# Patient Record
Sex: FEMALE | Race: WHITE | NOT HISPANIC OR LATINO | Employment: OTHER | URBAN - METROPOLITAN AREA
[De-identification: names, ages, dates, MRNs, and addresses within clinical notes are randomized per-mention and may not be internally consistent; named-entity substitution may affect disease eponyms.]

---

## 2017-02-13 ENCOUNTER — ALLSCRIPTS OFFICE VISIT (OUTPATIENT)
Dept: OTHER | Facility: OTHER | Age: 62
End: 2017-02-13

## 2017-02-13 DIAGNOSIS — E78.5 HYPERLIPIDEMIA: ICD-10-CM

## 2017-02-13 DIAGNOSIS — Z20.5 CONTACT WITH AND (SUSPECTED) EXPOSURE TO VIRAL HEPATITIS: ICD-10-CM

## 2017-02-13 DIAGNOSIS — D69.6 THROMBOCYTOPENIA (HCC): ICD-10-CM

## 2017-07-07 ENCOUNTER — APPOINTMENT (OUTPATIENT)
Dept: RADIOLOGY | Facility: CLINIC | Age: 62
End: 2017-07-07
Payer: COMMERCIAL

## 2017-07-07 ENCOUNTER — OFFICE VISIT (OUTPATIENT)
Dept: URGENT CARE | Facility: CLINIC | Age: 62
End: 2017-07-07
Payer: COMMERCIAL

## 2017-07-07 DIAGNOSIS — M25.561 PAIN IN RIGHT KNEE: ICD-10-CM

## 2017-07-07 PROCEDURE — 99213 OFFICE O/P EST LOW 20 MIN: CPT

## 2017-07-07 PROCEDURE — 73564 X-RAY EXAM KNEE 4 OR MORE: CPT

## 2017-07-08 ENCOUNTER — GENERIC CONVERSION - ENCOUNTER (OUTPATIENT)
Dept: OTHER | Facility: OTHER | Age: 62
End: 2017-07-08

## 2017-07-17 ENCOUNTER — ALLSCRIPTS OFFICE VISIT (OUTPATIENT)
Dept: OTHER | Facility: OTHER | Age: 62
End: 2017-07-17

## 2017-07-20 ENCOUNTER — GENERIC CONVERSION - ENCOUNTER (OUTPATIENT)
Dept: OTHER | Facility: OTHER | Age: 62
End: 2017-07-20

## 2017-08-04 ENCOUNTER — GENERIC CONVERSION - ENCOUNTER (OUTPATIENT)
Dept: OTHER | Facility: OTHER | Age: 62
End: 2017-08-04

## 2017-08-07 ENCOUNTER — GENERIC CONVERSION - ENCOUNTER (OUTPATIENT)
Dept: OTHER | Facility: OTHER | Age: 62
End: 2017-08-07

## 2017-08-09 ENCOUNTER — GENERIC CONVERSION - ENCOUNTER (OUTPATIENT)
Dept: OTHER | Facility: OTHER | Age: 62
End: 2017-08-09

## 2017-08-22 ENCOUNTER — ALLSCRIPTS OFFICE VISIT (OUTPATIENT)
Dept: OTHER | Facility: OTHER | Age: 62
End: 2017-08-22

## 2017-08-24 ENCOUNTER — GENERIC CONVERSION - ENCOUNTER (OUTPATIENT)
Dept: OTHER | Facility: OTHER | Age: 62
End: 2017-08-24

## 2017-09-07 ENCOUNTER — GENERIC CONVERSION - ENCOUNTER (OUTPATIENT)
Dept: OTHER | Facility: OTHER | Age: 62
End: 2017-09-07

## 2017-10-23 ENCOUNTER — ALLSCRIPTS OFFICE VISIT (OUTPATIENT)
Dept: OTHER | Facility: OTHER | Age: 62
End: 2017-10-23

## 2017-10-27 ENCOUNTER — ALLSCRIPTS OFFICE VISIT (OUTPATIENT)
Dept: OTHER | Facility: OTHER | Age: 62
End: 2017-10-27

## 2017-10-27 DIAGNOSIS — M25.50 PAIN IN JOINT: ICD-10-CM

## 2017-10-28 NOTE — PROGRESS NOTES
Assessment  1  Myalgia and myositis (729 1)  2  Hyperlipidemia (272 4) (E78 5)   · Obtain recent levels  3  Arthralgia of multiple joints (719 49) (M25 50)  4  Spider bite (989 5,E905 1) (T63 301A)    Plan  Arthralgia of multiple joints    · Start: Meloxicam 15 MG Oral Tablet; TAKE 1 TABLET DAILY    Discussion/Summary    PE not overly impressive  ?? spider bite had any influence or just a co-incidence  Did get the flu vaccine  ?due to statin  Will stop the statin and check labs  Trial of mobic  May need LDH and myoglobin  RTC two weeks  Possible side effects of new medications were reviewed with the patient/guardian today  The treatment plan was reviewed with the patient/guardian  The patient/guardian understands and agrees with the treatment plan      Chief Complaint  Pt c/o muscle aches throughout whole body x 2 months    also had spider bite on right bicep several weeks ago  History of Present Illness  HPI: WF presents c/o several week h/o intermittent severe diffuse muscle aches/pain and occasionally joint pain  Had a spider bite and noted a larger area of rash and a bump, just around the time her s/s started  Fhx of RA  No redness or swelling  No fever or chills  No new meds recently, but on a statin  No travel  No other s/s  Taking OTC tylenol and NSAID's w/o relief  Reports the pain becomes incapacitating at times  Review of Systems    Constitutional: feeling poorly-- and-- feeling tired, but-- no fever-- and-- no chills  Cardiovascular: no chest pain  Respiratory: no shortness of breath  Gastrointestinal: no abdominal pain  Genitourinary: no complaints of dysuria, no incontinence, no pelvic pain, no dysmenorrhea, no vaginal discharge or abnormal vaginal bleeding  Musculoskeletal: as noted in HPI  Neurological: no complaints of headache, no confusion, no numbness or tingling, no dizziness or fainting  Active Problems  1  Allergic rhinitis (477 9) (J30 9)  2   Asymptomatic menopausal state (V49 81) (Z78 0)  3  Chronic cough (786 2) (R05)  4  Esophageal reflux (530 81) (K21 9)  5  Generalized anxiety disorder (300 02) (F41 1)  6  Hyperlipidemia (272 4) (E78 5)  7  Internal derangement of right knee (717 9) (M23 91)  8  Menopause (627 2)  9  Need for immunization against influenza (V04 81) (Z23)  10  Positive depression screening (796 4) (Z13 89)  11  Screening for colon cancer (V76 51) (Z12 11)  12  Screening for deficiency anemia (V78 1) (Z13 0)  13  Screening for depression (V79 0) (Z13 89)  14  Screening for osteoporosis (V82 81) (Z13 820)  15  Tobacco use (305 1) (Z72 0)  16  Varicosities of leg (454 9) (I83 90)  17  Visit for pre-operative examination (V72 84) (Z01 818)  18  Visit for screening mammogram (V76 12) (Z12 31)  19  Well adult on routine health check (V70 0) (Z00 00)    Past Medical History  Active Problems And Past Medical History Reviewed: The active problems and past medical history were reviewed and updated today  Surgical History  Surgical History Reviewed: The surgical history was reviewed and updated today  Social History   · Denied: History of Alcohol   · Caffeine Use   · Current smoker (305 1) (F17 200)   · Denied: History of Drug Use   · Marital History - Currently    · Occupation:   · 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   · Tobacco use (305 1) (Z72 0)  The social history was reviewed and updated today  The social history was reviewed and is unchanged  Family History  Family History Reviewed: The family history was reviewed and updated today  Current Meds  1  BusPIRone HCl - 30 MG Oral Tablet; TAKE 1 TABLET DAILY; Therapy: 51UCJ9910 to Recorded  2  ClonazePAM 2 MG Oral Tablet; TAKE 1 TABLET EVERY 12 HOURS AS NEEDED; Therapy: ([facility]) to Recorded  3  Omeprazole 40 MG Oral Capsule Delayed Release; TAKE 1 CAPSULE DAILY;    Therapy: 22OUV7050 to (PEZNB:63ACY9925)  Requested for: 00LVS2956; Last ST:06FSK3083 Ordered  4  Simvastatin 40 MG Oral Tablet; take 1 tablet by mouth once daily; Therapy: 85NNR3020 to ([facility])  Requested for: 26Nrj3092; Last   Rx:[id number] Ordered  5  Venlafaxine HCl  MG Oral Capsule Extended Release 24 Hour; take 1 capsule by   mouth once daily; Therapy: 26Psj8890 to (Evaluate:63Hba7460)  Requested for: 80Xam7508; Last   Rx:87Xmp8163 Ordered  6  Venlafaxine HCl ER 75 MG Oral Capsule Extended Release 24 Hour; take 1 capsule by   mouth once daily; Therapy: 96RJJ9172 to (Evaluate:12Xry2223)  Requested for: 29Ura9862; Last   Rx:65Jqd5472 Ordered    The medication list was reviewed and updated today  Allergies  1  No Known Drug Allergies    Vitals   Recorded: 36OYK6622 10:20AM   Temperature 97 9 F   Heart Rate 78   Respiration 18   Systolic 720   Diastolic 72   Height 5 ft 4 in   Weight 146 lb    BMI Calculated 25 06   BSA Calculated 1 71     Physical Exam    Constitutional   General appearance: No acute distress, well appearing and well nourished  Eyes   Conjunctiva and lids: No swelling, erythema or discharge  Pupils and irises: Equal, round and reactive to light  Ears, Nose, Mouth, and Throat   Oropharynx: Normal with no erythema, edema, exudate or lesions  Pulmonary   Respiratory effort: No increased work of breathing or signs of respiratory distress  Auscultation of lungs: Clear to auscultation  Cardiovascular   Auscultation of heart: Normal rate and rhythm, normal S1 and S2, without murmurs  Abdomen   Abdomen: Non-tender, no masses  Musculoskeletal   Gait and station: Normal     Psychiatric   Orientation to person, place, and time: Normal     Mood and affect: Normal     Additional Exam:  Muscles w/o any spasms, increase temp, or erythema, but diffuse tenderness  Joints w/o inflammation, bogginess, ulnar deviation, or increase temp  ROM wnl          Signatures   Electronically signed by : KEVIN Carbone ; Oct 27 2017 10:52AM EST (Author)

## 2017-11-02 ENCOUNTER — GENERIC CONVERSION - ENCOUNTER (OUTPATIENT)
Dept: OTHER | Facility: OTHER | Age: 62
End: 2017-11-02

## 2017-11-13 ENCOUNTER — GENERIC CONVERSION - ENCOUNTER (OUTPATIENT)
Dept: OTHER | Facility: OTHER | Age: 62
End: 2017-11-13

## 2017-12-01 ENCOUNTER — GENERIC CONVERSION - ENCOUNTER (OUTPATIENT)
Dept: OTHER | Facility: OTHER | Age: 62
End: 2017-12-01

## 2017-12-07 ENCOUNTER — GENERIC CONVERSION - ENCOUNTER (OUTPATIENT)
Dept: FAMILY MEDICINE CLINIC | Facility: CLINIC | Age: 62
End: 2017-12-07

## 2017-12-18 ENCOUNTER — GENERIC CONVERSION - ENCOUNTER (OUTPATIENT)
Dept: OTHER | Facility: OTHER | Age: 62
End: 2017-12-18

## 2017-12-21 ENCOUNTER — GENERIC CONVERSION - ENCOUNTER (OUTPATIENT)
Dept: FAMILY MEDICINE CLINIC | Facility: CLINIC | Age: 62
End: 2017-12-21

## 2018-01-03 ENCOUNTER — APPOINTMENT (OUTPATIENT)
Dept: LAB | Facility: HOSPITAL | Age: 63
End: 2018-01-03
Payer: COMMERCIAL

## 2018-01-03 ENCOUNTER — GENERIC CONVERSION - ENCOUNTER (OUTPATIENT)
Dept: OTHER | Facility: OTHER | Age: 63
End: 2018-01-03

## 2018-01-03 DIAGNOSIS — R35.0 FREQUENCY OF MICTURITION: ICD-10-CM

## 2018-01-03 DIAGNOSIS — M54.50 LOW BACK PAIN: ICD-10-CM

## 2018-01-03 LAB
BACTERIA UR QL AUTO: ABNORMAL /HPF
BILIRUB UR QL STRIP: NEGATIVE
CLARITY UR: CLEAR
COLOR UR: YELLOW
GLUCOSE UR STRIP-MCNC: NEGATIVE MG/DL
HGB UR QL STRIP.AUTO: NEGATIVE
HYALINE CASTS #/AREA URNS LPF: ABNORMAL /LPF
KETONES UR STRIP-MCNC: NEGATIVE MG/DL
LEUKOCYTE ESTERASE UR QL STRIP: ABNORMAL
NITRITE UR QL STRIP: NEGATIVE
NON-SQ EPI CELLS URNS QL MICRO: ABNORMAL /HPF
PH UR STRIP.AUTO: 6 [PH] (ref 4.5–8)
PROT UR STRIP-MCNC: NEGATIVE MG/DL
RBC #/AREA URNS AUTO: ABNORMAL /HPF
SP GR UR STRIP.AUTO: 1 (ref 1–1.03)
UROBILINOGEN UR QL STRIP.AUTO: 0.2 E.U./DL
WBC #/AREA URNS AUTO: ABNORMAL /HPF

## 2018-01-03 PROCEDURE — 81001 URINALYSIS AUTO W/SCOPE: CPT

## 2018-01-04 ENCOUNTER — GENERIC CONVERSION - ENCOUNTER (OUTPATIENT)
Dept: OTHER | Facility: OTHER | Age: 63
End: 2018-01-04

## 2018-01-10 NOTE — RESULT NOTES
Verified Results  (1) D-DIMER 75KGL9127 03:06PM Rama Velazquez Order Number: [id number]     Order Number: [id number]     Test Name Result Flag Reference   D-DIMER 432 ng/ml (FEU) H 0-424       Plan  Elevated d-dimer    · VAS LOWER LIMB VENOUS DUPLEX STUDY, UNILATERAL/LIMITED;  Unilateral  Side:Right; Status:Hold For - Scheduling; Requested for:18Feb2016;

## 2018-01-12 NOTE — RESULT NOTES
Verified Results  (1) PLATELET COUNT 00PVK2914 02:04PM Rama Velazquez Order Number: [id number]     Order Number: [id number]     Test Name Result Flag Reference   PLATELET COUNT 570 Thousands/uL  149-390   MPV 11 2 fL  8 9-12 7

## 2018-01-13 VITALS
RESPIRATION RATE: 16 BRPM | HEIGHT: 63 IN | WEIGHT: 135 LBS | SYSTOLIC BLOOD PRESSURE: 114 MMHG | HEART RATE: 70 BPM | BODY MASS INDEX: 23.92 KG/M2 | TEMPERATURE: 98.1 F | DIASTOLIC BLOOD PRESSURE: 68 MMHG

## 2018-01-13 VITALS
TEMPERATURE: 97.9 F | HEART RATE: 78 BPM | RESPIRATION RATE: 18 BRPM | BODY MASS INDEX: 24.92 KG/M2 | DIASTOLIC BLOOD PRESSURE: 72 MMHG | HEIGHT: 64 IN | SYSTOLIC BLOOD PRESSURE: 128 MMHG | WEIGHT: 146 LBS

## 2018-01-14 VITALS
SYSTOLIC BLOOD PRESSURE: 108 MMHG | DIASTOLIC BLOOD PRESSURE: 70 MMHG | TEMPERATURE: 98.6 F | HEIGHT: 64 IN | HEART RATE: 70 BPM | RESPIRATION RATE: 18 BRPM

## 2018-01-14 VITALS
HEIGHT: 64 IN | RESPIRATION RATE: 16 BRPM | SYSTOLIC BLOOD PRESSURE: 92 MMHG | HEART RATE: 72 BPM | DIASTOLIC BLOOD PRESSURE: 70 MMHG | TEMPERATURE: 98.2 F

## 2018-01-16 NOTE — RESULT NOTES
Verified Results  (1) CBC/PLT/DIFF 86DTC8513 07:48AM Julia Merino Order Number: [id number]     Order Number: [id number]     Test Name Result Flag Reference   WBC COUNT 4 68 Thousand/uL  4 31-10 16   RBC COUNT 4 55 Million/uL  3 81-5 12   HEMOGLOBIN 14 6 g/dL  11 5-15 4   HEMATOCRIT 44 5 %  34 8-46  1   MCV 98 fL  82-98   MCH 32 1 pg  26 8-34 3   MCHC 32 8 g/dL  31 4-37 4   RDW 12 9 %  11 6-15 1   MPV 10 6 fL  8 9-12 7   PLATELET COUNT 450 Thousands/uL L 149-390   NEUTROPHILS RELATIVE PERCENT 61 %  43-75   LYMPHOCYTES RELATIVE PERCENT 27 %  14-44   MONOCYTES RELATIVE PERCENT 10 %  4-12   EOSINOPHILS RELATIVE PERCENT 2 %  0-6   BASOPHILS RELATIVE PERCENT 0 %  0-1   NEUTROPHILS ABSOLUTE COUNT 2 84 Thousands/µL  1 85-7 62   LYMPHOCYTES ABSOLUTE COUNT 1 25 Thousands/µL  0 60-4 47   MONOCYTES ABSOLUTE COUNT 0 48 Thousand/µL  0 17-1 22   EOSINOPHILS ABSOLUTE COUNT 0 09 Thousand/µL  0 00-0 61   BASOPHILS ABSOLUTE COUNT 0 02 Thousands/µL  0 00-0 10     (1) COMPREHENSIVE METABOLIC PANEL 98YLE4545 78:46DR Julia Merino Order Number: [id number]      National Kidney Disease Education Program recommendations are as follows:  GFR calculation is accurate only with a steady state creatinine  Chronic Kidney disease less than 60 ml/min/1 73 sq  meters  Kidney failure less than 15 ml/min/1 73 sq  meters  Test Name Result Flag Reference   GLUCOSE,RANDM 72 mg/dL     If the patient is fasting, the ADA then defines impaired fasting glucose as > 100 mg/dL and diabetes as > or equal to 123 mg/dL     SODIUM 142 mmol/L  136-145   POTASSIUM 4 5 mmol/L  3 5-5 3   CHLORIDE 106 mmol/L  100-108   CARBON DIOXIDE 26 mmol/L  21-32   ANION GAP (CALC) 10 mmol/L  4-13   BLOOD UREA NITROGEN 21 mg/dL  5-25   CREATININE 0 82 mg/dL  0 60-1 30   Standardized to IDMS reference method   CALCIUM 8 8 mg/dL  8 3-10 1   BILI, TOTAL 0 49 mg/dL  0 20-1 00   ALK PHOSPHATAS 75 U/L     ALT (SGPT) 28 U/L  12-78   AST(SGOT) 15 U/L  5-45 ALBUMIN 3 9 g/dL  3 5-5 0   TOTAL PROTEIN 6 7 g/dL  6 4-8 2   eGFR Non-African American      >60 0 ml/min/1 73sq m     (1) LIPID PANEL, FASTING [id number] 07:48AM Cydney Khoury Order Number: [id number]      Triglyceride:         Normal              <150 mg/dl       Borderline High    150-199 mg/dl       High               200-499 mg/dl       Very High          >499 mg/dl  Cholesterol:         Desirable        <200 mg/dl      Borderline High  200-239 mg/dl      High             >239 mg/dl  HDL Cholesterol:        High    >59 mg/dL      Low     <41 mg/dL     Test Name Result Flag Reference   CHOLESTEROL 201 mg/dL H    HDL,DIRECT 59 mg/dL  40-60   LDL CHOLESTEROL CALCULATED 103 mg/dL H 0-100   TRIGLYCERIDES 197 mg/dL H <=150       Plan  Thrombocytopenia    · (1) CBC/PLT/DIFF; Status:Active;  Requested for:08Mar2016;

## 2018-01-17 NOTE — RESULT NOTES
Verified Results  * XR KNEE 4+ VW RIGHT INJURY [id number] 05:29PM Celeste Ramirez Order Number: [id number]     Test Name Result Flag Reference   XR KNEE 4+ VW RIGHT (Report)     RIGHT KNEE     INDICATION: Right knee pain  COMPARISON: None     VIEWS: 4     IMAGES: 4     FINDINGS:     There is no acute fracture or dislocation  There is no joint effusion  No degenerative changes  No lytic or blastic lesions are seen  Soft tissues are unremarkable  IMPRESSION:     No acute osseous abnormality         Workstation performed: [id number]     Signed by:   Cali Moreno MD   7/7/17

## 2018-01-22 VITALS
HEART RATE: 70 BPM | TEMPERATURE: 97.9 F | HEIGHT: 64 IN | RESPIRATION RATE: 16 BRPM | SYSTOLIC BLOOD PRESSURE: 112 MMHG | DIASTOLIC BLOOD PRESSURE: 76 MMHG | OXYGEN SATURATION: 96 %

## 2018-01-23 NOTE — RESULT NOTES
Verified Results  (1) URINALYSIS w URINE C/S REFLEX (will reflex a microscopy if leukocytes, occult blood, or nitrites are not within normal limits) [id number] 06:24PM Vickie Altamirano Order Number: [id number]     Test Name Result Flag Reference   COLOR Yellow     CLARITY Clear     PH UA 6 0  4 5-8 0   LEUKOCYTE ESTERASE UA Small A Negative   NITRITE UA Negative  Negative   PROTEIN UA Negative mg/dl  Negative   GLUCOSE UA Negative mg/dl  Negative   KETONES UA Negative mg/dl  Negative   UROBILINOGEN UA 0 2 E U /dl  0 2, 1 0 E U /dl   BILIRUBIN UA Negative  Negative   BLOOD UA Negative  Negative   SPECIFIC GRAVITY UA 1 005  1 003-1 030   BACTERIA None Seen /hpf  None Seen, Occasional   EPITHELIAL CELLS None Seen /hpf  None Seen, Occasional   HYALINE CASTS None Seen /lpf  None Seen   RBC UA None Seen /hpf  None Seen, 0-5   WBC UA 2-4 /hpf A None Seen, 0-5, 5-55, 5-65

## 2018-01-24 VITALS
HEIGHT: 64 IN | OXYGEN SATURATION: 95 % | TEMPERATURE: 97.3 F | HEART RATE: 75 BPM | RESPIRATION RATE: 16 BRPM | SYSTOLIC BLOOD PRESSURE: 100 MMHG | DIASTOLIC BLOOD PRESSURE: 74 MMHG

## 2018-01-24 VITALS
DIASTOLIC BLOOD PRESSURE: 70 MMHG | TEMPERATURE: 98.1 F | RESPIRATION RATE: 16 BRPM | HEART RATE: 70 BPM | SYSTOLIC BLOOD PRESSURE: 110 MMHG | HEIGHT: 64 IN

## 2018-01-24 VITALS
TEMPERATURE: 98.8 F | DIASTOLIC BLOOD PRESSURE: 62 MMHG | HEART RATE: 72 BPM | SYSTOLIC BLOOD PRESSURE: 94 MMHG | HEIGHT: 64 IN | RESPIRATION RATE: 16 BRPM

## 2018-02-05 ENCOUNTER — TELEPHONE (OUTPATIENT)
Dept: INTERNAL MEDICINE CLINIC | Facility: CLINIC | Age: 63
End: 2018-02-05

## 2018-02-05 DIAGNOSIS — F41.9 ANXIETY: Primary | ICD-10-CM

## 2018-02-05 RX ORDER — VENLAFAXINE HYDROCHLORIDE 150 MG/1
1 CAPSULE, EXTENDED RELEASE ORAL DAILY
COMMUNITY
Start: 2017-08-31 | End: 2018-02-05 | Stop reason: SDUPTHER

## 2018-02-05 RX ORDER — VENLAFAXINE HYDROCHLORIDE 75 MG/1
75 CAPSULE, EXTENDED RELEASE ORAL DAILY
Qty: 30 CAPSULE | Refills: 3 | Status: SHIPPED | OUTPATIENT
Start: 2018-02-05 | End: 2018-07-31 | Stop reason: SDUPTHER

## 2018-02-05 RX ORDER — VENLAFAXINE HYDROCHLORIDE 75 MG/1
1 CAPSULE, EXTENDED RELEASE ORAL DAILY
COMMUNITY
Start: 2013-05-16 | End: 2018-02-05 | Stop reason: SDUPTHER

## 2018-02-05 RX ORDER — VENLAFAXINE HYDROCHLORIDE 150 MG/1
150 CAPSULE, EXTENDED RELEASE ORAL DAILY
Qty: 30 CAPSULE | Refills: 5 | Status: SHIPPED | OUTPATIENT
Start: 2018-02-05 | End: 2018-07-31 | Stop reason: SDUPTHER

## 2018-05-10 ENCOUNTER — TELEPHONE (OUTPATIENT)
Dept: INTERNAL MEDICINE CLINIC | Facility: CLINIC | Age: 63
End: 2018-05-10

## 2018-05-10 DIAGNOSIS — N39.0 URINARY TRACT INFECTION WITHOUT HEMATURIA, SITE UNSPECIFIED: Primary | ICD-10-CM

## 2018-05-10 RX ORDER — CIPROFLOXACIN 500 MG/1
500 TABLET, FILM COATED ORAL EVERY 12 HOURS SCHEDULED
Qty: 6 TABLET | Refills: 0 | Status: SHIPPED | OUTPATIENT
Start: 2018-05-10 | End: 2018-05-13

## 2018-05-10 NOTE — TELEPHONE ENCOUNTER
Pt has been having s/s of bladder infection, urgency, back pain  Will you call something in for her? [facility] [address]

## 2018-05-14 PROBLEM — M25.50 ARTHRALGIA OF MULTIPLE JOINTS: Status: ACTIVE | Noted: 2017-10-27

## 2018-05-14 PROBLEM — K59.00 CONSTIPATION: Status: ACTIVE | Noted: 2017-12-01

## 2018-05-14 PROBLEM — M79.10 MUSCLE PAIN: Status: ACTIVE | Noted: 2017-10-27

## 2018-06-06 ENCOUNTER — OFFICE VISIT (OUTPATIENT)
Dept: INTERNAL MEDICINE CLINIC | Facility: CLINIC | Age: 63
End: 2018-06-06
Payer: COMMERCIAL

## 2018-06-06 VITALS
HEIGHT: 63 IN | HEART RATE: 70 BPM | TEMPERATURE: 98.7 F | DIASTOLIC BLOOD PRESSURE: 62 MMHG | SYSTOLIC BLOOD PRESSURE: 104 MMHG

## 2018-06-06 DIAGNOSIS — R35.0 FREQUENCY OF MICTURITION: Primary | ICD-10-CM

## 2018-06-06 DIAGNOSIS — R06.6 HICCUPS: ICD-10-CM

## 2018-06-06 DIAGNOSIS — G89.29 CHRONIC PAIN OF RIGHT KNEE: ICD-10-CM

## 2018-06-06 DIAGNOSIS — R30.0 DYSURIA: ICD-10-CM

## 2018-06-06 DIAGNOSIS — N89.8 VAGINAL DISCHARGE: ICD-10-CM

## 2018-06-06 DIAGNOSIS — M25.561 CHRONIC PAIN OF RIGHT KNEE: ICD-10-CM

## 2018-06-06 DIAGNOSIS — M25.50 ARTHRALGIA, UNSPECIFIED JOINT: ICD-10-CM

## 2018-06-06 PROCEDURE — 99213 OFFICE O/P EST LOW 20 MIN: CPT | Performed by: INTERNAL MEDICINE

## 2018-06-06 RX ORDER — BUSPIRONE HYDROCHLORIDE 30 MG/1
TABLET ORAL
COMMUNITY

## 2018-06-06 RX ORDER — TRETINOIN 1 MG/G
CREAM TOPICAL
COMMUNITY
End: 2020-01-13 | Stop reason: SDUPTHER

## 2018-06-06 RX ORDER — TRETINOIN 0.5 MG/G
CREAM TOPICAL
COMMUNITY
End: 2018-07-06 | Stop reason: ALTCHOICE

## 2018-06-06 RX ORDER — CLONAZEPAM 1 MG/1
TABLET ORAL
COMMUNITY

## 2018-06-06 RX ORDER — OMEPRAZOLE 40 MG/1
CAPSULE, DELAYED RELEASE ORAL
COMMUNITY
End: 2018-09-28 | Stop reason: SDUPTHER

## 2018-06-06 RX ORDER — SULFAMETHOXAZOLE AND TRIMETHOPRIM 800; 160 MG/1; MG/1
1 TABLET ORAL EVERY 12 HOURS SCHEDULED
Qty: 20 TABLET | Refills: 0 | Status: SHIPPED | OUTPATIENT
Start: 2018-06-06 | End: 2018-06-16

## 2018-06-06 RX ORDER — PHENAZOPYRIDINE HYDROCHLORIDE 200 MG/1
200 TABLET, FILM COATED ORAL
Qty: 10 TABLET | Refills: 0 | Status: SHIPPED | OUTPATIENT
Start: 2018-06-06 | End: 2018-07-06 | Stop reason: ALTCHOICE

## 2018-06-06 NOTE — PATIENT INSTRUCTIONS
Dysuria   WHAT YOU NEED TO KNOW:   What is dysuria? Dysuria is difficulty urinating, or pain, burning, or discomfort when you urinate  Dysuria is usually a symptom of another problem  What causes dysuria? The following are the most common causes of dysuria:  · Infections, such as urinary tract infections and sexually transmitted infections     · Trauma, such as bicycle injury or sexual abuse     · Abnormal structure, such as narrowing of the urethra     · Blockage, such as kidney stones     · Medical conditions, such as constipation, enlarged prostate, and reactive arthritis     · Chemicals, such as douches, spermicides, and bubble bath     · Medicines, such as chemotherapy  What increases my risk for dysuria? · Dehydration     · Loss of bladder muscle strength due to older age     · Holding urine in your bladder for a long period of time     · Caffeine, soda, alcohol, and citrus drinks  What other symptoms may I have with dysuria? · Fever     · Cloudy, bad smelling urine     · Urge to urinate often but urinating little     · Back, side, or abdominal pain     · Blood in your urine     · Discharge that smells bad     · Itching     · Swelling of your genitals     · Pain with ejaculation or bowel movement (for males)  How is dysuria diagnosed? Your healthcare provider will examine you and ask about your symptoms  Tell your healthcare provider about any medicines you are taking  You may need any of the following to find the cause of your dysuria:  · A urine test  may be done to look for bacteria, blood, or pus  · A blood test  may be done to look for signs of infection  · A cystoscopy  allows healthcare providers to look for problems inside your bladder  A scope is put into your bladder through your urethra  The scope is a flexible tube with a light and camera on the end  · An ultrasound  uses sound waves to show pictures on a monitor  An ultrasound may be done to show problems in your bladder    How is dysuria treated? Treatment will depend on what is causing your dysuria  Your healthcare provider may refer you to a specialist, such as a urologist or a nephrologist  Vickie Veag may need medicines to help treat a bacterial infection or help decrease bladder spasms  How can I manage my dysuria? · Drink more liquids  Liquids help flush out bacteria that may be causing an infection  Ask your healthcare provider how much liquid to drink each day and which liquids are best for you  · Take sitz baths as directed  Fill a bathtub with 4 to 6 inches of warm water  You may also use a sitz bath pan that fits over a toilet  Sit in the sitz bath for 20 minutes  Do this 2 to 3 times a day, or as directed  The warm water can help decrease pain and swelling  When should I seek immediate care? · You have severe back, side, or abdominal pain  · You have fever and shaking chills  · You vomit several times in a row  When should I contact my healthcare provider? · Your symptoms do not go away, even after treatment  · You have questions or concerns about your condition or care  CARE AGREEMENT:   You have the right to help plan your care  Learn about your health condition and how it may be treated  Discuss treatment options with your caregivers to decide what care you want to receive  You always have the right to refuse treatment  The above information is an  only  It is not intended as medical advice for individual conditions or treatments  Talk to your doctor, nurse or pharmacist before following any medical regimen to see if it is safe and effective for you  © 2017 [address] Information is for End User's use only and may not be sold, redistributed or otherwise used for commercial purposes  All illustrations and images included in CareNotes® are the copyrighted property of A D A M , Inc  or Omari Andujar

## 2018-06-06 NOTE — PROGRESS NOTES
Assessment/Plan:    No problem-specific Assessment & Plan notes found for this encounter  Diagnoses and all orders for this visit:    Frequency of micturition  -     UA w Reflex to Microscopic w Reflex to Culture; Future  -     sulfamethoxazole-trimethoprim (BACTRIM DS) 800-160 mg per tablet; Take 1 tablet by mouth every 12 (twelve) hours for 10 days  -     phenazopyridine (PYRIDIUM) 200 mg tablet; Take 1 tablet (200 mg total) by mouth 3 (three) times a day with meals    Dysuria  -     UA w Reflex to Microscopic w Reflex to Culture; Future  -     sulfamethoxazole-trimethoprim (BACTRIM DS) 800-160 mg per tablet; Take 1 tablet by mouth every 12 (twelve) hours for 10 days  -     phenazopyridine (PYRIDIUM) 200 mg tablet; Take 1 tablet (200 mg total) by mouth 3 (three) times a day with meals    Hiccups    Chronic pain of right knee    Arthralgia, unspecified joint    Vaginal discharge    Other orders  -     tretinoin (RETIN-A) 0 1 % cream; tretinoin 0 1 % topical cream  -     tretinoin (REFISSA) 0 05 % cream; tretinoin 0 05 % topical cream  -     omeprazole (PriLOSEC) 40 MG capsule; omeprazole 40 mg capsule,delayed release  -     Multiple Vitamins-Minerals (MULTIVITAMIN ADULTS PO); multivitamin  -     clonazePAM (KlonoPIN) 1 mg tablet; clonazepam 1 mg tablet  -     busPIRone (BUSPAR) 30 MG tablet; buspirone 30 mg tablet        A/P; Unable to provide a urine  ??vaginal d/c any connection  Has a routine pap coming up  Will empirically treat  Pt to drop off a urine prior to starting the abx  If s/s persists, ? US or urodynamics  Pt scheduled for an acute visit and will address other issues in four weeks at her routine  Subjective:      Patient ID: Mary English is a 63 y o  female  WF presents c/o several week h/o urinary s/s  Pt reported frequency, burning, and incontinence at times  Was given abx and s/s resolved, only to return after the abx stopped  Same s/s  No gross blood  No fever or chills   No change in chronic back pain  No in diet or meds  Occasional vaginal d/c  No high risk sexual encounters  Has several other c/o's like always having hiccups after she eats anything, continued right knee pain after sx, and diffuse joint pain w/o swelling or redness  Urinary Tract Infection    Associated symptoms include frequency  Pertinent negatives include no chills, flank pain, nausea or vomiting  The following portions of the patient's history were reviewed and updated as appropriate:   She  has a past medical history of Abnormal finding in urine; Arthralgia of multiple joints; Biceps tendinitis, right; Bursitis of right knee; Bursitis of right shoulder; Chondrocalcinosis; COPD exacerbation ([address] ); 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; Drug intolerance; Dysfunction of right eustachian tube; Dysfunctional uterine bleeding; Edema; Elevated d-dimer; Exposure to hepatitis C; Fracture of fibula; Mild cervical dysplasia; Myalgia; Myositis; Palpitations; Pre-syncope; Thrombocytopenia ([facility] [address] ); and Uterine leiomyoma  She   Patient Active Problem List    Diagnosis Date Noted    Constipation 12/01/2017    Arthralgia of multiple joints 10/27/2017    Muscle pain 10/27/2017    Varicosities of leg 03/10/2016    Esophageal reflux 02/16/2015    Allergic rhinitis 09/20/2013    Symptomatic menopausal or female climacteric states 06/07/2013    Chronic cough 05/16/2013    Generalized anxiety disorder 05/16/2013    Hyperlipidemia 05/16/2013     She  has a past surgical history that includes Bladder surgery; Breast biopsy (Right); Colposcopy (01/06/1998); Dental surgery; Endometrial biopsy (05/21/1993); Hysteroscopy; and Tubal ligation  Her family history includes Aortic aneurysm in her family; Arrhythmia in her mother; Arthritis in her family; Coronary artery disease in her father and mother; Diabetes in her family and father; Heart disease in her family; Stroke in her mother    She  reports that she has been smoking  She has never used smokeless tobacco  She reports that she does not drink alcohol or use drugs  Current Outpatient Prescriptions   Medication Sig Dispense Refill    busPIRone (BUSPAR) 30 MG tablet buspirone 30 mg tablet      clonazePAM (KlonoPIN) 1 mg tablet clonazepam 1 mg tablet      Multiple Vitamins-Minerals (MULTIVITAMIN ADULTS PO) multivitamin      omeprazole (PriLOSEC) 40 MG capsule omeprazole 40 mg capsule,delayed release      tretinoin (REFISSA) 0 05 % cream tretinoin 0 05 % topical cream      tretinoin (RETIN-A) 0 1 % cream tretinoin 0 1 % topical cream      venlafaxine (EFFEXOR-XR) 150 mg 24 hr capsule Take 1 capsule (150 mg total) by mouth daily 30 capsule 5    venlafaxine (EFFEXOR-XR) 75 mg 24 hr capsule Take 1 capsule (75 mg total) by mouth daily 30 capsule 3    phenazopyridine (PYRIDIUM) 200 mg tablet Take 1 tablet (200 mg total) by mouth 3 (three) times a day with meals 10 tablet 0    sulfamethoxazole-trimethoprim (BACTRIM DS) 800-160 mg per tablet Take 1 tablet by mouth every 12 (twelve) hours for 10 days 20 tablet 0     No current facility-administered medications for this visit  Current Outpatient Prescriptions on File Prior to Visit   Medication Sig    venlafaxine (EFFEXOR-XR) 150 mg 24 hr capsule Take 1 capsule (150 mg total) by mouth daily    venlafaxine (EFFEXOR-XR) 75 mg 24 hr capsule Take 1 capsule (75 mg total) by mouth daily     No current facility-administered medications on file prior to visit  She has No Known Allergies       Review of Systems   Constitutional: Negative for activity change, chills, diaphoresis, fatigue and fever  Respiratory: Negative for cough, chest tightness, shortness of breath and wheezing  Cardiovascular: Negative for chest pain, palpitations and leg swelling  Gastrointestinal: Negative for abdominal pain, constipation, diarrhea, nausea and vomiting  Genitourinary: Positive for dysuria, frequency and vaginal discharge  Negative for difficulty urinating and flank pain  Musculoskeletal: Negative for arthralgias, gait problem and myalgias  Neurological: Negative for light-headedness and headaches  Psychiatric/Behavioral: Negative for confusion  The patient is not nervous/anxious  Objective:      /62   Pulse 70   Temp 98 7 °F (37 1 °C) (Tympanic)   Ht 5' 2 5" (1 588 m)          Physical Exam   Constitutional: She is oriented to person, place, and time  She appears well-developed and well-nourished  No distress  HENT:   Head: Normocephalic and atraumatic  Mouth/Throat: Oropharynx is clear and moist    Eyes: Conjunctivae and EOM are normal  Pupils are equal, round, and reactive to light  Cardiovascular: Normal rate, regular rhythm and normal heart sounds  Pulmonary/Chest: Effort normal and breath sounds normal  No respiratory distress  She has no wheezes  Abdominal: Soft  Bowel sounds are normal  She exhibits no distension  There is no tenderness  There is no guarding  Neurological: She is alert and oriented to person, place, and time  Psychiatric: She has a normal mood and affect  Her behavior is normal  Judgment and thought content normal    Nursing note and vitals reviewed

## 2018-06-11 ENCOUNTER — APPOINTMENT (OUTPATIENT)
Dept: LAB | Facility: CLINIC | Age: 63
End: 2018-06-11
Payer: COMMERCIAL

## 2018-06-11 DIAGNOSIS — R30.0 DYSURIA: ICD-10-CM

## 2018-06-11 DIAGNOSIS — R35.0 FREQUENCY OF MICTURITION: ICD-10-CM

## 2018-06-11 LAB
BACTERIA UR QL AUTO: ABNORMAL /HPF
BILIRUB UR QL STRIP: ABNORMAL
CLARITY UR: CLEAR
COLOR UR: ABNORMAL
GLUCOSE UR STRIP-MCNC: NEGATIVE MG/DL
HGB UR QL STRIP.AUTO: NEGATIVE
KETONES UR STRIP-MCNC: ABNORMAL MG/DL
LEUKOCYTE ESTERASE UR QL STRIP: ABNORMAL
NITRITE UR QL STRIP: POSITIVE
NON-SQ EPI CELLS URNS QL MICRO: ABNORMAL /HPF
PH UR STRIP.AUTO: 5.5 [PH] (ref 4.5–8)
PROT UR STRIP-MCNC: NEGATIVE MG/DL
RBC #/AREA URNS AUTO: ABNORMAL /HPF
SP GR UR STRIP.AUTO: 1.01 (ref 1–1.03)
UROBILINOGEN UR QL STRIP.AUTO: 2 E.U./DL
WBC #/AREA URNS AUTO: ABNORMAL /HPF

## 2018-06-11 PROCEDURE — 81001 URINALYSIS AUTO W/SCOPE: CPT

## 2018-06-27 ENCOUNTER — TELEPHONE (OUTPATIENT)
Dept: INTERNAL MEDICINE CLINIC | Facility: CLINIC | Age: 63
End: 2018-06-27

## 2018-06-27 NOTE — TELEPHONE ENCOUNTER
States bladder infection she was in for still did not go away  She constantly feels she has to urinate and pressure  She finished the antibiotics, but still has the feelings  Please advise

## 2018-07-02 PROBLEM — R35.0 FREQUENCY OF MICTURITION: Status: ACTIVE | Noted: 2018-07-02

## 2018-07-02 PROBLEM — G56.00 CARPAL TUNNEL SYNDROME: Status: ACTIVE | Noted: 2018-07-02

## 2018-07-02 PROBLEM — M25.569 KNEE PAIN: Status: ACTIVE | Noted: 2018-07-02

## 2018-07-02 PROBLEM — M70.40 PREPATELLAR BURSITIS: Status: ACTIVE | Noted: 2018-07-02

## 2018-07-02 PROBLEM — M65.30 ACQUIRED TRIGGER FINGER: Status: ACTIVE | Noted: 2018-07-02

## 2018-07-02 PROBLEM — R82.90 ABNORMAL FINDING IN URINE: Status: ACTIVE | Noted: 2018-07-02

## 2018-07-06 ENCOUNTER — OFFICE VISIT (OUTPATIENT)
Dept: INTERNAL MEDICINE CLINIC | Facility: CLINIC | Age: 63
End: 2018-07-06
Payer: COMMERCIAL

## 2018-07-06 VITALS
SYSTOLIC BLOOD PRESSURE: 92 MMHG | HEIGHT: 63 IN | DIASTOLIC BLOOD PRESSURE: 68 MMHG | TEMPERATURE: 98.7 F | RESPIRATION RATE: 18 BRPM | HEART RATE: 66 BPM | OXYGEN SATURATION: 97 %

## 2018-07-06 DIAGNOSIS — Z12.11 SCREEN FOR COLON CANCER: Primary | ICD-10-CM

## 2018-07-06 DIAGNOSIS — F41.1 GENERALIZED ANXIETY DISORDER: ICD-10-CM

## 2018-07-06 DIAGNOSIS — Z72.0 TOBACCO ABUSE: ICD-10-CM

## 2018-07-06 DIAGNOSIS — Z12.39 SCREENING FOR BREAST CANCER: ICD-10-CM

## 2018-07-06 DIAGNOSIS — R10.2 SUPRAPUBIC ABDOMINAL PAIN: ICD-10-CM

## 2018-07-06 DIAGNOSIS — K21.9 GASTROESOPHAGEAL REFLUX DISEASE WITHOUT ESOPHAGITIS: ICD-10-CM

## 2018-07-06 DIAGNOSIS — R30.0 DYSURIA: ICD-10-CM

## 2018-07-06 DIAGNOSIS — R35.0 FREQUENCY OF MICTURITION: ICD-10-CM

## 2018-07-06 DIAGNOSIS — E78.2 MIXED HYPERLIPIDEMIA: ICD-10-CM

## 2018-07-06 PROBLEM — M25.569 KNEE PAIN: Status: RESOLVED | Noted: 2018-07-02 | Resolved: 2018-07-06

## 2018-07-06 PROBLEM — M70.40 PREPATELLAR BURSITIS: Status: RESOLVED | Noted: 2018-07-02 | Resolved: 2018-07-06

## 2018-07-06 PROBLEM — R82.90 ABNORMAL FINDING IN URINE: Status: RESOLVED | Noted: 2018-07-02 | Resolved: 2018-07-06

## 2018-07-06 PROBLEM — M79.10 MUSCLE PAIN: Status: RESOLVED | Noted: 2017-10-27 | Resolved: 2018-07-06

## 2018-07-06 PROCEDURE — 99214 OFFICE O/P EST MOD 30 MIN: CPT | Performed by: INTERNAL MEDICINE

## 2018-07-06 RX ORDER — HYDROXYZINE HYDROCHLORIDE 25 MG/1
TABLET, FILM COATED ORAL
Refills: 0 | COMMUNITY
Start: 2018-06-12 | End: 2019-01-28

## 2018-07-06 NOTE — PATIENT INSTRUCTIONS

## 2018-07-06 NOTE — PROGRESS NOTES
Assessment/Plan:    No problem-specific Assessment & Plan notes found for this encounter  Diagnoses and all orders for this visit:    Screen for colon cancer  -     Ambulatory referral to Gastroenterology; Future    Mixed hyperlipidemia  -     Comprehensive metabolic panel; Future  -     Lipid Panel with Direct LDL reflex; Future  -     TSH, 3rd generation; Future    Screening for breast cancer  -     Mammo screening bilateral w 3d & cad; Future    Generalized anxiety disorder    Gastroesophageal reflux disease without esophagitis    Frequency of micturition  -     CBC and differential; Future  -     UA w Reflex to Microscopic w Reflex to Culture; Future  -     Cancel: POCT urine dip  -     US bladder with post void residual; Future  -     Ambulatory referral to Urology; Future    Dysuria  -     CBC and differential; Future  -     UA w Reflex to Microscopic w Reflex to Culture; Future  -     Cancel: POCT urine dip  -     US bladder with post void residual; Future  -     Ambulatory referral to Urology; Future    Suprapubic abdominal pain  -     US bladder with post void residual; Future  -     Ambulatory referral to Urology; Future    Tobacco abuse    Other orders  -     hydrOXYzine HCL (ATARAX) 25 mg tablet;   -     Cancel: Mammo screening bilateral w 3d & cad; Future      A/P: Unable to void  Last U/A w/o infection and s/s going on too long to just be a URI  Will check U/A and bladder US Refer to   Needs gyn as well  Wean tobacco  Order mammo and GI eval for colonoscopy  Had a hep c in the past  Check labs  Continue current treatment pending the labs  May need to go back on her cholesterol meds  RTC six months for routine  Subjective:      Patient ID: Vaishali Eagle is a 63 y o  female  WF RTC for f/u hyperlipidemia, GERD, etc  Doing ok and no new issues  Pt with urinary frequency,  discomfort with urination and discomfort over the lower abd  No blood, but a vaginal d/c at times  No fever or chills   Has been going for several months  Past U/A were negative  H/o prolapse bladder per the pt  Remains active w/o difficulty and no falls reported  Due for labs and mammo and colon cancer screen  Still smoking  The following portions of the patient's history were reviewed and updated as appropriate:   She  has a past medical history of Abnormal finding in urine; Arthralgia of multiple joints; Biceps tendinitis, right; Bursitis of right knee; Bursitis of right shoulder; Chondrocalcinosis; COPD exacerbation ([address] ); 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; Drug intolerance; Dysfunction of right eustachian tube; Dysfunctional uterine bleeding; Edema; Elevated d-dimer; Exposure to hepatitis C; Fracture of fibula; GERD (gastroesophageal reflux disease); Mild cervical dysplasia; Myalgia; Myositis; Palpitations; Pre-syncope; Thrombocytopenia ([address] ); Urinary tract infection; and Uterine leiomyoma  She   Patient Active Problem List    Diagnosis Date Noted    Tobacco abuse 07/06/2018    Acquired trigger finger 07/02/2018    Carpal tunnel syndrome 07/02/2018    Frequency of micturition 07/02/2018    Constipation 12/01/2017    Arthralgia of multiple joints 10/27/2017    Spider veins of both lower extremities 06/29/2016    Varicose veins of right lower extremity with pain 06/29/2016    Varicosities of leg 03/10/2016    Esophageal reflux 02/16/2015    Allergic rhinitis 09/20/2013    Symptomatic menopausal or female climacteric states 06/07/2013    Chronic cough 05/16/2013    Generalized anxiety disorder 05/16/2013    Hyperlipidemia 05/16/2013     She  has a past surgical history that includes Bladder surgery; Breast biopsy (Right); Colposcopy (01/06/1998); Dental surgery; Endometrial biopsy (05/21/1993); Hysteroscopy; and Tubal ligation    Her family history includes Aortic aneurysm in her family; Arrhythmia in her mother; Arthritis in her family; Coronary artery disease in her father and mother; Diabetes in her family and father; Heart disease in her family; Stroke in her mother  She  reports that she has been smoking  She has never used smokeless tobacco  She reports that she does not drink alcohol or use drugs  Current Outpatient Prescriptions   Medication Sig Dispense Refill    busPIRone (BUSPAR) 30 MG tablet buspirone 30 mg tablet      clonazePAM (KlonoPIN) 1 mg tablet clonazepam 1 mg tablet      hydrOXYzine HCL (ATARAX) 25 mg tablet   0    omeprazole (PriLOSEC) 40 MG capsule omeprazole 40 mg capsule,delayed release      venlafaxine (EFFEXOR-XR) 150 mg 24 hr capsule Take 1 capsule (150 mg total) by mouth daily 30 capsule 5    venlafaxine (EFFEXOR-XR) 75 mg 24 hr capsule Take 1 capsule (75 mg total) by mouth daily 30 capsule 3    Multiple Vitamins-Minerals (MULTIVITAMIN ADULTS PO) multivitamin      tretinoin (RETIN-A) 0 1 % cream tretinoin 0 1 % topical cream       No current facility-administered medications for this visit  Current Outpatient Prescriptions on File Prior to Visit   Medication Sig    busPIRone (BUSPAR) 30 MG tablet buspirone 30 mg tablet    clonazePAM (KlonoPIN) 1 mg tablet clonazepam 1 mg tablet    omeprazole (PriLOSEC) 40 MG capsule omeprazole 40 mg capsule,delayed release    venlafaxine (EFFEXOR-XR) 150 mg 24 hr capsule Take 1 capsule (150 mg total) by mouth daily    venlafaxine (EFFEXOR-XR) 75 mg 24 hr capsule Take 1 capsule (75 mg total) by mouth daily    Multiple Vitamins-Minerals (MULTIVITAMIN ADULTS PO) multivitamin    tretinoin (RETIN-A) 0 1 % cream tretinoin 0 1 % topical cream    [DISCONTINUED] phenazopyridine (PYRIDIUM) 200 mg tablet Take 1 tablet (200 mg total) by mouth 3 (three) times a day with meals    [DISCONTINUED] tretinoin (REFISSA) 0 05 % cream tretinoin 0 05 % topical cream     No current facility-administered medications on file prior to visit  She has No Known Allergies       Review of Systems   Constitutional: Negative for activity change, chills, diaphoresis, fatigue and fever  HENT: Negative  Eyes: Negative for visual disturbance  Respiratory: Negative for cough, chest tightness, shortness of breath and wheezing  Cardiovascular: Negative for chest pain, palpitations and leg swelling  Gastrointestinal: Negative for abdominal pain, constipation, diarrhea, nausea and vomiting  Endocrine: Negative for cold intolerance and heat intolerance  Genitourinary: Positive for difficulty urinating, dysuria, frequency, pelvic pain and urgency  Negative for flank pain, hematuria and vaginal discharge  Musculoskeletal: Negative for arthralgias, gait problem and myalgias  Neurological: Negative for dizziness, tremors, seizures, weakness, light-headedness and headaches  Psychiatric/Behavioral: Negative for confusion and dysphoric mood  The patient is not nervous/anxious  Objective:      BP 92/68 (BP Location: Left arm, Patient Position: Sitting, Cuff Size: Adult)   Pulse 66   Temp 98 7 °F (37 1 °C) (Tympanic)   Resp 18   Ht 5' 2 5" (1 588 m)   SpO2 97%          Physical Exam   Constitutional: She is oriented to person, place, and time  She appears well-developed and well-nourished  No distress  HENT:   Head: Normocephalic and atraumatic  Mouth/Throat: Oropharynx is clear and moist    Eyes: Conjunctivae and EOM are normal  Pupils are equal, round, and reactive to light  Neck: Neck supple  No JVD present  Cardiovascular: Normal rate, regular rhythm and normal heart sounds  No murmur heard  Pulmonary/Chest: Effort normal and breath sounds normal  No respiratory distress  She has no wheezes  Abdominal: Soft  Bowel sounds are normal  She exhibits no distension and no mass  There is no tenderness  There is no rebound and no guarding  Musculoskeletal: She exhibits no edema  Neurological: She is alert and oriented to person, place, and time  Psychiatric: She has a normal mood and affect   Her behavior is normal  Judgment and thought content normal    Nursing note and vitals reviewed

## 2018-07-11 ENCOUNTER — HOSPITAL ENCOUNTER (OUTPATIENT)
Dept: ULTRASOUND IMAGING | Facility: HOSPITAL | Age: 63
Discharge: HOME | End: 2018-07-11
Payer: COMMERCIAL

## 2018-07-11 DIAGNOSIS — R35.0 FREQUENCY OF MICTURITION: ICD-10-CM

## 2018-07-11 DIAGNOSIS — R30.0 DYSURIA: ICD-10-CM

## 2018-07-11 DIAGNOSIS — R10.2 SUPRAPUBIC ABDOMINAL PAIN: ICD-10-CM

## 2018-07-11 PROCEDURE — G0145 SCR C/V CYTO,THINLAYER,RESCR: HCPCS | Performed by: OBSTETRICS & GYNECOLOGY

## 2018-07-11 PROCEDURE — 51798 US URINE CAPACITY MEASURE: CPT

## 2018-07-12 ENCOUNTER — LAB REQUISITION (OUTPATIENT)
Dept: LAB | Facility: HOSPITAL | Age: 63
End: 2018-07-12
Payer: COMMERCIAL

## 2018-07-12 DIAGNOSIS — Z01.419 ENCOUNTER FOR GYNECOLOGICAL EXAMINATION WITHOUT ABNORMAL FINDING: ICD-10-CM

## 2018-07-17 ENCOUNTER — HOSPITAL ENCOUNTER (OUTPATIENT)
Dept: MAMMOGRAPHY | Facility: HOSPITAL | Age: 63
Discharge: HOME | End: 2018-07-17
Payer: COMMERCIAL

## 2018-07-17 DIAGNOSIS — Z51.81 THERAPEUTIC DRUG MONITORING: Primary | ICD-10-CM

## 2018-07-17 DIAGNOSIS — E78.2 MIXED HYPERLIPIDEMIA: Primary | ICD-10-CM

## 2018-07-17 DIAGNOSIS — Z12.39 SCREENING FOR BREAST CANCER: ICD-10-CM

## 2018-07-17 DIAGNOSIS — Z12.11 SCREENING FOR COLON CANCER: ICD-10-CM

## 2018-07-17 PROCEDURE — 77063 BREAST TOMOSYNTHESIS BI: CPT

## 2018-07-17 PROCEDURE — 77067 SCR MAMMO BI INCL CAD: CPT

## 2018-07-17 RX ORDER — ROSUVASTATIN CALCIUM 10 MG/1
10 TABLET, COATED ORAL DAILY
Qty: 90 TABLET | Refills: 0 | Status: SHIPPED | OUTPATIENT
Start: 2018-07-17 | End: 2018-07-31 | Stop reason: SDUPTHER

## 2018-07-19 LAB
LAB AP GYN PRIMARY INTERPRETATION: NORMAL
Lab: NORMAL

## 2018-07-26 DIAGNOSIS — R92.8 ABNORMAL MAMMOGRAM OF RIGHT BREAST: Primary | ICD-10-CM

## 2018-07-31 DIAGNOSIS — E78.2 MIXED HYPERLIPIDEMIA: ICD-10-CM

## 2018-07-31 DIAGNOSIS — F41.9 ANXIETY: ICD-10-CM

## 2018-07-31 RX ORDER — VENLAFAXINE HYDROCHLORIDE 75 MG/1
75 CAPSULE, EXTENDED RELEASE ORAL DAILY
Qty: 30 CAPSULE | Refills: 5 | Status: SHIPPED | OUTPATIENT
Start: 2018-07-31 | End: 2019-01-16 | Stop reason: SDUPTHER

## 2018-07-31 RX ORDER — VENLAFAXINE HYDROCHLORIDE 150 MG/1
150 CAPSULE, EXTENDED RELEASE ORAL DAILY
Qty: 30 CAPSULE | Refills: 5 | Status: SHIPPED | OUTPATIENT
Start: 2018-07-31 | End: 2019-01-16 | Stop reason: SDUPTHER

## 2018-07-31 RX ORDER — ROSUVASTATIN CALCIUM 10 MG/1
10 TABLET, COATED ORAL DAILY
Qty: 90 TABLET | Refills: 3 | Status: SHIPPED | OUTPATIENT
Start: 2018-07-31 | End: 2018-08-21 | Stop reason: SDUPTHER

## 2018-08-02 ENCOUNTER — TELEPHONE (OUTPATIENT)
Dept: MAMMOGRAPHY | Facility: CLINIC | Age: 63
End: 2018-08-02

## 2018-08-02 ENCOUNTER — HOSPITAL ENCOUNTER (OUTPATIENT)
Dept: MAMMOGRAPHY | Facility: HOSPITAL | Age: 63
Discharge: HOME | End: 2018-08-02
Attending: INTERNAL MEDICINE
Payer: COMMERCIAL

## 2018-08-02 ENCOUNTER — HOSPITAL ENCOUNTER (OUTPATIENT)
Dept: ULTRASOUND IMAGING | Facility: HOSPITAL | Age: 63
Discharge: HOME | End: 2018-08-02
Attending: INTERNAL MEDICINE
Payer: COMMERCIAL

## 2018-08-02 DIAGNOSIS — R92.8 ABNORMAL MAMMOGRAM OF RIGHT BREAST: ICD-10-CM

## 2018-08-02 DIAGNOSIS — R92.8 ABNORMAL MAMMOGRAM OF RIGHT BREAST: Primary | ICD-10-CM

## 2018-08-02 PROCEDURE — 76642 ULTRASOUND BREAST LIMITED: CPT

## 2018-08-02 NOTE — PROGRESS NOTES
Spoke with patient after Dr Theresa Alexander made                    recommendation for;      __x___ RIGHT ______LEFT      __x___Ultrasound guided  ______Stereotactic  Breast biopsy/aspiration      __x___Verbalized understanding        Blood thinners:  _____yes ___x__no    Date stopped: ____n/a_______    Biopsy teaching sheet given:  ___x____yes ______no  Information sheet provided by staff at [facility]

## 2018-08-08 ENCOUNTER — HOSPITAL ENCOUNTER (OUTPATIENT)
Dept: MAMMOGRAPHY | Facility: CLINIC | Age: 63
Discharge: HOME | End: 2018-08-08

## 2018-08-08 ENCOUNTER — HOSPITAL ENCOUNTER (OUTPATIENT)
Dept: ULTRASOUND IMAGING | Facility: CLINIC | Age: 63
Discharge: HOME | End: 2018-08-08
Payer: COMMERCIAL

## 2018-08-08 VITALS — HEART RATE: 68 BPM | SYSTOLIC BLOOD PRESSURE: 110 MMHG | DIASTOLIC BLOOD PRESSURE: 74 MMHG

## 2018-08-08 DIAGNOSIS — R92.8 ABNORMAL ULTRASOUND OF BREAST: ICD-10-CM

## 2018-08-08 DIAGNOSIS — R92.8 ABNORMAL FINDINGS ON DIAGNOSTIC IMAGING OF BREAST: ICD-10-CM

## 2018-08-08 DIAGNOSIS — R92.8 ABNORMAL MAMMOGRAM: ICD-10-CM

## 2018-08-08 PROCEDURE — 88305 TISSUE EXAM BY PATHOLOGIST: CPT | Performed by: PATHOLOGY

## 2018-08-08 PROCEDURE — 19083 BX BREAST 1ST LESION US IMAG: CPT

## 2018-08-08 RX ORDER — LIDOCAINE HYDROCHLORIDE 10 MG/ML
5 INJECTION, SOLUTION INFILTRATION; PERINEURAL ONCE
Status: COMPLETED | OUTPATIENT
Start: 2018-08-08 | End: 2018-08-08

## 2018-08-08 RX ADMIN — LIDOCAINE HYDROCHLORIDE 5 ML: 10 INJECTION, SOLUTION INFILTRATION; PERINEURAL at 11:28

## 2018-08-08 NOTE — PROGRESS NOTES
Procedure type:    ___x__ultrasound guided _____stereotactic    Breast:    _____Left ___x__Right    Location:1100 4cmfn    Needle:12ga [address]ia    # of passes:1    Clip:Wing    Performed by: Dr Bautista Rather held for 5 minutes by: Luisa Gupta(PCA)    Steri Strips:    __X___yes _____no    Band aid:    ___X__yes_____no    Tape and guaze:    _____yes _____no    Tolerated procedure:    ____X_yes _____no

## 2018-08-08 NOTE — PROGRESS NOTES
Patient arrived via:    ___x__ambulatory    _____wheelchair    _____stretcher      Domestic violence screen    ____x__negative______positive    Breast Implants:    ____x___yes ________no

## 2018-08-08 NOTE — PROGRESS NOTES
Ice pack given:    ____X_yes _____no    Discharge instructions signed by patient:    __X___yes _____no    Discharge instructions given to patient:    ___X__yes _____no    Discharged via:    __X___amulatory    _____wheelchair    _____stretcher    Stable on discharge:    ___XPOST LARGE CORE BREAST BIOPSY PATIENT INFORMATION      1  Place an ice pack inside your bra over the top of the dressing every hour for 20 minutes (20 minutes on, 60 minutes off)  Do this until bedtime  2  Do not shower or bathe until the following morning  3  You may bathe your breast carefully with the steri-strips in place  Be careful    Not to loosen them  The steri-strips will fall off in 3-5 days  4  You may have mild discomfort, and you may have some bruising where the   Needle entered the skin  This should clear within 5-7 days  5  If you need medicine for discomfort, take acetaminophen products such as   Tylenol  You may also take Advil or Motrin products  6  Do not participate in strenuous activities such as-tennis, aerobics, skiing,  Weight lifting, etc  for 24 hours  Refrain from swimming/soaking for 72 hours  7  Wearing a bra for sleeping may be more comfortable for the first 24-48 hours  8  Watch for continued bleeding, pain or fever over 101; please call with any questions or concerns  For procedures done at the [facility] call:  Misty Silva RN at [phone]  Alfredo Linn RN at [phone]                    *After 4 PM call the Interventional Radiology Department                    [phone] and ask to speak with the nurse on call  For procedures done at the [address] [place] [address] call:         Jamin Middleton RN at   *After 4 PM call the Interventional Radiology Department   [phone] and ask to speak with the nurse on call  For procedures done at [address] [place] [address] call:   The Radiology Nurse at [phone]  *After 4 PM call your physician, or go to the Emergency Department  9          The final results of your biopsy are usually available within one week  __yes ____no

## 2018-08-09 NOTE — PROGRESS NOTES
Post procedure call completed on 8/9/18 at 0914    Bleeding: _____yes ___x__no    Pain: _____yes ___x___no    Redness/Swelling: ______yes ___x___no    Band aid removed: _____yes ___x__no    Steri-Strips intact: ___x___yes _____no

## 2018-08-10 ENCOUNTER — TELEPHONE (OUTPATIENT)
Dept: MAMMOGRAPHY | Facility: CLINIC | Age: 63
End: 2018-08-10

## 2018-08-21 ENCOUNTER — OFFICE VISIT (OUTPATIENT)
Dept: INTERNAL MEDICINE CLINIC | Facility: CLINIC | Age: 63
End: 2018-08-21
Payer: COMMERCIAL

## 2018-08-21 VITALS
HEART RATE: 69 BPM | OXYGEN SATURATION: 97 % | TEMPERATURE: 99.3 F | RESPIRATION RATE: 16 BRPM | SYSTOLIC BLOOD PRESSURE: 106 MMHG | DIASTOLIC BLOOD PRESSURE: 72 MMHG | HEIGHT: 63 IN

## 2018-08-21 DIAGNOSIS — E78.2 MIXED HYPERLIPIDEMIA: ICD-10-CM

## 2018-08-21 DIAGNOSIS — F41.1 GENERALIZED ANXIETY DISORDER: Primary | ICD-10-CM

## 2018-08-21 PROBLEM — K59.00 CONSTIPATION: Status: RESOLVED | Noted: 2017-12-01 | Resolved: 2018-08-21

## 2018-08-21 PROCEDURE — 99213 OFFICE O/P EST LOW 20 MIN: CPT | Performed by: PHYSICIAN ASSISTANT

## 2018-08-21 RX ORDER — ROSUVASTATIN CALCIUM 10 MG/1
10 TABLET, COATED ORAL DAILY
Qty: 90 TABLET | Refills: 0 | Status: SHIPPED | OUTPATIENT
Start: 2018-08-21 | End: 2019-01-28

## 2018-08-21 RX ORDER — BUPROPION HYDROCHLORIDE 150 MG/1
TABLET ORAL EVERY MORNING
COMMUNITY
Start: 2018-08-11 | End: 2021-01-19

## 2018-08-21 NOTE — PROGRESS NOTES
Assessment/Plan:    Generalized anxiety disorder  Pt is improved with current regime managed by her psychiatrist  Would recommend she continue these medications  She will continue care with them for now  Diagnoses and all orders for this visit:    Generalized anxiety disorder    Mixed hyperlipidemia  -     rosuvastatin (CRESTOR) 10 MG tablet; Take 1 tablet (10 mg total) by mouth daily    Other orders  -     buPROPion (WELLBUTRIN XL) 150 mg 24 hr tablet; Take by mouth every morning            Subjective:      Patient ID: Clarissa Curtis is a 63 y o  female  Pt presents for psych evaluation  She continues to follow with a psychiatrist in [facility] and is unsure if she is going to continue with us or them  She is currently on klonopin, buspar, effexor and recently wellbutrin was added 2 weeks ago  She is noting improvement in her energy level and mood since the wellbutrin was added  She is tolerating these well and offers no new complaints  The following portions of the patient's history were reviewed and updated as appropriate:   She  has a past medical history of Abnormal finding in urine; Arthralgia of multiple joints; Biceps tendinitis, right; Bursitis of right knee; Bursitis of right shoulder; Chondrocalcinosis; COPD exacerbation ([address] ); 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; Drug intolerance; Dysfunction of right eustachian tube; Dysfunctional uterine bleeding; Edema; Elevated d-dimer; Exposure to hepatitis C; Fracture of fibula; GERD (gastroesophageal reflux disease); Mild cervical dysplasia; Myalgia; Myositis; Palpitations; Pre-syncope; Thrombocytopenia ([address] ); Urinary tract infection; and Uterine leiomyoma    She   Patient Active Problem List    Diagnosis Date Noted    Tobacco abuse 07/06/2018    Acquired trigger finger 07/02/2018    Carpal tunnel syndrome 07/02/2018    Frequency of micturition 07/02/2018    Arthralgia of multiple joints 10/27/2017    Spider veins of both lower extremities 06/29/2016    Varicosities of leg 03/10/2016    Esophageal reflux 02/16/2015    Allergic rhinitis 09/20/2013    Symptomatic menopausal or female climacteric states 06/07/2013    Chronic cough 05/16/2013    Generalized anxiety disorder 05/16/2013    Hyperlipidemia 05/16/2013     She  has a past surgical history that includes Bladder surgery; Breast biopsy (Right); Colposcopy (01/06/1998); Dental surgery; Endometrial biopsy (05/21/1993); Hysteroscopy; and Tubal ligation  Her family history includes Aortic aneurysm in her family; Arrhythmia in her mother; Arthritis in her family; Coronary artery disease in her father and mother; Diabetes in her family and father; Heart disease in her family; Stroke in her mother  She  reports that she has been smoking  She has never used smokeless tobacco  She reports that she does not drink alcohol or use drugs  Current Outpatient Prescriptions   Medication Sig Dispense Refill    buPROPion (WELLBUTRIN XL) 150 mg 24 hr tablet Take by mouth every morning        busPIRone (BUSPAR) 30 MG tablet TAKE 1 TABLET TID      clonazePAM (KlonoPIN) 1 mg tablet TAKE 1 TABLET TID      omeprazole (PriLOSEC) 40 MG capsule omeprazole 40 mg capsule,delayed release      tretinoin (RETIN-A) 0 1 % cream tretinoin 0 1 % topical cream      venlafaxine (EFFEXOR-XR) 150 mg 24 hr capsule Take 1 capsule (150 mg total) by mouth daily 30 capsule 5    venlafaxine (EFFEXOR-XR) 75 mg 24 hr capsule Take 1 capsule (75 mg total) by mouth daily 30 capsule 5    hydrOXYzine HCL (ATARAX) 25 mg tablet   0    rosuvastatin (CRESTOR) 10 MG tablet Take 1 tablet (10 mg total) by mouth daily 90 tablet 0     No current facility-administered medications for this visit        Current Outpatient Prescriptions on File Prior to Visit   Medication Sig    busPIRone (BUSPAR) 30 MG tablet TAKE 1 TABLET TID    clonazePAM (KlonoPIN) 1 mg tablet TAKE 1 TABLET TID    omeprazole (PriLOSEC) 40 MG capsule omeprazole 40 mg capsule,delayed release    tretinoin (RETIN-A) 0 1 % cream tretinoin 0 1 % topical cream    venlafaxine (EFFEXOR-XR) 150 mg 24 hr capsule Take 1 capsule (150 mg total) by mouth daily    venlafaxine (EFFEXOR-XR) 75 mg 24 hr capsule Take 1 capsule (75 mg total) by mouth daily    [DISCONTINUED] rosuvastatin (CRESTOR) 10 MG tablet Take 1 tablet (10 mg total) by mouth daily    hydrOXYzine HCL (ATARAX) 25 mg tablet     [DISCONTINUED] Multiple Vitamins-Minerals (MULTIVITAMIN ADULTS PO) multivitamin     No current facility-administered medications on file prior to visit  She has No Known Allergies       Review of Systems   Constitutional: Negative for chills and fever  HENT: Negative for congestion, ear pain, hearing loss, postnasal drip, rhinorrhea, sinus pain, sinus pressure, sore throat and trouble swallowing  Eyes: Negative for pain and visual disturbance  Respiratory: Negative for cough, chest tightness, shortness of breath and wheezing  Cardiovascular: Negative  Negative for chest pain, palpitations and leg swelling  Gastrointestinal: Negative for abdominal pain, blood in stool, constipation, diarrhea, nausea and vomiting  Endocrine: Negative for cold intolerance, heat intolerance, polydipsia, polyphagia and polyuria  Genitourinary: Negative for difficulty urinating, dysuria, flank pain and urgency  Musculoskeletal: Negative for arthralgias, back pain, gait problem and myalgias  Skin: Negative for rash  Allergic/Immunologic: Negative  Neurological: Negative for dizziness, weakness, light-headedness and headaches  Hematological: Negative  Psychiatric/Behavioral: Negative for behavioral problems, dysphoric mood and sleep disturbance  The patient is not nervous/anxious            Objective:      /72 (BP Location: Right arm, Patient Position: Sitting, Cuff Size: Large)   Pulse 69   Temp 99 3 °F (37 4 °C)   Resp 16   Ht 5' 2 5" (1 588 m)   SpO2 97% Physical Exam   Constitutional: She is oriented to person, place, and time  She appears well-developed and well-nourished  No distress  HENT:   Head: Normocephalic and atraumatic  Right Ear: External ear normal    Left Ear: External ear normal    Nose: Nose normal    Mouth/Throat: Oropharynx is clear and moist  No oropharyngeal exudate  Eyes: Conjunctivae and EOM are normal  Pupils are equal, round, and reactive to light  Right eye exhibits no discharge  Left eye exhibits no discharge  No scleral icterus  Neck: Normal range of motion  Neck supple  No thyromegaly present  Cardiovascular: Normal rate, regular rhythm and normal heart sounds  Exam reveals no gallop and no friction rub  No murmur heard  Pulmonary/Chest: Effort normal and breath sounds normal  No respiratory distress  She has no wheezes  She has no rales  Abdominal: Soft  Bowel sounds are normal  She exhibits no distension  There is no tenderness  Musculoskeletal: Normal range of motion  She exhibits no edema, tenderness or deformity  Neurological: She is alert and oriented to person, place, and time  No cranial nerve deficit  Skin: Skin is warm and dry  She is not diaphoretic  Psychiatric: She has a normal mood and affect   Her behavior is normal  Judgment and thought content normal

## 2018-09-28 DIAGNOSIS — K21.9 GASTROESOPHAGEAL REFLUX DISEASE WITHOUT ESOPHAGITIS: Primary | ICD-10-CM

## 2018-09-28 RX ORDER — OMEPRAZOLE 40 MG/1
40 CAPSULE, DELAYED RELEASE ORAL DAILY
Qty: 90 CAPSULE | Refills: 2 | Status: SHIPPED | OUTPATIENT
Start: 2018-09-28 | End: 2019-02-19 | Stop reason: SDUPTHER

## 2018-10-24 ENCOUNTER — TELEPHONE (OUTPATIENT)
Dept: INTERNAL MEDICINE CLINIC | Facility: CLINIC | Age: 63
End: 2018-10-24

## 2018-10-24 NOTE — TELEPHONE ENCOUNTER
Pt called, was put on crestor back in august, stopped this med because she is having bad joint pain, leg cramps, can't take it, pt will need something else  Is there anything she can do for the joint pain?  Getting worse, this has been going on for the last 6 months? pls advise

## 2018-10-24 NOTE — TELEPHONE ENCOUNTER
Needs an appt if the stopping the statin and after 2-4 weeks it hasn't resolved  Then it is probably something else and not the med

## 2018-10-26 ENCOUNTER — IMMUNIZATION (OUTPATIENT)
Dept: INTERNAL MEDICINE CLINIC | Facility: CLINIC | Age: 63
End: 2018-10-26
Payer: COMMERCIAL

## 2018-10-26 DIAGNOSIS — Z23 ENCOUNTER FOR IMMUNIZATION: ICD-10-CM

## 2018-10-26 PROCEDURE — 90682 RIV4 VACC RECOMBINANT DNA IM: CPT

## 2018-10-26 PROCEDURE — 90471 IMMUNIZATION ADMIN: CPT

## 2019-01-07 ENCOUNTER — TELEPHONE (OUTPATIENT)
Dept: INTERNAL MEDICINE CLINIC | Facility: CLINIC | Age: 64
End: 2019-01-07

## 2019-01-07 DIAGNOSIS — Z12.11 SCREENING FOR COLON CANCER: Primary | ICD-10-CM

## 2019-01-07 DIAGNOSIS — J40 BRONCHITIS: Primary | ICD-10-CM

## 2019-01-07 RX ORDER — AZITHROMYCIN 250 MG/1
TABLET, FILM COATED ORAL
Qty: 6 TABLET | Refills: 0 | Status: SHIPPED | OUTPATIENT
Start: 2019-01-07 | End: 2019-01-11

## 2019-01-07 NOTE — TELEPHONE ENCOUNTER
Pt canceled appt to day due to vomiting and diarrhea  Rescheduled for 6 month rtn, however, she also has a sinus infection and would like to know if you would send in antibiotic for her? Please advise so we can call her back

## 2019-01-16 DIAGNOSIS — F41.9 ANXIETY: ICD-10-CM

## 2019-01-16 RX ORDER — VENLAFAXINE HYDROCHLORIDE 150 MG/1
150 CAPSULE, EXTENDED RELEASE ORAL DAILY
Qty: 30 CAPSULE | Refills: 0 | Status: SHIPPED | OUTPATIENT
Start: 2019-01-16 | End: 2019-03-11 | Stop reason: SDUPTHER

## 2019-01-16 RX ORDER — VENLAFAXINE HYDROCHLORIDE 75 MG/1
75 CAPSULE, EXTENDED RELEASE ORAL DAILY
Qty: 30 CAPSULE | Refills: 0 | Status: SHIPPED | OUTPATIENT
Start: 2019-01-16 | End: 2019-03-11 | Stop reason: SDUPTHER

## 2019-01-28 ENCOUNTER — OFFICE VISIT (OUTPATIENT)
Dept: INTERNAL MEDICINE CLINIC | Facility: CLINIC | Age: 64
End: 2019-01-28
Payer: COMMERCIAL

## 2019-01-28 VITALS
TEMPERATURE: 98.3 F | SYSTOLIC BLOOD PRESSURE: 126 MMHG | RESPIRATION RATE: 18 BRPM | BODY MASS INDEX: 26.28 KG/M2 | HEIGHT: 63 IN | OXYGEN SATURATION: 94 % | DIASTOLIC BLOOD PRESSURE: 70 MMHG | HEART RATE: 87 BPM

## 2019-01-28 DIAGNOSIS — Z12.11 SCREEN FOR COLON CANCER: ICD-10-CM

## 2019-01-28 DIAGNOSIS — K21.9 GASTROESOPHAGEAL REFLUX DISEASE WITHOUT ESOPHAGITIS: ICD-10-CM

## 2019-01-28 DIAGNOSIS — E78.2 MIXED HYPERLIPIDEMIA: Primary | ICD-10-CM

## 2019-01-28 DIAGNOSIS — F41.1 GENERALIZED ANXIETY DISORDER: ICD-10-CM

## 2019-01-28 DIAGNOSIS — J01.11 ACUTE RECURRENT FRONTAL SINUSITIS: ICD-10-CM

## 2019-01-28 DIAGNOSIS — Z13.1 SCREENING FOR DIABETES MELLITUS (DM): ICD-10-CM

## 2019-01-28 DIAGNOSIS — Z72.0 TOBACCO ABUSE: ICD-10-CM

## 2019-01-28 DIAGNOSIS — Z11.59 NEED FOR HEPATITIS C SCREENING TEST: ICD-10-CM

## 2019-01-28 PROBLEM — M25.50 ARTHRALGIA OF MULTIPLE JOINTS: Status: RESOLVED | Noted: 2017-10-27 | Resolved: 2019-01-28

## 2019-01-28 PROBLEM — R35.0 FREQUENCY OF MICTURITION: Status: RESOLVED | Noted: 2018-07-02 | Resolved: 2019-01-28

## 2019-01-28 PROCEDURE — 99214 OFFICE O/P EST MOD 30 MIN: CPT | Performed by: INTERNAL MEDICINE

## 2019-01-28 RX ORDER — AMOXICILLIN AND CLAVULANATE POTASSIUM 875; 125 MG/1; MG/1
1 TABLET, FILM COATED ORAL EVERY 12 HOURS SCHEDULED
Qty: 20 TABLET | Refills: 0 | Status: SHIPPED | OUTPATIENT
Start: 2019-01-28 | End: 2019-02-07

## 2019-01-28 RX ORDER — GUAIFENESIN 600 MG
600 TABLET, EXTENDED RELEASE 12 HR ORAL EVERY 12 HOURS SCHEDULED
Qty: 20 TABLET | Refills: 0 | Status: SHIPPED | OUTPATIENT
Start: 2019-01-28 | End: 2019-02-26

## 2019-01-28 RX ORDER — FLUTICASONE PROPIONATE 50 MCG
1 SPRAY, SUSPENSION (ML) NASAL DAILY
Qty: 16 G | Refills: 0 | Status: SHIPPED | OUTPATIENT
Start: 2019-01-28 | End: 2019-04-09 | Stop reason: ALTCHOICE

## 2019-01-28 NOTE — PATIENT INSTRUCTIONS

## 2019-01-28 NOTE — PROGRESS NOTES
Assessment/Plan:    No problem-specific Assessment & Plan notes found for this encounter  Diagnoses and all orders for this visit:    Mixed hyperlipidemia  -     CBC and differential; Future  -     Comprehensive metabolic panel; Future  -     Lipid Panel with Direct LDL reflex; Future  -     TSH, 3rd generation with Free T4 reflex; Future    Gastroesophageal reflux disease without esophagitis  -     CBC and differential; Future  -     Comprehensive metabolic panel; Future    Generalized anxiety disorder  -     TSH, 3rd generation with Free T4 reflex; Future    Tobacco abuse    Screening for diabetes mellitus (DM)  -     Hemoglobin A1C; Future    Need for hepatitis C screening test    Screen for colon cancer  -     Ambulatory referral to Gastroenterology; Future    Acute recurrent frontal sinusitis  -     amoxicillin-clavulanate (AUGMENTIN) 875-125 mg per tablet; Take 1 tablet by mouth every 12 (twelve) hours for 10 days  -     guaiFENesin (MUCINEX) 600 mg 12 hr tablet; Take 1 tablet (600 mg total) by mouth every 12 (twelve) hours  -     fluticasone (FLONASE) 50 mcg/act nasal spray; 1 spray into each nostril daily    Other orders  -     Cancel: Hemoglobin A1C; Future      A/P: Doing well and will check labs  Will try Augmentin, mucinex, and INS for the sinuses  Recommend she wean the tobacco  Still off the statin due to GI issues and muscle aches  Await labs and see if pt still needs meds  Vaccines are up to date  Refer for Colonoscopy  Continue current treatment and RTC six months for routine  Subjective:      Patient ID: Hal Quinones is a 66 y o  female  WF RTC for f/u hyperlipidemia, ANAND, etc  Doing well and no new issues, but continues with URI s/s for the past four weeks despite a trial of zithromax  Never seen for the URI  No fever or chills, but notes intermittent sore throat, PND, yellow nasal congestion, and productive cough  No SOB or wheezing  Remains active w/o difficulty and no falls   ANAND and GERD are controlled  DJD pain is no worse  Still smoking  Due for labs ad CRC  The following portions of the patient's history were reviewed and updated as appropriate:   She  has a past medical history of Abnormal finding in urine; Arthralgia of multiple joints; Biceps tendinitis, right; Bursitis of right knee; Bursitis of right shoulder; Chondrocalcinosis; COPD exacerbation ([address] ); 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; Drug intolerance; Dysfunction of right eustachian tube; Dysfunctional uterine bleeding; Edema; Elevated d-dimer; Exposure to hepatitis C; Fracture of fibula; Generalized anxiety disorder; GERD (gastroesophageal reflux disease); Mild cervical dysplasia; Myalgia; Myositis; Palpitations; Pre-syncope; Thrombocytopenia ([address] ); Urinary tract infection; and Uterine leiomyoma  She   Patient Active Problem List    Diagnosis Date Noted    Tobacco abuse 07/06/2018    Acquired trigger finger 07/02/2018    Carpal tunnel syndrome 07/02/2018    Spider veins of both lower extremities 06/29/2016    Varicosities of leg 03/10/2016    Esophageal reflux 02/16/2015    Allergic rhinitis 09/20/2013    Symptomatic menopausal or female climacteric states 06/07/2013    Chronic cough 05/16/2013    Generalized anxiety disorder 05/16/2013    Hyperlipidemia 05/16/2013     She  has a past surgical history that includes Bladder surgery; Breast biopsy (Right); Colposcopy (01/06/1998); Dental surgery; Endometrial biopsy (05/21/1993); Hysteroscopy; Tubal ligation; and US guided breast biopsy right complete (Right, 8/8/2018)  Her family history includes Aortic aneurysm in her family; Arrhythmia in her mother; Arthritis in her family; Coronary artery disease in her father and mother; Diabetes in her family and father; Heart disease in her family; Stroke in her mother  She  reports that she has been smoking    She has never used smokeless tobacco  She reports that she does not drink alcohol or use drugs  Current Outpatient Prescriptions   Medication Sig Dispense Refill    amoxicillin-clavulanate (AUGMENTIN) 875-125 mg per tablet Take 1 tablet by mouth every 12 (twelve) hours for 10 days 20 tablet 0    buPROPion (WELLBUTRIN XL) 150 mg 24 hr tablet Take by mouth every morning        busPIRone (BUSPAR) 30 MG tablet TAKE 1 TABLET TID      clonazePAM (KlonoPIN) 1 mg tablet TAKE 1 TABLET TID      fluticasone (FLONASE) 50 mcg/act nasal spray 1 spray into each nostril daily 16 g 0    guaiFENesin (MUCINEX) 600 mg 12 hr tablet Take 1 tablet (600 mg total) by mouth every 12 (twelve) hours 20 tablet 0    omeprazole (PriLOSEC) 40 MG capsule Take 1 capsule (40 mg total) by mouth daily 90 capsule 2    tretinoin (RETIN-A) 0 1 % cream tretinoin 0 1 % topical cream      venlafaxine (EFFEXOR-XR) 150 mg 24 hr capsule Take 1 capsule (150 mg total) by mouth daily 30 capsule 0    venlafaxine (EFFEXOR-XR) 75 mg 24 hr capsule Take 1 capsule (75 mg total) by mouth daily 30 capsule 0     No current facility-administered medications for this visit        Current Outpatient Prescriptions on File Prior to Visit   Medication Sig    buPROPion (WELLBUTRIN XL) 150 mg 24 hr tablet Take by mouth every morning      busPIRone (BUSPAR) 30 MG tablet TAKE 1 TABLET TID    clonazePAM (KlonoPIN) 1 mg tablet TAKE 1 TABLET TID    omeprazole (PriLOSEC) 40 MG capsule Take 1 capsule (40 mg total) by mouth daily    tretinoin (RETIN-A) 0 1 % cream tretinoin 0 1 % topical cream    venlafaxine (EFFEXOR-XR) 150 mg 24 hr capsule Take 1 capsule (150 mg total) by mouth daily    venlafaxine (EFFEXOR-XR) 75 mg 24 hr capsule Take 1 capsule (75 mg total) by mouth daily    [DISCONTINUED] buPROPion (WELLBUTRIN XL) 300 mg 24 hr tablet Take 300 mg by mouth daily    [DISCONTINUED] hydrOXYzine HCL (ATARAX) 25 mg tablet     [DISCONTINUED] rosuvastatin (CRESTOR) 10 MG tablet Take 1 tablet (10 mg total) by mouth daily (Patient not taking: Reported on 1/28/2019 )     No current facility-administered medications on file prior to visit  She has No Known Allergies       Review of Systems   Constitutional: Negative for activity change, chills, diaphoresis, fatigue and fever  HENT: Positive for congestion, postnasal drip, rhinorrhea, sinus pressure and sore throat  Negative for ear discharge, ear pain, facial swelling and sinus pain  Eyes: Negative for visual disturbance  Respiratory: Positive for cough  Negative for chest tightness, shortness of breath and wheezing  Cardiovascular: Negative for chest pain, palpitations and leg swelling  Gastrointestinal: Negative for abdominal pain, constipation, diarrhea, nausea and vomiting  Endocrine: Negative for cold intolerance and heat intolerance  Genitourinary: Negative for difficulty urinating, dysuria and frequency  Musculoskeletal: Negative for arthralgias, gait problem and myalgias  Neurological: Negative for dizziness, syncope, weakness, light-headedness, numbness and headaches  Psychiatric/Behavioral: Negative for confusion, dysphoric mood and sleep disturbance  The patient is nervous/anxious  Objective:      /70 (BP Location: Left arm, Patient Position: Sitting, Cuff Size: Adult)   Pulse 87   Temp 98 3 °F (36 8 °C) (Tympanic)   Resp 18   Ht 5' 2 5" (1 588 m)   SpO2 94%   BMI 26 28 kg/m²          Physical Exam   Constitutional: She is oriented to person, place, and time  She appears well-developed and well-nourished  No distress  HENT:   Head: Normocephalic and atraumatic  Mouth/Throat: Oropharynx is clear and moist    Sinus tenderness with turbinates red and inflamed  Cobblestoning noted  Eyes: Pupils are equal, round, and reactive to light  Conjunctivae and EOM are normal    Neck: Neck supple  No JVD present  Cardiovascular: Normal rate, regular rhythm and normal heart sounds  No murmur heard    Pulmonary/Chest: Effort normal and breath sounds normal  No respiratory distress  She has no wheezes  She has no rales  Abdominal: Soft  Bowel sounds are normal  She exhibits no distension  There is no tenderness  Musculoskeletal: She exhibits no edema  Lymphadenopathy:     She has no cervical adenopathy  Neurological: She is alert and oriented to person, place, and time  Psychiatric: She has a normal mood and affect  Her behavior is normal  Judgment and thought content normal    Nursing note and vitals reviewed

## 2019-02-19 DIAGNOSIS — K21.9 GASTROESOPHAGEAL REFLUX DISEASE WITHOUT ESOPHAGITIS: ICD-10-CM

## 2019-02-19 RX ORDER — OMEPRAZOLE 40 MG/1
40 CAPSULE, DELAYED RELEASE ORAL DAILY
Qty: 90 CAPSULE | Refills: 3 | Status: SHIPPED | OUTPATIENT
Start: 2019-02-19 | End: 2019-02-21 | Stop reason: SDUPTHER

## 2019-02-21 DIAGNOSIS — K21.9 GASTROESOPHAGEAL REFLUX DISEASE WITHOUT ESOPHAGITIS: ICD-10-CM

## 2019-02-21 RX ORDER — OMEPRAZOLE 40 MG/1
40 CAPSULE, DELAYED RELEASE ORAL DAILY
Qty: 90 CAPSULE | Refills: 3 | Status: SHIPPED | OUTPATIENT
Start: 2019-02-21 | End: 2019-08-26 | Stop reason: SDUPTHER

## 2019-02-26 ENCOUNTER — OFFICE VISIT (OUTPATIENT)
Dept: INTERNAL MEDICINE CLINIC | Facility: CLINIC | Age: 64
End: 2019-02-26
Payer: COMMERCIAL

## 2019-02-26 VITALS
BODY MASS INDEX: 25.86 KG/M2 | SYSTOLIC BLOOD PRESSURE: 128 MMHG | TEMPERATURE: 97.1 F | HEIGHT: 63 IN | RESPIRATION RATE: 18 BRPM | DIASTOLIC BLOOD PRESSURE: 74 MMHG | HEART RATE: 74 BPM

## 2019-02-26 DIAGNOSIS — Z72.0 TOBACCO ABUSE: ICD-10-CM

## 2019-02-26 DIAGNOSIS — J01.10 ACUTE NON-RECURRENT FRONTAL SINUSITIS: Primary | ICD-10-CM

## 2019-02-26 PROCEDURE — 99213 OFFICE O/P EST LOW 20 MIN: CPT | Performed by: INTERNAL MEDICINE

## 2019-02-26 RX ORDER — GUAIFENESIN 600 MG
600 TABLET, EXTENDED RELEASE 12 HR ORAL EVERY 12 HOURS SCHEDULED
Qty: 20 TABLET | Refills: 0 | Status: SHIPPED | OUTPATIENT
Start: 2019-02-26 | End: 2019-04-09 | Stop reason: ALTCHOICE

## 2019-02-26 RX ORDER — LEVOFLOXACIN 500 MG/1
500 TABLET, FILM COATED ORAL EVERY 24 HOURS
Qty: 10 TABLET | Refills: 0 | Status: SHIPPED | OUTPATIENT
Start: 2019-02-26 | End: 2019-03-08

## 2019-02-26 NOTE — PROGRESS NOTES
Assessment/Plan:    No problem-specific Assessment & Plan notes found for this encounter  Diagnoses and all orders for this visit:    Acute non-recurrent frontal sinusitis  -     levofloxacin (LEVAQUIN) 500 mg tablet; Take 1 tablet (500 mg total) by mouth every 24 hours for 10 days  -     guaiFENesin (MUCINEX) 600 mg 12 hr tablet; Take 1 tablet (600 mg total) by mouth every 12 (twelve) hours    Tobacco abuse      A/P: Rest and increase po fluids  OTC PRN motrin or tylenol  Will start abx and mucinex  Stop smoking  RTC as scheduled  Subjective:      Patient ID: Christopher Patel is a 66 y o  female  Smoking WF presents with a several week h/o URI s/s  No travel, but  also ill  No fever, but chills  Notes headaches, PND, yellow nasal congestion, sore throat, and facial pressure  Slight productive cough, but no wheezing  One episode of diarrhea  The following portions of the patient's history were reviewed and updated as appropriate:   She  has a past medical history of Abnormal finding in urine, Arthralgia of multiple joints, Biceps tendinitis, right, Bursitis of right knee, Bursitis of right shoulder, Chondrocalcinosis, COPD exacerbation ([address] ), 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, Drug intolerance, Dysfunction of right eustachian tube, Dysfunctional uterine bleeding, Edema, Elevated d-dimer, Exposure to hepatitis C, Fracture of fibula, Generalized anxiety disorder, GERD (gastroesophageal reflux disease), Mild cervical dysplasia, Myalgia, Myositis, Palpitations, Pre-syncope, Thrombocytopenia ([address] ), Urinary tract infection, and Uterine leiomyoma    She   Patient Active Problem List    Diagnosis Date Noted    Tobacco abuse 07/06/2018    Acquired trigger finger 07/02/2018    Carpal tunnel syndrome 07/02/2018    Spider veins of both lower extremities 06/29/2016    Varicosities of leg 03/10/2016    Esophageal reflux 02/16/2015    Allergic rhinitis 09/20/2013    Symptomatic menopausal or female climacteric states 06/07/2013    Chronic cough 05/16/2013    Generalized anxiety disorder 05/16/2013    Hyperlipidemia 05/16/2013     She  has a past surgical history that includes Bladder surgery; Breast biopsy (Right); Colposcopy (01/06/1998); Dental surgery; Endometrial biopsy (05/21/1993); Hysteroscopy; Tubal ligation; and US guided breast biopsy right complete (Right, 8/8/2018)  Her family history includes Aortic aneurysm in her family; Arrhythmia in her mother; Arthritis in her family; Coronary artery disease in her father and mother; Diabetes in her family and father; Heart disease in her family; Stroke in her mother  She  reports that she has been smoking  She has never used smokeless tobacco  She reports that she does not drink alcohol or use drugs  Current Outpatient Medications   Medication Sig Dispense Refill    buPROPion (WELLBUTRIN XL) 150 mg 24 hr tablet Take by mouth every morning        busPIRone (BUSPAR) 30 MG tablet TAKE 1 TABLET TID      clonazePAM (KlonoPIN) 1 mg tablet TAKE 1 TABLET TID      fluticasone (FLONASE) 50 mcg/act nasal spray 1 spray into each nostril daily 16 g 0    omeprazole (PriLOSEC) 40 MG capsule Take 1 capsule (40 mg total) by mouth daily 90 capsule 3    tretinoin (RETIN-A) 0 1 % cream tretinoin 0 1 % topical cream      venlafaxine (EFFEXOR-XR) 150 mg 24 hr capsule Take 1 capsule (150 mg total) by mouth daily 30 capsule 0    venlafaxine (EFFEXOR-XR) 75 mg 24 hr capsule Take 1 capsule (75 mg total) by mouth daily 30 capsule 0    guaiFENesin (MUCINEX) 600 mg 12 hr tablet Take 1 tablet (600 mg total) by mouth every 12 (twelve) hours 20 tablet 0    levofloxacin (LEVAQUIN) 500 mg tablet Take 1 tablet (500 mg total) by mouth every 24 hours for 10 days 10 tablet 0     No current facility-administered medications for this visit        Current Outpatient Medications on File Prior to Visit   Medication Sig    buPROPion (WELLBUTRIN XL) 150 mg 24 hr tablet Take by mouth every morning      busPIRone (BUSPAR) 30 MG tablet TAKE 1 TABLET TID    clonazePAM (KlonoPIN) 1 mg tablet TAKE 1 TABLET TID    fluticasone (FLONASE) 50 mcg/act nasal spray 1 spray into each nostril daily    omeprazole (PriLOSEC) 40 MG capsule Take 1 capsule (40 mg total) by mouth daily    tretinoin (RETIN-A) 0 1 % cream tretinoin 0 1 % topical cream    venlafaxine (EFFEXOR-XR) 150 mg 24 hr capsule Take 1 capsule (150 mg total) by mouth daily    venlafaxine (EFFEXOR-XR) 75 mg 24 hr capsule Take 1 capsule (75 mg total) by mouth daily    [DISCONTINUED] guaiFENesin (MUCINEX) 600 mg 12 hr tablet Take 1 tablet (600 mg total) by mouth every 12 (twelve) hours     No current facility-administered medications on file prior to visit  She has No Known Allergies       Review of Systems   Constitutional: Positive for activity change, chills and fatigue  Negative for diaphoresis and fever  HENT: Positive for congestion, facial swelling, postnasal drip, rhinorrhea, sinus pressure, sinus pain and sore throat  Negative for ear discharge and ear pain  Respiratory: Positive for cough  Negative for chest tightness, shortness of breath and wheezing  Cardiovascular: Negative for chest pain, palpitations and leg swelling  Gastrointestinal: Negative for abdominal pain, constipation, diarrhea, nausea and vomiting  Genitourinary: Negative for difficulty urinating, dysuria and frequency  Musculoskeletal: Negative for arthralgias, gait problem and myalgias  Neurological: Positive for headaches  Negative for light-headedness  Psychiatric/Behavioral: Negative for confusion  The patient is not nervous/anxious  Objective:      /74   Pulse 74   Temp (!) 97 1 °F (36 2 °C) (Tympanic)   Resp 18   Ht 5' 3" (1 6 m)   BMI 25 86 kg/m²          Physical Exam   Constitutional: She is oriented to person, place, and time  She appears well-developed and well-nourished     HENT:   Head: Normocephalic and atraumatic  Right Ear: External ear normal    Left Ear: External ear normal    Mouth/Throat: Oropharynx is clear and moist  No oropharyngeal exudate  Sinus tenderness with turbinates red and inflamed  Cobblestoning noted  Eyes: Pupils are equal, round, and reactive to light  Conjunctivae and EOM are normal    Neck: Normal range of motion  Neck supple  Cardiovascular: Normal rate, regular rhythm and normal heart sounds  Pulmonary/Chest: Effort normal and breath sounds normal  No respiratory distress  She has no wheezes  She has no rales  Lymphadenopathy:     She has no cervical adenopathy  Neurological: She is alert and oriented to person, place, and time  Psychiatric: She has a normal mood and affect  Her behavior is normal  Judgment and thought content normal    Nursing note and vitals reviewed

## 2019-03-06 ENCOUNTER — HOSPITAL ENCOUNTER (OUTPATIENT)
Dept: RADIOLOGY | Facility: HOSPITAL | Age: 64
Discharge: HOME | End: 2019-03-06
Payer: COMMERCIAL

## 2019-03-06 ENCOUNTER — TRANSCRIBE ORDERS (OUTPATIENT)
Dept: ADMINISTRATIVE | Facility: HOSPITAL | Age: 64
End: 2019-03-06

## 2019-03-06 DIAGNOSIS — M54.5 LOW BACK PAIN, UNSPECIFIED BACK PAIN LATERALITY, UNSPECIFIED CHRONICITY, WITH SCIATICA PRESENCE UNSPECIFIED: Primary | ICD-10-CM

## 2019-03-06 DIAGNOSIS — M54.5 LOW BACK PAIN, UNSPECIFIED BACK PAIN LATERALITY, UNSPECIFIED CHRONICITY, WITH SCIATICA PRESENCE UNSPECIFIED: ICD-10-CM

## 2019-03-06 PROCEDURE — 72110 X-RAY EXAM L-2 SPINE 4/>VWS: CPT

## 2019-03-08 ENCOUNTER — TELEPHONE (OUTPATIENT)
Dept: INTERNAL MEDICINE CLINIC | Facility: CLINIC | Age: 64
End: 2019-03-08

## 2019-03-08 NOTE — TELEPHONE ENCOUNTER
Pt called, saw you 2-26-19, was given levaquin, finished that 2 days ago, still feels the same, still has the body aches, chills, nose is dripping constantly, stopped the mucinex, pls advise

## 2019-03-11 DIAGNOSIS — F41.9 ANXIETY: ICD-10-CM

## 2019-03-11 RX ORDER — VENLAFAXINE HYDROCHLORIDE 75 MG/1
75 CAPSULE, EXTENDED RELEASE ORAL DAILY
Qty: 30 CAPSULE | Refills: 5 | Status: SHIPPED | OUTPATIENT
Start: 2019-03-11 | End: 2019-08-25 | Stop reason: SDUPTHER

## 2019-03-11 RX ORDER — VENLAFAXINE HYDROCHLORIDE 150 MG/1
150 CAPSULE, EXTENDED RELEASE ORAL DAILY
Qty: 30 CAPSULE | Refills: 5 | Status: SHIPPED | OUTPATIENT
Start: 2019-03-11 | End: 2019-08-26 | Stop reason: SDUPTHER

## 2019-03-21 ENCOUNTER — TRANSCRIBE ORDERS (OUTPATIENT)
Dept: ADMINISTRATIVE | Facility: HOSPITAL | Age: 64
End: 2019-03-21

## 2019-03-21 DIAGNOSIS — M51.26 HNP (HERNIATED NUCLEUS PULPOSUS), LUMBAR: Primary | ICD-10-CM

## 2019-03-27 ENCOUNTER — HOSPITAL ENCOUNTER (OUTPATIENT)
Dept: MRI IMAGING | Facility: HOSPITAL | Age: 64
Discharge: HOME | End: 2019-03-27
Attending: ORTHOPAEDIC SURGERY
Payer: COMMERCIAL

## 2019-03-27 DIAGNOSIS — M51.26 HNP (HERNIATED NUCLEUS PULPOSUS), LUMBAR: ICD-10-CM

## 2019-03-27 PROCEDURE — 72148 MRI LUMBAR SPINE W/O DYE: CPT

## 2019-04-03 ENCOUNTER — TELEPHONE (OUTPATIENT)
Dept: INTERNAL MEDICINE CLINIC | Facility: CLINIC | Age: 64
End: 2019-04-03

## 2019-04-09 ENCOUNTER — OFFICE VISIT (OUTPATIENT)
Dept: OBGYN CLINIC | Facility: CLINIC | Age: 64
End: 2019-04-09
Payer: COMMERCIAL

## 2019-04-09 VITALS
SYSTOLIC BLOOD PRESSURE: 112 MMHG | DIASTOLIC BLOOD PRESSURE: 74 MMHG | HEART RATE: 73 BPM | BODY MASS INDEX: 25.87 KG/M2 | WEIGHT: 146 LBS | HEIGHT: 63 IN | RESPIRATION RATE: 16 BRPM

## 2019-04-09 DIAGNOSIS — M48.061 NEURAL FORAMINAL STENOSIS OF LUMBAR SPINE: ICD-10-CM

## 2019-04-09 DIAGNOSIS — M48.00 CENTRAL STENOSIS OF SPINAL CANAL: ICD-10-CM

## 2019-04-09 DIAGNOSIS — M47.816 FACET ARTHRITIS OF LUMBAR REGION: Primary | ICD-10-CM

## 2019-04-09 DIAGNOSIS — M43.16 SPONDYLOLISTHESIS OF LUMBAR REGION: ICD-10-CM

## 2019-04-09 DIAGNOSIS — M51.36 DEGENERATIVE DISC DISEASE, LUMBAR: ICD-10-CM

## 2019-04-09 DIAGNOSIS — M62.830 LUMBAR PARASPINAL MUSCLE SPASM: ICD-10-CM

## 2019-04-09 DIAGNOSIS — M51.26 PROTRUSION OF LUMBAR INTERVERTEBRAL DISC: ICD-10-CM

## 2019-04-09 PROCEDURE — 99203 OFFICE O/P NEW LOW 30 MIN: CPT | Performed by: FAMILY MEDICINE

## 2019-04-09 RX ORDER — MELOXICAM 15 MG/1
15 TABLET ORAL DAILY
Qty: 30 TABLET | Refills: 2 | Status: SHIPPED | OUTPATIENT
Start: 2019-04-09 | End: 2019-10-11

## 2019-04-17 ENCOUNTER — TELEPHONE (OUTPATIENT)
Dept: OBGYN CLINIC | Facility: CLINIC | Age: 64
End: 2019-04-17

## 2019-04-22 ENCOUNTER — TELEPHONE (OUTPATIENT)
Dept: OBGYN CLINIC | Facility: CLINIC | Age: 64
End: 2019-04-22

## 2019-04-22 DIAGNOSIS — M48.061 NEURAL FORAMINAL STENOSIS OF LUMBAR SPINE: ICD-10-CM

## 2019-04-22 DIAGNOSIS — M47.816 FACET ARTHRITIS OF LUMBAR REGION: Primary | ICD-10-CM

## 2019-04-22 DIAGNOSIS — M51.36 DEGENERATIVE DISC DISEASE, LUMBAR: ICD-10-CM

## 2019-04-26 ENCOUNTER — HOSPITAL ENCOUNTER (OUTPATIENT)
Dept: RADIOLOGY | Facility: HOSPITAL | Age: 64
Discharge: HOME | End: 2019-04-26
Payer: COMMERCIAL

## 2019-04-26 ENCOUNTER — TRANSCRIBE ORDERS (OUTPATIENT)
Dept: ADMINISTRATIVE | Facility: HOSPITAL | Age: 64
End: 2019-04-26

## 2019-04-26 DIAGNOSIS — M25.552 PAIN OF LEFT HIP JOINT: ICD-10-CM

## 2019-04-26 DIAGNOSIS — M25.552 PAIN OF LEFT HIP JOINT: Primary | ICD-10-CM

## 2019-04-26 LAB — HBA1C MFR BLD HPLC: 6 %

## 2019-04-26 PROCEDURE — 73502 X-RAY EXAM HIP UNI 2-3 VIEWS: CPT

## 2019-04-29 ENCOUNTER — TELEPHONE (OUTPATIENT)
Dept: INTERNAL MEDICINE CLINIC | Facility: CLINIC | Age: 64
End: 2019-04-29

## 2019-04-29 DIAGNOSIS — E78.2 MIXED HYPERLIPIDEMIA: Primary | ICD-10-CM

## 2019-04-29 RX ORDER — ATORVASTATIN CALCIUM 20 MG/1
20 TABLET, FILM COATED ORAL DAILY
Qty: 90 TABLET | Refills: 3 | Status: SHIPPED | OUTPATIENT
Start: 2019-04-29 | End: 2019-10-11

## 2019-05-16 ENCOUNTER — TELEPHONE (OUTPATIENT)
Dept: FAMILY MEDICINE CLINIC | Facility: CLINIC | Age: 64
End: 2019-05-16

## 2019-08-25 DIAGNOSIS — F41.9 ANXIETY: ICD-10-CM

## 2019-08-26 DIAGNOSIS — K21.9 GASTROESOPHAGEAL REFLUX DISEASE WITHOUT ESOPHAGITIS: ICD-10-CM

## 2019-08-26 DIAGNOSIS — F41.9 ANXIETY: ICD-10-CM

## 2019-08-26 RX ORDER — VENLAFAXINE HYDROCHLORIDE 150 MG/1
150 CAPSULE, EXTENDED RELEASE ORAL DAILY
Qty: 90 CAPSULE | Refills: 0 | Status: SHIPPED | OUTPATIENT
Start: 2019-08-26 | End: 2019-12-17 | Stop reason: SDUPTHER

## 2019-08-26 RX ORDER — VENLAFAXINE HYDROCHLORIDE 75 MG/1
CAPSULE, EXTENDED RELEASE ORAL
Qty: 30 CAPSULE | Refills: 0 | Status: SHIPPED | OUTPATIENT
Start: 2019-08-26 | End: 2019-08-26 | Stop reason: SDUPTHER

## 2019-08-26 RX ORDER — OMEPRAZOLE 40 MG/1
40 CAPSULE, DELAYED RELEASE ORAL DAILY
Qty: 90 CAPSULE | Refills: 0 | Status: SHIPPED | OUTPATIENT
Start: 2019-08-26 | End: 2020-04-03 | Stop reason: SDUPTHER

## 2019-08-26 RX ORDER — VENLAFAXINE HYDROCHLORIDE 75 MG/1
75 CAPSULE, EXTENDED RELEASE ORAL DAILY
Qty: 90 CAPSULE | Refills: 0 | Status: SHIPPED | OUTPATIENT
Start: 2019-08-26 | End: 2019-10-01 | Stop reason: SDUPTHER

## 2019-10-01 DIAGNOSIS — F41.9 ANXIETY: ICD-10-CM

## 2019-10-01 RX ORDER — VENLAFAXINE HYDROCHLORIDE 75 MG/1
75 CAPSULE, EXTENDED RELEASE ORAL DAILY
Qty: 90 CAPSULE | Refills: 1 | Status: SHIPPED | OUTPATIENT
Start: 2019-10-01 | End: 2020-03-27 | Stop reason: SDUPTHER

## 2019-10-11 ENCOUNTER — OFFICE VISIT (OUTPATIENT)
Dept: INTERNAL MEDICINE CLINIC | Facility: CLINIC | Age: 64
End: 2019-10-11
Payer: COMMERCIAL

## 2019-10-11 VITALS
RESPIRATION RATE: 18 BRPM | SYSTOLIC BLOOD PRESSURE: 110 MMHG | HEIGHT: 63 IN | HEART RATE: 68 BPM | TEMPERATURE: 98.1 F | DIASTOLIC BLOOD PRESSURE: 60 MMHG | BODY MASS INDEX: 25.86 KG/M2

## 2019-10-11 DIAGNOSIS — E28.39 MENOPAUSE OVARIAN FAILURE: ICD-10-CM

## 2019-10-11 DIAGNOSIS — Z23 ENCOUNTER FOR VACCINATION: ICD-10-CM

## 2019-10-11 DIAGNOSIS — Z12.11 SCREENING FOR COLON CANCER: ICD-10-CM

## 2019-10-11 DIAGNOSIS — G89.4 CHRONIC PAIN SYNDROME: ICD-10-CM

## 2019-10-11 DIAGNOSIS — G89.29 CHRONIC MIDLINE LOW BACK PAIN, UNSPECIFIED WHETHER SCIATICA PRESENT: ICD-10-CM

## 2019-10-11 DIAGNOSIS — E66.3 OVERWEIGHT (BMI 25.0-29.9): ICD-10-CM

## 2019-10-11 DIAGNOSIS — K21.9 GASTROESOPHAGEAL REFLUX DISEASE WITHOUT ESOPHAGITIS: ICD-10-CM

## 2019-10-11 DIAGNOSIS — M54.50 CHRONIC MIDLINE LOW BACK PAIN, UNSPECIFIED WHETHER SCIATICA PRESENT: ICD-10-CM

## 2019-10-11 DIAGNOSIS — E78.2 MIXED HYPERLIPIDEMIA: Primary | ICD-10-CM

## 2019-10-11 DIAGNOSIS — Z78.0 MENOPAUSE: ICD-10-CM

## 2019-10-11 DIAGNOSIS — F41.1 GENERALIZED ANXIETY DISORDER: ICD-10-CM

## 2019-10-11 DIAGNOSIS — Z11.59 NEED FOR HEPATITIS C SCREENING TEST: ICD-10-CM

## 2019-10-11 DIAGNOSIS — Z12.39 SCREENING FOR BREAST CANCER: ICD-10-CM

## 2019-10-11 DIAGNOSIS — J30.9 ALLERGIC RHINITIS, UNSPECIFIED SEASONALITY, UNSPECIFIED TRIGGER: ICD-10-CM

## 2019-10-11 DIAGNOSIS — Z72.0 TOBACCO ABUSE: ICD-10-CM

## 2019-10-11 DIAGNOSIS — Z13.31 NEGATIVE DEPRESSION SCREENING: ICD-10-CM

## 2019-10-11 PROCEDURE — 90471 IMMUNIZATION ADMIN: CPT | Performed by: INTERNAL MEDICINE

## 2019-10-11 PROCEDURE — 90682 RIV4 VACC RECOMBINANT DNA IM: CPT | Performed by: INTERNAL MEDICINE

## 2019-10-11 PROCEDURE — 99214 OFFICE O/P EST MOD 30 MIN: CPT | Performed by: INTERNAL MEDICINE

## 2019-10-11 RX ORDER — GABAPENTIN 100 MG/1
100 CAPSULE ORAL 3 TIMES DAILY
Qty: 90 CAPSULE | Refills: 1 | Status: SHIPPED | OUTPATIENT
Start: 2019-10-11 | End: 2019-12-11 | Stop reason: SINTOL

## 2019-10-11 NOTE — PROGRESS NOTES
Assessment/Plan:  Problem List Items Addressed This Visit        Digestive    Esophageal reflux       Respiratory    Allergic rhinitis       Other    Generalized anxiety disorder    Hyperlipidemia - Primary    Relevant Orders    Comprehensive metabolic panel    LDL cholesterol, direct    Triglycerides    Tobacco abuse    Chronic pain syndrome    Relevant Medications    gabapentin (NEURONTIN) 100 mg capsule    Chronic midline low back pain    Relevant Medications    gabapentin (NEURONTIN) 100 mg capsule      Other Visit Diagnoses     Menopause        Relevant Orders    DXA bone density spine hip and pelvis    Screening for colon cancer        Relevant Orders    Ambulatory referral to Gastroenterology    Occult Blood, Fecal Immunochemical    Screening for breast cancer        Relevant Orders    Mammo screening bilateral w 3d & cad    Negative depression screening        Encounter for vaccination        Relevant Orders    influenza vaccine, quadrivalent, recombinant, PF, 0 5 mL (Completed)    Menopause ovarian failure        Need for hepatitis C screening test        Relevant Orders    Hepatitis C antibody    Overweight (BMI 25 0-29  9)        BMI 25 0-25 9,adult               Diagnoses and all orders for this visit:    Mixed hyperlipidemia  -     Comprehensive metabolic panel; Future  -     LDL cholesterol, direct; Future  -     Triglycerides; Future    Menopause  -     DXA bone density spine hip and pelvis; Future    Screening for colon cancer  -     Ambulatory referral to Gastroenterology;  Future  -     Occult Blood, Fecal Immunochemical; Future    Screening for breast cancer  -     Mammo screening bilateral w 3d & cad; Future    Gastroesophageal reflux disease without esophagitis    Allergic rhinitis, unspecified seasonality, unspecified trigger    Generalized anxiety disorder    Tobacco abuse    Negative depression screening    Encounter for vaccination  -     influenza vaccine, quadrivalent, recombinant, PF, 0 5 mL    Menopause ovarian failure    Need for hepatitis C screening test  -     Hepatitis C antibody; Future    Overweight (BMI 25 0-29  9)    BMI 25 0-25 9,adult    Chronic pain syndrome  -     gabapentin (NEURONTIN) 100 mg capsule; Take 1 capsule (100 mg total) by mouth 3 (three) times a day    Chronic midline low back pain, unspecified whether sciatica present  -     gabapentin (NEURONTIN) 100 mg capsule; Take 1 capsule (100 mg total) by mouth 3 (three) times a day        No problem-specific Assessment & Plan notes found for this encounter  A/P: Doing well other than the back and will check labs  Will up date her flu shot  Wean tobacco  Already on SNRI and will start gabapentin  Will order mammo and dexa  Discussed BMI and will give information on diet and exercise  Continue current treatment and RTC 1 months for f/u dusty  Subjective:      Patient ID: Dolores Mejia is a 66 y o  female  WF RTC for f/u hyperlipidemia, GERD, etc  Doing well and no new issues  Remains active w/o difficulty and no falls  No reflux  ANAND is good  Still smoking  Due for labs, Dexa, mammo, and CRC  Still seeing pain management for chronic LBP  The following portions of the patient's history were reviewed and updated as appropriate:   She has a past medical history of Abnormal finding in urine, Arthralgia of multiple joints, Biceps tendinitis, right, Bursitis of right knee, Bursitis of right shoulder, Chondrocalcinosis, COPD exacerbation ([address] ), 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, Drug intolerance, Dysfunction of right eustachian tube, Dysfunctional uterine bleeding, Edema, Elevated d-dimer, Exposure to hepatitis C, Fracture of fibula, Generalized anxiety disorder, GERD (gastroesophageal reflux disease), Mild cervical dysplasia, Myalgia, Myositis, Palpitations, Pre-syncope, Thrombocytopenia ([address] ), Urinary tract infection, and Uterine leiomyoma  ,  does not have any pertinent problems on file  ,   has a past surgical history that includes Bladder surgery; Breast biopsy (Right); Colposcopy (01/06/1998); Dental surgery; Endometrial biopsy (05/21/1993); Hysteroscopy; Tubal ligation; and US guided breast biopsy right complete (Right, 8/8/2018)  ,  family history includes Aortic aneurysm in her family; Arrhythmia in her mother; Arthritis in her family; Coronary artery disease in her father and mother; Diabetes in her family and father; Heart disease in her family; Stroke in her mother  ,   reports that she has been smoking  She has never used smokeless tobacco  She reports that she does not drink alcohol or use drugs  ,  has No Known Allergies     Current Outpatient Medications   Medication Sig Dispense Refill    buPROPion (WELLBUTRIN XL) 150 mg 24 hr tablet Take by mouth every morning        busPIRone (BUSPAR) 30 MG tablet TAKE 1 TABLET TID      clonazePAM (KlonoPIN) 1 mg tablet TAKE 1 TABLET TID      omeprazole (PriLOSEC) 40 MG capsule Take 1 capsule (40 mg total) by mouth daily 90 capsule 0    tretinoin (RETIN-A) 0 1 % cream tretinoin 0 1 % topical cream      venlafaxine (EFFEXOR-XR) 150 mg 24 hr capsule Take 1 capsule (150 mg total) by mouth daily 90 capsule 0    venlafaxine (EFFEXOR-XR) 75 mg 24 hr capsule Take 1 capsule (75 mg total) by mouth daily 90 capsule 1    gabapentin (NEURONTIN) 100 mg capsule Take 1 capsule (100 mg total) by mouth 3 (three) times a day 90 capsule 1     No current facility-administered medications for this visit  Review of Systems   Constitutional: Negative for activity change, chills, diaphoresis, fatigue and fever  HENT: Negative  Eyes: Negative for visual disturbance  Respiratory: Negative for cough, chest tightness, shortness of breath and wheezing  Cardiovascular: Negative for chest pain, palpitations and leg swelling  Gastrointestinal: Negative for abdominal pain, constipation, diarrhea, nausea and vomiting     Endocrine: Negative for cold intolerance and heat intolerance  Genitourinary: Negative for difficulty urinating, dysuria and frequency  Musculoskeletal: Positive for back pain  Negative for arthralgias, gait problem and myalgias  Neurological: Negative for dizziness, seizures, syncope, weakness, light-headedness and headaches  Psychiatric/Behavioral: Negative for confusion, dysphoric mood and sleep disturbance  The patient is not nervous/anxious  PHQ-9 Depression Screening    PHQ-9:    Frequency of the following problems over the past two weeks:       Little interest or pleasure in doing things:  0 - not at all  Feeling down, depressed, or hopeless:  1 - several days  PHQ-2 Score:  1        Objective:  Vitals:    10/11/19 1034   BP: 110/60   Pulse: 68   Resp: 18   Temp: 98 1 °F (36 7 °C)   TempSrc: Tympanic   Height: 5' 3" (1 6 m)     Body mass index is 25 86 kg/m²  Physical Exam   Constitutional: She is oriented to person, place, and time  She appears well-developed and well-nourished  No distress  HENT:   Head: Normocephalic and atraumatic  Mouth/Throat: Oropharynx is clear and moist    Eyes: Pupils are equal, round, and reactive to light  Conjunctivae and EOM are normal    Neck: Neck supple  No JVD present  Cardiovascular: Normal rate, regular rhythm and normal heart sounds  Pulmonary/Chest: Effort normal and breath sounds normal  No respiratory distress  She has no wheezes  She has no rales  Abdominal: Soft  Bowel sounds are normal  She exhibits no distension  There is no tenderness  Musculoskeletal: She exhibits tenderness  She exhibits no edema or deformity  Neurological: She is alert and oriented to person, place, and time  Psychiatric: She has a normal mood and affect  Her behavior is normal  Judgment and thought content normal    Nursing note and vitals reviewed  Tobacco Cessation Counseling: Tobacco cessation counseling and education was provided   The patient is sincerely urged to quit consumption of tobacco  She is not ready to quit tobacco  The numerous health risks of tobacco consumption were discussed  If she decides to quit, there are a number of helpful adjunctive aids, and she can see me to discuss nicotine replacement therapy, chantix, or bupropion anytime in the future  Pt refused to be weighed today, but based on her last metrics:  BMI Counseling: Body mass index is 25 86 kg/m²  The BMI is above normal  Nutrition recommendations include reducing portion sizes, decreasing overall calorie intake, reducing intake of saturated fat and trans fat and reducing intake of cholesterol  Exercise recommendations include moderate aerobic physical activity for 150 minutes/week

## 2019-10-11 NOTE — PATIENT INSTRUCTIONS
Hyperlipidemia   AMBULATORY CARE:   Hyperlipidemia  is a high level of lipids (fats) in your blood  These lipids include cholesterol or triglycerides  Lipids are made by your body  They also come from the foods you eat  Your body needs lipids to work properly, but high levels increase your risk for heart disease, heart attack, and stroke  Call 911 for any of the following:   · You have any of the following signs of a heart attack:      ¨ Squeezing, pressure, or pain in your chest that lasts longer than 5 minutes or returns    ¨ Discomfort or pain in your back, neck, jaw, stomach, or arm     ¨ Trouble breathing    ¨ Nausea or vomiting    ¨ Lightheadedness or a sudden cold sweat, especially with chest pain or trouble breathing    · You have any of the following signs of a stroke:      ¨ Numbness or drooping on one side of your face     ¨ Weakness in an arm or leg    ¨ Confusion or difficulty speaking    ¨ Dizziness, a severe headache, or vision loss  Contact your healthcare provider if:   · You have questions or concerns about your condition or care  Treatment of hyperlipidemia  may first include lifestyle changes to help decrease your lipid levels  You may also need to take medicine to lower your lipid levels  Some of the lifestyle changes you may need to make include the following:  · Maintain a healthy weight  Ask your healthcare provider how much you should weigh  Ask him or her to help you create a weight loss plan if you are overweight  Weight loss can decrease your cholesterol and triglyceride levels  · Exercise as directed  Exercise lowers your cholesterol levels and helps you maintain a healthy weight  Get 40 minutes or more of moderate exercise 3 to 4 days each week  You can split your exercise into four 10-minute workouts instead of 40 minutes at one time  Examples of moderate exercises include walking briskly, swimming, or riding a bike   Work with your healthcare provider to plan the best exercise program for you  · Do not smoke  Nicotine and other chemicals in cigarettes and cigars can increase your risk for a heart attack and stroke  Ask your healthcare provider for information if you currently smoke and need help to quit  E-cigarettes or smokeless tobacco still contain nicotine  Talk to your healthcare provider before you use these products  · Eat heart-healthy foods  Talk to your dietitian about a heart-healthy diet  The following will help you manage hyperlipidemia:     ¨ Decrease the total amount of fat you eat  Choose lean meats, fat-free or 1% fat milk, and low-fat dairy products, such as yogurt and cheese  Limit or do not eat red meat  Red meats are high in fat and cholesterol  ¨ Replace unhealthy fats with healthy fats  Unhealthy fats include saturated fat, trans fat, and cholesterol  Choose soft margarines that are low in saturated fat and have little or no trans fat  Monounsaturated fats are healthy fats  These are found in olive oil, canola oil, avocado, and nuts  Polyunsaturated fats are also healthy  These are found in fish, flaxseed, walnuts, and soybeans  ¨ Eat fruits and vegetables every day  They are low in calories and fat and a good source of essential vitamins  Include dark green, red, and orange vegetables  Examples include spinach, kale, broccoli, and carrots  ¨ Eat foods high in fiber  Choose whole grain, high-fiber foods  Good choices include whole-wheat breads or cereals, beans, peas, fruits, and vegetables  · Ask your healthcare provider if it is safe for you to drink alcohol  Alcohol can increase your cholesterol and triglyceride levels  A drink of alcohol is 12 ounces of beer, 5 ounces of wine, or 1½ ounces of liquor  Follow up with your healthcare provider as directed: You may need to return for more tests  Your healthcare provider may refer you to a dietitian  Write down your questions so you remember to ask them during your visits     © 2017 [facility] Information is for End User's use only and may not be sold, redistributed or otherwise used for commercial purposes  All illustrations and images included in CareNotes® are the copyrighted property of [employer] D [employer] M [employer]  or [employer] Andujar  The above information is an  only  It is not intended as medical advice for individual conditions or treatments  Talk to your doctor, nurse or pharmacist before following any medical regimen to see if it is safe and effective for you  Cigarette Smoking and Your Health   AMBULATORY CARE:   Risks to your health if you smoke:  Nicotine and other chemicals found in tobacco damage every cell in your body  Even if you are a light smoker, you have an increased risk for cancer, heart disease, and lung disease  If you are pregnant or have diabetes, smoking increases your risk for complications  Benefits to your health if you stop smoking:   · You decrease respiratory symptoms such as coughing, wheezing, and shortness of breath  · You reduce your risk for cancers of the lung, mouth, throat, kidney, bladder, pancreas, stomach, and cervix  If you already have cancer, you increase the benefits of chemotherapy  You also reduce your risk for cancer returning or a second cancer from developing  · You reduce your risk for heart disease, blood clots, heart attack, and stroke  · You reduce your risk for lung infections, and diseases such as pneumonia, asthma, chronic bronchitis, and emphysema  · Your circulation improves  More oxygen can be delivered to your body  If you have diabetes, you lower your risk for complications, such as kidney, artery, and eye diseases  You also lower your risk for nerve damage  Nerve damage can lead to amputations, poor vision, and blindness  · You improve your body's ability to heal and to fight infections    Benefits to the health of others if you stop smoking:  Tobacco is harmful to nonsmokers who breathe in your secondhand smoke  The following are ways the health of others around you may improve when you stop smoking:  · You lower the risks for lung cancer and heart disease in nonsmoking adults  · If you are pregnant, you lower the risk for miscarriage, early delivery, low birth weight, and stillbirth  You also lower your baby's risk for SIDS, obesity, developmental delay, and neurobehavioral problems, such as ADHD  · If you have children, you lower their risk for ear infections, colds, pneumonia, bronchitis, and asthma  For more information and support to stop smoking:   · [employer]  Phone: 9- 611 [phone]  Web Address: www [employer]  Follow up with your healthcare provider as directed:  Write down your questions so you remember to ask them during your visits  © 2017 [address] Information is for End User's use only and may not be sold, redistributed or otherwise used for commercial purposes  All illustrations and images included in CareNotes® are the copyrighted property of A D A M [employer]  or Omari Andujar  The above information is an  only  It is not intended as medical advice for individual conditions or treatments  Talk to your doctor, nurse or pharmacist before following any medical regimen to see if it is safe and effective for you  Weight Management   AMBULATORY CARE:   Why it is important to manage your weight:  Being overweight increases your risk of health conditions such as heart disease, high blood pressure, type 2 diabetes, and certain types of cancer  It can also increase your risk for osteoarthritis, sleep apnea, and other respiratory problems  Aim for a slow, steady weight loss  Even a small amount of weight loss can lower your risk of health problems  How to lose weight safely:  A safe and healthy way to lose weight is to eat fewer calories and get regular exercise   You can lose up about 1 pound a week by decreasing the number of calories you eat by 500 calories each day  You can decrease calories by eating smaller portion sizes or by cutting out high-calorie foods  Read labels to find out how many calories are in the foods you eat  You can also burn calories with exercise such as walking, swimming, or biking  You will be more likely to keep weight off if you make these changes part of your lifestyle  Healthy meal plan for weight management:  A healthy meal plan includes a variety of foods, contains fewer calories, and helps you stay healthy  A healthy meal plan includes the following:  · Eat whole-grain foods more often  A healthy meal plan should contain fiber  Fiber is the part of grains, fruits, and vegetables that is not broken down by your body  Whole-grain foods are healthy and provide extra fiber in your diet  Some examples of whole-grain foods are whole-wheat breads and pastas, oatmeal, brown rice, and bulgur  · Eat a variety of vegetables every day  Include dark, leafy greens such as spinach, kale, taiwo greens, and mustard greens  Eat yellow and orange vegetables such as carrots, sweet potatoes, and winter squash  · Eat a variety of fruits every day  Choose fresh or canned fruit (canned in its own juice or light syrup) instead of juice  Fruit juice has very little or no fiber  · Eat low-fat dairy foods  Drink fat-free (skim) milk or 1% milk  Eat fat-free yogurt and low-fat cottage cheese  Try low-fat cheeses such as mozzarella and other reduced-fat cheeses  · Choose meat and other protein foods that are low in fat  Choose beans or other legumes such as split peas or lentils  Choose fish, skinless poultry (chicken or turkey), or lean cuts of red meat (beef or pork)  Before you cook meat or poultry, cut off any visible fat  · Use less fat and oil  Try baking foods instead of frying them  Add less fat, such as margarine, sour cream, regular salad dressing and mayonnaise to foods  Eat fewer high-fat foods  Some examples of high-fat foods include french fries, doughnuts, ice cream, and cakes  · Eat fewer sweets  Limit foods and drinks that are high in sugar  This includes candy, cookies, regular soda, and sweetened drinks  Ways to decrease calories:   · Eat smaller portions  ¨ Use a small plate with smaller servings  ¨ Do not eat second helpings  ¨ When you eat at a restaurant, ask for a box and place half of your meal in the box before you eat  ¨ Share an entrée with someone else  · Replace high-calorie snacks with healthy, low-calorie snacks  ¨ Choose fresh fruit, vegetables, fat-free rice cakes, or air-popped popcorn instead of potato chips, nuts, or chocolate  ¨ Choose water or calorie-free drinks instead of soda or sweetened drinks  · Eat regular meals  Skipping meals can lead to overeating later in the day  Eat a healthy snack in place of a meal if you do not have time to eat a regular meal      · Do not shop for groceries when you are hungry  You may be more likely to make unhealthy food choices  Take a grocery list of healthy foods and shop after you have eaten  Exercise:  Exercise at least 30 minutes per day on most days of the week  Some examples of exercise include walking, biking, dancing, and swimming  You can also fit in more physical activity by taking the stairs instead of the elevator or parking farther away from stores  Ask your healthcare provider about the best exercise plan for you  Other things to consider as you try to lose weight:   · Be aware of situations that may give you the urge to overeat, such as eating while watching television  Find ways to avoid these situations  For example, read a book, go for a walk, or do crafts  · Meet with a weight loss support group or friends who are also trying to lose weight  This may help you stay motivated to continue working on your weight loss goals    © 2017 [address] Information is for End User's use only and may not be sold, redistributed or otherwise used for commercial purposes  All illustrations and images included in CareNotes® are the copyrighted property of [employer]  or Omari Andujar  The above information is an  only  It is not intended as medical advice for individual conditions or treatments  Talk to your doctor, nurse or pharmacist before following any medical regimen to see if it is safe and effective for you  Low Fat Diet   AMBULATORY CARE:   A low-fat diet  is an eating plan that is low in total fat, unhealthy fat, and cholesterol  You may need to follow a low-fat diet if you have trouble digesting or absorbing fat  You may also need to follow this diet if you have high cholesterol  You can also lower your cholesterol by increasing the amount of fiber in your diet  Soluble fiber is a type of fiber that helps to decrease cholesterol levels  Different types of fat in food:   · Limit unhealthy fats  A diet that is high in cholesterol, saturated fat, and trans fat may cause unhealthy cholesterol levels  Unhealthy cholesterol levels increase your risk of heart disease  ¨ Cholesterol:  Limit intake of cholesterol to less than 200 mg per day  Cholesterol is found in meat, eggs, and dairy  ¨ Saturated fat:  Limit saturated fat to less than 7% of your total daily calories  Ask your dietitian how many calories you need each day  Saturated fat is found in butter, cheese, ice cream, whole milk, and palm oil  Saturated fat is also found in meat, such as beef, pork, chicken skin, and processed meats  Processed meats include sausage, hot dogs, and bologna  ¨ Trans fat:  Avoid trans fat as much as possible  Trans fat is used in fried and baked foods  Foods that say trans fat free on the label may still have up to 0 5 grams of trans fat per serving  · Include healthy fats    Replace foods that are high in saturated and trans fat with foods high in healthy fats  This may help to decrease high cholesterol levels  ¨ Monounsaturated fats: These are found in avocados, nuts, and vegetable oils, such as olive, canola, and sunflower oil  ¨ Polyunsaturated fats: These can be found in vegetable oils, such as soybean or corn oil  Omega-3 fats can help to decrease the risk of heart disease  Omega-3 fats are found in fish, such as salmon, herring, trout, and tuna  Omega-3 fats can also be found in plant foods, such as walnuts, flaxseed, soybeans, and canola oil    Foods to limit or avoid:   · Grains:      ¨ Snacks that are made with partially hydrogenated oils, such as chips, regular crackers, and butter-flavored popcorn    ¨ High-fat baked goods, such as biscuits, croissants, doughnuts, pies, cookies, and pastries    · Dairy:      ¨ Whole milk, 2% milk, and yogurt and ice cream made with whole milk    ¨ Half and half creamer, heavy cream, and whipping cream    ¨ Cheese, cream cheese, and sour cream    · Meats and proteins:      ¨ High-fat cuts of meat (T-bone steak, regular hamburger, and ribs)    ¨ Fried meat, poultry (turkey and chicken), and fish    ¨ Poultry (chicken and turkey) with skin    ¨ Cold cuts (salami or bologna), hot dogs, dickson, and sausage    ¨ Whole eggs and egg yolks    · Vegetables and fruits with added fat:      ¨ Fried vegetables or vegetables in butter or high-fat sauces, such as cream or cheese sauces    ¨ Fried fruit or fruit served with butter or cream    · Fats:      ¨ Butter, stick margarine, and shortening    ¨ Coconut, palm oil, and palm kernel oil  Foods to include:   · Grains:      ¨ Whole-grain breads, cereals, pasta, and brown rice    ¨ Low-fat crackers and pretzels    · Vegetables and fruits:      ¨ Fresh, frozen, or canned vegetables (no salt or low-sodium)    ¨ Fresh, frozen, dried, or canned fruit (canned in light syrup or fruit juice)    ¨ Avocado    · Low-fat dairy products:      ¨ Nonfat (skim) or 1% milk    ¨ Nonfat or low-fat cheese, yogurt, and cottage cheese    · Meats and proteins:      ¨ Chicken or turkey with no skin    ¨ Baked or broiled fish    ¨ Lean beef and pork (loin, round, extra lean hamburger)    ¨ Beans and peas, unsalted nuts, soy products    ¨ Egg whites and substitutes    ¨ Seeds and nuts    · Fats:      ¨ Unsaturated oil, such as canola, olive, peanut, soybean, or sunflower oil    ¨ Soft or liquid margarine and vegetable oil spread    ¨ Low-fat salad dressing  Other ways to decrease fat:   · Read food labels before you buy foods  Choose foods that have less than 30% of calories from fat  Choose low-fat or fat-free dairy products  Remember that fat free does not mean calorie free  These foods still contain calories, and too many calories can lead to weight gain  · Trim fat from meat and avoid fried food  Trim all visible fat from meat before you cook it  Remove the skin from poultry  Do not cain meat, fish, or poultry  Bake, roast, boil, or broil these foods instead  Avoid fried foods  Eat a baked potato instead of [place] fries  Steam vegetables instead of sautéing them in butter  · Add less fat to foods  Use imitation dickson bits on salads and baked potatoes instead of regular dickson bits  Use fat-free or low-fat salad dressings instead of regular dressings  Use low-fat or nonfat butter-flavored topping instead of regular butter or margarine on popcorn and other foods  Ways to decrease fat in recipes:  Replace high-fat ingredients with low-fat or nonfat ones  This may cause baked goods to be drier than usual  You may need to use nonfat cooking spray on pans to prevent food from sticking  You also may need to change the amount of other ingredients, such as water, in the recipe  Try the following:  · Use low-fat or light margarine instead of regular margarine or shortening  · Use lean ground turkey breast or chicken, or lean ground beef (less than 5% fat) instead of hamburger  · Add 1 teaspoon of canola oil to 8 ounces of skim milk instead of using cream or half and half  · Use grated zucchini, carrots, or apples in breads instead of coconut  · Use blenderized, low-fat cottage cheese, plain tofu, or low-fat ricotta cheese instead of cream cheese  · Use 1 egg white and 1 teaspoon of canola oil, or use ¼ cup (2 ounces) of fat-free egg substitute instead of a whole egg  · Replace half of the oil that is called for in a recipe with applesauce when you bake  Use 3 tablespoons of cocoa powder and 1 tablespoon of canola oil instead of a square of baking chocolate  How to increase fiber:  Eat enough high-fiber foods to get 20 to 30 grams of fiber every day  Slowly increase your fiber intake to avoid stomach cramps, gas, and other problems  · Eat 3 ounces of whole-grain foods each day  An ounce is about 1 slice of bread  Eat whole-grain breads, such as whole-wheat bread  Whole wheat, whole-wheat flour, or other whole grains should be listed as the first ingredient on the food label  Replace white flour with whole-grain flour or use half of each in recipes  Whole-grain flour is heavier than white flour, so you may have to add more yeast or baking powder  · Eat a high-fiber cereal for breakfast   Oatmeal is a good source of soluble fiber  Look for cereals that have bran or fiber in the name  Choose whole-grain products, such as brown rice, barley, and whole-wheat pasta  · Eat more beans, peas, and lentils  For example, add beans to soups or salads  Eat at least 5 cups of fruits and vegetables each day  Eat fruits and vegetables with the peel because the peel is high in fiber  © 2017 [address]  Information is for End User's use only and may not be sold, redistributed or otherwise used for commercial purposes  All illustrations and images included in CareNotes® are the copyrighted property of [employer]  or Omari Andujar    The above information is an  only  It is not intended as medical advice for individual conditions or treatments  Talk to your doctor, nurse or pharmacist before following any medical regimen to see if it is safe and effective for you  Heart Healthy Diet   AMBULATORY CARE:   A heart healthy diet  is an eating plan low in total fat, unhealthy fats, and sodium (salt)  A heart healthy diet helps decrease your risk for heart disease and stroke  Limit the amount of fat you eat to 25% to 35% of your total daily calories  Limit sodium to less than 2,300 mg each day  Healthy fats:  Healthy fats can help improve cholesterol levels  The risk for heart disease is decreased when cholesterol levels are normal  Choose healthy fats, such as the following:  · Unsaturated fat  is found in foods such as soybean, canola, olive, corn, and safflower oils  It is also found in soft tub margarine that is made with liquid vegetable oil  · Omega-3 fat  is found in certain fish, such as salmon, tuna, and trout, and in walnuts and flaxseed  Unhealthy fats:  Unhealthy fats can cause unhealthy cholesterol levels in your blood and increase your risk of heart disease  Limit unhealthy fats, such as the following:  · Cholesterol  is found in animal foods, such as eggs and lobster, and in dairy products made from whole milk  Limit cholesterol to less than 300 milligrams (mg) each day  You may need to limit cholesterol to 200 mg each day if you have heart disease  · Saturated fat  is found in meats, such as dickson and hamburger  It is also found in chicken or turkey skin, whole milk, and butter  Limit saturated fat to less than 7% of your total daily calories  Limit saturated fat to less than 6% if you have heart disease or are at increased risk for it  · Trans fat  is found in packaged foods, such as potato chips and cookies  It is also in hard margarine, some fried foods, and shortening   Avoid trans fats as much as possible    Heart healthy foods and drinks to include:  Ask your dietitian or healthcare provider how many servings to have from each of the following food groups:  · Grains:      ¨ Whole-wheat breads, cereals, and pastas, and brown rice    ¨ Low-fat, low-sodium crackers and chips    · Vegetables:      ¨ Broccoli, green beans, green peas, and spinach    ¨ Collards, kale, and lima beans    ¨ Carrots, sweet potatoes, tomatoes, and peppers    ¨ Canned vegetables with no salt added    · Fruits:      ¨ Bananas, peaches, pears, and pineapple    ¨ Grapes, raisins, and dates    ¨ Oranges, tangerines, grapefruit, orange juice, and grapefruit juice    ¨ Apricots, mangoes, melons, and papaya    ¨ Raspberries and strawberries    ¨ Canned fruit with no added sugar    · Low-fat dairy products:      ¨ Nonfat (skim) milk, 1% milk, and low-fat almond, cashew, or soy milks fortified with calcium    ¨ Low-fat cheese, regular or frozen yogurt, and cottage cheese    · Meats and proteins , such as lean cuts of beef and pork (loin, leg, round), skinless chicken and turkey, legumes, soy products, egg whites, and nuts  Foods and drinks to limit or avoid:  Ask your dietitian or healthcare provider about these and other foods that are high in unhealthy fat, sodium, and sugar:  · Snack or packaged foods , such as frozen dinners, cookies, macaroni and cheese, and cereals with more than 300 mg of sodium per serving    · Canned or dry mixes  for cakes, soups, sauces, or gravies    · Vegetables with added sodium , such as instant potatoes, vegetables with added sauces, or regular canned vegetables    · Other foods high in sodium , such as ketchup, barbecue sauce, salad dressing, pickles, olives, soy sauce, and miso    · High-fat dairy foods  such as whole or 2% milk, cream cheese, or sour cream, and cheeses     · High-fat protein foods  such as high-fat cuts of beef (T-bone steaks, ribs), chicken or turkey with skin, and organ meats, such as liver    · Cured or smoked meats , such as hot dogs, dickson, and sausage    · Unhealthy fats and oils , such as butter, stick margarine, shortening, and cooking oils such as coconut or palm oil    · Food and drinks high in sugar , such as soft drinks (soda), sports drinks, sweetened tea, candy, cake, cookies, pies, and doughnuts  Other diet guidelines to follow:   · Eat more foods containing omega-3 fats  Eat fish high in omega-3 fats at least 2 times a week  · Limit alcohol  Too much alcohol can damage your heart and raise your blood pressure  Women should limit alcohol to 1 drink a day  Men should limit alcohol to 2 drinks a day  A drink of alcohol is 12 ounces of beer, 5 ounces of wine, or 1½ ounces of liquor  · Choose low-sodium foods  High-sodium foods can lead to high blood pressure  Add little or no salt to food you prepare  Use herbs and spices in place of salt  · Eat more fiber  to help lower cholesterol levels  Eat at least 5 servings of fruits and vegetables each day  Eat 3 ounces of whole-grain foods each day  Legumes (beans) are also a good source of fiber  Lifestyle guidelines:   · Do not smoke  Nicotine and other chemicals in cigarettes and cigars can cause lung and heart damage  Ask your healthcare provider for information if you currently smoke and need help to quit  E-cigarettes or smokeless tobacco still contain nicotine  Talk to your healthcare provider before you use these products  · Exercise regularly  to help you maintain a healthy weight and improve your blood pressure and cholesterol levels  Ask your healthcare provider about the best exercise plan for you  Do not start an exercise program without asking your healthcare provider  Follow up with your healthcare provider as directed:  Write down your questions so you remember to ask them during your visits     © 2017 [address] [employer] Information is for End User's use only and may not be sold, redistributed or otherwise used for commercial purposes  All illustrations and images included in CareNotes® are the copyrighted property of [employer]  or Omari Andujar  The above information is an  only  It is not intended as medical advice for individual conditions or treatments  Talk to your doctor, nurse or pharmacist before following any medical regimen to see if it is safe and effective for you

## 2019-12-10 ENCOUNTER — TELEPHONE (OUTPATIENT)
Dept: INTERNAL MEDICINE CLINIC | Facility: CLINIC | Age: 64
End: 2019-12-10

## 2019-12-10 NOTE — TELEPHONE ENCOUNTER
Pt states gabapentin is not working for her and causes her stomach upset  She would like to know what else you can recommend? States her pain is bad

## 2019-12-11 ENCOUNTER — OFFICE VISIT (OUTPATIENT)
Dept: INTERNAL MEDICINE CLINIC | Facility: CLINIC | Age: 64
End: 2019-12-11
Payer: COMMERCIAL

## 2019-12-11 VITALS
DIASTOLIC BLOOD PRESSURE: 62 MMHG | SYSTOLIC BLOOD PRESSURE: 102 MMHG | TEMPERATURE: 97.7 F | BODY MASS INDEX: 25.86 KG/M2 | RESPIRATION RATE: 18 BRPM | HEIGHT: 63 IN | OXYGEN SATURATION: 96 % | HEART RATE: 58 BPM

## 2019-12-11 DIAGNOSIS — M48.07 SPINAL STENOSIS OF LUMBOSACRAL REGION: ICD-10-CM

## 2019-12-11 DIAGNOSIS — M54.42 CHRONIC MIDLINE LOW BACK PAIN WITH BILATERAL SCIATICA: Primary | ICD-10-CM

## 2019-12-11 DIAGNOSIS — G89.29 CHRONIC MIDLINE LOW BACK PAIN WITH BILATERAL SCIATICA: Primary | ICD-10-CM

## 2019-12-11 DIAGNOSIS — M51.26 HNP (HERNIATED NUCLEUS PULPOSUS), LUMBAR: ICD-10-CM

## 2019-12-11 DIAGNOSIS — M54.41 CHRONIC MIDLINE LOW BACK PAIN WITH BILATERAL SCIATICA: Primary | ICD-10-CM

## 2019-12-11 PROBLEM — M54.50 ACUTE BILATERAL LOW BACK PAIN WITHOUT SCIATICA: Status: RESOLVED | Noted: 2019-10-11 | Resolved: 2019-12-11

## 2019-12-11 PROCEDURE — 99213 OFFICE O/P EST LOW 20 MIN: CPT | Performed by: INTERNAL MEDICINE

## 2019-12-11 PROCEDURE — 4004F PT TOBACCO SCREEN RCVD TLK: CPT | Performed by: INTERNAL MEDICINE

## 2019-12-11 NOTE — PROGRESS NOTES
Assessment/Plan:  Problem List Items Addressed This Visit     None      Visit Diagnoses     Chronic midline low back pain with bilateral sciatica    -  Primary    Spinal stenosis of lumbosacral region        HNP (herniated nucleus pulposus), lumbar               Diagnoses and all orders for this visit:    Chronic midline low back pain with bilateral sciatica    Spinal stenosis of lumbosacral region    HNP (herniated nucleus pulposus), lumbar        No problem-specific Assessment & Plan notes found for this encounter  A/P: I have spent 25 minutes with Family today in which greater than 50% of this time was spent in counseling/coordination of care regarding Diagnostic results, Intructions for management, Importance of tx compliance and Impressions  Pt had several epidurals and lidocaine patches w/o success  Appears pain management wants to do ablation  Discussed avoiding long term narcs and that she should consider PT, yoga, swimming, and the ablation  ??if implantable stim might be an option  Pt referred back to pain management  RTC as scheduled  Subjective:      Patient ID: Kaylyn Weiss is a 60 y o  female  WF with chronic LBP with sciatica and seeing pain management, presents due to continue progression of her pain  Trouble walking at times and constant pain 5/10 with frequent flares to 10/10  NO bowl or bladder habit changes  No other c/o's         The following portions of the patient's history were reviewed and updated as appropriate:   She has a past medical history of Abnormal finding in urine, Arthralgia of multiple joints, Biceps tendinitis, right, Bursitis of right knee, Bursitis of right shoulder, Chondrocalcinosis, COPD exacerbation ([address] ), 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, Drug intolerance, Dysfunction of right eustachian tube, Dysfunctional uterine bleeding, Edema, Elevated d-dimer, Exposure to hepatitis C, Fracture of fibula, Generalized anxiety disorder, GERD (gastroesophageal reflux disease), Mild cervical dysplasia, Myalgia, Myositis, Palpitations, Pre-syncope, Thrombocytopenia ([facility] [address] ), Urinary tract infection, and Uterine leiomyoma  ,  does not have any pertinent problems on file  ,   has a past surgical history that includes Bladder surgery; Breast biopsy (Right); Colposcopy (01/06/1998); Dental surgery; Endometrial biopsy (05/21/1993); Hysteroscopy; Tubal ligation; and US guided breast biopsy right complete (Right, 8/8/2018)  ,  family history includes Aortic aneurysm in her family; Arrhythmia in her mother; Arthritis in her family; Coronary artery disease in her father and mother; Diabetes in her family and father; Heart disease in her family; Stroke in her mother  ,   reports that she has been smoking  She has never used smokeless tobacco  She reports that she does not drink alcohol or use drugs  ,  has No Known Allergies     Current Outpatient Medications   Medication Sig Dispense Refill    buPROPion (WELLBUTRIN XL) 150 mg 24 hr tablet Take by mouth every morning        busPIRone (BUSPAR) 30 MG tablet TAKE 1 TABLET TID      clonazePAM (KlonoPIN) 1 mg tablet TAKE 1 TABLET TID      omeprazole (PriLOSEC) 40 MG capsule Take 1 capsule (40 mg total) by mouth daily 90 capsule 0    tretinoin (RETIN-A) 0 1 % cream tretinoin 0 1 % topical cream      venlafaxine (EFFEXOR-XR) 150 mg 24 hr capsule Take 1 capsule (150 mg total) by mouth daily 90 capsule 0    venlafaxine (EFFEXOR-XR) 75 mg 24 hr capsule Take 1 capsule (75 mg total) by mouth daily 90 capsule 1     No current facility-administered medications for this visit  Review of Systems   Constitutional: Positive for activity change  Negative for chills, diaphoresis, fatigue and fever  Respiratory: Negative for cough, chest tightness, shortness of breath and wheezing  Cardiovascular: Negative for chest pain, palpitations and leg swelling     Gastrointestinal: Negative for abdominal pain, constipation, diarrhea, nausea and vomiting  Genitourinary: Negative for difficulty urinating, dysuria and frequency  Musculoskeletal: Positive for back pain and gait problem  Negative for arthralgias and myalgias  Neurological: Negative for weakness, light-headedness and headaches  Psychiatric/Behavioral: Negative for confusion  The patient is not nervous/anxious  PHQ-9 Depression Screening    PHQ-9:    Frequency of the following problems over the past two weeks:       Little interest or pleasure in doing things:  0 - not at all  Feeling down, depressed, or hopeless:  0 - not at all  PHQ-2 Score:  0        Objective:  Vitals:    12/11/19 1538   BP: 102/62   BP Location: Left arm   Patient Position: Sitting   Cuff Size: Adult   Pulse: 58   Resp: 18   Temp: 97 7 °F (36 5 °C)   SpO2: 96%   Height: 5' 3" (1 6 m)     Body mass index is 25 86 kg/m²  Physical Exam   Constitutional: She is oriented to person, place, and time  She appears well-developed and well-nourished  No distress  HENT:   Head: Normocephalic and atraumatic  Mouth/Throat: Oropharynx is clear and moist    Eyes: Pupils are equal, round, and reactive to light  Conjunctivae and EOM are normal    Musculoskeletal: She exhibits tenderness  She exhibits no edema or deformity  LS Spine w/o gross deformities, increase temp, erythema, swelling, or lesions  Tenderness midline L2-S1  ROM wnl   Spasms not noted  LE strength 5/5 with tone/ROM WNL  DTR 2/4  Negative Straight leg raises  No gait abnormalities(toe/heel walking)  Neurological: She is alert and oriented to person, place, and time  Psychiatric: She has a normal mood and affect  Her behavior is normal  Judgment and thought content normal    Nursing note and vitals reviewed

## 2019-12-17 DIAGNOSIS — F41.9 ANXIETY: ICD-10-CM

## 2019-12-17 RX ORDER — VENLAFAXINE HYDROCHLORIDE 150 MG/1
150 CAPSULE, EXTENDED RELEASE ORAL DAILY
Qty: 90 CAPSULE | Refills: 1 | Status: SHIPPED | OUTPATIENT
Start: 2019-12-17 | End: 2020-03-10 | Stop reason: SDUPTHER

## 2019-12-20 ENCOUNTER — HOSPITAL ENCOUNTER (OUTPATIENT)
Dept: RADIOLOGY | Facility: HOSPITAL | Age: 64
Discharge: HOME | End: 2019-12-20
Payer: COMMERCIAL

## 2019-12-20 ENCOUNTER — TRANSCRIBE ORDERS (OUTPATIENT)
Dept: ADMINISTRATIVE | Facility: HOSPITAL | Age: 64
End: 2019-12-20

## 2019-12-20 DIAGNOSIS — M25.569 KNEE PAIN, UNSPECIFIED CHRONICITY, UNSPECIFIED LATERALITY: Primary | ICD-10-CM

## 2019-12-20 DIAGNOSIS — M25.569 KNEE PAIN, UNSPECIFIED CHRONICITY, UNSPECIFIED LATERALITY: ICD-10-CM

## 2019-12-20 PROCEDURE — 73562 X-RAY EXAM OF KNEE 3: CPT

## 2019-12-24 ENCOUNTER — TELEPHONE (OUTPATIENT)
Dept: INTERNAL MEDICINE CLINIC | Facility: CLINIC | Age: 64
End: 2019-12-24

## 2020-01-13 ENCOUNTER — OFFICE VISIT (OUTPATIENT)
Dept: INTERNAL MEDICINE CLINIC | Facility: CLINIC | Age: 65
End: 2020-01-13
Payer: COMMERCIAL

## 2020-01-13 VITALS
DIASTOLIC BLOOD PRESSURE: 68 MMHG | BODY MASS INDEX: 25.86 KG/M2 | HEIGHT: 63 IN | HEART RATE: 70 BPM | RESPIRATION RATE: 16 BRPM | TEMPERATURE: 97.7 F | SYSTOLIC BLOOD PRESSURE: 118 MMHG

## 2020-01-13 DIAGNOSIS — M51.26 HNP (HERNIATED NUCLEUS PULPOSUS), LUMBAR: ICD-10-CM

## 2020-01-13 DIAGNOSIS — M54.50 CHRONIC MIDLINE LOW BACK PAIN, UNSPECIFIED WHETHER SCIATICA PRESENT: Primary | ICD-10-CM

## 2020-01-13 DIAGNOSIS — M79.609 POPLITEAL PAIN: ICD-10-CM

## 2020-01-13 DIAGNOSIS — M25.561 CHRONIC PAIN OF RIGHT KNEE: ICD-10-CM

## 2020-01-13 DIAGNOSIS — E66.3 OVERWEIGHT (BMI 25.0-29.9): ICD-10-CM

## 2020-01-13 DIAGNOSIS — M54.16 SPINAL STENOSIS OF LUMBAR REGION WITH RADICULOPATHY: ICD-10-CM

## 2020-01-13 DIAGNOSIS — M51.36 DDD (DEGENERATIVE DISC DISEASE), LUMBAR: ICD-10-CM

## 2020-01-13 DIAGNOSIS — M25.461 EFFUSION OF BURSA OF RIGHT KNEE: ICD-10-CM

## 2020-01-13 DIAGNOSIS — G89.29 CHRONIC PAIN OF RIGHT KNEE: ICD-10-CM

## 2020-01-13 DIAGNOSIS — L30.9 DERMATITIS: ICD-10-CM

## 2020-01-13 DIAGNOSIS — M48.061 SPINAL STENOSIS OF LUMBAR REGION WITH RADICULOPATHY: ICD-10-CM

## 2020-01-13 DIAGNOSIS — G89.29 CHRONIC MIDLINE LOW BACK PAIN, UNSPECIFIED WHETHER SCIATICA PRESENT: Primary | ICD-10-CM

## 2020-01-13 PROCEDURE — 99213 OFFICE O/P EST LOW 20 MIN: CPT | Performed by: INTERNAL MEDICINE

## 2020-01-13 RX ORDER — TRETINOIN 1 MG/G
CREAM TOPICAL
Qty: 45 G | Refills: 5 | Status: SHIPPED | OUTPATIENT
Start: 2020-01-13 | End: 2021-07-19 | Stop reason: SDUPTHER

## 2020-01-13 NOTE — PATIENT INSTRUCTIONS
Weight Management   AMBULATORY CARE:   Why it is important to manage your weight:  Being overweight increases your risk of health conditions such as heart disease, high blood pressure, type 2 diabetes, and certain types of cancer  It can also increase your risk for osteoarthritis, sleep apnea, and other respiratory problems  Aim for a slow, steady weight loss  Even a small amount of weight loss can lower your risk of health problems  How to lose weight safely:  A safe and healthy way to lose weight is to eat fewer calories and get regular exercise  You can lose up about 1 pound a week by decreasing the number of calories you eat by 500 calories each day  You can decrease calories by eating smaller portion sizes or by cutting out high-calorie foods  Read labels to find out how many calories are in the foods you eat  You can also burn calories with exercise such as walking, swimming, or biking  You will be more likely to keep weight off if you make these changes part of your lifestyle  Healthy meal plan for weight management:  A healthy meal plan includes a variety of foods, contains fewer calories, and helps you stay healthy  A healthy meal plan includes the following:  · Eat whole-grain foods more often  A healthy meal plan should contain fiber  Fiber is the part of grains, fruits, and vegetables that is not broken down by your body  Whole-grain foods are healthy and provide extra fiber in your diet  Some examples of whole-grain foods are whole-wheat breads and pastas, oatmeal, brown rice, and bulgur  · Eat a variety of vegetables every day  Include dark, leafy greens such as spinach, kale, taiwo greens, and mustard greens  Eat yellow and orange vegetables such as carrots, sweet potatoes, and winter squash  · Eat a variety of fruits every day  Choose fresh or canned fruit (canned in its own juice or light syrup) instead of juice  Fruit juice has very little or no fiber  · Eat low-fat dairy foods  Drink fat-free (skim) milk or 1% milk  Eat fat-free yogurt and low-fat cottage cheese  Try low-fat cheeses such as mozzarella and other reduced-fat cheeses  · Choose meat and other protein foods that are low in fat  Choose beans or other legumes such as split peas or lentils  Choose fish, skinless poultry (chicken or turkey), or lean cuts of red meat (beef or pork)  Before you cook meat or poultry, cut off any visible fat  · Use less fat and oil  Try baking foods instead of frying them  Add less fat, such as margarine, sour cream, regular salad dressing and mayonnaise to foods  Eat fewer high-fat foods  Some examples of high-fat foods include french fries, doughnuts, ice cream, and cakes  · Eat fewer sweets  Limit foods and drinks that are high in sugar  This includes candy, cookies, regular soda, and sweetened drinks  Ways to decrease calories:   · Eat smaller portions  ¨ Use a small plate with smaller servings  ¨ Do not eat second helpings  ¨ When you eat at a restaurant, ask for a box and place half of your meal in the box before you eat  ¨ Share an entrée with someone else  · Replace high-calorie snacks with healthy, low-calorie snacks  ¨ Choose fresh fruit, vegetables, fat-free rice cakes, or air-popped popcorn instead of potato chips, nuts, or chocolate  ¨ Choose water or calorie-free drinks instead of soda or sweetened drinks  · Eat regular meals  Skipping meals can lead to overeating later in the day  Eat a healthy snack in place of a meal if you do not have time to eat a regular meal      · Do not shop for groceries when you are hungry  You may be more likely to make unhealthy food choices  Take a grocery list of healthy foods and shop after you have eaten  Exercise:  Exercise at least 30 minutes per day on most days of the week  Some examples of exercise include walking, biking, dancing, and swimming   You can also fit in more physical activity by taking the stairs instead of the elevator or parking farther away from stores  Ask your healthcare provider about the best exercise plan for you  Other things to consider as you try to lose weight:   · Be aware of situations that may give you the urge to overeat, such as eating while watching television  Find ways to avoid these situations  For example, read a book, go for a walk, or do crafts  · Meet with a weight loss support group or friends who are also trying to lose weight  This may help you stay motivated to continue working on your weight loss goals  © 2017 [facility] Information is for End User's use only and may not be sold, redistributed or otherwise used for commercial purposes  All illustrations and images included in CareNotes® are the copyrighted property of [employer] , [employer]  or Omari Andujar  The above information is an  only  It is not intended as medical advice for individual conditions or treatments  Talk to your doctor, nurse or pharmacist before following any medical regimen to see if it is safe and effective for you  Low Fat Diet   AMBULATORY CARE:   A low-fat diet  is an eating plan that is low in total fat, unhealthy fat, and cholesterol  You may need to follow a low-fat diet if you have trouble digesting or absorbing fat  You may also need to follow this diet if you have high cholesterol  You can also lower your cholesterol by increasing the amount of fiber in your diet  Soluble fiber is a type of fiber that helps to decrease cholesterol levels  Different types of fat in food:   · Limit unhealthy fats  A diet that is high in cholesterol, saturated fat, and trans fat may cause unhealthy cholesterol levels  Unhealthy cholesterol levels increase your risk of heart disease  ¨ Cholesterol:  Limit intake of cholesterol to less than 200 mg per day  Cholesterol is found in meat, eggs, and dairy      ¨ Saturated fat:  Limit saturated fat to less than 7% of your total daily calories  Ask your dietitian how many calories you need each day  Saturated fat is found in butter, cheese, ice cream, whole milk, and palm oil  Saturated fat is also found in meat, such as beef, pork, chicken skin, and processed meats  Processed meats include sausage, hot dogs, and bologna  ¨ Trans fat:  Avoid trans fat as much as possible  Trans fat is used in fried and baked foods  Foods that say trans fat free on the label may still have up to 0 5 grams of trans fat per serving  · Include healthy fats  Replace foods that are high in saturated and trans fat with foods high in healthy fats  This may help to decrease high cholesterol levels  ¨ Monounsaturated fats: These are found in avocados, nuts, and vegetable oils, such as olive, canola, and sunflower oil  ¨ Polyunsaturated fats: These can be found in vegetable oils, such as soybean or corn oil  Omega-3 fats can help to decrease the risk of heart disease  Omega-3 fats are found in fish, such as salmon, herring, trout, and tuna  Omega-3 fats can also be found in plant foods, such as walnuts, flaxseed, soybeans, and canola oil    Foods to limit or avoid:   · Grains:      ¨ Snacks that are made with partially hydrogenated oils, such as chips, regular crackers, and butter-flavored popcorn    ¨ High-fat baked goods, such as biscuits, croissants, doughnuts, pies, cookies, and pastries    · Dairy:      ¨ Whole milk, 2% milk, and yogurt and ice cream made with whole milk    ¨ Half and half creamer, heavy cream, and whipping cream    ¨ Cheese, cream cheese, and sour cream    · Meats and proteins:      ¨ High-fat cuts of meat (T-bone steak, regular hamburger, and ribs)    ¨ Fried meat, poultry (turkey and chicken), and fish    ¨ Poultry (chicken and turkey) with skin    ¨ Cold cuts (salami or bologna), hot dogs, dicskon, and sausage    ¨ Whole eggs and egg yolks    · Vegetables and fruits with added fat:      ¨ Fried vegetables or vegetables in butter or high-fat sauces, such as cream or cheese sauces    ¨ Fried fruit or fruit served with butter or cream    · Fats:      ¨ Butter, stick margarine, and shortening    ¨ Coconut, palm oil, and palm kernel oil  Foods to include:   · Grains:      ¨ Whole-grain breads, cereals, pasta, and brown rice    ¨ Low-fat crackers and pretzels    · Vegetables and fruits:      ¨ Fresh, frozen, or canned vegetables (no salt or low-sodium)    ¨ Fresh, frozen, dried, or canned fruit (canned in light syrup or fruit juice)    ¨ Avocado    · Low-fat dairy products:      ¨ Nonfat (skim) or 1% milk    ¨ Nonfat or low-fat cheese, yogurt, and cottage cheese    · Meats and proteins:      ¨ Chicken or turkey with no skin    ¨ Baked or broiled fish    ¨ Lean beef and pork (loin, round, extra lean hamburger)    ¨ Beans and peas, unsalted nuts, soy products    ¨ Egg whites and substitutes    ¨ Seeds and nuts    · Fats:      ¨ Unsaturated oil, such as canola, olive, peanut, soybean, or sunflower oil    ¨ Soft or liquid margarine and vegetable oil spread    ¨ Low-fat salad dressing  Other ways to decrease fat:   · Read food labels before you buy foods  Choose foods that have less than 30% of calories from fat  Choose low-fat or fat-free dairy products  Remember that fat free does not mean calorie free  These foods still contain calories, and too many calories can lead to weight gain  · Trim fat from meat and avoid fried food  Trim all visible fat from meat before you cook it  Remove the skin from poultry  Do not cain meat, fish, or poultry  Bake, roast, boil, or broil these foods instead  Avoid fried foods  Eat a baked potato instead of [place] fries  Steam vegetables instead of sautéing them in butter  · Add less fat to foods  Use imitation dickson bits on salads and baked potatoes instead of regular dickson bits  Use fat-free or low-fat salad dressings instead of regular dressings   Use low-fat or nonfat butter-flavored topping instead of regular butter or margarine on popcorn and other foods  Ways to decrease fat in recipes:  Replace high-fat ingredients with low-fat or nonfat ones  This may cause baked goods to be drier than usual  You may need to use nonfat cooking spray on pans to prevent food from sticking  You also may need to change the amount of other ingredients, such as water, in the recipe  Try the following:  · Use low-fat or light margarine instead of regular margarine or shortening  · Use lean ground turkey breast or chicken, or lean ground beef (less than 5% fat) instead of hamburger  · Add 1 teaspoon of canola oil to 8 ounces of skim milk instead of using cream or half and half  · Use grated zucchini, carrots, or apples in breads instead of coconut  · Use blenderized, low-fat cottage cheese, plain tofu, or low-fat ricotta cheese instead of cream cheese  · Use 1 egg white and 1 teaspoon of canola oil, or use ¼ cup (2 ounces) of fat-free egg substitute instead of a whole egg  · Replace half of the oil that is called for in a recipe with applesauce when you bake  Use 3 tablespoons of cocoa powder and 1 tablespoon of canola oil instead of a square of baking chocolate  How to increase fiber:  Eat enough high-fiber foods to get 20 to 30 grams of fiber every day  Slowly increase your fiber intake to avoid stomach cramps, gas, and other problems  · Eat 3 ounces of whole-grain foods each day  An ounce is about 1 slice of bread  Eat whole-grain breads, such as whole-wheat bread  Whole wheat, whole-wheat flour, or other whole grains should be listed as the first ingredient on the food label  Replace white flour with whole-grain flour or use half of each in recipes  Whole-grain flour is heavier than white flour, so you may have to add more yeast or baking powder  · Eat a high-fiber cereal for breakfast   Oatmeal is a good source of soluble fiber  Look for cereals that have bran or fiber in the name   Choose whole-grain products, such as brown rice, barley, and whole-wheat pasta  · Eat more beans, peas, and lentils  For example, add beans to soups or salads  Eat at least 5 cups of fruits and vegetables each day  Eat fruits and vegetables with the peel because the peel is high in fiber  © 2017 [address]  Information is for End User's use only and may not be sold, redistributed or otherwise used for commercial purposes  All illustrations and images included in CareNotes® are the copyrighted property of [employer] , [employer]  or Omari Andujar  The above information is an  only  It is not intended as medical advice for individual conditions or treatments  Talk to your doctor, nurse or pharmacist before following any medical regimen to see if it is safe and effective for you  Heart Healthy Diet   AMBULATORY CARE:   A heart healthy diet  is an eating plan low in total fat, unhealthy fats, and sodium (salt)  A heart healthy diet helps decrease your risk for heart disease and stroke  Limit the amount of fat you eat to 25% to 35% of your total daily calories  Limit sodium to less than 2,300 mg each day  Healthy fats:  Healthy fats can help improve cholesterol levels  The risk for heart disease is decreased when cholesterol levels are normal  Choose healthy fats, such as the following:  · Unsaturated fat  is found in foods such as soybean, canola, olive, corn, and safflower oils  It is also found in soft tub margarine that is made with liquid vegetable oil  · Omega-3 fat  is found in certain fish, such as salmon, tuna, and trout, and in walnuts and flaxseed  Unhealthy fats:  Unhealthy fats can cause unhealthy cholesterol levels in your blood and increase your risk of heart disease  Limit unhealthy fats, such as the following:  · Cholesterol  is found in animal foods, such as eggs and lobster, and in dairy products made from whole milk   Limit cholesterol to less than 300 milligrams (mg) each day  You may need to limit cholesterol to 200 mg each day if you have heart disease  · Saturated fat  is found in meats, such as dickson and hamburger  It is also found in chicken or turkey skin, whole milk, and butter  Limit saturated fat to less than 7% of your total daily calories  Limit saturated fat to less than 6% if you have heart disease or are at increased risk for it  · Trans fat  is found in packaged foods, such as potato chips and cookies  It is also in hard margarine, some fried foods, and shortening  Avoid trans fats as much as possible    Heart healthy foods and drinks to include:  Ask your dietitian or healthcare provider how many servings to have from each of the following food groups:  · Grains:      ¨ Whole-wheat breads, cereals, and pastas, and brown rice    ¨ Low-fat, low-sodium crackers and chips    · Vegetables:      ¨ Broccoli, green beans, green peas, and spinach    ¨ Collards, kale, and lima beans    ¨ Carrots, sweet potatoes, tomatoes, and peppers    ¨ Canned vegetables with no salt added    · Fruits:      ¨ Bananas, peaches, pears, and pineapple    ¨ Grapes, raisins, and dates    ¨ Oranges, tangerines, grapefruit, orange juice, and grapefruit juice    ¨ Apricots, mangoes, melons, and papaya    ¨ Raspberries and strawberries    ¨ Canned fruit with no added sugar    · Low-fat dairy products:      ¨ Nonfat (skim) milk, 1% milk, and low-fat almond, cashew, or soy milks fortified with calcium    ¨ Low-fat cheese, regular or frozen yogurt, and cottage cheese    · Meats and proteins , such as lean cuts of beef and pork (loin, leg, round), skinless chicken and turkey, legumes, soy products, egg whites, and nuts  Foods and drinks to limit or avoid:  Ask your dietitian or healthcare provider about these and other foods that are high in unhealthy fat, sodium, and sugar:  · Snack or packaged foods , such as frozen dinners, cookies, macaroni and cheese, and cereals with more than 300 mg of sodium per serving    · Canned or dry mixes  for cakes, soups, sauces, or gravies    · Vegetables with added sodium , such as instant potatoes, vegetables with added sauces, or regular canned vegetables    · Other foods high in sodium , such as ketchup, barbecue sauce, salad dressing, pickles, olives, soy sauce, and miso    · High-fat dairy foods  such as whole or 2% milk, cream cheese, or sour cream, and cheeses     · High-fat protein foods  such as high-fat cuts of beef (T-bone steaks, ribs), chicken or turkey with skin, and organ meats, such as liver    · Cured or smoked meats , such as hot dogs, dickson, and sausage    · Unhealthy fats and oils , such as butter, stick margarine, shortening, and cooking oils such as coconut or palm oil    · Food and drinks high in sugar , such as soft drinks (soda), sports drinks, sweetened tea, candy, cake, cookies, pies, and doughnuts  Other diet guidelines to follow:   · Eat more foods containing omega-3 fats  Eat fish high in omega-3 fats at least 2 times a week  · Limit alcohol  Too much alcohol can damage your heart and raise your blood pressure  Women should limit alcohol to 1 drink a day  Men should limit alcohol to 2 drinks a day  A drink of alcohol is 12 ounces of beer, 5 ounces of wine, or 1½ ounces of liquor  · Choose low-sodium foods  High-sodium foods can lead to high blood pressure  Add little or no salt to food you prepare  Use herbs and spices in place of salt  · Eat more fiber  to help lower cholesterol levels  Eat at least 5 servings of fruits and vegetables each day  Eat 3 ounces of whole-grain foods each day  Legumes (beans) are also a good source of fiber  Lifestyle guidelines:   · Do not smoke  Nicotine and other chemicals in cigarettes and cigars can cause lung and heart damage  Ask your healthcare provider for information if you currently smoke and need help to quit  E-cigarettes or smokeless tobacco still contain nicotine  Talk to your healthcare provider before you use these products  · Exercise regularly  to help you maintain a healthy weight and improve your blood pressure and cholesterol levels  Ask your healthcare provider about the best exercise plan for you  Do not start an exercise program without asking your healthcare provider  Follow up with your healthcare provider as directed:  Write down your questions so you remember to ask them during your visits  © 2017 [address] [employer]  Information is for End User's use only and may not be sold, redistributed or otherwise used for commercial purposes  All illustrations and images included in CareNotes® are the copyrighted property of [employer]  or Omari Andujar  The above information is an  only  It is not intended as medical advice for individual conditions or treatments  Talk to your doctor, nurse or pharmacist before following any medical regimen to see if it is safe and effective for you

## 2020-01-13 NOTE — PROGRESS NOTES
BMI Counseling: There is no height or weight on file to calculate BMI  The BMI is above normal  Nutrition recommendations include decreasing portion sizes, increasing intake of lean protein and reducing intake of saturated and trans fat  Exercise recommendations include moderate physical activity 150 minutes/week  No pharmacotherapy was ordered  Assessment/Plan:  Problem List Items Addressed This Visit     None      Visit Diagnoses     Chronic midline low back pain, unspecified whether sciatica present    -  Primary    Relevant Orders    Ambulatory referral to Orthopedic Surgery    Ambulatory referral to Physical Therapy    Overweight (BMI 25 0-29  9)        BMI 25 0-25 9,adult        DDD (degenerative disc disease), lumbar        Relevant Orders    Ambulatory referral to Orthopedic Surgery    Ambulatory referral to Physical Therapy    Spinal stenosis of lumbar region with radiculopathy        Relevant Orders    Ambulatory referral to Orthopedic Surgery    Ambulatory referral to Physical Therapy    HNP (herniated nucleus pulposus), lumbar        Dermatitis        Relevant Medications    tretinoin (RETIN-A) 0 1 % cream    Chronic pain of right knee        Relevant Orders    Ambulatory referral to Orthopedic Surgery    Ambulatory referral to Physical Therapy    VAS lower limb venous duplex study, unilateral/limited    Effusion of bursa of right knee        Relevant Orders    Ambulatory referral to Orthopedic Surgery    Ambulatory referral to Physical Therapy    VAS lower limb venous duplex study, unilateral/limited    Popliteal pain        Relevant Orders    VAS lower limb venous duplex study, unilateral/limited           Diagnoses and all orders for this visit:    Chronic midline low back pain, unspecified whether sciatica present  -     Ambulatory referral to Orthopedic Surgery; Future  -     Ambulatory referral to Physical Therapy; Future    Overweight (BMI 25 0-29  9)    BMI 25 0-25 9,adult    DDD (degenerative disc disease), lumbar  -     Ambulatory referral to Orthopedic Surgery; Future  -     Ambulatory referral to Physical Therapy; Future    Spinal stenosis of lumbar region with radiculopathy  -     Ambulatory referral to Orthopedic Surgery; Future  -     Ambulatory referral to Physical Therapy; Future    HNP (herniated nucleus pulposus), lumbar    Dermatitis  -     tretinoin (RETIN-A) 0 1 % cream; Apply topically daily at bedtime    Chronic pain of right knee  -     Ambulatory referral to Orthopedic Surgery; Future  -     Ambulatory referral to Physical Therapy; Future  -     VAS lower limb venous duplex study, unilateral/limited; Future    Effusion of bursa of right knee  -     Ambulatory referral to Orthopedic Surgery; Future  -     Ambulatory referral to Physical Therapy; Future  -     VAS lower limb venous duplex study, unilateral/limited; Future    Popliteal pain  -     VAS lower limb venous duplex study, unilateral/limited; Future        No problem-specific Assessment & Plan notes found for this encounter  A/P: Pt is difficult in regards to the history  After discussion and PE, it appears that the LBP is a little better and than her concern is with the right knee pain  Appears the knee pain is not from radiculopathy, but more concern with MS and since her pain is more in the popliteal and proximal calf, need to r/o DVT or Baker's cyst  Will order a duplex and may need an US  Will start PT for both the knee and back  Will refer to ortho and may need an MRI  Initially discussed neuro sx eval for the back, but since that appears to be a little better, may benefit from additional epidural injections and as mentioned before by pain management, ablation  Pt to return to pain management and will hold off on neurosx for now  RTC two months for f/u and routine  Subjective:      Patient ID: Fiilppo Ruth is a 60 y o  female      WF, with known LS spine issues including stenosis, DDD, and HNP and received epidural injections in the past, presents due continued pain in the lower back and radiation  No change in bowel or bladder habits  Just had lumbar injection about two weeks at Dr Cate Pelayo  Reports pain is still bad and is down the right leg and knee  Pain is an 8/10  Taking tylenol  Upon further questioning, pt reports back pain is not as bad and most of her pain is in the right knee  Reports having and xray and an injection in the knee several weeks ago  Had prior sx on the knee  No trauma  Difficulty walking  The following portions of the patient's history were reviewed and updated as appropriate:   She has a past medical history of Abnormal finding in urine, Arthralgia of multiple joints, Biceps tendinitis, right, Bursitis of right knee, Bursitis of right shoulder, Chondrocalcinosis, COPD exacerbation ([address] ), 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, Drug intolerance, Dysfunction of right eustachian tube, Dysfunctional uterine bleeding, Edema, Elevated d-dimer, Exposure to hepatitis C, Fracture of fibula, Generalized anxiety disorder, GERD (gastroesophageal reflux disease), Mild cervical dysplasia, Myalgia, Myositis, Palpitations, Pre-syncope, Thrombocytopenia ([address] ), Urinary tract infection, and Uterine leiomyoma  ,  does not have any pertinent problems on file  ,   has a past surgical history that includes Bladder surgery; Breast biopsy (Right); Colposcopy (01/06/1998); Dental surgery; Endometrial biopsy (05/21/1993); Hysteroscopy; Tubal ligation; and US guided breast biopsy right complete (Right, 8/8/2018)  ,  family history includes Aortic aneurysm in her family; Arrhythmia in her mother; Arthritis in her family; Coronary artery disease in her father and mother; Diabetes in her family and father; Heart disease in her family; Stroke in her mother  ,   reports that she has been smoking   She has never used smokeless tobacco  She reports that she does not drink alcohol or use drugs ,  has No Known Allergies     Current Outpatient Medications   Medication Sig Dispense Refill    buPROPion (WELLBUTRIN XL) 150 mg 24 hr tablet Take by mouth every morning        busPIRone (BUSPAR) 30 MG tablet TAKE 1 TABLET TID      clonazePAM (KlonoPIN) 1 mg tablet TAKE 1 TABLET TID      omeprazole (PriLOSEC) 40 MG capsule Take 1 capsule (40 mg total) by mouth daily 90 capsule 0    tretinoin (RETIN-A) 0 1 % cream Apply topically daily at bedtime 45 g 5    venlafaxine (EFFEXOR-XR) 150 mg 24 hr capsule Take 1 capsule (150 mg total) by mouth daily 90 capsule 1    venlafaxine (EFFEXOR-XR) 75 mg 24 hr capsule Take 1 capsule (75 mg total) by mouth daily 90 capsule 1     No current facility-administered medications for this visit  Review of Systems   Constitutional: Negative for activity change, chills, diaphoresis, fatigue and fever  Respiratory: Negative for cough, chest tightness, shortness of breath and wheezing  Cardiovascular: Negative for chest pain, palpitations and leg swelling  Gastrointestinal: Negative for abdominal pain, constipation, diarrhea, nausea and vomiting  Genitourinary: Negative for difficulty urinating, dysuria and frequency  Musculoskeletal: Positive for back pain and gait problem  Negative for joint swelling and myalgias  Neurological: Negative for weakness, light-headedness, numbness and headaches  Psychiatric/Behavioral: Negative for confusion  The patient is not nervous/anxious  PHQ-9 Depression Screening    PHQ-9:    Frequency of the following problems over the past two weeks:             Objective:  Vitals:    01/13/20 1417   BP: 118/68   Pulse: 70   Resp: 16   Temp: 97 7 °F (36 5 °C)   TempSrc: Tympanic   Height: 5' 3" (1 6 m)     Body mass index is 25 86 kg/m²  Physical Exam   Constitutional: She is oriented to person, place, and time  She appears well-developed and well-nourished  No distress  HENT:   Head: Normocephalic and atraumatic  Mouth/Throat: Oropharynx is clear and moist    Eyes: Pupils are equal, round, and reactive to light  Conjunctivae and EOM are normal    Musculoskeletal: She exhibits tenderness  She exhibits no edema or deformity  LS Spine w/o gross deformities, increase temp, erythema, swelling, or lesions  Tenderness midline L3-S1  ROM wnl  Spasms none  LE strength 5/5 with tone/ROM WNL  DTR 2/4  Negative Straight leg raises  No gait abnormalities(toe/heel walking)  Right knee joint w/o any gross deformities, increase temp, erythema, or swelling  No crepitus  Small effusions over the suprapatellar bursa, but no ballotment  Joint integrity intact, but collats are intact,but laxed  Negative drawer's  Negative Hubert's  ROM wnl  Tenderness diffuse and over the popliteal area        Neurological: She is alert and oriented to person, place, and time  Psychiatric: She has a normal mood and affect  Her behavior is normal  Judgment and thought content normal    Nursing note and vitals reviewed  BMI Counseling: Body mass index is 25 86 kg/m²  The BMI is above normal  Nutrition recommendations include reducing portion sizes, decreasing overall calorie intake, reducing intake of saturated fat and trans fat and reducing intake of cholesterol  Exercise recommendations include moderate aerobic physical activity for 150 minutes/week

## 2020-01-17 ENCOUNTER — EVALUATION (OUTPATIENT)
Dept: PHYSICAL THERAPY | Facility: CLINIC | Age: 65
End: 2020-01-17
Payer: COMMERCIAL

## 2020-01-17 ENCOUNTER — HOSPITAL ENCOUNTER (OUTPATIENT)
Dept: NON INVASIVE DIAGNOSTICS | Facility: HOSPITAL | Age: 65
Discharge: HOME | End: 2020-01-17
Payer: COMMERCIAL

## 2020-01-17 DIAGNOSIS — M25.561 CHRONIC PAIN OF RIGHT KNEE: ICD-10-CM

## 2020-01-17 DIAGNOSIS — G89.29 CHRONIC PAIN OF RIGHT KNEE: Primary | ICD-10-CM

## 2020-01-17 DIAGNOSIS — G89.29 CHRONIC PAIN OF RIGHT KNEE: ICD-10-CM

## 2020-01-17 DIAGNOSIS — M25.461 EFFUSION OF BURSA OF RIGHT KNEE: ICD-10-CM

## 2020-01-17 DIAGNOSIS — M79.609 POPLITEAL PAIN: ICD-10-CM

## 2020-01-17 DIAGNOSIS — M25.561 CHRONIC PAIN OF RIGHT KNEE: Primary | ICD-10-CM

## 2020-01-17 PROCEDURE — 93971 EXTREMITY STUDY: CPT

## 2020-01-17 PROCEDURE — 97110 THERAPEUTIC EXERCISES: CPT | Performed by: PHYSICAL THERAPIST

## 2020-01-17 PROCEDURE — 97162 PT EVAL MOD COMPLEX 30 MIN: CPT | Performed by: PHYSICAL THERAPIST

## 2020-01-17 NOTE — PROGRESS NOTES
PT Evaluation     Today's date: 2020  Patient name: Cristina Carson  : 1955  MRN: [id number]  Referring provider: Jacky Bernard DO  Dx:   Encounter Diagnosis     ICD-10-CM    1  Chronic pain of right knee M25 561     G89 29                   Assessment  Assessment details: Pt Cristina Carson is a 60 y o  who presents to OPPT with s/s consistent with R knee popliteal bursa effusion  Pt with limited R knee mobility, decreased strength, edema, soft tissue restrictions with TTP, and gait dysfunction  Pt notes that while she remains independent with activities, she has had to modify tasks due to increase in pain with positioning  She gets significant increase in pain with driving, sitting for prolonged periods, and trying to sit with the knee fully flexed  Pt also notes increase in edema throughout the day, worse with excessive WB'ing activity  She is currently relying on heavy doses of Tylenol to manage pain; PT spoke to pt about NSAIDs to help address edema  Pt would benefit from skilled therapy services to address outlined impairments, work towards goals, and restore pts PLOF  Thank you!    Impairments: abnormal gait, abnormal or restricted ROM, activity intolerance, difficulty understanding, impaired balance, impaired physical strength, lacks appropriate home exercise program, pain with function, safety issue and weight-bearing intolerance  Understanding of Dx/Px/POC: good   Prognosis: good    Goals  STG: within 4 weeks   Pt demonstrating improvement in R knee ROM by 5-10 degrees   Pt with decrease in pain > 25%   Pt demonstrating improvement in R LE strength by 1/2 grade   Pt with reduction in edema by 25%  LTG: within 8 weeks  Pt able to ambulate > 1 hour without increase in symptoms to complete grocery shopping  Pt able to sit comfortably > 1 hour without increase in pain in order to drive prolonged distances   Pt able to demonstrate R knee ROM and MMT WFL in order to complete ADLs without limitations   Pt able to return to work activities without modification or limitations    Plan  Plan details: PT provided pt with detailed HEP program; pt performed activities with correct technique demonstrated this date  Patient would benefit from: skilled physical therapy  Planned modality interventions: cryotherapy and ultrasound  Planned therapy interventions: balance, manual therapy, neuromuscular re-education, postural training, patient education, strengthening, stretching, self care, therapeutic exercise, home exercise program, graded exercise, gait training, functional ROM exercises and flexibility  Frequency: 2x week  Duration in weeks: 8  Treatment plan discussed with: patient        Subjective Evaluation    History of Present Illness  Mechanism of injury: Pt reporting that she has been having pan in the R knee since May 2019 with insidious onset of symptoms  She is seeing pain management for chronic LBP and mentioned the knee pain so she had recent injection in the R knee which did not help decrease her pain at all  She mentioned pain to her PCP at recent appointment and he ordered x-rays which were (+) for OA in the R knee  She is now referred to OPPT for conservative management of s/s   No specific follow up with MD    Quality of life: good    Pain  At best pain rating: 3  At worst pain ratin  Quality: dull ache, tight and sharp  Relieving factors: medications and ice (OTC prn )  Aggravating factors: standing, walking and sitting  Progression: worsening    Social Support  Steps to enter house: yes  Stairs in house: yes   Lives in: multiple-level home  Lives with: spouse    Employment status: working    Diagnostic Tests  X-ray: abnormal  Treatments  Previous treatment: injection treatment  Current treatment: physical therapy  Patient Goals  Patient goals for therapy: increased strength, independence with ADLs/IADLs, increased motion, decreased pain, decreased edema and improved balance          Objective Observations     Right Knee   Positive for edema  Palpation     Right   Hypertonic in the distal biceps femoris, distal semimembranosus, distal semitendinosus and medial gastrocnemius  Tenderness     Right Knee   Tenderness in the lateral joint line, pes anserinus and popliteal fossa       Neurological Testing     Sensation     Knee   Left Knee   Intact: light touch    Right Knee   Intact: light touch     Active Range of Motion   Left Knee   Flexion: 140 degrees   Extension: 0 degrees     Right Knee   Flexion: 95 degrees with pain  Extension: -15 degrees with pain    Mobility   Patellar Mobility:     Right Knee   Hypomobile: medial and lateral     Strength/Myotome Testing     Left Knee   Flexion: 5  Extension: 5    Right Knee   Flexion: 3+  Extension: 3+    Swelling     Left Knee Girth Measurement (cm)   Joint line: 35 cm    Right Knee Girth Measurement (cm)   Joint line: 36 5 cm               Re-eval Date: 2/17/2020     Date 1/17       Visit Count 1       FOTO Completed        Pain In        Pain Out            Manual  1/17                           Exercise Diary  1/17       NuStep         SLR x 3         HR/TR        Squats        Step-ups         TKE with band        Step-downs        Bosu lunges        LAQ        Hams with tband        QS        Heel slides        SAQ        Hip add        Hip abd        SLR        S/L SLR        Bridges                             Modalities        CP - R posterior knee

## 2020-01-21 ENCOUNTER — APPOINTMENT (OUTPATIENT)
Dept: PHYSICAL THERAPY | Facility: CLINIC | Age: 65
End: 2020-01-21
Payer: COMMERCIAL

## 2020-01-21 PROCEDURE — 93971 EXTREMITY STUDY: CPT | Performed by: SURGERY

## 2020-01-23 ENCOUNTER — APPOINTMENT (OUTPATIENT)
Dept: PHYSICAL THERAPY | Facility: CLINIC | Age: 65
End: 2020-01-23
Payer: COMMERCIAL

## 2020-01-28 ENCOUNTER — APPOINTMENT (OUTPATIENT)
Dept: PHYSICAL THERAPY | Facility: CLINIC | Age: 65
End: 2020-01-28
Payer: COMMERCIAL

## 2020-01-29 ENCOUNTER — TELEPHONE (OUTPATIENT)
Dept: INTERNAL MEDICINE CLINIC | Facility: CLINIC | Age: 65
End: 2020-01-29

## 2020-01-29 DIAGNOSIS — M71.21 BAKER CYST, RIGHT: Primary | ICD-10-CM

## 2020-01-30 ENCOUNTER — APPOINTMENT (OUTPATIENT)
Dept: PHYSICAL THERAPY | Facility: CLINIC | Age: 65
End: 2020-01-30
Payer: COMMERCIAL

## 2020-02-21 NOTE — PROGRESS NOTES
PT Discharge      Today's date: 2020  Patient name: Daquan Ambriz  : 1955  MRN: [id number]  Referring provider: Anna Garrison DO  Dx:         Encounter Diagnosis       ICD-10-CM     1  Chronic pain of right knee M25 561       G89 29              Assessment  Discharge Update 20: Pt did not return to therapy treatment following her initial intake completed on 20  Pt unable to update clinical findings at Discharge as pt did not return for assessment; findings outlined below reflect initial objective data  Pt to be D/C from OPPT due to non-compliance and 2* to script expiration at this time  Thank you! Assessment details: Pt Daquan Ambriz is a 60 y o  who presents to OPPT with s/s consistent with R knee popliteal bursa effusion  Pt with limited R knee mobility, decreased strength, edema, soft tissue restrictions with TTP, and gait dysfunction  Pt notes that while she remains independent with activities, she has had to modify tasks due to increase in pain with positioning  She gets significant increase in pain with driving, sitting for prolonged periods, and trying to sit with the knee fully flexed  Pt also notes increase in edema throughout the day, worse with excessive WB'ing activity  She is currently relying on heavy doses of Tylenol to manage pain; PT spoke to pt about NSAIDs to help address edema  Pt would benefit from skilled therapy services to address outlined impairments, work towards goals, and restore pts PLOF  Thank you!    Impairments: abnormal gait, abnormal or restricted ROM, activity intolerance, difficulty understanding, impaired balance, impaired physical strength, lacks appropriate home exercise program, pain with function, safety issue and weight-bearing intolerance  Understanding of Dx/Px/POC: good   Prognosis: good   Goals  STG: within 4 weeks   Pt demonstrating improvement in R knee ROM by 5-10 degrees - not met  Pt with decrease in pain > 25% - not met  Pt demonstrating improvement in R LE strength by 1/2 grade - not met  Pt with reduction in edema by 25% - not met    LTG: within 8 weeks  Pt able to ambulate > 1 hour without increase in symptoms to complete grocery shopping - not met  Pt able to sit comfortably > 1 hour without increase in pain in order to drive prolonged distances - not met  Pt able to demonstrate R knee ROM and MMT WFL in order to complete ADLs without limitations - not met  Pt able to return to work activities without modification or limitations - not met   Plan  Plan details: Pt to be D/C from OPPT   Treatment plan discussed with: patient         Subjective Evaluation     History of Present Illness  Mechanism of injury: Pt reporting that she has been having pan in the R knee since May 2019 with insidious onset of symptoms  She is seeing pain management for chronic LBP and mentioned the knee pain so she had recent injection in the R knee which did not help decrease her pain at all  She mentioned pain to her PCP at recent appointment and he ordered x-rays which were (+) for OA in the R knee  She is now referred to OPPT for conservative management of s/s   No specific follow up with MD    Quality of life: good    Pain  At best pain rating: 3  At worst pain ratin  Quality: dull ache, tight and sharp  Relieving factors: medications and ice (OTC prn )  Aggravating factors: standing, walking and sitting  Progression: worsening     Social Support  Steps to enter house: yes  Stairs in house: yes   Lives in: multiple-level home  Lives with: spouse     Employment status: working   Diagnostic Tests  X-ray: abnormal  Treatments  Previous treatment: injection treatment  Current treatment: physical therapy  Patient Goals  Patient goals for therapy: increased strength, independence with ADLs/IADLs, increased motion, decreased pain, decreased edema and improved balance              Objective      Observations      Right Knee   Positive for edema       Palpation      Right Hypertonic in the distal biceps femoris, distal semimembranosus, distal semitendinosus and medial gastrocnemius       Tenderness      Right Knee   Tenderness in the lateral joint line, pes anserinus and popliteal fossa       Neurological Testing      Sensation      Knee   Left Knee   Intact: light touch     Right Knee   Intact: light touch      Active Range of Motion   Left Knee   Flexion: 140 degrees   Extension: 0 degrees      Right Knee   Flexion: 95 degrees with pain  Extension: -15 degrees with pain     Mobility   Patellar Mobility:      Right Knee   Hypomobile: medial and lateral      Strength/Myotome Testing      Left Knee   Flexion: 5  Extension: 5     Right Knee   Flexion: 3+  Extension: 3+     Swelling      Left Knee Girth Measurement (cm)   Joint line: 35 cm     Right Knee Girth Measurement (cm)   Joint line: 36 5 cm

## 2020-03-10 DIAGNOSIS — F41.9 ANXIETY: ICD-10-CM

## 2020-03-10 RX ORDER — VENLAFAXINE HYDROCHLORIDE 150 MG/1
150 CAPSULE, EXTENDED RELEASE ORAL DAILY
Qty: 90 CAPSULE | Refills: 1 | Status: SHIPPED | OUTPATIENT
Start: 2020-03-10 | End: 2020-03-27 | Stop reason: SDUPTHER

## 2020-03-10 NOTE — TELEPHONE ENCOUNTER
Requested medication(s) are due for refill today: Yes  Patient has already received a courtesy refill: No  Other reason request has been forwarded to provider: Please review

## 2020-03-27 DIAGNOSIS — F41.9 ANXIETY: ICD-10-CM

## 2020-03-27 RX ORDER — VENLAFAXINE HYDROCHLORIDE 150 MG/1
150 CAPSULE, EXTENDED RELEASE ORAL DAILY
Qty: 90 CAPSULE | Refills: 1 | Status: SHIPPED | OUTPATIENT
Start: 2020-03-27 | End: 2020-08-17 | Stop reason: SDUPTHER

## 2020-03-27 RX ORDER — VENLAFAXINE HYDROCHLORIDE 75 MG/1
75 CAPSULE, EXTENDED RELEASE ORAL DAILY
Qty: 90 CAPSULE | Refills: 1 | Status: SHIPPED | OUTPATIENT
Start: 2020-03-27 | End: 2020-08-17 | Stop reason: SDUPTHER

## 2020-04-01 DIAGNOSIS — E78.2 MIXED HYPERLIPIDEMIA: ICD-10-CM

## 2020-04-01 RX ORDER — ATORVASTATIN CALCIUM 20 MG/1
TABLET, FILM COATED ORAL
Qty: 90 TABLET | Refills: 3 | Status: SHIPPED | OUTPATIENT
Start: 2020-04-01 | End: 2021-03-24

## 2020-04-03 DIAGNOSIS — K21.9 GASTROESOPHAGEAL REFLUX DISEASE WITHOUT ESOPHAGITIS: ICD-10-CM

## 2020-04-03 RX ORDER — OMEPRAZOLE 40 MG/1
40 CAPSULE, DELAYED RELEASE ORAL DAILY
Qty: 90 CAPSULE | Refills: 0 | Status: SHIPPED | OUTPATIENT
Start: 2020-04-03 | End: 2020-08-17 | Stop reason: SDUPTHER

## 2020-08-11 ENCOUNTER — OFFICE VISIT (OUTPATIENT)
Dept: OBGYN CLINIC | Facility: CLINIC | Age: 65
End: 2020-08-11
Payer: COMMERCIAL

## 2020-08-11 VITALS
BODY MASS INDEX: 25.87 KG/M2 | SYSTOLIC BLOOD PRESSURE: 100 MMHG | DIASTOLIC BLOOD PRESSURE: 58 MMHG | HEIGHT: 63 IN | WEIGHT: 146 LBS | TEMPERATURE: 97.5 F

## 2020-08-11 DIAGNOSIS — M17.11 PRIMARY OSTEOARTHRITIS OF RIGHT KNEE: Primary | ICD-10-CM

## 2020-08-11 PROCEDURE — 3008F BODY MASS INDEX DOCD: CPT | Performed by: ORTHOPAEDIC SURGERY

## 2020-08-11 PROCEDURE — 99213 OFFICE O/P EST LOW 20 MIN: CPT | Performed by: ORTHOPAEDIC SURGERY

## 2020-08-11 PROCEDURE — 20610 DRAIN/INJ JOINT/BURSA W/O US: CPT | Performed by: PHYSICIAN ASSISTANT

## 2020-08-11 PROCEDURE — 4004F PT TOBACCO SCREEN RCVD TLK: CPT | Performed by: ORTHOPAEDIC SURGERY

## 2020-08-11 RX ORDER — METHYLPREDNISOLONE ACETATE 40 MG/ML
2 INJECTION, SUSPENSION INTRA-ARTICULAR; INTRALESIONAL; INTRAMUSCULAR; SOFT TISSUE
Status: COMPLETED | OUTPATIENT
Start: 2020-08-11 | End: 2020-08-11

## 2020-08-11 RX ORDER — BUPIVACAINE HYDROCHLORIDE 5 MG/ML
3.5 INJECTION, SOLUTION PERINEURAL
Status: COMPLETED | OUTPATIENT
Start: 2020-08-11 | End: 2020-08-11

## 2020-08-11 RX ADMIN — METHYLPREDNISOLONE ACETATE 2 ML: 40 INJECTION, SUSPENSION INTRA-ARTICULAR; INTRALESIONAL; INTRAMUSCULAR; SOFT TISSUE at 09:07

## 2020-08-11 RX ADMIN — BUPIVACAINE HYDROCHLORIDE 3.5 ML: 5 INJECTION, SOLUTION PERINEURAL at 09:07

## 2020-08-11 NOTE — PROGRESS NOTES
ASSESSMENT/PLAN:    Diagnoses and all orders for this visit:    Primary osteoarthritis of right knee    Other orders  -     Large joint arthrocentesis        The patient's right knee was injected with Depo-Medrol and Marcaine  She tolerated the injection quite well  She will follow up with our office in 3 months  Return in about 3 months (around 11/11/2020)  _____________________________________________________  CHIEF COMPLAINT:  Chief Complaint   Patient presents with    Right Knee - Follow-up         SUBJECTIVE:  Alejandro Carson is a 60 y o  female with a history of primary osteoarthritis of her right knee  She presents to our office complaining of right knee pain  She would like a corticosteroid injection today  She denies any fever or chills  She denies any numbness or tingling        The following portions of the patient's history were reviewed and updated as appropriate: allergies, current medications, past family history, past medical history, past social history, past surgical history and problem list     PAST MEDICAL HISTORY:  Past Medical History:   Diagnosis Date    Abnormal finding in urine     Arthralgia of multiple joints     Biceps tendinitis, right     Bursitis of right knee     Bursitis of right shoulder     Chondrocalcinosis     COPD exacerbation (HCC)     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     Drug intolerance     Dysfunction of right eustachian tube     Dysfunctional uterine bleeding     Edema     Elevated d-dimer     Exposure to hepatitis C     Fracture of fibula     Generalized anxiety disorder     GERD (gastroesophageal reflux disease)     Mild cervical dysplasia     Myalgia     Myositis     Palpitations     Pre-syncope     Seasonal allergies     Thrombocytopenia (HCC)     Urinary tract infection     Uterine leiomyoma        PAST SURGICAL HISTORY:  Past Surgical History:   Procedure Laterality Date    BLADDER SURGERY      BREAST BIOPSY Right     US guided    COLPOSCOPY  01/06/1998    DENTAL SURGERY      ENDOMETRIAL BIOPSY  05/21/1993    HYSTERECTOMY      HYSTEROSCOPY      uterus- 1/22/98, 7/1/02 and 4/10/07    TUBAL LIGATION      US GUIDED BREAST BIOPSY RIGHT COMPLETE Right 8/8/2018       FAMILY HISTORY:  Family History   Problem Relation Age of Onset    Stroke Mother         cerebral artery occlusion with cerebral infarction    Coronary artery disease Mother     Arrhythmia Mother         sinus    Coronary artery disease Father     Diabetes Father     Aortic aneurysm Family     Arthritis Family     Diabetes Family     Heart disease Family        SOCIAL HISTORY:  Social History     Tobacco Use    Smoking status: Current Every Day Smoker    Smokeless tobacco: Never Used   Substance Use Topics    Alcohol use: No    Drug use: No       MEDICATIONS:    Current Outpatient Medications:     atorvastatin (LIPITOR) 20 mg tablet, take 1 tablet by mouth once daily, Disp: 90 tablet, Rfl: 3    buPROPion (WELLBUTRIN XL) 150 mg 24 hr tablet, Take by mouth every morning  , Disp: , Rfl:     busPIRone (BUSPAR) 30 MG tablet, TAKE 1 TABLET TID, Disp: , Rfl:     clonazePAM (KlonoPIN) 1 mg tablet, TAKE 1 TABLET TID, Disp: , Rfl:     omeprazole (PriLOSEC) 40 MG capsule, Take 1 capsule (40 mg total) by mouth daily, Disp: 90 capsule, Rfl: 0    tretinoin (RETIN-A) 0 1 % cream, Apply topically daily at bedtime, Disp: 45 g, Rfl: 5    venlafaxine (EFFEXOR-XR) 150 mg 24 hr capsule, Take 1 capsule (150 mg total) by mouth daily, Disp: 90 capsule, Rfl: 1    venlafaxine (EFFEXOR-XR) 75 mg 24 hr capsule, Take 1 capsule (75 mg total) by mouth daily, Disp: 90 capsule, Rfl: 1    ALLERGIES:  No Known Allergies    ROS:  Review of Systems     Constitutional: Negative for fatigue, fever or loss of apetite  HENT: Negative  Respiratory: Negative for shortness of breath, dyspnea  Cardiovascular: Negative for chest pain/tightness     Gastrointestinal: Negative for abdominal pain, N/V  Endocrine: Negative for cold/heat intolerance, unexplained weight loss/gain  Genitourinary: Negative for flank pain, dysuria, hematuria  Musculoskeletal: Positive for arthralgia   Skin: Negative for rash  Neurological: Negative for numbness or tingling  Psychiatric/Behavioral: Negative for agitation  _____________________________________________________  PHYSICAL EXAMINATION:    Blood pressure 100/58, temperature 97 5 °F (36 4 °C), height 5' 3" (1 6 m), weight 66 2 kg (146 lb)  Constitutional: Oriented to person, place, and time  Appears well-developed and well-nourished  No distress  HENT:   Head: Normocephalic  Eyes: Conjunctivae are normal  Right eye exhibits no discharge  Left eye exhibits no discharge  No scleral icterus  Cardiovascular: Normal rate  Pulmonary/Chest: Effort normal    Neurological: Alert and oriented to person, place, and time  Skin: Skin is warm and dry  No rash noted  Not diaphoretic  No erythema  No pallor  Psychiatric: Normal mood and affect  Behavior is normal  Judgment and thought content normal       MUSCULOSKELETAL EXAMINATION:   Physical Exam  Ortho Exam    Right lower extremity is neurovascularly intact  Toes are pink and mobile   Compartments are soft  No warmth, erythema or ecchymosis  ROM of knee is from 5-115 degrees  Negative Lachman, drawer or pivot shift  No medial instability  Medial joint line tenderness, slight lateral joint line tenderness  Patellofemoral crepitation  Objective:  BP Readings from Last 1 Encounters:   08/11/20 100/58      Wt Readings from Last 1 Encounters:   08/11/20 66 2 kg (146 lb)        BMI:   Estimated body mass index is 25 86 kg/m² as calculated from the following:    Height as of this encounter: 5' 3" (1 6 m)  Weight as of this encounter: 66 2 kg (146 lb)    _____________________________________________________    PROCEDURES PERFORMED:  Large joint arthrocentesis: R knee  Date/Time: 8/11/2020 9:07 AM  Consent given by: patient  Site marked: site marked  Timeout: Immediately prior to procedure a time out was called to verify the correct patient, procedure, equipment, support staff and site/side marked as required   Supporting Documentation  Indications: pain   Procedure Details  Location: knee - R knee  Preparation: Patient was prepped and draped in the usual sterile fashion  Needle size: 22 G  Ultrasound guidance: no  Approach: lateral  Medications administered: 2 mL methylPREDNISolone acetate 40 mg/mL; 3 5 mL bupivacaine 0 5 %    Patient tolerance: patient tolerated the procedure well with no immediate complications  Dressing:  Sterile dressing applied            Lucia Ac PA-C

## 2020-08-14 DIAGNOSIS — F41.9 ANXIETY: ICD-10-CM

## 2020-08-14 DIAGNOSIS — K21.9 GASTROESOPHAGEAL REFLUX DISEASE WITHOUT ESOPHAGITIS: ICD-10-CM

## 2020-08-17 RX ORDER — VENLAFAXINE HYDROCHLORIDE 75 MG/1
75 CAPSULE, EXTENDED RELEASE ORAL DAILY
Qty: 30 CAPSULE | Refills: 0 | Status: SHIPPED | OUTPATIENT
Start: 2020-08-17 | End: 2020-10-21

## 2020-08-17 RX ORDER — OMEPRAZOLE 40 MG/1
40 CAPSULE, DELAYED RELEASE ORAL DAILY
Qty: 30 CAPSULE | Refills: 0 | Status: SHIPPED | OUTPATIENT
Start: 2020-08-17 | End: 2020-10-12

## 2020-08-17 RX ORDER — VENLAFAXINE HYDROCHLORIDE 150 MG/1
150 CAPSULE, EXTENDED RELEASE ORAL DAILY
Qty: 30 CAPSULE | Refills: 0 | Status: SHIPPED | OUTPATIENT
Start: 2020-08-17 | End: 2021-04-29 | Stop reason: SDUPTHER

## 2020-10-12 DIAGNOSIS — K21.9 GASTROESOPHAGEAL REFLUX DISEASE WITHOUT ESOPHAGITIS: ICD-10-CM

## 2020-10-12 RX ORDER — OMEPRAZOLE 40 MG/1
CAPSULE, DELAYED RELEASE ORAL
Qty: 30 CAPSULE | Refills: 5 | Status: SHIPPED | OUTPATIENT
Start: 2020-10-12 | End: 2021-04-02

## 2020-10-21 DIAGNOSIS — F41.9 ANXIETY: ICD-10-CM

## 2020-10-21 RX ORDER — VENLAFAXINE HYDROCHLORIDE 75 MG/1
CAPSULE, EXTENDED RELEASE ORAL
Qty: 30 CAPSULE | Refills: 5 | Status: SHIPPED | OUTPATIENT
Start: 2020-10-21 | End: 2020-11-03 | Stop reason: SDUPTHER

## 2020-11-03 ENCOUNTER — TELEPHONE (OUTPATIENT)
Dept: INTERNAL MEDICINE CLINIC | Facility: CLINIC | Age: 65
End: 2020-11-03

## 2020-11-03 DIAGNOSIS — F41.9 ANXIETY: ICD-10-CM

## 2020-11-03 RX ORDER — VENLAFAXINE HYDROCHLORIDE 75 MG/1
75 CAPSULE, EXTENDED RELEASE ORAL DAILY
Qty: 90 CAPSULE | Refills: 1 | Status: SHIPPED | OUTPATIENT
Start: 2020-11-03 | End: 2021-04-29 | Stop reason: SDUPTHER

## 2020-12-01 ENCOUNTER — OFFICE VISIT (OUTPATIENT)
Dept: OBGYN CLINIC | Facility: CLINIC | Age: 65
End: 2020-12-01
Payer: COMMERCIAL

## 2020-12-01 VITALS
WEIGHT: 146 LBS | SYSTOLIC BLOOD PRESSURE: 120 MMHG | HEART RATE: 73 BPM | HEIGHT: 63 IN | DIASTOLIC BLOOD PRESSURE: 71 MMHG | BODY MASS INDEX: 25.87 KG/M2

## 2020-12-01 DIAGNOSIS — M17.11 PRIMARY OSTEOARTHRITIS OF RIGHT KNEE: Primary | ICD-10-CM

## 2020-12-01 PROCEDURE — 20610 DRAIN/INJ JOINT/BURSA W/O US: CPT | Performed by: ORTHOPAEDIC SURGERY

## 2020-12-01 PROCEDURE — 99213 OFFICE O/P EST LOW 20 MIN: CPT | Performed by: ORTHOPAEDIC SURGERY

## 2020-12-01 PROCEDURE — 3008F BODY MASS INDEX DOCD: CPT | Performed by: ORTHOPAEDIC SURGERY

## 2020-12-01 PROCEDURE — 4004F PT TOBACCO SCREEN RCVD TLK: CPT | Performed by: ORTHOPAEDIC SURGERY

## 2020-12-01 RX ORDER — METHYLPREDNISOLONE ACETATE 40 MG/ML
2 INJECTION, SUSPENSION INTRA-ARTICULAR; INTRALESIONAL; INTRAMUSCULAR; SOFT TISSUE
Status: COMPLETED | OUTPATIENT
Start: 2020-12-01 | End: 2020-12-01

## 2020-12-01 RX ORDER — BUPIVACAINE HYDROCHLORIDE 2.5 MG/ML
4 INJECTION, SOLUTION INFILTRATION; PERINEURAL
Status: COMPLETED | OUTPATIENT
Start: 2020-12-01 | End: 2020-12-01

## 2020-12-01 RX ADMIN — BUPIVACAINE HYDROCHLORIDE 4 ML: 2.5 INJECTION, SOLUTION INFILTRATION; PERINEURAL at 08:52

## 2020-12-01 RX ADMIN — METHYLPREDNISOLONE ACETATE 2 ML: 40 INJECTION, SUSPENSION INTRA-ARTICULAR; INTRALESIONAL; INTRAMUSCULAR; SOFT TISSUE at 08:52

## 2020-12-23 ENCOUNTER — VBI (OUTPATIENT)
Dept: ADMINISTRATIVE | Facility: OTHER | Age: 65
End: 2020-12-23

## 2021-01-18 ENCOUNTER — TELEPHONE (OUTPATIENT)
Dept: INTERNAL MEDICINE CLINIC | Facility: CLINIC | Age: 66
End: 2021-01-18

## 2021-01-19 ENCOUNTER — TELEMEDICINE (OUTPATIENT)
Dept: INTERNAL MEDICINE CLINIC | Facility: CLINIC | Age: 66
End: 2021-01-19
Payer: COMMERCIAL

## 2021-01-19 VITALS — OXYGEN SATURATION: 96 % | HEIGHT: 63 IN | HEART RATE: 66 BPM | BODY MASS INDEX: 23.92 KG/M2 | WEIGHT: 135 LBS

## 2021-01-19 DIAGNOSIS — Z20.822 CLOSE EXPOSURE TO COVID-19 VIRUS: Primary | ICD-10-CM

## 2021-01-19 DIAGNOSIS — J06.9 UPPER RESPIRATORY TRACT INFECTION, UNSPECIFIED TYPE: ICD-10-CM

## 2021-01-19 DIAGNOSIS — Z12.31 ENCOUNTER FOR SCREENING MAMMOGRAM FOR MALIGNANT NEOPLASM OF BREAST: ICD-10-CM

## 2021-01-19 DIAGNOSIS — Z20.822 CLOSE EXPOSURE TO COVID-19 VIRUS: ICD-10-CM

## 2021-01-19 DIAGNOSIS — E28.39 MENOPAUSE OVARIAN FAILURE: ICD-10-CM

## 2021-01-19 PROCEDURE — 87635 SARS-COV-2 COVID-19 AMP PRB: CPT | Performed by: INTERNAL MEDICINE

## 2021-01-19 PROCEDURE — 99214 OFFICE O/P EST MOD 30 MIN: CPT | Performed by: INTERNAL MEDICINE

## 2021-01-19 PROCEDURE — 3725F SCREEN DEPRESSION PERFORMED: CPT | Performed by: INTERNAL MEDICINE

## 2021-01-19 RX ORDER — BUPROPION HYDROCHLORIDE 150 MG/1
150 TABLET, EXTENDED RELEASE ORAL DAILY
COMMUNITY

## 2021-01-19 NOTE — PATIENT INSTRUCTIONS
COVID-19 (Coronavirus Disease 2019)   WHAT YOU NEED TO KNOW:   COVID-19 is the disease caused by the novel (new) coronavirus first discovered in December 2019  Coronaviruses generally cause upper respiratory (nose, throat, and lung) infections, such as a cold  The new virus can also cause serious lower respiratory conditions, such as pneumonia or acute respiratory distress syndrome (ARDS)  Anyone can develop serious problems from the new virus, but your risk is higher if you are 65 or older  A weak immune system, diabetes, or a heart or lung condition can also increase your risk  DISCHARGE INSTRUCTIONS:   If you think you or someone you know may be infected:  Do the following to protect others:  · If emergency care is needed,  tell the  about the possible infection, or call ahead and tell the emergency department  · Call a healthcare provider  for instructions if symptoms are mild  Anyone who may be infected should not  arrive without calling first  The provider will need to protect staff members and other patients  · The person who may be infected needs to wear a face covering  while getting medical care  This will help lower the risk of infecting others  Coverings are not used for anyone who is younger than 2 years, has breathing problems, or cannot remove it  The provider can give you instructions for anyone who cannot wear a covering  Call your local emergency number (911 in the [facility]) or go to the emergency department if:   · You have trouble breathing or shortness of breath at rest     · You have chest pain or pressure that lasts longer than 5 minutes  · You become confused or hard to wake  · Your lips or face are blue  · You have a fever of 104°F (40°C) or higher  Call your doctor if:   · You do not  have symptoms of COVID-19 but had close physical contact within 14 days with someone who tested positive  · You have questions or concerns about your condition or care      Medicines: You may need any of the following for mild symptoms:  · Decongestants  help reduce nasal congestion and help you breathe more easily  If you take decongestant pills, they may make you feel restless or cause problems with your sleep  Do not use decongestant sprays for more than a few days  · Cough suppressants  help reduce coughing  Ask your healthcare provider which type of cough medicine is best for you  · Acetaminophen  decreases pain and fever  It is available without a doctor's order  Ask how much to take and how often to take it  Follow directions  Read the labels of all other medicines you are using to see if they also contain acetaminophen, or ask your doctor or pharmacist  Acetaminophen can cause liver damage if not taken correctly  Do not use more than 4 grams (4,000 milligrams) total of acetaminophen in one day  · NSAIDs , such as ibuprofen, help decrease swelling, pain, and fever  NSAIDs can cause stomach bleeding or kidney problems in certain people  If you take blood thinner medicine, always ask your healthcare provider if NSAIDs are safe for you  Always read the medicine label and follow directions  · Take your medicine as directed  Contact your healthcare provider if you think your medicine is not helping or if you have side effects  Tell him or her if you are allergic to any medicine  Keep a list of the medicines, vitamins, and herbs you take  Include the amounts, and when and why you take them  Bring the list or the pill bottles to follow-up visits  Carry your medicine list with you in case of an emergency  How the 2019 coronavirus spreads: The virus spreads quickly and easily  You can become infected if you are in contact with a large amount of the virus, even for a short time  You can also become infected by being around a small amount of virus for a long time   The following are ways the virus is thought to spread, but more information may be coming:  · Droplets are the most common way all coronaviruses spread  The virus can travel in droplets that form when a person talks, coughs, or sneezes  Anyone who breathes in the droplets or gets them in his or her eyes can become infected with the virus  Close personal contact with an infected person is thought to be the main way the virus spreads  Close personal contact means you are within 6 feet (2 meters) of the person  · Person-to-person contact can spread the virus  For example, a person with the virus on his or her hands can spread it by shaking hands with someone  At this time, it does not appear that the virus can be passed to a baby during pregnancy or delivery  The baby can be infected after he or she is born through person-to-person contact  The virus also does not appear to spread in breast milk  If you are pregnant or breastfeeding, talk to your healthcare provider or obstetrician about any concerns you have  · The virus can stay on objects and surfaces  A person can get the virus on his or her hands by touching the object or surface  Infection happens if the person then touches his or her eyes or mouth with unwashed hands  It is not yet known how long the virus can stay on an object or surface  That is why it is important to clean all surfaces that are used regularly  · An infected animal may be able to infect a person who touches it  This may happen at live markets or on a farm  How everyone can lower the risk for COVID-19:  The best way to prevent infection is to avoid anyone who is infected, but this can be hard to do  An infected person can spread the virus before signs or symptoms begin, or even if signs or symptoms never develop  The following can help lower the risk for infection:      · Wash your hands often throughout the day  Use soap and water  Rub your soapy hands together, lacing your fingers  Wash the front and back of each hand, and in between your fingers   Use the fingers of one hand to scrub under the fingernails of the other hand  Wash for at least 20 seconds  Rinse with warm, running water for several seconds  Then dry your hands with a clean towel or paper towel  Use hand  that contains alcohol if soap and water are not available  Do not touch your eyes, nose, or mouth without washing your hands first  Teach children how to wash their hands and use hand   · Cover a sneeze or cough  This prevents droplets from traveling from you to others  Turn your face away and cover your mouth and nose with a tissue  Throw the tissue away  Use the bend of your arm if a tissue is not available  Then wash your hands well with soap and water or use hand   Turn and cover your face if you are around someone who is sneezing or coughing  Teach children how to cover a cough or sneeze  · Follow worldwide, national, and local social distancing guidelines  Social distancing means people avoid close physical contact so the virus cannot spread from one person to another  Keep at least 6 feet (2 meters) between you and others  Also keep this distance from anyone who comes to your home, such as someone making a delivery  · Make a habit of not touching your face  It is not known how long the virus can stay on objects and surfaces  If you get the virus on your hands, you can transfer it to your eyes, nose, or mouth and become infected  You can also transfer it to objects, surfaces, or people  Be aware of what you touch when you go out  Examples include handrails and elevator buttons  Try not to touch anything with bare hands unless it is necessary  Wash your hands before you leave your home and when you return  · Clean and disinfect high-touch surfaces and objects often  Use a disinfecting solution or wipes  You can make a solution by diluting 4 teaspoons of bleach in 1 quart (4 cups) of water   Clean and disinfect even if you think no one living in or coming to your home is infected with the virus  You can wipe items with a disinfecting cloth before you bring them into your home  Wash your hands after you handle anything you bring into your home  · Make your immune system as healthy as possible  A weakened immune system makes you more vulnerable to the new coronavirus  No COVID-19 vaccine is available yet  Vaccines such as the flu and pneumonia vaccines can help your immune system  Your healthcare provider can tell you which vaccines to get, and when to get them  Keep your immune system as strong as possible  Do not smoke  Eat healthy foods, exercise regularly, and try to manage stress  Go to bed and wake up at the same times each day  Social distancing guidelines:  National and local social distancing rules vary  Rules may change over time as restrictions are lifted  Restrictions may return if an outbreak happens where you live  It is important to know and follow all current social distancing rules in your area  The following are general guidelines:  · Limit trips out of your home  You may be able to have food, medicines, and other supplies delivered  If possible, have delivered items left at your door or other area  Try not to have someone hand you an item  You will be so close to the person that the virus can spread between you  · Do not have close physical contact with anyone who does not live in your home  Do not shake hands with, hug, or kiss a person as a greeting  Stand or walk as far from others as possible  If you must use public transportation (such as a bus or subway), try to sit or stand away from others  You can stay safely connected with others through phone calls, e-mail messages, social media websites, and video chats  Check in on anyone who may be having a hard time socially distancing, or who lives alone  Ask administrators at nursing homes or long-term care facilities how you can safely communicate with someone living there      · Wear a cloth face covering around others who do not live in your home  Face coverings help prevent the virus from spreading to others in droplets  You can use a clear face covering if someone needs to read your lips  This is a cloth covering that has plastic over the mouth area so your lips can be seen  Do not use coverings that have breathing valves or vents  The virus can travel out of the valve or vent and be spread to others  Do not take your covering off to talk, cough, or sneeze  Do not use coverings on children younger than 2 years or on anyone who has breathing problems or cannot remove it  · Only allow medical or other necessary professionals into your home  Wear your face covering, and remind professionals to wear a face covering  Remind them to wash their hands when they arrive and before they leave  Do not  let anyone who does not live in your home in, even if the person is not sick  A person can pass the virus to others before symptoms of COVID-19 begin  Some people never even develop symptoms  Children commonly have mild symptoms or no symptoms  It may be hard to tell a child not to hug or kiss you  Explain that this is how he or she can help you stay healthy  · Do not go to someone else's home unless it is necessary  Do not go over to visit, even if the person is lonely  Only go if you need to help him or her  Make sure you both wear face coverings while you are there  · Avoid large gatherings and crowds  Gatherings or crowds of 10 or more individuals can cause the virus to spread  Examples of gatherings include parties, sporting events, [employer] services, and conferences  Crowds may form at beaches, [place], and tourist attractions  Protect yourself by staying away from large gatherings and crowds  · Ask your healthcare provider for other ways to have appointments  You may be able to have appointments without having to go into the provider's office  Some providers offer phone, video, or other types of appointments  You may also be able to get prescriptions for a few months of your medicines at a time  · Stay safe if you must go out to work  You may have a job that can only be done outside your home  Keep physical distance between you and other workers as much as possible  Follow your employer's rules so everyone stays safe  If you have COVID-19 and are recovering at home:  Healthcare providers will give you specific instructions to follow  The following are general guidelines to remind you how to keep others safe until you are well:  · Wash your hands often  Use soap and water as much as possible  You can use hand  that contains alcohol if soap and water are not available  Do not share towels with anyone  If you use paper towels, throw them away in a lined trash can kept in your room or area  Use a covered trash can, if possible  · Do not go out of your home unless it is necessary  You may have to go to your healthcare provider's office for check-ups or to get prescription refills  Do not arrive at the provider's office without an appointment  Providers have to make their offices safe for staff and other patients  · Do not have close physical contact with anyone unless it is necessary  Only have close physical contact with a person giving direct care, or a baby or child you must care for  Family members and friends should not visit you  If possible, stay in a separate area or room of your home if you live with others  No one should go into the area or room except to give you care  You can visit with others by phone, video chat, e-mail, or similar systems  It is important to stay connected with others in your life while you recover  · Wear a face covering while others are near you  This can help prevent droplets from spreading the virus when you talk, sneeze, or cough  Put the covering on before anyone comes into your room or area   Remind the person to cover his or her nose and mouth before going in to provide care for you  · Do not share items  Do not share dishes, towels, or other items with anyone  Items need to be washed after you use them  · Protect your baby  Wash your hands with soap and water often throughout the day  Wear a clean face covering while you breastfeed, or while you express or pump breast milk  If possible, ask someone who is well to care for your baby  You can put breast milk in bottles for the person to use, if needed  Talk to your healthcare provider if you have any questions or concerns about caring for or bonding with your baby  He or she will tell you when to bring your baby in for check-ups and vaccines  He or she will also tell you what to do if you think your baby was infected with the new virus  · Do not handle live animals  Until more is known, it is best not to touch, play with, or handle live animals  Some animals, including pets, have been infected with the new coronavirus  Do not handle or care for animals until you are well  Care includes feeding, petting, and cuddling your pet  Do not let your pet lick you or share your food  Ask someone who is not infected to take care of your pet, if possible  If you must care for a pet, wear a face covering  Wash your hands before and after you give care  · Follow directions from your healthcare provider for being around others after you recover  You will need to wait at least 10 days after symptoms first appeared  Then you will need to have no fever for 24 hours without fever medicine, and no other symptoms  A loss of taste or smell may continue for several months  It is considered okay to be around others if this is your only symptom  It is not known for sure if or for how long a recovered person can pass the virus to others  Your provider may give you instructions, such as continuing social distancing or wearing a face covering around others      How to take care of someone who has COVID-19:  If the person lives in another home, arrange for a time to give care  Remember to bring a few pairs of disposable gloves and a cloth face covering  The following are general guidelines to help you safely care for anyone who has COVID-19:  · Wash your hands often  Wash before and after you go into the person's home, area, or room  Throw paper towels away in a lined trash can that has a lid, if possible  · Do not allow others to go near the person  No one should come into the person's home unless it is necessary  If possible, the person should be in a separate area or room if he or she lives with others  Keep the room's door shut unless you need to go in or out  Have others call, video chat, or e-mail the person if he or she is feeling well enough  The person may feel lonely if he or she is kept separate for a long period of time  Safe communication can help him or her stay connected to family and friends  · Make sure the person's room has good air flow  You may be able to open the window if the weather allows  An air conditioner can also be turned on to help air move  · Contact the person before you go in to give care  Make sure the person is wearing a face covering  Remind him or her to wash his or her hands with soap and water  He or she can use hand  that contains alcohol if soap and water are not available  Put on a face covering before you go in to give care  · Wear gloves while you give care and clean  Clean items the person uses often  Clean countertops, cooking surfaces, and the fronts and insides of the microwave and refrigerator  Clean the shower, toilet, the area around the toilet, the sink, the area around the sink, and faucets  Gather used laundry or bedding  Wash and dry items on the warmest settings the fabric allows  Wash dishes and silverware in hot, soapy water or in a   · Anything you throw away needs to go into a lined trash can    When you need to empty the trash, close the open end of the lining and tie it closed  This helps prevent items the virus is on from spilling out of the trash  Remove your gloves and throw them away  Wash your hands  Follow up with your doctor as directed:  Write down your questions so you remember to ask them during your visits  For more information:   · Centers for Disease Control and Prevention  [address] , [address]  Phone: [phone] [phone]  Web Address: [employer] com br    © Copyright [address] [place] [address] Drive Information is for End User's use only and may not be sold, redistributed or otherwise used for commercial purposes  All illustrations and images included in CareNotes® are the copyrighted property of [employer] D A M [employer]  or [facility] Derian Pineda   The above information is an  only  It is not intended as medical advice for individual conditions or treatments  Talk to your doctor, nurse or pharmacist before following any medical regimen to see if it is safe and effective for you

## 2021-01-19 NOTE — PROGRESS NOTES
COVID-19 Virtual Visit     Assessment/Plan:    Problem List Items Addressed This Visit     None      Visit Diagnoses     Close exposure to COVID-19 virus    -  Primary    Relevant Orders    Novel Coronavirus (Covid-19),PCR [facility] - Collected at [facility]    Menopause ovarian failure        Relevant Orders    DXA bone density spine hip and pelvis    Encounter for screening mammogram for malignant neoplasm of breast        Relevant Orders    Mammo screening bilateral w 3d & cad    Upper respiratory tract infection, unspecified type        Relevant Orders    Novel Coronavirus (Covid-19),PCR [facility] - Collected at [facility]         Disposition:     I referred patient to one of our centralized sites for a COVID-19 swab  A/P: Day # 5 since onset of COVID s/s  Covid test will be ordered  Doing ok and mainly nasal s/s and slight cough  No SOB  Start isolation/quarantine, increase fluids, PRN motrin/tylenol, and breathing exercises  RTC one day for f/u  I have spent 20 minutes directly with the patient  Greater than 50% of this time was spent in counseling/coordination of care regarding: diagnostic results, prognosis, risks and benefits of treatment options, instructions for management, patient and family education, importance of treatment compliance, risk factor reductions and impressions  Encounter provider Dionna Hyman DO    Provider located at [address] [place] [id number]    Recent Visits  Date Type Provider Dept   01/18/21 Telephone [address] recent visits within past 7 days and meeting all other requirements     Today's Visits  Date Type Provider Dept   01/19/21 DO Patricio Singh today's visits and meeting all other requirements     Future Appointments  No visits were found meeting these conditions     Showing future appointments within next 150 days and meeting all other requirements      This virtual check-in was done via [employer] App and patient was informed that this is a secure, HIPAA-compliant platform  She agrees to proceed  Patient agrees to participate in a virtual check in via telephone or video visit instead of presenting to the office to address urgent/immediate medical needs  Patient is aware this is a billable service  After connecting through [facility], the patient was identified by name and date of birth  Lidia Villalobos was informed that this was a telemedicine visit and that the exam was being conducted confidentially over secure lines  My office door was closed  No one else was in the room  Lidia Villalobos acknowledged consent and understanding of privacy and security of the telemedicine visit  I informed the patient that I have reviewed her record in Epic and presented the opportunity for her to ask any questions regarding the visit today  The patient agreed to participate  Subjective:   Lidia Villalobos is a 63 y o  female who is concerned about COVID-19  Patient's symptoms include chills, nasal congestion, rhinorrhea, cough and headache  Patient denies fever, fatigue, sore throat, anosmia, loss of taste, shortness of breath, chest tightness, abdominal pain, nausea, vomiting, diarrhea and myalgias       Date of symptom onset: 1/14/2021  Date of exposure: 1/19/2021    Exposure:   Contact with a person who is under investigation (PUI) for or who is positive for COVID-19 within the last 14 days?: Yes    Hospitalized recently for fever and/or lower respiratory symptoms?: No      Currently a healthcare worker that is involved in direct patient care?: No      Works in a special setting where the risk of COVID-19 transmission may be high? (this may include long-term care, correctional and correction facilities; homeless shelters; assisted-living facilities and group homes ): No      Resident in a special setting where the risk of COVID-19 transmission may be high? 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     Drug intolerance     Dysfunction of right eustachian tube     Dysfunctional uterine bleeding     Edema     Elevated d-dimer     Exposure to hepatitis C     Fracture of fibula     Generalized anxiety disorder     GERD (gastroesophageal reflux disease)     Mild cervical dysplasia     Myalgia     Myositis     Palpitations     Pre-syncope     Seasonal allergies     Thrombocytopenia (HCC)     Urinary tract infection     Uterine leiomyoma      Past Surgical History:   Procedure Laterality Date    BLADDER SURGERY      BREAST BIOPSY Right     US guided    COLPOSCOPY  01/06/1998    DENTAL SURGERY      ENDOMETRIAL BIOPSY  05/21/1993    HYSTERECTOMY      HYSTEROSCOPY      uterus- 1/22/98, 7/1/02 and 4/10/07    TUBAL LIGATION      US GUIDED BREAST BIOPSY RIGHT COMPLETE Right 8/8/2018     Current Outpatient Medications   Medication Sig Dispense Refill    atorvastatin (LIPITOR) 20 mg tablet take 1 tablet by mouth once daily 90 tablet 3    buPROPion (WELLBUTRIN SR) 150 mg 12 hr tablet Take 150 mg by mouth daily      busPIRone (BUSPAR) 30 MG tablet TAKE 1 TABLET TID      clonazePAM (KlonoPIN) 1 mg tablet TAKE 1 TABLET TID      omeprazole (PriLOSEC) 40 MG capsule take 1 capsule by mouth once daily 30 capsule 5    tretinoin (RETIN-A) 0 1 % cream Apply topically daily at bedtime 45 g 5    venlafaxine (EFFEXOR-XR) 150 mg 24 hr capsule Take 1 capsule (150 mg total) by mouth daily 30 capsule 0    venlafaxine (EFFEXOR-XR) 75 mg 24 hr capsule Take 1 capsule (75 mg total) by mouth daily 90 capsule 1     No current facility-administered medications for this visit  No Known Allergies    Review of Systems   Constitutional: Positive for activity change and chills  Negative for diaphoresis, fatigue and fever  HENT: Positive for congestion, postnasal drip and rhinorrhea  Negative for sinus pressure, sinus pain and sore throat  No loss of taste/smell  Respiratory: Positive for cough  Negative for chest tightness, shortness of breath and wheezing  Cardiovascular: Negative for chest pain, palpitations and leg swelling  Gastrointestinal: Negative for abdominal pain, constipation, diarrhea, nausea and vomiting  Genitourinary: Negative for difficulty urinating, dysuria and frequency  Musculoskeletal: Negative for arthralgias, gait problem and myalgias  Neurological: Positive for headaches  Negative for light-headedness  Psychiatric/Behavioral: Negative for confusion  The patient is not nervous/anxious  Objective:    Vitals:    01/19/21 1406   Pulse: 66   SpO2: 96%   Weight: 61 2 kg (135 lb)   Height: 5' 3" (1 6 m)       Physical Exam  Constitutional:       General: She is not in acute distress  Appearance: Normal appearance  She is not ill-appearing  HENT:      Head: Normocephalic and atraumatic  Eyes:      Extraocular Movements: Extraocular movements intact  Conjunctiva/sclera: Conjunctivae normal       Pupils: Pupils are equal, round, and reactive to light  Pulmonary:      Effort: Pulmonary effort is normal  No respiratory distress  Neurological:      General: No focal deficit present  Mental Status: She is alert and oriented to person, place, and time  Mental status is at baseline  Psychiatric:         Mood and Affect: Mood normal          Behavior: Behavior normal          Thought Content:  Thought content normal          Judgment: Judgment normal        VIRTUAL VISIT DISCLAIMER    Wild Cummings acknowledges that she has consented to an online visit or consultation  She understands that the online visit is based solely on information provided by her, and that, in the absence of a face-to-face physical evaluation by the physician, the diagnosis she receives is both limited and provisional in terms of accuracy and completeness  This is not intended to replace a full medical face-to-face evaluation by the physician  Wild Cummings understands and accepts these terms

## 2021-01-20 ENCOUNTER — TELEMEDICINE (OUTPATIENT)
Dept: INTERNAL MEDICINE CLINIC | Facility: CLINIC | Age: 66
End: 2021-01-20
Payer: COMMERCIAL

## 2021-01-20 VITALS — BODY MASS INDEX: 23.92 KG/M2 | HEIGHT: 63 IN | OXYGEN SATURATION: 98 % | WEIGHT: 135 LBS

## 2021-01-20 DIAGNOSIS — Z20.822 CLOSE EXPOSURE TO COVID-19 VIRUS: Primary | ICD-10-CM

## 2021-01-20 PROCEDURE — 99214 OFFICE O/P EST MOD 30 MIN: CPT | Performed by: INTERNAL MEDICINE

## 2021-01-20 NOTE — PROGRESS NOTES
COVID-19 Virtual Visit     Assessment/Plan:    Problem List Items Addressed This Visit     None      Visit Diagnoses     Close exposure to COVID-19 virus    -  Primary         Disposition:     I recommended continued isolation until at least 24 hours have passed since recovery defined as resolution of fever without the use of fever-reducing medications AND improvement in COVID symptoms AND 10 days have passed since onset of symptoms (or 10 days have passed since date of first positive viral diagnostic test for asymptomatic patients)  A/P: Day # 6 since onset of COVID s/s  Covid test still pending  Reports  just admitted for worsening COVID  Sauravtomycarlotta Kim Doing ok and notes some chills, nasal congestion, and slight cough  No SOB with Sat on RA 95%    Continue isolation/quarantine, increase fluids, PRN motrin/tylenol, and breathing exercises  RTC one day for f/u  I have spent 15 minutes directly with the patient  Greater than 50% of this time was spent in counseling/coordination of care regarding: diagnostic results, prognosis, risks and benefits of treatment options, instructions for management, patient and family education, importance of treatment compliance, risk factor reductions and impressions  Encounter provider Jacek Riddle DO    Provider located at [address] [place] [phone]    Recent Visits  Date Type Provider Dept   [facility]   01/18/21 Telephone [address] recent visits within past 7 days and meeting all other requirements     Today's Visits  Date Type Provider Dept   01/20/21 Telemedicine DO Patricio Remy today's visits and meeting all other requirements     Future Appointments  No visits were found meeting these conditions     Showing future appointments within next 150 days and meeting all other requirements      This virtual check-in was done via HipChat and patient was informed that this is a secure, HIPAA-compliant platform  She agrees to proceed  Patient agrees to participate in a virtual check in via telephone or video visit instead of presenting to the office to address urgent/immediate medical needs  Patient is aware this is a billable service  After connecting through [facility], the patient was identified by name and date of birth  Neisha Mary was informed that this was a telemedicine visit and that the exam was being conducted confidentially over secure lines  My office door was closed  No one else was in the room  Neisha Mary acknowledged consent and understanding of privacy and security of the telemedicine visit  I informed the patient that I have reviewed her record in Epic and presented the opportunity for her to ask any questions regarding the visit today  The patient agreed to participate  Subjective:   Neisha Mary is a 63 y o  female who has been screened for COVID-19  Symptom change since last report: unchanged  Patient's symptoms include chills, nasal congestion, rhinorrhea, cough and headache  Patient denies fever, fatigue, malaise, sore throat, anosmia, loss of taste, shortness of breath, chest tightness, abdominal pain, nausea, vomiting, diarrhea and myalgias  Danae Graf has been staying home and has isolated themselves in her home  She is taking care to not share personal items and is cleaning all surfaces that are touched often, like counters, tabletops, and doorknobs using household cleaning sprays or wipes  She is wearing a mask when she leaves her room       Date of symptom onset: 1/14/2021    No results found for: Antonio Goncalves, [address] [facility], [address]  Past Medical History:   Diagnosis Date    Abnormal finding in urine     Arthralgia of multiple joints     Biceps tendinitis, right     Bursitis of right knee     Bursitis of right shoulder     Chondrocalcinosis     COPD exacerbation (HCC)     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     Drug intolerance     Dysfunction of right eustachian tube     Dysfunctional uterine bleeding     Edema     Elevated d-dimer     Exposure to hepatitis C     Fracture of fibula     Generalized anxiety disorder     GERD (gastroesophageal reflux disease)     Mild cervical dysplasia     Myalgia     Myositis     Palpitations     Pre-syncope     Seasonal allergies     Thrombocytopenia (HCC)     Urinary tract infection     Uterine leiomyoma      Past Surgical History:   Procedure Laterality Date    BLADDER SURGERY      BREAST BIOPSY Right     US guided    COLPOSCOPY  01/06/1998    DENTAL SURGERY      ENDOMETRIAL BIOPSY  05/21/1993    HYSTERECTOMY      HYSTEROSCOPY      uterus- 1/22/98, 7/1/02 and 4/10/07    TUBAL LIGATION      US GUIDED BREAST BIOPSY RIGHT COMPLETE Right 8/8/2018     Current Outpatient Medications   Medication Sig Dispense Refill    atorvastatin (LIPITOR) 20 mg tablet take 1 tablet by mouth once daily 90 tablet 3    buPROPion (WELLBUTRIN SR) 150 mg 12 hr tablet Take 150 mg by mouth daily      busPIRone (BUSPAR) 30 MG tablet TAKE 1 TABLET TID      clonazePAM (KlonoPIN) 1 mg tablet TAKE 1 TABLET TID      omeprazole (PriLOSEC) 40 MG capsule take 1 capsule by mouth once daily 30 capsule 5    tretinoin (RETIN-A) 0 1 % cream Apply topically daily at bedtime 45 g 5    venlafaxine (EFFEXOR-XR) 150 mg 24 hr capsule Take 1 capsule (150 mg total) by mouth daily 30 capsule 0    venlafaxine (EFFEXOR-XR) 75 mg 24 hr capsule Take 1 capsule (75 mg total) by mouth daily 90 capsule 1     No current facility-administered medications for this visit  No Known Allergies    Review of Systems   Constitutional: Positive for chills  Negative for activity change, diaphoresis, fatigue and fever  HENT: Positive for congestion, postnasal drip and rhinorrhea   Negative for sinus pressure, sinus pain and sore throat  No loss of taste/smell  Respiratory: Positive for cough  Negative for chest tightness, shortness of breath and wheezing  Cardiovascular: Negative for chest pain, palpitations and leg swelling  Gastrointestinal: Negative for abdominal pain, constipation, diarrhea, nausea and vomiting  Genitourinary: Negative for difficulty urinating, dysuria and frequency  Musculoskeletal: Negative for arthralgias, gait problem and myalgias  Neurological: Positive for headaches  Negative for light-headedness  Psychiatric/Behavioral: Negative for confusion  The patient is not nervous/anxious  Objective:    Vitals:    01/20/21 1256   SpO2: 98%   Weight: 61 2 kg (135 lb)   Height: 5' 3" (1 6 m)       Physical Exam  Vitals signs and nursing note reviewed  Constitutional:       General: She is not in acute distress  Appearance: Normal appearance  She is not ill-appearing  HENT:      Head: Normocephalic and atraumatic  Eyes:      Extraocular Movements: Extraocular movements intact  Conjunctiva/sclera: Conjunctivae normal       Pupils: Pupils are equal, round, and reactive to light  Pulmonary:      Effort: Pulmonary effort is normal  No respiratory distress  Neurological:      General: No focal deficit present  Mental Status: She is alert and oriented to person, place, and time  Mental status is at baseline  Psychiatric:         Mood and Affect: Mood normal          Behavior: Behavior normal          Thought Content: Thought content normal          Judgment: Judgment normal        VIRTUAL VISIT DISCLAIMER    Devonte Gann acknowledges that she has consented to an online visit or consultation   She understands that the online visit is based solely on information provided by her, and that, in the absence of a face-to-face physical evaluation by the physician, the diagnosis she receives is both limited and provisional in terms of accuracy and completeness  This is not intended to replace a full medical face-to-face evaluation by the physician  Lico Walden understands and accepts these terms

## 2021-01-20 NOTE — PATIENT INSTRUCTIONS
COVID-19 (Coronavirus Disease 2019)   WHAT YOU NEED TO KNOW:   COVID-19 is the disease caused by the novel (new) coronavirus first discovered in December 2019  Coronaviruses generally cause upper respiratory (nose, throat, and lung) infections, such as a cold  The new virus can also cause serious lower respiratory conditions, such as pneumonia or acute respiratory distress syndrome (ARDS)  Anyone can develop serious problems from the new virus, but your risk is higher if you are 65 or older  A weak immune system, diabetes, or a heart or lung condition can also increase your risk  DISCHARGE INSTRUCTIONS:   If you think you or someone you know may be infected:  Do the following to protect others:  · If emergency care is needed,  tell the  about the possible infection, or call ahead and tell the emergency department  · Call a healthcare provider  for instructions if symptoms are mild  Anyone who may be infected should not  arrive without calling first  The provider will need to protect staff members and other patients  · The person who may be infected needs to wear a face covering  while getting medical care  This will help lower the risk of infecting others  Coverings are not used for anyone who is younger than 2 years, has breathing problems, or cannot remove it  The provider can give you instructions for anyone who cannot wear a covering  Call your local emergency number (911 in the [facility]) or go to the emergency department if:   · You have trouble breathing or shortness of breath at rest     · You have chest pain or pressure that lasts longer than 5 minutes  · You become confused or hard to wake  · Your lips or face are blue  · You have a fever of 104°F (40°C) or higher  Call your doctor if:   · You do not  have symptoms of COVID-19 but had close physical contact within 14 days with someone who tested positive  · You have questions or concerns about your condition or care      Medicines: You may need any of the following for mild symptoms:  · Decongestants  help reduce nasal congestion and help you breathe more easily  If you take decongestant pills, they may make you feel restless or cause problems with your sleep  Do not use decongestant sprays for more than a few days  · Cough suppressants  help reduce coughing  Ask your healthcare provider which type of cough medicine is best for you  · Acetaminophen  decreases pain and fever  It is available without a doctor's order  Ask how much to take and how often to take it  Follow directions  Read the labels of all other medicines you are using to see if they also contain acetaminophen, or ask your doctor or pharmacist  Acetaminophen can cause liver damage if not taken correctly  Do not use more than 4 grams (4,000 milligrams) total of acetaminophen in one day  · NSAIDs , such as ibuprofen, help decrease swelling, pain, and fever  NSAIDs can cause stomach bleeding or kidney problems in certain people  If you take blood thinner medicine, always ask your healthcare provider if NSAIDs are safe for you  Always read the medicine label and follow directions  · Take your medicine as directed  Contact your healthcare provider if you think your medicine is not helping or if you have side effects  Tell him or her if you are allergic to any medicine  Keep a list of the medicines, vitamins, and herbs you take  Include the amounts, and when and why you take them  Bring the list or the pill bottles to follow-up visits  Carry your medicine list with you in case of an emergency  How the 2019 coronavirus spreads: The virus spreads quickly and easily  You can become infected if you are in contact with a large amount of the virus, even for a short time  You can also become infected by being around a small amount of virus for a long time   The following are ways the virus is thought to spread, but more information may be coming:  · Droplets are the most common way all coronaviruses spread  The virus can travel in droplets that form when a person talks, coughs, or sneezes  Anyone who breathes in the droplets or gets them in his or her eyes can become infected with the virus  Close personal contact with an infected person is thought to be the main way the virus spreads  Close personal contact means you are within 6 feet (2 meters) of the person  · Person-to-person contact can spread the virus  For example, a person with the virus on his or her hands can spread it by shaking hands with someone  At this time, it does not appear that the virus can be passed to a baby during pregnancy or delivery  The baby can be infected after he or she is born through person-to-person contact  The virus also does not appear to spread in breast milk  If you are pregnant or breastfeeding, talk to your healthcare provider or obstetrician about any concerns you have  · The virus can stay on objects and surfaces  A person can get the virus on his or her hands by touching the object or surface  Infection happens if the person then touches his or her eyes or mouth with unwashed hands  It is not yet known how long the virus can stay on an object or surface  That is why it is important to clean all surfaces that are used regularly  · An infected animal may be able to infect a person who touches it  This may happen at live markets or on a farm  How everyone can lower the risk for COVID-19:  The best way to prevent infection is to avoid anyone who is infected, but this can be hard to do  An infected person can spread the virus before signs or symptoms begin, or even if signs or symptoms never develop  The following can help lower the risk for infection:      · Wash your hands often throughout the day  Use soap and water  Rub your soapy hands together, lacing your fingers  Wash the front and back of each hand, and in between your fingers   Use the fingers of one hand to scrub under the fingernails of the other hand  Wash for at least 20 seconds  Rinse with warm, running water for several seconds  Then dry your hands with a clean towel or paper towel  Use hand  that contains alcohol if soap and water are not available  Do not touch your eyes, nose, or mouth without washing your hands first  Teach children how to wash their hands and use hand   · Cover a sneeze or cough  This prevents droplets from traveling from you to others  Turn your face away and cover your mouth and nose with a tissue  Throw the tissue away  Use the bend of your arm if a tissue is not available  Then wash your hands well with soap and water or use hand   Turn and cover your face if you are around someone who is sneezing or coughing  Teach children how to cover a cough or sneeze  · Follow worldwide, national, and local social distancing guidelines  Social distancing means people avoid close physical contact so the virus cannot spread from one person to another  Keep at least 6 feet (2 meters) between you and others  Also keep this distance from anyone who comes to your home, such as someone making a delivery  · Make a habit of not touching your face  It is not known how long the virus can stay on objects and surfaces  If you get the virus on your hands, you can transfer it to your eyes, nose, or mouth and become infected  You can also transfer it to objects, surfaces, or people  Be aware of what you touch when you go out  Examples include handrails and elevator buttons  Try not to touch anything with bare hands unless it is necessary  Wash your hands before you leave your home and when you return  · Clean and disinfect high-touch surfaces and objects often  Use a disinfecting solution or wipes  You can make a solution by diluting 4 teaspoons of bleach in 1 quart (4 cups) of water   Clean and disinfect even if you think no one living in or coming to your home is infected with the virus  You can wipe items with a disinfecting cloth before you bring them into your home  Wash your hands after you handle anything you bring into your home  · Make your immune system as healthy as possible  A weakened immune system makes you more vulnerable to the new coronavirus  No COVID-19 vaccine is available yet  Vaccines such as the flu and pneumonia vaccines can help your immune system  Your healthcare provider can tell you which vaccines to get, and when to get them  Keep your immune system as strong as possible  Do not smoke  Eat healthy foods, exercise regularly, and try to manage stress  Go to bed and wake up at the same times each day  Social distancing guidelines:  National and local social distancing rules vary  Rules may change over time as restrictions are lifted  Restrictions may return if an outbreak happens where you live  It is important to know and follow all current social distancing rules in your area  The following are general guidelines:  · Limit trips out of your home  You may be able to have food, medicines, and other supplies delivered  If possible, have delivered items left at your door or other area  Try not to have someone hand you an item  You will be so close to the person that the virus can spread between you  · Do not have close physical contact with anyone who does not live in your home  Do not shake hands with, hug, or kiss a person as a greeting  Stand or walk as far from others as possible  If you must use public transportation (such as a bus or subway), try to sit or stand away from others  You can stay safely connected with others through phone calls, e-mail messages, social media websites, and video chats  Check in on anyone who may be having a hard time socially distancing, or who lives alone  Ask administrators at nursing homes or long-term care facilities how you can safely communicate with someone living there      · Wear a cloth face covering around others who do not live in your home  Face coverings help prevent the virus from spreading to others in droplets  You can use a clear face covering if someone needs to read your lips  This is a cloth covering that has plastic over the mouth area so your lips can be seen  Do not use coverings that have breathing valves or vents  The virus can travel out of the valve or vent and be spread to others  Do not take your covering off to talk, cough, or sneeze  Do not use coverings on children younger than 2 years or on anyone who has breathing problems or cannot remove it  · Only allow medical or other necessary professionals into your home  Wear your face covering, and remind professionals to wear a face covering  Remind them to wash their hands when they arrive and before they leave  Do not  let anyone who does not live in your home in, even if the person is not sick  A person can pass the virus to others before symptoms of COVID-19 begin  Some people never even develop symptoms  Children commonly have mild symptoms or no symptoms  It may be hard to tell a child not to hug or kiss you  Explain that this is how he or she can help you stay healthy  · Do not go to someone else's home unless it is necessary  Do not go over to visit, even if the person is lonely  Only go if you need to help him or her  Make sure you both wear face coverings while you are there  · Avoid large gatherings and crowds  Gatherings or crowds of 10 or more individuals can cause the virus to spread  Examples of gatherings include parties, sporting events, [employer] services, and conferences  Crowds may form at beaches, [place], and tourist attractions  Protect yourself by staying away from large gatherings and crowds  · Ask your healthcare provider for other ways to have appointments  You may be able to have appointments without having to go into the provider's office  Some providers offer phone, video, or other types of appointments  You may also be able to get prescriptions for a few months of your medicines at a time  · Stay safe if you must go out to work  You may have a job that can only be done outside your home  Keep physical distance between you and other workers as much as possible  Follow your employer's rules so everyone stays safe  If you have COVID-19 and are recovering at home:  Healthcare providers will give you specific instructions to follow  The following are general guidelines to remind you how to keep others safe until you are well:  · Wash your hands often  Use soap and water as much as possible  You can use hand  that contains alcohol if soap and water are not available  Do not share towels with anyone  If you use paper towels, throw them away in a lined trash can kept in your room or area  Use a covered trash can, if possible  · Do not go out of your home unless it is necessary  You may have to go to your healthcare provider's office for check-ups or to get prescription refills  Do not arrive at the provider's office without an appointment  Providers have to make their offices safe for staff and other patients  · Do not have close physical contact with anyone unless it is necessary  Only have close physical contact with a person giving direct care, or a baby or child you must care for  Family members and friends should not visit you  If possible, stay in a separate area or room of your home if you live with others  No one should go into the area or room except to give you care  You can visit with others by phone, video chat, e-mail, or similar systems  It is important to stay connected with others in your life while you recover  · Wear a face covering while others are near you  This can help prevent droplets from spreading the virus when you talk, sneeze, or cough  Put the covering on before anyone comes into your room or area   Remind the person to cover his or her nose and mouth before going in to provide care for you  · Do not share items  Do not share dishes, towels, or other items with anyone  Items need to be washed after you use them  · Protect your baby  Wash your hands with soap and water often throughout the day  Wear a clean face covering while you breastfeed, or while you express or pump breast milk  If possible, ask someone who is well to care for your baby  You can put breast milk in bottles for the person to use, if needed  Talk to your healthcare provider if you have any questions or concerns about caring for or bonding with your baby  He or she will tell you when to bring your baby in for check-ups and vaccines  He or she will also tell you what to do if you think your baby was infected with the new virus  · Do not handle live animals  Until more is known, it is best not to touch, play with, or handle live animals  Some animals, including pets, have been infected with the new coronavirus  Do not handle or care for animals until you are well  Care includes feeding, petting, and cuddling your pet  Do not let your pet lick you or share your food  Ask someone who is not infected to take care of your pet, if possible  If you must care for a pet, wear a face covering  Wash your hands before and after you give care  · Follow directions from your healthcare provider for being around others after you recover  You will need to wait at least 10 days after symptoms first appeared  Then you will need to have no fever for 24 hours without fever medicine, and no other symptoms  A loss of taste or smell may continue for several months  It is considered okay to be around others if this is your only symptom  It is not known for sure if or for how long a recovered person can pass the virus to others  Your provider may give you instructions, such as continuing social distancing or wearing a face covering around others      How to take care of someone who has COVID-19:  If the person lives in another home, arrange for a time to give care  Remember to bring a few pairs of disposable gloves and a cloth face covering  The following are general guidelines to help you safely care for anyone who has COVID-19:  · Wash your hands often  Wash before and after you go into the person's home, area, or room  Throw paper towels away in a lined trash can that has a lid, if possible  · Do not allow others to go near the person  No one should come into the person's home unless it is necessary  If possible, the person should be in a separate area or room if he or she lives with others  Keep the room's door shut unless you need to go in or out  Have others call, video chat, or e-mail the person if he or she is feeling well enough  The person may feel lonely if he or she is kept separate for a long period of time  Safe communication can help him or her stay connected to family and friends  · Make sure the person's room has good air flow  You may be able to open the window if the weather allows  An air conditioner can also be turned on to help air move  · Contact the person before you go in to give care  Make sure the person is wearing a face covering  Remind him or her to wash his or her hands with soap and water  He or she can use hand  that contains alcohol if soap and water are not available  Put on a face covering before you go in to give care  · Wear gloves while you give care and clean  Clean items the person uses often  Clean countertops, cooking surfaces, and the fronts and insides of the microwave and refrigerator  Clean the shower, toilet, the area around the toilet, the sink, the area around the sink, and faucets  Gather used laundry or bedding  Wash and dry items on the warmest settings the fabric allows  Wash dishes and silverware in hot, soapy water or in a   · Anything you throw away needs to go into a lined trash can    When you need to empty the trash, close the open end of the lining and tie it closed  This helps prevent items the virus is on from spilling out of the trash  Remove your gloves and throw them away  Wash your hands  Follow up with your doctor as directed:  Write down your questions so you remember to ask them during your visits  For more information:   · Centers for Disease Control and Prevention  [address] , [address]  Phone: 3- 064 - 092-6977  Web Address: [employer] com br    © Copyright [address] [place] [address] Drive Information is for End User's use only and may not be sold, redistributed or otherwise used for commercial purposes  All illustrations and images included in CareNotes® are the copyrighted property of A D A M [employer]  or [facility] Derian Pineda   The above information is an  only  It is not intended as medical advice for individual conditions or treatments  Talk to your doctor, nurse or pharmacist before following any medical regimen to see if it is safe and effective for you

## 2021-01-21 ENCOUNTER — TELEMEDICINE (OUTPATIENT)
Dept: INTERNAL MEDICINE CLINIC | Facility: CLINIC | Age: 66
End: 2021-01-21
Payer: COMMERCIAL

## 2021-01-21 VITALS — WEIGHT: 135 LBS | OXYGEN SATURATION: 96 % | BODY MASS INDEX: 23.92 KG/M2 | HEIGHT: 63 IN

## 2021-01-21 DIAGNOSIS — U07.1 LAB TEST POSITIVE FOR DETECTION OF COVID-19 VIRUS: Primary | ICD-10-CM

## 2021-01-21 LAB — SARS-COV-2 RNA RESP QL NAA+PROBE: POSITIVE

## 2021-01-21 PROCEDURE — 3008F BODY MASS INDEX DOCD: CPT | Performed by: INTERNAL MEDICINE

## 2021-01-21 PROCEDURE — 99213 OFFICE O/P EST LOW 20 MIN: CPT | Performed by: INTERNAL MEDICINE

## 2021-01-21 NOTE — PATIENT INSTRUCTIONS
COVID-19 (Coronavirus Disease 2019)   WHAT YOU NEED TO KNOW:   COVID-19 is the disease caused by the novel (new) coronavirus first discovered in December 2019  Coronaviruses generally cause upper respiratory (nose, throat, and lung) infections, such as a cold  The new virus can also cause serious lower respiratory conditions, such as pneumonia or acute respiratory distress syndrome (ARDS)  Anyone can develop serious problems from the new virus, but your risk is higher if you are 65 or older  A weak immune system, diabetes, or a heart or lung condition can also increase your risk  DISCHARGE INSTRUCTIONS:   If you think you or someone you know may be infected:  Do the following to protect others:  · If emergency care is needed,  tell the  about the possible infection, or call ahead and tell the emergency department  · Call a healthcare provider  for instructions if symptoms are mild  Anyone who may be infected should not  arrive without calling first  The provider will need to protect staff members and other patients  · The person who may be infected needs to wear a face covering  while getting medical care  This will help lower the risk of infecting others  Coverings are not used for anyone who is younger than 2 years, has breathing problems, or cannot remove it  The provider can give you instructions for anyone who cannot wear a covering  Call your local emergency number (911 in the [facility]) or go to the emergency department if:   · You have trouble breathing or shortness of breath at rest     · You have chest pain or pressure that lasts longer than 5 minutes  · You become confused or hard to wake  · Your lips or face are blue  · You have a fever of 104°F (40°C) or higher  Call your doctor if:   · You do not  have symptoms of COVID-19 but had close physical contact within 14 days with someone who tested positive  · You have questions or concerns about your condition or care      Medicines: You may need any of the following for mild symptoms:  · Decongestants  help reduce nasal congestion and help you breathe more easily  If you take decongestant pills, they may make you feel restless or cause problems with your sleep  Do not use decongestant sprays for more than a few days  · Cough suppressants  help reduce coughing  Ask your healthcare provider which type of cough medicine is best for you  · Acetaminophen  decreases pain and fever  It is available without a doctor's order  Ask how much to take and how often to take it  Follow directions  Read the labels of all other medicines you are using to see if they also contain acetaminophen, or ask your doctor or pharmacist  Acetaminophen can cause liver damage if not taken correctly  Do not use more than 4 grams (4,000 milligrams) total of acetaminophen in one day  · NSAIDs , such as ibuprofen, help decrease swelling, pain, and fever  NSAIDs can cause stomach bleeding or kidney problems in certain people  If you take blood thinner medicine, always ask your healthcare provider if NSAIDs are safe for you  Always read the medicine label and follow directions  · Take your medicine as directed  Contact your healthcare provider if you think your medicine is not helping or if you have side effects  Tell him or her if you are allergic to any medicine  Keep a list of the medicines, vitamins, and herbs you take  Include the amounts, and when and why you take them  Bring the list or the pill bottles to follow-up visits  Carry your medicine list with you in case of an emergency  How the 2019 coronavirus spreads: The virus spreads quickly and easily  You can become infected if you are in contact with a large amount of the virus, even for a short time  You can also become infected by being around a small amount of virus for a long time   The following are ways the virus is thought to spread, but more information may be coming:  · Droplets are the most common way all coronaviruses spread  The virus can travel in droplets that form when a person talks, coughs, or sneezes  Anyone who breathes in the droplets or gets them in his or her eyes can become infected with the virus  Close personal contact with an infected person is thought to be the main way the virus spreads  Close personal contact means you are within 6 feet (2 meters) of the person  · Person-to-person contact can spread the virus  For example, a person with the virus on his or her hands can spread it by shaking hands with someone  At this time, it does not appear that the virus can be passed to a baby during pregnancy or delivery  The baby can be infected after he or she is born through person-to-person contact  The virus also does not appear to spread in breast milk  If you are pregnant or breastfeeding, talk to your healthcare provider or obstetrician about any concerns you have  · The virus can stay on objects and surfaces  A person can get the virus on his or her hands by touching the object or surface  Infection happens if the person then touches his or her eyes or mouth with unwashed hands  It is not yet known how long the virus can stay on an object or surface  That is why it is important to clean all surfaces that are used regularly  · An infected animal may be able to infect a person who touches it  This may happen at live markets or on a farm  How everyone can lower the risk for COVID-19:  The best way to prevent infection is to avoid anyone who is infected, but this can be hard to do  An infected person can spread the virus before signs or symptoms begin, or even if signs or symptoms never develop  The following can help lower the risk for infection:      · Wash your hands often throughout the day  Use soap and water  Rub your soapy hands together, lacing your fingers  Wash the front and back of each hand, and in between your fingers   Use the fingers of one hand to scrub under the fingernails of the other hand  Wash for at least 20 seconds  Rinse with warm, running water for several seconds  Then dry your hands with a clean towel or paper towel  Use hand  that contains alcohol if soap and water are not available  Do not touch your eyes, nose, or mouth without washing your hands first  Teach children how to wash their hands and use hand   · Cover a sneeze or cough  This prevents droplets from traveling from you to others  Turn your face away and cover your mouth and nose with a tissue  Throw the tissue away  Use the bend of your arm if a tissue is not available  Then wash your hands well with soap and water or use hand   Turn and cover your face if you are around someone who is sneezing or coughing  Teach children how to cover a cough or sneeze  · Follow worldwide, national, and local social distancing guidelines  Social distancing means people avoid close physical contact so the virus cannot spread from one person to another  Keep at least 6 feet (2 meters) between you and others  Also keep this distance from anyone who comes to your home, such as someone making a delivery  · Make a habit of not touching your face  It is not known how long the virus can stay on objects and surfaces  If you get the virus on your hands, you can transfer it to your eyes, nose, or mouth and become infected  You can also transfer it to objects, surfaces, or people  Be aware of what you touch when you go out  Examples include handrails and elevator buttons  Try not to touch anything with bare hands unless it is necessary  Wash your hands before you leave your home and when you return  · Clean and disinfect high-touch surfaces and objects often  Use a disinfecting solution or wipes  You can make a solution by diluting 4 teaspoons of bleach in 1 quart (4 cups) of water   Clean and disinfect even if you think no one living in or coming to your home is infected with the virus  You can wipe items with a disinfecting cloth before you bring them into your home  Wash your hands after you handle anything you bring into your home  · Make your immune system as healthy as possible  A weakened immune system makes you more vulnerable to the new coronavirus  No COVID-19 vaccine is available yet  Vaccines such as the flu and pneumonia vaccines can help your immune system  Your healthcare provider can tell you which vaccines to get, and when to get them  Keep your immune system as strong as possible  Do not smoke  Eat healthy foods, exercise regularly, and try to manage stress  Go to bed and wake up at the same times each day  Social distancing guidelines:  National and local social distancing rules vary  Rules may change over time as restrictions are lifted  Restrictions may return if an outbreak happens where you live  It is important to know and follow all current social distancing rules in your area  The following are general guidelines:  · Limit trips out of your home  You may be able to have food, medicines, and other supplies delivered  If possible, have delivered items left at your door or other area  Try not to have someone hand you an item  You will be so close to the person that the virus can spread between you  · Do not have close physical contact with anyone who does not live in your home  Do not shake hands with, hug, or kiss a person as a greeting  Stand or walk as far from others as possible  If you must use public transportation (such as a bus or subway), try to sit or stand away from others  You can stay safely connected with others through phone calls, e-mail messages, social media websites, and video chats  Check in on anyone who may be having a hard time socially distancing, or who lives alone  Ask administrators at nursing homes or long-term care facilities how you can safely communicate with someone living there      · Wear a cloth face covering around others who do not live in your home  Face coverings help prevent the virus from spreading to others in droplets  You can use a clear face covering if someone needs to read your lips  This is a cloth covering that has plastic over the mouth area so your lips can be seen  Do not use coverings that have breathing valves or vents  The virus can travel out of the valve or vent and be spread to others  Do not take your covering off to talk, cough, or sneeze  Do not use coverings on children younger than 2 years or on anyone who has breathing problems or cannot remove it  · Only allow medical or other necessary professionals into your home  Wear your face covering, and remind professionals to wear a face covering  Remind them to wash their hands when they arrive and before they leave  Do not  let anyone who does not live in your home in, even if the person is not sick  A person can pass the virus to others before symptoms of COVID-19 begin  Some people never even develop symptoms  Children commonly have mild symptoms or no symptoms  It may be hard to tell a child not to hug or kiss you  Explain that this is how he or she can help you stay healthy  · Do not go to someone else's home unless it is necessary  Do not go over to visit, even if the person is lonely  Only go if you need to help him or her  Make sure you both wear face coverings while you are there  · Avoid large gatherings and crowds  Gatherings or crowds of 10 or more individuals can cause the virus to spread  Examples of gatherings include parties, sporting events, [employer] services, and conferences  Crowds may form at beaches, [place], and tourist attractions  Protect yourself by staying away from large gatherings and crowds  · Ask your healthcare provider for other ways to have appointments  You may be able to have appointments without having to go into the provider's office  Some providers offer phone, video, or other types of appointments  You may also be able to get prescriptions for a few months of your medicines at a time  · Stay safe if you must go out to work  You may have a job that can only be done outside your home  Keep physical distance between you and other workers as much as possible  Follow your employer's rules so everyone stays safe  If you have COVID-19 and are recovering at home:  Healthcare providers will give you specific instructions to follow  The following are general guidelines to remind you how to keep others safe until you are well:  · Wash your hands often  Use soap and water as much as possible  You can use hand  that contains alcohol if soap and water are not available  Do not share towels with anyone  If you use paper towels, throw them away in a lined trash can kept in your room or area  Use a covered trash can, if possible  · Do not go out of your home unless it is necessary  You may have to go to your healthcare provider's office for check-ups or to get prescription refills  Do not arrive at the provider's office without an appointment  Providers have to make their offices safe for staff and other patients  · Do not have close physical contact with anyone unless it is necessary  Only have close physical contact with a person giving direct care, or a baby or child you must care for  Family members and friends should not visit you  If possible, stay in a separate area or room of your home if you live with others  No one should go into the area or room except to give you care  You can visit with others by phone, video chat, e-mail, or similar systems  It is important to stay connected with others in your life while you recover  · Wear a face covering while others are near you  This can help prevent droplets from spreading the virus when you talk, sneeze, or cough  Put the covering on before anyone comes into your room or area   Remind the person to cover his or her nose and mouth before going in to provide care for you  · Do not share items  Do not share dishes, towels, or other items with anyone  Items need to be washed after you use them  · Protect your baby  Wash your hands with soap and water often throughout the day  Wear a clean face covering while you breastfeed, or while you express or pump breast milk  If possible, ask someone who is well to care for your baby  You can put breast milk in bottles for the person to use, if needed  Talk to your healthcare provider if you have any questions or concerns about caring for or bonding with your baby  He or she will tell you when to bring your baby in for check-ups and vaccines  He or she will also tell you what to do if you think your baby was infected with the new virus  · Do not handle live animals  Until more is known, it is best not to touch, play with, or handle live animals  Some animals, including pets, have been infected with the new coronavirus  Do not handle or care for animals until you are well  Care includes feeding, petting, and cuddling your pet  Do not let your pet lick you or share your food  Ask someone who is not infected to take care of your pet, if possible  If you must care for a pet, wear a face covering  Wash your hands before and after you give care  · Follow directions from your healthcare provider for being around others after you recover  You will need to wait at least 10 days after symptoms first appeared  Then you will need to have no fever for 24 hours without fever medicine, and no other symptoms  A loss of taste or smell may continue for several months  It is considered okay to be around others if this is your only symptom  It is not known for sure if or for how long a recovered person can pass the virus to others  Your provider may give you instructions, such as continuing social distancing or wearing a face covering around others      How to take care of someone who has COVID-19:  If the person lives in another home, arrange for a time to give care  Remember to bring a few pairs of disposable gloves and a cloth face covering  The following are general guidelines to help you safely care for anyone who has COVID-19:  · Wash your hands often  Wash before and after you go into the person's home, area, or room  Throw paper towels away in a lined trash can that has a lid, if possible  · Do not allow others to go near the person  No one should come into the person's home unless it is necessary  If possible, the person should be in a separate area or room if he or she lives with others  Keep the room's door shut unless you need to go in or out  Have others call, video chat, or e-mail the person if he or she is feeling well enough  The person may feel lonely if he or she is kept separate for a long period of time  Safe communication can help him or her stay connected to family and friends  · Make sure the person's room has good air flow  You may be able to open the window if the weather allows  An air conditioner can also be turned on to help air move  · Contact the person before you go in to give care  Make sure the person is wearing a face covering  Remind him or her to wash his or her hands with soap and water  He or she can use hand  that contains alcohol if soap and water are not available  Put on a face covering before you go in to give care  · Wear gloves while you give care and clean  Clean items the person uses often  Clean countertops, cooking surfaces, and the fronts and insides of the microwave and refrigerator  Clean the shower, toilet, the area around the toilet, the sink, the area around the sink, and faucets  Gather used laundry or bedding  Wash and dry items on the warmest settings the fabric allows  Wash dishes and silverware in hot, soapy water or in a   · Anything you throw away needs to go into a lined trash can    When you need to empty the trash, close the open end of the lining and tie it closed  This helps prevent items the virus is on from spilling out of the trash  Remove your gloves and throw them away  Wash your hands  Follow up with your doctor as directed:  Write down your questions so you remember to ask them during your visits  For more information:   · Centers for Disease Control and Prevention  [address] , [address]  Phone: 0- 537 - [phone]  Web Address: [employer] com br    © Copyright [address] [place] [address] Drive Information is for End User's use only and may not be sold, redistributed or otherwise used for commercial purposes  All illustrations and images included in CareNotes® are the copyrighted property of A D A M , [employer]  or [facility] Derian Pineda   The above information is an  only  It is not intended as medical advice for individual conditions or treatments  Talk to your doctor, nurse or pharmacist before following any medical regimen to see if it is safe and effective for you

## 2021-01-21 NOTE — PROGRESS NOTES
COVID-19 Virtual Visit     Assessment/Plan:    Problem List Items Addressed This Visit     None      Visit Diagnoses     Lab test positive for detection of COVID-19 virus    -  Primary         Disposition:     I recommended continued isolation until at least 24 hours have passed since recovery defined as resolution of fever without the use of fever-reducing medications AND improvement in COVID symptoms AND 10 days have passed since onset of symptoms (or 10 days have passed since date of first positive viral diagnostic test for asymptomatic patients)  A/P: Day # 7 since onset of COVID s/s  Covid test now positive  Doing about the same  No fevers  Some chills, nasal congestion, and slight cough  No SOB   remains in the hospital with covid  Continue isolation/quarantine, increase fluids, PRN motrin/tylenol, and breathing exercises  RTC one day for f/u  I have spent 20 minutes directly with the patient  Greater than 50% of this time was spent in counseling/coordination of care regarding: diagnostic results, prognosis, risks and benefits of treatment options, instructions for management, patient and family education, importance of treatment compliance, risk factor reductions and impressions  Encounter provider Al Fraction, DO    Provider located at [address] [place] [id number]    Recent Visits  Date Type Provider Dept   01/20/21 Telemedicine Al Fraction, DO [facility]   01/19/21 [facility], DO [facility]   01/18/21 Telephone Yandel Thomas recent visits within past 7 days and meeting all other requirements     Today's Visits  Date Type Provider Dept   01/21/21 Telemedicine Al Fraction, DO Patricio Thomas today's visits and meeting all other requirements     Future Appointments  No visits were found meeting these conditions     Showing future appointments within next 150 days and meeting all other requirements      This virtual check-in was done via [employer] and patient was informed that this is not a secure, HIPAA-compliant platform  She agrees to proceed  Patient agrees to participate in a virtual check in via telephone or video visit instead of presenting to the office to address urgent/immediate medical needs  Patient is aware this is a billable service  After connecting through [facility], the patient was identified by name and date of birth  Filippo Ruth was informed that this was a telemedicine visit and that the exam was being conducted confidentially over secure lines  My office door was closed  No one else was in the room  Filippo Ruth acknowledged consent and understanding of privacy and security of the telemedicine visit  I informed the patient that I have reviewed her record in Epic and presented the opportunity for her to ask any questions regarding the visit today  The patient agreed to participate  Subjective:   Filippo Ruth is a 63 y o  female who has been screened for COVID-19  Symptom change since last report: improving  Patient's symptoms include chills, nasal congestion, rhinorrhea, cough and headache  Patient denies fever, fatigue, sore throat, anosmia, loss of taste, shortness of breath, chest tightness, abdominal pain, nausea, vomiting, diarrhea and myalgias  [facility] has been staying home and has isolated themselves in her home  She is taking care to not share personal items and is cleaning all surfaces that are touched often, like counters, tabletops, and doorknobs using household cleaning sprays or wipes  She is wearing a mask when she leaves her room       Date of symptom onset: 1/14/2021  Date of positive COVID-19 PCR: 1/19/2021    Lab Results   Component Value Date    SARSCOV2 Positive (A) 01/19/2021     Past Medical History:   Diagnosis Date    Abnormal finding in urine     Arthralgia of multiple joints     Biceps tendinitis, right     Bursitis of right knee     Bursitis of right shoulder     Chondrocalcinosis     COPD exacerbation (HCC)     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     Drug intolerance     Dysfunction of right eustachian tube     Dysfunctional uterine bleeding     Edema     Elevated d-dimer     Exposure to hepatitis C     Fracture of fibula     Generalized anxiety disorder     GERD (gastroesophageal reflux disease)     Mild cervical dysplasia     Myalgia     Myositis     Palpitations     Pre-syncope     Seasonal allergies     Thrombocytopenia (HCC)     Urinary tract infection     Uterine leiomyoma      Past Surgical History:   Procedure Laterality Date    BLADDER SURGERY      BREAST BIOPSY Right     US guided    COLPOSCOPY  01/06/1998    DENTAL SURGERY      ENDOMETRIAL BIOPSY  05/21/1993    HYSTERECTOMY      HYSTEROSCOPY      uterus- 1/22/98, 7/1/02 and 4/10/07    TUBAL LIGATION      US GUIDED BREAST BIOPSY RIGHT COMPLETE Right 8/8/2018     Current Outpatient Medications   Medication Sig Dispense Refill    atorvastatin (LIPITOR) 20 mg tablet take 1 tablet by mouth once daily 90 tablet 3    buPROPion (WELLBUTRIN SR) 150 mg 12 hr tablet Take 150 mg by mouth daily      busPIRone (BUSPAR) 30 MG tablet TAKE 1 TABLET TID      clonazePAM (KlonoPIN) 1 mg tablet TAKE 1 TABLET TID      omeprazole (PriLOSEC) 40 MG capsule take 1 capsule by mouth once daily 30 capsule 5    tretinoin (RETIN-A) 0 1 % cream Apply topically daily at bedtime 45 g 5    venlafaxine (EFFEXOR-XR) 150 mg 24 hr capsule Take 1 capsule (150 mg total) by mouth daily 30 capsule 0    venlafaxine (EFFEXOR-XR) 75 mg 24 hr capsule Take 1 capsule (75 mg total) by mouth daily 90 capsule 1     No current facility-administered medications for this visit  No Known Allergies    Review of Systems   Constitutional: Positive for activity change and chills   Negative for diaphoresis, fatigue and fever    HENT: Positive for congestion, postnasal drip and rhinorrhea  Negative for sinus pressure, sinus pain and sore throat  No loss of taste/smell  Respiratory: Positive for cough  Negative for chest tightness, shortness of breath and wheezing  Cardiovascular: Negative for chest pain, palpitations and leg swelling  Gastrointestinal: Negative for abdominal pain, constipation, diarrhea, nausea and vomiting  Genitourinary: Negative for difficulty urinating, dysuria and frequency  Musculoskeletal: Negative for arthralgias, gait problem and myalgias  Neurological: Positive for headaches  Negative for light-headedness  Psychiatric/Behavioral: Negative for confusion  The patient is not nervous/anxious  Objective:    Vitals:    01/21/21 1348   SpO2: 96%   Weight: 61 2 kg (135 lb)   Height: 5' 3" (1 6 m)       Physical Exam  Vitals signs and nursing note reviewed  Constitutional:       General: She is not in acute distress  Appearance: Normal appearance  She is not ill-appearing  HENT:      Head: Normocephalic and atraumatic  Eyes:      Extraocular Movements: Extraocular movements intact  Conjunctiva/sclera: Conjunctivae normal       Pupils: Pupils are equal, round, and reactive to light  Pulmonary:      Effort: Pulmonary effort is normal  No respiratory distress  Neurological:      General: No focal deficit present  Mental Status: She is alert and oriented to person, place, and time  Mental status is at baseline  Psychiatric:         Mood and Affect: Mood normal          Behavior: Behavior normal          Thought Content: Thought content normal          Judgment: Judgment normal        VIRTUAL VISIT DISCLAIMER    Justin Church acknowledges that she has consented to an online visit or consultation   She understands that the online visit is based solely on information provided by her, and that, in the absence of a face-to-face physical evaluation by the physician, the diagnosis she receives is both limited and provisional in terms of accuracy and completeness  This is not intended to replace a full medical face-to-face evaluation by the physician  Devonte Gann understands and accepts these terms

## 2021-01-22 ENCOUNTER — TELEMEDICINE (OUTPATIENT)
Dept: INTERNAL MEDICINE CLINIC | Facility: CLINIC | Age: 66
End: 2021-01-22
Payer: COMMERCIAL

## 2021-01-22 VITALS — TEMPERATURE: 98 F | OXYGEN SATURATION: 95 %

## 2021-01-22 DIAGNOSIS — U07.1 LAB TEST POSITIVE FOR DETECTION OF COVID-19 VIRUS: Primary | ICD-10-CM

## 2021-01-22 PROCEDURE — 4004F PT TOBACCO SCREEN RCVD TLK: CPT | Performed by: INTERNAL MEDICINE

## 2021-01-22 PROCEDURE — 99213 OFFICE O/P EST LOW 20 MIN: CPT | Performed by: INTERNAL MEDICINE

## 2021-01-22 NOTE — PATIENT INSTRUCTIONS
COVID-19 (Coronavirus Disease 2019)   WHAT YOU NEED TO KNOW:   COVID-19 is the disease caused by the novel (new) coronavirus first discovered in December 2019  Coronaviruses generally cause upper respiratory (nose, throat, and lung) infections, such as a cold  The new virus can also cause serious lower respiratory conditions, such as pneumonia or acute respiratory distress syndrome (ARDS)  Anyone can develop serious problems from the new virus, but your risk is higher if you are 65 or older  A weak immune system, diabetes, or a heart or lung condition can also increase your risk  DISCHARGE INSTRUCTIONS:   If you think you or someone you know may be infected:  Do the following to protect others:  · If emergency care is needed,  tell the  about the possible infection, or call ahead and tell the emergency department  · Call a healthcare provider  for instructions if symptoms are mild  Anyone who may be infected should not  arrive without calling first  The provider will need to protect staff members and other patients  · The person who may be infected needs to wear a face covering  while getting medical care  This will help lower the risk of infecting others  Coverings are not used for anyone who is younger than 2 years, has breathing problems, or cannot remove it  The provider can give you instructions for anyone who cannot wear a covering  Call your local emergency number (911 in the [address] [facility]) or go to the emergency department if:   · You have trouble breathing or shortness of breath at rest     · You have chest pain or pressure that lasts longer than 5 minutes  · You become confused or hard to wake  · Your lips or face are blue  · You have a fever of 104°F (40°C) or higher  Call your doctor if:   · You do not  have symptoms of COVID-19 but had close physical contact within 14 days with someone who tested positive  · You have questions or concerns about your condition or care      Medicines: You may need any of the following for mild symptoms:  · Decongestants  help reduce nasal congestion and help you breathe more easily  If you take decongestant pills, they may make you feel restless or cause problems with your sleep  Do not use decongestant sprays for more than a few days  · Cough suppressants  help reduce coughing  Ask your healthcare provider which type of cough medicine is best for you  · Acetaminophen  decreases pain and fever  It is available without a doctor's order  Ask how much to take and how often to take it  Follow directions  Read the labels of all other medicines you are using to see if they also contain acetaminophen, or ask your doctor or pharmacist  Acetaminophen can cause liver damage if not taken correctly  Do not use more than 4 grams (4,000 milligrams) total of acetaminophen in one day  · NSAIDs , such as ibuprofen, help decrease swelling, pain, and fever  NSAIDs can cause stomach bleeding or kidney problems in certain people  If you take blood thinner medicine, always ask your healthcare provider if NSAIDs are safe for you  Always read the medicine label and follow directions  · Take your medicine as directed  Contact your healthcare provider if you think your medicine is not helping or if you have side effects  Tell him or her if you are allergic to any medicine  Keep a list of the medicines, vitamins, and herbs you take  Include the amounts, and when and why you take them  Bring the list or the pill bottles to follow-up visits  Carry your medicine list with you in case of an emergency  How the 2019 coronavirus spreads: The virus spreads quickly and easily  You can become infected if you are in contact with a large amount of the virus, even for a short time  You can also become infected by being around a small amount of virus for a long time   The following are ways the virus is thought to spread, but more information may be coming:  · Droplets are the most common way all coronaviruses spread  The virus can travel in droplets that form when a person talks, coughs, or sneezes  Anyone who breathes in the droplets or gets them in his or her eyes can become infected with the virus  Close personal contact with an infected person is thought to be the main way the virus spreads  Close personal contact means you are within 6 feet (2 meters) of the person  · Person-to-person contact can spread the virus  For example, a person with the virus on his or her hands can spread it by shaking hands with someone  At this time, it does not appear that the virus can be passed to a baby during pregnancy or delivery  The baby can be infected after he or she is born through person-to-person contact  The virus also does not appear to spread in breast milk  If you are pregnant or breastfeeding, talk to your healthcare provider or obstetrician about any concerns you have  · The virus can stay on objects and surfaces  A person can get the virus on his or her hands by touching the object or surface  Infection happens if the person then touches his or her eyes or mouth with unwashed hands  It is not yet known how long the virus can stay on an object or surface  That is why it is important to clean all surfaces that are used regularly  · An infected animal may be able to infect a person who touches it  This may happen at live markets or on a farm  How everyone can lower the risk for COVID-19:  The best way to prevent infection is to avoid anyone who is infected, but this can be hard to do  An infected person can spread the virus before signs or symptoms begin, or even if signs or symptoms never develop  The following can help lower the risk for infection:      · Wash your hands often throughout the day  Use soap and water  Rub your soapy hands together, lacing your fingers  Wash the front and back of each hand, and in between your fingers   Use the fingers of one hand to scrub under the fingernails of the other hand  Wash for at least 20 seconds  Rinse with warm, running water for several seconds  Then dry your hands with a clean towel or paper towel  Use hand  that contains alcohol if soap and water are not available  Do not touch your eyes, nose, or mouth without washing your hands first  Teach children how to wash their hands and use hand   · Cover a sneeze or cough  This prevents droplets from traveling from you to others  Turn your face away and cover your mouth and nose with a tissue  Throw the tissue away  Use the bend of your arm if a tissue is not available  Then wash your hands well with soap and water or use hand   Turn and cover your face if you are around someone who is sneezing or coughing  Teach children how to cover a cough or sneeze  · Follow worldwide, national, and local social distancing guidelines  Social distancing means people avoid close physical contact so the virus cannot spread from one person to another  Keep at least 6 feet (2 meters) between you and others  Also keep this distance from anyone who comes to your home, such as someone making a delivery  · Make a habit of not touching your face  It is not known how long the virus can stay on objects and surfaces  If you get the virus on your hands, you can transfer it to your eyes, nose, or mouth and become infected  You can also transfer it to objects, surfaces, or people  Be aware of what you touch when you go out  Examples include handrails and elevator buttons  Try not to touch anything with bare hands unless it is necessary  Wash your hands before you leave your home and when you return  · Clean and disinfect high-touch surfaces and objects often  Use a disinfecting solution or wipes  You can make a solution by diluting 4 teaspoons of bleach in 1 quart (4 cups) of water   Clean and disinfect even if you think no one living in or coming to your home is infected with the virus  You can wipe items with a disinfecting cloth before you bring them into your home  Wash your hands after you handle anything you bring into your home  · Make your immune system as healthy as possible  A weakened immune system makes you more vulnerable to the new coronavirus  No COVID-19 vaccine is available yet  Vaccines such as the flu and pneumonia vaccines can help your immune system  Your healthcare provider can tell you which vaccines to get, and when to get them  Keep your immune system as strong as possible  Do not smoke  Eat healthy foods, exercise regularly, and try to manage stress  Go to bed and wake up at the same times each day  Social distancing guidelines:  National and local social distancing rules vary  Rules may change over time as restrictions are lifted  Restrictions may return if an outbreak happens where you live  It is important to know and follow all current social distancing rules in your area  The following are general guidelines:  · Limit trips out of your home  You may be able to have food, medicines, and other supplies delivered  If possible, have delivered items left at your door or other area  Try not to have someone hand you an item  You will be so close to the person that the virus can spread between you  · Do not have close physical contact with anyone who does not live in your home  Do not shake hands with, hug, or kiss a person as a greeting  Stand or walk as far from others as possible  If you must use public transportation (such as a bus or subway), try to sit or stand away from others  You can stay safely connected with others through phone calls, e-mail messages, social media websites, and video chats  Check in on anyone who may be having a hard time socially distancing, or who lives alone  Ask administrators at nursing homes or long-term care facilities how you can safely communicate with someone living there      · Wear a cloth face covering around others who do not live in your home  Face coverings help prevent the virus from spreading to others in droplets  You can use a clear face covering if someone needs to read your lips  This is a cloth covering that has plastic over the mouth area so your lips can be seen  Do not use coverings that have breathing valves or vents  The virus can travel out of the valve or vent and be spread to others  Do not take your covering off to talk, cough, or sneeze  Do not use coverings on children younger than 2 years or on anyone who has breathing problems or cannot remove it  · Only allow medical or other necessary professionals into your home  Wear your face covering, and remind professionals to wear a face covering  Remind them to wash their hands when they arrive and before they leave  Do not  let anyone who does not live in your home in, even if the person is not sick  A person can pass the virus to others before symptoms of COVID-19 begin  Some people never even develop symptoms  Children commonly have mild symptoms or no symptoms  It may be hard to tell a child not to hug or kiss you  Explain that this is how he or she can help you stay healthy  · Do not go to someone else's home unless it is necessary  Do not go over to visit, even if the person is lonely  Only go if you need to help him or her  Make sure you both wear face coverings while you are there  · Avoid large gatherings and crowds  Gatherings or crowds of 10 or more individuals can cause the virus to spread  Examples of gatherings include parties, sporting events, [employer] orth[employer] services, and conferences  Crowds may form at beaches, [place], and tourist attractions  Protect yourself by staying away from large gatherings and crowds  · Ask your healthcare provider for other ways to have appointments  You may be able to have appointments without having to go into the provider's office  Some providers offer phone, video, or other types of appointments  You may also be able to get prescriptions for a few months of your medicines at a time  · Stay safe if you must go out to work  You may have a job that can only be done outside your home  Keep physical distance between you and other workers as much as possible  Follow your employer's rules so everyone stays safe  If you have COVID-19 and are recovering at home:  Healthcare providers will give you specific instructions to follow  The following are general guidelines to remind you how to keep others safe until you are well:  · Wash your hands often  Use soap and water as much as possible  You can use hand  that contains alcohol if soap and water are not available  Do not share towels with anyone  If you use paper towels, throw them away in a lined trash can kept in your room or area  Use a covered trash can, if possible  · Do not go out of your home unless it is necessary  You may have to go to your healthcare provider's office for check-ups or to get prescription refills  Do not arrive at the provider's office without an appointment  Providers have to make their offices safe for staff and other patients  · Do not have close physical contact with anyone unless it is necessary  Only have close physical contact with a person giving direct care, or a baby or child you must care for  Family members and friends should not visit you  If possible, stay in a separate area or room of your home if you live with others  No one should go into the area or room except to give you care  You can visit with others by phone, video chat, e-mail, or similar systems  It is important to stay connected with others in your life while you recover  · Wear a face covering while others are near you  This can help prevent droplets from spreading the virus when you talk, sneeze, or cough  Put the covering on before anyone comes into your room or area   Remind the person to cover his or her nose and mouth before going in to provide care for you  · Do not share items  Do not share dishes, towels, or other items with anyone  Items need to be washed after you use them  · Protect your baby  Wash your hands with soap and water often throughout the day  Wear a clean face covering while you breastfeed, or while you express or pump breast milk  If possible, ask someone who is well to care for your baby  You can put breast milk in bottles for the person to use, if needed  Talk to your healthcare provider if you have any questions or concerns about caring for or bonding with your baby  He or she will tell you when to bring your baby in for check-ups and vaccines  He or she will also tell you what to do if you think your baby was infected with the new virus  · Do not handle live animals  Until more is known, it is best not to touch, play with, or handle live animals  Some animals, including pets, have been infected with the new coronavirus  Do not handle or care for animals until you are well  Care includes feeding, petting, and cuddling your pet  Do not let your pet lick you or share your food  Ask someone who is not infected to take care of your pet, if possible  If you must care for a pet, wear a face covering  Wash your hands before and after you give care  · Follow directions from your healthcare provider for being around others after you recover  You will need to wait at least 10 days after symptoms first appeared  Then you will need to have no fever for 24 hours without fever medicine, and no other symptoms  A loss of taste or smell may continue for several months  It is considered okay to be around others if this is your only symptom  It is not known for sure if or for how long a recovered person can pass the virus to others  Your provider may give you instructions, such as continuing social distancing or wearing a face covering around others      How to take care of someone who has COVID-19:  If the person lives in another home, arrange for a time to give care  Remember to bring a few pairs of disposable gloves and a cloth face covering  The following are general guidelines to help you safely care for anyone who has COVID-19:  · Wash your hands often  Wash before and after you go into the person's home, area, or room  Throw paper towels away in a lined trash can that has a lid, if possible  · Do not allow others to go near the person  No one should come into the person's home unless it is necessary  If possible, the person should be in a separate area or room if he or she lives with others  Keep the room's door shut unless you need to go in or out  Have others call, video chat, or e-mail the person if he or she is feeling well enough  The person may feel lonely if he or she is kept separate for a long period of time  Safe communication can help him or her stay connected to family and friends  · Make sure the person's room has good air flow  You may be able to open the window if the weather allows  An air conditioner can also be turned on to help air move  · Contact the person before you go in to give care  Make sure the person is wearing a face covering  Remind him or her to wash his or her hands with soap and water  He or she can use hand  that contains alcohol if soap and water are not available  Put on a face covering before you go in to give care  · Wear gloves while you give care and clean  Clean items the person uses often  Clean countertops, cooking surfaces, and the fronts and insides of the microwave and refrigerator  Clean the shower, toilet, the area around the toilet, the sink, the area around the sink, and faucets  Gather used laundry or bedding  Wash and dry items on the warmest settings the fabric allows  Wash dishes and silverware in hot, soapy water or in a   · Anything you throw away needs to go into a lined trash can    When you need to empty the trash, close the open end of the lining and tie it closed  This helps prevent items the virus is on from spilling out of the trash  Remove your gloves and throw them away  Wash your hands  Follow up with your doctor as directed:  Write down your questions so you remember to ask them during your visits  For more information:   · Centers for Disease Control and Prevention  [address] , [address]  Phone: 6- 675 - [phone]  Web Address: [employer] com br    © Copyright [address] [place] [address] Drive Information is for End User's use only and may not be sold, redistributed or otherwise used for commercial purposes  All illustrations and images included in CareNotes® are the copyrighted property of A D A M , [employer]  or [facility] Derian Pineda   The above information is an  only  It is not intended as medical advice for individual conditions or treatments  Talk to your doctor, nurse or pharmacist before following any medical regimen to see if it is safe and effective for you

## 2021-01-22 NOTE — PROGRESS NOTES
COVID-19 Virtual Visit     Assessment/Plan:    Problem List Items Addressed This Visit     None      Visit Diagnoses     Lab test positive for detection of COVID-19 virus    -  Primary         Disposition:     I recommended continued isolation until at least 24 hours have passed since recovery defined as resolution of fever without the use of fever-reducing medications AND improvement in COVID symptoms AND 10 days have passed since onset of symptoms (or 10 days have passed since date of first positive viral diagnostic test for asymptomatic patients)  A/P: Day # 8 since onset of COVID s/s  Covid test was positive  Doing about the same  Chills a little worse and some abdominal pain, but no diarrhea, n/v, SOB or cough    Continue isolation/quarantine, increase fluids, PRN motrin/tylenol, and breathing exercises  RTC three days per her request  for f/u  I have spent 15 minutes directly with the patient  Greater than 50% of this time was spent in counseling/coordination of care regarding: prognosis, risks and benefits of treatment options, instructions for management, patient and family education, importance of treatment compliance, risk factor reductions and impressions          Encounter provider Clementina King DO    Provider located at [address] [place] [id number]    Recent Visits  Date Type Provider Dept   01/21/21 Telemedicine Clementina King DO [facility]   01/20/21 Telemedicine Clementina King DO [facility]   01/19/21 Telemedicine Clementina King DO [facility]   01/18/21 Telephone Anna Thomas recent visits within past 7 days and meeting all other requirements     Today's Visits  Date Type Provider Dept   01/22/21 Telemedicine Clementina King DO [facility] [address]   Showing today's visits and meeting all other requirements     Future Appointments  No visits were found meeting these conditions  Showing future appointments within next 150 days and meeting all other requirements      This virtual check-in was done via [employer] and patient was informed that this is not a secure, HIPAA-compliant platform  She agrees to proceed  Patient agrees to participate in a virtual check in via telephone or video visit instead of presenting to the office to address urgent/immediate medical needs  Patient is aware this is a billable service  After connecting through [facility], the patient was identified by name and date of birth  Cathy Swenson was informed that this was a telemedicine visit and that the exam was being conducted confidentially over secure lines  My office door was closed  No one else was in the room  Cathy Swenson acknowledged consent and understanding of privacy and security of the telemedicine visit  I informed the patient that I have reviewed her record in Epic and presented the opportunity for her to ask any questions regarding the visit today  The patient agreed to participate  Subjective:   Cathy Swenson is a 63 y o  female who has been screened for COVID-19  Symptom change since last report: improving  Patient's symptoms include chills, nasal congestion, rhinorrhea and abdominal pain  Patient denies fever, fatigue, sore throat, anosmia, loss of taste, cough, shortness of breath, chest tightness, nausea, vomiting, diarrhea, myalgias and headaches  Sina has been staying home and has isolated themselves in her home  She is taking care to not share personal items and is cleaning all surfaces that are touched often, like counters, tabletops, and doorknobs using household cleaning sprays or wipes  She is wearing a mask when she leaves her room       Date of symptom onset: 1/14/2021  Date of positive COVID-19 PCR: 1/19/2021    Lab Results   Component Value Date    SARSCOV2 Positive (A) 01/19/2021     Past Medical History:   Diagnosis Date    Abnormal finding in urine  Arthralgia of multiple joints     Biceps tendinitis, right     Bursitis of right knee     Bursitis of right shoulder     Chondrocalcinosis     COPD exacerbation (HCC)     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     Drug intolerance     Dysfunction of right eustachian tube     Dysfunctional uterine bleeding     Edema     Elevated d-dimer     Exposure to hepatitis C     Fracture of fibula     Generalized anxiety disorder     GERD (gastroesophageal reflux disease)     Mild cervical dysplasia     Myalgia     Myositis     Palpitations     Pre-syncope     Seasonal allergies     Thrombocytopenia (HCC)     Urinary tract infection     Uterine leiomyoma      Past Surgical History:   Procedure Laterality Date    BLADDER SURGERY      BREAST BIOPSY Right     US guided    COLPOSCOPY  01/06/1998    DENTAL SURGERY      ENDOMETRIAL BIOPSY  05/21/1993    HYSTERECTOMY      HYSTEROSCOPY      uterus- 1/22/98, 7/1/02 and 4/10/07    TUBAL LIGATION      US GUIDED BREAST BIOPSY RIGHT COMPLETE Right 8/8/2018     Current Outpatient Medications   Medication Sig Dispense Refill    atorvastatin (LIPITOR) 20 mg tablet take 1 tablet by mouth once daily 90 tablet 3    buPROPion (WELLBUTRIN SR) 150 mg 12 hr tablet Take 150 mg by mouth daily      busPIRone (BUSPAR) 30 MG tablet TAKE 1 TABLET TID      clonazePAM (KlonoPIN) 1 mg tablet TAKE 1 TABLET TID      omeprazole (PriLOSEC) 40 MG capsule take 1 capsule by mouth once daily 30 capsule 5    tretinoin (RETIN-A) 0 1 % cream Apply topically daily at bedtime 45 g 5    venlafaxine (EFFEXOR-XR) 150 mg 24 hr capsule Take 1 capsule (150 mg total) by mouth daily 30 capsule 0    venlafaxine (EFFEXOR-XR) 75 mg 24 hr capsule Take 1 capsule (75 mg total) by mouth daily 90 capsule 1     No current facility-administered medications for this visit  No Known Allergies    Review of Systems   Constitutional: Positive for chills   Negative for activity change, diaphoresis, fatigue and fever  HENT: Positive for congestion, postnasal drip and rhinorrhea  Negative for sinus pressure, sinus pain and sore throat  No loss of taste/smell  Respiratory: Negative for cough, chest tightness, shortness of breath and wheezing  Cardiovascular: Negative for chest pain, palpitations and leg swelling  Gastrointestinal: Positive for abdominal pain  Negative for constipation, diarrhea, nausea and vomiting  Genitourinary: Negative for difficulty urinating, dysuria and frequency  Musculoskeletal: Negative for arthralgias, gait problem and myalgias  Neurological: Negative for light-headedness and headaches  Psychiatric/Behavioral: Negative for confusion  The patient is not nervous/anxious  Objective:    Vitals:    01/22/21 1255   Temp: 98 °F (36 7 °C)   SpO2: 95%       Physical Exam  Vitals signs and nursing note reviewed  Constitutional:       General: She is not in acute distress  Appearance: Normal appearance  She is not ill-appearing  HENT:      Head: Normocephalic and atraumatic  Eyes:      Extraocular Movements: Extraocular movements intact  Conjunctiva/sclera: Conjunctivae normal       Pupils: Pupils are equal, round, and reactive to light  Pulmonary:      Effort: Pulmonary effort is normal  No respiratory distress  Neurological:      General: No focal deficit present  Mental Status: She is alert and oriented to person, place, and time  Mental status is at baseline  Psychiatric:         Mood and Affect: Mood normal          Behavior: Behavior normal          Thought Content: Thought content normal          Judgment: Judgment normal        VIRTUAL VISIT DISCLAIMER    Gerhardt Staple acknowledges that she has consented to an online visit or consultation   She understands that the online visit is based solely on information provided by her, and that, in the absence of a face-to-face physical evaluation by the physician, the diagnosis she receives is both limited and provisional in terms of accuracy and completeness  This is not intended to replace a full medical face-to-face evaluation by the physician  Filippo Ruth understands and accepts these terms

## 2021-01-23 ENCOUNTER — TELEMEDICINE (OUTPATIENT)
Dept: INTERNAL MEDICINE CLINIC | Facility: CLINIC | Age: 66
End: 2021-01-23
Payer: COMMERCIAL

## 2021-01-23 DIAGNOSIS — U07.1 LAB TEST POSITIVE FOR DETECTION OF COVID-19 VIRUS: Primary | ICD-10-CM

## 2021-01-23 PROCEDURE — 99213 OFFICE O/P EST LOW 20 MIN: CPT | Performed by: INTERNAL MEDICINE

## 2021-01-23 NOTE — PROGRESS NOTES
COVID-19 Virtual Visit     Assessment/Plan:    Problem List Items Addressed This Visit     None      Visit Diagnoses     Lab test positive for detection of COVID-19 virus    -  Primary         Disposition:     I recommended continued isolation until at least 24 hours have passed since recovery defined as resolution of fever without the use of fever-reducing medications AND improvement in COVID symptoms AND 10 days have passed since onset of symptoms (or 10 days have passed since date of first positive viral diagnostic test for asymptomatic patients)  A/P: Day # 9 since onset of COVID s/s  Covid test was positive  Doing better and only some nasal congestion  No fever, cough or SOB  Continue isolation/quarantine, increase fluids, PRN motrin/tylenol, and breathing exercises  Lift restrictions if remains afebrile and no s/s as of 1/25  RTC as scheduled for f/u  I have spent 15 minutes directly with the patient  Greater than 50% of this time was spent in counseling/coordination of care regarding: prognosis, risks and benefits of treatment options, instructions for management, patient and family education, importance of treatment compliance, risk factor reductions and impressions          Encounter provider Jacek Riddle DO    Provider located at [address] [place] [phone]    Recent Visits  Date Type Provider Dept   01/22/21 Telemedicine Jacek Riddle DO [facility]   01/21/21 Telemedicine Jacek Riddle DO [facility]   01/20/21 Telemedicine Jacek Riddle DO [facility]   01/19/21 Telemedicine Jacek Riddle DO [facility]   01/18/21 Telephone iLzette Thomas recent visits within past 7 days and meeting all other requirements     Today's Visits  Date Type Provider Dept   01/23/21 Telemedicine Jacek Riddle DO [facility]   Showing today's visits and meeting all other requirements     Future Appointments  No visits were found meeting these conditions  Showing future appointments within next 150 days and meeting all other requirements      This virtual check-in was done via [employer] and patient was informed that this is a secure, HIPAA-compliant platform  She agrees to proceed  Patient agrees to participate in a virtual check in via telephone or video visit instead of presenting to the office to address urgent/immediate medical needs  Patient is aware this is a billable service  After connecting through [facility], the patient was identified by name and date of birth  Wild Cummings was informed that this was a telemedicine visit and that the exam was being conducted confidentially over secure lines  My office door was closed  No one else was in the room  Wild Cummings acknowledged consent and understanding of privacy and security of the telemedicine visit  I informed the patient that I have reviewed her record in Epic and presented the opportunity for her to ask any questions regarding the visit today  The patient agreed to participate  Subjective:   Wild Cummings is a 63 y o  female who has been screened for COVID-19  Symptom change since last report: improving  Patient's symptoms include chills, nasal congestion and rhinorrhea  Patient denies fever, fatigue, sore throat, anosmia, loss of taste, cough, shortness of breath, chest tightness, abdominal pain, nausea, vomiting, diarrhea, myalgias and headaches  Jennifer Lang has been staying home and has isolated themselves in her home  She is taking care to not share personal items and is cleaning all surfaces that are touched often, like counters, tabletops, and doorknobs using household cleaning sprays or wipes  She is wearing a mask when she leaves her room       Date of symptom onset: 1/14/2021  Date of positive COVID-19 PCR: 1/19/2021    Lab Results   Component Value Date    SARSCOV2 Positive (A) 01/19/2021 Past Medical History:   Diagnosis Date    Abnormal finding in urine     Arthralgia of multiple joints     Biceps tendinitis, right     Bursitis of right knee     Bursitis of right shoulder     Chondrocalcinosis     COPD exacerbation (HCC)     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     Drug intolerance     Dysfunction of right eustachian tube     Dysfunctional uterine bleeding     Edema     Elevated d-dimer     Exposure to hepatitis C     Fracture of fibula     Generalized anxiety disorder     GERD (gastroesophageal reflux disease)     Mild cervical dysplasia     Myalgia     Myositis     Palpitations     Pre-syncope     Seasonal allergies     Thrombocytopenia (HCC)     Urinary tract infection     Uterine leiomyoma      Past Surgical History:   Procedure Laterality Date    BLADDER SURGERY      BREAST BIOPSY Right     US guided    COLPOSCOPY  01/06/1998    DENTAL SURGERY      ENDOMETRIAL BIOPSY  05/21/1993    HYSTERECTOMY      HYSTEROSCOPY      uterus- 1/22/98, 7/1/02 and 4/10/07    TUBAL LIGATION      US GUIDED BREAST BIOPSY RIGHT COMPLETE Right 8/8/2018     Current Outpatient Medications   Medication Sig Dispense Refill    atorvastatin (LIPITOR) 20 mg tablet take 1 tablet by mouth once daily 90 tablet 3    buPROPion (WELLBUTRIN SR) 150 mg 12 hr tablet Take 150 mg by mouth daily      busPIRone (BUSPAR) 30 MG tablet TAKE 1 TABLET TID      clonazePAM (KlonoPIN) 1 mg tablet TAKE 1 TABLET TID      omeprazole (PriLOSEC) 40 MG capsule take 1 capsule by mouth once daily 30 capsule 5    tretinoin (RETIN-A) 0 1 % cream Apply topically daily at bedtime 45 g 5    venlafaxine (EFFEXOR-XR) 150 mg 24 hr capsule Take 1 capsule (150 mg total) by mouth daily 30 capsule 0    venlafaxine (EFFEXOR-XR) 75 mg 24 hr capsule Take 1 capsule (75 mg total) by mouth daily 90 capsule 1     No current facility-administered medications for this visit        No Known Allergies    Review of Systems   Constitutional: Positive for activity change and chills  Negative for diaphoresis, fatigue and fever  HENT: Positive for congestion, postnasal drip and rhinorrhea  Negative for sinus pressure, sinus pain and sore throat  No loss of taste/smell  Respiratory: Negative for cough, chest tightness, shortness of breath and wheezing  Cardiovascular: Negative for chest pain, palpitations and leg swelling  Gastrointestinal: Negative for abdominal pain, constipation, diarrhea, nausea and vomiting  Genitourinary: Negative for difficulty urinating, dysuria and frequency  Musculoskeletal: Negative for arthralgias, gait problem and myalgias  Neurological: Negative for light-headedness and headaches  Psychiatric/Behavioral: Negative for confusion  The patient is not nervous/anxious  Objective:    Vitals:       Physical Exam  Vitals signs and nursing note reviewed  Constitutional:       General: She is not in acute distress  Appearance: Normal appearance  She is not ill-appearing  HENT:      Head: Normocephalic and atraumatic  Eyes:      Extraocular Movements: Extraocular movements intact  Conjunctiva/sclera: Conjunctivae normal       Pupils: Pupils are equal, round, and reactive to light  Pulmonary:      Effort: Pulmonary effort is normal  No respiratory distress  Abdominal:      General: Bowel sounds are normal  There is no distension  Palpations: Abdomen is soft  Tenderness: There is no abdominal tenderness  Neurological:      General: No focal deficit present  Mental Status: She is alert and oriented to person, place, and time  Mental status is at baseline  Psychiatric:         Mood and Affect: Mood normal          Behavior: Behavior normal          Thought Content:  Thought content normal          Judgment: Judgment normal        VIRTUAL VISIT DISCLAIMER    Daquan Ambriz acknowledges that she has consented to an online visit or consultation  She understands that the online visit is based solely on information provided by her, and that, in the absence of a face-to-face physical evaluation by the physician, the diagnosis she receives is both limited and provisional in terms of accuracy and completeness  This is not intended to replace a full medical face-to-face evaluation by the physician  Dolores Romo understands and accepts these terms

## 2021-01-23 NOTE — PATIENT INSTRUCTIONS
COVID-19 (Coronavirus Disease 2019)   WHAT YOU NEED TO KNOW:   COVID-19 is the disease caused by the novel (new) coronavirus first discovered in December 2019  Coronaviruses generally cause upper respiratory (nose, throat, and lung) infections, such as a cold  The new virus can also cause serious lower respiratory conditions, such as pneumonia or acute respiratory distress syndrome (ARDS)  Anyone can develop serious problems from the new virus, but your risk is higher if you are 65 or older  A weak immune system, diabetes, or a heart or lung condition can also increase your risk  DISCHARGE INSTRUCTIONS:   If you think you or someone you know may be infected:  Do the following to protect others:  · If emergency care is needed,  tell the  about the possible infection, or call ahead and tell the emergency department  · Call a healthcare provider  for instructions if symptoms are mild  Anyone who may be infected should not  arrive without calling first  The provider will need to protect staff members and other patients  · The person who may be infected needs to wear a face covering  while getting medical care  This will help lower the risk of infecting others  Coverings are not used for anyone who is younger than 2 years, has breathing problems, or cannot remove it  The provider can give you instructions for anyone who cannot wear a covering  Call your local emergency number (911 in the [address] [facility]) or go to the emergency department if:   · You have trouble breathing or shortness of breath at rest     · You have chest pain or pressure that lasts longer than 5 minutes  · You become confused or hard to wake  · Your lips or face are blue  · You have a fever of 104°F (40°C) or higher  Call your doctor if:   · You do not  have symptoms of COVID-19 but had close physical contact within 14 days with someone who tested positive  · You have questions or concerns about your condition or care      Medicines: You may need any of the following for mild symptoms:  · Decongestants  help reduce nasal congestion and help you breathe more easily  If you take decongestant pills, they may make you feel restless or cause problems with your sleep  Do not use decongestant sprays for more than a few days  · Cough suppressants  help reduce coughing  Ask your healthcare provider which type of cough medicine is best for you  · Acetaminophen  decreases pain and fever  It is available without a doctor's order  Ask how much to take and how often to take it  Follow directions  Read the labels of all other medicines you are using to see if they also contain acetaminophen, or ask your doctor or pharmacist  Acetaminophen can cause liver damage if not taken correctly  Do not use more than 4 grams (4,000 milligrams) total of acetaminophen in one day  · NSAIDs , such as ibuprofen, help decrease swelling, pain, and fever  NSAIDs can cause stomach bleeding or kidney problems in certain people  If you take blood thinner medicine, always ask your healthcare provider if NSAIDs are safe for you  Always read the medicine label and follow directions  · Take your medicine as directed  Contact your healthcare provider if you think your medicine is not helping or if you have side effects  Tell him or her if you are allergic to any medicine  Keep a list of the medicines, vitamins, and herbs you take  Include the amounts, and when and why you take them  Bring the list or the pill bottles to follow-up visits  Carry your medicine list with you in case of an emergency  How the 2019 coronavirus spreads: The virus spreads quickly and easily  You can become infected if you are in contact with a large amount of the virus, even for a short time  You can also become infected by being around a small amount of virus for a long time   The following are ways the virus is thought to spread, but more information may be coming:  · Droplets are the most common way all coronaviruses spread  The virus can travel in droplets that form when a person talks, coughs, or sneezes  Anyone who breathes in the droplets or gets them in his or her eyes can become infected with the virus  Close personal contact with an infected person is thought to be the main way the virus spreads  Close personal contact means you are within 6 feet (2 meters) of the person  · Person-to-person contact can spread the virus  For example, a person with the virus on his or her hands can spread it by shaking hands with someone  At this time, it does not appear that the virus can be passed to a baby during pregnancy or delivery  The baby can be infected after he or she is born through person-to-person contact  The virus also does not appear to spread in breast milk  If you are pregnant or breastfeeding, talk to your healthcare provider or obstetrician about any concerns you have  · The virus can stay on objects and surfaces  A person can get the virus on his or her hands by touching the object or surface  Infection happens if the person then touches his or her eyes or mouth with unwashed hands  It is not yet known how long the virus can stay on an object or surface  That is why it is important to clean all surfaces that are used regularly  · An infected animal may be able to infect a person who touches it  This may happen at live markets or on a farm  How everyone can lower the risk for COVID-19:  The best way to prevent infection is to avoid anyone who is infected, but this can be hard to do  An infected person can spread the virus before signs or symptoms begin, or even if signs or symptoms never develop  The following can help lower the risk for infection:      · Wash your hands often throughout the day  Use soap and water  Rub your soapy hands together, lacing your fingers  Wash the front and back of each hand, and in between your fingers   Use the fingers of one hand to scrub under the fingernails of the other hand  Wash for at least 20 seconds  Rinse with warm, running water for several seconds  Then dry your hands with a clean towel or paper towel  Use hand  that contains alcohol if soap and water are not available  Do not touch your eyes, nose, or mouth without washing your hands first  Teach children how to wash their hands and use hand   · Cover a sneeze or cough  This prevents droplets from traveling from you to others  Turn your face away and cover your mouth and nose with a tissue  Throw the tissue away  Use the bend of your arm if a tissue is not available  Then wash your hands well with soap and water or use hand   Turn and cover your face if you are around someone who is sneezing or coughing  Teach children how to cover a cough or sneeze  · Follow worldwide, national, and local social distancing guidelines  Social distancing means people avoid close physical contact so the virus cannot spread from one person to another  Keep at least 6 feet (2 meters) between you and others  Also keep this distance from anyone who comes to your home, such as someone making a delivery  · Make a habit of not touching your face  It is not known how long the virus can stay on objects and surfaces  If you get the virus on your hands, you can transfer it to your eyes, nose, or mouth and become infected  You can also transfer it to objects, surfaces, or people  Be aware of what you touch when you go out  Examples include handrails and elevator buttons  Try not to touch anything with bare hands unless it is necessary  Wash your hands before you leave your home and when you return  · Clean and disinfect high-touch surfaces and objects often  Use a disinfecting solution or wipes  You can make a solution by diluting 4 teaspoons of bleach in 1 quart (4 cups) of water   Clean and disinfect even if you think no one living in or coming to your home is infected with the virus  You can wipe items with a disinfecting cloth before you bring them into your home  Wash your hands after you handle anything you bring into your home  · Make your immune system as healthy as possible  A weakened immune system makes you more vulnerable to the new coronavirus  No COVID-19 vaccine is available yet  Vaccines such as the flu and pneumonia vaccines can help your immune system  Your healthcare provider can tell you which vaccines to get, and when to get them  Keep your immune system as strong as possible  Do not smoke  Eat healthy foods, exercise regularly, and try to manage stress  Go to bed and wake up at the same times each day  Social distancing guidelines:  National and local social distancing rules vary  Rules may change over time as restrictions are lifted  Restrictions may return if an outbreak happens where you live  It is important to know and follow all current social distancing rules in your area  The following are general guidelines:  · Limit trips out of your home  You may be able to have food, medicines, and other supplies delivered  If possible, have delivered items left at your door or other area  Try not to have someone hand you an item  You will be so close to the person that the virus can spread between you  · Do not have close physical contact with anyone who does not live in your home  Do not shake hands with, hug, or kiss a person as a greeting  Stand or walk as far from others as possible  If you must use public transportation (such as a bus or subway), try to sit or stand away from others  You can stay safely connected with others through phone calls, e-mail messages, social media websites, and video chats  Check in on anyone who may be having a hard time socially distancing, or who lives alone  Ask administrators at nursing homes or long-term care facilities how you can safely communicate with someone living there      · Wear a cloth face covering around others who do not live in your home  Face coverings help prevent the virus from spreading to others in droplets  You can use a clear face covering if someone needs to read your lips  This is a cloth covering that has plastic over the mouth area so your lips can be seen  Do not use coverings that have breathing valves or vents  The virus can travel out of the valve or vent and be spread to others  Do not take your covering off to talk, cough, or sneeze  Do not use coverings on children younger than 2 years or on anyone who has breathing problems or cannot remove it  · Only allow medical or other necessary professionals into your home  Wear your face covering, and remind professionals to wear a face covering  Remind them to wash their hands when they arrive and before they leave  Do not  let anyone who does not live in your home in, even if the person is not sick  A person can pass the virus to others before symptoms of COVID-19 begin  Some people never even develop symptoms  Children commonly have mild symptoms or no symptoms  It may be hard to tell a child not to hug or kiss you  Explain that this is how he or she can help you stay healthy  · Do not go to someone else's home unless it is necessary  Do not go over to visit, even if the person is lonely  Only go if you need to help him or her  Make sure you both wear face coverings while you are there  · Avoid large gatherings and crowds  Gatherings or crowds of 10 or more individuals can cause the virus to spread  Examples of gatherings include parties, sporting events, [employer] services, and conferences  Crowds may form at beaches, [place], and tourist attractions  Protect yourself by staying away from large gatherings and crowds  · Ask your healthcare provider for other ways to have appointments  You may be able to have appointments without having to go into the provider's office  Some providers offer phone, video, or other types of appointments  You may also be able to get prescriptions for a few months of your medicines at a time  · Stay safe if you must go out to work  You may have a job that can only be done outside your home  Keep physical distance between you and other workers as much as possible  Follow your employer's rules so everyone stays safe  If you have COVID-19 and are recovering at home:  Healthcare providers will give you specific instructions to follow  The following are general guidelines to remind you how to keep others safe until you are well:  · Wash your hands often  Use soap and water as much as possible  You can use hand  that contains alcohol if soap and water are not available  Do not share towels with anyone  If you use paper towels, throw them away in a lined trash can kept in your room or area  Use a covered trash can, if possible  · Do not go out of your home unless it is necessary  You may have to go to your healthcare provider's office for check-ups or to get prescription refills  Do not arrive at the provider's office without an appointment  Providers have to make their offices safe for staff and other patients  · Do not have close physical contact with anyone unless it is necessary  Only have close physical contact with a person giving direct care, or a baby or child you must care for  Family members and friends should not visit you  If possible, stay in a separate area or room of your home if you live with others  No one should go into the area or room except to give you care  You can visit with others by phone, video chat, e-mail, or similar systems  It is important to stay connected with others in your life while you recover  · Wear a face covering while others are near you  This can help prevent droplets from spreading the virus when you talk, sneeze, or cough  Put the covering on before anyone comes into your room or area   Remind the person to cover his or her nose and mouth before going in to provide care for you  · Do not share items  Do not share dishes, towels, or other items with anyone  Items need to be washed after you use them  · Protect your baby  Wash your hands with soap and water often throughout the day  Wear a clean face covering while you breastfeed, or while you express or pump breast milk  If possible, ask someone who is well to care for your baby  You can put breast milk in bottles for the person to use, if needed  Talk to your healthcare provider if you have any questions or concerns about caring for or bonding with your baby  He or she will tell you when to bring your baby in for check-ups and vaccines  He or she will also tell you what to do if you think your baby was infected with the new virus  · Do not handle live animals  Until more is known, it is best not to touch, play with, or handle live animals  Some animals, including pets, have been infected with the new coronavirus  Do not handle or care for animals until you are well  Care includes feeding, petting, and cuddling your pet  Do not let your pet lick you or share your food  Ask someone who is not infected to take care of your pet, if possible  If you must care for a pet, wear a face covering  Wash your hands before and after you give care  · Follow directions from your healthcare provider for being around others after you recover  You will need to wait at least 10 days after symptoms first appeared  Then you will need to have no fever for 24 hours without fever medicine, and no other symptoms  A loss of taste or smell may continue for several months  It is considered okay to be around others if this is your only symptom  It is not known for sure if or for how long a recovered person can pass the virus to others  Your provider may give you instructions, such as continuing social distancing or wearing a face covering around others      How to take care of someone who has COVID-19:  If the person lives in another home, arrange for a time to give care  Remember to bring a few pairs of disposable gloves and a cloth face covering  The following are general guidelines to help you safely care for anyone who has COVID-19:  · Wash your hands often  Wash before and after you go into the person's home, area, or room  Throw paper towels away in a lined trash can that has a lid, if possible  · Do not allow others to go near the person  No one should come into the person's home unless it is necessary  If possible, the person should be in a separate area or room if he or she lives with others  Keep the room's door shut unless you need to go in or out  Have others call, video chat, or e-mail the person if he or she is feeling well enough  The person may feel lonely if he or she is kept separate for a long period of time  Safe communication can help him or her stay connected to family and friends  · Make sure the person's room has good air flow  You may be able to open the window if the weather allows  An air conditioner can also be turned on to help air move  · Contact the person before you go in to give care  Make sure the person is wearing a face covering  Remind him or her to wash his or her hands with soap and water  He or she can use hand  that contains alcohol if soap and water are not available  Put on a face covering before you go in to give care  · Wear gloves while you give care and clean  Clean items the person uses often  Clean countertops, cooking surfaces, and the fronts and insides of the microwave and refrigerator  Clean the shower, toilet, the area around the toilet, the sink, the area around the sink, and faucets  Gather used laundry or bedding  Wash and dry items on the warmest settings the fabric allows  Wash dishes and silverware in hot, soapy water or in a   · Anything you throw away needs to go into a lined trash can    When you need to empty the trash, close the open end of the lining and tie it closed  This helps prevent items the virus is on from spilling out of the trash  Remove your gloves and throw them away  Wash your hands  Follow up with your doctor as directed:  Write down your questions so you remember to ask them during your visits  For more information:   · Centers for Disease Control and Prevention  [address] , [address]  Phone: 3- 794 - 039-6231  Web Address: [employer] com br    © Copyright [address] [place] [address] Drive Information is for End User's use only and may not be sold, redistributed or otherwise used for commercial purposes  All illustrations and images included in CareNotes® are the copyrighted property of [employer] D A M [employer]  or [facility] Derian Pineda   The above information is an  only  It is not intended as medical advice for individual conditions or treatments  Talk to your doctor, nurse or pharmacist before following any medical regimen to see if it is safe and effective for you

## 2021-02-17 ENCOUNTER — CONSULT (OUTPATIENT)
Dept: INTERNAL MEDICINE CLINIC | Facility: CLINIC | Age: 66
End: 2021-02-17
Payer: COMMERCIAL

## 2021-02-17 VITALS
HEIGHT: 63 IN | TEMPERATURE: 98.6 F | RESPIRATION RATE: 18 BRPM | HEART RATE: 76 BPM | SYSTOLIC BLOOD PRESSURE: 138 MMHG | DIASTOLIC BLOOD PRESSURE: 70 MMHG | OXYGEN SATURATION: 94 % | BODY MASS INDEX: 25.45 KG/M2 | WEIGHT: 143.6 LBS

## 2021-02-17 DIAGNOSIS — N81.4 UTERINE PROLAPSE: ICD-10-CM

## 2021-02-17 DIAGNOSIS — Z01.818 VISIT FOR PRE-OPERATIVE EXAMINATION: Primary | ICD-10-CM

## 2021-02-17 DIAGNOSIS — Z72.0 TOBACCO USE: ICD-10-CM

## 2021-02-17 PROCEDURE — 99214 OFFICE O/P EST MOD 30 MIN: CPT | Performed by: INTERNAL MEDICINE

## 2021-02-17 NOTE — PROGRESS NOTES
Assessment/Plan:  Problem List Items Addressed This Visit        Genitourinary    Uterine prolapse       Other    Tobacco abuse      Other Visit Diagnoses     Visit for pre-operative examination    -  Primary           Diagnoses and all orders for this visit:    Visit for pre-operative examination    Uterine prolapse    Tobacco use        No problem-specific Assessment & Plan notes found for this encounter  A/P: PAT tests c/o labs and were acceptable    Pt and co-morbidities are stable  Pt is a Navarro's Class I and carries a cardiac risk of about 1%  Recommend holding any ASA, NSAID's, omega 3, and MVT one week prior to the procedure  On call to OR, may take her SNRI, buspar, DNRI, and klonopin with a sip of water if needed  May resume the rest of her meds once eating  Make anesthesia aware of her dental plates  Aggressive pulmonary secretion control due to the smoking  Aggressive DVT prophylaxis if to be immobile  No other recs at this time  Thanks and good luck  Subjective:      Patient ID: Stella Coker is a 63 y o  female  WF presents at the request of [facility] urogyn for pre-op eval for upcoming hysterectomy for uterine prolapse  tentatively scheduled for 2/19/21  Since last visit, doing well and no recent illnesses  Remains active w/o difficulty and no falls  No travel history  Denies depression  No recent illnesses  No fever, chills, or sweats  No unexplained wt changes  Denies CP, SOB, or palpitations  No edema  No orthopnea or PND  No sz or syncope  No changes in bowel or bladder habits  PMH includes Hyperlipidemia, ANAND, DJD, and GERD  Past sx include breast bx, tubal, bladder sx, and endometrial ablation and reports no problems with prior procedures or anesthesia  Admits to daily smoking and denies ETOH use  No history of DVT or PE  NO history of bleeding issues and is on NSAID's, but not on anti-coagulants  Admits to dental plates  Denies C spine issues   No objections to getting blood products if deemed necessary  Had PAT testing done  The following portions of the patient's history were reviewed and updated as appropriate:   She has a past medical history of Abnormal finding in urine, Arthralgia of multiple joints, Biceps tendinitis, right, Bursitis of right knee, Bursitis of right shoulder, Chondrocalcinosis, COPD exacerbation ([address] ), 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, Drug intolerance, Dysfunction of right eustachian tube, Dysfunctional uterine bleeding, Edema, Elevated d-dimer, Exposure to hepatitis C, Fracture of fibula, Generalized anxiety disorder, GERD (gastroesophageal reflux disease), Mild cervical dysplasia, Myalgia, Myositis, Palpitations, Pre-syncope, Seasonal allergies, Thrombocytopenia ([address] ), Urinary tract infection, and Uterine leiomyoma  ,  does not have any pertinent problems on file  ,   has a past surgical history that includes Bladder surgery; Breast biopsy (Right); Colposcopy (01/06/1998); Dental surgery; Endometrial biopsy (05/21/1993); Hysteroscopy; Tubal ligation; US guided breast biopsy right complete (Right, 8/8/2018); and Hysterectomy  ,  family history includes Aortic aneurysm in her family; Arrhythmia in her mother; Arthritis in her family; Coronary artery disease in her father and mother; Diabetes in her family and father; Heart disease in her family; Stroke in her mother  ,   reports that she has been smoking  She has never used smokeless tobacco  She reports that she does not drink alcohol or use drugs  ,  has No Known Allergies     Current Outpatient Medications   Medication Sig Dispense Refill    atorvastatin (LIPITOR) 20 mg tablet take 1 tablet by mouth once daily 90 tablet 3    buPROPion (WELLBUTRIN SR) 150 mg 12 hr tablet Take 150 mg by mouth daily      busPIRone (BUSPAR) 30 MG tablet TAKE 1 TABLET TID      clonazePAM (KlonoPIN) 1 mg tablet TAKE 1 TABLET TID      omeprazole (PriLOSEC) 40 MG capsule take 1 capsule by mouth once daily 30 capsule 5    tretinoin (RETIN-A) 0 1 % cream Apply topically daily at bedtime 45 g 5    venlafaxine (EFFEXOR-XR) 150 mg 24 hr capsule Take 1 capsule (150 mg total) by mouth daily 30 capsule 0    venlafaxine (EFFEXOR-XR) 75 mg 24 hr capsule Take 1 capsule (75 mg total) by mouth daily 90 capsule 1     No current facility-administered medications for this visit  Review of Systems   Constitutional: Negative for activity change, chills, diaphoresis, fatigue and fever  HENT: Negative  Eyes: Negative for visual disturbance  Respiratory: Negative for cough, chest tightness, shortness of breath and wheezing  Cardiovascular: Negative for chest pain, palpitations and leg swelling  Gastrointestinal: Negative for abdominal pain, constipation, diarrhea, nausea and vomiting  Endocrine: Negative for cold intolerance and heat intolerance  Genitourinary: Negative for difficulty urinating, dysuria and frequency  Musculoskeletal: Negative for arthralgias, gait problem and myalgias  Neurological: Negative for dizziness, seizures, syncope, weakness, light-headedness and headaches  Psychiatric/Behavioral: Negative for confusion, dysphoric mood and sleep disturbance  The patient is not nervous/anxious  PHQ-9 Depression Screening    PHQ-9:   Frequency of the following problems over the past two weeks:            Objective:  Vitals:    02/17/21 0820   BP: 138/70   BP Location: Right arm   Patient Position: Sitting   Cuff Size: Adult   Pulse: 76   Resp: 18   Temp: 98 6 °F (37 °C)   TempSrc: Temporal   SpO2: 94%   Weight: 65 1 kg (143 lb 9 6 oz)   Height: 5' 3" (1 6 m)     Body mass index is 25 44 kg/m²  Physical Exam  Vitals signs and nursing note reviewed  Constitutional:       General: She is not in acute distress  Appearance: Normal appearance  She is well-developed  She is not ill-appearing  HENT:      Head: Normocephalic and atraumatic  Comments: No oropharyngeal obstruction  Mouth/Throat:      Mouth: Mucous membranes are moist    Eyes:      Extraocular Movements: Extraocular movements intact  Conjunctiva/sclera: Conjunctivae normal       Pupils: Pupils are equal, round, and reactive to light  Neck:      Musculoskeletal: Normal range of motion and neck supple  No neck rigidity or muscular tenderness  Vascular: No carotid bruit  Comments: No C spine restriction  Cardiovascular:      Rate and Rhythm: Normal rate and regular rhythm  Heart sounds: Normal heart sounds  No murmur  Pulmonary:      Effort: Pulmonary effort is normal  No respiratory distress  Breath sounds: Normal breath sounds  No wheezing or rales  Abdominal:      General: Bowel sounds are normal  There is no distension  Palpations: Abdomen is soft  Tenderness: There is no abdominal tenderness  Musculoskeletal: Normal range of motion  General: No tenderness or deformity  Right lower leg: No edema  Left lower leg: No edema  Lymphadenopathy:      Cervical: No cervical adenopathy  Neurological:      General: No focal deficit present  Mental Status: She is alert and oriented to person, place, and time  Mental status is at baseline  Psychiatric:         Mood and Affect: Mood normal          Behavior: Behavior normal          Thought Content:  Thought content normal          Judgment: Judgment normal

## 2021-02-17 NOTE — PATIENT INSTRUCTIONS
Cigarette Smoking and Your Health   AMBULATORY CARE:   Risks to your health if you smoke:  Nicotine and other chemicals found in tobacco and e-cigarettes can damage every cell in your body  Even if you are a light smoker, you have an increased risk for cancer, heart disease, and lung disease  If you are pregnant or have diabetes, smoking increases your risk for complications  Nicotine can affect an adolescent's developing brain  This can lead to trouble thinking, learning, or paying attention  Benefits to your health if you stop smoking:   · You decrease respiratory symptoms such as coughing, wheezing, and shortness of breath  · You reduce your risk for cancers of the lung, mouth, throat, kidney, bladder, pancreas, stomach, and cervix  If you already have cancer, you increase the benefits of chemotherapy  You also reduce your risk for cancer returning or a second cancer from developing  · You reduce your risk for heart disease, blood clots, heart attack, and stroke  · You reduce your risk for lung infections, and diseases such as pneumonia, asthma, chronic bronchitis, and emphysema  · Your circulation improves  More oxygen can be delivered to your body  If you have diabetes, you lower your risk for complications, such as kidney, artery, and eye diseases  You also lower your risk for nerve damage  Nerve damage can lead to amputations, poor vision, and blindness  · You improve your body's ability to heal and to fight infections  · An adolescent can help his or her brain and body develop in a healthy way  Talk to your adolescent about all the health risks of nicotine  If you can, start talking about nicotine when your child is younger than 12 years  This may make it easier for him or her not to start using nicotine as a teenager or adult  Explain to him or her that it is best never to start  It can be hard to try to quit later      Benefits to the health of others if you stop smoking:  Tobacco is harmful to nonsmokers who breathe in your secondhand smoke  The following are ways the health of others around you may improve when you stop smoking:  · You lower the risks for lung cancer and heart disease in nonsmoking adults  · If you are pregnant, you lower the risk for miscarriage, early delivery, low birth weight, and stillbirth  You also lower your baby's risk for SIDS, obesity, developmental delay, and neurobehavioral problems, such as ADHD  · If you have children, you lower their risk for ear infections, colds, pneumonia, bronchitis, and asthma  Follow up with your doctor as directed:  Write down your questions so you remember to ask them during your visits  For more information and support to stop smoking:   · Smokefree  gov  Phone: 4- 226 - 991-4942  Web Address: www [employer]  [address] Information is for End User's use only and may not be sold, redistributed or otherwise used for commercial purposes  All illustrations and images included in CareNotes® are the copyrighted property of [employer]  or [facility] Derian Pineda   The above information is an  only  It is not intended as medical advice for individual conditions or treatments  Talk to your doctor, nurse or pharmacist before following any medical regimen to see if it is safe and effective for you

## 2021-02-23 ENCOUNTER — OFFICE VISIT (OUTPATIENT)
Dept: OBGYN CLINIC | Facility: CLINIC | Age: 66
End: 2021-02-23
Payer: COMMERCIAL

## 2021-02-23 VITALS
BODY MASS INDEX: 25.34 KG/M2 | HEART RATE: 69 BPM | SYSTOLIC BLOOD PRESSURE: 123 MMHG | HEIGHT: 63 IN | DIASTOLIC BLOOD PRESSURE: 79 MMHG | WEIGHT: 143 LBS

## 2021-02-23 DIAGNOSIS — M17.11 PRIMARY OSTEOARTHRITIS OF RIGHT KNEE: Primary | ICD-10-CM

## 2021-02-23 PROCEDURE — 20610 DRAIN/INJ JOINT/BURSA W/O US: CPT | Performed by: PHYSICIAN ASSISTANT

## 2021-02-23 PROCEDURE — 99213 OFFICE O/P EST LOW 20 MIN: CPT | Performed by: PHYSICIAN ASSISTANT

## 2021-02-23 RX ORDER — METHYLPREDNISOLONE ACETATE 40 MG/ML
2 INJECTION, SUSPENSION INTRA-ARTICULAR; INTRALESIONAL; INTRAMUSCULAR; SOFT TISSUE
Status: COMPLETED | OUTPATIENT
Start: 2021-02-23 | End: 2021-02-23

## 2021-02-23 RX ORDER — BUPIVACAINE HYDROCHLORIDE 2.5 MG/ML
4 INJECTION, SOLUTION INFILTRATION; PERINEURAL
Status: COMPLETED | OUTPATIENT
Start: 2021-02-23 | End: 2021-02-23

## 2021-02-23 RX ADMIN — BUPIVACAINE HYDROCHLORIDE 4 ML: 2.5 INJECTION, SOLUTION INFILTRATION; PERINEURAL at 09:19

## 2021-02-23 RX ADMIN — METHYLPREDNISOLONE ACETATE 2 ML: 40 INJECTION, SUSPENSION INTRA-ARTICULAR; INTRALESIONAL; INTRAMUSCULAR; SOFT TISSUE at 09:19

## 2021-02-23 NOTE — PROGRESS NOTES
ASSESSMENT/PLAN:    Diagnoses and all orders for this visit:    Primary osteoarthritis of right knee    Other orders  -     Large joint arthrocentesis         the patient's right knee was injected with Depo-Medrol and Marcaine  She tolerated the injection quite well  She will return back to our office in 3 months for strength and motion check  The patient is acceptable to this plan  No follow-ups on file       _____________________________________________________  CHIEF COMPLAINT:  Chief Complaint   Patient presents with    Right Knee - Follow-up         SUBJECTIVE:  Dolores Romo is a 63 y o  Female with a known history of primary osteoarthritis of her right knee  She presents to our office today for a quarterly corticosteroid injection  She is still complaining of right knee pain  She denies any falls or trauma  She denies any fever or chills  She denies any numbness or tingling       The following portions of the patient's history were reviewed and updated as appropriate: allergies, current medications, past family history, past medical history, past social history, past surgical history and problem list     PAST MEDICAL HISTORY:  Past Medical History:   Diagnosis Date    Abnormal finding in urine     Arthralgia of multiple joints     Biceps tendinitis, right     Bursitis of right knee     Bursitis of right shoulder     Chondrocalcinosis     COPD exacerbation (HCC)     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     Drug intolerance     Dysfunction of right eustachian tube     Dysfunctional uterine bleeding     Edema     Elevated d-dimer     Exposure to hepatitis C     Fracture of fibula     Generalized anxiety disorder     GERD (gastroesophageal reflux disease)     Mild cervical dysplasia     Myalgia     Myositis     Palpitations     Pre-syncope     Seasonal allergies     Thrombocytopenia (HCC)     Urinary tract infection     Uterine leiomyoma        PAST SURGICAL HISTORY:  Past Surgical History:   Procedure Laterality Date    BLADDER SURGERY      BREAST BIOPSY Right     US guided    COLPOSCOPY  01/06/1998    DENTAL SURGERY      ENDOMETRIAL BIOPSY  05/21/1993    HYSTERECTOMY      HYSTEROSCOPY      uterus- 1/22/98, 7/1/02 and 4/10/07    TUBAL LIGATION      US GUIDED BREAST BIOPSY RIGHT COMPLETE Right 8/8/2018       FAMILY HISTORY:  Family History   Problem Relation Age of Onset    Stroke Mother         cerebral artery occlusion with cerebral infarction    Coronary artery disease Mother     Arrhythmia Mother         sinus    Coronary artery disease Father     Diabetes Father     Aortic aneurysm Family     Arthritis Family     Diabetes Family     Heart disease Family        SOCIAL HISTORY:  Social History     Tobacco Use    Smoking status: Current Every Day Smoker    Smokeless tobacco: Never Used   Substance Use Topics    Alcohol use: No    Drug use: No       MEDICATIONS:    Current Outpatient Medications:     atorvastatin (LIPITOR) 20 mg tablet, take 1 tablet by mouth once daily, Disp: 90 tablet, Rfl: 3    buPROPion (WELLBUTRIN SR) 150 mg 12 hr tablet, Take 150 mg by mouth daily, Disp: , Rfl:     busPIRone (BUSPAR) 30 MG tablet, TAKE 1 TABLET TID, Disp: , Rfl:     clonazePAM (KlonoPIN) 1 mg tablet, TAKE 1 TABLET TID, Disp: , Rfl:     omeprazole (PriLOSEC) 40 MG capsule, take 1 capsule by mouth once daily, Disp: 30 capsule, Rfl: 5    tretinoin (RETIN-A) 0 1 % cream, Apply topically daily at bedtime, Disp: 45 g, Rfl: 5    venlafaxine (EFFEXOR-XR) 150 mg 24 hr capsule, Take 1 capsule (150 mg total) by mouth daily, Disp: 30 capsule, Rfl: 0    venlafaxine (EFFEXOR-XR) 75 mg 24 hr capsule, Take 1 capsule (75 mg total) by mouth daily, Disp: 90 capsule, Rfl: 1    ALLERGIES:  No Known Allergies    ROS:  Review of Systems     Constitutional: Negative for fatigue, fever or loss of appetite  HENT: Negative      Respiratory: Negative for shortness of breath, dyspnea  Cardiovascular: Negative for chest pain/tightness  Gastrointestinal: Negative for abdominal pain, N/V  Endocrine: Negative for cold/heat intolerance, unexplained weight loss/gain  Genitourinary: Negative for flank pain, dysuria, hematuria  Musculoskeletal: Positive for arthralgia   Skin: Negative for rash  Neurological: Negative for numbness or tingling  Psychiatric/Behavioral: Negative for agitation  _____________________________________________________  PHYSICAL EXAMINATION:    Blood pressure 123/79, pulse 69, height 5' 3" (1 6 m), weight 64 9 kg (143 lb)  Constitutional: Oriented to person, place, and time  Appears well-developed and well-nourished  No distress  HENT:   Head: Normocephalic  Eyes: Conjunctivae are normal  Right eye exhibits no discharge  Left eye exhibits no discharge  No scleral icterus  Cardiovascular: Normal rate  Pulmonary/Chest: Effort normal    Neurological: Alert and oriented to person, place, and time  Skin: Skin is warm and dry  No rash noted  Not diaphoretic  No erythema  No pallor  Psychiatric: Normal mood and affect  Behavior is normal  Judgment and thought content normal       MUSCULOSKELETAL EXAMINATION:   Physical Exam  Ortho Exam    Right lower extremity is neurovascularly intact  Toes are pink and mobile   Compartments are soft  No warmth, erythema or ecchymosis  ROM of knee is from 5-115 degrees  Negative Lachman, drawer or pivot shift  No medial instability  Medial joint line tenderness, slight lateral joint line tenderness  Patellofemoral crepitation  Objective:  BP Readings from Last 1 Encounters:   02/23/21 123/79      Wt Readings from Last 1 Encounters:   02/23/21 64 9 kg (143 lb)        BMI:   Estimated body mass index is 25 33 kg/m² as calculated from the following:    Height as of this encounter: 5' 3" (1 6 m)  Weight as of this encounter: 64 9 kg (143 lb)        PROCEDURES PERFORMED:  Large joint arthrocentesis: R knee  Universal Protocol:  Consent given by: patient  Time out: Immediately prior to procedure a "time out" was called to verify the correct patient, procedure, equipment, support staff and site/side marked as required    Site marked: the operative site was marked  Supporting Documentation  Indications: pain   Procedure Details  Location: knee - R knee  Preparation: Patient was prepped and draped in the usual sterile fashion  Needle size: 22 G  Ultrasound guidance: no  Approach: lateral  Medications administered: 2 mL methylPREDNISolone acetate 40 mg/mL; 4 mL bupivacaine 0 25 %    Patient tolerance: patient tolerated the procedure well with no immediate complications  Dressing:  Sterile dressing applied            Ambika Gutierrez PA-C

## 2021-03-18 ENCOUNTER — OFFICE VISIT (OUTPATIENT)
Dept: INTERNAL MEDICINE CLINIC | Facility: CLINIC | Age: 66
End: 2021-03-18
Payer: COMMERCIAL

## 2021-03-18 VITALS
HEART RATE: 68 BPM | BODY MASS INDEX: 24.83 KG/M2 | HEIGHT: 63 IN | DIASTOLIC BLOOD PRESSURE: 60 MMHG | WEIGHT: 140.13 LBS | SYSTOLIC BLOOD PRESSURE: 118 MMHG | OXYGEN SATURATION: 97 % | TEMPERATURE: 97.5 F

## 2021-03-18 DIAGNOSIS — E28.39 MENOPAUSE OVARIAN FAILURE: ICD-10-CM

## 2021-03-18 DIAGNOSIS — Z11.4 SCREENING FOR HIV (HUMAN IMMUNODEFICIENCY VIRUS): ICD-10-CM

## 2021-03-18 DIAGNOSIS — Z11.59 NEED FOR HEPATITIS C SCREENING TEST: ICD-10-CM

## 2021-03-18 DIAGNOSIS — Z12.31 ENCOUNTER FOR SCREENING MAMMOGRAM FOR MALIGNANT NEOPLASM OF BREAST: ICD-10-CM

## 2021-03-18 DIAGNOSIS — K21.9 GASTROESOPHAGEAL REFLUX DISEASE WITHOUT ESOPHAGITIS: ICD-10-CM

## 2021-03-18 DIAGNOSIS — G89.4 CHRONIC PAIN SYNDROME: ICD-10-CM

## 2021-03-18 DIAGNOSIS — Z78.0 POSTMENOPAUSAL STATUS (AGE-RELATED) (NATURAL): ICD-10-CM

## 2021-03-18 DIAGNOSIS — E78.2 MIXED HYPERLIPIDEMIA: Primary | ICD-10-CM

## 2021-03-18 DIAGNOSIS — Z72.0 TOBACCO ABUSE: ICD-10-CM

## 2021-03-18 DIAGNOSIS — Z12.11 SCREEN FOR COLON CANCER: ICD-10-CM

## 2021-03-18 DIAGNOSIS — Z12.11 ENCOUNTER FOR SCREENING FOR COLORECTAL MALIGNANT NEOPLASM: ICD-10-CM

## 2021-03-18 DIAGNOSIS — K59.00 CONSTIPATION, UNSPECIFIED CONSTIPATION TYPE: ICD-10-CM

## 2021-03-18 DIAGNOSIS — Z12.12 ENCOUNTER FOR SCREENING FOR COLORECTAL MALIGNANT NEOPLASM: ICD-10-CM

## 2021-03-18 DIAGNOSIS — F41.1 GENERALIZED ANXIETY DISORDER: ICD-10-CM

## 2021-03-18 DIAGNOSIS — Z13.31 NEGATIVE DEPRESSION SCREENING: ICD-10-CM

## 2021-03-18 PROCEDURE — 3008F BODY MASS INDEX DOCD: CPT | Performed by: INTERNAL MEDICINE

## 2021-03-18 PROCEDURE — 99214 OFFICE O/P EST MOD 30 MIN: CPT | Performed by: INTERNAL MEDICINE

## 2021-03-18 PROCEDURE — 4004F PT TOBACCO SCREEN RCVD TLK: CPT | Performed by: INTERNAL MEDICINE

## 2021-03-18 PROCEDURE — 3725F SCREEN DEPRESSION PERFORMED: CPT | Performed by: INTERNAL MEDICINE

## 2021-03-18 NOTE — PROGRESS NOTES
Assessment/Plan:  Problem List Items Addressed This Visit        Digestive    Esophageal reflux       Other    Chronic pain syndrome    Relevant Orders    CBC and differential    Constipation    Generalized anxiety disorder    Relevant Orders    Benzodiazepines Conf (GC/MS)    Toxicology screen, urine    Hyperlipidemia - Primary    Relevant Orders    Comprehensive metabolic panel    Lipid Panel with Direct LDL reflex    TSH, 3rd generation with Free T4 reflex    Tobacco abuse      Other Visit Diagnoses     Encounter for screening mammogram for malignant neoplasm of breast        Relevant Orders    Mammo screening bilateral w 3d & cad    Encounter for screening for colorectal malignant neoplasm        Relevant Orders    Occult Blood, Fecal Immunochemical    Postmenopausal status (age-related) (natural)        Relevant Orders    DXA bone density spine hip and pelvis    Menopause ovarian failure        Screen for colon cancer        Negative depression screening        Need for hepatitis C screening test        Relevant Orders    Hepatitis C antibody    Screening for HIV (human immunodeficiency virus)        Relevant Orders    HIV 1/2 Antigen/Antibody (4th Generation) w Reflex [facility]           Diagnoses and all orders for this visit:    Mixed hyperlipidemia  -     Comprehensive metabolic panel; Future  -     Lipid Panel with Direct LDL reflex; Future  -     TSH, 3rd generation with Free T4 reflex; Future    Encounter for screening mammogram for malignant neoplasm of breast  -     Mammo screening bilateral w 3d & cad; Future    Encounter for screening for colorectal malignant neoplasm  -     Occult Blood, Fecal Immunochemical; Future    Postmenopausal status (age-related) (natural)  -     DXA bone density spine hip and pelvis;  Future    Gastroesophageal reflux disease without esophagitis    Constipation, unspecified constipation type    Generalized anxiety disorder  -     Benzodiazepines Conf (GC/MS)  -     Toxicology screen, urine    Chronic pain syndrome  -     CBC and differential; Future    Tobacco abuse    Menopause ovarian failure    Screen for colon cancer    Negative depression screening    Need for hepatitis C screening test  -     Hepatitis C antibody; Future    Screening for HIV (human immunodeficiency virus)  -     HIV 1/2 Antigen/Antibody (4th Generation) w Reflex [facility]; Future    Other orders  -     Cancel: Cologuard; Future        No problem-specific Assessment & Plan notes found for this encounter  A/P: Doing well and will check labs  Discussed seeing the urinary incontinence expert in PT, but will try [facility]  Order mammo and dex  Order CRC  Wean tobacco  Continue current treatment and RTC six months for routine  Subjective:      Patient ID: Nicole Hendrickson is a 63 y o  female  WF RTC for f/u hyperlipidemia, ANAND, etc   Doing well and no new issues and is s/p sx for uterine prolapse and is still having some incontinence    Remains active w/o difficulty and no falls  ANAND is controlled  Chronic pain is manageable  No reflux  Constipation is good  Still smoking  Due for labs, mammo, dexa, and CRC  The following portions of the patient's history were reviewed and updated as appropriate:   She has a past medical history of Abnormal finding in urine, Arthralgia of multiple joints, Biceps tendinitis, right, Bursitis of right knee, Bursitis of right shoulder, Chondrocalcinosis, COPD exacerbation ([address] ), 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, Drug intolerance, Dysfunction of right eustachian tube, Dysfunctional uterine bleeding, Edema, Elevated d-dimer, Exposure to hepatitis C, Fracture of fibula, Generalized anxiety disorder, GERD (gastroesophageal reflux disease), Mild cervical dysplasia, Myalgia, Myositis, Palpitations, Pre-syncope, Seasonal allergies, Thrombocytopenia ([address] ), Urinary tract infection, and Uterine leiomyoma  ,  does not have any pertinent problems on file  ,   has a past surgical history that includes Bladder surgery; Breast biopsy (Right); Colposcopy (01/06/1998); Dental surgery; Endometrial biopsy (05/21/1993); Hysteroscopy; Tubal ligation; US guided breast biopsy right complete (Right, 8/8/2018); and Hysterectomy  ,  family history includes Aortic aneurysm in her family; Arrhythmia in her mother; Arthritis in her family; Coronary artery disease in her father and mother; Diabetes in her family and father; Heart disease in her family; Stroke in her mother  ,   reports that she has been smoking  She has never used smokeless tobacco  She reports that she does not drink alcohol or use drugs  ,  has No Known Allergies     Current Outpatient Medications   Medication Sig Dispense Refill    atorvastatin (LIPITOR) 20 mg tablet take 1 tablet by mouth once daily 90 tablet 3    buPROPion (WELLBUTRIN SR) 150 mg 12 hr tablet Take 150 mg by mouth daily      busPIRone (BUSPAR) 30 MG tablet TAKE 1 TABLET TID      clonazePAM (KlonoPIN) 1 mg tablet TAKE 1 TABLET TID      omeprazole (PriLOSEC) 40 MG capsule take 1 capsule by mouth once daily 30 capsule 5    tretinoin (RETIN-A) 0 1 % cream Apply topically daily at bedtime 45 g 5    venlafaxine (EFFEXOR-XR) 150 mg 24 hr capsule Take 1 capsule (150 mg total) by mouth daily 30 capsule 0    venlafaxine (EFFEXOR-XR) 75 mg 24 hr capsule Take 1 capsule (75 mg total) by mouth daily 90 capsule 1     No current facility-administered medications for this visit  Review of Systems   Constitutional: Negative for activity change, chills, diaphoresis, fatigue and fever  HENT: Negative  Eyes: Negative for visual disturbance  Respiratory: Negative for cough, chest tightness, shortness of breath and wheezing  Cardiovascular: Negative for chest pain, palpitations and leg swelling  Gastrointestinal: Negative for abdominal pain, constipation, diarrhea, nausea and vomiting  Endocrine: Negative for cold intolerance and heat intolerance  Genitourinary: Negative for difficulty urinating, dysuria and frequency  Urinary incontinence   Musculoskeletal: Positive for back pain  Negative for arthralgias, gait problem and myalgias  Neurological: Negative for dizziness, seizures, syncope, weakness, light-headedness and headaches  Psychiatric/Behavioral: Negative for confusion, dysphoric mood, sleep disturbance and suicidal ideas  The patient is nervous/anxious  PHQ-9 Depression Screening    PHQ-9:   Frequency of the following problems over the past two weeks:      Little interest or pleasure in doing things: 0 - not at all  Feeling down, depressed, or hopeless: 1 - several days  PHQ-2 Score: 1        Objective:  Vitals:    03/18/21 0926   BP: 118/60   Pulse: 68   Temp: 97 5 °F (36 4 °C)   SpO2: 97%   Weight: 63 6 kg (140 lb 2 oz)   Height: 5' 3" (1 6 m)     Body mass index is 24 82 kg/m²  Physical Exam  Vitals signs and nursing note reviewed  Constitutional:       General: She is not in acute distress  Appearance: Normal appearance  She is not ill-appearing  HENT:      Head: Normocephalic and atraumatic  Mouth/Throat:      Mouth: Mucous membranes are moist    Eyes:      Extraocular Movements: Extraocular movements intact  Conjunctiva/sclera: Conjunctivae normal       Pupils: Pupils are equal, round, and reactive to light  Neck:      Musculoskeletal: Neck supple  Vascular: No carotid bruit  Cardiovascular:      Rate and Rhythm: Normal rate and regular rhythm  Heart sounds: Normal heart sounds  Pulmonary:      Effort: Pulmonary effort is normal  No respiratory distress  Breath sounds: Normal breath sounds  No wheezing or rales  Abdominal:      General: Bowel sounds are normal  There is no distension  Palpations: Abdomen is soft  Tenderness: There is no abdominal tenderness  Musculoskeletal:      Right lower leg: No edema  Left lower leg: No edema     Neurological:      General: No focal deficit present  Mental Status: She is alert and oriented to person, place, and time  Mental status is at baseline  Psychiatric:         Mood and Affect: Mood normal          Behavior: Behavior normal          Thought Content:  Thought content normal          Judgment: Judgment normal

## 2021-03-18 NOTE — PATIENT INSTRUCTIONS
Hyperlipidemia   WHAT YOU NEED TO KNOW:   What is hyperlipidemia? Hyperlipidemia is a high level of lipids (fats) in your blood  These lipids include cholesterol or triglycerides  Lipids are made by your body  They also come from the foods you eat  Your body needs lipids to work properly, but high levels increase your risk for heart disease, heart attack, and stroke  What increases my risk for hyperlipidemia? · Family history of high lipid levels    · Diet high in saturated fats, cholesterol, or calories     · High alcohol intake or smoking    · Lack of regular physical activity    · Medical conditions such as hypothyroidism, obesity, or type 2 diabetes    · Certain medicines, such as blood pressure medicines, hormones, and steroids    How is hyperlipidemia managed and treated? Your healthcare provider may first recommend that you make lifestyle changes to help decrease your lipid levels  You may also need to take medicine to lower your lipid levels  Some of the lifestyle changes you may need to make include the following:  · Maintain a healthy weight  Ask your healthcare provider how much you should weigh  Ask him or her to help you create a weight loss plan if you are overweight  Weight loss can decrease your cholesterol and triglyceride levels  · Exercise as directed  Exercise lowers your cholesterol levels and helps you maintain a healthy weight  Get 30 minutes or more of aerobic exercise 4 to 6 days each week  You can split your exercise into four 10-minute workouts instead of 30 minutes at one time  Examples of aerobic exercises include walking briskly, swimming, or riding a bike  Work with your healthcare provider to plan the best exercise program for you  · Do not smoke  Nicotine and other chemicals in cigarettes and cigars can increase your risk for a heart attack and stroke  Ask your healthcare provider for information if you currently smoke and need help to quit   E-cigarettes or smokeless tobacco still contain nicotine  Talk to your healthcare provider before you use these products  · Eat heart-healthy foods  Talk to your dietitian about a heart-healthy diet  The following will help you manage hyperlipidemia:     ? Decrease the total amount of fat you eat  Choose lean meats, fat-free or 1% fat milk, and low-fat dairy products, such as yogurt and cheese  Limit or do not eat red meat  Red meats are high in fat and cholesterol  ? Replace unhealthy fats with healthy fats  Unhealthy fats include saturated fat, trans fat, and cholesterol  Choose soft margarines that are low in saturated fat and have little or no trans fat  Monounsaturated fats are healthy fats  These are found in olive oil, canola oil, avocado, and nuts  Polyunsaturated fats are also healthy  These are found in fish, flaxseed, walnuts, and soybeans  ? Eat 5 or more servings of fruits and vegetables every day  They are low in calories and fat, and a good source of essential vitamins  Include dark green, red, and orange vegetables  Examples include spinach, kale, broccoli, and carrots  ? Eat foods high in fiber  Fiber can help lower your cholesterol levels  Choose whole grain, high-fiber foods  Good choices include whole-wheat breads or cereals, beans, peas, fruits, and vegetables  · Ask your healthcare provider if it is safe for you to drink alcohol  Alcohol can increase your cholesterol and triglyceride levels  A drink of alcohol is 12 ounces of beer, 5 ounces of wine, or 1½ ounces of liquor  Call 911 for any of the following:   · You have any of the following signs of a heart attack:      ? Squeezing, pressure, or pain in your chest    ? You may  also have any of the following:     ? Discomfort or pain in your back, neck, jaw, stomach, or arm    ? Shortness of breath    ? Nausea or vomiting    ?  Lightheadedness or a sudden cold sweat    · You have any of the following signs of a stroke:      ? Numbness or drooping on one side of your face     ? Weakness in an arm or leg    ? Confusion or difficulty speaking    ? Dizziness, a severe headache, or vision loss    When should I contact my healthcare provider? · You have questions or concerns about your condition or care  CARE AGREEMENT:   You have the right to help plan your care  Learn about your health condition and how it may be treated  Discuss treatment options with your healthcare providers to decide what care you want to receive  You always have the right to refuse treatment  The above information is an  only  It is not intended as medical advice for individual conditions or treatments  Talk to your doctor, nurse or pharmacist before following any medical regimen to see if it is safe and effective for you  © Copyright [address] Information is for End User's use only and may not be sold, redistributed or otherwise used for commercial purposes   All illustrations and images included in CareNotes® are the copyrighted property of [employer]  or [address] [place] [address]

## 2021-03-24 DIAGNOSIS — E78.2 MIXED HYPERLIPIDEMIA: ICD-10-CM

## 2021-03-24 RX ORDER — ATORVASTATIN CALCIUM 20 MG/1
TABLET, FILM COATED ORAL
Qty: 90 TABLET | Refills: 3 | Status: SHIPPED | OUTPATIENT
Start: 2021-03-24 | End: 2022-03-16

## 2021-04-02 DIAGNOSIS — K21.9 GASTROESOPHAGEAL REFLUX DISEASE WITHOUT ESOPHAGITIS: ICD-10-CM

## 2021-04-02 RX ORDER — OMEPRAZOLE 40 MG/1
CAPSULE, DELAYED RELEASE ORAL
Qty: 30 CAPSULE | Refills: 5 | Status: SHIPPED | OUTPATIENT
Start: 2021-04-02 | End: 2021-04-29 | Stop reason: SDUPTHER

## 2021-04-08 ENCOUNTER — TELEPHONE (OUTPATIENT)
Dept: OBGYN CLINIC | Facility: HOSPITAL | Age: 66
End: 2021-04-08

## 2021-04-08 NOTE — TELEPHONE ENCOUNTER
She can have another knee injection in approximately 2 weeks    If she wants pain medicine, she will have to get this from her family physician

## 2021-04-08 NOTE — TELEPHONE ENCOUNTER
Patient fell about 2 weeks ago and landed on her right side  She's having trouble walking  She saw Dr Ayah Pryor on 2/23 where she got an injection in her right knee  She's in a lot of pain and wants to know if she can get another injection  If it's covered only every 3 months, she's asking for either a prescription for pain in the meantime  But she'd rather get another shot      Callback [phone]

## 2021-04-09 ENCOUNTER — TELEPHONE (OUTPATIENT)
Dept: INTERNAL MEDICINE CLINIC | Facility: CLINIC | Age: 66
End: 2021-04-09

## 2021-04-09 DIAGNOSIS — M25.569 ACUTE KNEE PAIN, UNSPECIFIED LATERALITY: Primary | ICD-10-CM

## 2021-04-09 RX ORDER — IBUPROFEN 600 MG/1
600 TABLET ORAL EVERY 6 HOURS PRN
Qty: 90 TABLET | Refills: 1 | Status: SHIPPED | OUTPATIENT
Start: 2021-04-09 | End: 2022-04-19 | Stop reason: SDUPTHER

## 2021-04-29 ENCOUNTER — TELEPHONE (OUTPATIENT)
Dept: INTERNAL MEDICINE CLINIC | Facility: CLINIC | Age: 66
End: 2021-04-29

## 2021-04-29 DIAGNOSIS — F41.9 ANXIETY: ICD-10-CM

## 2021-04-29 DIAGNOSIS — K21.9 GASTROESOPHAGEAL REFLUX DISEASE WITHOUT ESOPHAGITIS: ICD-10-CM

## 2021-04-29 RX ORDER — VENLAFAXINE HYDROCHLORIDE 150 MG/1
150 CAPSULE, EXTENDED RELEASE ORAL DAILY
Qty: 30 CAPSULE | Refills: 0 | Status: SHIPPED | OUTPATIENT
Start: 2021-04-29 | End: 2021-05-24

## 2021-04-29 RX ORDER — OMEPRAZOLE 40 MG/1
40 CAPSULE, DELAYED RELEASE ORAL DAILY
Qty: 30 CAPSULE | Refills: 5 | Status: SHIPPED | OUTPATIENT
Start: 2021-04-29 | End: 2021-10-16

## 2021-04-29 RX ORDER — VENLAFAXINE HYDROCHLORIDE 75 MG/1
75 CAPSULE, EXTENDED RELEASE ORAL DAILY
Qty: 90 CAPSULE | Refills: 1 | Status: SHIPPED | OUTPATIENT
Start: 2021-04-29 | End: 2021-12-10 | Stop reason: SDUPTHER

## 2021-05-07 ENCOUNTER — OFFICE VISIT (OUTPATIENT)
Dept: URGENT CARE | Facility: CLINIC | Age: 66
End: 2021-05-07
Payer: COMMERCIAL

## 2021-05-07 VITALS
WEIGHT: 142 LBS | HEIGHT: 65 IN | DIASTOLIC BLOOD PRESSURE: 67 MMHG | BODY MASS INDEX: 23.66 KG/M2 | OXYGEN SATURATION: 97 % | RESPIRATION RATE: 20 BRPM | SYSTOLIC BLOOD PRESSURE: 114 MMHG | TEMPERATURE: 97.6 F | HEART RATE: 75 BPM

## 2021-05-07 DIAGNOSIS — S61.313A LACERATION OF LEFT MIDDLE FINGER WITH DAMAGE TO NAIL, FOREIGN BODY PRESENCE UNSPECIFIED, INITIAL ENCOUNTER: Primary | ICD-10-CM

## 2021-05-07 PROCEDURE — 12001 RPR S/N/AX/GEN/TRNK 2.5CM/<: CPT | Performed by: PHYSICIAN ASSISTANT

## 2021-05-07 PROCEDURE — 99213 OFFICE O/P EST LOW 20 MIN: CPT | Performed by: PHYSICIAN ASSISTANT

## 2021-05-07 RX ORDER — CEPHALEXIN 500 MG/1
500 CAPSULE ORAL EVERY 8 HOURS SCHEDULED
Qty: 21 CAPSULE | Refills: 0 | Status: SHIPPED | OUTPATIENT
Start: 2021-05-07 | End: 2021-05-14

## 2021-05-07 NOTE — PATIENT INSTRUCTIONS
Keflex as prescribed  Return for suture removal in 10-14 days  Keep wound covered for 24 hours then change bandage 2 times per day  Keep clean, dry and apply topical antibiotic ointment  Tylenol or Ibuprofen for pain as needed  Have wound checked in 1-2 days  Watch for signs of infection  Follow up with PCP in 3-5 days  Go to ED if symptoms worsen    Discussed risk of immobilization with patient  Remove splint/brace and go straight to ER if you experience sudden increase in pain, swelling, change in color/temperature/sensation  Patient verbalized understanding  Laceration   WHAT YOU NEED TO KNOW:   A laceration is an injury to the skin and the soft tissue underneath it  Lacerations can happen anywhere on the body  DISCHARGE INSTRUCTIONS:   Return to the emergency department if:   · You have heavy bleeding or bleeding that does not stop after 10 minutes of holding firm, direct pressure over the wound  · Your wound opens up  Call your doctor if:   · You have a fever or chills  · Your laceration is red, warm, or swollen  · You have red streaks on your skin coming from your wound  · You have white or yellow drainage from the wound that smells bad  · You have pain that gets worse, even after treatment  · You have questions or concerns about your condition or care  Medicines: You may need any of the following:  · Prescription pain medicine  may be given  Ask your healthcare provider how to take this medicine safely  Some prescription pain medicines contain acetaminophen  Do not take other medicines that contain acetaminophen without talking to your healthcare provider  Too much acetaminophen may cause liver damage  Prescription pain medicine may cause constipation  Ask your healthcare provider how to prevent or treat constipation  · Antibiotics  help treat or prevent a bacterial infection  · Take your medicine as directed    Contact your healthcare provider if you think your medicine is not helping or if you have side effects  Tell him or her if you are allergic to any medicine  Keep a list of the medicines, vitamins, and herbs you take  Include the amounts, and when and why you take them  Bring the list or the pill bottles to follow-up visits  Carry your medicine list with you in case of an emergency  Care for your wound as directed:   · Do not get your wound wet  until your healthcare provider says it is okay  Do not soak your wound in water  Do not go swimming until your healthcare provider says it is okay  Carefully wash the wound with soap and water  Gently pat the area dry or allow it to air dry  · Change your bandages  when they get wet, dirty, or after washing  Apply new, clean bandages as directed  Do not apply elastic bandages or tape too tight  Do not put powders or lotions over your incision  · Apply antibiotic ointment as directed  Your healthcare provider may give you antibiotic ointment to put over your wound if you have stitches  If you have strips of tape over your incision, let them dry up and fall off on their own  If they do not fall off within 14 days, gently remove them  If you have glue over your wound, do not remove or pick at it  If your glue comes off, do not replace it with glue that you have at home  · Check your wound every day for signs of infection, such as swelling, redness, or pus  Self-care:   · Apply ice  on your wound for 15 to 20 minutes every hour or as directed  Use an ice pack, or put crushed ice in a plastic bag  Cover it with a towel  Ice helps prevent tissue damage and decreases swelling and pain  · Use a splint as directed  A splint will decrease movement and stress on your wound  It may help it heal faster  A splint may be used for lacerations over joints or areas of your body that bend  Ask your healthcare provider how to apply and remove a splint  · Decrease scarring of your wound  by applying ointments as directed   Do not apply ointments until your healthcare provider says it is okay  You may need to wait until your wound is healed  Ask which ointment to buy and how often to use it  After your wound is healed, use sunscreen over the area when you are out in the sun  You should do this for at least 6 months to 1 year after your injury  Follow up with your doctor as directed: You may need to follow up in 24 to 48 hours to have your wound checked for infection  You will need to return in 3 to 14 days if you have stitches or staples so they can be removed  Care for your wound as directed to prevent infection and help it heal  Write down your questions so you remember to ask them during your visits  © Copyright [address] Information is for End User's use only and may not be sold, redistributed or otherwise used for commercial purposes  All illustrations and images included in CareNotes® are the copyrighted property of [employer]  or [facility] Derian Pineda   The above information is an  only  It is not intended as medical advice for individual conditions or treatments  Talk to your doctor, nurse or pharmacist before following any medical regimen to see if it is safe and effective for you

## 2021-05-07 NOTE — PROGRESS NOTES
[email or web address]        NAME: Vamsi Whyte is a 63 y o  female  : 1955    MRN: [id number]  DATE: May 7, 2021  TIME: 5:14 PM    Assessment and Plan   Laceration of left middle finger with damage to nail, foreign body presence unspecified, initial encounter [[id number]]  1  Laceration of left middle finger with damage to nail, foreign body presence unspecified, initial encounter  cephalexin (KEFLEX) 500 mg capsule         Patient Instructions     Keflex as prescribed  Return for suture removal in 10-14 days  Keep wound covered for 24 hours then change bandage 2 times per day  Keep clean, dry and apply topical antibiotic ointment  Tylenol or Ibuprofen for pain as needed  Have wound checked in 1-2 days  Watch for signs of infection  Follow up with PCP in 3-5 days  Go to ED if symptoms worsen    Discussed risk of immobilization with patient  Remove splint/brace and go straight to ER if you experience sudden increase in pain, swelling, change in color/temperature/sensation  Patient verbalized understanding  Chief Complaint     Chief Complaint   Patient presents with    Finger Laceration     R middle finger laceration         History of Present Illness       [place] poked between fingers resulting in laceration  Tetanus UTD  Finger Laceration  This is a new problem  The current episode started today  Pertinent negatives include no numbness or weakness  Treatments tried: washed and came to urgent care  Review of Systems   Review of Systems   Skin: Positive for wound  Neurological: Negative for weakness and numbness           Current Medications       Current Outpatient Medications:     atorvastatin (LIPITOR) 20 mg tablet, take 1 tablet by mouth once daily, Disp: 90 tablet, Rfl: 3    buPROPion (WELLBUTRIN SR) 150 mg 12 hr tablet, Take 150 mg by mouth daily, Disp: , Rfl:     busPIRone (BUSPAR) 30 MG tablet, TAKE 1 TABLET TID, Disp: , Rfl:     clonazePAM (KlonoPIN) 1 mg tablet, TAKE 1 TABLET TID, Disp: , Rfl:     omeprazole (PriLOSEC) 40 MG capsule, Take 1 capsule (40 mg total) by mouth daily, Disp: 30 capsule, Rfl: 5    tretinoin (RETIN-A) 0 1 % cream, Apply topically daily at bedtime, Disp: 45 g, Rfl: 5    venlafaxine (EFFEXOR-XR) 150 mg 24 hr capsule, Take 1 capsule (150 mg total) by mouth daily, Disp: 30 capsule, Rfl: 0    venlafaxine (EFFEXOR-XR) 75 mg 24 hr capsule, Take 1 capsule (75 mg total) by mouth daily, Disp: 90 capsule, Rfl: 1    cephalexin (KEFLEX) 500 mg capsule, Take 1 capsule (500 mg total) by mouth every 8 (eight) hours for 7 days, Disp: 21 capsule, Rfl: 0    ibuprofen (MOTRIN) 600 mg tablet, Take 1 tablet (600 mg total) by mouth every 6 (six) hours as needed for mild pain (Patient not taking: Reported on 5/7/2021), Disp: 90 tablet, Rfl: 1    Current Allergies     Allergies as of 05/07/2021    (No Known Allergies)            The following portions of the patient's history were reviewed and updated as appropriate: allergies, current medications, past family history, past medical history, past social history, past surgical history and problem list      Past Medical History:   Diagnosis Date    Abnormal finding in urine     Arthralgia of multiple joints     Biceps tendinitis, right     Bursitis of right knee     Bursitis of right shoulder     Chondrocalcinosis     COPD exacerbation (HCC)     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     Drug intolerance     Dysfunction of right eustachian tube     Dysfunctional uterine bleeding     Edema     Elevated d-dimer     Exposure to hepatitis C     Fracture of fibula     Generalized anxiety disorder     GERD (gastroesophageal reflux disease)     Mild cervical dysplasia     Myalgia     Myositis     Palpitations     Pre-syncope     Seasonal allergies     Thrombocytopenia (HCC)     Urinary tract infection     Uterine leiomyoma        Past Surgical History:   Procedure Laterality Date    BLADDER SURGERY      BREAST BIOPSY Right     US guided    COLPOSCOPY  01/06/1998    DENTAL SURGERY      ENDOMETRIAL BIOPSY  05/21/1993    HYSTERECTOMY      HYSTEROSCOPY      uterus- 1/22/98, 7/1/02 and 4/10/07    TUBAL LIGATION      US GUIDED BREAST BIOPSY RIGHT COMPLETE Right 8/8/2018       Family History   Problem Relation Age of Onset    Stroke Mother         cerebral artery occlusion with cerebral infarction    Coronary artery disease Mother     Arrhythmia Mother         sinus    Coronary artery disease Father     Diabetes Father     Aortic aneurysm Family     Arthritis Family     Diabetes Family     Heart disease Family          Medications have been verified  Objective   /67   Pulse 75   Temp 97 6 °F (36 4 °C)   Resp 20   Ht 5' 5" (1 651 m)   Wt 64 4 kg (142 lb)   SpO2 97%   BMI 23 63 kg/m²   No LMP recorded  Patient is postmenopausal          Physical Exam     Physical Exam  Constitutional:       General: She is not in acute distress  Appearance: She is well-developed  Cardiovascular:      Rate and Rhythm: Normal rate and regular rhythm  Heart sounds: Normal heart sounds  No murmur  No friction rub  No gallop  Pulmonary:      Effort: Pulmonary effort is normal  No respiratory distress  Breath sounds: Normal breath sounds  No wheezing or rales  Chest:      Chest wall: No tenderness  Skin:     General: Skin is warm  Findings: No erythema  Comments: Approximately 0 5cm circular laceration between 3rd and 4th web space  Neurological:      Mental Status: She is alert  Psychiatric:         Behavior: Behavior normal          Thought Content:  Thought content normal          Judgment: Judgment normal          Laceration repair    Date/Time: 5/7/2021 5:13 PM  Performed by: Karissa Rizo PA-C  Authorized by: Karissa Rizo PA-C   Risks and benefits: risks, benefits and alternatives were discussed  Consent given by: patient  Patient identity confirmed: verbally with patient  Location: R hand between 3rd and 4th finger  Laceration length: 0 5 cm  Tendon involvement: none  Nerve involvement: none  Vascular damage: no    Anesthesia:  Local Anesthetic: lidocaine 2% without epinephrine  Anesthetic total: 5 mL      Procedure Details:  Preparation: Patient was prepped and draped in the usual sterile fashion  Irrigation solution: saline  Irrigation method: syringe  Skin closure: 5-0 nylon  Number of sutures: 2  Approximation: close  Approximation difficulty: simple  Dressing: petroleum gauze, non-adherent pad and coban    Patient tolerance: patient tolerated the procedure well with no immediate complications

## 2021-05-11 ENCOUNTER — OFFICE VISIT (OUTPATIENT)
Dept: OBGYN CLINIC | Facility: CLINIC | Age: 66
End: 2021-05-11
Payer: COMMERCIAL

## 2021-05-11 VITALS
BODY MASS INDEX: 23.66 KG/M2 | SYSTOLIC BLOOD PRESSURE: 119 MMHG | DIASTOLIC BLOOD PRESSURE: 76 MMHG | HEART RATE: 75 BPM | HEIGHT: 65 IN | WEIGHT: 142 LBS

## 2021-05-11 DIAGNOSIS — M17.11 PRIMARY OSTEOARTHRITIS OF RIGHT KNEE: Primary | ICD-10-CM

## 2021-05-11 PROCEDURE — 99213 OFFICE O/P EST LOW 20 MIN: CPT | Performed by: PHYSICIAN ASSISTANT

## 2021-05-11 PROCEDURE — 20610 DRAIN/INJ JOINT/BURSA W/O US: CPT | Performed by: PHYSICIAN ASSISTANT

## 2021-05-11 PROCEDURE — 4004F PT TOBACCO SCREEN RCVD TLK: CPT | Performed by: PHYSICIAN ASSISTANT

## 2021-05-11 PROCEDURE — 3008F BODY MASS INDEX DOCD: CPT | Performed by: PHYSICIAN ASSISTANT

## 2021-05-11 RX ORDER — BUPIVACAINE HYDROCHLORIDE 2.5 MG/ML
4 INJECTION, SOLUTION INFILTRATION; PERINEURAL
Status: COMPLETED | OUTPATIENT
Start: 2021-05-11 | End: 2021-05-11

## 2021-05-11 RX ORDER — METHYLPREDNISOLONE ACETATE 40 MG/ML
2 INJECTION, SUSPENSION INTRA-ARTICULAR; INTRALESIONAL; INTRAMUSCULAR; SOFT TISSUE
Status: COMPLETED | OUTPATIENT
Start: 2021-05-11 | End: 2021-05-11

## 2021-05-11 RX ADMIN — BUPIVACAINE HYDROCHLORIDE 4 ML: 2.5 INJECTION, SOLUTION INFILTRATION; PERINEURAL at 09:31

## 2021-05-11 RX ADMIN — METHYLPREDNISOLONE ACETATE 2 ML: 40 INJECTION, SUSPENSION INTRA-ARTICULAR; INTRALESIONAL; INTRAMUSCULAR; SOFT TISSUE at 09:31

## 2021-05-11 NOTE — PROGRESS NOTES
ASSESSMENT/PLAN:    Diagnoses and all orders for this visit:    Primary osteoarthritis of right knee    Other orders  -     Large joint arthrocentesis         the patient's right knee was injected with Depo-Medrol and Marcaine  She tolerated the injection quite well  She will follow up with our office in 3 months for strength and motion check  The patient is acceptable to this plan  Return in about 3 months (around 8/11/2021)  _____________________________________________________  CHIEF COMPLAINT:  Chief Complaint   Patient presents with    Right Knee - Follow-up         SUBJECTIVE:  Mariya Palacio is a 63 y o  female  With a history of primary osteoarthritis of her right knee  She presents to our office today complaining of right knee pain  She would like a corticosteroid injection  He denies any numbness or tingling  She denies any fever or chills        The following portions of the patient's history were reviewed and updated as appropriate: allergies, current medications, past family history, past medical history, past social history, past surgical history and problem list     PAST MEDICAL HISTORY:  Past Medical History:   Diagnosis Date    Abnormal finding in urine     Arthralgia of multiple joints     Biceps tendinitis, right     Bursitis of right knee     Bursitis of right shoulder     Chondrocalcinosis     COPD exacerbation ([address] )     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     Drug intolerance     Dysfunction of right eustachian tube     Dysfunctional uterine bleeding     Edema     Elevated d-dimer     Exposure to hepatitis C     Fracture of fibula     Generalized anxiety disorder     GERD (gastroesophageal reflux disease)     Mild cervical dysplasia     Myalgia     Myositis     Palpitations     Pre-syncope     Seasonal allergies     Thrombocytopenia (HCC)     Urinary tract infection     Uterine leiomyoma        PAST SURGICAL HISTORY:  Past Surgical History:   Procedure Laterality Date    BLADDER SURGERY      BREAST BIOPSY Right     US guided    COLPOSCOPY  01/06/1998    DENTAL SURGERY      ENDOMETRIAL BIOPSY  05/21/1993    HYSTERECTOMY      HYSTEROSCOPY      uterus- 1/22/98, 7/1/02 and 4/10/07    TUBAL LIGATION      US GUIDED BREAST BIOPSY RIGHT COMPLETE Right 8/8/2018       FAMILY HISTORY:  Family History   Problem Relation Age of Onset    Stroke Mother         cerebral artery occlusion with cerebral infarction    Coronary artery disease Mother     Arrhythmia Mother         sinus    Coronary artery disease Father     Diabetes Father     Aortic aneurysm Family     Arthritis Family     Diabetes Family     Heart disease Family        SOCIAL HISTORY:  Social History     Tobacco Use    Smoking status: Current Every Day Smoker    Smokeless tobacco: Never Used   Substance Use Topics    Alcohol use: No    Drug use: No       MEDICATIONS:    Current Outpatient Medications:     atorvastatin (LIPITOR) 20 mg tablet, take 1 tablet by mouth once daily, Disp: 90 tablet, Rfl: 3    buPROPion (WELLBUTRIN SR) 150 mg 12 hr tablet, Take 150 mg by mouth daily, Disp: , Rfl:     busPIRone (BUSPAR) 30 MG tablet, TAKE 1 TABLET TID, Disp: , Rfl:     cephalexin (KEFLEX) 500 mg capsule, Take 1 capsule (500 mg total) by mouth every 8 (eight) hours for 7 days, Disp: 21 capsule, Rfl: 0    clonazePAM (KlonoPIN) 1 mg tablet, TAKE 1 TABLET TID, Disp: , Rfl:     ibuprofen (MOTRIN) 600 mg tablet, Take 1 tablet (600 mg total) by mouth every 6 (six) hours as needed for mild pain, Disp: 90 tablet, Rfl: 1    omeprazole (PriLOSEC) 40 MG capsule, Take 1 capsule (40 mg total) by mouth daily, Disp: 30 capsule, Rfl: 5    tretinoin (RETIN-A) 0 1 % cream, Apply topically daily at bedtime, Disp: 45 g, Rfl: 5    venlafaxine (EFFEXOR-XR) 150 mg 24 hr capsule, Take 1 capsule (150 mg total) by mouth daily, Disp: 30 capsule, Rfl: 0    venlafaxine (EFFEXOR-XR) 75 mg 24 hr capsule, Take 1 capsule (75 mg total) by mouth daily, Disp: 90 capsule, Rfl: 1    ALLERGIES:  No Known Allergies    ROS:  Review of Systems     Constitutional: Negative for fatigue, fever or loss of appetite  HENT: Negative  Respiratory: Negative for shortness of breath, dyspnea  Cardiovascular: Negative for chest pain/tightness  Gastrointestinal: Negative for abdominal pain, N/V  Endocrine: Negative for cold/heat intolerance, unexplained weight loss/gain  Genitourinary: Negative for flank pain, dysuria, hematuria  Musculoskeletal: Positive for arthralgia   Skin: Negative for rash  Neurological: Negative for numbness or tingling  Psychiatric/Behavioral: Negative for agitation  _____________________________________________________  PHYSICAL EXAMINATION:    Blood pressure 119/76, pulse 75, height 5' 5" (1 651 m), weight 64 4 kg (142 lb)  Constitutional: Oriented to person, place, and time  Appears well-developed and well-nourished  No distress  HENT:   Head: Normocephalic  Eyes: Conjunctivae are normal  Right eye exhibits no discharge  Left eye exhibits no discharge  No scleral icterus  Cardiovascular: Normal rate  Pulmonary/Chest: Effort normal    Neurological: Alert and oriented to person, place, and time  Skin: Skin is warm and dry  No rash noted  Not diaphoretic  No erythema  No pallor  Psychiatric: Normal mood and affect   Behavior is normal  Judgment and thought content normal       MUSCULOSKELETAL EXAMINATION:   Physical Exam  Ortho Exam    Right lower extremity is neurovascularly intact  Toes are pink and mobile   Compartments are soft  No warmth, erythema or ecchymosis  ROM of knee is from 5-115 degrees  Negative Lachman, drawer or pivot shift  No medial instability  Medial joint line tenderness, slight lateral joint line tenderness  Patellofemoral crepitation  Objective:  BP Readings from Last 1 Encounters:   05/11/21 119/76      Wt Readings from Last 1 Encounters: 05/11/21 64 4 kg (142 lb)        BMI:   Estimated body mass index is 23 63 kg/m² as calculated from the following:    Height as of this encounter: 5' 5" (1 651 m)  Weight as of this encounter: 64 4 kg (142 lb)  PROCEDURES PERFORMED:  Large joint arthrocentesis: R knee  Universal Protocol:  Consent given by: patient  Time out: Immediately prior to procedure a "time out" was called to verify the correct patient, procedure, equipment, support staff and site/side marked as required    Site marked: the operative site was marked  Supporting Documentation  Indications: pain   Procedure Details  Location: knee - R knee  Preparation: Patient was prepped and draped in the usual sterile fashion  Needle size: 22 G  Ultrasound guidance: no  Approach: lateral  Medications administered: 2 mL methylPREDNISolone acetate 40 mg/mL; 4 mL bupivacaine 0 25 %    Patient tolerance: patient tolerated the procedure well with no immediate complications  Dressing:  Sterile dressing applied            Gurjit Alejo PA-C

## 2021-05-20 ENCOUNTER — HOSPITAL ENCOUNTER (OUTPATIENT)
Dept: BONE DENSITY | Facility: HOSPITAL | Age: 66
Discharge: HOME | End: 2021-05-20
Attending: INTERNAL MEDICINE
Payer: COMMERCIAL

## 2021-05-20 ENCOUNTER — HOSPITAL ENCOUNTER (OUTPATIENT)
Dept: MAMMOGRAPHY | Facility: HOSPITAL | Age: 66
Discharge: HOME | End: 2021-05-20
Attending: INTERNAL MEDICINE
Payer: COMMERCIAL

## 2021-05-20 VITALS — WEIGHT: 138 LBS | BODY MASS INDEX: 22.99 KG/M2 | HEIGHT: 65 IN

## 2021-05-20 DIAGNOSIS — Z78.0 POSTMENOPAUSAL STATUS (AGE-RELATED) (NATURAL): ICD-10-CM

## 2021-05-20 DIAGNOSIS — Z12.31 ENCOUNTER FOR SCREENING MAMMOGRAM FOR MALIGNANT NEOPLASM OF BREAST: ICD-10-CM

## 2021-05-20 PROCEDURE — 77063 BREAST TOMOSYNTHESIS BI: CPT

## 2021-05-20 PROCEDURE — 77080 DXA BONE DENSITY AXIAL: CPT

## 2021-05-20 PROCEDURE — 77067 SCR MAMMO BI INCL CAD: CPT

## 2021-05-23 DIAGNOSIS — F41.9 ANXIETY: ICD-10-CM

## 2021-05-24 RX ORDER — VENLAFAXINE HYDROCHLORIDE 150 MG/1
CAPSULE, EXTENDED RELEASE ORAL
Qty: 30 CAPSULE | Refills: 5 | Status: SHIPPED | OUTPATIENT
Start: 2021-05-24 | End: 2021-11-13

## 2021-05-27 ENCOUNTER — TELEPHONE (OUTPATIENT)
Dept: INTERNAL MEDICINE CLINIC | Facility: CLINIC | Age: 66
End: 2021-05-27

## 2021-05-27 DIAGNOSIS — M85.89 OSTEOPENIA OF MULTIPLE SITES: Primary | ICD-10-CM

## 2021-05-27 RX ORDER — ALENDRONATE SODIUM 70 MG/1
70 TABLET ORAL
Qty: 12 TABLET | Refills: 3 | Status: SHIPPED | OUTPATIENT
Start: 2021-05-27 | End: 2022-04-18

## 2021-05-27 NOTE — TELEPHONE ENCOUNTER
Pt called back on Dexa  Pt is only taking a Vitamin D supplement did advise her to start Calcium 600mg BID also  Pt is also interested in starting a medication  Please send to [facility] [address]

## 2021-06-21 ENCOUNTER — VBI (OUTPATIENT)
Dept: ADMINISTRATIVE | Facility: OTHER | Age: 66
End: 2021-06-21

## 2021-07-19 DIAGNOSIS — L30.9 DERMATITIS: ICD-10-CM

## 2021-07-19 RX ORDER — TRETINOIN 1 MG/G
CREAM TOPICAL
Qty: 45 G | Refills: 5 | Status: SHIPPED | OUTPATIENT
Start: 2021-07-19 | End: 2022-03-28 | Stop reason: SDUPTHER

## 2021-08-09 ENCOUNTER — VBI (OUTPATIENT)
Dept: ADMINISTRATIVE | Facility: OTHER | Age: 66
End: 2021-08-09

## 2021-08-13 ENCOUNTER — OFFICE VISIT (OUTPATIENT)
Dept: OBGYN CLINIC | Facility: CLINIC | Age: 66
End: 2021-08-13
Payer: COMMERCIAL

## 2021-08-13 VITALS
HEIGHT: 65 IN | WEIGHT: 138 LBS | BODY MASS INDEX: 22.99 KG/M2 | HEART RATE: 69 BPM | DIASTOLIC BLOOD PRESSURE: 74 MMHG | SYSTOLIC BLOOD PRESSURE: 115 MMHG

## 2021-08-13 DIAGNOSIS — M75.81 ROTATOR CUFF TENDINITIS, RIGHT: Primary | ICD-10-CM

## 2021-08-13 DIAGNOSIS — M17.11 PRIMARY OSTEOARTHRITIS OF RIGHT KNEE: ICD-10-CM

## 2021-08-13 PROCEDURE — 3008F BODY MASS INDEX DOCD: CPT | Performed by: PHYSICIAN ASSISTANT

## 2021-08-13 PROCEDURE — 20610 DRAIN/INJ JOINT/BURSA W/O US: CPT | Performed by: PHYSICIAN ASSISTANT

## 2021-08-13 PROCEDURE — 99213 OFFICE O/P EST LOW 20 MIN: CPT | Performed by: PHYSICIAN ASSISTANT

## 2021-08-13 PROCEDURE — 4004F PT TOBACCO SCREEN RCVD TLK: CPT | Performed by: PHYSICIAN ASSISTANT

## 2021-08-13 RX ORDER — METHYLPREDNISOLONE ACETATE 40 MG/ML
2 INJECTION, SUSPENSION INTRA-ARTICULAR; INTRALESIONAL; INTRAMUSCULAR; SOFT TISSUE
Status: COMPLETED | OUTPATIENT
Start: 2021-08-13 | End: 2021-08-13

## 2021-08-13 RX ORDER — BUPIVACAINE HYDROCHLORIDE 2.5 MG/ML
4 INJECTION, SOLUTION INFILTRATION; PERINEURAL
Status: COMPLETED | OUTPATIENT
Start: 2021-08-13 | End: 2021-08-13

## 2021-08-13 RX ADMIN — METHYLPREDNISOLONE ACETATE 2 ML: 40 INJECTION, SUSPENSION INTRA-ARTICULAR; INTRALESIONAL; INTRAMUSCULAR; SOFT TISSUE at 09:35

## 2021-08-13 RX ADMIN — BUPIVACAINE HYDROCHLORIDE 4 ML: 2.5 INJECTION, SOLUTION INFILTRATION; PERINEURAL at 09:35

## 2021-08-13 NOTE — PROGRESS NOTES
ASSESSMENT/PLAN:    Diagnoses and all orders for this visit:    Rotator cuff tendinitis, right  -     XR shoulder 2+ vw right; Future    Primary osteoarthritis of right knee  -     Injection procedure prior authorization; Future    Other orders  -     Large joint arthrocentesis        X-rays of the patient's right shoulder were negative for any fractures or dislocations  She does have a subacromial spur  Possible treatment option were discussed with the patient  She would like a corticosteroid injection  The patient's right shoulder was injected with Depo-Medrol Marcaine  She tolerated the injection quite well  She is also interested in restarting viscosupplementation for her right knee  We will get her approval and she will return back once she is approved  The patient is acceptable to this plan  Return for Viscosupplementation  The patient's right shoulder was injected with Depo-Medrol and Marcaine  She tolerated procedure quite well  Return back in 6 weeks for re-evaluation  In the meantime, we will get her approved  For viscosupplementation of her right knee      _____________________________________________________  CHIEF COMPLAINT:  Chief Complaint   Patient presents with    Right Shoulder - Pain         SUBJECTIVE:  Mirtha Strong is a 63 y o  female who presents   To our office complaining of right shoulder pain  The patient states she has had this pain for quite some time, however, it has been worsening recently  She denies any new falls or trauma  She denies any numbness or tingling  She denies any fever or chills  She states her range of motion is limited to pain       The following portions of the patient's history were reviewed and updated as appropriate: allergies, current medications, past family history, past medical history, past social history, past surgical history and problem list     PAST MEDICAL HISTORY:  Past Medical History:   Diagnosis Date    Abnormal finding in urine     Arthralgia of multiple joints     Biceps tendinitis, right     Bursitis of right knee     Bursitis of right shoulder     Chondrocalcinosis     COPD exacerbation (HCC)     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     Drug intolerance     Dysfunction of right eustachian tube     Dysfunctional uterine bleeding     Edema     Elevated d-dimer     Exposure to hepatitis C     Fracture of fibula     Generalized anxiety disorder     GERD (gastroesophageal reflux disease)     Mild cervical dysplasia     Myalgia     Myositis     Palpitations     Pre-syncope     Seasonal allergies     Thrombocytopenia (HCC)     Urinary tract infection     Uterine leiomyoma        PAST SURGICAL HISTORY:  Past Surgical History:   Procedure Laterality Date    BLADDER SURGERY      BREAST BIOPSY Right     US guided    COLPOSCOPY  01/06/1998    DENTAL SURGERY      ENDOMETRIAL BIOPSY  05/21/1993    HYSTERECTOMY      HYSTEROSCOPY      uterus- 1/22/98, 7/1/02 and 4/10/07    TUBAL LIGATION      US GUIDED BREAST BIOPSY RIGHT COMPLETE Right 8/8/2018       FAMILY HISTORY:  Family History   Problem Relation Age of Onset    Stroke Mother         cerebral artery occlusion with cerebral infarction    Coronary artery disease Mother     Arrhythmia Mother         sinus    Coronary artery disease Father     Diabetes Father     Aortic aneurysm Family     Arthritis Family     Diabetes Family     Heart disease Family     No Known Problems Daughter     No Known Problems Maternal Grandmother     No Known Problems Paternal Grandmother     No Known Problems Daughter     No Known Problems Maternal Aunt     Ovarian cancer Maternal Aunt        SOCIAL HISTORY:  Social History     Tobacco Use    Smoking status: Current Every Day Smoker    Smokeless tobacco: Never Used   Vaping Use    Vaping Use: Never used   Substance Use Topics    Alcohol use: No    Drug use: No       MEDICATIONS:    Current Outpatient Medications:     alendronate (Fosamax) 70 mg tablet, Take 1 tablet (70 mg total) by mouth every 7 days, Disp: 12 tablet, Rfl: 3    atorvastatin (LIPITOR) 20 mg tablet, take 1 tablet by mouth once daily, Disp: 90 tablet, Rfl: 3    buPROPion (WELLBUTRIN SR) 150 mg 12 hr tablet, Take 150 mg by mouth daily, Disp: , Rfl:     busPIRone (BUSPAR) 30 MG tablet, TAKE 1 TABLET TID, Disp: , Rfl:     clonazePAM (KlonoPIN) 1 mg tablet, TAKE 1 TABLET TID, Disp: , Rfl:     ibuprofen (MOTRIN) 600 mg tablet, Take 1 tablet (600 mg total) by mouth every 6 (six) hours as needed for mild pain, Disp: 90 tablet, Rfl: 1    omeprazole (PriLOSEC) 40 MG capsule, Take 1 capsule (40 mg total) by mouth daily, Disp: 30 capsule, Rfl: 5    tretinoin (RETIN-A) 0 1 % cream, Apply topically daily at bedtime, Disp: 45 g, Rfl: 5    venlafaxine (EFFEXOR-XR) 150 mg 24 hr capsule, take 1 capsule by mouth once daily, Disp: 30 capsule, Rfl: 5    venlafaxine (EFFEXOR-XR) 75 mg 24 hr capsule, Take 1 capsule (75 mg total) by mouth daily, Disp: 90 capsule, Rfl: 1    ALLERGIES:  No Known Allergies    ROS:  Review of Systems     Constitutional: Negative for fatigue, fever or loss of appetite  HENT: Negative  Respiratory: Negative for shortness of breath, dyspnea  Cardiovascular: Negative for chest pain/tightness  Gastrointestinal: Negative for abdominal pain, N/V  Endocrine: Negative for cold/heat intolerance, unexplained weight loss/gain  Genitourinary: Negative for flank pain, dysuria, hematuria  Musculoskeletal: Positive for arthralgia   Skin: Negative for rash  Neurological: Negative for numbness or tingling  Psychiatric/Behavioral: Negative for agitation  _____________________________________________________  PHYSICAL EXAMINATION:    Blood pressure 115/74, pulse 69, height 5' 5" (1 651 m), weight 62 6 kg (138 lb)  Constitutional: Oriented to person, place, and time  Appears well-developed and well-nourished   No distress  HENT:   Head: Normocephalic  Eyes: Conjunctivae are normal  Right eye exhibits no discharge  Left eye exhibits no discharge  No scleral icterus  Cardiovascular: Normal rate  Pulmonary/Chest: Effort normal    Neurological: Alert and oriented to person, place, and time  Skin: Skin is warm and dry  No rash noted  Not diaphoretic  No erythema  No pallor  Psychiatric: Normal mood and affect  Behavior is normal  Judgment and thought content normal       MUSCULOSKELETAL EXAMINATION:   Physical Exam  Ortho Exam    Right lower extremity is neurovascularly intact  Toes are pink and mobile   Compartments are soft  No warmth, erythema or ecchymosis  ROM of knee is from 5-115 degrees  Negative Lachman, drawer or pivot shift  No medial instability  Medial joint line tenderness, slight lateral joint line tenderness  Patellofemoral crepitation    Neck is soft and supple  Negative axial compression test of the neck  Right upper extremity is neurovascularly intact  Fingers are pink and mobile  Compartments are soft  Range of motion of the shoulder was 110° of forward flexion and abduction and internal rotation to L5  signs of impingement with no instability  Glenohumeral crepitation is present  Rotator cuff strength is 4/5  Pulses intact  Objective:  BP Readings from Last 1 Encounters:   08/13/21 115/74      Wt Readings from Last 1 Encounters:   08/13/21 62 6 kg (138 lb)        BMI:   Estimated body mass index is 22 96 kg/m² as calculated from the following:    Height as of this encounter: 5' 5" (1 651 m)  Weight as of this encounter: 62 6 kg (138 lb)  Radiographs:  _____________________________________________________  STUDIES REVIEWED:  I have personally reviewed pertinent films and reports in PACS  X-rays of the patient's right shoulder were negative for any fractures or dislocations    She does have a subacromial spur    PROCEDURES PERFORMED:  Large joint arthrocentesis: R subacromial bursa  Universal Protocol:  Consent given by: patient  Patient understanding: patient states understanding of the procedure being performed  Site marked: the operative site was marked  Supporting Documentation  Indications: pain   Procedure Details  Location: shoulder - R subacromial bursa  Preparation: Patient was prepped and draped in the usual sterile fashion  Needle size: 22 G  Ultrasound guidance: no  Approach: lateral  Medications administered: 2 mL methylPREDNISolone acetate 40 mg/mL; 4 mL bupivacaine 0 25 %    Patient tolerance: patient tolerated the procedure well with no immediate complications  Dressing:  Sterile dressing applied            Antonina Sapp PA-C

## 2021-09-07 ENCOUNTER — PROCEDURE VISIT (OUTPATIENT)
Dept: OBGYN CLINIC | Facility: CLINIC | Age: 66
End: 2021-09-07
Payer: COMMERCIAL

## 2021-09-07 VITALS
HEART RATE: 69 BPM | WEIGHT: 138 LBS | DIASTOLIC BLOOD PRESSURE: 71 MMHG | SYSTOLIC BLOOD PRESSURE: 121 MMHG | HEIGHT: 65 IN | BODY MASS INDEX: 22.99 KG/M2

## 2021-09-07 DIAGNOSIS — M17.11 PRIMARY OSTEOARTHRITIS OF RIGHT KNEE: Primary | ICD-10-CM

## 2021-09-07 PROCEDURE — 3008F BODY MASS INDEX DOCD: CPT | Performed by: PHYSICIAN ASSISTANT

## 2021-09-07 PROCEDURE — 20610 DRAIN/INJ JOINT/BURSA W/O US: CPT | Performed by: ORTHOPAEDIC SURGERY

## 2021-09-07 RX ORDER — HYALURONATE SODIUM 10 MG/ML
20 SYRINGE (ML) INTRAARTICULAR
Status: COMPLETED | OUTPATIENT
Start: 2021-09-07 | End: 2021-09-07

## 2021-09-07 RX ADMIN — Medication 20 MG: at 12:08

## 2021-09-07 NOTE — PROGRESS NOTES
Assessment/Plan:  Assessment/Plan   Diagnoses and all orders for this visit:    Primary osteoarthritis of right knee  -     Large joint arthrocentesis     Patient was provided with 1st of 3 shot Euflexxa viscosupplementation injection series for treatment of primary osteoarthritis of the right knee  Patient tolerated treatment well  She will be seen for follow-up in  1 week for 2nd Euflexxa injection  She is in agreement with this treatment plan  under aseptic technique, the right knee was injected with her 1st set of Euflexxa  She tolerated procedure quite well  Return back next week for 2nd set of injections  If her condition changes, she will not hesitate to let us know  Physical exam shows diffuse medial and lateral joint tenderness and patellofemoral crepitation  There is a painful arc of motion throughout her right knee  Subjective:   Patient ID: Jovani Lofton is a 63 y o  female  HPI    Patient presents today for initiation of viscosupplementation injection series for treatment of primary osteoarthritis of the right knee  She was last seen in regards to this issue on 5/11/2021 at which time she was provided with a CS injection  On 8/13/2021, while being seen for an unrelated issue, she mentioned that she was interested in reinitiating viscosupplementation injections, and prior authorization was submitted  On today's presentation she reports continued pain in the right knee with weight-bearing activity, as well as with deep knee flexion motions  She denies any recent onset bruising, swelling, numbness, or tingling      The following portions of the patient's history were reviewed and updated as appropriate: allergies, current medications, past family history, past medical history, past social history, past surgical history and problem list     Past Medical History:   Diagnosis Date    Abnormal finding in urine     Arthralgia of multiple joints     Biceps tendinitis, right     Bursitis of right knee     Bursitis of right shoulder     Chondrocalcinosis     COPD exacerbation (HCC)     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     Drug intolerance     Dysfunction of right eustachian tube     Dysfunctional uterine bleeding     Edema     Elevated d-dimer     Exposure to hepatitis C     Fracture of fibula     Generalized anxiety disorder     GERD (gastroesophageal reflux disease)     Mild cervical dysplasia     Myalgia     Myositis     Palpitations     Pre-syncope     Seasonal allergies     Thrombocytopenia (HCC)     Urinary tract infection     Uterine leiomyoma      Past Surgical History:   Procedure Laterality Date    BLADDER SURGERY      BREAST BIOPSY Right     US guided    COLPOSCOPY  01/06/1998    DENTAL SURGERY      ENDOMETRIAL BIOPSY  05/21/1993    HYSTERECTOMY      HYSTEROSCOPY      uterus- 1/22/98, 7/1/02 and 4/10/07    TUBAL LIGATION      US GUIDED BREAST BIOPSY RIGHT COMPLETE Right 8/8/2018     Family History   Problem Relation Age of Onset    Stroke Mother         cerebral artery occlusion with cerebral infarction    Coronary artery disease Mother     Arrhythmia Mother         sinus    Coronary artery disease Father     Diabetes Father     Aortic aneurysm Family     Arthritis Family     Diabetes Family     Heart disease Family     No Known Problems Daughter     No Known Problems Maternal Grandmother     No Known Problems Paternal Grandmother     No Known Problems Daughter     No Known Problems Maternal Aunt     Ovarian cancer Maternal Aunt      Social History     Socioeconomic History    Marital status: /Civil Union     Spouse name: None    Number of children: None    Years of education: None    Highest education level: None   Occupational History    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[employer]    Occupation: housewife   Tobacco Use    Smoking status: Current Every Day Smoker    Smokeless tobacco: Never Used   Vaping Use    Vaping Use: Never used   Substance and Sexual Activity    Alcohol use: No    Drug use: No    Sexual activity: None   Other Topics Concern    None   Social History Narrative    Caffeine use     Social Determinants of Health     Financial Resource Strain:     Difficulty of Paying Living Expenses:    Food Insecurity:     Worried About Running Out of Food in the Last Year:     [address] in the Last Year:    Transportation Needs:     Lack of Transportation (Medical):      Lack of Transportation (Non-Medical):    Physical Activity:     Days of Exercise per Week:     Minutes of Exercise per Session:    Stress:     Feeling of Stress :    Social Connections:     Frequency of Communication with Friends and Family:     Frequency of Social Gatherings with Friends and Family:     Attends [employer] Services:     Active Member of Clubs or Organizations:     Attends Club or Organization Meetings:     Marital Status:    Intimate Partner Violence:     Fear of Current or Ex-Partner:     Emotionally Abused:     Physically Abused:     Sexually Abused:        Current Outpatient Medications:     alendronate (Fosamax) 70 mg tablet, Take 1 tablet (70 mg total) by mouth every 7 days, Disp: 12 tablet, Rfl: 3    atorvastatin (LIPITOR) 20 mg tablet, take 1 tablet by mouth once daily, Disp: 90 tablet, Rfl: 3    buPROPion (WELLBUTRIN SR) 150 mg 12 hr tablet, Take 150 mg by mouth daily, Disp: , Rfl:     busPIRone (BUSPAR) 30 MG tablet, TAKE 1 TABLET TID, Disp: , Rfl:     clonazePAM (KlonoPIN) 1 mg tablet, TAKE 1 TABLET TID, Disp: , Rfl:     ibuprofen (MOTRIN) 600 mg tablet, Take 1 tablet (600 mg total) by mouth every 6 (six) hours as needed for mild pain, Disp: 90 tablet, Rfl: 1    omeprazole (PriLOSEC) 40 MG capsule, Take 1 capsule (40 mg total) by mouth daily, Disp: 30 capsule, Rfl: 5    tretinoin (RETIN-A) 0 1 % cream, Apply topically daily at bedtime, Disp: 45 g, Rfl: 5    venlafaxine (EFFEXOR-XR) 150 mg 24 hr capsule, take 1 capsule by mouth once daily, Disp: 30 capsule, Rfl: 5    venlafaxine (EFFEXOR-XR) 75 mg 24 hr capsule, Take 1 capsule (75 mg total) by mouth daily, Disp: 90 capsule, Rfl: 1    No Known Allergies      Review of Systems   Constitutional: Negative for chills, fever and unexpected weight change  HENT: Negative for hearing loss, nosebleeds and sore throat  Eyes: Negative for pain, redness and visual disturbance  Respiratory: Negative for cough, shortness of breath and wheezing  Cardiovascular: Negative for chest pain, palpitations and leg swelling  Gastrointestinal: Negative for abdominal pain, nausea and vomiting  Endocrine: Negative for polydipsia and polyuria  Genitourinary: Negative for dysuria and hematuria  Musculoskeletal:        As noted in HPI   Skin: Negative for rash and wound  Neurological: Negative for dizziness, numbness and headaches  Psychiatric/Behavioral: Negative for decreased concentration and suicidal ideas  The patient is not nervous/anxious          Objective:  /71 (BP Location: Right arm, Patient Position: Sitting, Cuff Size: Standard)   Pulse 69   Ht 5' 5" (1 651 m)   Wt 62 6 kg (138 lb)   BMI 22 96 kg/m²     Ortho Exam   Right knee -   Patient ambulates with Smooth gait pattern  Uses no assistive device  no anatomical deformity  Skin is warm and dry to touch with no signs of erythema, ecchymosis, infection  No soft tissue swelling, no effusion noted  ROM  5°- 115°  TTP over  Medial joint line, mild tenderness over lateral joint line  Flexor and extensor mechanisms intact  Knee is stable to varus and valgus stress  - Lachman's  - Anterior Drawer, - Posterior Drawer  - medial Hubert's, - lateral Hubert's  - Pivot Shift  Patella tracks centrally with mild palpable crepitus  Calf compartments are soft and supple  2+ TP and DP pulses with brisk capillary refill to the toes  Sural, saphenous, tibial, superficial and deep peroneal motor and sensory distributions intact  Sensation light touch intact distally    Physical Exam    Imaging:  No new imaging reviewed this visit    Large joint arthrocentesis: R knee  [place] Protocol:  Procedure performed by: SUMA Shen ATC)  Consent: Verbal consent obtained  Risks and benefits: risks, benefits and alternatives were discussed  Consent given by: patient  Time out: Immediately prior to procedure a "time out" was called to verify the correct patient, procedure, equipment, support staff and site/side marked as required    Timeout called at: 9/7/2021 11:51 AM   Patient understanding: patient states understanding of the procedure being performed  Site marked: the operative site was marked  Patient identity confirmed: verbally with patient    Supporting Documentation  Indications: pain and joint swelling   Procedure Details  Location: knee - R knee  Preparation: Patient was prepped and draped in the usual sterile fashion  Needle size: 22 G  Ultrasound guidance: no  Approach: anterolateral  Medications administered: 20 mg Sodium Hyaluronate 20 MG/2ML  Specialty Pharmacy Supplied: received medications from pharmacy  Patient tolerance: patient tolerated the procedure well with no immediate complications  Dressing:  Sterile dressing applied        Scribe Attestation    I,:  Layne Melo am acting as a scribe while in the presence of the attending physician :       I,:  Thelma Santos DO personally performed the services described in this documentation    as scribed in my presence :

## 2021-09-13 ENCOUNTER — TELEPHONE (OUTPATIENT)
Dept: OBGYN CLINIC | Facility: HOSPITAL | Age: 66
End: 2021-09-13

## 2021-09-13 NOTE — TELEPHONE ENCOUNTER
Patient is calling because she had a her 1st euflexxa injection on 9/7/21 in rt knee   Patient is stating that she has a lot of pain & swelling & she would like to know if that is normal      [phone]

## 2021-09-14 ENCOUNTER — RA CDI HCC (OUTPATIENT)
Dept: OTHER | Facility: HOSPITAL | Age: 66
End: 2021-09-14

## 2021-09-14 NOTE — PROGRESS NOTES
[facility]  coding opportunities       Chart reviewed, no opportunity found: CHART REVIEWED, NO OPPORTUNITY FOUND                        Patients insurance company:  [employer] [facility] (Medicare Advantage and Commercial)

## 2021-09-16 NOTE — TELEPHONE ENCOUNTER
Cont to sleep.  No resp distress.  Call light in reach.  One:One  Continues.    Patient called back and left a message returning call      Call back #[phone]

## 2021-09-23 ENCOUNTER — PROCEDURE VISIT (OUTPATIENT)
Dept: OBGYN CLINIC | Facility: CLINIC | Age: 66
End: 2021-09-23
Payer: COMMERCIAL

## 2021-09-23 DIAGNOSIS — M17.11 PRIMARY OSTEOARTHRITIS OF RIGHT KNEE: Primary | ICD-10-CM

## 2021-09-23 PROCEDURE — 20610 DRAIN/INJ JOINT/BURSA W/O US: CPT | Performed by: PHYSICIAN ASSISTANT

## 2021-09-23 RX ORDER — HYALURONATE SODIUM 10 MG/ML
20 SYRINGE (ML) INTRAARTICULAR
Status: COMPLETED | OUTPATIENT
Start: 2021-09-23 | End: 2021-09-23

## 2021-09-23 RX ORDER — MELOXICAM 15 MG/1
15 TABLET ORAL DAILY
Qty: 30 TABLET | Refills: 1 | Status: SHIPPED | OUTPATIENT
Start: 2021-09-23 | End: 2022-04-19 | Stop reason: SDUPTHER

## 2021-09-23 RX ADMIN — Medication 20 MG: at 14:12

## 2021-09-23 NOTE — PROGRESS NOTES
ASSESSMENT/PLAN:    Diagnoses and all orders for this visit:    Primary osteoarthritis of right knee  -     meloxicam (MOBIC) 15 mg tablet; Take 1 tablet (15 mg total) by mouth daily    Other orders  -     Large joint arthrocentesis         the patient's right knee was injected with her 2nd set of Euflexxa  She tolerated the injection quite well  She will follow up next week for her 3rd and final set  The patient was also prescribed meloxicam   She is acceptable to this plan  Return in about 1 week (around 9/30/2021)  _____________________________________________________  CHIEF COMPLAINT:  No chief complaint on file  SUBJECTIVE:  Marylu Moreno is a 63 y o  female who presents  To our office for viscosupplementation of her right knee  She is here today for her 2nd set of Euflexxa  She still complains of right knee pain  She denies any numbness or tingling  She denies any fever or chills       The following portions of the patient's history were reviewed and updated as appropriate: allergies, current medications, past family history, past medical history, past social history, past surgical history and problem list     PAST MEDICAL HISTORY:  Past Medical History:   Diagnosis Date    Abnormal finding in urine     Arthralgia of multiple joints     Biceps tendinitis, right     Bursitis of right knee     Bursitis of right shoulder     Chondrocalcinosis     COPD exacerbation ([address] )     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     Drug intolerance     Dysfunction of right eustachian tube     Dysfunctional uterine bleeding     Edema     Elevated d-dimer     Exposure to hepatitis C     Fracture of fibula     Generalized anxiety disorder     GERD (gastroesophageal reflux disease)     Mild cervical dysplasia     Myalgia     Myositis     Palpitations     Pre-syncope     Seasonal allergies     Thrombocytopenia (HCC)     Urinary tract infection     Uterine leiomyoma PAST SURGICAL HISTORY:  Past Surgical History:   Procedure Laterality Date    BLADDER SURGERY      BREAST BIOPSY Right     US guided    COLPOSCOPY  01/06/1998    DENTAL SURGERY      ENDOMETRIAL BIOPSY  05/21/1993    HYSTERECTOMY      HYSTEROSCOPY      uterus- 1/22/98, 7/1/02 and 4/10/07    TUBAL LIGATION      US GUIDED BREAST BIOPSY RIGHT COMPLETE Right 8/8/2018       FAMILY HISTORY:  Family History   Problem Relation Age of Onset    Stroke Mother         cerebral artery occlusion with cerebral infarction    Coronary artery disease Mother     Arrhythmia Mother         sinus    Coronary artery disease Father     Diabetes Father     Aortic aneurysm Family     Arthritis Family     Diabetes Family     Heart disease Family     No Known Problems Daughter     No Known Problems Maternal Grandmother     No Known Problems Paternal Grandmother     No Known Problems Daughter     No Known Problems Maternal Aunt     Ovarian cancer Maternal Aunt        SOCIAL HISTORY:  Social History     Tobacco Use    Smoking status: Current Every Day Smoker    Smokeless tobacco: Never Used   Vaping Use    Vaping Use: Never used   Substance Use Topics    Alcohol use: No    Drug use: No       MEDICATIONS:    Current Outpatient Medications:     alendronate (Fosamax) 70 mg tablet, Take 1 tablet (70 mg total) by mouth every 7 days, Disp: 12 tablet, Rfl: 3    atorvastatin (LIPITOR) 20 mg tablet, take 1 tablet by mouth once daily, Disp: 90 tablet, Rfl: 3    buPROPion (WELLBUTRIN SR) 150 mg 12 hr tablet, Take 150 mg by mouth daily, Disp: , Rfl:     busPIRone (BUSPAR) 30 MG tablet, TAKE 1 TABLET TID, Disp: , Rfl:     clonazePAM (KlonoPIN) 1 mg tablet, TAKE 1 TABLET TID, Disp: , Rfl:     ibuprofen (MOTRIN) 600 mg tablet, Take 1 tablet (600 mg total) by mouth every 6 (six) hours as needed for mild pain, Disp: 90 tablet, Rfl: 1    meloxicam (MOBIC) 15 mg tablet, Take 1 tablet (15 mg total) by mouth daily, Disp: 30 tablet, Rfl: 1    omeprazole (PriLOSEC) 40 MG capsule, Take 1 capsule (40 mg total) by mouth daily, Disp: 30 capsule, Rfl: 5    tretinoin (RETIN-A) 0 1 % cream, Apply topically daily at bedtime, Disp: 45 g, Rfl: 5    venlafaxine (EFFEXOR-XR) 150 mg 24 hr capsule, take 1 capsule by mouth once daily, Disp: 30 capsule, Rfl: 5    venlafaxine (EFFEXOR-XR) 75 mg 24 hr capsule, Take 1 capsule (75 mg total) by mouth daily, Disp: 90 capsule, Rfl: 1    ALLERGIES:  No Known Allergies    ROS:  Review of Systems     Constitutional: Negative for fatigue, fever or loss of appetite  HENT: Negative  Respiratory: Negative for shortness of breath, dyspnea  Cardiovascular: Negative for chest pain/tightness  Gastrointestinal: Negative for abdominal pain, N/V  Endocrine: Negative for cold/heat intolerance, unexplained weight loss/gain  Genitourinary: Negative for flank pain, dysuria, hematuria  Musculoskeletal: Positive for arthralgia   Skin: Negative for rash  Neurological: Negative for numbness or tingling  Psychiatric/Behavioral: Negative for agitation  _____________________________________________________  PHYSICAL EXAMINATION:    There were no vitals taken for this visit  Constitutional: Oriented to person, place, and time  Appears well-developed and well-nourished  No distress  HENT:   Head: Normocephalic  Eyes: Conjunctivae are normal  Right eye exhibits no discharge  Left eye exhibits no discharge  No scleral icterus  Cardiovascular: Normal rate  Pulmonary/Chest: Effort normal    Neurological: Alert and oriented to person, place, and time  Skin: Skin is warm and dry  No rash noted  Not diaphoretic  No erythema  No pallor  Psychiatric: Normal mood and affect   Behavior is normal  Judgment and thought content normal       MUSCULOSKELETAL EXAMINATION:   Physical Exam  Ortho Exam    Right lower extremity is neurovascularly intact  Toes are pink and mobile   Compartments are soft  No warmth, erythema or ecchymosis  ROM of knee is from 5-115 degrees  Negative Lachman, drawer or pivot shift  No medial instability  Medial joint line tenderness, slight lateral joint line tenderness  Patellofemoral crepitation  Objective:  BP Readings from Last 1 Encounters:   09/07/21 121/71      Wt Readings from Last 1 Encounters:   09/07/21 62 6 kg (138 lb)        BMI:   Estimated body mass index is 22 96 kg/m² as calculated from the following:    Height as of 9/7/21: 5' 5" (1 651 m)  Weight as of 9/7/21: 62 6 kg (138 lb)        PROCEDURES PERFORMED:  Large joint arthrocentesis: R knee  Universal Protocol:  Risks and benefits: risks, benefits and alternatives were discussed  Consent given by: patient  Patient understanding: patient states understanding of the procedure being performed  Site marked: the operative site was marked  Supporting Documentation  Indications: pain   Procedure Details  Location: knee - R knee  Preparation: Patient was prepped and draped in the usual sterile fashion  Needle size: 22 G  Ultrasound guidance: no  Approach: lateral  Medications administered: 20 mg Sodium Hyaluronate 20 MG/2ML    Patient tolerance: patient tolerated the procedure well with no immediate complications  Dressing:  Sterile dressing applied            Nasreen Nowak PA-C

## 2021-09-30 ENCOUNTER — PROCEDURE VISIT (OUTPATIENT)
Dept: OBGYN CLINIC | Facility: CLINIC | Age: 66
End: 2021-09-30
Payer: COMMERCIAL

## 2021-09-30 VITALS
WEIGHT: 136.8 LBS | DIASTOLIC BLOOD PRESSURE: 74 MMHG | HEART RATE: 69 BPM | BODY MASS INDEX: 22.76 KG/M2 | SYSTOLIC BLOOD PRESSURE: 127 MMHG

## 2021-09-30 DIAGNOSIS — M17.11 PRIMARY OSTEOARTHRITIS OF RIGHT KNEE: Primary | ICD-10-CM

## 2021-09-30 PROCEDURE — 20610 DRAIN/INJ JOINT/BURSA W/O US: CPT | Performed by: PHYSICIAN ASSISTANT

## 2021-09-30 RX ORDER — TRETINOIN 0.5 MG/G
CREAM TOPICAL
COMMUNITY

## 2021-09-30 RX ORDER — SODIUM FLUORIDE 5 MG/G
GEL, DENTIFRICE DENTAL
COMMUNITY

## 2021-09-30 RX ORDER — 1.1% SODIUM FLUORIDE PRESCRIPTION DENTAL CREAM 5 MG/G
CREAM DENTAL
COMMUNITY
Start: 2021-08-25

## 2021-09-30 RX ORDER — PSEUDOEPHEDRINE HCL 30 MG
100 TABLET ORAL 2 TIMES DAILY
COMMUNITY
Start: 2021-02-19 | End: 2022-02-19

## 2021-09-30 RX ORDER — HYALURONATE SODIUM 10 MG/ML
20 SYRINGE (ML) INTRAARTICULAR
Status: COMPLETED | OUTPATIENT
Start: 2021-09-30 | End: 2021-09-30

## 2021-09-30 RX ORDER — BUPROPION HYDROCHLORIDE 150 MG/1
TABLET ORAL
COMMUNITY
Start: 2021-09-20

## 2021-09-30 RX ADMIN — Medication 20 MG: at 13:33

## 2021-09-30 NOTE — PROGRESS NOTES
ASSESSMENT/PLAN:    Diagnoses and all orders for this visit:    Primary osteoarthritis of right knee    Other orders  -     buPROPion (WELLBUTRIN XL) 150 mg 24 hr tablet  -     SF 5000 Plus 1 1 % CREA  -     SODIUM FLUORIDE, DENTAL GEL, 1 1 % GEL; SF 5000 Plus 1 1 % dental cream  -     tretinoin (REFISSA) 0 05 % cream; tretinoin 0 05 % topical cream  -     Docusate Sodium (DSS) 100 MG CAPS; Take 100 mg by mouth 2 (two) times a day  -     Large joint arthrocentesis         the patient's right knee was injected with her 3rd and final set of Euflexxa  She tolerated the injection quite well  She will follow up with our office in 6 weeks  The patient is acceptable to this plan  Return in about 6 weeks (around 11/11/2021)  _____________________________________________________    SUBJECTIVE:  Mirtha Strong is a 63 y o  female who presents  To our office for viscosupplementation of her right knee  She is here today for her 3rd and final set of Euflexxa  She tolerated last week's injection without issue  She denies any numbness or tingling  She denies any fever or chills       The following portions of the patient's history were reviewed and updated as appropriate: allergies, current medications, past family history, past medical history, past social history, past surgical history and problem list     PAST MEDICAL HISTORY:  Past Medical History:   Diagnosis Date    Abnormal finding in urine     Arthralgia of multiple joints     Biceps tendinitis, right     Bursitis of right knee     Bursitis of right shoulder     Chondrocalcinosis     COPD exacerbation (HCC)     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     Drug intolerance     Dysfunction of right eustachian tube     Dysfunctional uterine bleeding     Edema     Elevated d-dimer     Exposure to hepatitis C     Fracture of fibula     Generalized anxiety disorder     GERD (gastroesophageal reflux disease)     Mild cervical dysplasia     Myalgia     Myositis     Palpitations     Pre-syncope     Seasonal allergies     Thrombocytopenia (HCC)     Urinary tract infection     Uterine leiomyoma        PAST SURGICAL HISTORY:  Past Surgical History:   Procedure Laterality Date    BLADDER SURGERY      BREAST BIOPSY Right     US guided    COLPOSCOPY  01/06/1998    DENTAL SURGERY      ENDOMETRIAL BIOPSY  05/21/1993    HYSTERECTOMY      HYSTEROSCOPY      uterus- 1/22/98, 7/1/02 and 4/10/07    TUBAL LIGATION      US GUIDED BREAST BIOPSY RIGHT COMPLETE Right 8/8/2018       FAMILY HISTORY:  Family History   Problem Relation Age of Onset    Stroke Mother         cerebral artery occlusion with cerebral infarction    Coronary artery disease Mother     Arrhythmia Mother         sinus    Coronary artery disease Father     Diabetes Father     Aortic aneurysm Family     Arthritis Family     Diabetes Family     Heart disease Family     No Known Problems Daughter     No Known Problems Maternal Grandmother     No Known Problems Paternal Grandmother     No Known Problems Daughter     No Known Problems Maternal Aunt     Ovarian cancer Maternal Aunt        SOCIAL HISTORY:  Social History     Tobacco Use    Smoking status: Current Every Day Smoker    Smokeless tobacco: Never Used   Vaping Use    Vaping Use: Never used   Substance Use Topics    Alcohol use: No    Drug use: No       MEDICATIONS:    Current Outpatient Medications:     Docusate Sodium (DSS) 100 MG CAPS, Take 100 mg by mouth 2 (two) times a day, Disp: , Rfl:     alendronate (Fosamax) 70 mg tablet, Take 1 tablet (70 mg total) by mouth every 7 days, Disp: 12 tablet, Rfl: 3    atorvastatin (LIPITOR) 20 mg tablet, take 1 tablet by mouth once daily, Disp: 90 tablet, Rfl: 3    buPROPion (WELLBUTRIN SR) 150 mg 12 hr tablet, Take 150 mg by mouth daily, Disp: , Rfl:     buPROPion (WELLBUTRIN XL) 150 mg 24 hr tablet, , Disp: , Rfl:     busPIRone (BUSPAR) 30 MG tablet, TAKE 1 TABLET TID, Disp: , Rfl:     clonazePAM (KlonoPIN) 1 mg tablet, TAKE 1 TABLET TID, Disp: , Rfl:     ibuprofen (MOTRIN) 600 mg tablet, Take 1 tablet (600 mg total) by mouth every 6 (six) hours as needed for mild pain, Disp: 90 tablet, Rfl: 1    meloxicam (MOBIC) 15 mg tablet, Take 1 tablet (15 mg total) by mouth daily, Disp: 30 tablet, Rfl: 1    omeprazole (PriLOSEC) 40 MG capsule, Take 1 capsule (40 mg total) by mouth daily, Disp: 30 capsule, Rfl: 5    SF 5000 Plus 1 1 % CREA, , Disp: , Rfl:     SODIUM FLUORIDE, DENTAL GEL, 1 1 % GEL, SF 5000 Plus 1 1 % dental cream, Disp: , Rfl:     tretinoin (REFISSA) 0 05 % cream, tretinoin 0 05 % topical cream, Disp: , Rfl:     tretinoin (RETIN-A) 0 1 % cream, Apply topically daily at bedtime, Disp: 45 g, Rfl: 5    venlafaxine (EFFEXOR-XR) 150 mg 24 hr capsule, take 1 capsule by mouth once daily, Disp: 30 capsule, Rfl: 5    venlafaxine (EFFEXOR-XR) 75 mg 24 hr capsule, Take 1 capsule (75 mg total) by mouth daily, Disp: 90 capsule, Rfl: 1    ALLERGIES:  No Known Allergies    ROS:  Review of Systems     Constitutional: Negative for fatigue, fever or loss of appetite  HENT: Negative  Respiratory: Negative for shortness of breath, dyspnea  Cardiovascular: Negative for chest pain/tightness  Gastrointestinal: Negative for abdominal pain, N/V  Endocrine: Negative for cold/heat intolerance, unexplained weight loss/gain  Genitourinary: Negative for flank pain, dysuria, hematuria  Musculoskeletal: Positive for arthralgia   Skin: Negative for rash  Neurological: Negative for numbness or tingling  Psychiatric/Behavioral: Negative for agitation  _____________________________________________________  PHYSICAL EXAMINATION:    Blood pressure 127/74, pulse 69, weight 62 1 kg (136 lb 12 8 oz)  Constitutional: Oriented to person, place, and time  Appears well-developed and well-nourished  No distress  HENT:   Head: Normocephalic     Eyes: Conjunctivae are normal  Right eye exhibits no discharge  Left eye exhibits no discharge  No scleral icterus  Cardiovascular: Normal rate  Pulmonary/Chest: Effort normal    Neurological: Alert and oriented to person, place, and time  Skin: Skin is warm and dry  No rash noted  Not diaphoretic  No erythema  No pallor  Psychiatric: Normal mood and affect  Behavior is normal  Judgment and thought content normal       MUSCULOSKELETAL EXAMINATION:   Physical Exam  Ortho Exam    Right lower extremity is neurovascularly intact  Toes are pink and mobile   Compartments are soft  No warmth, erythema or ecchymosis  ROM of knee is from 5-115 degrees  Negative Lachman, drawer or pivot shift  No medial instability  Medial joint line tenderness, slight lateral joint line tenderness  Patellofemoral crepitation  Objective:  BP Readings from Last 1 Encounters:   09/30/21 127/74      Wt Readings from Last 1 Encounters:   09/30/21 62 1 kg (136 lb 12 8 oz)        BMI:   Estimated body mass index is 22 76 kg/m² as calculated from the following:    Height as of 9/7/21: 5' 5" (1 651 m)  Weight as of this encounter: 62 1 kg (136 lb 12 8 oz)        PROCEDURES PERFORMED:  Large joint arthrocentesis: R knee  Universal Protocol:  Consent given by: patient  Patient understanding: patient states understanding of the procedure being performed  Site marked: the operative site was marked  Supporting Documentation  Indications: pain   Procedure Details  Location: knee - R knee  Preparation: Patient was prepped and draped in the usual sterile fashion  Needle size: 22 G  Ultrasound guidance: no  Approach: lateral  Medications administered: 20 mg Sodium Hyaluronate 20 MG/2ML    Patient tolerance: patient tolerated the procedure well with no immediate complications  Dressing:  Sterile dressing applied            Mario Knowles PA-C

## 2021-10-06 NOTE — ASSESSMENT & PLAN NOTE
Pt is improved with current regime managed by her psychiatrist  Would recommend she continue these medications  She will continue care with them for now 
[FreeTextEntry1] : Jared is here for the F/u, accompanied by his care taker. The referral from the residence is not indicating that he had any seizures.\par No events . no behavioral issues.He continues to complain of lack of activity and social interactions , of course in his sarcastic way.\par He says he sleeps well. \par Her mood is good\par Tries to do everything by himself\par Not complaining of joint or spinal pain\par No falls\par he did have blood test done that shows Keppra level \par Her  HbA1C is 7.5\par EEG-- left hem slowing

## 2021-10-16 DIAGNOSIS — K21.9 GASTROESOPHAGEAL REFLUX DISEASE WITHOUT ESOPHAGITIS: ICD-10-CM

## 2021-10-16 RX ORDER — OMEPRAZOLE 40 MG/1
CAPSULE, DELAYED RELEASE ORAL
Qty: 30 CAPSULE | Refills: 5 | Status: SHIPPED | OUTPATIENT
Start: 2021-10-16 | End: 2022-04-16

## 2021-10-19 ENCOUNTER — TELEMEDICINE (OUTPATIENT)
Dept: INTERNAL MEDICINE CLINIC | Facility: CLINIC | Age: 66
End: 2021-10-19
Payer: COMMERCIAL

## 2021-10-19 DIAGNOSIS — Z20.822 CLOSE EXPOSURE TO COVID-19 VIRUS: ICD-10-CM

## 2021-10-19 DIAGNOSIS — J06.9 UPPER RESPIRATORY TRACT INFECTION, UNSPECIFIED TYPE: Primary | ICD-10-CM

## 2021-10-19 PROCEDURE — U0005 INFEC AGEN DETEC AMPLI PROBE: HCPCS | Performed by: INTERNAL MEDICINE

## 2021-10-19 PROCEDURE — 99213 OFFICE O/P EST LOW 20 MIN: CPT | Performed by: INTERNAL MEDICINE

## 2021-10-19 PROCEDURE — U0003 INFECTIOUS AGENT DETECTION BY NUCLEIC ACID (DNA OR RNA); SEVERE ACUTE RESPIRATORY SYNDROME CORONAVIRUS 2 (SARS-COV-2) (CORONAVIRUS DISEASE [COVID-19]), AMPLIFIED PROBE TECHNIQUE, MAKING USE OF HIGH THROUGHPUT TECHNOLOGIES AS DESCRIBED BY CMS-2020-01-R: HCPCS | Performed by: INTERNAL MEDICINE

## 2021-10-19 RX ORDER — ONDANSETRON 4 MG/1
4 TABLET, FILM COATED ORAL EVERY 8 HOURS PRN
Qty: 20 TABLET | Refills: 0 | Status: SHIPPED | OUTPATIENT
Start: 2021-10-19

## 2021-10-20 ENCOUNTER — TELEMEDICINE (OUTPATIENT)
Dept: INTERNAL MEDICINE CLINIC | Facility: CLINIC | Age: 66
End: 2021-10-20
Payer: COMMERCIAL

## 2021-10-20 DIAGNOSIS — Z20.822 CLOSE EXPOSURE TO COVID-19 VIRUS: ICD-10-CM

## 2021-10-20 DIAGNOSIS — J06.9 UPPER RESPIRATORY TRACT INFECTION, UNSPECIFIED TYPE: Primary | ICD-10-CM

## 2021-10-20 LAB — SARS-COV-2 RNA RESP QL NAA+PROBE: NEGATIVE

## 2021-10-20 PROCEDURE — 99213 OFFICE O/P EST LOW 20 MIN: CPT | Performed by: INTERNAL MEDICINE

## 2021-10-21 ENCOUNTER — TELEMEDICINE (OUTPATIENT)
Dept: INTERNAL MEDICINE CLINIC | Facility: CLINIC | Age: 66
End: 2021-10-21
Payer: COMMERCIAL

## 2021-10-21 DIAGNOSIS — Z20.822 CLOSE EXPOSURE TO COVID-19 VIRUS: ICD-10-CM

## 2021-10-21 DIAGNOSIS — J06.9 UPPER RESPIRATORY TRACT INFECTION, UNSPECIFIED TYPE: Primary | ICD-10-CM

## 2021-10-21 PROCEDURE — 4004F PT TOBACCO SCREEN RCVD TLK: CPT | Performed by: INTERNAL MEDICINE

## 2021-10-21 PROCEDURE — 99213 OFFICE O/P EST LOW 20 MIN: CPT | Performed by: INTERNAL MEDICINE

## 2021-10-21 RX ORDER — AZITHROMYCIN 250 MG/1
TABLET, FILM COATED ORAL
Qty: 6 TABLET | Refills: 0 | Status: SHIPPED | OUTPATIENT
Start: 2021-10-21 | End: 2021-10-26

## 2021-10-21 RX ORDER — GUAIFENESIN 600 MG
600 TABLET, EXTENDED RELEASE 12 HR ORAL EVERY 12 HOURS SCHEDULED
Qty: 20 TABLET | Refills: 0 | Status: SHIPPED | OUTPATIENT
Start: 2021-10-21

## 2021-10-21 RX ORDER — GUAIFENESIN AND CODEINE PHOSPHATE 100; 10 MG/5ML; MG/5ML
5 SOLUTION ORAL 3 TIMES DAILY PRN
Qty: 120 ML | Refills: 0 | Status: SHIPPED | OUTPATIENT
Start: 2021-10-21

## 2021-10-21 RX ORDER — FLUTICASONE PROPIONATE 50 MCG
1 SPRAY, SUSPENSION (ML) NASAL DAILY
Qty: 16 G | Refills: 0 | Status: SHIPPED | OUTPATIENT
Start: 2021-10-21

## 2021-10-22 ENCOUNTER — TELEPHONE (OUTPATIENT)
Dept: INTERNAL MEDICINE CLINIC | Facility: CLINIC | Age: 66
End: 2021-10-22

## 2021-10-22 DIAGNOSIS — B37.0 THRUSH: Primary | ICD-10-CM

## 2021-10-26 ENCOUNTER — TELEPHONE (OUTPATIENT)
Dept: INTERNAL MEDICINE CLINIC | Facility: CLINIC | Age: 66
End: 2021-10-26

## 2021-11-13 DIAGNOSIS — F41.9 ANXIETY: ICD-10-CM

## 2021-11-13 RX ORDER — VENLAFAXINE HYDROCHLORIDE 150 MG/1
CAPSULE, EXTENDED RELEASE ORAL
Qty: 30 CAPSULE | Refills: 5 | Status: SHIPPED | OUTPATIENT
Start: 2021-11-13 | End: 2022-03-09 | Stop reason: SDUPTHER

## 2021-12-07 ENCOUNTER — OFFICE VISIT (OUTPATIENT)
Dept: OBGYN CLINIC | Facility: CLINIC | Age: 66
End: 2021-12-07
Payer: COMMERCIAL

## 2021-12-07 VITALS
HEIGHT: 65 IN | WEIGHT: 132 LBS | HEART RATE: 97 BPM | BODY MASS INDEX: 21.99 KG/M2 | DIASTOLIC BLOOD PRESSURE: 81 MMHG | SYSTOLIC BLOOD PRESSURE: 146 MMHG

## 2021-12-07 DIAGNOSIS — M17.11 PRIMARY OSTEOARTHRITIS OF RIGHT KNEE: Primary | ICD-10-CM

## 2021-12-07 PROCEDURE — 20610 DRAIN/INJ JOINT/BURSA W/O US: CPT | Performed by: ORTHOPAEDIC SURGERY

## 2021-12-07 PROCEDURE — 99213 OFFICE O/P EST LOW 20 MIN: CPT | Performed by: ORTHOPAEDIC SURGERY

## 2021-12-07 PROCEDURE — 3008F BODY MASS INDEX DOCD: CPT | Performed by: INTERNAL MEDICINE

## 2021-12-07 RX ORDER — BUPIVACAINE HYDROCHLORIDE 2.5 MG/ML
4 INJECTION, SOLUTION INFILTRATION; PERINEURAL
Status: COMPLETED | OUTPATIENT
Start: 2021-12-07 | End: 2021-12-07

## 2021-12-07 RX ORDER — TRIAMCINOLONE ACETONIDE 40 MG/ML
80 INJECTION, SUSPENSION INTRA-ARTICULAR; INTRAMUSCULAR
Status: COMPLETED | OUTPATIENT
Start: 2021-12-07 | End: 2021-12-07

## 2021-12-07 RX ADMIN — TRIAMCINOLONE ACETONIDE 80 MG: 40 INJECTION, SUSPENSION INTRA-ARTICULAR; INTRAMUSCULAR at 14:26

## 2021-12-07 RX ADMIN — BUPIVACAINE HYDROCHLORIDE 4 ML: 2.5 INJECTION, SOLUTION INFILTRATION; PERINEURAL at 14:26

## 2021-12-08 ENCOUNTER — VBI (OUTPATIENT)
Dept: ADMINISTRATIVE | Facility: OTHER | Age: 66
End: 2021-12-08

## 2021-12-10 ENCOUNTER — TELEPHONE (OUTPATIENT)
Dept: INTERNAL MEDICINE CLINIC | Facility: CLINIC | Age: 66
End: 2021-12-10

## 2021-12-10 DIAGNOSIS — F41.9 ANXIETY: ICD-10-CM

## 2021-12-10 RX ORDER — VENLAFAXINE HYDROCHLORIDE 75 MG/1
75 CAPSULE, EXTENDED RELEASE ORAL DAILY
Qty: 90 CAPSULE | Refills: 1 | Status: SHIPPED | OUTPATIENT
Start: 2021-12-10 | End: 2022-03-09 | Stop reason: SDUPTHER

## 2021-12-16 ENCOUNTER — VBI (OUTPATIENT)
Dept: ADMINISTRATIVE | Facility: OTHER | Age: 66
End: 2021-12-16

## 2021-12-17 ENCOUNTER — TELEMEDICINE (OUTPATIENT)
Dept: INTERNAL MEDICINE CLINIC | Facility: CLINIC | Age: 66
End: 2021-12-17
Payer: COMMERCIAL

## 2021-12-17 DIAGNOSIS — J06.9 UPPER RESPIRATORY TRACT INFECTION, UNSPECIFIED TYPE: Primary | ICD-10-CM

## 2021-12-17 DIAGNOSIS — Z20.822 CLOSE EXPOSURE TO COVID-19 VIRUS: ICD-10-CM

## 2021-12-17 DIAGNOSIS — Z72.0 TOBACCO ABUSE: ICD-10-CM

## 2021-12-17 PROCEDURE — 99213 OFFICE O/P EST LOW 20 MIN: CPT | Performed by: INTERNAL MEDICINE

## 2021-12-17 PROCEDURE — 87636 SARSCOV2 & INF A&B AMP PRB: CPT | Performed by: INTERNAL MEDICINE

## 2021-12-17 RX ORDER — GUAIFENESIN 600 MG
600 TABLET, EXTENDED RELEASE 12 HR ORAL EVERY 12 HOURS SCHEDULED
Qty: 20 TABLET | Refills: 0 | Status: SHIPPED | OUTPATIENT
Start: 2021-12-17

## 2021-12-17 RX ORDER — FLUTICASONE PROPIONATE 50 MCG
1 SPRAY, SUSPENSION (ML) NASAL DAILY
Qty: 16 G | Refills: 0 | Status: SHIPPED | OUTPATIENT
Start: 2021-12-17

## 2021-12-17 RX ORDER — AZITHROMYCIN 250 MG/1
TABLET, FILM COATED ORAL
Qty: 6 TABLET | Refills: 0 | Status: SHIPPED | OUTPATIENT
Start: 2021-12-17 | End: 2021-12-21

## 2021-12-20 ENCOUNTER — TELEPHONE (OUTPATIENT)
Dept: INTERNAL MEDICINE CLINIC | Facility: CLINIC | Age: 66
End: 2021-12-20

## 2022-02-10 ENCOUNTER — HOSPITAL ENCOUNTER (OUTPATIENT)
Dept: MRI IMAGING | Facility: HOSPITAL | Age: 67
Discharge: HOME | End: 2022-02-10
Payer: COMMERCIAL

## 2022-02-10 DIAGNOSIS — M54.50 LOW BACK PAIN, UNSPECIFIED: ICD-10-CM

## 2022-02-10 PROCEDURE — G1004 CDSM NDSC: HCPCS

## 2022-02-10 PROCEDURE — 72148 MRI LUMBAR SPINE W/O DYE: CPT

## 2022-02-17 ENCOUNTER — TELEPHONE (OUTPATIENT)
Dept: INTERNAL MEDICINE CLINIC | Facility: CLINIC | Age: 67
End: 2022-02-17

## 2022-02-17 DIAGNOSIS — G89.4 CHRONIC PAIN SYNDROME: Primary | ICD-10-CM

## 2022-02-18 ENCOUNTER — APPOINTMENT (OUTPATIENT)
Dept: LAB | Facility: CLINIC | Age: 67
End: 2022-02-18
Payer: COMMERCIAL

## 2022-02-18 DIAGNOSIS — Z11.4 SCREENING FOR HIV (HUMAN IMMUNODEFICIENCY VIRUS): ICD-10-CM

## 2022-02-18 DIAGNOSIS — G89.4 CHRONIC PAIN SYNDROME: ICD-10-CM

## 2022-02-18 DIAGNOSIS — E78.2 MIXED HYPERLIPIDEMIA: ICD-10-CM

## 2022-02-18 DIAGNOSIS — Z11.59 NEED FOR HEPATITIS C SCREENING TEST: ICD-10-CM

## 2022-02-18 LAB
ALBUMIN SERPL BCP-MCNC: 3.6 G/DL (ref 3.5–5)
ALP SERPL-CCNC: 88 U/L (ref 46–116)
ALT SERPL W P-5'-P-CCNC: 26 U/L (ref 12–78)
ANION GAP SERPL CALCULATED.3IONS-SCNC: 4 MMOL/L (ref 4–13)
AST SERPL W P-5'-P-CCNC: 18 U/L (ref 5–45)
BASOPHILS # BLD AUTO: 0.05 THOUSANDS/ΜL (ref 0–0.1)
BASOPHILS NFR BLD AUTO: 1 % (ref 0–1)
BILIRUB SERPL-MCNC: 0.49 MG/DL (ref 0.2–1)
BUN SERPL-MCNC: 21 MG/DL (ref 5–25)
CALCIUM SERPL-MCNC: 9 MG/DL (ref 8.3–10.1)
CHLORIDE SERPL-SCNC: 112 MMOL/L (ref 100–108)
CHOLEST SERPL-MCNC: 243 MG/DL
CO2 SERPL-SCNC: 25 MMOL/L (ref 21–32)
CREAT SERPL-MCNC: 0.67 MG/DL (ref 0.6–1.3)
EOSINOPHIL # BLD AUTO: 0.31 THOUSAND/ΜL (ref 0–0.61)
EOSINOPHIL NFR BLD AUTO: 5 % (ref 0–6)
ERYTHROCYTE [DISTWIDTH] IN BLOOD BY AUTOMATED COUNT: 13.6 % (ref 11.6–15.1)
GFR SERPL CREATININE-BSD FRML MDRD: 93 ML/MIN/1.73SQ M
GLUCOSE P FAST SERPL-MCNC: 99 MG/DL (ref 65–99)
HCT VFR BLD AUTO: 39.1 % (ref 34.8–46.1)
HCV AB SER QL: NORMAL
HDLC SERPL-MCNC: 61 MG/DL
HGB BLD-MCNC: 12.2 G/DL (ref 11.5–15.4)
IMM GRANULOCYTES # BLD AUTO: 0.02 THOUSAND/UL (ref 0–0.2)
IMM GRANULOCYTES NFR BLD AUTO: 0 % (ref 0–2)
LDLC SERPL CALC-MCNC: 135 MG/DL (ref 0–100)
LYMPHOCYTES # BLD AUTO: 1.86 THOUSANDS/ΜL (ref 0.6–4.47)
LYMPHOCYTES NFR BLD AUTO: 29 % (ref 14–44)
MCH RBC QN AUTO: 30.9 PG (ref 26.8–34.3)
MCHC RBC AUTO-ENTMCNC: 31.2 G/DL (ref 31.4–37.4)
MCV RBC AUTO: 99 FL (ref 82–98)
MONOCYTES # BLD AUTO: 0.49 THOUSAND/ΜL (ref 0.17–1.22)
MONOCYTES NFR BLD AUTO: 8 % (ref 4–12)
NEUTROPHILS # BLD AUTO: 3.71 THOUSANDS/ΜL (ref 1.85–7.62)
NEUTS SEG NFR BLD AUTO: 57 % (ref 43–75)
NRBC BLD AUTO-RTO: 0 /100 WBCS
PLATELET # BLD AUTO: 281 THOUSANDS/UL (ref 149–390)
PMV BLD AUTO: 11.3 FL (ref 8.9–12.7)
POTASSIUM SERPL-SCNC: 4.7 MMOL/L (ref 3.5–5.3)
PROT SERPL-MCNC: 7.2 G/DL (ref 6.4–8.2)
RBC # BLD AUTO: 3.95 MILLION/UL (ref 3.81–5.12)
SODIUM SERPL-SCNC: 141 MMOL/L (ref 136–145)
TRIGL SERPL-MCNC: 236 MG/DL
TSH SERPL DL<=0.05 MIU/L-ACNC: 1.35 UIU/ML (ref 0.36–3.74)
WBC # BLD AUTO: 6.44 THOUSAND/UL (ref 4.31–10.16)

## 2022-02-18 PROCEDURE — 84443 ASSAY THYROID STIM HORMONE: CPT

## 2022-02-18 PROCEDURE — 87389 HIV-1 AG W/HIV-1&-2 AB AG IA: CPT

## 2022-02-18 PROCEDURE — 85025 COMPLETE CBC W/AUTO DIFF WBC: CPT

## 2022-02-18 PROCEDURE — 36415 COLL VENOUS BLD VENIPUNCTURE: CPT

## 2022-02-18 PROCEDURE — 80307 DRUG TEST PRSMV CHEM ANLYZR: CPT | Performed by: INTERNAL MEDICINE

## 2022-02-18 PROCEDURE — 80053 COMPREHEN METABOLIC PANEL: CPT

## 2022-02-18 PROCEDURE — 86803 HEPATITIS C AB TEST: CPT

## 2022-02-18 PROCEDURE — 80061 LIPID PANEL: CPT

## 2022-02-19 LAB
AMPHETAMINES UR QL SCN: NEGATIVE NG/ML
BARBITURATES UR QL SCN: NEGATIVE NG/ML
BENZODIAZ UR QL: NEGATIVE NG/ML
BZE UR QL: NEGATIVE NG/ML
CANNABINOIDS UR QL SCN: NEGATIVE NG/ML
METHADONE UR QL SCN: NEGATIVE NG/ML
OPIATES UR QL: NEGATIVE NG/ML
PCP UR QL: NEGATIVE NG/ML
PROPOXYPH UR QL SCN: NEGATIVE NG/ML

## 2022-02-20 LAB — HIV 1+2 AB+HIV1 P24 AG SERPL QL IA: NORMAL

## 2022-02-22 ENCOUNTER — APPOINTMENT (OUTPATIENT)
Dept: LAB | Facility: CLINIC | Age: 67
End: 2022-02-22
Payer: COMMERCIAL

## 2022-02-22 DIAGNOSIS — Z12.11 ENCOUNTER FOR SCREENING FOR COLORECTAL MALIGNANT NEOPLASM: ICD-10-CM

## 2022-02-22 DIAGNOSIS — Z12.12 ENCOUNTER FOR SCREENING FOR COLORECTAL MALIGNANT NEOPLASM: ICD-10-CM

## 2022-02-22 PROCEDURE — 80307 DRUG TEST PRSMV CHEM ANLYZR: CPT | Performed by: INTERNAL MEDICINE

## 2022-02-24 DIAGNOSIS — R79.9 ABNORMAL BLOOD FINDINGS: Primary | ICD-10-CM

## 2022-03-03 ENCOUNTER — TELEPHONE (OUTPATIENT)
Dept: INTERNAL MEDICINE CLINIC | Facility: CLINIC | Age: 67
End: 2022-03-03

## 2022-03-03 NOTE — TELEPHONE ENCOUNTER
Pt called she sees [facility] for her back and Dr Ludin Nuñez for her pain, he is retiring and refer her else where, [facility] sent her to a spine doctor who reviewed her lastest MRI and told her that her spine is unfixable that her whole spine would be replaced with metal, she been taking tylenol and ibuprofen on a daily basis and it not even helping Dr Ludin Nuñez told her that opioids would not help in her situation, she asking for you advice in what to do for the pain

## 2022-03-09 DIAGNOSIS — F41.9 ANXIETY: ICD-10-CM

## 2022-03-09 RX ORDER — VENLAFAXINE HYDROCHLORIDE 75 MG/1
75 CAPSULE, EXTENDED RELEASE ORAL DAILY
Qty: 90 CAPSULE | Refills: 1 | Status: SHIPPED | OUTPATIENT
Start: 2022-03-09 | End: 2022-06-02 | Stop reason: SDUPTHER

## 2022-03-09 RX ORDER — VENLAFAXINE HYDROCHLORIDE 150 MG/1
150 CAPSULE, EXTENDED RELEASE ORAL DAILY
Qty: 30 CAPSULE | Refills: 5 | Status: SHIPPED | OUTPATIENT
Start: 2022-03-09 | End: 2022-06-02 | Stop reason: SDUPTHER

## 2022-03-10 ENCOUNTER — TELEPHONE (OUTPATIENT)
Dept: LAB | Facility: HOSPITAL | Age: 67
End: 2022-03-10

## 2022-03-10 ENCOUNTER — TELEPHONE (OUTPATIENT)
Dept: INTERNAL MEDICINE CLINIC | Facility: CLINIC | Age: 67
End: 2022-03-10

## 2022-03-16 DIAGNOSIS — E78.2 MIXED HYPERLIPIDEMIA: ICD-10-CM

## 2022-03-16 RX ORDER — ATORVASTATIN CALCIUM 20 MG/1
TABLET, FILM COATED ORAL
Qty: 90 TABLET | Refills: 3 | Status: SHIPPED | OUTPATIENT
Start: 2022-03-16

## 2022-03-22 ENCOUNTER — TELEPHONE (OUTPATIENT)
Dept: INTERNAL MEDICINE CLINIC | Facility: CLINIC | Age: 67
End: 2022-03-22

## 2022-03-28 DIAGNOSIS — L30.9 DERMATITIS: ICD-10-CM

## 2022-03-28 RX ORDER — TRETINOIN 1 MG/G
CREAM TOPICAL
Qty: 45 G | Refills: 5 | Status: SHIPPED | OUTPATIENT
Start: 2022-03-28

## 2022-03-29 ENCOUNTER — OFFICE VISIT (OUTPATIENT)
Dept: OBGYN CLINIC | Facility: CLINIC | Age: 67
End: 2022-03-29
Payer: COMMERCIAL

## 2022-03-29 VITALS
HEIGHT: 65 IN | WEIGHT: 140 LBS | SYSTOLIC BLOOD PRESSURE: 126 MMHG | BODY MASS INDEX: 23.32 KG/M2 | DIASTOLIC BLOOD PRESSURE: 77 MMHG | HEART RATE: 72 BPM

## 2022-03-29 DIAGNOSIS — M17.11 PRIMARY OSTEOARTHRITIS OF RIGHT KNEE: Primary | ICD-10-CM

## 2022-03-29 PROCEDURE — 4004F PT TOBACCO SCREEN RCVD TLK: CPT | Performed by: ORTHOPAEDIC SURGERY

## 2022-03-29 PROCEDURE — 3008F BODY MASS INDEX DOCD: CPT | Performed by: ORTHOPAEDIC SURGERY

## 2022-03-29 PROCEDURE — 20610 DRAIN/INJ JOINT/BURSA W/O US: CPT | Performed by: ORTHOPAEDIC SURGERY

## 2022-03-29 PROCEDURE — 99213 OFFICE O/P EST LOW 20 MIN: CPT | Performed by: ORTHOPAEDIC SURGERY

## 2022-03-29 RX ORDER — BUPIVACAINE HYDROCHLORIDE 2.5 MG/ML
4 INJECTION, SOLUTION INFILTRATION; PERINEURAL
Status: COMPLETED | OUTPATIENT
Start: 2022-03-29 | End: 2022-03-29

## 2022-03-29 RX ORDER — TRIAMCINOLONE ACETONIDE 40 MG/ML
80 INJECTION, SUSPENSION INTRA-ARTICULAR; INTRAMUSCULAR
Status: COMPLETED | OUTPATIENT
Start: 2022-03-29 | End: 2022-03-29

## 2022-03-29 RX ADMIN — TRIAMCINOLONE ACETONIDE 80 MG: 40 INJECTION, SUSPENSION INTRA-ARTICULAR; INTRAMUSCULAR at 08:40

## 2022-03-29 RX ADMIN — BUPIVACAINE HYDROCHLORIDE 4 ML: 2.5 INJECTION, SOLUTION INFILTRATION; PERINEURAL at 08:40

## 2022-03-29 NOTE — PROGRESS NOTES
Assessment:   Diagnosis ICD-10-CM Associated Orders   1  Primary osteoarthritis of right knee  M17 11 Injection Procedure Prior Authorization       Plan:  · Patient right knee was injected today with Kenalog and Marcaine  This is documented appropriately below  She tolerated the injection quite well  · She will be pre certed today for repeat round of Euflexxa into her right knee  · Continue use of OTC meds and ice prn for pain relief      The patient has degenerative joint disease of her right knee  Under aseptic technique, this was injected with Kenalog and Marcaine  She tolerated procedure quite well  She wants to start viscosupplementation as soon as he is approved with insurance  A referral was made  Return back once this is complete  Physical examination shows diffuse medial lateral joint tenderness and patellofemoral crepitation  Negative Homans  There are palpable osteophytes    To do next visit:  Return for visco injections into right knee  The above stated was discussed in layman's terms and the patient expressed understanding  All questions were answered to the patient's satisfaction  Scribe Attestation    I,:  Reji Stewart am acting as a scribe while in the presence of the attending physician :       I,:  Ashley Diamond, DO personally performed the services described in this documentation    as scribed in my presence :             Subjective:   Maximilian Brenner is a 61 y o  female who presents today for follow-up visit for her right knee  She has been treating for known severe medial compartment osteoarthritis  She was last seen on 12/07/2021 and provided with a cortisone injection for right knee with appreciable benefit up until a few weeks ago  She denies any new acute injury or trauma  Today, she notes increasing pain about the medial aspect of the knee  Pain is worse with ambulating and standing  She takes OTC meds and ice p r n  for pain relief with minimal benefit  No numbness or tingling  No fevers or chills  Review of systems negative unless otherwise specified in HPI  Review of Systems   Constitutional: Negative for chills, fever and unexpected weight change  HENT: Negative for hearing loss, nosebleeds and sore throat  Eyes: Negative for pain, redness and visual disturbance  Respiratory: Negative for cough, shortness of breath and wheezing  Cardiovascular: Negative for chest pain, palpitations and leg swelling  Gastrointestinal: Negative for abdominal distention, nausea and vomiting  Endocrine: Negative for polydipsia and polyuria  Genitourinary: Negative for dysuria and hematuria  Skin: Negative for rash and wound  Neurological: Negative for dizziness, numbness and headaches  Psychiatric/Behavioral: Negative for decreased concentration and suicidal ideas         Past Medical History:   Diagnosis Date    Abnormal finding in urine     Arthralgia of multiple joints     Biceps tendinitis, right     Bursitis of right knee     Bursitis of right shoulder     Chondrocalcinosis     COPD exacerbation (HCC)     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     Drug intolerance     Dysfunction of right eustachian tube     Dysfunctional uterine bleeding     Edema     Elevated d-dimer     Exposure to hepatitis C     Fracture of fibula     Generalized anxiety disorder     GERD (gastroesophageal reflux disease)     Mild cervical dysplasia     Myalgia     Myositis     Palpitations     Pre-syncope     Seasonal allergies     Thrombocytopenia (HCC)     Urinary tract infection     Uterine leiomyoma        Past Surgical History:   Procedure Laterality Date    BLADDER SURGERY      BREAST BIOPSY Right     US guided    COLPOSCOPY  01/06/1998    DENTAL SURGERY      ENDOMETRIAL BIOPSY  05/21/1993    HYSTERECTOMY      HYSTEROSCOPY      uterus- 1/22/98, 7/1/02 and 4/10/07    TUBAL LIGATION      US GUIDED BREAST BIOPSY RIGHT COMPLETE Right 8/8/2018       Family History   Problem Relation Age of Onset    Stroke Mother         cerebral artery occlusion with cerebral infarction    Coronary artery disease Mother     Arrhythmia Mother         sinus    Coronary artery disease Father     Diabetes Father     Aortic aneurysm Family     Arthritis Family     Diabetes Family     Heart disease Family     No Known Problems Daughter     No Known Problems Maternal Grandmother     No Known Problems Paternal Grandmother     No Known Problems Daughter     No Known Problems Maternal Aunt     Ovarian cancer Maternal Aunt        Social History     Occupational History    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    Occupation: housewife   Tobacco Use    Smoking status: Current Every Day Smoker    Smokeless tobacco: Never Used   Vaping Use    Vaping Use: Never used   Substance and Sexual Activity    Alcohol use: No    Drug use: No    Sexual activity: Not on file         Current Outpatient Medications:     alendronate (Fosamax) 70 mg tablet, Take 1 tablet (70 mg total) by mouth every 7 days, Disp: 12 tablet, Rfl: 3    atorvastatin (LIPITOR) 20 mg tablet, take 1 tablet by mouth once daily, Disp: 90 tablet, Rfl: 3    buPROPion (WELLBUTRIN XL) 150 mg 24 hr tablet, , Disp: , Rfl:     clonazePAM (KlonoPIN) 1 mg tablet, TAKE 1 TABLET TID, Disp: , Rfl:     ibuprofen (MOTRIN) 600 mg tablet, Take 1 tablet (600 mg total) by mouth every 6 (six) hours as needed for mild pain, Disp: 90 tablet, Rfl: 1    SODIUM FLUORIDE, DENTAL GEL, 1 1 % GEL, SF 5000 Plus 1 1 % dental cream, Disp: , Rfl:     venlafaxine (EFFEXOR-XR) 150 mg 24 hr capsule, Take 1 capsule (150 mg total) by mouth daily, Disp: 30 capsule, Rfl: 5    venlafaxine (EFFEXOR-XR) 75 mg 24 hr capsule, Take 1 capsule (75 mg total) by mouth daily, Disp: 90 capsule, Rfl: 1    buPROPion (WELLBUTRIN SR) 150 mg 12 hr tablet, Take 150 mg by mouth daily (Patient not taking: Reported on 3/29/2022 ), Disp: , Rfl:     busPIRone (BUSPAR) 30 MG tablet, TAKE 1 TABLET TID (Patient not taking: Reported on 12/17/2021), Disp: , Rfl:     Docusate Sodium (DSS) 100 MG CAPS, Take 100 mg by mouth 2 (two) times a day (Patient not taking: Reported on 12/17/2021 ), Disp: , Rfl:     fluticasone (FLONASE) 50 mcg/act nasal spray, 1 spray into each nostril daily (Patient not taking: Reported on 3/29/2022 ), Disp: 16 g, Rfl: 0    fluticasone (FLONASE) 50 mcg/act nasal spray, 1 spray into each nostril daily (Patient not taking: Reported on 3/29/2022 ), Disp: 16 g, Rfl: 0    guaiFENesin (Mucinex) 600 mg 12 hr tablet, Take 1 tablet (600 mg total) by mouth every 12 (twelve) hours (Patient not taking: Reported on 3/29/2022 ), Disp: 20 tablet, Rfl: 0    guaiFENesin (Mucinex) 600 mg 12 hr tablet, Take 1 tablet (600 mg total) by mouth every 12 (twelve) hours (Patient not taking: Reported on 3/29/2022 ), Disp: 20 tablet, Rfl: 0    guaifenesin-codeine (GUAIFENESIN AC) 100-10 MG/5ML liquid, Take 5 mL by mouth 3 (three) times a day as needed for cough (Patient not taking: Reported on 3/29/2022 ), Disp: 120 mL, Rfl: 0    meloxicam (MOBIC) 15 mg tablet, Take 1 tablet (15 mg total) by mouth daily (Patient not taking: Reported on 3/29/2022 ), Disp: 30 tablet, Rfl: 1    nystatin (MYCOSTATIN) 500,000 units/5 mL suspension, Apply 5 mL (500,000 Units total) to the mouth or throat 4 (four) times a day, Disp: 473 mL, Rfl: 0    omeprazole (PriLOSEC) 40 MG capsule, take 1 capsule by mouth once daily, Disp: 30 capsule, Rfl: 5    ondansetron (ZOFRAN) 4 mg tablet, Take 1 tablet (4 mg total) by mouth every 8 (eight) hours as needed for nausea or vomiting (Patient not taking: Reported on 12/17/2021 ), Disp: 20 tablet, Rfl: 0    SF 5000 Plus 1 1 % CREA, , Disp: , Rfl:     tretinoin (REFISSA) 0 05 % cream, tretinoin 0 05 % topical cream (Patient not taking: Reported on 3/29/2022), Disp: , Rfl:     tretinoin (RETIN-A) 0 1 % cream, Apply topically daily at bedtime (Patient not taking: Reported on 3/29/2022 ), Disp: 45 g, Rfl: 5    No Known Allergies         Vitals:    03/29/22 0828   BP: 126/77   Pulse: 72       Objective:                    Ortho Exam   Right lower extremity is neurovascularly intact  Toes are pink and mobile   Compartments are soft  No warmth, erythema or ecchymosis  ROM of knee is from 5-115 degrees  Negative Lachman, drawer or pivot shift  No medial instability  Medial joint line tenderness, slight lateral joint line tenderness  Patellofemoral crepitation    Diagnostics, reviewed and taken today if performed as documented:    None performed      Procedures, if performed today:    Large joint arthrocentesis: R knee  Universal Protocol:  Consent: Verbal consent obtained  Risks and benefits: risks, benefits and alternatives were discussed  Consent given by: patient    Supporting Documentation  Indications: pain and diagnostic evaluation   Procedure Details  Location: knee - R knee  Preparation: Patient was prepped and draped in the usual sterile fashion  Needle size: 20 G  Ultrasound guidance: no  Approach: lateral  Medications administered: 4 mL bupivacaine 0 25 %; 80 mg triamcinolone acetonide 40 mg/mL    Patient tolerance: patient tolerated the procedure well with no immediate complications  Dressing:  Sterile dressing applied          Portions of the record may have been created with voice recognition software  Occasional wrong word or "sound a like" substitutions may have occurred due to the inherent limitations of voice recognition software  Read the chart carefully and recognize, using context, where substitutions have occurred

## 2022-04-16 DIAGNOSIS — K21.9 GASTROESOPHAGEAL REFLUX DISEASE WITHOUT ESOPHAGITIS: ICD-10-CM

## 2022-04-16 RX ORDER — OMEPRAZOLE 40 MG/1
CAPSULE, DELAYED RELEASE ORAL
Qty: 30 CAPSULE | Refills: 5 | Status: SHIPPED | OUTPATIENT
Start: 2022-04-16

## 2022-04-19 ENCOUNTER — PROCEDURE VISIT (OUTPATIENT)
Dept: OBGYN CLINIC | Facility: CLINIC | Age: 67
End: 2022-04-19
Payer: COMMERCIAL

## 2022-04-19 ENCOUNTER — TELEPHONE (OUTPATIENT)
Dept: INTERNAL MEDICINE CLINIC | Facility: CLINIC | Age: 67
End: 2022-04-19

## 2022-04-19 VITALS — HEIGHT: 65 IN | WEIGHT: 140 LBS | BODY MASS INDEX: 23.32 KG/M2

## 2022-04-19 DIAGNOSIS — Z12.12 ENCOUNTER FOR SCREENING FOR COLORECTAL MALIGNANT NEOPLASM: Primary | ICD-10-CM

## 2022-04-19 DIAGNOSIS — M65.311 TRIGGER FINGER OF RIGHT THUMB: ICD-10-CM

## 2022-04-19 DIAGNOSIS — M17.11 PRIMARY OSTEOARTHRITIS OF RIGHT KNEE: Primary | ICD-10-CM

## 2022-04-19 DIAGNOSIS — M25.569 ACUTE KNEE PAIN, UNSPECIFIED LATERALITY: ICD-10-CM

## 2022-04-19 DIAGNOSIS — Z12.11 ENCOUNTER FOR SCREENING FOR COLORECTAL MALIGNANT NEOPLASM: Primary | ICD-10-CM

## 2022-04-19 PROCEDURE — 3008F BODY MASS INDEX DOCD: CPT | Performed by: ORTHOPAEDIC SURGERY

## 2022-04-19 PROCEDURE — 20550 NJX 1 TENDON SHEATH/LIGAMENT: CPT | Performed by: ORTHOPAEDIC SURGERY

## 2022-04-19 PROCEDURE — 4004F PT TOBACCO SCREEN RCVD TLK: CPT | Performed by: ORTHOPAEDIC SURGERY

## 2022-04-19 PROCEDURE — 20610 DRAIN/INJ JOINT/BURSA W/O US: CPT | Performed by: ORTHOPAEDIC SURGERY

## 2022-04-19 PROCEDURE — 99213 OFFICE O/P EST LOW 20 MIN: CPT | Performed by: ORTHOPAEDIC SURGERY

## 2022-04-19 RX ORDER — MELOXICAM 15 MG/1
15 TABLET ORAL DAILY
Qty: 30 TABLET | Refills: 2 | Status: SHIPPED | OUTPATIENT
Start: 2022-04-19

## 2022-04-19 RX ORDER — IBUPROFEN 800 MG/1
800 TABLET ORAL EVERY 6 HOURS PRN
Qty: 120 TABLET | Refills: 1 | Status: SHIPPED | OUTPATIENT
Start: 2022-04-19

## 2022-04-19 RX ORDER — HYALURONATE SODIUM 10 MG/ML
20 SYRINGE (ML) INTRAARTICULAR
Status: COMPLETED | OUTPATIENT
Start: 2022-04-19 | End: 2022-04-19

## 2022-04-19 RX ORDER — BETAMETHASONE SODIUM PHOSPHATE AND BETAMETHASONE ACETATE 3; 3 MG/ML; MG/ML
3 INJECTION, SUSPENSION INTRA-ARTICULAR; INTRALESIONAL; INTRAMUSCULAR; SOFT TISSUE
Status: COMPLETED | OUTPATIENT
Start: 2022-04-19 | End: 2022-04-19

## 2022-04-19 RX ORDER — BUPIVACAINE HYDROCHLORIDE 2.5 MG/ML
0.5 INJECTION, SOLUTION EPIDURAL; INFILTRATION; INTRACAUDAL
Status: COMPLETED | OUTPATIENT
Start: 2022-04-19 | End: 2022-04-19

## 2022-04-19 RX ADMIN — BETAMETHASONE SODIUM PHOSPHATE AND BETAMETHASONE ACETATE 3 MG: 3; 3 INJECTION, SUSPENSION INTRA-ARTICULAR; INTRALESIONAL; INTRAMUSCULAR; SOFT TISSUE at 16:04

## 2022-04-19 RX ADMIN — Medication 20 MG: at 16:04

## 2022-04-19 RX ADMIN — BUPIVACAINE HYDROCHLORIDE 0.5 ML: 2.5 INJECTION, SOLUTION EPIDURAL; INFILTRATION; INTRACAUDAL at 16:04

## 2022-04-19 NOTE — PROGRESS NOTES
ASSESSMENT/PLAN:    Diagnoses and all orders for this visit:    Primary osteoarthritis of right knee    Trigger finger of right thumb    Patient was provided with her 1st Euflexxa injection into her right knee  She tolerated this injection quite well  This is documented appropriately below  She was also given an injection into her right thumb for known trigger finger  Return in about 1 week (around 4/26/2022) for Euflexxa #2 right knee  Under aseptic technique, her right knee was injected with her 1st set of Euflexxa in her right thumb with Celestone and Marcaine intra-articular  She tolerated both procedures well  Return back next week for 2nd set of injections to her right knee  Physical exam of the right thumb shows hypertrophy with grinding and inflammation  Examination the right knee shows diffuse medial lateral joint tenderness with patellofemoral crepitation  There is a painful arc of motion throughout the knee      _____________________________________________________  CHIEF COMPLAINT:  Chief Complaint   Patient presents with    Right Knee - Follow-up         SUBJECTIVE:  Ely Castillo is a 61y o  year old female who presents for viscosupplementation  First to flex injection into her right knee  She notes continued daily pain about the right knee  Pain is localized globally about the knee  Pain is worse with ambulating standing  She also notes right-sided thumb pain with catching and locking  No numbness or tingling  No fevers or chills      PAST MEDICAL HISTORY:  Past Medical History:   Diagnosis Date    Abnormal finding in urine     Arthralgia of multiple joints     Biceps tendinitis, right     Bursitis of right knee     Bursitis of right shoulder     Chondrocalcinosis     COPD exacerbation (HCC)     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     Drug intolerance     Dysfunction of right eustachian tube     Dysfunctional uterine bleeding     Edema     Elevated d-dimer     Exposure to hepatitis C     Fracture of fibula     Generalized anxiety disorder     GERD (gastroesophageal reflux disease)     Mild cervical dysplasia     Myalgia     Myositis     Palpitations     Pre-syncope     Seasonal allergies     Thrombocytopenia (HCC)     Urinary tract infection     Uterine leiomyoma        PAST SURGICAL HISTORY:  Past Surgical History:   Procedure Laterality Date    BLADDER SURGERY      BREAST BIOPSY Right     US guided    COLPOSCOPY  01/06/1998    DENTAL SURGERY      ENDOMETRIAL BIOPSY  05/21/1993    HYSTERECTOMY      HYSTEROSCOPY      uterus- 1/22/98, 7/1/02 and 4/10/07    TUBAL LIGATION      US GUIDED BREAST BIOPSY RIGHT COMPLETE Right 8/8/2018       FAMILY HISTORY:  Family History   Problem Relation Age of Onset    Stroke Mother         cerebral artery occlusion with cerebral infarction    Coronary artery disease Mother     Arrhythmia Mother         sinus    Coronary artery disease Father     Diabetes Father     Aortic aneurysm Family     Arthritis Family     Diabetes Family     Heart disease Family     No Known Problems Daughter     No Known Problems Maternal Grandmother     No Known Problems Paternal Grandmother     No Known Problems Daughter     No Known Problems Maternal Aunt     Ovarian cancer Maternal Aunt        SOCIAL HISTORY:  Social History     Tobacco Use    Smoking status: Current Every Day Smoker    Smokeless tobacco: Never Used   Vaping Use    Vaping Use: Never used   Substance Use Topics    Alcohol use: No    Drug use: No       MEDICATIONS:    Current Outpatient Medications:     alendronate (FOSAMAX) 70 mg tablet, take 1 tablet by mouth every 7 days, Disp: 6 tablet, Rfl: 0    atorvastatin (LIPITOR) 20 mg tablet, take 1 tablet by mouth once daily, Disp: 90 tablet, Rfl: 3    buPROPion (WELLBUTRIN XL) 150 mg 24 hr tablet, , Disp: , Rfl:     clonazePAM (KlonoPIN) 1 mg tablet, TAKE 1 TABLET TID, Disp: , Rfl:    ibuprofen (MOTRIN) 600 mg tablet, Take 1 tablet (600 mg total) by mouth every 6 (six) hours as needed for mild pain, Disp: 90 tablet, Rfl: 1    nystatin (MYCOSTATIN) 500,000 units/5 mL suspension, Apply 5 mL (500,000 Units total) to the mouth or throat 4 (four) times a day, Disp: 473 mL, Rfl: 0    omeprazole (PriLOSEC) 40 MG capsule, take 1 capsule by mouth once daily, Disp: 30 capsule, Rfl: 5    SODIUM FLUORIDE, DENTAL GEL, 1 1 % GEL, SF 5000 Plus 1 1 % dental cream, Disp: , Rfl:     venlafaxine (EFFEXOR-XR) 150 mg 24 hr capsule, Take 1 capsule (150 mg total) by mouth daily, Disp: 30 capsule, Rfl: 5    venlafaxine (EFFEXOR-XR) 75 mg 24 hr capsule, Take 1 capsule (75 mg total) by mouth daily, Disp: 90 capsule, Rfl: 1    buPROPion (WELLBUTRIN SR) 150 mg 12 hr tablet, Take 150 mg by mouth daily (Patient not taking: Reported on 3/29/2022 ), Disp: , Rfl:     busPIRone (BUSPAR) 30 MG tablet, TAKE 1 TABLET TID (Patient not taking: Reported on 12/17/2021), Disp: , Rfl:     Docusate Sodium (DSS) 100 MG CAPS, Take 100 mg by mouth 2 (two) times a day (Patient not taking: Reported on 12/17/2021 ), Disp: , Rfl:     fluticasone (FLONASE) 50 mcg/act nasal spray, 1 spray into each nostril daily (Patient not taking: Reported on 3/29/2022 ), Disp: 16 g, Rfl: 0    fluticasone (FLONASE) 50 mcg/act nasal spray, 1 spray into each nostril daily (Patient not taking: Reported on 3/29/2022 ), Disp: 16 g, Rfl: 0    guaiFENesin (Mucinex) 600 mg 12 hr tablet, Take 1 tablet (600 mg total) by mouth every 12 (twelve) hours (Patient not taking: Reported on 3/29/2022 ), Disp: 20 tablet, Rfl: 0    guaiFENesin (Mucinex) 600 mg 12 hr tablet, Take 1 tablet (600 mg total) by mouth every 12 (twelve) hours (Patient not taking: Reported on 3/29/2022 ), Disp: 20 tablet, Rfl: 0    guaifenesin-codeine (GUAIFENESIN AC) 100-10 MG/5ML liquid, Take 5 mL by mouth 3 (three) times a day as needed for cough (Patient not taking: Reported on 3/29/2022 ), Disp: 120 mL, Rfl: 0    meloxicam (MOBIC) 15 mg tablet, Take 1 tablet (15 mg total) by mouth daily (Patient not taking: Reported on 3/29/2022 ), Disp: 30 tablet, Rfl: 1    ondansetron (ZOFRAN) 4 mg tablet, Take 1 tablet (4 mg total) by mouth every 8 (eight) hours as needed for nausea or vomiting (Patient not taking: Reported on 12/17/2021 ), Disp: 20 tablet, Rfl: 0    SF 5000 Plus 1 1 % CREA, , Disp: , Rfl:     tretinoin (REFISSA) 0 05 % cream, tretinoin 0 05 % topical cream (Patient not taking: Reported on 3/29/2022), Disp: , Rfl:     tretinoin (RETIN-A) 0 1 % cream, Apply topically daily at bedtime (Patient not taking: Reported on 3/29/2022 ), Disp: 45 g, Rfl: 5    ALLERGIES:  No Known Allergies    REVIEW OF SYSTEMS:  Pertinent items are noted in HPI  A comprehensive review of systems was negative   _____________________________________________________  PHYSICAL EXAMINATION:  General: well developed and well nourished, alert, oriented times 3 and appears comfortable  Psychiatric: Normal  HEENT:  Normocephalic, atraumatic  Cardiovascular:  Regular  Pulmonary: No wheezing or stridor  Skin: No masses, erthema, lacerations, fluctation, ulcerations  Neurovascular: Motor and sensory exams are grossly intact, +2 PT pulse  MUSCULOSKELETAL EXAMINATION:    Right lower extremity is neurovascularly intact  Toes are pink and mobile   Compartments are soft  No warmth, erythema or ecchymosis  ROM of knee is from 5-115 degrees  Negative Lachman, drawer or pivot shift  No medial instability  Medial joint line tenderness, slight lateral joint line tenderness  Patellofemoral crepitation    right thumb:  Positive palpable nodule over the A1 pulley  Positive tenderness to palpation over A1 pulley  Positive catching   Positive clicking    _____________________________________________________  STUDIES REVIEWED:  No Studies to review      PROCEDURES PERFORMED:  Large joint arthrocentesis: R knee  Universal Protocol:  Consent: Verbal consent obtained  Risks and benefits: risks, benefits and alternatives were discussed  Consent given by: patient    Supporting Documentation  Indications: pain and diagnostic evaluation   Procedure Details  Location: knee - R knee  Preparation: Patient was prepped and draped in the usual sterile fashion  Needle size: 20 G  Ultrasound guidance: no  Approach: lateral  Medications administered: 20 mg Sodium Hyaluronate 20 MG/2ML    Patient tolerance: patient tolerated the procedure well with no immediate complications  Dressing:  Sterile dressing applied    Hand/upper extremity injection: R thumb A1  Universal Protocol:  Consent: Verbal consent obtained    Risks and benefits: risks, benefits and alternatives were discussed  Consent given by: patient    Supporting Documentation  Indications: diagnostic, pain and therapeutic   Procedure Details  Condition:trigger finger Location: thumb - R thumb A1   Preparation: Patient was prepped and draped in the usual sterile fashion  Needle size: 25 G  Ultrasound guidance: no  Approach: volar  Medications administered: 0 5 mL bupivacaine (PF) 0 25 %; 3 mg betamethasone acetate-betamethasone sodium phosphate 6 (3-3) mg/mL    Patient tolerance: patient tolerated the procedure well with no immediate complications  Dressing:  Sterile dressing applied             Scribe Attestation    I,:  Roderick Ritter am acting as a scribe while in the presence of the attending physician :       I,:  Angelina Gonzalez DO personally performed the services described in this documentation    as scribed in my presence :

## 2022-05-03 ENCOUNTER — PROCEDURE VISIT (OUTPATIENT)
Dept: OBGYN CLINIC | Facility: CLINIC | Age: 67
End: 2022-05-03
Payer: COMMERCIAL

## 2022-05-03 VITALS
HEART RATE: 76 BPM | WEIGHT: 140 LBS | HEIGHT: 65 IN | BODY MASS INDEX: 23.32 KG/M2 | DIASTOLIC BLOOD PRESSURE: 72 MMHG | SYSTOLIC BLOOD PRESSURE: 123 MMHG

## 2022-05-03 DIAGNOSIS — M17.11 PRIMARY OSTEOARTHRITIS OF RIGHT KNEE: Primary | ICD-10-CM

## 2022-05-03 PROCEDURE — 20610 DRAIN/INJ JOINT/BURSA W/O US: CPT | Performed by: ORTHOPAEDIC SURGERY

## 2022-05-03 PROCEDURE — 3008F BODY MASS INDEX DOCD: CPT | Performed by: ORTHOPAEDIC SURGERY

## 2022-05-03 RX ORDER — HYALURONATE SODIUM 10 MG/ML
20 SYRINGE (ML) INTRAARTICULAR
Status: COMPLETED | OUTPATIENT
Start: 2022-05-03 | End: 2022-05-03

## 2022-05-03 RX ADMIN — Medication 20 MG: at 14:19

## 2022-05-03 NOTE — PROGRESS NOTES
ASSESSMENT/PLAN:    Diagnoses and all orders for this visit:    Primary osteoarthritis of right knee    Patient was given her 2nd Euflexxa injection today into her right knee  This is documented appropriate below  She tolerated the injection quite well  Continue use of OTC meds and ice p r n  for pain relief      Return in about 1 week (around 5/10/2022) for Euflexx #3 into right knee  Under aseptic technique, her right knee was injected with her 2nd set of Euflexxa  She tolerated procedure quite well  Return back next week for final set of injections  If her condition changes, she will not hesitate to let us know      _____________________________________________________  CHIEF COMPLAINT:  Chief Complaint   Patient presents with    Right Knee - Follow-up         SUBJECTIVE:  Apollo Severino is a 61y o  year old female who presents for viscosupplementation  2nd of 3 Euflexxa injections into her right knee  Patient reports continued pain about the right knee  Pain is worse with ambulation standing  No instability  No numbness or tingling  No fevers or chills      PAST MEDICAL HISTORY:  Past Medical History:   Diagnosis Date    Abnormal finding in urine     Arthralgia of multiple joints     Biceps tendinitis, right     Bursitis of right knee     Bursitis of right shoulder     Chondrocalcinosis     COPD exacerbation (HCC)     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     Drug intolerance     Dysfunction of right eustachian tube     Dysfunctional uterine bleeding     Edema     Elevated d-dimer     Exposure to hepatitis C     Fracture of fibula     Generalized anxiety disorder     GERD (gastroesophageal reflux disease)     Mild cervical dysplasia     Myalgia     Myositis     Palpitations     Pre-syncope     Seasonal allergies     Thrombocytopenia (HCC)     Urinary tract infection     Uterine leiomyoma        PAST SURGICAL HISTORY:  Past Surgical History: Procedure Laterality Date    BLADDER SURGERY      BREAST BIOPSY Right     US guided    COLPOSCOPY  01/06/1998    DENTAL SURGERY      ENDOMETRIAL BIOPSY  05/21/1993    HYSTERECTOMY      HYSTEROSCOPY      uterus- 1/22/98, 7/1/02 and 4/10/07    TUBAL LIGATION      US GUIDED BREAST BIOPSY RIGHT COMPLETE Right 8/8/2018       FAMILY HISTORY:  Family History   Problem Relation Age of Onset    Stroke Mother         cerebral artery occlusion with cerebral infarction    Coronary artery disease Mother     Arrhythmia Mother         sinus    Coronary artery disease Father     Diabetes Father     Aortic aneurysm Family     Arthritis Family     Diabetes Family     Heart disease Family     No Known Problems Daughter     No Known Problems Maternal Grandmother     No Known Problems Paternal Grandmother     No Known Problems Daughter     No Known Problems Maternal Aunt     Ovarian cancer Maternal Aunt        SOCIAL HISTORY:  Social History     Tobacco Use    Smoking status: Current Every Day Smoker    Smokeless tobacco: Never Used   Vaping Use    Vaping Use: Never used   Substance Use Topics    Alcohol use: No    Drug use: No       MEDICATIONS:    Current Outpatient Medications:     alendronate (FOSAMAX) 70 mg tablet, take 1 tablet by mouth every 7 days, Disp: 6 tablet, Rfl: 0    atorvastatin (LIPITOR) 20 mg tablet, take 1 tablet by mouth once daily, Disp: 90 tablet, Rfl: 3    buPROPion (WELLBUTRIN XL) 150 mg 24 hr tablet, , Disp: , Rfl:     clonazePAM (KlonoPIN) 1 mg tablet, TAKE 1 TABLET TID, Disp: , Rfl:     ibuprofen (MOTRIN) 800 mg tablet, Take 1 tablet (800 mg total) by mouth every 6 (six) hours as needed for mild pain, Disp: 120 tablet, Rfl: 1    meloxicam (MOBIC) 15 mg tablet, Take 1 tablet (15 mg total) by mouth daily, Disp: 30 tablet, Rfl: 2    nystatin (MYCOSTATIN) 500,000 units/5 mL suspension, Apply 5 mL (500,000 Units total) to the mouth or throat 4 (four) times a day, Disp: 473 mL, Rfl: 0    omeprazole (PriLOSEC) 40 MG capsule, take 1 capsule by mouth once daily, Disp: 30 capsule, Rfl: 5    SODIUM FLUORIDE, DENTAL GEL, 1 1 % GEL, SF 5000 Plus 1 1 % dental cream, Disp: , Rfl:     venlafaxine (EFFEXOR-XR) 150 mg 24 hr capsule, Take 1 capsule (150 mg total) by mouth daily, Disp: 30 capsule, Rfl: 5    venlafaxine (EFFEXOR-XR) 75 mg 24 hr capsule, Take 1 capsule (75 mg total) by mouth daily, Disp: 90 capsule, Rfl: 1    buPROPion (WELLBUTRIN SR) 150 mg 12 hr tablet, Take 150 mg by mouth daily (Patient not taking: Reported on 3/29/2022 ), Disp: , Rfl:     busPIRone (BUSPAR) 30 MG tablet, TAKE 1 TABLET TID (Patient not taking: Reported on 12/17/2021), Disp: , Rfl:     Docusate Sodium (DSS) 100 MG CAPS, Take 100 mg by mouth 2 (two) times a day (Patient not taking: Reported on 12/17/2021 ), Disp: , Rfl:     fluticasone (FLONASE) 50 mcg/act nasal spray, 1 spray into each nostril daily (Patient not taking: Reported on 3/29/2022 ), Disp: 16 g, Rfl: 0    fluticasone (FLONASE) 50 mcg/act nasal spray, 1 spray into each nostril daily (Patient not taking: Reported on 3/29/2022 ), Disp: 16 g, Rfl: 0    guaiFENesin (Mucinex) 600 mg 12 hr tablet, Take 1 tablet (600 mg total) by mouth every 12 (twelve) hours (Patient not taking: Reported on 3/29/2022 ), Disp: 20 tablet, Rfl: 0    guaiFENesin (Mucinex) 600 mg 12 hr tablet, Take 1 tablet (600 mg total) by mouth every 12 (twelve) hours (Patient not taking: Reported on 3/29/2022 ), Disp: 20 tablet, Rfl: 0    guaifenesin-codeine (GUAIFENESIN AC) 100-10 MG/5ML liquid, Take 5 mL by mouth 3 (three) times a day as needed for cough (Patient not taking: Reported on 3/29/2022 ), Disp: 120 mL, Rfl: 0    ondansetron (ZOFRAN) 4 mg tablet, Take 1 tablet (4 mg total) by mouth every 8 (eight) hours as needed for nausea or vomiting (Patient not taking: Reported on 12/17/2021 ), Disp: 20 tablet, Rfl: 0    SF 5000 Plus 1 1 % CREA, , Disp: , Rfl:     tretinoin (REFISSA) 0 05 % cream, tretinoin 0 05 % topical cream (Patient not taking: Reported on 3/29/2022), Disp: , Rfl:     tretinoin (RETIN-A) 0 1 % cream, Apply topically daily at bedtime (Patient not taking: Reported on 3/29/2022 ), Disp: 45 g, Rfl: 5    ALLERGIES:  No Known Allergies    REVIEW OF SYSTEMS:  Pertinent items are noted in HPI  A comprehensive review of systems was negative   _____________________________________________________  PHYSICAL EXAMINATION:  General: well developed and well nourished, alert, oriented times 3 and appears comfortable  Psychiatric: Normal  HEENT:  Normocephalic, atraumatic  Cardiovascular:  Regular  Pulmonary: No wheezing or stridor  Skin: No masses, erthema, lacerations, fluctation, ulcerations  Neurovascular: Motor and sensory exams are grossly intact, +2 PT pulse  MUSCULOSKELETAL EXAMINATION:    Right lower extremity is neurovascularly intact  Toes are pink and mobile   Compartments are soft  No warmth, erythema or ecchymosis  ROM of knee is from 5-115 degrees  Negative Lachman, drawer or pivot shift  No medial instability  Medial joint line tenderness, slight lateral joint line tenderness  Patellofemoral crepitation    _____________________________________________________  STUDIES REVIEWED:  No Studies to review      PROCEDURES PERFORMED:  Large joint arthrocentesis: R knee  Universal Protocol:  Consent: Verbal consent obtained    Risks and benefits: risks, benefits and alternatives were discussed  Consent given by: patient    Supporting Documentation  Indications: pain and diagnostic evaluation   Procedure Details  Location: knee - R knee  Preparation: Patient was prepped and draped in the usual sterile fashion  Needle size: 20 G  Ultrasound guidance: no  Approach: lateral  Medications administered: 20 mg Sodium Hyaluronate 20 MG/2ML    Patient tolerance: patient tolerated the procedure well with no immediate complications  Dressing:  Sterile dressing applied            Scribe Attestation    I,:  Key Lackey am acting as a scribe while in the presence of the attending physician :       I,:  Noelle Miramontes, DO personally performed the services described in this documentation    as scribed in my presence :

## 2022-05-17 ENCOUNTER — PROCEDURE VISIT (OUTPATIENT)
Dept: OBGYN CLINIC | Facility: CLINIC | Age: 67
End: 2022-05-17
Payer: COMMERCIAL

## 2022-05-17 VITALS
DIASTOLIC BLOOD PRESSURE: 70 MMHG | HEIGHT: 65 IN | SYSTOLIC BLOOD PRESSURE: 115 MMHG | BODY MASS INDEX: 23.3 KG/M2 | HEART RATE: 71 BPM

## 2022-05-17 DIAGNOSIS — M17.11 PRIMARY OSTEOARTHRITIS OF RIGHT KNEE: Primary | ICD-10-CM

## 2022-05-17 PROCEDURE — 20610 DRAIN/INJ JOINT/BURSA W/O US: CPT | Performed by: ORTHOPAEDIC SURGERY

## 2022-05-17 RX ORDER — HYALURONATE SODIUM 10 MG/ML
20 SYRINGE (ML) INTRAARTICULAR
Status: COMPLETED | OUTPATIENT
Start: 2022-05-17 | End: 2022-05-17

## 2022-05-17 RX ADMIN — Medication 20 MG: at 13:47

## 2022-05-17 NOTE — PROGRESS NOTES
ASSESSMENT/PLAN:    Diagnoses and all orders for this visit:    Primary osteoarthritis of right knee    Other orders  -     Large joint arthrocentesis        The patient's right knee was injected with her 3rd set of Euflexxa  She tolerated the injection quite well  She will follow up with our office in 6 weeks  The patient is acceptable to this plan  Return in about 6 weeks (around 6/28/2022)  The patient presents for 3rd set of Euflexxa to her right knee  Physical examination shows diffuse medial lateral joint tenderness with patellofemoral crepitation  Arc of motion is improved  Under aseptic technique, this was injected with her 3rd set of Euflexxa  She tolerated procedure quite well  Return back in 6 weeks to find out the maximum extent of the injections    _____________________________________________________  CHIEF COMPLAINT:  Chief Complaint   Patient presents with    Right Knee - Follow-up         SUBJECTIVE:  Savannah Lopez is a 61 y o  female who presents to our office she is here today for her 3rd and final set of [address] [place]  She tolerated last week's injection without issue  She denies any numbness or tingling  She denies any fever or chills        The following portions of the patient's history were reviewed and updated as appropriate: allergies, current medications, past family history, past medical history, past social history, past surgical history and problem list     PAST MEDICAL HISTORY:  Past Medical History:   Diagnosis Date    Abnormal finding in urine     Arthralgia of multiple joints     Biceps tendinitis, right     Bursitis of right knee     Bursitis of right shoulder     Chondrocalcinosis     COPD exacerbation (HCC)     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     Drug intolerance     Dysfunction of right eustachian tube     Dysfunctional uterine bleeding     Edema     Elevated d-dimer     Exposure to hepatitis C     Fracture of fibula  Generalized anxiety disorder     GERD (gastroesophageal reflux disease)     Mild cervical dysplasia     Myalgia     Myositis     Palpitations     Pre-syncope     Seasonal allergies     Thrombocytopenia (HCC)     Urinary tract infection     Uterine leiomyoma        PAST SURGICAL HISTORY:  Past Surgical History:   Procedure Laterality Date    BLADDER SURGERY      BREAST BIOPSY Right     US guided    COLPOSCOPY  01/06/1998    DENTAL SURGERY      ENDOMETRIAL BIOPSY  05/21/1993    HYSTERECTOMY      HYSTEROSCOPY      uterus- 1/22/98, 7/1/02 and 4/10/07    TUBAL LIGATION      US GUIDED BREAST BIOPSY RIGHT COMPLETE Right 8/8/2018       FAMILY HISTORY:  Family History   Problem Relation Age of Onset    Stroke Mother         cerebral artery occlusion with cerebral infarction    Coronary artery disease Mother     Arrhythmia Mother         sinus    Coronary artery disease Father     Diabetes Father     Aortic aneurysm Family     Arthritis Family     Diabetes Family     Heart disease Family     No Known Problems Daughter     No Known Problems Maternal Grandmother     No Known Problems Paternal Grandmother     No Known Problems Daughter     No Known Problems Maternal Aunt     Ovarian cancer Maternal Aunt        SOCIAL HISTORY:  Social History     Tobacco Use    Smoking status: Current Every Day Smoker    Smokeless tobacco: Never Used   Vaping Use    Vaping Use: Never used   Substance Use Topics    Alcohol use: No    Drug use: No       MEDICATIONS:    Current Outpatient Medications:     alendronate (FOSAMAX) 70 mg tablet, take 1 tablet by mouth every 7 days, Disp: 6 tablet, Rfl: 0    atorvastatin (LIPITOR) 20 mg tablet, take 1 tablet by mouth once daily, Disp: 90 tablet, Rfl: 3    buPROPion (WELLBUTRIN XL) 150 mg 24 hr tablet, , Disp: , Rfl:     clonazePAM (KlonoPIN) 1 mg tablet, TAKE 1 TABLET TID, Disp: , Rfl:     ibuprofen (MOTRIN) 800 mg tablet, Take 1 tablet (800 mg total) by mouth every 6 (six) hours as needed for mild pain, Disp: 120 tablet, Rfl: 1    meloxicam (MOBIC) 15 mg tablet, Take 1 tablet (15 mg total) by mouth daily, Disp: 30 tablet, Rfl: 2    nystatin (MYCOSTATIN) 500,000 units/5 mL suspension, Apply 5 mL (500,000 Units total) to the mouth or throat 4 (four) times a day, Disp: 473 mL, Rfl: 0    omeprazole (PriLOSEC) 40 MG capsule, take 1 capsule by mouth once daily, Disp: 30 capsule, Rfl: 5    SODIUM FLUORIDE, DENTAL GEL, 1 1 % GEL, SF 5000 Plus 1 1 % dental cream, Disp: , Rfl:     tretinoin (REFISSA) 0 05 % cream, tretinoin 0 05 % topical cream, Disp: , Rfl:     venlafaxine (EFFEXOR-XR) 150 mg 24 hr capsule, Take 1 capsule (150 mg total) by mouth daily, Disp: 30 capsule, Rfl: 5    venlafaxine (EFFEXOR-XR) 75 mg 24 hr capsule, Take 1 capsule (75 mg total) by mouth daily, Disp: 90 capsule, Rfl: 1    buPROPion (WELLBUTRIN SR) 150 mg 12 hr tablet, Take 150 mg by mouth daily (Patient not taking: No sig reported), Disp: , Rfl:     busPIRone (BUSPAR) 30 MG tablet, TAKE 1 TABLET TID (Patient not taking: No sig reported), Disp: , Rfl:     Docusate Sodium (DSS) 100 MG CAPS, Take 100 mg by mouth 2 (two) times a day (Patient not taking: Reported on 12/17/2021 ), Disp: , Rfl:     fluticasone (FLONASE) 50 mcg/act nasal spray, 1 spray into each nostril daily (Patient not taking: No sig reported), Disp: 16 g, Rfl: 0    fluticasone (FLONASE) 50 mcg/act nasal spray, 1 spray into each nostril daily (Patient not taking: No sig reported), Disp: 16 g, Rfl: 0    guaiFENesin (Mucinex) 600 mg 12 hr tablet, Take 1 tablet (600 mg total) by mouth every 12 (twelve) hours (Patient not taking: No sig reported), Disp: 20 tablet, Rfl: 0    guaiFENesin (Mucinex) 600 mg 12 hr tablet, Take 1 tablet (600 mg total) by mouth every 12 (twelve) hours (Patient not taking: No sig reported), Disp: 20 tablet, Rfl: 0    guaifenesin-codeine (GUAIFENESIN AC) 100-10 MG/5ML liquid, Take 5 mL by mouth 3 (three) times a day as needed for cough (Patient not taking: No sig reported), Disp: 120 mL, Rfl: 0    ondansetron (ZOFRAN) 4 mg tablet, Take 1 tablet (4 mg total) by mouth every 8 (eight) hours as needed for nausea or vomiting (Patient not taking: No sig reported), Disp: 20 tablet, Rfl: 0    SF 5000 Plus 1 1 % CREA, , Disp: , Rfl:     tretinoin (RETIN-A) 0 1 % cream, Apply topically daily at bedtime (Patient not taking: No sig reported), Disp: 45 g, Rfl: 5    ALLERGIES:  No Known Allergies    ROS:  Review of Systems     Constitutional: Negative for fatigue, fever or loss of appetite  HENT: Negative  Respiratory: Negative for shortness of breath, dyspnea  Cardiovascular: Negative for chest pain/tightness  Gastrointestinal: Negative for abdominal pain, N/V  Endocrine: Negative for cold/heat intolerance, unexplained weight loss/gain  Genitourinary: Negative for flank pain, dysuria, hematuria  Musculoskeletal: Positive for arthralgia   Skin: Negative for rash  Neurological: Negative for numbness or tingling  Psychiatric/Behavioral: Negative for agitation  _____________________________________________________  PHYSICAL EXAMINATION:    Blood pressure 115/70, pulse 71, height 5' 5" (1 651 m)  Constitutional: Oriented to person, place, and time  Appears well-developed and well-nourished  No distress  HENT:   Head: Normocephalic  Eyes: Conjunctivae are normal  Right eye exhibits no discharge  Left eye exhibits no discharge  No scleral icterus  Cardiovascular: Normal rate  Pulmonary/Chest: Effort normal    Neurological: Alert and oriented to person, place, and time  Skin: Skin is warm and dry  No rash noted  Not diaphoretic  No erythema  No pallor  Psychiatric: Normal mood and affect   Behavior is normal  Judgment and thought content normal       MUSCULOSKELETAL EXAMINATION:   Physical Exam  Ortho Exam    Right lower extremity is neurovascularly intact  Toes are pink and mobile Compartments are soft  No warmth, erythema or ecchymosis  ROM of knee is from 5-115 degrees  Negative Lachman, drawer or pivot shift  No medial instability  Medial joint line tenderness, slight lateral joint line tenderness  Patellofemoral crepitation  Objective:  BP Readings from Last 1 Encounters:   05/17/22 115/70      Wt Readings from Last 1 Encounters:   05/03/22 63 5 kg (140 lb)        BMI:   Estimated body mass index is 23 3 kg/m² as calculated from the following:    Height as of this encounter: 5' 5" (1 651 m)  Weight as of 5/3/22: 63 5 kg (140 lb)        PROCEDURES PERFORMED:  Large joint arthrocentesis: R knee  Universal Protocol:  Risks and benefits: risks, benefits and alternatives were discussed  Consent given by: patient  Patient understanding: patient states understanding of the procedure being performed  Site marked: the operative site was marked  Supporting Documentation  Indications: pain   Procedure Details  Location: knee - R knee  Preparation: Patient was prepped and draped in the usual sterile fashion  Needle size: 22 G  Ultrasound guidance: no  Approach: lateral  Medications administered: 20 mg Sodium Hyaluronate 20 MG/2ML    Patient tolerance: patient tolerated the procedure well with no immediate complications  Dressing:  Sterile dressing applied            Scribe Attestation    I,:  Ban Kraft PA-C am acting as a scribe while in the presence of the attending physician :       I,:  Nino Tillman DO personally performed the services described in this documentation    as scribed in my presence :

## 2022-05-27 ENCOUNTER — TELEPHONE (OUTPATIENT)
Dept: INTERNAL MEDICINE CLINIC | Facility: CLINIC | Age: 67
End: 2022-05-27

## 2022-05-27 NOTE — TELEPHONE ENCOUNTER
Pt requesting an antibiotic be sent to [facility] for UTI      Symptoms: pressure, unable to urinate, pain

## 2022-06-02 ENCOUNTER — TELEPHONE (OUTPATIENT)
Dept: INTERNAL MEDICINE CLINIC | Facility: CLINIC | Age: 67
End: 2022-06-02

## 2022-06-02 DIAGNOSIS — F41.9 ANXIETY: ICD-10-CM

## 2022-06-02 RX ORDER — VENLAFAXINE HYDROCHLORIDE 75 MG/1
75 CAPSULE, EXTENDED RELEASE ORAL DAILY
Qty: 90 CAPSULE | Refills: 0 | Status: SHIPPED | OUTPATIENT
Start: 2022-06-02

## 2022-06-02 RX ORDER — VENLAFAXINE HYDROCHLORIDE 150 MG/1
150 CAPSULE, EXTENDED RELEASE ORAL DAILY
Qty: 90 CAPSULE | Refills: 0 | Status: SHIPPED | OUTPATIENT
Start: 2022-06-02

## 2022-06-03 ENCOUNTER — CLINICAL SUPPORT (OUTPATIENT)
Dept: INTERNAL MEDICINE CLINIC | Facility: CLINIC | Age: 67
End: 2022-06-03
Payer: COMMERCIAL

## 2022-06-03 DIAGNOSIS — R39.9 UTI SYMPTOMS: Primary | ICD-10-CM

## 2022-06-03 LAB
SL AMB  POCT GLUCOSE, UA: NORMAL
SL AMB LEUKOCYTE ESTERASE,UA: NORMAL
SL AMB POCT BILIRUBIN,UA: NORMAL
SL AMB POCT BLOOD,UA: NORMAL
SL AMB POCT CLARITY,UA: CLEAR
SL AMB POCT COLOR,UA: YELLOW
SL AMB POCT KETONES,UA: NORMAL
SL AMB POCT NITRITE,UA: NORMAL
SL AMB POCT PH,UA: 5
SL AMB POCT SPECIFIC GRAVITY,UA: 1.02
SL AMB POCT URINE PROTEIN: NORMAL
SL AMB POCT UROBILINOGEN: NORMAL

## 2022-06-03 PROCEDURE — 87086 URINE CULTURE/COLONY COUNT: CPT | Performed by: INTERNAL MEDICINE

## 2022-06-03 PROCEDURE — 81002 URINALYSIS NONAUTO W/O SCOPE: CPT

## 2022-06-04 LAB — BACTERIA UR CULT: NORMAL

## 2022-06-24 DIAGNOSIS — M85.89 OSTEOPENIA OF MULTIPLE SITES: ICD-10-CM

## 2022-06-24 RX ORDER — ALENDRONATE SODIUM 70 MG/1
TABLET ORAL
Qty: 8 TABLET | Refills: 1 | Status: SHIPPED | OUTPATIENT
Start: 2022-06-24

## 2022-08-02 ENCOUNTER — TELEPHONE (OUTPATIENT)
Dept: OBGYN CLINIC | Facility: HOSPITAL | Age: 67
End: 2022-08-02

## 2022-08-02 NOTE — TELEPHONE ENCOUNTER
Patient called in   Message received   Patient understanding  Patient states she has no relief  and has been taking all of the above  Patient will wait for appointment to discuss

## 2022-08-16 ENCOUNTER — OFFICE VISIT (OUTPATIENT)
Dept: OBGYN CLINIC | Facility: CLINIC | Age: 67
End: 2022-08-16
Payer: COMMERCIAL

## 2022-08-16 VITALS
DIASTOLIC BLOOD PRESSURE: 65 MMHG | SYSTOLIC BLOOD PRESSURE: 110 MMHG | HEART RATE: 81 BPM | BODY MASS INDEX: 23.3 KG/M2 | HEIGHT: 65 IN

## 2022-08-16 DIAGNOSIS — M17.11 PRIMARY OSTEOARTHRITIS OF RIGHT KNEE: Primary | ICD-10-CM

## 2022-08-16 PROCEDURE — 20610 DRAIN/INJ JOINT/BURSA W/O US: CPT | Performed by: ORTHOPAEDIC SURGERY

## 2022-08-16 PROCEDURE — 99213 OFFICE O/P EST LOW 20 MIN: CPT | Performed by: ORTHOPAEDIC SURGERY

## 2022-08-16 RX ORDER — BUPIVACAINE HYDROCHLORIDE 2.5 MG/ML
4 INJECTION, SOLUTION INFILTRATION; PERINEURAL
Status: COMPLETED | OUTPATIENT
Start: 2022-08-16 | End: 2022-08-16

## 2022-08-16 RX ORDER — METHYLPREDNISOLONE ACETATE 40 MG/ML
2 INJECTION, SUSPENSION INTRA-ARTICULAR; INTRALESIONAL; INTRAMUSCULAR; SOFT TISSUE
Status: COMPLETED | OUTPATIENT
Start: 2022-08-16 | End: 2022-08-16

## 2022-08-16 RX ADMIN — METHYLPREDNISOLONE ACETATE 2 ML: 40 INJECTION, SUSPENSION INTRA-ARTICULAR; INTRALESIONAL; INTRAMUSCULAR; SOFT TISSUE at 14:52

## 2022-08-16 RX ADMIN — BUPIVACAINE HYDROCHLORIDE 4 ML: 2.5 INJECTION, SOLUTION INFILTRATION; PERINEURAL at 14:52

## 2022-08-16 NOTE — PROGRESS NOTES
Assessment/Plan:   Diagnoses and all orders for this visit:    Primary osteoarthritis of right knee  -     Injection Procedure Prior Authorization; Future  -     Large joint arthrocentesis    Discussed with patient that today's physical exam is consistent with flare-up of primary osteoarthritis of the right knee  Patient was offered, and accepted, Depo-Medrol Marcaine injection(s) to the right knee for relief of pain and inflammation  Patient tolerated treatment(s) well  Discussed with patient that she would like to repeat Euflexxa viscosupplementation injections  Prior authorization request was submitted to the system  She can repeat this series any time after 11/17/2022  She will be seen on or after November 17  After completing that series, she states that she would like to consider right knee replacement for the winter of 2022/2023  Patient expresses understanding and is in agreement with this treatment plan  The patient has arthritic changes along her right knee  This was reinjected with Depo-Medrol and Marcaine  She tolerated procedure quite well  Return back any time after November 17th to start viscosupplementation  Physical exam shows diffuse medial and lateral joint tenderness    Subjective:   Patient ID: Kalpesh Larissa  1955     HPI  Patient is a 61 y o  female who presents for follow-up evaluation of primary osteoarthritis of the right knee  She was last seen in regards to this issue on 5/17/2022, which time she completed a course of viscosupplementation injections  She states that she did get relief following the injections, but she did not achieve full resolution  On today's presentation she reports continued anterior medial knee pain with weight-bearing activity  She denies any new onset bruising, swelling, numbness, or tingling  She reports occasional feelings of instability      The following portions of the patient's history were reviewed and updated as appropriate:  Past medical history, past surgical history, Family history, social history, current medications and allergies    Past Medical History:   Diagnosis Date    Abnormal finding in urine     Arthralgia of multiple joints     Biceps tendinitis, right     Bursitis of right knee     Bursitis of right shoulder     Chondrocalcinosis     COPD exacerbation ([address] )     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     Drug intolerance     Dysfunction of right eustachian tube     Dysfunctional uterine bleeding     Edema     Elevated d-dimer     Exposure to hepatitis C     Fracture of fibula     Generalized anxiety disorder     GERD (gastroesophageal reflux disease)     Mild cervical dysplasia     Myalgia     Myositis     Palpitations     Pre-syncope     Seasonal allergies     Thrombocytopenia (HCC)     Urinary tract infection     Uterine leiomyoma        Past Surgical History:   Procedure Laterality Date    BLADDER SURGERY      BREAST BIOPSY Right     US guided    COLPOSCOPY  01/06/1998    DENTAL SURGERY      ENDOMETRIAL BIOPSY  05/21/1993    HYSTERECTOMY      HYSTEROSCOPY      uterus- 1/22/98, 7/1/02 and 4/10/07    TUBAL LIGATION      US GUIDED BREAST BIOPSY RIGHT COMPLETE Right 8/8/2018       Family History   Problem Relation Age of Onset    Stroke Mother         cerebral artery occlusion with cerebral infarction    Coronary artery disease Mother     Arrhythmia Mother         sinus    Coronary artery disease Father     Diabetes Father     Aortic aneurysm Family     Arthritis Family     Diabetes Family     Heart disease Family     No Known Problems Daughter     No Known Problems Maternal Grandmother     No Known Problems Paternal Grandmother     No Known Problems Daughter     No Known Problems Maternal Aunt     Ovarian cancer Maternal Aunt        Social History     Socioeconomic History    Marital status: /Civil Union     Spouse name: None    Number of children: None    Years of education: None    Highest education level: None   Occupational History    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 [place]    Occupation: housewife   Tobacco Use    Smoking status: Current Every Day Smoker    Smokeless tobacco: Never Used   Vaping Use    Vaping Use: Never used   Substance and Sexual Activity    Alcohol use: No    Drug use: No    Sexual activity: None   Other Topics Concern    None   Social History Narrative    Caffeine use     Social Determinants of Health     Financial Resource Strain: Not on file   Food Insecurity: Not on file   Transportation Needs: Not on file   Physical Activity: Not on file   Stress: Not on file   Social Connections: Not on file   Intimate Partner Violence: Not on file   Housing Stability: Not on file         Current Outpatient Medications:     alendronate (FOSAMAX) 70 mg tablet, take 1 tablet by mouth every 7 days, Disp: 8 tablet, Rfl: 1    atorvastatin (LIPITOR) 20 mg tablet, take 1 tablet by mouth once daily, Disp: 90 tablet, Rfl: 3    buPROPion (WELLBUTRIN XL) 150 mg 24 hr tablet, , Disp: , Rfl:     clonazePAM (KlonoPIN) 1 mg tablet, TAKE 1 TABLET TID, Disp: , Rfl:     ibuprofen (MOTRIN) 800 mg tablet, Take 1 tablet (800 mg total) by mouth every 6 (six) hours as needed for mild pain, Disp: 120 tablet, Rfl: 1    meloxicam (MOBIC) 15 mg tablet, Take 1 tablet (15 mg total) by mouth daily, Disp: 30 tablet, Rfl: 2    nystatin (MYCOSTATIN) 500,000 units/5 mL suspension, Apply 5 mL (500,000 Units total) to the mouth or throat 4 (four) times a day, Disp: 473 mL, Rfl: 0    omeprazole (PriLOSEC) 40 MG capsule, take 1 capsule by mouth once daily, Disp: 30 capsule, Rfl: 5    SODIUM FLUORIDE, DENTAL GEL, 1 1 % GEL, SF 5000 Plus 1 1 % dental cream, Disp: , Rfl:     tretinoin (REFISSA) 0 05 % cream, tretinoin 0 05 % topical cream, Disp: , Rfl:     venlafaxine (EFFEXOR-XR) 150 mg 24 hr capsule, Take 1 capsule (150 mg total) by mouth daily, Disp: 90 capsule, Rfl: 0    venlafaxine (EFFEXOR-XR) 75 mg 24 hr capsule, Take 1 capsule (75 mg total) by mouth daily, Disp: 90 capsule, Rfl: 0    buPROPion (WELLBUTRIN SR) 150 mg 12 hr tablet, Take 150 mg by mouth daily (Patient not taking: No sig reported), Disp: , Rfl:     busPIRone (BUSPAR) 30 MG tablet, TAKE 1 TABLET TID (Patient not taking: No sig reported), Disp: , Rfl:     Docusate Sodium (DSS) 100 MG CAPS, Take 100 mg by mouth 2 (two) times a day (Patient not taking: Reported on 12/17/2021 ), Disp: , Rfl:     fluticasone (FLONASE) 50 mcg/act nasal spray, 1 spray into each nostril daily (Patient not taking: No sig reported), Disp: 16 g, Rfl: 0    fluticasone (FLONASE) 50 mcg/act nasal spray, 1 spray into each nostril daily (Patient not taking: No sig reported), Disp: 16 g, Rfl: 0    guaiFENesin (Mucinex) 600 mg 12 hr tablet, Take 1 tablet (600 mg total) by mouth every 12 (twelve) hours (Patient not taking: No sig reported), Disp: 20 tablet, Rfl: 0    guaiFENesin (Mucinex) 600 mg 12 hr tablet, Take 1 tablet (600 mg total) by mouth every 12 (twelve) hours (Patient not taking: No sig reported), Disp: 20 tablet, Rfl: 0    guaifenesin-codeine (GUAIFENESIN AC) 100-10 MG/5ML liquid, Take 5 mL by mouth 3 (three) times a day as needed for cough (Patient not taking: No sig reported), Disp: 120 mL, Rfl: 0    ondansetron (ZOFRAN) 4 mg tablet, Take 1 tablet (4 mg total) by mouth every 8 (eight) hours as needed for nausea or vomiting (Patient not taking: No sig reported), Disp: 20 tablet, Rfl: 0    SF 5000 Plus 1 1 % CREA, , Disp: , Rfl:     tretinoin (RETIN-A) 0 1 % cream, Apply topically daily at bedtime (Patient not taking: No sig reported), Disp: 45 g, Rfl: 5    No Known Allergies    Review of Systems   Constitutional: Negative for chills, fever and unexpected weight change  HENT: Negative for hearing loss, nosebleeds and sore throat  Eyes: Negative for pain, redness and visual disturbance     Respiratory: Negative for cough, shortness of breath and wheezing  Cardiovascular: Negative for chest pain, palpitations and leg swelling  Gastrointestinal: Negative for abdominal pain, nausea and vomiting  Endocrine: Negative for polydipsia and polyuria  Genitourinary: Negative for dysuria and hematuria  Musculoskeletal:        As noted in HPI   Skin: Negative for rash and wound  Neurological: Negative for dizziness, numbness and headaches  Psychiatric/Behavioral: Negative for decreased concentration and suicidal ideas  The patient is not nervous/anxious  Objective:  /65 (BP Location: Left arm, Patient Position: Sitting, Cuff Size: Standard)   Pulse 81   Ht 5' 5" (1 651 m)   BMI 23 30 kg/m²     Ortho Exam  Right knee -   Patient ambulates with antalgic gait pattern  Uses no assistive device  Mild genu varus anatomical deformity  Skin is warm and dry to touch with no signs of erythema, ecchymosis, infection  No soft tissue swelling, no effusion noted  ROM 5°-115°  TTP over medial joint lines, mildly TTP over lateral joint lines  Flexor and extensor mechanisms intact  Knee is stable to varus and valgus stress   - Lachman's  - Anterior Drawer, - Posterior Drawer  - medial Hubert's, - lateral Hubert's  - Pivot Shift  Patella tracks centrally with palpable crepitus  Calf compartments are soft and supple  2+ TP and DP pulses with brisk capillary refill to the toes  Sural, saphenous, tibial, superficial and deep peroneal motor and sensory distributions intact  Sensation light touch intact distally    Physical Exam  Vitals and nursing note reviewed  Constitutional:       General: She is not in acute distress  Appearance: She is well-developed  HENT:      Head: Normocephalic and atraumatic  Eyes:      Conjunctiva/sclera: Conjunctivae normal    Cardiovascular:      Rate and Rhythm: Normal rate     Pulmonary:      Effort: Pulmonary effort is normal    Musculoskeletal:      Cervical back: Neck supple  Skin:     General: Skin is warm and dry  Capillary Refill: Capillary refill takes less than 2 seconds  Neurological:      Mental Status: She is alert and oriented to person, place, and time  Psychiatric:         Mood and Affect: Mood normal          Behavior: Behavior normal           Diagnostic Test Review:  No new imaging reviewed this visit    Large joint arthrocentesis: R knee  Universal Protocol:  Consent: Verbal consent obtained  Risks and benefits: risks, benefits and alternatives were discussed  Consent given by: patient  Time out: Immediately prior to procedure a "time out" was called to verify the correct patient, procedure, equipment, support staff and site/side marked as required    Timeout called at: 8/16/2022 2:40 PM   Patient understanding: patient states understanding of the procedure being performed  Site marked: the operative site was marked  Patient identity confirmed: verbally with patient    Supporting Documentation  Indications: pain and joint swelling   Procedure Details  Location: knee - R knee  Preparation: Patient was prepped and draped in the usual sterile fashion  Needle size: 22 G  Ultrasound guidance: no  Approach: anterolateral  Medications administered: 4 mL bupivacaine 0 25 %; 2 mL methylPREDNISolone acetate 40 mg/mL    Patient tolerance: patient tolerated the procedure well with no immediate complications  Dressing:  Sterile dressing applied           Scribe Attestation    I,:  Kindra Mclean am acting as a scribe while in the presence of the attending physician :       I,:  Ashley Diamond DO personally performed the services described in this documentation    as scribed in my presence :

## 2022-08-26 DIAGNOSIS — F41.9 ANXIETY: ICD-10-CM

## 2022-08-26 RX ORDER — VENLAFAXINE HYDROCHLORIDE 75 MG/1
CAPSULE, EXTENDED RELEASE ORAL
Qty: 90 CAPSULE | Refills: 0 | Status: SHIPPED | OUTPATIENT
Start: 2022-08-26 | End: 2022-09-06 | Stop reason: SDUPTHER

## 2022-08-26 RX ORDER — VENLAFAXINE HYDROCHLORIDE 150 MG/1
CAPSULE, EXTENDED RELEASE ORAL
Qty: 90 CAPSULE | Refills: 0 | Status: SHIPPED | OUTPATIENT
Start: 2022-08-26 | End: 2022-09-06 | Stop reason: SDUPTHER

## 2022-09-02 ENCOUNTER — APPOINTMENT (OUTPATIENT)
Dept: LAB | Facility: CLINIC | Age: 67
End: 2022-09-02
Payer: COMMERCIAL

## 2022-09-02 ENCOUNTER — APPOINTMENT (OUTPATIENT)
Dept: RADIOLOGY | Facility: CLINIC | Age: 67
End: 2022-09-02
Payer: COMMERCIAL

## 2022-09-02 ENCOUNTER — OFFICE VISIT (OUTPATIENT)
Dept: INTERNAL MEDICINE CLINIC | Facility: CLINIC | Age: 67
End: 2022-09-02
Payer: COMMERCIAL

## 2022-09-02 ENCOUNTER — TELEPHONE (OUTPATIENT)
Dept: INTERNAL MEDICINE CLINIC | Facility: CLINIC | Age: 67
End: 2022-09-02

## 2022-09-02 VITALS
OXYGEN SATURATION: 97 % | HEIGHT: 65 IN | WEIGHT: 138.5 LBS | DIASTOLIC BLOOD PRESSURE: 74 MMHG | HEART RATE: 76 BPM | BODY MASS INDEX: 23.07 KG/M2 | SYSTOLIC BLOOD PRESSURE: 122 MMHG | TEMPERATURE: 97.9 F

## 2022-09-02 DIAGNOSIS — M70.22 OLECRANON BURSITIS, LEFT ELBOW: ICD-10-CM

## 2022-09-02 DIAGNOSIS — M25.422 PAIN AND SWELLING OF LEFT ELBOW: Primary | ICD-10-CM

## 2022-09-02 DIAGNOSIS — Z23 ENCOUNTER FOR VACCINATION: ICD-10-CM

## 2022-09-02 DIAGNOSIS — M25.522 PAIN AND SWELLING OF LEFT ELBOW: Primary | ICD-10-CM

## 2022-09-02 DIAGNOSIS — M25.522 PAIN AND SWELLING OF LEFT ELBOW: ICD-10-CM

## 2022-09-02 DIAGNOSIS — M25.422 PAIN AND SWELLING OF LEFT ELBOW: ICD-10-CM

## 2022-09-02 DIAGNOSIS — R79.9 ABNORMAL BLOOD FINDINGS: ICD-10-CM

## 2022-09-02 LAB
ALBUMIN SERPL BCP-MCNC: 3.3 G/DL (ref 3.5–5)
ALP SERPL-CCNC: 87 U/L (ref 46–116)
ALT SERPL W P-5'-P-CCNC: 26 U/L (ref 12–78)
ANION GAP SERPL CALCULATED.3IONS-SCNC: 4 MMOL/L (ref 4–13)
AST SERPL W P-5'-P-CCNC: 16 U/L (ref 5–45)
BASOPHILS # BLD AUTO: 0.05 THOUSANDS/ΜL (ref 0–0.1)
BASOPHILS NFR BLD AUTO: 1 % (ref 0–1)
BILIRUB SERPL-MCNC: 0.31 MG/DL (ref 0.2–1)
BUN SERPL-MCNC: 14 MG/DL (ref 5–25)
CALCIUM ALBUM COR SERPL-MCNC: 9.8 MG/DL (ref 8.3–10.1)
CALCIUM SERPL-MCNC: 9.2 MG/DL (ref 8.3–10.1)
CHLORIDE SERPL-SCNC: 109 MMOL/L (ref 96–108)
CO2 SERPL-SCNC: 27 MMOL/L (ref 21–32)
CREAT SERPL-MCNC: 0.74 MG/DL (ref 0.6–1.3)
EOSINOPHIL # BLD AUTO: 0.25 THOUSAND/ΜL (ref 0–0.61)
EOSINOPHIL NFR BLD AUTO: 3 % (ref 0–6)
ERYTHROCYTE [DISTWIDTH] IN BLOOD BY AUTOMATED COUNT: 14.8 % (ref 11.6–15.1)
FOLATE SERPL-MCNC: >20 NG/ML (ref 3.1–17.5)
GFR SERPL CREATININE-BSD FRML MDRD: 85 ML/MIN/1.73SQ M
GLUCOSE SERPL-MCNC: 85 MG/DL (ref 65–140)
HCT VFR BLD AUTO: 38.4 % (ref 34.8–46.1)
HGB BLD-MCNC: 12 G/DL (ref 11.5–15.4)
IMM GRANULOCYTES # BLD AUTO: 0.03 THOUSAND/UL (ref 0–0.2)
IMM GRANULOCYTES NFR BLD AUTO: 0 % (ref 0–2)
LYMPHOCYTES # BLD AUTO: 1.96 THOUSANDS/ΜL (ref 0.6–4.47)
LYMPHOCYTES NFR BLD AUTO: 24 % (ref 14–44)
MCH RBC QN AUTO: 30.5 PG (ref 26.8–34.3)
MCHC RBC AUTO-ENTMCNC: 31.3 G/DL (ref 31.4–37.4)
MCV RBC AUTO: 98 FL (ref 82–98)
MONOCYTES # BLD AUTO: 0.69 THOUSAND/ΜL (ref 0.17–1.22)
MONOCYTES NFR BLD AUTO: 9 % (ref 4–12)
NEUTROPHILS # BLD AUTO: 5.1 THOUSANDS/ΜL (ref 1.85–7.62)
NEUTS SEG NFR BLD AUTO: 63 % (ref 43–75)
NRBC BLD AUTO-RTO: 0 /100 WBCS
PLATELET # BLD AUTO: 377 THOUSANDS/UL (ref 149–390)
PMV BLD AUTO: 10.7 FL (ref 8.9–12.7)
POTASSIUM SERPL-SCNC: 4.5 MMOL/L (ref 3.5–5.3)
PROT SERPL-MCNC: 7.2 G/DL (ref 6.4–8.4)
RBC # BLD AUTO: 3.94 MILLION/UL (ref 3.81–5.12)
SODIUM SERPL-SCNC: 140 MMOL/L (ref 135–147)
URATE SERPL-MCNC: 3.6 MG/DL (ref 2–7.5)
VIT B12 SERPL-MCNC: 805 PG/ML (ref 100–900)
WBC # BLD AUTO: 8.08 THOUSAND/UL (ref 4.31–10.16)

## 2022-09-02 PROCEDURE — 80053 COMPREHEN METABOLIC PANEL: CPT

## 2022-09-02 PROCEDURE — 84550 ASSAY OF BLOOD/URIC ACID: CPT

## 2022-09-02 PROCEDURE — 20605 DRAIN/INJ JOINT/BURSA W/O US: CPT | Performed by: INTERNAL MEDICINE

## 2022-09-02 PROCEDURE — 85025 COMPLETE CBC W/AUTO DIFF WBC: CPT

## 2022-09-02 PROCEDURE — 36415 COLL VENOUS BLD VENIPUNCTURE: CPT

## 2022-09-02 PROCEDURE — 89050 BODY FLUID CELL COUNT: CPT | Performed by: INTERNAL MEDICINE

## 2022-09-02 PROCEDURE — 87205 SMEAR GRAM STAIN: CPT | Performed by: INTERNAL MEDICINE

## 2022-09-02 PROCEDURE — 99213 OFFICE O/P EST LOW 20 MIN: CPT | Performed by: INTERNAL MEDICINE

## 2022-09-02 PROCEDURE — 87070 CULTURE OTHR SPECIMN AEROBIC: CPT | Performed by: INTERNAL MEDICINE

## 2022-09-02 PROCEDURE — 73080 X-RAY EXAM OF ELBOW: CPT

## 2022-09-02 PROCEDURE — 3725F SCREEN DEPRESSION PERFORMED: CPT | Performed by: INTERNAL MEDICINE

## 2022-09-02 PROCEDURE — 89051 BODY FLUID CELL COUNT: CPT | Performed by: INTERNAL MEDICINE

## 2022-09-02 PROCEDURE — 82746 ASSAY OF FOLIC ACID SERUM: CPT

## 2022-09-02 PROCEDURE — 89060 EXAM SYNOVIAL FLUID CRYSTALS: CPT | Performed by: INTERNAL MEDICINE

## 2022-09-02 PROCEDURE — 82607 VITAMIN B-12: CPT

## 2022-09-02 RX ORDER — MELOXICAM 15 MG/1
15 TABLET ORAL DAILY
Qty: 30 TABLET | Refills: 1 | Status: SHIPPED | OUTPATIENT
Start: 2022-09-02

## 2022-09-02 RX ORDER — CEPHALEXIN 500 MG/1
500 CAPSULE ORAL EVERY 8 HOURS SCHEDULED
Qty: 30 CAPSULE | Refills: 0 | Status: SHIPPED | OUTPATIENT
Start: 2022-09-02 | End: 2022-09-12

## 2022-09-02 NOTE — PROGRESS NOTES
Assessment/Plan:  Problem List Items Addressed This Visit    None     Visit Diagnoses     Pain and swelling of left elbow    -  Primary    Relevant Medications    meloxicam (MOBIC) 15 mg tablet    cephalexin (KEFLEX) 500 mg capsule    Other Relevant Orders    Medium joint arthrocentesis: L olecranon bursa (Completed)    Comprehensive metabolic panel    CBC and differential    Uric acid    XR elbow 3+ vw left    Body fluid culture and Gram stain    RBC count,Synovial Fluid    Synovial fluid white cell count w/ diff    Synovial fluid, crystal    Encounter for vaccination        Relevant Medications    meloxicam (MOBIC) 15 mg tablet    cephalexin (KEFLEX) 500 mg capsule    Other Relevant Orders    Medium joint arthrocentesis: L olecranon bursa (Completed)    Comprehensive metabolic panel    CBC and differential    Uric acid    XR elbow 3+ vw left    Body fluid culture and Gram stain    RBC count,Synovial Fluid    Synovial fluid white cell count w/ diff    Synovial fluid, crystal    Olecranon bursitis, left elbow        Relevant Medications    meloxicam (MOBIC) 15 mg tablet    cephalexin (KEFLEX) 500 mg capsule    Other Relevant Orders    Medium joint arthrocentesis: L olecranon bursa (Completed)    Comprehensive metabolic panel    CBC and differential    Uric acid    XR elbow 3+ vw left    Body fluid culture and Gram stain    RBC count,Synovial Fluid    Synovial fluid white cell count w/ diff    Synovial fluid, crystal           Diagnoses and all orders for this visit:    Pain and swelling of left elbow  -     Medium joint arthrocentesis: L olecranon bursa  -     Comprehensive metabolic panel; Future  -     CBC and differential; Future  -     Uric acid; Future  -     XR elbow 3+ vw left; Future  -     meloxicam (MOBIC) 15 mg tablet; Take 1 tablet (15 mg total) by mouth daily  -     cephalexin (KEFLEX) 500 mg capsule;  Take 1 capsule (500 mg total) by mouth every 8 (eight) hours for 10 days  -     Body fluid culture and Gram stain  -     RBC count,Synovial Fluid  -     Synovial fluid white cell count w/ diff  -     Synovial fluid, crystal    Encounter for vaccination  -     Medium joint arthrocentesis: L olecranon bursa  -     Comprehensive metabolic panel; Future  -     CBC and differential; Future  -     Uric acid; Future  -     XR elbow 3+ vw left; Future  -     meloxicam (MOBIC) 15 mg tablet; Take 1 tablet (15 mg total) by mouth daily  -     cephalexin (KEFLEX) 500 mg capsule; Take 1 capsule (500 mg total) by mouth every 8 (eight) hours for 10 days  -     Body fluid culture and Gram stain  -     RBC count,Synovial Fluid  -     Synovial fluid white cell count w/ diff  -     Synovial fluid, crystal    Olecranon bursitis, left elbow  -     Medium joint arthrocentesis: L olecranon bursa  -     Comprehensive metabolic panel; Future  -     CBC and differential; Future  -     Uric acid; Future  -     XR elbow 3+ vw left; Future  -     meloxicam (MOBIC) 15 mg tablet; Take 1 tablet (15 mg total) by mouth daily  -     cephalexin (KEFLEX) 500 mg capsule; Take 1 capsule (500 mg total) by mouth every 8 (eight) hours for 10 days  -     Body fluid culture and Gram stain  -     RBC count,Synovial Fluid  -     Synovial fluid white cell count w/ diff  -     Synovial fluid, crystal      No problem-specific Assessment & Plan notes found for this encounter  A/P: 15ml cloudy yellow aspirate  Will send to the lab and will check an xray and labs  ??cause  Doubt traumatic, but still possible  Could be infectious, but pt not sick enough  Await labs and culture  Will start empiric abx and NSAID  Hot/cold compresses  May need additional aspiration and if not infectious, ?steroid at the same time  May need to see ortho  RTC one week for f/u  Subjective:      Patient ID: Carlos Manuel Spain is a 61 y o  female  Right handed WF w/o any PMH of elbow issues, gout, CTD, etc , present c/o three week  left elbow pain and swelling  No trauma  No neck pain  The following portions of the patient's history were reviewed and updated as appropriate:   She has a past medical history of Abnormal finding in urine, Arthralgia of multiple joints, Biceps tendinitis, right, Bursitis of right knee, Bursitis of right shoulder, Chondrocalcinosis, COPD exacerbation ([address] ), 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, Drug intolerance, Dysfunction of right eustachian tube, Dysfunctional uterine bleeding, Edema, Elevated d-dimer, Exposure to hepatitis C, Fracture of fibula, Generalized anxiety disorder, GERD (gastroesophageal reflux disease), Mild cervical dysplasia, Myalgia, Myositis, Palpitations, Pre-syncope, Seasonal allergies, Thrombocytopenia ([address] ), Urinary tract infection, and Uterine leiomyoma  ,  does not have any pertinent problems on file  ,   has a past surgical history that includes Bladder surgery; Breast biopsy (Right); Colposcopy (01/06/1998); Dental surgery; Endometrial biopsy (05/21/1993); Hysteroscopy; Tubal ligation; US guided breast biopsy right complete (Right, 8/8/2018); and Hysterectomy  ,  family history includes Aortic aneurysm in her family; Arrhythmia in her mother; Arthritis in her family; Coronary artery disease in her father and mother; Diabetes in her family and father; Heart disease in her family; No Known Problems in her daughter, daughter, maternal aunt, maternal grandmother, and paternal grandmother; Ovarian cancer in her maternal aunt; Stroke in her mother  ,   reports that she has been smoking  She has never used smokeless tobacco  She reports that she does not drink alcohol and does not use drugs  ,  has No Known Allergies     Current Outpatient Medications   Medication Sig Dispense Refill    alendronate (FOSAMAX) 70 mg tablet take 1 tablet by mouth every 7 days 8 tablet 1    atorvastatin (LIPITOR) 20 mg tablet take 1 tablet by mouth once daily 90 tablet 3    buPROPion (WELLBUTRIN XL) 150 mg 24 hr tablet       cephalexin (KEFLEX) 500 mg capsule Take 1 capsule (500 mg total) by mouth every 8 (eight) hours for 10 days 30 capsule 0    clonazePAM (KlonoPIN) 1 mg tablet TAKE 1 TABLET TID      ibuprofen (MOTRIN) 800 mg tablet Take 1 tablet (800 mg total) by mouth every 6 (six) hours as needed for mild pain 120 tablet 1    meloxicam (MOBIC) 15 mg tablet Take 1 tablet (15 mg total) by mouth daily 30 tablet 1    nystatin (MYCOSTATIN) 500,000 units/5 mL suspension Apply 5 mL (500,000 Units total) to the mouth or throat 4 (four) times a day 473 mL 0    omeprazole (PriLOSEC) 40 MG capsule take 1 capsule by mouth once daily 30 capsule 5    SF 5000 Plus 1 1 % CREA       SODIUM FLUORIDE, DENTAL GEL, 1 1 % GEL SF 5000 Plus 1 1 % dental cream      tretinoin (REFISSA) 0 05 % cream tretinoin 0 05 % topical cream      tretinoin (RETIN-A) 0 1 % cream Apply topically daily at bedtime 45 g 5    venlafaxine (EFFEXOR-XR) 150 mg 24 hr capsule take 1 capsule by mouth once daily 90 capsule 0    venlafaxine (EFFEXOR-XR) 75 mg 24 hr capsule take 1 capsule by mouth once daily 90 capsule 0    ondansetron (ZOFRAN) 4 mg tablet Take 1 tablet (4 mg total) by mouth every 8 (eight) hours as needed for nausea or vomiting (Patient not taking: No sig reported) 20 tablet 0     No current facility-administered medications for this visit  Review of Systems   Constitutional: Positive for activity change  Negative for chills, diaphoresis, fatigue and fever  Respiratory: Negative for cough, chest tightness, shortness of breath and wheezing  Cardiovascular: Negative for chest pain, palpitations and leg swelling  Gastrointestinal: Negative for abdominal pain, constipation, diarrhea, nausea and vomiting  Genitourinary: Negative for difficulty urinating, dysuria and frequency  Musculoskeletal: Positive for arthralgias and joint swelling  Negative for gait problem, myalgias, neck pain and neck stiffness     Neurological: Negative for weakness, light-headedness, numbness and headaches  Psychiatric/Behavioral: Negative for confusion  The patient is not nervous/anxious  PHQ-2/9 Depression Screening    Little interest or pleasure in doing things: 1 - several days  Feeling down, depressed, or hopeless: 1 - several days  PHQ-2 Score: 2  PHQ-2 Interpretation: Negative depression screen        Objective:  Vitals:    09/02/22 0941   BP: 122/74   Pulse: 76   Temp: 97 9 °F (36 6 °C)   SpO2: 97%   Weight: 62 8 kg (138 lb 8 oz)   Height: 5' 5" (1 651 m)     Body mass index is 23 05 kg/m²  Physical Exam  Vitals and nursing note reviewed  Constitutional:       General: She is not in acute distress  Appearance: Normal appearance  She is not ill-appearing  HENT:      Head: Normocephalic  Mouth/Throat:      Mouth: Mucous membranes are moist    Eyes:      Extraocular Movements: Extraocular movements intact  Conjunctiva/sclera: Conjunctivae normal       Pupils: Pupils are equal, round, and reactive to light  Musculoskeletal:         General: Swelling and tenderness present  No deformity or signs of injury  Normal range of motion  Comments: C Spine w/o gross deformities, increase temp, erythema, swelling, or lesions  Tenderness none,  ROM wnl  Spasms none  UE strength 5/5 with tone/ROM WNL  DTR 2/4  Left elbow joint w/o any gross deformities, but  increase temp, erythema, and swelling of the olecronon bursa  ?scabbed area    None crepitus  Moderate effusions, but noballotment  Joint integrity intact  No nodes  NO ulnar deviation  NO [place] neck deformities  ROM wnl  Tenderness diffusely over the posterior elbow        Neurological:      General: No focal deficit present  Mental Status: She is alert and oriented to person, place, and time  Mental status is at baseline  Psychiatric:         Mood and Affect: Mood normal          Behavior: Behavior normal          Thought Content:  Thought content normal          Judgment: Judgment normal        Medium joint arthrocentesis: L olecranon bursa  Universal Protocol:  Consent: Verbal consent obtained  Risks and benefits: risks, benefits and alternatives were discussed  Consent given by: patient  Time out: Immediately prior to procedure a "time out" was called to verify the correct patient, procedure, equipment, support staff and site/side marked as required    Patient understanding: patient states understanding of the procedure being performed  Required items: required blood products, implants, devices, and special equipment available  Patient identity confirmed: verbally with patient    Supporting Documentation  Indications: joint swelling, pain and diagnostic evaluation   Procedure Details  Location: elbow - L olecranon bursa  Preparation: Patient was prepped and draped in the usual sterile fashion  Needle size: 18 G  Ultrasound guidance: no    Aspirate amount: 15 mL  Aspirate: yellow and cloudy  Analysis: fluid sample sent for laboratory analysis    Patient tolerance: patient tolerated the procedure well with no immediate complications  Dressing:  Sterile dressing applied

## 2022-09-03 LAB
APPEARANCE FLD: CLEAR
COLOR FLD: YELLOW
CRYSTALS SNV QL MICRO: NORMAL
LYMPHOCYTES # SNV MANUAL: 11 %
MONOCYTES NFR SNV MANUAL: 3 %
NEUTROPHILS NFR SNV MANUAL: 86 %
RBC # SNV MANUAL: 495 /UL (ref 0–10)
SITE: ABNORMAL
TOTAL CELLS COUNTED SPEC: 100
WBC # FLD MANUAL: 6553 /UL (ref 0–200)

## 2022-09-04 LAB
BACTERIA SPEC BFLD CULT: NO GROWTH
GRAM STN SPEC: NORMAL

## 2022-09-06 DIAGNOSIS — F41.9 ANXIETY: ICD-10-CM

## 2022-09-06 DIAGNOSIS — K21.9 GASTROESOPHAGEAL REFLUX DISEASE WITHOUT ESOPHAGITIS: ICD-10-CM

## 2022-09-06 LAB
BACTERIA SPEC BFLD CULT: NO GROWTH
GRAM STN SPEC: NORMAL

## 2022-09-06 RX ORDER — VENLAFAXINE HYDROCHLORIDE 150 MG/1
150 CAPSULE, EXTENDED RELEASE ORAL DAILY
Qty: 90 CAPSULE | Refills: 1 | Status: SHIPPED | OUTPATIENT
Start: 2022-09-06

## 2022-09-06 RX ORDER — OMEPRAZOLE 40 MG/1
40 CAPSULE, DELAYED RELEASE ORAL DAILY
Qty: 30 CAPSULE | Refills: 5 | Status: SHIPPED | OUTPATIENT
Start: 2022-09-06 | End: 2022-10-21 | Stop reason: SDUPTHER

## 2022-09-06 RX ORDER — VENLAFAXINE HYDROCHLORIDE 75 MG/1
75 CAPSULE, EXTENDED RELEASE ORAL DAILY
Qty: 90 CAPSULE | Refills: 1 | Status: SHIPPED | OUTPATIENT
Start: 2022-09-06

## 2022-09-09 ENCOUNTER — OFFICE VISIT (OUTPATIENT)
Dept: INTERNAL MEDICINE CLINIC | Facility: CLINIC | Age: 67
End: 2022-09-09
Payer: COMMERCIAL

## 2022-09-09 VITALS
SYSTOLIC BLOOD PRESSURE: 120 MMHG | DIASTOLIC BLOOD PRESSURE: 80 MMHG | OXYGEN SATURATION: 98 % | HEART RATE: 75 BPM | TEMPERATURE: 98.5 F | WEIGHT: 140 LBS | BODY MASS INDEX: 23.32 KG/M2 | HEIGHT: 65 IN

## 2022-09-09 DIAGNOSIS — M54.50 CHRONIC BILATERAL LOW BACK PAIN, UNSPECIFIED WHETHER SCIATICA PRESENT: ICD-10-CM

## 2022-09-09 DIAGNOSIS — M71.022 OLECRANON BURSA ABSCESS, LEFT: Primary | ICD-10-CM

## 2022-09-09 DIAGNOSIS — G89.29 CHRONIC BILATERAL LOW BACK PAIN, UNSPECIFIED WHETHER SCIATICA PRESENT: ICD-10-CM

## 2022-09-09 PROCEDURE — 99213 OFFICE O/P EST LOW 20 MIN: CPT | Performed by: INTERNAL MEDICINE

## 2022-09-09 NOTE — PROGRESS NOTES
Assessment/Plan:  Problem List Items Addressed This Visit    None     Visit Diagnoses     Olecranon bursa abscess, left    -  Primary    Relevant Orders    Ambulatory referral to Physical Therapy    Ambulatory referral to Orthopedic Surgery    Chronic bilateral low back pain, unspecified whether sciatica present        Relevant Orders    Ambulatory referral to Physical Therapy           Diagnoses and all orders for this visit:    Olecranon bursa abscess, left  -     Ambulatory referral to Physical Therapy; Future  -     Ambulatory referral to Orthopedic Surgery; Future    Chronic bilateral low back pain, unspecified whether sciatica present  -     Ambulatory referral to Physical Therapy; Future      No problem-specific Assessment & Plan notes found for this encounter  A/P: Doing ok and discussed imaging and labs  Effusion is back and will need aspiration and ?steroids  Will start PT and refer to ortho  Continue with ice and NSAID's  RTC three weeks for f/u  Subjective:      Patient ID: Adin Cardona is a 61 y o  female  WF RTC for f/u left elbow pain and swelling  Aspirated and labs c/w bursitis, non infectious or traumatic, as did her xray  Started on NSAID's  Fluid re-accumulated and continues with pain and swelling  Chronic LBP is bad as well         The following portions of the patient's history were reviewed and updated as appropriate:   She has a past medical history of Abnormal finding in urine, Arthralgia of multiple joints, Biceps tendinitis, right, Bursitis of right knee, Bursitis of right shoulder, Chondrocalcinosis, COPD exacerbation ([address] ), 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, Drug intolerance, Dysfunction of right eustachian tube, Dysfunctional uterine bleeding, Edema, Elevated d-dimer, Exposure to hepatitis C, Fracture of fibula, Generalized anxiety disorder, GERD (gastroesophageal reflux disease), Mild cervical dysplasia, Myalgia, Myositis, Palpitations, Pre-syncope, Seasonal allergies, Thrombocytopenia ([facility] [address] ), Urinary tract infection, and Uterine leiomyoma  ,  does not have any pertinent problems on file  ,   has a past surgical history that includes Bladder surgery; Breast biopsy (Right); Colposcopy (01/06/1998); Dental surgery; Endometrial biopsy (05/21/1993); Hysteroscopy; Tubal ligation; US guided breast biopsy right complete (Right, 8/8/2018); and Hysterectomy  ,  family history includes Aortic aneurysm in her family; Arrhythmia in her mother; Arthritis in her family; Coronary artery disease in her father and mother; Diabetes in her family and father; Heart disease in her family; No Known Problems in her daughter, daughter, maternal aunt, maternal grandmother, and paternal grandmother; Ovarian cancer in her maternal aunt; Stroke in her mother  ,   reports that she has been smoking  She has never used smokeless tobacco  She reports that she does not drink alcohol and does not use drugs  ,  has No Known Allergies     Current Outpatient Medications   Medication Sig Dispense Refill    alendronate (FOSAMAX) 70 mg tablet take 1 tablet by mouth every 7 days 8 tablet 1    atorvastatin (LIPITOR) 20 mg tablet take 1 tablet by mouth once daily 90 tablet 3    buPROPion (WELLBUTRIN XL) 150 mg 24 hr tablet       cephalexin (KEFLEX) 500 mg capsule Take 1 capsule (500 mg total) by mouth every 8 (eight) hours for 10 days 30 capsule 0    clonazePAM (KlonoPIN) 1 mg tablet TAKE 1 TABLET TID      ibuprofen (MOTRIN) 800 mg tablet Take 1 tablet (800 mg total) by mouth every 6 (six) hours as needed for mild pain 120 tablet 1    meloxicam (MOBIC) 15 mg tablet Take 1 tablet (15 mg total) by mouth daily 30 tablet 1    nystatin (MYCOSTATIN) 500,000 units/5 mL suspension Apply 5 mL (500,000 Units total) to the mouth or throat 4 (four) times a day 473 mL 0    omeprazole (PriLOSEC) 40 MG capsule Take 1 capsule (40 mg total) by mouth daily 30 capsule 5    ondansetron (ZOFRAN) 4 mg tablet Take 1 tablet (4 mg total) by mouth every 8 (eight) hours as needed for nausea or vomiting (Patient not taking: No sig reported) 20 tablet 0    SF 5000 Plus 1 1 % CREA       SODIUM FLUORIDE, DENTAL GEL, 1 1 % GEL SF 5000 Plus 1 1 % dental cream      tretinoin (REFISSA) 0 05 % cream tretinoin 0 05 % topical cream      tretinoin (RETIN-A) 0 1 % cream Apply topically daily at bedtime 45 g 5    venlafaxine (EFFEXOR-XR) 150 mg 24 hr capsule Take 1 capsule (150 mg total) by mouth daily 90 capsule 1    venlafaxine (EFFEXOR-XR) 75 mg 24 hr capsule Take 1 capsule (75 mg total) by mouth daily 90 capsule 1     No current facility-administered medications for this visit  Review of Systems   Constitutional: Negative for activity change, chills, diaphoresis, fatigue and fever  HENT: Negative  Eyes: Negative for visual disturbance  Respiratory: Negative for cough, chest tightness, shortness of breath and wheezing  Cardiovascular: Negative for chest pain, palpitations and leg swelling  Gastrointestinal: Negative for abdominal pain, constipation, diarrhea, nausea and vomiting  Endocrine: Negative for cold intolerance and heat intolerance  Genitourinary: Negative for difficulty urinating, dysuria and frequency  Musculoskeletal: Positive for arthralgias and joint swelling  Negative for gait problem and myalgias  Neurological: Negative for dizziness, light-headedness and headaches  Psychiatric/Behavioral: Negative for confusion  The patient is not nervous/anxious  PHQ-2/9 Depression Screening          Objective:  Vitals:    09/09/22 1321   BP: 120/80   Pulse: 75   Temp: 98 5 °F (36 9 °C)   SpO2: 98%   Weight: 63 5 kg (140 lb)   Height: 5' 5" (1 651 m)     Body mass index is 23 3 kg/m²  Physical Exam  Vitals and nursing note reviewed  Constitutional:       General: She is not in acute distress  Appearance: Normal appearance  She is not ill-appearing     HENT:      Head: Normocephalic and atraumatic  Mouth/Throat:      Mouth: Mucous membranes are moist    Eyes:      Extraocular Movements: Extraocular movements intact  Conjunctiva/sclera: Conjunctivae normal       Pupils: Pupils are equal, round, and reactive to light  Musculoskeletal:         General: Swelling and tenderness present  Comments: Left elbow joint w/o any gross deformities, increase temp, erythema, or swelling  No crepitus  Moderate effusion, but no ballotment  Joint integrity intact with some induration into the forearm  No nodes  NO ulnar deviation  NO [place] neck deformities  ROM wnl  Tenderness diffusely      Neurological:      General: No focal deficit present  Mental Status: She is alert and oriented to person, place, and time  Mental status is at baseline  Psychiatric:         Mood and Affect: Mood normal          Behavior: Behavior normal          Thought Content:  Thought content normal          Judgment: Judgment normal

## 2022-09-12 ENCOUNTER — HOSPITAL ENCOUNTER (OUTPATIENT)
Dept: CT IMAGING | Facility: HOSPITAL | Age: 67
Discharge: HOME | End: 2022-09-12
Attending: FAMILY MEDICINE
Payer: COMMERCIAL

## 2022-09-12 ENCOUNTER — OFFICE VISIT (OUTPATIENT)
Dept: OBGYN CLINIC | Facility: CLINIC | Age: 67
End: 2022-09-12
Payer: COMMERCIAL

## 2022-09-12 VITALS
SYSTOLIC BLOOD PRESSURE: 120 MMHG | DIASTOLIC BLOOD PRESSURE: 76 MMHG | HEART RATE: 72 BPM | BODY MASS INDEX: 22.99 KG/M2 | WEIGHT: 138 LBS | HEIGHT: 65 IN

## 2022-09-12 DIAGNOSIS — M25.422 PAIN AND SWELLING OF LEFT ELBOW: ICD-10-CM

## 2022-09-12 DIAGNOSIS — M25.422 PAIN AND SWELLING OF LEFT ELBOW: Primary | ICD-10-CM

## 2022-09-12 DIAGNOSIS — M25.522 PAIN AND SWELLING OF LEFT ELBOW: Primary | ICD-10-CM

## 2022-09-12 DIAGNOSIS — M70.22 OLECRANON BURSITIS, LEFT ELBOW: ICD-10-CM

## 2022-09-12 DIAGNOSIS — M71.022 OLECRANON BURSA ABSCESS, LEFT: ICD-10-CM

## 2022-09-12 DIAGNOSIS — M25.522 PAIN AND SWELLING OF LEFT ELBOW: ICD-10-CM

## 2022-09-12 LAB
APPEARANCE FLD: ABNORMAL
COLOR FLD: ABNORMAL
LYMPHOCYTES # SNV MANUAL: 6 %
MONOCYTES NFR SNV MANUAL: 4 %
NEUTROPHILS NFR SNV MANUAL: 90 %
SITE: ABNORMAL
TOTAL CELLS COUNTED SPEC: 100
WBC # FLD MANUAL: ABNORMAL /UL (ref 0–200)

## 2022-09-12 PROCEDURE — 20605 DRAIN/INJ JOINT/BURSA W/O US: CPT | Performed by: FAMILY MEDICINE

## 2022-09-12 PROCEDURE — 87205 SMEAR GRAM STAIN: CPT | Performed by: FAMILY MEDICINE

## 2022-09-12 PROCEDURE — 73200 CT UPPER EXTREMITY W/O DYE: CPT

## 2022-09-12 PROCEDURE — 99214 OFFICE O/P EST MOD 30 MIN: CPT | Performed by: FAMILY MEDICINE

## 2022-09-12 PROCEDURE — G1004 CDSM NDSC: HCPCS

## 2022-09-12 PROCEDURE — 89051 BODY FLUID CELL COUNT: CPT | Performed by: FAMILY MEDICINE

## 2022-09-12 PROCEDURE — 87070 CULTURE OTHR SPECIMN AEROBIC: CPT | Performed by: FAMILY MEDICINE

## 2022-09-12 NOTE — LETTER
September 12, 2022     Ines El[facility] [address] [facility]    Patient: Maxine Cohen   YOB: 1955   Date of Visit: 9/12/2022       Dear Dr Sandra Olivares: Thank you for referring Maxine Cohen to me for evaluation  Below are my notes for this consultation  If you have questions, please do not hesitate to call me  I look forward to following your patient along with you  Sincerely,        Kirstin Eubanks MD        CC: No Recipients  Kirstin Eubanks MD  9/12/2022 11:22 AM  Incomplete  [facility] [address]  [facility] [id number] [phone]      Chief Complaint:  Chief Complaint   Patient presents with    Right Elbow - Swelling       Vitals:  Ht 5' 5" (1 651 m)   Wt 62 6 kg (138 lb)   BMI 22 96 kg/m²     The following portions of the patient's history were reviewed and updated as appropriate: allergies, current medications, past family history, past medical history, past social history, past surgical history, and problem list       Subjective:   Patient ID: Maxine Cohen is a 61 y o  female  Here c/o L elbow pain and swelling  Saw PCP  Drained elbow  xr done  Denies any trauma  Swelling for about 6 wks      Review of Systems   Constitutional: Negative for fatigue and fever  Respiratory: Negative for shortness of breath  Cardiovascular: Negative for chest pain  Gastrointestinal: Negative for abdominal pain and nausea  Genitourinary: Negative for dysuria  Musculoskeletal: Positive for arthralgias  Skin: Negative for rash and wound  Neurological: Negative for weakness and headaches  Objective:  Left Elbow Exam     Tenderness   The patient is experiencing tenderness in the olecranon fossa  Range of Motion   The patient has normal left elbow ROM  Muscle Strength   The patient has normal left elbow strength      Comments:  Olecranon swelling  Induration of proximal radius, warm, red            Physical Exam  Vitals and nursing note reviewed  Constitutional:       Appearance: Normal appearance  She is well-developed  HENT:      Head: Normocephalic  Mouth/Throat:      Mouth: Mucous membranes are moist    Eyes:      Extraocular Movements: Extraocular movements intact  Cardiovascular:      Rate and Rhythm: Normal rate and regular rhythm  Heart sounds: Normal heart sounds  Pulmonary:      Effort: Pulmonary effort is normal       Breath sounds: Normal breath sounds  Abdominal:      General: Bowel sounds are normal       Palpations: Abdomen is soft  Musculoskeletal:         General: Tenderness present  Normal range of motion  Cervical back: Normal range of motion  Skin:     General: Skin is warm and dry  Neurological:      General: No focal deficit present  Mental Status: She is alert and oriented to person, place, and time  Psychiatric:         Mood and Affect: Mood normal          Behavior: Behavior normal          Thought Content: Thought content normal      Medium joint arthrocentesis: L olecranon bursa  Universal Protocol:  Consent: Verbal consent obtained  Risks and benefits: risks, benefits and alternatives were discussed  Consent given by: patient  Timeout called at: 9/12/2022 11:21 AM   Supporting Documentation  Indications: pain and joint swelling   Procedure Details  Location: elbow - L olecranon bursa  Preparation: Patient was prepped and draped in the usual sterile fashion  Needle size: 18 G  Ultrasound guidance: no  Approach: posterior              I have personally reviewed pertinent films in PACS and my interpretation is XR L elbow- mod OA, no fx         Assessment/Plan:  Assessment/Plan   There are no diagnoses linked to this encounter  No follow-ups on file       Carlos Mcmullen MD

## 2022-09-12 NOTE — PATIENT INSTRUCTIONS
F/u after CT   STAT CT L elbow-  L elbow pain/swelling/induration  XR neg   possible abscess  Fluid sent for culture  L elbow aspiration done today

## 2022-09-12 NOTE — PROGRESS NOTES
[facility] [place]  [address]  [facility] [id number] [phone]      Chief Complaint:  Chief Complaint   Patient presents with    Right Elbow - Swelling       Vitals:  Ht 5' 5" (1 651 m)   Wt 62 6 kg (138 lb)   BMI 22 96 kg/m²     The following portions of the patient's history were reviewed and updated as appropriate: allergies, current medications, past family history, past medical history, past social history, past surgical history, and problem list       Subjective:   Patient ID: Sal Pacheco is a 61 y o  female  Here c/o L elbow pain and swelling  Saw PCP  Drained elbow  xr done  Denies any trauma  Swelling for about 6 wks      Review of Systems   Constitutional: Negative for fatigue and fever  Respiratory: Negative for shortness of breath  Cardiovascular: Negative for chest pain  Gastrointestinal: Negative for abdominal pain and nausea  Genitourinary: Negative for dysuria  Musculoskeletal: Positive for arthralgias  Skin: Negative for rash and wound  Neurological: Negative for weakness and headaches  Objective:  Left Elbow Exam     Tenderness   The patient is experiencing tenderness in the olecranon fossa  Range of Motion   The patient has normal left elbow ROM  Muscle Strength   The patient has normal left elbow strength  Comments:  Olecranon swelling  Induration of proximal radius, warm, red            Physical Exam  Vitals and nursing note reviewed  Constitutional:       Appearance: Normal appearance  She is well-developed  HENT:      Head: Normocephalic  Mouth/Throat:      Mouth: Mucous membranes are moist    Eyes:      Extraocular Movements: Extraocular movements intact  Cardiovascular:      Rate and Rhythm: Normal rate and regular rhythm  Heart sounds: Normal heart sounds  Pulmonary:      Effort: Pulmonary effort is normal       Breath sounds: Normal breath sounds     Abdominal: General: Bowel sounds are normal       Palpations: Abdomen is soft  Musculoskeletal:         General: Tenderness present  Normal range of motion  Cervical back: Normal range of motion  Skin:     General: Skin is warm and dry  Neurological:      General: No focal deficit present  Mental Status: She is alert and oriented to person, place, and time  Psychiatric:         Mood and Affect: Mood normal          Behavior: Behavior normal          Thought Content: Thought content normal      Medium joint arthrocentesis: L olecranon bursa  Universal Protocol:  Consent: Verbal consent obtained  Risks and benefits: risks, benefits and alternatives were discussed  Consent given by: patient  Timeout called at: 9/12/2022 11:21 AM   Supporting Documentation  Indications: pain and joint swelling   Procedure Details  Location: elbow - L olecranon bursa  Preparation: Patient was prepped and draped in the usual sterile fashion  Needle size: 18 G  Ultrasound guidance: no  Approach: posterior    Aspirate amount: 4 mL  Aspirate: serous, yellow and cloudy  Analysis: fluid sample sent for laboratory analysis    Patient tolerance: patient tolerated the procedure well with no immediate complications            I have personally reviewed pertinent films in PACS and my interpretation is XR L elbow- mod OA, no fx         Assessment/Plan:  Assessment/Plan   There are no diagnoses linked to this encounter  No follow-ups on file       Barbara Falk MD

## 2022-09-13 ENCOUNTER — TELEPHONE (OUTPATIENT)
Dept: OBGYN CLINIC | Facility: CLINIC | Age: 67
End: 2022-09-13

## 2022-09-13 DIAGNOSIS — M25.522 PAIN AND SWELLING OF LEFT ELBOW: Primary | ICD-10-CM

## 2022-09-13 DIAGNOSIS — M25.422 PAIN AND SWELLING OF LEFT ELBOW: Primary | ICD-10-CM

## 2022-09-13 RX ORDER — SULFAMETHOXAZOLE AND TRIMETHOPRIM 800; 160 MG/1; MG/1
1 TABLET ORAL EVERY 12 HOURS SCHEDULED
Qty: 14 TABLET | Refills: 0 | Status: SHIPPED | OUTPATIENT
Start: 2022-09-13 | End: 2022-09-20

## 2022-09-15 LAB
BACTERIA SPEC BFLD CULT: NO GROWTH
GRAM STN SPEC: NORMAL
GRAM STN SPEC: NORMAL

## 2022-09-16 ENCOUNTER — OFFICE VISIT (OUTPATIENT)
Dept: OBGYN CLINIC | Facility: CLINIC | Age: 67
End: 2022-09-16
Payer: COMMERCIAL

## 2022-09-16 ENCOUNTER — PREP FOR PROCEDURE (OUTPATIENT)
Dept: OBGYN CLINIC | Facility: CLINIC | Age: 67
End: 2022-09-16

## 2022-09-16 VITALS
HEIGHT: 65 IN | SYSTOLIC BLOOD PRESSURE: 118 MMHG | DIASTOLIC BLOOD PRESSURE: 63 MMHG | BODY MASS INDEX: 22.96 KG/M2 | HEART RATE: 78 BPM

## 2022-09-16 DIAGNOSIS — Z01.812 PRE-OPERATIVE LABORATORY EXAMINATION: ICD-10-CM

## 2022-09-16 DIAGNOSIS — Z11.59 SPECIAL SCREENING EXAMINATION FOR VIRAL DISEASE: ICD-10-CM

## 2022-09-16 DIAGNOSIS — M17.11 PRIMARY OSTEOARTHRITIS OF RIGHT KNEE: Primary | ICD-10-CM

## 2022-09-16 DIAGNOSIS — M65.311 TRIGGER FINGER OF RIGHT THUMB: ICD-10-CM

## 2022-09-16 PROCEDURE — 99213 OFFICE O/P EST LOW 20 MIN: CPT | Performed by: ORTHOPAEDIC SURGERY

## 2022-09-16 PROCEDURE — 20550 NJX 1 TENDON SHEATH/LIGAMENT: CPT | Performed by: ORTHOPAEDIC SURGERY

## 2022-09-16 RX ORDER — FOLIC ACID 1 MG/1
1 TABLET ORAL DAILY
Qty: 30 TABLET | Refills: 1 | Status: SHIPPED | OUTPATIENT
Start: 2022-09-16 | End: 2022-11-15

## 2022-09-16 RX ORDER — BETAMETHASONE SODIUM PHOSPHATE AND BETAMETHASONE ACETATE 3; 3 MG/ML; MG/ML
3 INJECTION, SUSPENSION INTRA-ARTICULAR; INTRALESIONAL; INTRAMUSCULAR; SOFT TISSUE
Status: COMPLETED | OUTPATIENT
Start: 2022-09-16 | End: 2022-09-16

## 2022-09-16 RX ORDER — BUPIVACAINE HYDROCHLORIDE 2.5 MG/ML
0.5 INJECTION, SOLUTION EPIDURAL; INFILTRATION; INTRACAUDAL
Status: COMPLETED | OUTPATIENT
Start: 2022-09-16 | End: 2022-09-16

## 2022-09-16 RX ORDER — FERROUS SULFATE TAB EC 324 MG (65 MG FE EQUIVALENT) 324 (65 FE) MG
324 TABLET DELAYED RESPONSE ORAL
Qty: 60 TABLET | Refills: 1 | Status: SHIPPED | OUTPATIENT
Start: 2022-09-16 | End: 2022-11-15

## 2022-09-16 RX ORDER — MULTIVIT-MIN/IRON FUM/FOLIC AC 7.5 MG-4
1 TABLET ORAL DAILY
Qty: 30 TABLET | Refills: 1 | Status: SHIPPED | OUTPATIENT
Start: 2022-09-16 | End: 2022-11-15

## 2022-09-16 RX ORDER — CHLORHEXIDINE GLUCONATE 4 G/100ML
SOLUTION TOPICAL DAILY PRN
OUTPATIENT
Start: 2022-09-16

## 2022-09-16 RX ORDER — ASCORBIC ACID 500 MG
500 TABLET ORAL 2 TIMES DAILY
Qty: 60 TABLET | Refills: 1 | Status: SHIPPED | OUTPATIENT
Start: 2022-09-16 | End: 2022-11-15

## 2022-09-16 RX ORDER — CHLORHEXIDINE GLUCONATE 0.12 MG/ML
15 RINSE ORAL ONCE
OUTPATIENT
Start: 2022-09-16 | End: 2022-09-16

## 2022-09-16 RX ADMIN — BUPIVACAINE HYDROCHLORIDE 0.5 ML: 2.5 INJECTION, SOLUTION EPIDURAL; INFILTRATION; INTRACAUDAL at 11:44

## 2022-09-16 RX ADMIN — BETAMETHASONE SODIUM PHOSPHATE AND BETAMETHASONE ACETATE 3 MG: 3; 3 INJECTION, SUSPENSION INTRA-ARTICULAR; INTRALESIONAL; INTRAMUSCULAR; SOFT TISSUE at 11:44

## 2022-09-16 NOTE — PROGRESS NOTES
Assessment:   Diagnosis ICD-10-CM Associated Orders   1  Primary osteoarthritis of right knee  M17 11 XR knee 3 vw right non injury     Ambulatory referral to Physical Therapy     Case request operating room: ARTHROPLASTY KNEE TOTAL     Multiple Vitamins-Minerals (multivitamin with minerals) tablet     folic acid (FOLVITE) 1 mg tablet     ferrous sulfate 324 (65 Fe) mg     ascorbic acid (VITAMIN C) 500 MG tablet     Case request operating room: ARTHROPLASTY KNEE TOTAL   2  Pre-operative laboratory examination  Z01 812 Comprehensive metabolic panel     Hemoglobin A1C W/EAG Estimation     CBC and differential     Anemia Panel w/Reflex     Protime-INR     APTT     Type and screen     EKG 12 lead   3  Special screening examination for viral disease  Z11 59 PAT Covid Screening   4  Trigger finger of right thumb  M65 311        Plan:  Reviewed today's physical exam findings and x-ray findings with patient at time of visit  X-Ray of right knee taken on 09/16/2022 were reviewed and showed severe medial joint space narrowing consistent with primary osteoarthritis  Risks and benefits of the total knee arthroplasty were explained to patient in detail today and questions were answered to their satisfaction  Patient gave their informed consent for above procedure  Patient elects to undergo TKA on November 16  She can continue NSAIDs/Tylenol as needed for pain and soreness  She was also offered, accepted, performed an injection(s) of cortisone to her Right thumb for symptomatic relief of pain and inflammation  Patient tolerated the treatment(s) well  Ice and post injection protocol advised  Patient expresses understanding and is in agreement with this treatment plan  The patient was given the opportunity to ask questions or present concerns  To do next visit:  No follow-ups on file  The above stated was discussed in layman's terms and the patient expressed understanding    All questions were answered to the patient's satisfaction  Patient has advanced arthritic changes along her right knee  She has opted for elective right total knee replacement  All risks, complications, benefits were discussed with the patient in great detail including bleeding, infection, blood clots, pain, stiffness, neurovascular damage, fractures, dislocations, the possibility of loss of life from surgery, heart attack, stroke, etcetera  Her surgery scheduled for November 16th, 2022  She also has a right trigger thumb which was injected with Celestone Marcaine  There has any further problems, she will not hesitate to let us know      Scribe Attestation    I,:  Andre Been am acting as a scribe while in the presence of the attending physician :       I,:  Liliya Garcia, DO personally performed the services described in this documentation    as scribed in my presence :             Subjective:   Sae Holloway is a 61 y o  female who presents today for a repeat evaluation of her right knee due to chronic pain, known primary ostearthritis  Patient reports today with worsening pain in her right knee especially with weight-bearing activities and ambulating stairs  Patient states that she has tried an injection of cortisone and visco supplementations with no relief and would like to undergo a right TKA  Patient states that she is currently taking NSAIDs / tylenol to provide symptomatic relief  She denies any recent bruising, numbness, tingling, or feelings of instability  She also denies any fevers, chills or shortness of breath  Secondary, she would like an injection of cortisone to her right thumb to provide symptomatic relief, known trigger finger  Review of systems negative unless otherwise specified in HPI  Review of Systems   Constitutional: Negative for chills and fever  HENT: Negative for ear pain and sore throat  Eyes: Negative for pain and visual disturbance  Respiratory: Negative for cough and shortness of breath  Cardiovascular: Negative for chest pain and palpitations  Gastrointestinal: Negative for abdominal pain and vomiting  Genitourinary: Negative for dysuria and hematuria  Musculoskeletal: Negative for arthralgias and back pain  Skin: Negative for color change and rash  Neurological: Negative for seizures and syncope  All other systems reviewed and are negative        Past Medical History:   Diagnosis Date    Abnormal finding in urine     Arthralgia of multiple joints     Biceps tendinitis, right     Bursitis of right knee     Bursitis of right shoulder     Chondrocalcinosis     COPD exacerbation (HCC)     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     Drug intolerance     Dysfunction of right eustachian tube     Dysfunctional uterine bleeding     Edema     Elevated d-dimer     Exposure to hepatitis C     Fracture of fibula     Generalized anxiety disorder     GERD (gastroesophageal reflux disease)     Mild cervical dysplasia     Myalgia     Myositis     Palpitations     Pre-syncope     Seasonal allergies     Thrombocytopenia (HCC)     Urinary tract infection     Uterine leiomyoma        Past Surgical History:   Procedure Laterality Date    BLADDER SURGERY      BREAST BIOPSY Right     US guided    COLPOSCOPY  01/06/1998    DENTAL SURGERY      ENDOMETRIAL BIOPSY  05/21/1993    HYSTERECTOMY      HYSTEROSCOPY      uterus- 1/22/98, 7/1/02 and 4/10/07    TUBAL LIGATION      US GUIDED BREAST BIOPSY RIGHT COMPLETE Right 8/8/2018       Family History   Problem Relation Age of Onset    Stroke Mother         cerebral artery occlusion with cerebral infarction    Coronary artery disease Mother     Arrhythmia Mother         sinus    Coronary artery disease Father     Diabetes Father     Aortic aneurysm Family     Arthritis Family     Diabetes Family     Heart disease Family     No Known Problems Daughter     No Known Problems Maternal Grandmother     No Known Problems Paternal Grandmother     No Known Problems Daughter     No Known Problems Maternal Aunt     Ovarian cancer Maternal Aunt        Social History     Occupational History    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    Occupation: housewife   Tobacco Use    Smoking status: Current Every Day Smoker     Packs/day: 1 50    Smokeless tobacco: Never Used   Vaping Use    Vaping Use: Never used   Substance and Sexual Activity    Alcohol use: No    Drug use: No    Sexual activity: Not on file         Current Outpatient Medications:     alendronate (FOSAMAX) 70 mg tablet, take 1 tablet by mouth every 7 days, Disp: 8 tablet, Rfl: 1    ascorbic acid (VITAMIN C) 500 MG tablet, Take 1 tablet (500 mg total) by mouth 2 (two) times a day, Disp: 60 tablet, Rfl: 1    atorvastatin (LIPITOR) 20 mg tablet, take 1 tablet by mouth once daily, Disp: 90 tablet, Rfl: 3    buPROPion (WELLBUTRIN XL) 150 mg 24 hr tablet, , Disp: , Rfl:     clonazePAM (KlonoPIN) 1 mg tablet, TAKE 1 TABLET TID, Disp: , Rfl:     ferrous sulfate 324 (65 Fe) mg, Take 1 tablet (324 mg total) by mouth 2 (two) times a day before meals, Disp: 60 tablet, Rfl: 1    folic acid (FOLVITE) 1 mg tablet, Take 1 tablet (1 mg total) by mouth daily, Disp: 30 tablet, Rfl: 1    ibuprofen (MOTRIN) 800 mg tablet, Take 1 tablet (800 mg total) by mouth every 6 (six) hours as needed for mild pain, Disp: 120 tablet, Rfl: 1    Multiple Vitamins-Minerals (multivitamin with minerals) tablet, Take 1 tablet by mouth daily, Disp: 30 tablet, Rfl: 1    omeprazole (PriLOSEC) 40 MG capsule, Take 1 capsule (40 mg total) by mouth daily, Disp: 30 capsule, Rfl: 5    SODIUM FLUORIDE, DENTAL GEL, 1 1 % GEL, SF 5000 Plus 1 1 % dental cream, Disp: , Rfl:     sulfamethoxazole-trimethoprim (BACTRIM DS) 800-160 mg per tablet, Take 1 tablet by mouth every 12 (twelve) hours for 7 days, Disp: 14 tablet, Rfl: 0    tretinoin (RETIN-A) 0 1 % cream, Apply topically daily at bedtime, Disp: 45 g, Rfl: 5    venlafaxine (EFFEXOR-XR) 150 mg 24 hr capsule, Take 1 capsule (150 mg total) by mouth daily, Disp: 90 capsule, Rfl: 1    venlafaxine (EFFEXOR-XR) 75 mg 24 hr capsule, Take 1 capsule (75 mg total) by mouth daily, Disp: 90 capsule, Rfl: 1    meloxicam (MOBIC) 15 mg tablet, Take 1 tablet (15 mg total) by mouth daily (Patient not taking: Reported on 9/16/2022), Disp: 30 tablet, Rfl: 1    nystatin (MYCOSTATIN) 500,000 units/5 mL suspension, Apply 5 mL (500,000 Units total) to the mouth or throat 4 (four) times a day (Patient not taking: Reported on 9/16/2022), Disp: 473 mL, Rfl: 0    ondansetron (ZOFRAN) 4 mg tablet, Take 1 tablet (4 mg total) by mouth every 8 (eight) hours as needed for nausea or vomiting (Patient not taking: No sig reported), Disp: 20 tablet, Rfl: 0    SF 5000 Plus 1 1 % CREA, , Disp: , Rfl:     tretinoin (REFISSA) 0 05 % cream, tretinoin 0 05 % topical cream (Patient not taking: Reported on 9/16/2022), Disp: , Rfl:     No Known Allergies       Vitals:    09/16/22 1106   BP: 118/63   Pulse: 78       Objective:                    Ortho Exam  right knee(s) -   Patient ambulates with antalgic gait pattern  Uses No assistive device  No anatomical deformity  Skin is warm and dry to touch with no signs of erythema, ecchymosis, or infection  No soft tissue swelling or effusion noted  ROM (5° - 115°)  MMT: 4/5 throughout  TTP over medial joint line, TTP over lateral joint line, TTP over pes anserine bursa, no popliteal fullness appreciated on exam   Flexor and extensor mechanisms are intact   Knee is stable to varus and valgus stress  - Lachman's  - Anterior Drawer, - Posterior Drawer  - Medial Hubert's, - Lateral Hubert's  - Pivot Shift  Patella tracks centrally with palpable crepitus  Calf compartments are soft and supple  - Yoon's sign  2+ DP and PT pulses with brisk capillary refill to the toes  Sural, saphenous, tibial, superficial, and deep peroneal motor and sensory distributions intact  Sensation light touch intact distally    right Hand -    Patient presents with no obvious anatomical deformity  Skin is warm and dry to touch with no signs of erythema, ecchymosis, or infection  No soft tissue swelling or effusion noted  Full FDS, FDP, extensor mechanisms are intact  No rotational deformity with composite finger flexion  TTP with palpable nodule in the thumb flexor tendon local to A1 Pulley  Reproducible triggering on exam  Demonstrates normal wrist, elbow, and shoulder motion  Forearm compartments are soft and supple  2+ distal radial pulse with brisk capillary refill to the fingers  Radial, median, and ulnar motor and sensory distribution intact  Sensations light to touch intact distally      Diagnostics, reviewed and taken today if performed as documented: The attending physician has personally reviewed the pertinent films in PACS and interpretation is as follows:    X-Ray of right knee taken on 09/16/2022 were reviewed and showed severe medial joint space narrowing consistent with primary osteoarthritis  Procedures, if performed today:    Hand/upper extremity injection: R thumb A1  Universal Protocol:  Consent: Verbal consent obtained    Risks and benefits: risks, benefits and alternatives were discussed  Consent given by: patient  Timeout called at: 9/16/2022 11:35 AM   Patient understanding: patient states understanding of the procedure being performed  Site marked: the operative site was marked  Patient identity confirmed: verbally with patient    Supporting Documentation  Indications: diagnostic and therapeutic   Procedure Details  Condition:trigger finger Location: thumb - R thumb A1   Preparation: Patient was prepped and draped in the usual sterile fashion  Needle size: 27 G  Ultrasound guidance: no  Approach: volar  Medications administered: 0 5 mL bupivacaine (PF) 0 25 %; 3 mg betamethasone acetate-betamethasone sodium phosphate 6 (3-3) mg/mL    Patient tolerance: patient tolerated the procedure well with no immediate complications  Dressing:  Sterile dressing applied             Portions of the record may have been created with voice recognition software  Occasional wrong word or "sound a like" substitutions may have occurred due to the inherent limitations of voice recognition software  Read the chart carefully and recognize, using context, where substitutions have occurred

## 2022-10-04 ENCOUNTER — OFFICE VISIT (OUTPATIENT)
Dept: OBGYN CLINIC | Facility: CLINIC | Age: 67
End: 2022-10-04
Payer: COMMERCIAL

## 2022-10-04 VITALS
SYSTOLIC BLOOD PRESSURE: 133 MMHG | WEIGHT: 139 LBS | HEART RATE: 73 BPM | BODY MASS INDEX: 23.16 KG/M2 | DIASTOLIC BLOOD PRESSURE: 75 MMHG | HEIGHT: 65 IN

## 2022-10-04 DIAGNOSIS — M25.422 PAIN AND SWELLING OF LEFT ELBOW: ICD-10-CM

## 2022-10-04 DIAGNOSIS — M25.522 PAIN AND SWELLING OF LEFT ELBOW: ICD-10-CM

## 2022-10-04 DIAGNOSIS — M17.11 PRIMARY OSTEOARTHRITIS OF RIGHT KNEE: ICD-10-CM

## 2022-10-04 DIAGNOSIS — M70.22 OLECRANON BURSITIS, LEFT ELBOW: ICD-10-CM

## 2022-10-04 DIAGNOSIS — M71.022 OLECRANON BURSA ABSCESS, LEFT: Primary | ICD-10-CM

## 2022-10-04 PROCEDURE — 20605 DRAIN/INJ JOINT/BURSA W/O US: CPT | Performed by: FAMILY MEDICINE

## 2022-10-04 PROCEDURE — 99214 OFFICE O/P EST MOD 30 MIN: CPT | Performed by: FAMILY MEDICINE

## 2022-10-04 NOTE — PROGRESS NOTES
[facility] [address]  [place]  [id number] [place] [id number] [phone]      Chief Complaint:  Chief Complaint   Patient presents with    Left Elbow - Follow-up       Vitals:  /75   Pulse 73   Ht 5' 5" (1 651 m)   Wt 63 kg (139 lb)   BMI 23 13 kg/m²     The following portions of the patient's history were reviewed and updated as appropriate: allergies, current medications, past family history, past medical history, past social history, past surgical history, and problem list       Subjective:   Patient ID: Maximilian Brenner is a 61 y o  female  Here for f/u L elbow pain/swelling  Hurts to bump  CT done  Aspiration was neg for bacteria but elevated WBC's  Denies feeling ill or fevers  No pain at rest   Red  Finished bactrim- no issues    CT LEFT ELBOW WITHOUT IV CONTRAST     INDICATION: M25 522: Pain in left elbow  M25 422: Effusion, left elbow  M70 22: Olecranon bursitis, left elbow      COMPARISON:  Left elbow radiographs 9/2/2022     TECHNIQUE:  CT examination of the above was performed  This examination, like all CT scans performed in the [facility], was performed utilizing techniques to minimize radiation dose exposure, including the use of iterative reconstruction   and automated exposure control software  Sagittal and coronal two dimensional reconstructed images were also submitted for interpretation      Rad dose  482 69 mGy-cm      FINDINGS:     OSSEOUS STRUCTURES:  No fracture, dislocation or destructive osseous lesion  Moderate elbow osteoarthritis      VISUALIZED MUSCULATURE:  Unremarkable      SOFT TISSUES:  Olecranon bursitis with adjacent subcutaneous soft tissue stranding  No radiopaque foreign body      OTHER PERTINENT FINDINGS:  None      IMPRESSION:     Olecranon bursitis and adjacent edema/cellulitis, possibly infectious or inflammatory (consider gout)      No acute osseous abnormality        Review of Systems Constitutional: Negative for fatigue and fever  Respiratory: Negative for shortness of breath  Cardiovascular: Negative for chest pain  Gastrointestinal: Negative for abdominal pain and nausea  Genitourinary: Negative for dysuria  Musculoskeletal: Positive for arthralgias  Skin: Negative for rash and wound  Neurological: Negative for weakness and headaches  Objective:  Left Elbow Exam     Tenderness   Left elbow tenderness location: olecranon TTP  Muscle Strength   The patient has normal left elbow strength  Comments:  Erythema, TTP, swelling olecranon with proximal 2cm long indurated area as well            Physical Exam  Constitutional:       Appearance: Normal appearance  She is normal weight  HENT:      Head: Normocephalic  Eyes:      Extraocular Movements: Extraocular movements intact  Pulmonary:      Effort: Pulmonary effort is normal    Musculoskeletal:      Cervical back: Normal range of motion  Skin:     General: Skin is warm and dry  Neurological:      General: No focal deficit present  Mental Status: She is alert and oriented to person, place, and time  Mental status is at baseline  Psychiatric:         Mood and Affect: Mood normal          Behavior: Behavior normal          Thought Content: Thought content normal          Judgment: Judgment normal      Medium joint arthrocentesis: L olecranon bursa  Universal Protocol:  Consent: Verbal consent obtained    Risks and benefits: risks, benefits and alternatives were discussed  Consent given by: patient  Timeout called at: 10/4/2022 9:39 AM   Supporting Documentation  Indications: pain and joint swelling   Procedure Details  Location: elbow - L olecranon bursa  Preparation: Patient was prepped and draped in the usual sterile fashion  Needle size: 18 G  Ultrasound guidance: no  Approach: dorsal    Aspirate amount: 1 mL  Aspirate: clear and yellow    Patient tolerance: patient tolerated the procedure well with no immediate complications  Dressing:  Sterile dressing applied            I have personally reviewed pertinent films in PACS and my interpretation is CT L elbow-  olecranon bursitis with cellulitis, possibly infectious or inflammatory/gout? Gretchen Gr Assessment/Plan:  Assessment/Plan   Diagnoses and all orders for this visit:    Olecranon bursa abscess, left  -     Ambulatory Referral to Orthopedic Surgery; Future    Pain and swelling of left elbow  -     Ambulatory Referral to Orthopedic Surgery; Future    Olecranon bursitis, left elbow  -     Ambulatory Referral to Orthopedic Surgery; Future    Other orders  -     Cancel: Hand/upper extremity injection  -     Medium joint arthrocentesis: L olecranon bursa        Return for f/u with Dr Jessica Hernandez, [address]

## 2022-10-04 NOTE — PATIENT INSTRUCTIONS
F/u here as needed  Referral to Dr Reyes Dose- took abx no improvement, continued swelling and pain  No current sign of infection  Icing/heat/OTC pain meds as needed    L elbow olecranon bursa aspiration done today

## 2022-10-10 ENCOUNTER — OFFICE VISIT (OUTPATIENT)
Dept: OBGYN CLINIC | Facility: CLINIC | Age: 67
End: 2022-10-10
Payer: COMMERCIAL

## 2022-10-10 VITALS
SYSTOLIC BLOOD PRESSURE: 143 MMHG | HEIGHT: 65 IN | HEART RATE: 83 BPM | DIASTOLIC BLOOD PRESSURE: 80 MMHG | BODY MASS INDEX: 23.13 KG/M2

## 2022-10-10 DIAGNOSIS — Z12.11 ENCOUNTER FOR SCREENING COLONOSCOPY: ICD-10-CM

## 2022-10-10 DIAGNOSIS — M25.422 PAIN AND SWELLING OF LEFT ELBOW: ICD-10-CM

## 2022-10-10 DIAGNOSIS — M70.22 OLECRANON BURSITIS, LEFT ELBOW: Primary | ICD-10-CM

## 2022-10-10 DIAGNOSIS — M35.3 POLYMYALGIA (HCC): ICD-10-CM

## 2022-10-10 DIAGNOSIS — M25.522 PAIN AND SWELLING OF LEFT ELBOW: ICD-10-CM

## 2022-10-10 DIAGNOSIS — M71.022 OLECRANON BURSA ABSCESS, LEFT: ICD-10-CM

## 2022-10-10 PROCEDURE — 20605 DRAIN/INJ JOINT/BURSA W/O US: CPT | Performed by: ORTHOPAEDIC SURGERY

## 2022-10-10 PROCEDURE — 99213 OFFICE O/P EST LOW 20 MIN: CPT | Performed by: ORTHOPAEDIC SURGERY

## 2022-10-10 NOTE — PROGRESS NOTES
Assessment:   Diagnosis ICD-10-CM Associated Orders   1  Olecranon bursitis, left elbow  M70 22 Ambulatory Referral to Orthopedic Surgery   2  Olecranon bursa abscess, left  M71 022 Ambulatory Referral to Orthopedic Surgery   3  Pain and swelling of left elbow  M25 522 Ambulatory Referral to Orthopedic Surgery    M25 422    4  Encounter for screening colonoscopy  Z12 11 Ambulatory referral for colon cancer education   5  Polymyalgia ([address] )  M35 3 Ambulatory Referral to Rheumatology       Plan:  She was offered, accepted, performed an aspiration of 2 cc's of clear fluid to her Left elbow for olecranon bursitis  Patient tolerated the treatment(s) well  Ice and post injection protocol advised  She will be seen for follow-up after her knee replacement for re-evaluation and consideration for repeat injections as necessary  Amb ref to rheumatology to address polymyalgia  Patient expresses understanding and is in agreement with this treatment plan  The patient was given the opportunity to ask questions or present concerns  Under aseptic technique, the olecranon bursa was aspirated 2 cc of clear fluid  There is still prominence left, which is attributed to a bursal sac and not the fluid  The patient was instructed to avoid excessive bleeding  Continue home exercise program   She is scheduled for knee replacement surgery min November  We will see her for that  If her condition changes, she will not hesitate to let us know    To do next visit:  No follow-ups on file  The above stated was discussed in layman's terms and the patient expressed understanding  All questions were answered to the patient's satisfaction  Scribe Attestation    I,:  Balta Crum am acting as a scribe while in the presence of the attending physician :       I,:  Franck Mace, DO personally performed the services described in this documentation    as scribed in my presence :             Subjective:   Cherry Gaston is a 61 y o  female who presents today for an Initial evaluation of her left elbow, known olecranon bursitis  Patient would like her left elbow aspirated today  She denies any recent bruising, numbness, tingling, or feelings of instability  She also denies any fevers, chills or shortness of breath  Review of systems negative unless otherwise specified in HPI  Review of Systems   Constitutional: Negative for chills and fever  HENT: Negative for ear pain and sore throat  Eyes: Negative for pain and visual disturbance  Respiratory: Negative for cough and shortness of breath  Cardiovascular: Negative for chest pain and palpitations  Gastrointestinal: Negative for abdominal pain and vomiting  Genitourinary: Negative for dysuria and hematuria  Musculoskeletal: Negative for arthralgias and back pain  Skin: Negative for color change and rash  Neurological: Negative for seizures and syncope  All other systems reviewed and are negative        Past Medical History:   Diagnosis Date   • Abnormal finding in urine    • Arthralgia of multiple joints    • Biceps tendinitis, right    • Bursitis of right knee    • Bursitis of right shoulder    • Chondrocalcinosis    • COPD exacerbation (HCC)    • 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    • Drug intolerance    • Dysfunction of right eustachian tube    • Dysfunctional uterine bleeding    • Edema    • Elevated d-dimer    • Exposure to hepatitis C    • Fracture of fibula    • Generalized anxiety disorder    • GERD (gastroesophageal reflux disease)    • Mild cervical dysplasia    • Myalgia    • Myositis    • Palpitations    • Pre-syncope    • Seasonal allergies    • Thrombocytopenia (HCC)    • Urinary tract infection    • Uterine leiomyoma        Past Surgical History:   Procedure Laterality Date   • BLADDER SURGERY     • BREAST BIOPSY Right     US guided   • COLPOSCOPY  01/06/1998   • DENTAL SURGERY     • ENDOMETRIAL BIOPSY  05/21/1993   • HYSTERECTOMY • HYSTEROSCOPY      uterus- 1/22/98, 7/1/02 and 4/10/07   • TUBAL LIGATION     • US GUIDED BREAST BIOPSY RIGHT COMPLETE Right 8/8/2018       Family History   Problem Relation Age of Onset   • Stroke Mother         cerebral artery occlusion with cerebral infarction   • Coronary artery disease Mother    • Arrhythmia Mother         sinus   • Coronary artery disease Father    • Diabetes Father    • Aortic aneurysm Family    • Arthritis Family    • Diabetes Family    • Heart disease Family    • No Known Problems Daughter    • No Known Problems Maternal Grandmother    • No Known Problems Paternal Grandmother    • No Known Problems Daughter    • No Known Problems Maternal Aunt    • Ovarian cancer Maternal Aunt        Social History     Occupational History   • 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   • Occupation: housewife   Tobacco Use   • Smoking status: Current Every Day Smoker     Packs/day: 1 50   • Smokeless tobacco: Never Used   Vaping Use   • Vaping Use: Never used   Substance and Sexual Activity   • Alcohol use: No   • Drug use: No   • Sexual activity: Not on file         Current Outpatient Medications:   •  alendronate (FOSAMAX) 70 mg tablet, take 1 tablet by mouth every 7 days, Disp: 8 tablet, Rfl: 1  •  ascorbic acid (VITAMIN C) 500 MG tablet, Take 1 tablet (500 mg total) by mouth 2 (two) times a day, Disp: 60 tablet, Rfl: 1  •  atorvastatin (LIPITOR) 20 mg tablet, take 1 tablet by mouth once daily, Disp: 90 tablet, Rfl: 3  •  buPROPion (WELLBUTRIN XL) 150 mg 24 hr tablet, , Disp: , Rfl:   •  clonazePAM (KlonoPIN) 1 mg tablet, TAKE 1 TABLET TID, Disp: , Rfl:   •  diclofenac sodium (VOLTAREN) 50 mg EC tablet, Take 1 tablet (50 mg total) by mouth 2 (two) times a day as needed (pain), Disp: 60 tablet, Rfl: 3  •  folic acid (FOLVITE) 1 mg tablet, Take 1 tablet (1 mg total) by mouth daily, Disp: 30 tablet, Rfl: 1  •  ibuprofen (MOTRIN) 800 mg tablet, Take 1 tablet (800 mg total) by mouth every 6 (six) hours as needed for mild pain, Disp: 120 tablet, Rfl: 1  •  Multiple Vitamins-Minerals (multivitamin with minerals) tablet, Take 1 tablet by mouth daily, Disp: 30 tablet, Rfl: 1  •  omeprazole (PriLOSEC) 40 MG capsule, Take 1 capsule (40 mg total) by mouth daily, Disp: 30 capsule, Rfl: 5  •  SF 5000 Plus 1 1 % CREA, , Disp: , Rfl:   •  tretinoin (RETIN-A) 0 1 % cream, Apply topically daily at bedtime, Disp: 45 g, Rfl: 5  •  venlafaxine (EFFEXOR-XR) 150 mg 24 hr capsule, Take 1 capsule (150 mg total) by mouth daily, Disp: 90 capsule, Rfl: 1  •  venlafaxine (EFFEXOR-XR) 75 mg 24 hr capsule, Take 1 capsule (75 mg total) by mouth daily, Disp: 90 capsule, Rfl: 1  •  ferrous sulfate 324 (65 Fe) mg, Take 1 tablet (324 mg total) by mouth 2 (two) times a day before meals (Patient not taking: No sig reported), Disp: 60 tablet, Rfl: 1  •  meloxicam (MOBIC) 15 mg tablet, Take 1 tablet (15 mg total) by mouth daily (Patient not taking: No sig reported), Disp: 30 tablet, Rfl: 1  •  nystatin (MYCOSTATIN) 500,000 units/5 mL suspension, Apply 5 mL (500,000 Units total) to the mouth or throat 4 (four) times a day (Patient not taking: No sig reported), Disp: 473 mL, Rfl: 0  •  ondansetron (ZOFRAN) 4 mg tablet, Take 1 tablet (4 mg total) by mouth every 8 (eight) hours as needed for nausea or vomiting (Patient not taking: No sig reported), Disp: 20 tablet, Rfl: 0  •  SODIUM FLUORIDE, DENTAL GEL, 1 1 % GEL, SF 5000 Plus 1 1 % dental cream (Patient not taking: No sig reported), Disp: , Rfl:   •  tretinoin (REFISSA) 0 05 % cream, tretinoin 0 05 % topical cream (Patient not taking: No sig reported), Disp: , Rfl:     No Known Allergies       Vitals:    10/10/22 0854   BP: 143/80   Pulse: 83       Objective:                    Ortho Exam  left Elbow -   No anatomical deformity  Skin is warm and dry to touch with no signs of erythema, ecchymosis, or infection  Mild soft tissue swelling or effusion noted on olecranon bursa  TTP over olecranon bursa  ROM: normal  MMT: 5/5 throughout  - varus laxity, - valgus laxity  Demonstrates normal shoulder, wrist, and finger motion  - radial head instability  - ulnar nerve subluxation  2+ distal radial pulse with brisk capillary refill to the fingers  Radial, median, and ulnar motor and sensory distrubution intact  Sensation to light touch intact distally     Diagnostics, reviewed and taken today if performed as documented:    None performed    Procedures, if performed today:    Medium joint arthrocentesis: L olecranon bursa  Universal Protocol:  Consent: Verbal consent obtained  Risks and benefits: risks, benefits and alternatives were discussed  Consent given by: patient  Time out: Immediately prior to procedure a "time out" was called to verify the correct patient, procedure, equipment, support staff and site/side marked as required  Timeout called at: 10/10/2022 9:18 AM   Patient understanding: patient states understanding of the procedure being performed  Site marked: the operative site was marked  Patient identity confirmed: verbally with patient    Supporting Documentation  Indications: pain and diagnostic evaluation   Procedure Details  Location: elbow - L olecranon bursa  Preparation: Patient was prepped and draped in the usual sterile fashion  Needle size: 22 G  Ultrasound guidance: no  Approach: anterolateral    Aspirate amount: 2 mL  Aspirate: clear    Patient tolerance: patient tolerated the procedure well with no immediate complications  Dressing:  Sterile dressing applied            Portions of the record may have been created with voice recognition software  Occasional wrong word or "sound a like" substitutions may have occurred due to the inherent limitations of voice recognition software  Read the chart carefully and recognize, using context, where substitutions have occurred

## 2022-10-14 ENCOUNTER — TELEPHONE (OUTPATIENT)
Dept: OBGYN CLINIC | Facility: HOSPITAL | Age: 67
End: 2022-10-14

## 2022-10-14 NOTE — TELEPHONE ENCOUNTER
Spoke with patient  She stated her pain was in her hands and neck  I stated she can take OTC pain meds or an anti inflammatory  She said she has taken several with no relief  Patient does have a Rheumatology referral in system  I gave her a number to call and schedule an appt

## 2022-10-14 NOTE — TELEPHONE ENCOUNTER
Caller: Patient    Doctor: Dr Avtar Enriquez    Reason for call: Patient calling with severe pain in right hand and requesting a call back     Call back#: [phone]

## 2022-10-16 DIAGNOSIS — M85.89 OSTEOPENIA OF MULTIPLE SITES: ICD-10-CM

## 2022-10-16 RX ORDER — ALENDRONATE SODIUM 70 MG/1
TABLET ORAL
Qty: 8 TABLET | Refills: 1 | Status: SHIPPED | OUTPATIENT
Start: 2022-10-16

## 2022-10-18 ENCOUNTER — TELEPHONE (OUTPATIENT)
Dept: OBGYN CLINIC | Facility: HOSPITAL | Age: 67
End: 2022-10-18

## 2022-10-18 NOTE — TELEPHONE ENCOUNTER
Unfortunate, her knee pain may worsen during the perioperative period  It will not explain her generalized body pain  She may have to see a rheumatologist regarding this

## 2022-10-18 NOTE — TELEPHONE ENCOUNTER
Caller: Harper Gomez    Doctor: Farzaneh Zamora     Reason for call: Patient would like to know what is causing her all over pain  Lower back // wrist and neck  Patient states she has spoke to you regarding these issues  Patient does see pain management for her back  No relief  She is scheduled for R knee surgery on Nov 16 and is scared to have additional pain  What can she do ? Should she come to speak with you prior to surgery ?     Call back#: [phone] or [phone]

## 2022-10-18 NOTE — TELEPHONE ENCOUNTER
Spoke with patient and information above relayed  Patient has appt with Rheumatology  6/5  She is wondering if Dr Amy Alvarez can do some preliminary bloodwork for Rheumatology?

## 2022-10-18 NOTE — TELEPHONE ENCOUNTER
Unfortunately, it is beyond my orthopedic expertise to recommend the plethora of blood work that will be ordered based on her condition and symptoms

## 2022-10-19 ENCOUNTER — TELEPHONE (OUTPATIENT)
Dept: OBGYN CLINIC | Facility: HOSPITAL | Age: 67
End: 2022-10-19

## 2022-10-19 NOTE — TELEPHONE ENCOUNTER
She should see [facility] for her neck as well  If she can deal with the pain in her knee, then surgery should be postponed    If not, it should occur as scheduled

## 2022-10-19 NOTE — TELEPHONE ENCOUNTER
She will schedule with [facility] for Neck and Lumbar back  Is she okay for steroid injection this close to surgery  She is asking for imaging of the wrists, can xray be provided? She wants the TKA surgery as scheduled

## 2022-10-19 NOTE — TELEPHONE ENCOUNTER
Caller: Patient's daughter    Doctor: Dr Richard Titus    Reason for call: requesting faxed Rheum referral to [phone] attn : Jacob Booker

## 2022-10-19 NOTE — TELEPHONE ENCOUNTER
I cannot order any additional x-rays till she is examined    Once again, if she seeing Rheumatology, they can order it  as well

## 2022-10-19 NOTE — TELEPHONE ENCOUNTER
I spoke with patient and information above relayed  She is concerned with being able to maneuver after TKA with her Cervical pain and B/L Wrist pain and Lumbar pain  She has never had any imaging of her B/L wrists or no  imaging of the Cervical area  She is wondering if this is something you can assess? She does see [facility] for Lumbar area  She was going to reach out to them for possible epidural injection  Would this be allowed so close to TKA?

## 2022-10-19 NOTE — TELEPHONE ENCOUNTER
Caller: Patients daughter    Doctor: Alfred Duron    Reason for call: To see if there was a rheum referral in system       Call back#:

## 2022-10-19 NOTE — TELEPHONE ENCOUNTER
Caller: Cece Sanches    Doctor:     Reason for call: Patient called to check on the fax for Rheum, let her know it was already faxed and she will call her daughter      Call back#: [phone]

## 2022-10-21 ENCOUNTER — APPOINTMENT (OUTPATIENT)
Dept: LAB | Facility: CLINIC | Age: 67
End: 2022-10-21
Payer: COMMERCIAL

## 2022-10-21 ENCOUNTER — OFFICE VISIT (OUTPATIENT)
Dept: INTERNAL MEDICINE CLINIC | Facility: CLINIC | Age: 67
End: 2022-10-21
Payer: COMMERCIAL

## 2022-10-21 VITALS
DIASTOLIC BLOOD PRESSURE: 70 MMHG | OXYGEN SATURATION: 98 % | SYSTOLIC BLOOD PRESSURE: 118 MMHG | HEIGHT: 65 IN | WEIGHT: 137.13 LBS | BODY MASS INDEX: 22.85 KG/M2 | HEART RATE: 62 BPM | TEMPERATURE: 97.2 F

## 2022-10-21 DIAGNOSIS — M48.062 SPINAL STENOSIS OF LUMBAR REGION WITH NEUROGENIC CLAUDICATION: ICD-10-CM

## 2022-10-21 DIAGNOSIS — M50.30 DDD (DEGENERATIVE DISC DISEASE), CERVICAL: ICD-10-CM

## 2022-10-21 DIAGNOSIS — S40.811A ABRASION OF SKIN OF RIGHT UPPER ARM: ICD-10-CM

## 2022-10-21 DIAGNOSIS — Z01.812 PRE-OPERATIVE LABORATORY EXAMINATION: Primary | ICD-10-CM

## 2022-10-21 DIAGNOSIS — K21.9 GASTROESOPHAGEAL REFLUX DISEASE WITHOUT ESOPHAGITIS: ICD-10-CM

## 2022-10-21 DIAGNOSIS — Z82.61 FAMILY HISTORY OF RHEUMATOID ARTHRITIS: ICD-10-CM

## 2022-10-21 DIAGNOSIS — M79.10 MYALGIA: ICD-10-CM

## 2022-10-21 DIAGNOSIS — M41.80 DEXTROSCOLIOSIS: ICD-10-CM

## 2022-10-21 DIAGNOSIS — G89.29 CHRONIC MIDLINE LOW BACK PAIN WITHOUT SCIATICA: ICD-10-CM

## 2022-10-21 DIAGNOSIS — M54.50 CHRONIC MIDLINE LOW BACK PAIN WITHOUT SCIATICA: ICD-10-CM

## 2022-10-21 DIAGNOSIS — M51.04 HNP (HERNIATED NUCLEUS PULPOSUS WITH MYELOPATHY), THORACIC: ICD-10-CM

## 2022-10-21 DIAGNOSIS — G89.4 CHRONIC PAIN SYNDROME: ICD-10-CM

## 2022-10-21 DIAGNOSIS — M25.50 ARTHRALGIA, UNSPECIFIED JOINT: Primary | ICD-10-CM

## 2022-10-21 DIAGNOSIS — M25.50 ARTHRALGIA, UNSPECIFIED JOINT: ICD-10-CM

## 2022-10-21 LAB
ALBUMIN SERPL BCP-MCNC: 3.7 G/DL (ref 3.5–5)
ALP SERPL-CCNC: 71 U/L (ref 46–116)
ALT SERPL W P-5'-P-CCNC: 26 U/L (ref 12–78)
ANION GAP SERPL CALCULATED.3IONS-SCNC: 5 MMOL/L (ref 4–13)
APTT PPP: 29 SECONDS (ref 23–37)
AST SERPL W P-5'-P-CCNC: 13 U/L (ref 5–45)
BASOPHILS # BLD AUTO: 0.05 THOUSANDS/ÂΜL (ref 0–0.1)
BASOPHILS NFR BLD AUTO: 1 % (ref 0–1)
BILIRUB SERPL-MCNC: 0.5 MG/DL (ref 0.2–1)
BUN SERPL-MCNC: 15 MG/DL (ref 5–25)
CALCIUM SERPL-MCNC: 9.3 MG/DL (ref 8.3–10.1)
CHLORIDE SERPL-SCNC: 111 MMOL/L (ref 96–108)
CO2 SERPL-SCNC: 24 MMOL/L (ref 21–32)
CREAT SERPL-MCNC: 0.63 MG/DL (ref 0.6–1.3)
CRP SERPL QL: <3 MG/L
EOSINOPHIL # BLD AUTO: 0.12 THOUSAND/ÂΜL (ref 0–0.61)
EOSINOPHIL NFR BLD AUTO: 2 % (ref 0–6)
ERYTHROCYTE [DISTWIDTH] IN BLOOD BY AUTOMATED COUNT: 13.8 % (ref 11.6–15.1)
EST. AVERAGE GLUCOSE BLD GHB EST-MCNC: 111 MG/DL
FERRITIN SERPL-MCNC: 10 NG/ML (ref 8–388)
GFR SERPL CREATININE-BSD FRML MDRD: 95 ML/MIN/1.73SQ M
GLUCOSE SERPL-MCNC: 103 MG/DL (ref 65–140)
HBA1C MFR BLD: 5.5 %
HCT VFR BLD AUTO: 39.7 % (ref 34.8–46.1)
HGB BLD-MCNC: 12.4 G/DL (ref 11.5–15.4)
IMM GRANULOCYTES # BLD AUTO: 0.01 THOUSAND/UL (ref 0–0.2)
IMM GRANULOCYTES NFR BLD AUTO: 0 % (ref 0–2)
INR PPP: 0.89 (ref 0.84–1.19)
IRON SATN MFR SERPL: 11 % (ref 15–50)
IRON SERPL-MCNC: 38 UG/DL (ref 50–170)
LYMPHOCYTES # BLD AUTO: 1.61 THOUSANDS/ÂΜL (ref 0.6–4.47)
LYMPHOCYTES NFR BLD AUTO: 31 % (ref 14–44)
MCH RBC QN AUTO: 31.2 PG (ref 26.8–34.3)
MCHC RBC AUTO-ENTMCNC: 31.2 G/DL (ref 31.4–37.4)
MCV RBC AUTO: 100 FL (ref 82–98)
MONOCYTES # BLD AUTO: 0.44 THOUSAND/ÂΜL (ref 0.17–1.22)
MONOCYTES NFR BLD AUTO: 8 % (ref 4–12)
NEUTROPHILS # BLD AUTO: 3.02 THOUSANDS/ÂΜL (ref 1.85–7.62)
NEUTS SEG NFR BLD AUTO: 58 % (ref 43–75)
NRBC BLD AUTO-RTO: 0 /100 WBCS
PLATELET # BLD AUTO: 317 THOUSANDS/UL (ref 149–390)
PMV BLD AUTO: 11.1 FL (ref 8.9–12.7)
POTASSIUM SERPL-SCNC: 3.9 MMOL/L (ref 3.5–5.3)
PROT SERPL-MCNC: 7.1 G/DL (ref 6.4–8.4)
PROTHROMBIN TIME: 12.3 SECONDS (ref 11.6–14.5)
RBC # BLD AUTO: 3.98 MILLION/UL (ref 3.81–5.12)
RETICS # AUTO: NORMAL 10*3/UL (ref 14097–95744)
RETICS # CALC: 1.55 % (ref 0.37–1.87)
SODIUM SERPL-SCNC: 140 MMOL/L (ref 135–147)
TIBC SERPL-MCNC: 335 UG/DL (ref 250–450)
URATE SERPL-MCNC: 3.4 MG/DL (ref 2–7.5)
WBC # BLD AUTO: 5.25 THOUSAND/UL (ref 4.31–10.16)

## 2022-10-21 PROCEDURE — 83540 ASSAY OF IRON: CPT

## 2022-10-21 PROCEDURE — 83550 IRON BINDING TEST: CPT

## 2022-10-21 PROCEDURE — 86140 C-REACTIVE PROTEIN: CPT

## 2022-10-21 PROCEDURE — 86430 RHEUMATOID FACTOR TEST QUAL: CPT

## 2022-10-21 PROCEDURE — 85045 AUTOMATED RETICULOCYTE COUNT: CPT

## 2022-10-21 PROCEDURE — 85730 THROMBOPLASTIN TIME PARTIAL: CPT

## 2022-10-21 PROCEDURE — 36415 COLL VENOUS BLD VENIPUNCTURE: CPT

## 2022-10-21 PROCEDURE — 80053 COMPREHEN METABOLIC PANEL: CPT

## 2022-10-21 PROCEDURE — 84550 ASSAY OF BLOOD/URIC ACID: CPT

## 2022-10-21 PROCEDURE — 81374 HLA I TYPING 1 ANTIGEN LR: CPT

## 2022-10-21 PROCEDURE — 83036 HEMOGLOBIN GLYCOSYLATED A1C: CPT

## 2022-10-21 PROCEDURE — 82728 ASSAY OF FERRITIN: CPT

## 2022-10-21 PROCEDURE — 86038 ANTINUCLEAR ANTIBODIES: CPT

## 2022-10-21 PROCEDURE — 85610 PROTHROMBIN TIME: CPT

## 2022-10-21 PROCEDURE — 85025 COMPLETE CBC W/AUTO DIFF WBC: CPT

## 2022-10-21 PROCEDURE — 86618 LYME DISEASE ANTIBODY: CPT

## 2022-10-21 PROCEDURE — 99214 OFFICE O/P EST MOD 30 MIN: CPT | Performed by: INTERNAL MEDICINE

## 2022-10-21 RX ORDER — GABAPENTIN 300 MG/1
CAPSULE ORAL
Qty: 90 CAPSULE | Refills: 1 | Status: SHIPPED | OUTPATIENT
Start: 2022-10-21

## 2022-10-21 RX ORDER — OMEPRAZOLE 40 MG/1
40 CAPSULE, DELAYED RELEASE ORAL DAILY
Qty: 90 CAPSULE | Refills: 1 | Status: SHIPPED | OUTPATIENT
Start: 2022-10-21

## 2022-10-21 NOTE — PROGRESS NOTES
Assessment/Plan:  Problem List Items Addressed This Visit        Other    Chronic pain syndrome    Relevant Medications    gabapentin (Neurontin) 300 mg capsule    Other Relevant Orders    CBC and differential    Comprehensive metabolic panel    C-reactive protein    JESSICA w/Reflex    Lyme Total Antibody Profile with reflex to WB    Uric acid    RF Screen w/ Reflex to Titer    Ambulatory referral to Pain Management    Ambulatory referral to Neurosurgery    HLA-B27 antigen      Other Visit Diagnoses     Arthralgia, unspecified joint    -  Primary    Relevant Medications    gabapentin (Neurontin) 300 mg capsule    Other Relevant Orders    CBC and differential    Comprehensive metabolic panel    C-reactive protein    JESSICA w/Reflex    Lyme Total Antibody Profile with reflex to WB    Uric acid    RF Screen w/ Reflex to Titer    Ambulatory referral to Pain Management    Ambulatory referral to Neurosurgery    HLA-B27 antigen    Myalgia        Relevant Medications    gabapentin (Neurontin) 300 mg capsule    Other Relevant Orders    CBC and differential    Comprehensive metabolic panel    C-reactive protein    JESSICA w/Reflex    Lyme Total Antibody Profile with reflex to WB    Uric acid    RF Screen w/ Reflex to Titer    Ambulatory referral to Pain Management    Ambulatory referral to Neurosurgery    HLA-B27 antigen    Chronic midline low back pain without sciatica        Relevant Medications    gabapentin (Neurontin) 300 mg capsule    Other Relevant Orders    CBC and differential    Comprehensive metabolic panel    C-reactive protein    JESSICA w/Reflex    Lyme Total Antibody Profile with reflex to WB    Uric acid    RF Screen w/ Reflex to Titer    Ambulatory referral to Pain Management    Ambulatory referral to Neurosurgery    HLA-B27 antigen    Abrasion of skin of right upper arm        Relevant Medications    gabapentin (Neurontin) 300 mg capsule    Other Relevant Orders    CBC and differential    Comprehensive metabolic panel C-reactive protein    JESSICA w/Reflex    Lyme Total Antibody Profile with reflex to WB    Uric acid    RF Screen w/ Reflex to Titer    Ambulatory referral to Pain Management    Ambulatory referral to Neurosurgery    HLA-B27 antigen    HNP (herniated nucleus pulposus with myelopathy), thoracic        Relevant Medications    gabapentin (Neurontin) 300 mg capsule    Other Relevant Orders    CBC and differential    Comprehensive metabolic panel    C-reactive protein    JESSICA w/Reflex    Lyme Total Antibody Profile with reflex to WB    Uric acid    RF Screen w/ Reflex to Titer    Ambulatory referral to Pain Management    Ambulatory referral to Neurosurgery    HLA-B27 antigen    Spinal stenosis of lumbar region with neurogenic claudication        Relevant Medications    gabapentin (Neurontin) 300 mg capsule    Other Relevant Orders    CBC and differential    Comprehensive metabolic panel    C-reactive protein    JESSICA w/Reflex    Lyme Total Antibody Profile with reflex to WB    Uric acid    RF Screen w/ Reflex to Titer    Ambulatory referral to Pain Management    Ambulatory referral to Neurosurgery    HLA-B27 antigen    DDD (degenerative disc disease), cervical        Relevant Medications    gabapentin (Neurontin) 300 mg capsule    Other Relevant Orders    CBC and differential    Comprehensive metabolic panel    C-reactive protein    JESSICA w/Reflex    Lyme Total Antibody Profile with reflex to WB    Uric acid    RF Screen w/ Reflex to Titer    Ambulatory referral to Pain Management    Ambulatory referral to Neurosurgery    HLA-B27 antigen    Dextroscoliosis        Relevant Medications    gabapentin (Neurontin) 300 mg capsule    Other Relevant Orders    CBC and differential    Comprehensive metabolic panel    C-reactive protein    JESSICA w/Reflex    Lyme Total Antibody Profile with reflex to WB    Uric acid    RF Screen w/ Reflex to Titer    Ambulatory referral to Pain Management    Ambulatory referral to Neurosurgery    HLA-B27 antigen    Family history of rheumatoid arthritis        Relevant Medications    gabapentin (Neurontin) 300 mg capsule    Other Relevant Orders    CBC and differential    Comprehensive metabolic panel    C-reactive protein    JESSICA w/Reflex    Lyme Total Antibody Profile with reflex to WB    Uric acid    RF Screen w/ Reflex to Titer    Ambulatory referral to Pain Management    Ambulatory referral to Neurosurgery    HLA-B27 antigen           Diagnoses and all orders for this visit:    Arthralgia, unspecified joint  -     CBC and differential; Future  -     Comprehensive metabolic panel; Future  -     C-reactive protein; Future  -     JESSICA w/Reflex; Future  -     Lyme Total Antibody Profile with reflex to WB; Future  -     Uric acid; Future  -     RF Screen w/ Reflex to Titer; Future  -     Ambulatory referral to Pain Management; Future  -     Ambulatory referral to Neurosurgery; Future  -     gabapentin (Neurontin) 300 mg capsule; ONE po q day x 3 and then one po bid x3 and then one tid  -     HLA-B27 antigen; Future    Myalgia  -     CBC and differential; Future  -     Comprehensive metabolic panel; Future  -     C-reactive protein; Future  -     JESSICA w/Reflex; Future  -     Lyme Total Antibody Profile with reflex to WB; Future  -     Uric acid; Future  -     RF Screen w/ Reflex to Titer; Future  -     Ambulatory referral to Pain Management; Future  -     Ambulatory referral to Neurosurgery; Future  -     gabapentin (Neurontin) 300 mg capsule; ONE po q day x 3 and then one po bid x3 and then one tid  -     HLA-B27 antigen; Future    Chronic pain syndrome  -     CBC and differential; Future  -     Comprehensive metabolic panel; Future  -     C-reactive protein; Future  -     JESSICA w/Reflex; Future  -     Lyme Total Antibody Profile with reflex to WB; Future  -     Uric acid; Future  -     RF Screen w/ Reflex to Titer; Future  -     Ambulatory referral to Pain Management;  Future  -     Ambulatory referral to Neurosurgery; Future  -     gabapentin (Neurontin) 300 mg capsule; ONE po q day x 3 and then one po bid x3 and then one tid  -     HLA-B27 antigen; Future    Chronic midline low back pain without sciatica  -     CBC and differential; Future  -     Comprehensive metabolic panel; Future  -     C-reactive protein; Future  -     JESSICA w/Reflex; Future  -     Lyme Total Antibody Profile with reflex to WB; Future  -     Uric acid; Future  -     RF Screen w/ Reflex to Titer; Future  -     Ambulatory referral to Pain Management; Future  -     Ambulatory referral to Neurosurgery; Future  -     gabapentin (Neurontin) 300 mg capsule; ONE po q day x 3 and then one po bid x3 and then one tid  -     HLA-B27 antigen; Future    Abrasion of skin of right upper arm  -     CBC and differential; Future  -     Comprehensive metabolic panel; Future  -     C-reactive protein; Future  -     JESSICA w/Reflex; Future  -     Lyme Total Antibody Profile with reflex to WB; Future  -     Uric acid; Future  -     RF Screen w/ Reflex to Titer; Future  -     Ambulatory referral to Pain Management; Future  -     Ambulatory referral to Neurosurgery; Future  -     gabapentin (Neurontin) 300 mg capsule; ONE po q day x 3 and then one po bid x3 and then one tid  -     HLA-B27 antigen; Future    HNP (herniated nucleus pulposus with myelopathy), thoracic  -     CBC and differential; Future  -     Comprehensive metabolic panel; Future  -     C-reactive protein; Future  -     JESSICA w/Reflex; Future  -     Lyme Total Antibody Profile with reflex to WB; Future  -     Uric acid; Future  -     RF Screen w/ Reflex to Titer; Future  -     Ambulatory referral to Pain Management; Future  -     Ambulatory referral to Neurosurgery; Future  -     gabapentin (Neurontin) 300 mg capsule; ONE po q day x 3 and then one po bid x3 and then one tid  -     HLA-B27 antigen;  Future    Spinal stenosis of lumbar region with neurogenic claudication  -     CBC and differential; Future  - Comprehensive metabolic panel; Future  -     C-reactive protein; Future  -     JESSICA w/Reflex; Future  -     Lyme Total Antibody Profile with reflex to WB; Future  -     Uric acid; Future  -     RF Screen w/ Reflex to Titer; Future  -     Ambulatory referral to Pain Management; Future  -     Ambulatory referral to Neurosurgery; Future  -     gabapentin (Neurontin) 300 mg capsule; ONE po q day x 3 and then one po bid x3 and then one tid  -     HLA-B27 antigen; Future    DDD (degenerative disc disease), cervical  -     CBC and differential; Future  -     Comprehensive metabolic panel; Future  -     C-reactive protein; Future  -     JESSICA w/Reflex; Future  -     Lyme Total Antibody Profile with reflex to WB; Future  -     Uric acid; Future  -     RF Screen w/ Reflex to Titer; Future  -     Ambulatory referral to Pain Management; Future  -     Ambulatory referral to Neurosurgery; Future  -     gabapentin (Neurontin) 300 mg capsule; ONE po q day x 3 and then one po bid x3 and then one tid  -     HLA-B27 antigen; Future    Dextroscoliosis  -     CBC and differential; Future  -     Comprehensive metabolic panel; Future  -     C-reactive protein; Future  -     JESSICA w/Reflex; Future  -     Lyme Total Antibody Profile with reflex to WB; Future  -     Uric acid; Future  -     RF Screen w/ Reflex to Titer; Future  -     Ambulatory referral to Pain Management; Future  -     Ambulatory referral to Neurosurgery; Future  -     gabapentin (Neurontin) 300 mg capsule; ONE po q day x 3 and then one po bid x3 and then one tid  -     HLA-B27 antigen; Future    Family history of rheumatoid arthritis  -     CBC and differential; Future  -     Comprehensive metabolic panel; Future  -     C-reactive protein; Future  -     JESSICA w/Reflex; Future  -     Lyme Total Antibody Profile with reflex to WB; Future  -     Uric acid; Future  -     RF Screen w/ Reflex to Titer; Future  -     Ambulatory referral to Pain Management;  Future  -     Ambulatory referral to Neurosurgery; Future  -     gabapentin (Neurontin) 300 mg capsule; ONE po q day x 3 and then one po bid x3 and then one tid  -     HLA-B27 antigen; Future      No problem-specific Assessment & Plan notes found for this encounter  A/P: Doing poorly  Multiple issues MS wise  MRI reviewed and bad pathology  Will refer to a new pain management  Will refer to neurosx  ??need for a back brace  Will check labs r/o CTD, lymes, gout, etc  Hold on xray of the hands  May need to see Rheum Start dusty and consider changing effexor to cymbalta  Continue voltaren  Start tumeric and biofex  Refer PT for all  RTC for f/u in six weeks  ?palliative care eval, Rheum or psychiatry given back  Subjective:      Patient ID: Meg Ellis is a 61 y o  female  WF w/o a PMH of CTD, but significant DJD/DDD and scoilosis, but positive FHx of RA, presents at the request of her ortho and pain management specialists for further w/u of worsening joint and muscle pain for years  Pt reports pain is bad all over despite meds and injections  No fever or chills  NO redness, increase temp, or swelling  Reports in addition to her chronic LBP, wrist, elbows, hands, knees etc are all involved  No bug bites  No new meds or dietary changes  No saddle anesthesia or change in bowel or bladder  Needs a right TKR  MRI earlier this year so significant stenosis, HNP, and DDD  Reports decrease in ADL's and QOL due to pain and restrictions         The following portions of the patient's history were reviewed and updated as appropriate:   She has a past medical history of Abnormal finding in urine, Arthralgia of multiple joints, Biceps tendinitis, right, Bursitis of right knee, Bursitis of right shoulder, Chondrocalcinosis, COPD exacerbation ([address] ), 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, Drug intolerance, Dysfunction of right eustachian tube, Dysfunctional uterine bleeding, Edema, Elevated d-dimer, Exposure to hepatitis C, Fracture of fibula, Generalized anxiety disorder, GERD (gastroesophageal reflux disease), Mild cervical dysplasia, Myalgia, Myositis, Palpitations, Pre-syncope, Seasonal allergies, Thrombocytopenia ([address] ), Urinary tract infection, and Uterine leiomyoma  ,  does not have any pertinent problems on file  ,   has a past surgical history that includes Bladder surgery; Breast biopsy (Right); Colposcopy (01/06/1998); Dental surgery; Endometrial biopsy (05/21/1993); Hysteroscopy; Tubal ligation; US guided breast biopsy right complete (Right, 8/8/2018); and Hysterectomy  ,  family history includes Aortic aneurysm in her family; Arrhythmia in her mother; Arthritis in her family; Coronary artery disease in her father and mother; Diabetes in her family and father; Heart disease in her family; No Known Problems in her daughter, daughter, maternal aunt, maternal grandmother, and paternal grandmother; Ovarian cancer in her maternal aunt; Stroke in her mother  ,   reports that she has been smoking  She has been smoking about 1 50 packs per day  She has never used smokeless tobacco  She reports that she does not drink alcohol and does not use drugs  ,  has No Known Allergies     Current Outpatient Medications   Medication Sig Dispense Refill   • alendronate (FOSAMAX) 70 mg tablet take 1 tablet by mouth every 7 days 8 tablet 1   • ascorbic acid (VITAMIN C) 500 MG tablet Take 1 tablet (500 mg total) by mouth 2 (two) times a day 60 tablet 1   • atorvastatin (LIPITOR) 20 mg tablet take 1 tablet by mouth once daily 90 tablet 3   • buPROPion (WELLBUTRIN XL) 150 mg 24 hr tablet      • clonazePAM (KlonoPIN) 1 mg tablet TAKE 1 TABLET TID     • diclofenac sodium (VOLTAREN) 50 mg EC tablet Take 1 tablet (50 mg total) by mouth 2 (two) times a day as needed (pain) 60 tablet 3   • ferrous sulfate 324 (65 Fe) mg Take 1 tablet (324 mg total) by mouth 2 (two) times a day before meals 60 tablet 1   • folic acid (FOLVITE) 1 mg tablet Take 1 tablet (1 mg total) by mouth daily 30 tablet 1   • gabapentin (Neurontin) 300 mg capsule ONE po q day x 3 and then one po bid x3 and then one tid  90 capsule 1   • ibuprofen (MOTRIN) 800 mg tablet Take 1 tablet (800 mg total) by mouth every 6 (six) hours as needed for mild pain 120 tablet 1   • meloxicam (MOBIC) 15 mg tablet Take 1 tablet (15 mg total) by mouth daily 30 tablet 1   • Multiple Vitamins-Minerals (multivitamin with minerals) tablet Take 1 tablet by mouth daily 30 tablet 1   • nystatin (MYCOSTATIN) 500,000 units/5 mL suspension Apply 5 mL (500,000 Units total) to the mouth or throat 4 (four) times a day 473 mL 0   • omeprazole (PriLOSEC) 40 MG capsule Take 1 capsule (40 mg total) by mouth daily 30 capsule 5   • ondansetron (ZOFRAN) 4 mg tablet Take 1 tablet (4 mg total) by mouth every 8 (eight) hours as needed for nausea or vomiting 20 tablet 0   • SF 5000 Plus 1 1 % CREA      • SODIUM FLUORIDE, DENTAL GEL, 1 1 % GEL SF 5000 Plus 1 1 % dental cream     • tretinoin (REFISSA) 0 05 % cream tretinoin 0 05 % topical cream     • tretinoin (RETIN-A) 0 1 % cream Apply topically daily at bedtime 45 g 5   • venlafaxine (EFFEXOR-XR) 150 mg 24 hr capsule Take 1 capsule (150 mg total) by mouth daily 90 capsule 1   • venlafaxine (EFFEXOR-XR) 75 mg 24 hr capsule Take 1 capsule (75 mg total) by mouth daily 90 capsule 1     No current facility-administered medications for this visit  Review of Systems   Constitutional: Positive for activity change  Negative for chills, diaphoresis, fatigue and fever  HENT: Negative  Respiratory: Negative for cough, chest tightness, shortness of breath and wheezing  Cardiovascular: Negative for chest pain, palpitations and leg swelling  Gastrointestinal: Negative for abdominal pain, constipation, diarrhea, nausea and vomiting  Genitourinary: Negative for difficulty urinating, dysuria and frequency     Musculoskeletal: Positive for arthralgias, back pain, gait problem, myalgias, neck pain and neck stiffness  Negative for joint swelling  Neurological: Negative for light-headedness and headaches  Psychiatric/Behavioral: Negative for confusion, dysphoric mood and sleep disturbance  The patient is not nervous/anxious  PHQ-2/9 Depression Screening          Objective:  Vitals:    10/21/22 1144   BP: 118/70   Pulse: 62   Temp: (!) 97 2 °F (36 2 °C)   SpO2: 98%   Weight: 62 2 kg (137 lb 2 oz)   Height: 5' 5" (1 651 m)     Body mass index is 22 82 kg/m²  Physical Exam  Vitals and nursing note reviewed  Constitutional:       General: She is not in acute distress  Appearance: Normal appearance  She is not ill-appearing  HENT:      Head: Normocephalic and atraumatic  Mouth/Throat:      Mouth: Mucous membranes are moist    Eyes:      Extraocular Movements: Extraocular movements intact  Conjunctiva/sclera: Conjunctivae normal       Pupils: Pupils are equal, round, and reactive to light  Neck:      Vascular: No carotid bruit  Cardiovascular:      Rate and Rhythm: Normal rate and regular rhythm  Heart sounds: Normal heart sounds  Pulmonary:      Effort: Pulmonary effort is normal  No respiratory distress  Breath sounds: Normal breath sounds  No wheezing, rhonchi or rales  Abdominal:      General: Bowel sounds are normal  There is no distension  Palpations: Abdomen is soft  Tenderness: There is no abdominal tenderness  Musculoskeletal:         General: Tenderness and deformity present  No swelling  Normal range of motion  Cervical back: Neck supple  Right lower leg: No edema  Left lower leg: No edema  Comments: LS Spine w/o gross dextro scoilosis,  But no increase temp, erythema, swelling, or lesions  Tenderness midline t12-s1  ROM wnl  Spasms none  LE strength 5/5 with tone/ROM WNL  DTR 2/4  No gait abnormalities(toe/heel walking)  Multiple joints w/o any gross deformities, increase temp, erythema, or swelling  Some crepitus   No effusions except for Olecranon bursa on the left  or ballotment  Joint integrity intact  Positive heberden's and [place]  nodes  NO ulnar deviation  NO [place] neck deformities  ROM wnl  Tenderness ? diffusely in the wrist, hands, shoulders, knees, and elbows      Neurological:      General: No focal deficit present  Mental Status: She is alert and oriented to person, place, and time  Mental status is at baseline  Psychiatric:         Mood and Affect: Mood normal          Behavior: Behavior normal          Thought Content:  Thought content normal          Judgment: Judgment normal

## 2022-10-21 NOTE — PATIENT INSTRUCTIONS
Gabapentin (By mouth)   Gabapentin ([id number]-a-PEN-tin)  Treats seizures and pain caused by shingles  Brand Name(s): FusePaq Fanatrex, Neurontin   There may be other brand names for this medicine  When This Medicine Should Not Be Used: This medicine is not right for everyone  Do not use it if you had an allergic reaction to gabapentin  How to Use This Medicine:   Capsule, Liquid, Tablet  Take your medicine as directed  Your dose may need to be changed several times to find what works best for you  If you have epilepsy, do not allow more than 12 hours to pass between doses  Capsule: Swallow the capsule whole with plenty of water  Do not open, crush, or chew it  Gralise® tablet: Swallow the tablet whole   Do not crush, break, or chew it  Neurontin® tablet: If you break a tablet into 2 pieces, use the second half as your next dose  Do not use the half-tablet if the whole tablet has been cut or broken after 28 days  Oral liquid: Measure the oral liquid medicine with a marked measuring spoon, oral syringe, or medicine cup  This medicine should come with a Medication Guide  Ask your pharmacist for a copy if you do not have one  Missed dose: Take a dose as soon as you remember  If it is almost time for your next dose, wait until then and take a regular dose  Do not take extra medicine to make up for a missed dose  Store the medicine in a closed container at room temperature, away from heat, moisture, and direct light  Store the Neurontin® oral liquid in the refrigerator  Do not freeze  Drugs and Foods to Avoid:   Ask your doctor or pharmacist before using any other medicine, including over-the-counter medicines, vitamins, and herbal products  Some medicines can affect how gabapentin works  Tell your doctor if you also using hydrocodone or morphine  If you take an antacid, wait at least 2 hours before you take gabapentin  Do not drink alcohol while you are using this medicine    Tell your doctor if you use anything else that makes you sleepy  Some examples are allergy medicine, narcotic pain medicine, and alcohol  Tell your doctor if you are also using lorazepam, oxycodone, or zolpidem  Warnings While Using This Medicine:   Tell your doctor if you are pregnant or breastfeeding, or if you have kidney problems (including patients receiving dialysis) or lung problems  Tell your doctor if you have a history of depression or mental health problems  This medicine may cause the following problems:  Drug reaction with eosinophilia and systemic symptoms (DRESS) or multiorgan hypersensitivity, which may damage the liver, kidney, blood, heart, or muscles  Changes in mood or behavior, including suicidal thoughts or behavior  Respiratory depression (serious breathing problem that can be life-threatening), when used with narcotic pain medicines  Do not stop using this medicine suddenly  Your doctor will need to slowly decrease your dose before you stop it completely  This medicine may make you dizzy or drowsy  Do not drive or do anything else that could be dangerous until you know how this medicine affects you  Tell any doctor or dentist who treats you that you are using this medicine  This medicine may affect certain medical test results  Your doctor will check your progress and the effects of this medicine at regular visits  Keep all appointments  Keep all medicine out of the reach of children  Never share your medicine with anyone  Possible Side Effects While Using This Medicine:   Call your doctor right away if you notice any of these side effects:   Allergic reaction: Itching or hives, swelling in your face or hands, swelling or tingling in your mouth or throat, chest tightness, trouble breathing  Behavior problems, aggression, restlessness, trouble concentrating, moodiness (especially in children)  Blistering, peeling, red skin rash  Blue lips, fingernails, or skin, chest pain, fast heartbeat, trouble breathing  Change in how much or how often you urinate, bloody or cloudy urine  Dark urine or pale stools, nausea, vomiting, loss of appetite, stomach pain, yellow skin or eyes  Fever, chills, cough, sore throat, body aches  Problems with coordination, shakiness, unsteadiness, unusual eye movement  Rapid weight gain, swelling in your hands, ankles, or feet  Rash, swollen or tender glands in the neck, armpit, or groin  Unusual moods or behaviors, thoughts of hurting yourself, feeling depressed  If you notice these less serious side effects, talk with your doctor:   Dizziness, drowsiness, sleepiness, tiredness  If you notice other side effects that you think are caused by this medicine, tell your doctor  Call your doctor for medical advice about side effects  You may report side effects to FDA at 6-947-FDA-2455    © Copyright [employer] [facility] 2022 Information is for End User's use only and may not be sold, redistributed or otherwise used for commercial purposes  The above information is an  only  It is not intended as medical advice for individual conditions or treatments  Talk to your doctor, nurse or pharmacist before following any medical regimen to see if it is safe and effective for you

## 2022-10-21 NOTE — TELEPHONE ENCOUNTER
Spoke with patient and made aware  She is seeing PCP today for Rheumo work up and labs  Advised that PCP would be best to send referral to [facility]  Verbalized understanding

## 2022-10-22 LAB
ABO GROUP BLD: NORMAL
B BURGDOR IGG+IGM SER-ACNC: <0.2 AI
BLD GP AB SCN SERPL QL: NEGATIVE
RH BLD: POSITIVE
SPECIMEN EXPIRATION DATE: NORMAL

## 2022-10-24 ENCOUNTER — TELEPHONE (OUTPATIENT)
Dept: OBGYN CLINIC | Facility: HOSPITAL | Age: 67
End: 2022-10-24

## 2022-10-24 LAB
RHEUMATOID FACT SER QL LA: NEGATIVE
RYE IGE QN: NEGATIVE

## 2022-10-25 ENCOUNTER — ANESTHESIA EVENT (OUTPATIENT)
Dept: PERIOP | Facility: HOSPITAL | Age: 67
End: 2022-10-25

## 2022-10-25 ENCOUNTER — APPOINTMENT (OUTPATIENT)
Dept: PREADMISSION TESTING | Facility: HOSPITAL | Age: 67
End: 2022-10-25
Payer: COMMERCIAL

## 2022-10-25 ENCOUNTER — APPOINTMENT (OUTPATIENT)
Dept: RADIOLOGY | Facility: CLINIC | Age: 67
End: 2022-10-25
Payer: COMMERCIAL

## 2022-10-25 ENCOUNTER — TELEPHONE (OUTPATIENT)
Dept: INTERNAL MEDICINE CLINIC | Facility: CLINIC | Age: 67
End: 2022-10-25

## 2022-10-25 ENCOUNTER — OFFICE VISIT (OUTPATIENT)
Dept: OBGYN CLINIC | Facility: CLINIC | Age: 67
End: 2022-10-25
Payer: COMMERCIAL

## 2022-10-25 VITALS
SYSTOLIC BLOOD PRESSURE: 115 MMHG | DIASTOLIC BLOOD PRESSURE: 70 MMHG | HEART RATE: 70 BPM | BODY MASS INDEX: 24.29 KG/M2 | HEIGHT: 63 IN

## 2022-10-25 DIAGNOSIS — M25.531 BILATERAL WRIST PAIN: Primary | ICD-10-CM

## 2022-10-25 DIAGNOSIS — M25.532 BILATERAL WRIST PAIN: ICD-10-CM

## 2022-10-25 DIAGNOSIS — M25.532 PAIN IN LEFT WRIST: ICD-10-CM

## 2022-10-25 DIAGNOSIS — M25.532 BILATERAL WRIST PAIN: Primary | ICD-10-CM

## 2022-10-25 DIAGNOSIS — S60.811A ABRASION OF RIGHT WRIST, INITIAL ENCOUNTER: ICD-10-CM

## 2022-10-25 DIAGNOSIS — M25.531 BILATERAL WRIST PAIN: ICD-10-CM

## 2022-10-25 PROCEDURE — 99213 OFFICE O/P EST LOW 20 MIN: CPT | Performed by: ORTHOPAEDIC SURGERY

## 2022-10-25 PROCEDURE — 20605 DRAIN/INJ JOINT/BURSA W/O US: CPT | Performed by: ORTHOPAEDIC SURGERY

## 2022-10-25 PROCEDURE — 73110 X-RAY EXAM OF WRIST: CPT

## 2022-10-25 RX ORDER — TRIAMCINOLONE ACETONIDE 40 MG/ML
20 INJECTION, SUSPENSION INTRA-ARTICULAR; INTRAMUSCULAR
Status: COMPLETED | OUTPATIENT
Start: 2022-10-25 | End: 2022-10-25

## 2022-10-25 RX ORDER — ACETAMINOPHEN 500 MG
500 TABLET ORAL EVERY 6 HOURS PRN
COMMUNITY

## 2022-10-25 RX ORDER — BUPIVACAINE HYDROCHLORIDE 2.5 MG/ML
1 INJECTION, SOLUTION INFILTRATION; PERINEURAL
Status: COMPLETED | OUTPATIENT
Start: 2022-10-25 | End: 2022-10-25

## 2022-10-25 RX ADMIN — BUPIVACAINE HYDROCHLORIDE 1 ML: 2.5 INJECTION, SOLUTION INFILTRATION; PERINEURAL at 14:56

## 2022-10-25 RX ADMIN — TRIAMCINOLONE ACETONIDE 20 MG: 40 INJECTION, SUSPENSION INTRA-ARTICULAR; INTRAMUSCULAR at 14:56

## 2022-10-25 NOTE — PROGRESS NOTES
ASSESSMENT/PLAN:    Diagnoses and all orders for this visit:    Bilateral wrist pain  -     XR wrist 3+ vw right; Future  -     XR wrist 3+ vw left; Future    Pain in left wrist  -     Medium joint arthrocentesis: L radiocarpal    Abrasion of right wrist, initial encounter        Wilver Ramirez is a 61 y o   female who presents with bilateral wrist osteoarthritis  The patients x-rays were reviewed and reveal no acute osseous abnormality or fracture, mild degenerative changes  Due to the patient's pain she was offered a corticosteroid injection to her left wrist   She was not offered a corticosteroid injection to her right wrist due to abrasions from a mishap with her garage   Patient elected to move forward with corticosteroid injection to her left wrist   She tolerated the procedure well  Patient will be seen in the OR on 11/16/2022 for right total knee arthroplasty  No follow-ups on file  Under aseptic technique, the left radiocarpal joint was injected with Celestone marked  She tolerated procedure quite well  Return back in 3 weeks for her knee wrist surgery  Physical examination shows diffuse tenderness along these regions only  Unfortunately, there are fresh  wounds along the right side which would preclude any injection  If her condition changes, she will not hesitate let us her know    _____________________________________________________  CHIEF COMPLAINT:  Chief Complaint   Patient presents with   • Left Wrist - Pain   • Right Wrist - Pain         SUBJECTIVE:  Wilver Ramirez is a 61 y o  female who presents with bilateral wrist pain  The patient is scheduled for right total knee arthroplasty on 11/16/2022 with Dr Maurice Auguste  Patient denies falling on her wrists  She got her right wrist caught in her garage        The following portions of the patient's history were reviewed and updated as appropriate: allergies, current medications, past family history, past medical history, past social history, past surgical history and problem list     PAST MEDICAL HISTORY:  Past Medical History:   Diagnosis Date   • Abnormal finding in urine    • Anxiety    • Arthralgia of multiple joints    • Balance disorder    • Biceps tendinitis, right    • Bursitis of right knee    • Bursitis of right shoulder    • Chondrocalcinosis    • Chronic pain disorder    • COPD exacerbation (HCC)    • 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    • Drug intolerance    • Dysfunction of right eustachian tube    • Dysfunctional uterine bleeding    • Edema    • Elevated d-dimer    • Exposure to hepatitis C    • Fracture of fibula    • Generalized anxiety disorder    • GERD (gastroesophageal reflux disease)    • Hyperlipidemia    • Mild cervical dysplasia    • Myalgia    • Myositis    • Palpitations    • PONV (postoperative nausea and vomiting)    • Pre-syncope    • Seasonal allergies    • Stress incontinence    • Thrombocytopenia ([address] )    • Urinary retention    • Urinary tract infection    • Uterine leiomyoma        PAST SURGICAL HISTORY:  Past Surgical History:   Procedure Laterality Date   • BLADDER SURGERY     • BREAST BIOPSY Right     US guided   • COLPOSCOPY  01/06/1998   • DENTAL SURGERY     • ENDOMETRIAL BIOPSY  05/21/1993   • HYSTERECTOMY     • HYSTEROSCOPY      uterus- 1/22/98, 7/1/02 and 4/10/07   • TUBAL LIGATION     • US GUIDED BREAST BIOPSY RIGHT COMPLETE Right 8/8/2018       FAMILY HISTORY:  Family History   Problem Relation Age of Onset   • Stroke Mother         cerebral artery occlusion with cerebral infarction   • Coronary artery disease Mother    • Arrhythmia Mother         sinus   • Coronary artery disease Father    • Diabetes Father    • Aortic aneurysm Family    • Arthritis Family    • Diabetes Family    • Heart disease Family    • No Known Problems Daughter    • No Known Problems Maternal Grandmother    • No Known Problems Paternal Grandmother    • No Known Problems Daughter • No Known Problems Maternal Aunt    • Ovarian cancer Maternal Aunt        SOCIAL HISTORY:  Social History     Tobacco Use   • Smoking status: Current Every Day Smoker     Packs/day: 1 50     Years: 45 00     Pack years: 67 50     Types: Cigarettes   • Smokeless tobacco: Never Used   Vaping Use   • Vaping Use: Never used   Substance Use Topics   • Alcohol use: No   • Drug use: No       MEDICATIONS:    Current Outpatient Medications:   •  acetaminophen (TYLENOL) 500 mg tablet, Take 500 mg by mouth every 6 (six) hours as needed for mild pain, Disp: , Rfl:   •  alendronate (FOSAMAX) 70 mg tablet, take 1 tablet by mouth every 7 days, Disp: 8 tablet, Rfl: 1  •  ascorbic acid (VITAMIN C) 500 MG tablet, Take 1 tablet (500 mg total) by mouth 2 (two) times a day, Disp: 60 tablet, Rfl: 1  •  atorvastatin (LIPITOR) 20 mg tablet, take 1 tablet by mouth once daily, Disp: 90 tablet, Rfl: 3  •  buPROPion (WELLBUTRIN XL) 150 mg 24 hr tablet, , Disp: , Rfl:   •  clonazePAM (KlonoPIN) 1 mg tablet, 4 (four) times a day as needed (Takes 1/2  pill), Disp: , Rfl:   •  diclofenac sodium (VOLTAREN) 50 mg EC tablet, Take 1 tablet (50 mg total) by mouth 2 (two) times a day as needed (pain), Disp: 60 tablet, Rfl: 3  •  ferrous sulfate 324 (65 Fe) mg, Take 1 tablet (324 mg total) by mouth 2 (two) times a day before meals, Disp: 60 tablet, Rfl: 1  •  folic acid (FOLVITE) 1 mg tablet, Take 1 tablet (1 mg total) by mouth daily, Disp: 30 tablet, Rfl: 1  •  gabapentin (Neurontin) 300 mg capsule, ONE po q day x 3 and then one po bid x3 and then one tid , Disp: 90 capsule, Rfl: 1  •  ibuprofen (MOTRIN) 800 mg tablet, Take 1 tablet (800 mg total) by mouth every 6 (six) hours as needed for mild pain, Disp: 120 tablet, Rfl: 1  •  meloxicam (MOBIC) 15 mg tablet, Take 1 tablet (15 mg total) by mouth daily, Disp: 30 tablet, Rfl: 1  •  METHOCARBAMOL PO, Take by mouth as needed, Disp: , Rfl:   •  Multiple Vitamins-Minerals (multivitamin with minerals) tablet, Take 1 tablet by mouth daily, Disp: 30 tablet, Rfl: 1  •  nystatin (MYCOSTATIN) 500,000 units/5 mL suspension, Apply 5 mL (500,000 Units total) to the mouth or throat 4 (four) times a day, Disp: 473 mL, Rfl: 0  •  omeprazole (PriLOSEC) 40 MG capsule, Take 1 capsule (40 mg total) by mouth daily, Disp: 90 capsule, Rfl: 1  •  ondansetron (ZOFRAN) 4 mg tablet, Take 1 tablet (4 mg total) by mouth every 8 (eight) hours as needed for nausea or vomiting, Disp: 20 tablet, Rfl: 0  •  SF 5000 Plus 1 1 % CREA, , Disp: , Rfl:   •  SODIUM FLUORIDE, DENTAL GEL, 1 1 % GEL, SF 5000 Plus 1 1 % dental cream, Disp: , Rfl:   •  tretinoin (REFISSA) 0 05 % cream, tretinoin 0 05 % topical cream, Disp: , Rfl:   •  tretinoin (RETIN-A) 0 1 % cream, Apply topically daily at bedtime, Disp: 45 g, Rfl: 5  •  venlafaxine (EFFEXOR-XR) 150 mg 24 hr capsule, Take 1 capsule (150 mg total) by mouth daily, Disp: 90 capsule, Rfl: 1  •  venlafaxine (EFFEXOR-XR) 75 mg 24 hr capsule, Take 1 capsule (75 mg total) by mouth daily, Disp: 90 capsule, Rfl: 1    ALLERGIES:  No Known Allergies    ROS:  Review of Systems   Constitutional: Negative for appetite change, chills, fever and unexpected weight change  HENT: Negative for congestion, ear pain, sore throat and trouble swallowing  Eyes: Negative for pain and visual disturbance  Respiratory: Negative for cough and shortness of breath  Cardiovascular: Negative for chest pain and palpitations  Gastrointestinal: Negative for abdominal pain, nausea and vomiting  Endocrine: Negative for cold intolerance and heat intolerance  Genitourinary: Negative for dysuria and hematuria  Musculoskeletal: Positive for arthralgias  Negative for joint swelling and myalgias  Skin: Negative for color change and rash  Neurological: Negative for seizures, syncope and numbness  All other systems reviewed and are negative  Constitutional: Negative for fatigue, fever or loss of appetite     HENT: Negative  Respiratory: Negative for shortness of breath, dyspnea  Cardiovascular: Negative for chest pain/tightness  Gastrointestinal: Negative for abdominal pain, N/V  Endocrine: Negative for cold/heat intolerance, unexplained weight loss/gain  Genitourinary: Negative for flank pain, dysuria, hematuria  Musculoskeletal: Positive for arthralgia   Skin: Negative for rash  Neurological: Negative for numbness or tingling  Psychiatric/Behavioral: Negative for agitation  _____________________________________________________  PHYSICAL EXAMINATION:    Blood pressure 115/70, pulse 70, height 5' 3" (1 6 m)  Constitutional: Oriented to person, place, and time  Appears well-developed and well-nourished  No distress  HENT:   Head: Normocephalic  Eyes: Conjunctivae are normal  Right eye exhibits no discharge  Left eye exhibits no discharge  No scleral icterus  Cardiovascular: Normal rate  Pulmonary/Chest: Effort normal    Neurological: Alert and oriented to person, place, and time  Skin: Skin is warm and dry  No rash noted  Not diaphoretic  No erythema  No pallor  Psychiatric: Normal mood and affect  Behavior is normal  Judgment and thought content normal       MUSCULOSKELETAL EXAMINATION:   Physical Exam  Vitals and nursing note reviewed  HENT:      Head: Normocephalic and atraumatic  Eyes:      General: No scleral icterus  Conjunctiva/sclera: Conjunctivae normal    Cardiovascular:      Rate and Rhythm: Normal rate  Pulmonary:      Effort: Pulmonary effort is normal  No respiratory distress  Musculoskeletal:      Cervical back: Normal range of motion and neck supple  Skin:     General: Skin is warm and dry  Neurological:      Mental Status: She is alert and oriented to person, place, and time  Psychiatric:         Behavior: Behavior normal        Right Hand Exam     Tenderness   The patient is experiencing tenderness in the dorsal area      Range of Motion   Wrist   Extension: normal   Flexion: normal     Other   Sensation: normal  Pulse: present      Left Hand Exam     Tenderness   The patient is experiencing tenderness in the dorsal area (distal radius and CMC)  Range of Motion   Wrist   Extension: normal   Flexion: normal     Other   Sensation: normal  Pulse: present              Objective:  BP Readings from Last 1 Encounters:   10/25/22 115/70      Wt Readings from Last 1 Encounters:   10/21/22 62 2 kg (137 lb 2 oz)        BMI:   Estimated body mass index is 24 29 kg/m² as calculated from the following:    Height as of this encounter: 5' 3" (1 6 m)  Weight as of 10/21/22: 62 2 kg (137 lb 2 oz)  PROCEDURES PERFORMED:  Medium joint arthrocentesis: L radiocarpal  Universal Protocol:  Risks and benefits: risks, benefits and alternatives were discussed  Consent given by: patient  Time out: Immediately prior to procedure a "time out" was called to verify the correct patient, procedure, equipment, support staff and site/side marked as required    Timeout called at: 10/25/2022 2:52 PM   Patient understanding: patient states understanding of the procedure being performed  Site marked: the operative site was marked  Supporting Documentation  Indications: pain   Procedure Details  Location: wrist - L radiocarpal  Preparation: Patient was prepped and draped in the usual sterile fashion  Needle size: 25 G  Ultrasound guidance: no  Medications administered: 1 mL bupivacaine 0 25 %; 20 mg triamcinolone acetonide 40 mg/mL    Patient tolerance: patient tolerated the procedure well with no immediate complications  Dressing:  Sterile dressing applied            Scribe Attestation    I,:  Vita Grullon am acting as a scribe while in the presence of the attending physician :       I,:  Vara Lombard, DO personally performed the services described in this documentation    as scribed in my presence :

## 2022-10-25 NOTE — TELEPHONE ENCOUNTER
Pt stopped at office today after her appt with Dr Kori Blanchard  He said she has regular arthritis in her wrist  Can you prescribed something for pain?

## 2022-10-25 NOTE — PRE-PROCEDURE INSTRUCTIONS
Pre-Surgery Instructions:   Medication Instructions   • acetaminophen (TYLENOL) 500 mg tablet Uses PRN- OK to take day of surgery   • alendronate (FOSAMAX) 70 mg tablet Continue normal schedule   • ascorbic acid (VITAMIN C) 500 MG tablet Hold day of surgery  • atorvastatin (LIPITOR) 20 mg tablet Take night before surgery   • buPROPion (WELLBUTRIN XL) 150 mg 24 hr tablet Take day of surgery  • clonazePAM (KlonoPIN) 1 mg tablet Take day of surgery  • diclofenac sodium (VOLTAREN) 50 mg EC tablet Stop taking 7 days prior to surgery  • ferrous sulfate 324 (65 Fe) mg Hold day of surgery  • folic acid (FOLVITE) 1 mg tablet Hold day of surgery  • ibuprofen (MOTRIN) 800 mg tablet Stop taking 7 days prior to surgery  • METHOCARBAMOL PO Uses PRN- OK to take day of surgery   • Multiple Vitamins-Minerals (multivitamin with minerals) tablet Hold day of surgery  • omeprazole (PriLOSEC) 40 MG capsule Take day of surgery  • SODIUM FLUORIDE, DENTAL GEL, 1 1 % GEL Take day of surgery  • tretinoin (RETIN-A) 0 1 % cream Hold day of surgery  • venlafaxine (EFFEXOR-XR) 150 mg 24 hr capsule Take night before surgery   • venlafaxine (EFFEXOR-XR) 75 mg 24 hr capsule Take night before surgery   Covid screening negative as per patient  Fully vaccinated  Reviewed showering and medication instructions  Instructed to stop NSAIDS and non prescribed vitamins 7 days prior to DOS  Tylenol is OK to take  Take medications morning of surgery with a small sip of water  Patient verbalized understanding  Advised NPO after MN and ASC will call with scheduled surgical time  Advised to read over Ortho Booklet provided by surgeon's office  Provided with direct number to call if she has any questions  Encouraged her to schedule a pre-op PT appointment

## 2022-10-25 NOTE — TELEPHONE ENCOUNTER
Dr Kori Blanchard gave her an injection in the one wrist and because of the injury to the other one he did not do one

## 2022-10-28 LAB — HLA-B27 QL NAA+PROBE: NEGATIVE

## 2022-11-02 ENCOUNTER — CONSULT (OUTPATIENT)
Dept: INTERNAL MEDICINE CLINIC | Facility: CLINIC | Age: 67
End: 2022-11-02

## 2022-11-02 VITALS
SYSTOLIC BLOOD PRESSURE: 124 MMHG | DIASTOLIC BLOOD PRESSURE: 80 MMHG | HEIGHT: 63 IN | OXYGEN SATURATION: 96 % | HEART RATE: 79 BPM | BODY MASS INDEX: 24.63 KG/M2 | TEMPERATURE: 98.2 F | WEIGHT: 139 LBS

## 2022-11-02 DIAGNOSIS — Z01.818 VISIT FOR PRE-OPERATIVE EXAMINATION: Primary | ICD-10-CM

## 2022-11-02 DIAGNOSIS — M17.11 PRIMARY OSTEOARTHRITIS OF RIGHT KNEE: ICD-10-CM

## 2022-11-02 DIAGNOSIS — Z72.0 TOBACCO ABUSE: ICD-10-CM

## 2022-11-02 DIAGNOSIS — Z23 ENCOUNTER FOR VACCINATION: ICD-10-CM

## 2022-11-02 NOTE — PROGRESS NOTES
Assessment/Plan:  Problem List Items Addressed This Visit        Musculoskeletal and Integument    Primary osteoarthritis of right knee       Other    Tobacco abuse      Other Visit Diagnoses     Visit for pre-operative examination    -  Primary    Relevant Orders    POCT ECG (Completed)    Encounter for vaccination        Relevant Orders    influenza vaccine, quadrivalent, recombinant, PF, 0 5 mL, for patients 18 yr+ (FLUBLOK)           Diagnoses and all orders for this visit:    Visit for pre-operative examination  -     POCT ECG    Primary osteoarthritis of right knee    Tobacco abuse    Encounter for vaccination  -     Cancel: influenza vaccine, high-dose, PF 0 5 mL  -     influenza vaccine, quadrivalent, recombinant, PF, 0 5 mL, for patients 18 yr+ (FLUBLOK)      No problem-specific Assessment & Plan notes found for this encounter  A/P: PAT tests c/o an ekg and labs performed and reviewed  EKG is normal and labs ok except for low iron for which she is starting replacement    Pt and co-morbidities are stable  Pt is a [employer] I and carries a cardiac risk of about 1%  Recommend holding any ASA, NSAID's, omega 3, and MVT one week prior to the procedure  On the day of sx, may take with a sip of water her wellbutrin, klonopin, dusty, omeprazole, and effexor  Be aware that pt might have a high tolerence for pain meds  Recommend nicotine patch if remains an inpatient  Aggressive post op pulmonary secretion control due to smoking and aggressive DVT prophylaxis  Recommend pt starting tylenol 48 hours prior to sx ATC for pain management  Instructed to remove her dental plate prior to sx May resume her other meds once taking po  No other recs  Thanks and good luck  Subjective:      Patient ID: Edwin Castleman is a 61 y o  female  WF presents at the request of Dr Jorge Alberto Alvares  for pre-op eval for upcoming right TKR  tentatively scheduled for 11/16/22   Since last visit, doing well and no recent illnesses,but lots of MS pains besides the knee    Remains as active as her knee pain will allow and no falls  No travel history  Denies depression  No recent illnesses  No fever, chills, or sweats  No unexplained wt changes  Denies CP, SOB, or palpitations  No edema  No orthopnea or PND  No sz or syncope  No changes in bowel or bladder habits  PMH includes GERD, tobacco use, allergies, DJD, HLD, and ANAND  Past sx include tubal, hysto, breast bx, endometrial bx, bladder sx, colposcopy,  and reports no problems with prior procedures or anesthesia  Admits to  Smoking daily and denies ETOH use  No history of DVT or PE  NO history of bleeding issues and is on NSAID's, but not on anti-coagulants  Admits to  dental plates  No  C spine issues  No objections to getting blood products if deemed necessary  Had  PAT testing done  The following portions of the patient's history were reviewed and updated as appropriate:   She has a past medical history of Abnormal finding in urine, Anxiety, Arthralgia of multiple joints, Balance disorder, Biceps tendinitis, right, Bursitis of right knee, Bursitis of right shoulder, Chondrocalcinosis, Chronic pain disorder, COPD exacerbation ([facility] [address] ), 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, Drug intolerance, Dysfunction of right eustachian tube, Dysfunctional uterine bleeding, Edema, Elevated d-dimer, Exposure to hepatitis C, Fracture of fibula, Generalized anxiety disorder, GERD (gastroesophageal reflux disease), Hyperlipidemia, Mild cervical dysplasia, Myalgia, Myositis, Palpitations, PONV (postoperative nausea and vomiting), Pre-syncope, Seasonal allergies, Stress incontinence, Thrombocytopenia ([facility] [address] ), Urinary retention, Urinary tract infection, and Uterine leiomyoma  ,  does not have any pertinent problems on file  ,   has a past surgical history that includes Bladder surgery; Breast biopsy (Right); Colposcopy (01/06/1998); Dental surgery; Endometrial biopsy (05/21/1993); Hysteroscopy;  Tubal ligation; US guided breast biopsy right complete (Right, 8/8/2018); and Hysterectomy  ,  family history includes Aortic aneurysm in her family; Arrhythmia in her mother; Arthritis in her family; Coronary artery disease in her father and mother; Diabetes in her family and father; Heart disease in her family; No Known Problems in her daughter, daughter, maternal aunt, maternal grandmother, and paternal grandmother; Ovarian cancer in her maternal aunt; Stroke in her mother  ,   reports that she has been smoking cigarettes  She has a 67 50 pack-year smoking history  She has never used smokeless tobacco  She reports that she does not drink alcohol and does not use drugs  ,  has No Known Allergies     Current Outpatient Medications   Medication Sig Dispense Refill   • acetaminophen (TYLENOL) 500 mg tablet Take 500 mg by mouth every 6 (six) hours as needed for mild pain     • alendronate (FOSAMAX) 70 mg tablet take 1 tablet by mouth every 7 days 8 tablet 1   • ascorbic acid (VITAMIN C) 500 MG tablet Take 1 tablet (500 mg total) by mouth 2 (two) times a day 60 tablet 1   • atorvastatin (LIPITOR) 20 mg tablet take 1 tablet by mouth once daily 90 tablet 3   • buPROPion (WELLBUTRIN XL) 150 mg 24 hr tablet      • clonazePAM (KlonoPIN) 1 mg tablet 4 (four) times a day as needed (Takes 1/2  pill)     • diclofenac sodium (VOLTAREN) 50 mg EC tablet Take 1 tablet (50 mg total) by mouth 2 (two) times a day as needed (pain) 60 tablet 3   • ferrous sulfate 324 (65 Fe) mg Take 1 tablet (324 mg total) by mouth 2 (two) times a day before meals 60 tablet 1   • folic acid (FOLVITE) 1 mg tablet Take 1 tablet (1 mg total) by mouth daily 30 tablet 1   • gabapentin (Neurontin) 300 mg capsule ONE po q day x 3 and then one po bid x3 and then one tid   90 capsule 1   • ibuprofen (MOTRIN) 800 mg tablet Take 1 tablet (800 mg total) by mouth every 6 (six) hours as needed for mild pain 120 tablet 1   • meloxicam (MOBIC) 15 mg tablet Take 1 tablet (15 mg total) by mouth daily 30 tablet 1   • METHOCARBAMOL PO Take by mouth as needed     • Multiple Vitamins-Minerals (multivitamin with minerals) tablet Take 1 tablet by mouth daily 30 tablet 1   • nystatin (MYCOSTATIN) 500,000 units/5 mL suspension Apply 5 mL (500,000 Units total) to the mouth or throat 4 (four) times a day 473 mL 0   • omeprazole (PriLOSEC) 40 MG capsule Take 1 capsule (40 mg total) by mouth daily 90 capsule 1   • ondansetron (ZOFRAN) 4 mg tablet Take 1 tablet (4 mg total) by mouth every 8 (eight) hours as needed for nausea or vomiting 20 tablet 0   • SF 5000 Plus 1 1 % CREA      • SODIUM FLUORIDE, DENTAL GEL, 1 1 % GEL SF 5000 Plus 1 1 % dental cream     • tretinoin (REFISSA) 0 05 % cream tretinoin 0 05 % topical cream     • tretinoin (RETIN-A) 0 1 % cream Apply topically daily at bedtime 45 g 5   • venlafaxine (EFFEXOR-XR) 150 mg 24 hr capsule Take 1 capsule (150 mg total) by mouth daily 90 capsule 1   • venlafaxine (EFFEXOR-XR) 75 mg 24 hr capsule Take 1 capsule (75 mg total) by mouth daily 90 capsule 1     No current facility-administered medications for this visit  Review of Systems   Constitutional: Positive for activity change  Negative for chills, diaphoresis, fatigue and fever  HENT: Negative  Eyes: Negative for visual disturbance  Respiratory: Negative for cough, chest tightness, shortness of breath and wheezing  Cardiovascular: Negative for chest pain, palpitations and leg swelling  Gastrointestinal: Negative for abdominal pain, constipation, diarrhea, nausea and vomiting  Endocrine: Negative for cold intolerance and heat intolerance  Genitourinary: Negative for difficulty urinating, dysuria and frequency  Musculoskeletal: Positive for arthralgias, back pain, gait problem and myalgias  Negative for joint swelling  Neurological: Negative for dizziness, seizures, syncope, weakness, light-headedness and headaches     Psychiatric/Behavioral: Negative for confusion, dysphoric mood and sleep disturbance  The patient is not nervous/anxious  PHQ-2/9 Depression Screening          Objective:  Vitals:    11/02/22 1136   BP: 124/80   Pulse: 79   Temp: 98 2 °F (36 8 °C)   SpO2: 96%   Weight: 63 kg (139 lb)   Height: 5' 3" (1 6 m)     Body mass index is 24 62 kg/m²  Physical Exam  Vitals and nursing note reviewed  Constitutional:       General: She is not in acute distress  Appearance: Normal appearance  She is not ill-appearing  HENT:      Head: Normocephalic and atraumatic  Comments: No oropharyngeal obstruction  Dental plate noted  Mouth/Throat:      Mouth: Mucous membranes are moist    Eyes:      Extraocular Movements: Extraocular movements intact  Conjunctiva/sclera: Conjunctivae normal       Pupils: Pupils are equal, round, and reactive to light  Neck:      Vascular: No carotid bruit  Comments: No c spine restriction  Cardiovascular:      Rate and Rhythm: Normal rate and regular rhythm  Heart sounds: Normal heart sounds  Pulmonary:      Effort: Pulmonary effort is normal  No respiratory distress  Breath sounds: Normal breath sounds  No wheezing, rhonchi or rales  Abdominal:      General: Bowel sounds are normal  There is no distension  Palpations: Abdomen is soft  Tenderness: There is no abdominal tenderness  Musculoskeletal:         General: Tenderness present  No swelling or deformity  Normal range of motion  Cervical back: Normal range of motion and neck supple  No rigidity or tenderness  Right lower leg: No edema  Left lower leg: No edema  Lymphadenopathy:      Cervical: No cervical adenopathy  Neurological:      General: No focal deficit present  Mental Status: She is alert and oriented to person, place, and time  Mental status is at baseline  Psychiatric:         Mood and Affect: Mood normal          Behavior: Behavior normal          Thought Content:  Thought content normal  Judgment: Judgment normal

## 2022-11-14 ENCOUNTER — TELEPHONE (OUTPATIENT)
Dept: OBGYN CLINIC | Facility: CLINIC | Age: 67
End: 2022-11-14

## 2022-11-14 ENCOUNTER — OFFICE VISIT (OUTPATIENT)
Dept: URGENT CARE | Facility: CLINIC | Age: 67
End: 2022-11-14

## 2022-11-14 ENCOUNTER — TELEPHONE (OUTPATIENT)
Dept: OBGYN CLINIC | Facility: HOSPITAL | Age: 67
End: 2022-11-14

## 2022-11-14 VITALS
OXYGEN SATURATION: 98 % | RESPIRATION RATE: 18 BRPM | HEART RATE: 78 BPM | WEIGHT: 140 LBS | HEIGHT: 65 IN | BODY MASS INDEX: 23.32 KG/M2 | TEMPERATURE: 97.2 F

## 2022-11-14 DIAGNOSIS — B34.9 VIRAL INFECTION: Primary | ICD-10-CM

## 2022-11-14 NOTE — PROGRESS NOTES
[facility]        NAME: Tremaine Latham is a 61 y o  female  : 1955    MRN: [id number]  DATE: 2022  TIME: 12:59 PM      Assessment and Plan     Viral infection [B34 9]  1  Viral infection  Covid/Flu-Office Collect         Patient Instructions     Patient Instructions     Continue supportive care, especially pushing fluids  Viral Syndrome   AMBULATORY CARE:   Viral syndrome  is a term used for symptoms of an infection caused by a virus  Viruses are spread easily from person to person through the air and on shared items  Signs and symptoms  may start slowly or suddenly and last hours to days  They can be mild to severe and can change over days or hours  You may have any of the following:  · Fever and chills    · A runny or stuffy nose    · Cough, sore throat, or hoarseness    · Headache, or pain and pressure around your eyes    · Muscle aches and joint pain    · Shortness of breath or wheezing    · Abdominal pain, cramps, and diarrhea    · Nausea, vomiting, or loss of appetite    Call your local emergency number (911 in the [address] [place] [address],[address]) or have someone else call if:   · You have a seizure  · You cannot be woken  · You have chest pain or trouble breathing  Seek care immediately if:   · You have a stiff neck, a bad headache, and sensitivity to light  · You feel weak, dizzy, or confused  · You stop urinating or urinate a lot less than usual     · You cough up blood or thick yellow or green mucus  · You have severe abdominal pain or your abdomen is larger than usual     Call your doctor if:   · Your symptoms do not get better with treatment or get worse after 3 days  · You have a rash or ear pain  · You have burning when you urinate  · You have questions or concerns about your condition or care  Treatment for viral syndrome  may include medicines to manage your symptoms  Antibiotics are not given for a viral infection   You may  need any of the following:  · Acetaminophen  decreases pain and fever  It is available without a doctor's order  Ask how much to take and how often to take it  Follow directions  Read the labels of all other medicines you are using to see if they also contain acetaminophen, or ask your doctor or pharmacist  Acetaminophen can cause liver damage if not taken correctly  Do not use more than 4 grams (4,000 milligrams) total of acetaminophen in one day  · NSAIDs , such as ibuprofen, help decrease swelling, pain, and fever  NSAIDs can cause stomach bleeding or kidney problems in certain people  If you take blood thinner medicine, always ask your healthcare provider if NSAIDs are safe for you  Always read the medicine label and follow directions  · Cold medicine  helps decrease swelling, control a cough, and relieve chest or nasal congestion  · Saline nasal spray  helps decrease nasal congestion  Manage your symptoms:   · Drink liquids as directed to prevent dehydration  Ask how much liquid to drink each day and which liquids are best for you  Ask if you should drink an oral rehydration solution (ORS)  An ORS has the right amounts of water, salts, and sugar you need to replace body fluids  This may help prevent dehydration caused by vomiting or diarrhea  Do not drink liquids with caffeine  Liquids with caffeine can make dehydration worse  · Get plenty of rest to help your body heal   Take naps throughout the day  Ask your healthcare provider when you can return to work and your normal activities  · Use a cool mist humidifier to help you breathe easier  Ask your healthcare provider how to use a cool mist humidifier  · Eat honey or use cough drops for a sore throat  Cough drops are available without a doctor's order  Follow directions for taking cough drops  · Do not smoke or be close to anyone who is smoking  Nicotine and other chemicals in cigarettes and cigars can cause lung damage   Smoking can also delay healing  Ask your healthcare provider for information if you currently smoke and need help to quit  E-cigarettes or smokeless tobacco still contain nicotine  Talk to your healthcare provider before you use these products  Prevent the spread of germs:       1  Wash your hands often  Wash your hands several times each day  Wash after you use the bathroom, change a child's diaper, and before you prepare or eat food  Use soap and water every time  Rub your soapy hands together, lacing your fingers  Wash the front and back of your hands, and in between your fingers  Use the fingers of one hand to scrub under the fingernails of the other hand  Wash for at least 20 seconds  Rinse with warm, running water for several seconds  Then dry your hands with a clean towel or paper towel  Use hand  that contains alcohol if soap and water are not available  Do not touch your eyes, nose, or mouth without washing your hands first          2  Cover a sneeze or cough  Use a tissue that covers your mouth and nose  Throw the tissue away in a trash can right away  Use the bend of your arm if a tissue is not available  Wash your hands well with soap and water or use a hand   3  Stay away from others while you are sick  Avoid crowds as much as possible  4  Ask about vaccines you may need  Talk to your healthcare provider about your vaccine history  He or she will tell you which vaccines you need, and when to get them  ? Get the influenza (flu) vaccine as soon as recommended each year  The flu vaccine is available starting in September or October  Flu viruses change, so it is important to get a flu vaccine every year  ? Get the pneumonia vaccine if recommended  This vaccine is usually recommended every 5 years  Your provider will tell you when to get this vaccine, if needed  Follow up with your doctor as directed:  Write down your questions so you remember to ask them during your visits    © Copyright [employer] 2022 Information is for [employer] use only and may not be sold, redistributed or otherwise used for commercial purposes  All illustrations and images included in CareNotes® are the copyrighted property of [employer]  or [employer]  The above information is an  only  It is not intended as medical advice for individual conditions or treatments  Talk to your doctor, nurse or pharmacist before following any medical regimen to see if it is safe and effective for you  Influenza   AMBULATORY CARE:   Influenza  (the flu) is an infection caused by the influenza virus  The flu is easily spread when an infected person coughs, sneezes, or has close contact with others  You may be able to spread the flu to others for 1 week or longer after signs or symptoms appear  Common signs and symptoms include the following:   · Fever and chills    · Headaches, body aches, and muscle or joint pain    · Cough, runny nose, and sore throat    · Loss of appetite, nausea, vomiting, or diarrhea    · Tiredness    · Trouble breathing    Call your local emergency number (911 in the [address],[address]) if:   · You have trouble breathing, and your lips look purple or blue  · You have a seizure  Seek care immediately if:   · You are dizzy, or you are urinating less or not at all  · You have a headache with a stiff neck, and you feel tired or confused  · You have new pain or pressure in your chest     · Your symptoms, such as shortness of breath, vomiting, or diarrhea, get worse  · Your symptoms, such as fever and coughing, seem to get better, but then get worse  Call your doctor if:   · You have new muscle pain or weakness  · You have questions or concerns about your condition or care  Treatment for influenza  may include any of the following:  · Acetaminophen  decreases pain and fever  It is available without a doctor's order  Ask how much to take and how often to take it  Follow directions  Read the labels of all other medicines you are using to see if they also contain acetaminophen, or ask your doctor or pharmacist  Acetaminophen can cause liver damage if not taken correctly  Do not use more than 4 grams (4,000 milligrams) total of acetaminophen in one day  · NSAIDs , such as ibuprofen, help decrease swelling, pain, and fever  This medicine is available with or without a doctor's order  NSAIDs can cause stomach bleeding or kidney problems in certain people  If you take blood thinner medicine, always ask your healthcare provider if NSAIDs are safe for you  Always read the medicine label and follow directions  · Antivirals  help fight a viral infection  Manage your symptoms:   · Rest  as much as you can to help you recover  · Drink liquids as directed  to help prevent dehydration  Ask how much liquid to drink each day and which liquids are best for you  Prevent the spread of germs:       1  Wash your hands often  Wash your hands several times each day  Wash after you use the bathroom, change a child's diaper, and before you prepare or eat food  Use soap and water every time  Rub your soapy hands together, lacing your fingers  Wash the front and back of your hands, and in between your fingers  Use the fingers of one hand to scrub under the fingernails of the other hand  Wash for at least 20 seconds  Rinse with warm, running water for several seconds  Then dry your hands with a clean towel or paper towel  Use hand  that contains alcohol if soap and water are not available  Do not touch your eyes, nose, or mouth without washing your hands first          2  Cover a sneeze or cough  Use a tissue that covers your mouth and nose  Throw the tissue away in a trash can right away  Use the bend of your arm if a tissue is not available  Wash your hands well with soap and water or use a hand   3  Stay away from others while you are sick    Avoid crowds as much as possible  4  Ask about vaccines you may need  Talk to your healthcare provider about your vaccine history  He or she will tell you which vaccines you need, and when to get them  ? Get the influenza (flu) vaccine as soon as it is available  Flu viruses change, so it is important to get a flu vaccine every year  ? Get the pneumonia vaccine if recommended  This vaccine is usually recommended every 5 years  Your provider will tell you when to get this vaccine, if needed  Follow up with your doctor as directed:  Write down your questions so you remember to ask them during your visits  © Copyright [employer] 2022 Information is for End User's use only and may not be sold, redistributed or otherwise used for commercial purposes  All illustrations and images included in CareNotes® are the copyrighted property of A [employer] A M , Inc  or [employer]  The above information is an  only  It is not intended as medical advice for individual conditions or treatments  Talk to your doctor, nurse or pharmacist before following any medical regimen to see if it is safe and effective for you  Follow up with PCP in 3-5 days  Proceed to  ER if symptoms worsen  Chief Complaint     Chief Complaint   Patient presents with   • Vomiting     Nausea chills not feeling well since Wednesday          History of Present Illness     Onset of s/s illness Wed into Thurs  Fri weak  Sat felt a little better  [facility] felt the chills and felt worse again  No specific sick contacts  Took a covid test this am around 0400 w/ negative results  Notes that she did get her flu shot  Review of Systems     Review of Systems   Constitutional: Positive for appetite change, chills and fatigue  Negative for fever  HENT: Positive for sore throat and trouble swallowing (first few days; improving)  Negative for congestion and ear pain  Respiratory: Positive for cough   Negative for chest tightness, shortness of breath and wheezing  Gastrointestinal: Positive for abdominal pain (sore, cramping--generalized), diarrhea and vomiting  Genitourinary: Positive for decreased urine volume (slight decreased--importance of hydration)  Musculoskeletal: Positive for myalgias  All other systems reviewed and are negative          Current Medications       Current Outpatient Medications:   •  acetaminophen (TYLENOL) 500 mg tablet, Take 500 mg by mouth every 6 (six) hours as needed for mild pain, Disp: , Rfl:   •  alendronate (FOSAMAX) 70 mg tablet, take 1 tablet by mouth every 7 days, Disp: 8 tablet, Rfl: 1  •  ascorbic acid (VITAMIN C) 500 MG tablet, Take 1 tablet (500 mg total) by mouth 2 (two) times a day, Disp: 60 tablet, Rfl: 1  •  atorvastatin (LIPITOR) 20 mg tablet, take 1 tablet by mouth once daily, Disp: 90 tablet, Rfl: 3  •  buPROPion (WELLBUTRIN XL) 150 mg 24 hr tablet, , Disp: , Rfl:   •  clonazePAM (KlonoPIN) 1 mg tablet, 4 (four) times a day as needed (Takes 1/2  pill), Disp: , Rfl:   •  diclofenac sodium (VOLTAREN) 50 mg EC tablet, Take 1 tablet (50 mg total) by mouth 2 (two) times a day as needed (pain), Disp: 60 tablet, Rfl: 3  •  ferrous sulfate 324 (65 Fe) mg, Take 1 tablet (324 mg total) by mouth 2 (two) times a day before meals, Disp: 60 tablet, Rfl: 1  •  folic acid (FOLVITE) 1 mg tablet, Take 1 tablet (1 mg total) by mouth daily, Disp: 30 tablet, Rfl: 1  •  gabapentin (Neurontin) 300 mg capsule, ONE po q day x 3 and then one po bid x3 and then one tid , Disp: 90 capsule, Rfl: 1  •  ibuprofen (MOTRIN) 800 mg tablet, Take 1 tablet (800 mg total) by mouth every 6 (six) hours as needed for mild pain, Disp: 120 tablet, Rfl: 1  •  meloxicam (MOBIC) 15 mg tablet, Take 1 tablet (15 mg total) by mouth daily, Disp: 30 tablet, Rfl: 1  •  METHOCARBAMOL PO, Take by mouth as needed, Disp: , Rfl:   •  Multiple Vitamins-Minerals (multivitamin with minerals) tablet, Take 1 tablet by mouth daily, Disp: 30 tablet, Rfl: 1  • nystatin (MYCOSTATIN) 500,000 units/5 mL suspension, Apply 5 mL (500,000 Units total) to the mouth or throat 4 (four) times a day, Disp: 473 mL, Rfl: 0  •  omeprazole (PriLOSEC) 40 MG capsule, Take 1 capsule (40 mg total) by mouth daily, Disp: 90 capsule, Rfl: 1  •  ondansetron (ZOFRAN) 4 mg tablet, Take 1 tablet (4 mg total) by mouth every 8 (eight) hours as needed for nausea or vomiting, Disp: 20 tablet, Rfl: 0  •  SF 5000 Plus 1 1 % CREA, , Disp: , Rfl:   •  SODIUM FLUORIDE, DENTAL GEL, 1 1 % GEL, SF 5000 Plus 1 1 % dental cream, Disp: , Rfl:   •  tretinoin (REFISSA) 0 05 % cream, tretinoin 0 05 % topical cream, Disp: , Rfl:   •  tretinoin (RETIN-A) 0 1 % cream, Apply topically daily at bedtime, Disp: 45 g, Rfl: 5  •  venlafaxine (EFFEXOR-XR) 150 mg 24 hr capsule, Take 1 capsule (150 mg total) by mouth daily, Disp: 90 capsule, Rfl: 1  •  venlafaxine (EFFEXOR-XR) 75 mg 24 hr capsule, Take 1 capsule (75 mg total) by mouth daily, Disp: 90 capsule, Rfl: 1    Current Allergies     Allergies as of 11/14/2022   • (No Known Allergies)              The following portions of the patient's history were reviewed and updated as appropriate: allergies, current medications, past family history, past medical history, past social history, past surgical history and problem list      Past Medical History:   Diagnosis Date   • Abnormal finding in urine    • Anxiety    • Arthralgia of multiple joints    • Balance disorder    • Biceps tendinitis, right    • Bursitis of right knee    • Bursitis of right shoulder    • Chondrocalcinosis    • Chronic pain disorder    • COPD exacerbation (HCC)    • 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    • Drug intolerance    • Dysfunction of right eustachian tube    • Dysfunctional uterine bleeding    • Edema    • Elevated d-dimer    • Exposure to hepatitis C    • Fracture of fibula    • Generalized anxiety disorder    • GERD (gastroesophageal reflux disease)    • Hyperlipidemia    • Mild cervical dysplasia    • Myalgia    • Myositis    • Palpitations    • PONV (postoperative nausea and vomiting)    • Pre-syncope    • Seasonal allergies    • Stress incontinence    • Thrombocytopenia (HCC)    • Urinary retention    • Urinary tract infection    • Uterine leiomyoma        Past Surgical History:   Procedure Laterality Date   • BLADDER SURGERY     • BREAST BIOPSY Right     US guided   • COLPOSCOPY  01/06/1998   • DENTAL SURGERY     • ENDOMETRIAL BIOPSY  05/21/1993   • HYSTERECTOMY     • HYSTEROSCOPY      uterus- 1/22/98, 7/1/02 and 4/10/07   • TUBAL LIGATION     • US GUIDED BREAST BIOPSY RIGHT COMPLETE Right 8/8/2018       Family History   Problem Relation Age of Onset   • Stroke Mother         cerebral artery occlusion with cerebral infarction   • Coronary artery disease Mother    • Arrhythmia Mother         sinus   • Coronary artery disease Father    • Diabetes Father    • Aortic aneurysm Family    • Arthritis Family    • Diabetes Family    • Heart disease Family    • No Known Problems Daughter    • No Known Problems Maternal Grandmother    • No Known Problems Paternal Grandmother    • No Known Problems Daughter    • No Known Problems Maternal Aunt    • Ovarian cancer Maternal Aunt          Medications have been verified  Objective     Pulse 78   Temp (!) 97 2 °F (36 2 °C)   Resp 18   Ht 5' 5" (1 651 m)   Wt 63 5 kg (140 lb)   SpO2 98%   BMI 23 30 kg/m²   No LMP recorded  Patient is postmenopausal          Physical Exam     Physical Exam  Vitals and nursing note reviewed  Constitutional:       General: She is not in acute distress  Appearance: Normal appearance  She is well-developed and well-groomed  She is ill-appearing  She is not toxic-appearing or diaphoretic  HENT:      Head: Normocephalic and atraumatic        Right Ear: Hearing, tympanic membrane, ear canal and external ear normal       Left Ear: Hearing, tympanic membrane, ear canal and external ear normal       Nose: Nose normal  No mucosal edema or congestion  Mouth/Throat:      Mouth: Mucous membranes are moist       Pharynx: Oropharynx is clear  Uvula midline  No oropharyngeal exudate or posterior oropharyngeal erythema  Eyes:      Pupils: Pupils are equal, round, and reactive to light  Cardiovascular:      Rate and Rhythm: Normal rate and regular rhythm  No extrasystoles are present  Heart sounds: Normal heart sounds, S1 normal and S2 normal  No murmur heard  No friction rub  No gallop  Pulmonary:      Effort: Pulmonary effort is normal  No tachypnea, bradypnea, accessory muscle usage, prolonged expiration, respiratory distress or retractions  Breath sounds: Normal breath sounds and air entry  No stridor or decreased air movement  No decreased breath sounds, wheezing, rhonchi or rales  Abdominal:      General: Bowel sounds are increased  There is no distension  Palpations: Abdomen is soft  Tenderness: There is generalized abdominal tenderness (mild)  There is no guarding or rebound  Comments: BS slightly increased     Musculoskeletal:         General: Normal range of motion  Cervical back: Normal range of motion and neck supple  Lymphadenopathy:      Cervical: Cervical adenopathy present  Skin:     General: Skin is warm and dry  Capillary Refill: Capillary refill takes less than 2 seconds  Neurological:      General: No focal deficit present  Mental Status: She is alert and oriented to person, place, and time  Psychiatric:         Mood and Affect: Mood normal          Behavior: Behavior normal  Behavior is cooperative  Thought Content:  Thought content normal          Judgment: Judgment normal

## 2022-11-14 NOTE — TELEPHONE ENCOUNTER
Caller: patient     Doctor: Pako Joe    Reason for call: patient has sx scheduled 11/16, has the flu    Call back#: [phone]

## 2022-11-14 NOTE — PATIENT INSTRUCTIONS
Continue supportive care, especially pushing fluids  Viral Syndrome   AMBULATORY CARE:   Viral syndrome  is a term used for symptoms of an infection caused by a virus  Viruses are spread easily from person to person through the air and on shared items  Signs and symptoms  may start slowly or suddenly and last hours to days  They can be mild to severe and can change over days or hours  You may have any of the following:  Fever and chills    A runny or stuffy nose    Cough, sore throat, or hoarseness    Headache, or pain and pressure around your eyes    Muscle aches and joint pain    Shortness of breath or wheezing    Abdominal pain, cramps, and diarrhea    Nausea, vomiting, or loss of appetite    Call your local emergency number (911 in the [address] [facility]) or have someone else call if:   You have a seizure  You cannot be woken  You have chest pain or trouble breathing  Seek care immediately if:   You have a stiff neck, a bad headache, and sensitivity to light  You feel weak, dizzy, or confused  You stop urinating or urinate a lot less than usual     You cough up blood or thick yellow or green mucus  You have severe abdominal pain or your abdomen is larger than usual     Call your doctor if:   Your symptoms do not get better with treatment or get worse after 3 days  You have a rash or ear pain  You have burning when you urinate  You have questions or concerns about your condition or care  Treatment for viral syndrome  may include medicines to manage your symptoms  Antibiotics are not given for a viral infection  You may  need any of the following:  Acetaminophen  decreases pain and fever  It is available without a doctor's order  Ask how much to take and how often to take it  Follow directions  Read the labels of all other medicines you are using to see if they also contain acetaminophen, or ask your doctor or pharmacist  Acetaminophen can cause liver damage if not taken correctly   Do not use more than 4 grams (4,000 milligrams) total of acetaminophen in one day  NSAIDs , such as ibuprofen, help decrease swelling, pain, and fever  NSAIDs can cause stomach bleeding or kidney problems in certain people  If you take blood thinner medicine, always ask your healthcare provider if NSAIDs are safe for you  Always read the medicine label and follow directions  Cold medicine  helps decrease swelling, control a cough, and relieve chest or nasal congestion  Saline nasal spray  helps decrease nasal congestion  Manage your symptoms:   Drink liquids as directed to prevent dehydration  Ask how much liquid to drink each day and which liquids are best for you  Ask if you should drink an oral rehydration solution (ORS)  An ORS has the right amounts of water, salts, and sugar you need to replace body fluids  This may help prevent dehydration caused by vomiting or diarrhea  Do not drink liquids with caffeine  Liquids with caffeine can make dehydration worse  Get plenty of rest to help your body heal   Take naps throughout the day  Ask your healthcare provider when you can return to work and your normal activities  Use a cool mist humidifier to help you breathe easier  Ask your healthcare provider how to use a cool mist humidifier  Eat honey or use cough drops for a sore throat  Cough drops are available without a doctor's order  Follow directions for taking cough drops  Do not smoke or be close to anyone who is smoking  Nicotine and other chemicals in cigarettes and cigars can cause lung damage  Smoking can also delay healing  Ask your healthcare provider for information if you currently smoke and need help to quit  E-cigarettes or smokeless tobacco still contain nicotine  Talk to your healthcare provider before you use these products  Prevent the spread of germs:       Wash your hands often  Wash your hands several times each day   Wash after you use the bathroom, change a child's diaper, and before you prepare or eat food  Use soap and water every time  Rub your soapy hands together, lacing your fingers  Wash the front and back of your hands, and in between your fingers  Use the fingers of one hand to scrub under the fingernails of the other hand  Wash for at least 20 seconds  Rinse with warm, running water for several seconds  Then dry your hands with a clean towel or paper towel  Use hand  that contains alcohol if soap and water are not available  Do not touch your eyes, nose, or mouth without washing your hands first          Cover a sneeze or cough  Use a tissue that covers your mouth and nose  Throw the tissue away in a trash can right away  Use the bend of your arm if a tissue is not available  Wash your hands well with soap and water or use a hand   Stay away from others while you are sick  Avoid crowds as much as possible  Ask about vaccines you may need  Talk to your healthcare provider about your vaccine history  He or she will tell you which vaccines you need, and when to get them  Get the influenza (flu) vaccine as soon as recommended each year  The flu vaccine is available starting in September or October  Flu viruses change, so it is important to get a flu vaccine every year  Get the pneumonia vaccine if recommended  This vaccine is usually recommended every 5 years  Your provider will tell you when to get this vaccine, if needed  Follow up with your doctor as directed:  Write down your questions so you remember to ask them during your visits  © Copyright [employer] 2022 Information is for End User's use only and may not be sold, redistributed or otherwise used for commercial purposes  All illustrations and images included in CareNotes® are the copyrighted property of [employer]  or Primo Ir[employer]  The above information is an  only  It is not intended as medical advice for individual conditions or treatments   Talk to your doctor, nurse or pharmacist before following any medical regimen to see if it is safe and effective for you  Influenza   AMBULATORY CARE:   Influenza  (the flu) is an infection caused by the influenza virus  The flu is easily spread when an infected person coughs, sneezes, or has close contact with others  You may be able to spread the flu to others for 1 week or longer after signs or symptoms appear  Common signs and symptoms include the following:   Fever and chills    Headaches, body aches, and muscle or joint pain    Cough, runny nose, and sore throat    Loss of appetite, nausea, vomiting, or diarrhea    Tiredness    Trouble breathing    Call your local emergency number (911 in the [address] [facility]) if:   You have trouble breathing, and your lips look purple or blue  You have a seizure  Seek care immediately if:   You are dizzy, or you are urinating less or not at all  You have a headache with a stiff neck, and you feel tired or confused  You have new pain or pressure in your chest     Your symptoms, such as shortness of breath, vomiting, or diarrhea, get worse  Your symptoms, such as fever and coughing, seem to get better, but then get worse  Call your doctor if:   You have new muscle pain or weakness  You have questions or concerns about your condition or care  Treatment for influenza  may include any of the following:  Acetaminophen  decreases pain and fever  It is available without a doctor's order  Ask how much to take and how often to take it  Follow directions  Read the labels of all other medicines you are using to see if they also contain acetaminophen, or ask your doctor or pharmacist  Acetaminophen can cause liver damage if not taken correctly  Do not use more than 4 grams (4,000 milligrams) total of acetaminophen in one day  NSAIDs , such as ibuprofen, help decrease swelling, pain, and fever  This medicine is available with or without a doctor's order   NSAIDs can cause stomach bleeding or kidney problems in certain people  If you take blood thinner medicine, always ask your healthcare provider if NSAIDs are safe for you  Always read the medicine label and follow directions  Antivirals  help fight a viral infection  Manage your symptoms:   Rest  as much as you can to help you recover  Drink liquids as directed  to help prevent dehydration  Ask how much liquid to drink each day and which liquids are best for you  Prevent the spread of germs:       Wash your hands often  Wash your hands several times each day  Wash after you use the bathroom, change a child's diaper, and before you prepare or eat food  Use soap and water every time  Rub your soapy hands together, lacing your fingers  Wash the front and back of your hands, and in between your fingers  Use the fingers of one hand to scrub under the fingernails of the other hand  Wash for at least 20 seconds  Rinse with warm, running water for several seconds  Then dry your hands with a clean towel or paper towel  Use hand  that contains alcohol if soap and water are not available  Do not touch your eyes, nose, or mouth without washing your hands first          Cover a sneeze or cough  Use a tissue that covers your mouth and nose  Throw the tissue away in a trash can right away  Use the bend of your arm if a tissue is not available  Wash your hands well with soap and water or use a hand   Stay away from others while you are sick  Avoid crowds as much as possible  Ask about vaccines you may need  Talk to your healthcare provider about your vaccine history  He or she will tell you which vaccines you need, and when to get them  Get the influenza (flu) vaccine as soon as it is available  Flu viruses change, so it is important to get a flu vaccine every year  Get the pneumonia vaccine if recommended  This vaccine is usually recommended every 5 years   Your provider will tell you when to get this vaccine, if needed  Follow up with your doctor as directed:  Write down your questions so you remember to ask them during your visits  © Copyright [employer] 2022 Information is for End User's use only and may not be sold, redistributed or otherwise used for commercial purposes  All illustrations and images included in CareNotes® are the copyrighted property of [employer]  or Primo Pineda   The above information is an  only  It is not intended as medical advice for individual conditions or treatments  Talk to your doctor, nurse or pharmacist before following any medical regimen to see if it is safe and effective for you

## 2022-11-15 LAB
FLUAV RNA RESP QL NAA+PROBE: NEGATIVE
FLUBV RNA RESP QL NAA+PROBE: NEGATIVE
SARS-COV-2 RNA RESP QL NAA+PROBE: NEGATIVE

## 2022-11-16 ENCOUNTER — ANESTHESIA (OUTPATIENT)
Dept: PERIOP | Facility: HOSPITAL | Age: 67
End: 2022-11-16

## 2022-11-16 ENCOUNTER — TELEPHONE (OUTPATIENT)
Dept: OBGYN CLINIC | Facility: CLINIC | Age: 67
End: 2022-11-16

## 2022-11-21 ENCOUNTER — TELEPHONE (OUTPATIENT)
Dept: OBGYN CLINIC | Facility: CLINIC | Age: 67
End: 2022-11-21

## 2022-11-21 NOTE — TELEPHONE ENCOUNTER
CHAIMM wanted to offer her to be moved up to 12/14 for her surgery   This was the second attempt so I will give it to another person

## 2022-11-29 ENCOUNTER — CONSULT (OUTPATIENT)
Dept: NEUROSURGERY | Facility: CLINIC | Age: 67
End: 2022-11-29

## 2022-11-29 VITALS
BODY MASS INDEX: 24.8 KG/M2 | RESPIRATION RATE: 16 BRPM | HEIGHT: 63 IN | WEIGHT: 140 LBS | TEMPERATURE: 97.4 F | DIASTOLIC BLOOD PRESSURE: 82 MMHG | SYSTOLIC BLOOD PRESSURE: 122 MMHG | HEART RATE: 80 BPM

## 2022-11-29 DIAGNOSIS — G89.29 CHRONIC MIDLINE LOW BACK PAIN WITHOUT SCIATICA: ICD-10-CM

## 2022-11-29 DIAGNOSIS — M48.062 SPINAL STENOSIS OF LUMBAR REGION WITH NEUROGENIC CLAUDICATION: ICD-10-CM

## 2022-11-29 DIAGNOSIS — M51.04 HNP (HERNIATED NUCLEUS PULPOSUS WITH MYELOPATHY), THORACIC: ICD-10-CM

## 2022-11-29 DIAGNOSIS — M50.30 DDD (DEGENERATIVE DISC DISEASE), CERVICAL: ICD-10-CM

## 2022-11-29 DIAGNOSIS — M25.50 ARTHRALGIA, UNSPECIFIED JOINT: ICD-10-CM

## 2022-11-29 DIAGNOSIS — M54.50 CHRONIC MIDLINE LOW BACK PAIN WITHOUT SCIATICA: ICD-10-CM

## 2022-11-29 DIAGNOSIS — S40.811A ABRASION OF SKIN OF RIGHT UPPER ARM: ICD-10-CM

## 2022-11-29 DIAGNOSIS — M41.80 DEXTROSCOLIOSIS: ICD-10-CM

## 2022-11-29 DIAGNOSIS — M79.10 MYALGIA: ICD-10-CM

## 2022-11-29 DIAGNOSIS — Z82.61 FAMILY HISTORY OF RHEUMATOID ARTHRITIS: ICD-10-CM

## 2022-11-29 DIAGNOSIS — G89.4 CHRONIC PAIN SYNDROME: ICD-10-CM

## 2022-11-29 NOTE — PROGRESS NOTES
Neurosurgery Office Note  Navarro Hawkins 61 y o  female MRN: [id number]      Assessment/Plan     Chronic bilateral low back pain without sciatica  Patient seen for new evaluation of back pain  · Low back pain x 4 years, worsening lately  Occasional pain in her mid-back  The pain is constant and does not radiate to her chest, abdomen or down her legs  No lower extremity pain, numbness, tingling, weakness  No BBI  Hx of scoliosis  · No relief from PT, has had injections/nerve ablations with only recent mild relief  · Exam: scoliosis deformity, 5/5 strength, sensation intact, 2+ DTR, no clonus  Imaging:  · MRI lumbar spine 2/10/22: 1  Progression of degenerative change at level L4-5  Worsening right lateral recess stenosis with compression of right L5 nerve root  Mild canal stenosis  Correlate for right L5 radiculopathy  2   Severe dextroscoliosis with apex at L1-2  3   No significant interval progression of degenerative change in the remainder of lumbar spine  No high-grade canal stenosis or foraminal  4  Moderate left foraminal stenosis at L1-2 secondary to both degenerative and scoliotic changes    Plan:  · Reviewed images with patient and   · She has low back pain and underlying scoliosis, but no radiculopathy symptoms  In addition to her scoliosis, there are some degenerative changes in her spine with foraminal stenosis, but no significant canal stenosis to account for her pain  · Her back pain meghan be multifactorial, including related to arthritis and scoliosis  · Discussed that surgical intervention for her scoliosis would be extensive  She is not really interested in spine surgery, unless there were no other options  · Recommend continuing PT and pain management  Discussed multimodal pain approach to treating pain, including muscle relaxer, gabapentin, etc  Her pain management physician had discussed possible pain pump   Unsure if she would benefit from spinal cord stimulator in the absence of radicular symptoms  · Check scoliosis xrays and flexion/extension lumbar spine xrays to assess for any instability  Will call with the result  · If her symptoms change/worsen, advised to let us know  · At this point, follow up as needed  Continue conservative treatment options  · Call with any questions or concerns  Diagnoses and all orders for this visit:    Arthralgia, unspecified joint  -     Ambulatory referral to Neurosurgery    Myalgia  -     Ambulatory referral to Neurosurgery    Chronic pain syndrome  -     Ambulatory referral to Neurosurgery    Chronic midline low back pain without sciatica  -     Ambulatory referral to Neurosurgery  -     XR spine lumbar complete w bending minimum 6 views; Future  -     XR entire spine (scoliosis) 4-5 vw; Future    Abrasion of skin of right upper arm  -     Ambulatory referral to Neurosurgery    HNP (herniated nucleus pulposus with myelopathy), thoracic  -     Ambulatory referral to Neurosurgery    Spinal stenosis of lumbar region with neurogenic claudication  -     Ambulatory referral to Neurosurgery    DDD (degenerative disc disease), cervical  -     Ambulatory referral to Neurosurgery    Dextroscoliosis  -     Ambulatory referral to Neurosurgery  -     XR spine lumbar complete w bending minimum 6 views; Future  -     XR entire spine (scoliosis) 4-5 vw; Future    Family history of rheumatoid arthritis  -     Ambulatory referral to Neurosurgery          I spent 40 minutes with the patient today in which >50% of the time was spent counseling/coordination of care regarding diagnosis, imaging review, symptoms and treatment plan  CHIEF COMPLAINT    Chief Complaint   Patient presents with   • Consult   • Back Pain       HISTORY    History of Present Illness     61y o  year old female     74-year-old female seen for new evaluation back pain  This has been going on for 3-4 years and has progressively been worsening  Denies any injury or trauma  Complains of mid and low back pain, the low back pain is constant and occasionally it will go up to her mid back below her bra line  It goes across her low back  Denies radiating pain to her chest, abdomen, or down her legs  No numbness, tingling, or weakness  She describes the pain as a constant made worse with doing activities  Describes it as a 10/10 pain that is eased a bit with Tylenol and ibuprofen  Some days are not as bad as others  Notes urinary leakage x 1 yr sometimes if she coughs  No BBI  She has tried physical therapy on 2 separate occasions but it has not really helped her, but she does still continue to do home PT  She has seen pain management and has undergone nerve ablation and injections  Unsure which levels  She has finally gotten a bit of relief from her most recent injection about 1 month ago (outside physician)  See Discussion    REVIEW OF SYSTEMS    Review of Systems   Constitutional: Negative  HENT: Negative  Eyes: Negative  Respiratory: Negative  Cardiovascular: Negative  Gastrointestinal: Negative  Endocrine: Negative  Genitourinary:        Leakage   Musculoskeletal: Positive for back pain (LBP radiatng up to about the bra line, at times to bi/hip, more so on right), gait problem (loss of balance) and myalgias (back spasms)  Skin: Negative  Allergic/Immunologic: Negative  Hematological: Negative  Psychiatric/Behavioral: Negative            Meds/Allergies     Current Outpatient Medications   Medication Sig Dispense Refill   • acetaminophen (TYLENOL) 500 mg tablet Take 500 mg by mouth every 6 (six) hours as needed for mild pain     • alendronate (FOSAMAX) 70 mg tablet take 1 tablet by mouth every 7 days 8 tablet 1   • ascorbic acid (VITAMIN C) 500 MG tablet Take 1 tablet (500 mg total) by mouth 2 (two) times a day 60 tablet 1   • atorvastatin (LIPITOR) 20 mg tablet take 1 tablet by mouth once daily 90 tablet 3   • buPROPion (WELLBUTRIN XL) 150 mg 24 hr tablet      • clonazePAM (KlonoPIN) 1 mg tablet 4 (four) times a day as needed (Takes 1/2  pill)     • ibuprofen (MOTRIN) 800 mg tablet Take 1 tablet (800 mg total) by mouth every 6 (six) hours as needed for mild pain 120 tablet 1   • Multiple Vitamins-Minerals (multivitamin with minerals) tablet Take 1 tablet by mouth daily 30 tablet 1   • omeprazole (PriLOSEC) 40 MG capsule Take 1 capsule (40 mg total) by mouth daily 90 capsule 1   • SODIUM FLUORIDE, DENTAL GEL, 1 1 % GEL SF 5000 Plus 1 1 % dental cream     • tretinoin (REFISSA) 0 05 % cream tretinoin 0 05 % topical cream     • venlafaxine (EFFEXOR-XR) 150 mg 24 hr capsule Take 1 capsule (150 mg total) by mouth daily 90 capsule 1   • venlafaxine (EFFEXOR-XR) 75 mg 24 hr capsule Take 1 capsule (75 mg total) by mouth daily 90 capsule 1   • diclofenac sodium (VOLTAREN) 50 mg EC tablet Take 1 tablet (50 mg total) by mouth 2 (two) times a day as needed (pain) (Patient not taking: Reported on 11/29/2022) 60 tablet 3   • ferrous sulfate 324 (65 Fe) mg Take 1 tablet (324 mg total) by mouth 2 (two) times a day before meals (Patient not taking: Reported on 11/29/2022) 60 tablet 1   • folic acid (FOLVITE) 1 mg tablet Take 1 tablet (1 mg total) by mouth daily (Patient not taking: Reported on 11/29/2022) 30 tablet 1   • gabapentin (Neurontin) 300 mg capsule ONE po q day x 3 and then one po bid x3 and then one tid   (Patient not taking: Reported on 11/29/2022) 90 capsule 1   • meloxicam (MOBIC) 15 mg tablet Take 1 tablet (15 mg total) by mouth daily (Patient not taking: Reported on 11/29/2022) 30 tablet 1   • METHOCARBAMOL PO Take by mouth as needed (Patient not taking: Reported on 11/29/2022)     • nystatin (MYCOSTATIN) 500,000 units/5 mL suspension Apply 5 mL (500,000 Units total) to the mouth or throat 4 (four) times a day (Patient not taking: Reported on 11/29/2022) 473 mL 0   • ondansetron (ZOFRAN) 4 mg tablet Take 1 tablet (4 mg total) by mouth every 8 (eight) hours as needed for nausea or vomiting (Patient not taking: Reported on 11/29/2022) 20 tablet 0   • SF 5000 Plus 1 1 % CREA      • tretinoin (RETIN-A) 0 1 % cream Apply topically daily at bedtime 45 g 5     No current facility-administered medications for this visit  No Known Allergies    PAST HISTORY    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    • Drug intolerance    • Dysfunction of right eustachian tube    • Dysfunctional uterine bleeding    • Edema    • Elevated d-dimer    • Exposure to hepatitis C    • Fracture of fibula    • Generalized anxiety disorder    • GERD (gastroesophageal reflux disease)    • Hyperlipidemia    • Mild cervical dysplasia    • Myalgia    • Myositis    • Palpitations    • PONV (postoperative nausea and vomiting)    • Pre-syncope    • Seasonal allergies    • Stress incontinence    • Thrombocytopenia ([address] )    • Urinary retention    • Urinary tract infection    • Uterine leiomyoma        Past Surgical History:   Procedure Laterality Date   • BLADDER SURGERY     • BREAST BIOPSY Right     US guided   • COLPOSCOPY  01/06/1998   • DENTAL SURGERY     • ENDOMETRIAL BIOPSY  05/21/1993   • HYSTERECTOMY     • HYSTEROSCOPY      uterus- 1/22/98, 7/1/02 and 4/10/07   • TUBAL LIGATION     • US GUIDED BREAST BIOPSY RIGHT COMPLETE Right 8/8/2018       Social History     Tobacco Use   • Smoking status: Every Day     Packs/day: 1 50     Years: 45 00     Pack years: 67 50     Types: Cigarettes   • Smokeless tobacco: Never   Vaping Use   • Vaping Use: Never used   Substance Use Topics   • Alcohol use: No   • Drug use:  No Family History   Problem Relation Age of Onset   • Stroke Mother         cerebral artery occlusion with cerebral infarction   • Coronary artery disease Mother    • Arrhythmia Mother         sinus   • Coronary artery disease Father    • Diabetes Father    • Aortic aneurysm Family    • Arthritis Family    • Diabetes Family    • Heart disease Family    • No Known Problems Daughter    • No Known Problems Maternal Grandmother    • No Known Problems Paternal Grandmother    • No Known Problems Daughter    • No Known Problems Maternal Aunt    • Ovarian cancer Maternal Aunt          Above history personally reviewed  EXAM    Vitals:Blood pressure 122/82, pulse 80, temperature (!) 97 4 °F (36 3 °C), resp  rate 16, height 5' 3" (1 6 m), weight 63 5 kg (140 lb)  ,Body mass index is 24 8 kg/m²  Physical Exam  Vitals reviewed  Constitutional:       General: She is awake  Appearance: Normal appearance  HENT:      Head: Normocephalic and atraumatic  Eyes:      Conjunctiva/sclera: Conjunctivae normal    Cardiovascular:      Rate and Rhythm: Normal rate  Pulmonary:      Effort: Pulmonary effort is normal    Musculoskeletal:         General: Deformity present  Comments: Scoliosis deformity of low back  Pain with lateral flexion, otherwise good ROM    Skin:     General: Skin is warm and dry  Neurological:      Mental Status: She is alert and oriented to person, place, and time  Gait: Gait is intact  Deep Tendon Reflexes:      Reflex Scores:       Patellar reflexes are 2+ on the right side and 2+ on the left side  Psychiatric:         Attention and Perception: Attention and perception normal          Mood and Affect: Mood and affect normal          Speech: Speech normal          Behavior: Behavior normal  Behavior is cooperative  Thought Content:  Thought content normal          Cognition and Memory: Cognition and memory normal          Judgment: Judgment normal          Neurologic Exam     Mental Status   Oriented to person, place, and time  Follows 3 step commands  Attention: normal  Concentration: normal    Speech: speech is normal   Level of consciousness: alert  Knowledge: good  Normal comprehension  Motor Exam     Strength   Right iliopsoas: 5/5  Left iliopsoas: 5/5  Right quadriceps: 5/5  Left quadriceps: 5/5  Right hamstrin/5  Left hamstrin/5  Right anterior tibial: 5/5  Left anterior tibial: 5/5  Right gastroc: 5/5  Left gastroc: 5/5    Sensory Exam   Right leg light touch: normal  Left leg light touch: normal    Gait, Coordination, and Reflexes     Gait  Gait: normal    Reflexes   Right patellar: 2+  Left patellar: 2+  Right ankle clonus: absent  Left ankle clonus: absent        MEDICAL DECISION MAKING    Imaging Studies:     No results found  I have personally reviewed pertinent reports     and I have personally reviewed pertinent films in PACS

## 2022-11-29 NOTE — ASSESSMENT & PLAN NOTE
Patient seen for new evaluation of back pain  · Low back pain x 4 years, worsening lately  Occasional pain in her mid-back  The pain is constant and does not radiate to her chest, abdomen or down her legs  No lower extremity pain, numbness, tingling, weakness  No BBI  Hx of scoliosis  · No relief from PT, has had injections/nerve ablations with only recent mild relief  · Exam: scoliosis deformity, 5/5 strength, sensation intact, 2+ DTR, no clonus  Imaging:  · MRI lumbar spine 2/10/22: 1  Progression of degenerative change at level L4-5  Worsening right lateral recess stenosis with compression of right L5 nerve root  Mild canal stenosis  Correlate for right L5 radiculopathy  2   Severe dextroscoliosis with apex at L1-2  3   No significant interval progression of degenerative change in the remainder of lumbar spine  No high-grade canal stenosis or foraminal  4  Moderate left foraminal stenosis at L1-2 secondary to both degenerative and scoliotic changes    Plan:  · Reviewed images with patient and   · She has low back pain and underlying scoliosis, but no radiculopathy symptoms  In addition to her scoliosis, there are some degenerative changes in her spine with foraminal stenosis, but no significant canal stenosis to account for her pain  · Her back pain meghan be multifactorial, including related to arthritis and scoliosis  · Discussed that surgical intervention for her scoliosis would be extensive  She is not really interested in spine surgery, unless there were no other options  · Recommend continuing PT and pain management  Discussed multimodal pain approach to treating pain, including muscle relaxer, gabapentin, etc  Her pain management physician had discussed possible pain pump  Unsure if she would benefit from spinal cord stimulator in the absence of radicular symptoms  · Check scoliosis xrays and flexion/extension lumbar spine xrays to assess for any instability   Will call with the result  · If her symptoms change/worsen, advised to let us know  · At this point, follow up as needed  Continue conservative treatment options  · Call with any questions or concerns

## 2023-01-11 ENCOUNTER — PREP FOR PROCEDURE (OUTPATIENT)
Dept: OBGYN CLINIC | Facility: CLINIC | Age: 68
End: 2023-01-11

## 2023-01-11 ENCOUNTER — TELEPHONE (OUTPATIENT)
Dept: OBGYN CLINIC | Facility: CLINIC | Age: 68
End: 2023-01-11

## 2023-01-11 DIAGNOSIS — Z01.812 PRE-OPERATIVE LABORATORY EXAMINATION: Primary | ICD-10-CM

## 2023-01-11 DIAGNOSIS — M17.11 PRIMARY OSTEOARTHRITIS OF RIGHT KNEE: ICD-10-CM

## 2023-01-16 ENCOUNTER — APPOINTMENT (OUTPATIENT)
Dept: LAB | Facility: CLINIC | Age: 68
End: 2023-01-16

## 2023-01-16 DIAGNOSIS — M17.11 PRIMARY OSTEOARTHRITIS OF RIGHT KNEE: ICD-10-CM

## 2023-01-16 DIAGNOSIS — Z01.812 PRE-OPERATIVE LABORATORY EXAMINATION: ICD-10-CM

## 2023-01-16 LAB
ABO GROUP BLD: NORMAL
ANION GAP SERPL CALCULATED.3IONS-SCNC: 7 MMOL/L (ref 4–13)
APTT PPP: 30 SECONDS (ref 23–37)
BASOPHILS # BLD AUTO: 0.05 THOUSANDS/ÂΜL (ref 0–0.1)
BASOPHILS NFR BLD AUTO: 1 % (ref 0–1)
BLD GP AB SCN SERPL QL: NEGATIVE
BUN SERPL-MCNC: 24 MG/DL (ref 5–25)
CALCIUM SERPL-MCNC: 9.3 MG/DL (ref 8.3–10.1)
CHLORIDE SERPL-SCNC: 111 MMOL/L (ref 96–108)
CO2 SERPL-SCNC: 24 MMOL/L (ref 21–32)
CREAT SERPL-MCNC: 0.68 MG/DL (ref 0.6–1.3)
EOSINOPHIL # BLD AUTO: 0.24 THOUSAND/ÂΜL (ref 0–0.61)
EOSINOPHIL NFR BLD AUTO: 5 % (ref 0–6)
ERYTHROCYTE [DISTWIDTH] IN BLOOD BY AUTOMATED COUNT: 14.2 % (ref 11.6–15.1)
FERRITIN SERPL-MCNC: 8 NG/ML (ref 8–388)
GFR SERPL CREATININE-BSD FRML MDRD: 92 ML/MIN/1.73SQ M
GLUCOSE SERPL-MCNC: 93 MG/DL (ref 65–140)
HCT VFR BLD AUTO: 37.6 % (ref 34.8–46.1)
HGB BLD-MCNC: 11.8 G/DL (ref 11.5–15.4)
IMM GRANULOCYTES # BLD AUTO: 0.01 THOUSAND/UL (ref 0–0.2)
IMM GRANULOCYTES NFR BLD AUTO: 0 % (ref 0–2)
INR PPP: 0.9 (ref 0.84–1.19)
IRON SATN MFR SERPL: 12 % (ref 15–50)
IRON SERPL-MCNC: 40 UG/DL (ref 50–170)
LYMPHOCYTES # BLD AUTO: 2.17 THOUSANDS/ÂΜL (ref 0.6–4.47)
LYMPHOCYTES NFR BLD AUTO: 42 % (ref 14–44)
MCH RBC QN AUTO: 30 PG (ref 26.8–34.3)
MCHC RBC AUTO-ENTMCNC: 31.4 G/DL (ref 31.4–37.4)
MCV RBC AUTO: 96 FL (ref 82–98)
MONOCYTES # BLD AUTO: 0.39 THOUSAND/ÂΜL (ref 0.17–1.22)
MONOCYTES NFR BLD AUTO: 8 % (ref 4–12)
NEUTROPHILS # BLD AUTO: 2.31 THOUSANDS/ÂΜL (ref 1.85–7.62)
NEUTS SEG NFR BLD AUTO: 44 % (ref 43–75)
NRBC BLD AUTO-RTO: 0 /100 WBCS
PLATELET # BLD AUTO: 324 THOUSANDS/UL (ref 149–390)
PMV BLD AUTO: 10.8 FL (ref 8.9–12.7)
POTASSIUM SERPL-SCNC: 4.3 MMOL/L (ref 3.5–5.3)
PROTHROMBIN TIME: 12.3 SECONDS (ref 11.6–14.5)
RBC # BLD AUTO: 3.93 MILLION/UL (ref 3.81–5.12)
RETICS # AUTO: NORMAL 10*3/UL (ref 14097–95744)
RETICS # CALC: 1.17 % (ref 0.37–1.87)
RH BLD: POSITIVE
SODIUM SERPL-SCNC: 142 MMOL/L (ref 135–147)
SPECIMEN EXPIRATION DATE: NORMAL
TIBC SERPL-MCNC: 331 UG/DL (ref 250–450)
WBC # BLD AUTO: 5.17 THOUSAND/UL (ref 4.31–10.16)

## 2023-01-17 LAB
EST. AVERAGE GLUCOSE BLD GHB EST-MCNC: 120 MG/DL
HBA1C MFR BLD: 5.8 %

## 2023-01-23 RX ORDER — METHOCARBAMOL 750 MG/1
750 TABLET, FILM COATED ORAL EVERY 6 HOURS PRN
COMMUNITY
Start: 2023-01-01

## 2023-01-26 ENCOUNTER — TELEPHONE (OUTPATIENT)
Dept: OBGYN CLINIC | Facility: HOSPITAL | Age: 68
End: 2023-01-26

## 2023-01-26 DIAGNOSIS — M17.11 PRIMARY OSTEOARTHRITIS OF RIGHT KNEE: Primary | ICD-10-CM

## 2023-02-01 ENCOUNTER — TELEPHONE (OUTPATIENT)
Dept: OBGYN CLINIC | Facility: CLINIC | Age: 68
End: 2023-02-01

## 2023-02-03 ENCOUNTER — APPOINTMENT (OUTPATIENT)
Dept: PREADMISSION TESTING | Facility: HOSPITAL | Age: 68
End: 2023-02-03

## 2023-02-03 NOTE — PRE-PROCEDURE INSTRUCTIONS
Pre-Surgery Instructions:   Medication Instructions   • acetaminophen (TYLENOL) 500 mg tablet Uses PRN- OK to take day of surgery   • alendronate (FOSAMAX) 70 mg tablet weekly   • ascorbic acid (VITAMIN C) 500 MG tablet Hold day of surgery  • atorvastatin (LIPITOR) 20 mg tablet Take night before surgery   • buPROPion (WELLBUTRIN XL) 150 mg 24 hr tablet Take day of surgery  • clonazePAM (KlonoPIN) 1 mg tablet Uses PRN- OK to take day of surgery   • diclofenac sodium (VOLTAREN) 50 mg EC tablet Stop taking 7 days prior to surgery  • ferrous sulfate 324 (65 Fe) mg Hold day of surgery  • folic acid (FOLVITE) 1 mg tablet Hold day of surgery  • gabapentin (Neurontin) 300 mg capsule Take day of surgery  • ibuprofen (MOTRIN) 800 mg tablet Stop taking 7 days prior to surgery  • methocarbamol (ROBAXIN) 750 mg tablet Uses PRN- OK to take day of surgery   • Multiple Vitamins-Minerals (multivitamin with minerals) tablet Stop taking 7 days prior to surgery  • omeprazole (PriLOSEC) 40 MG capsule Take day of surgery  • SODIUM FLUORIDE, DENTAL GEL, 1 1 % GEL Take day of surgery  • tretinoin (RETIN-A) 0 1 % cream Hold day of surgery  • venlafaxine (EFFEXOR-XR) 150 mg 24 hr capsule Take day of surgery  INSTR ON CATALINA CALL,  REPORT LOC , BRING PHOTO ID/MED LIST/INS  INFO ,SHOWER REV , STOP ASA/NSAID/VIT 7 DAY PREOP, PT VERBALIZES UNDERSTANDING W/ NO FURTHER QUESTIONS

## 2023-02-06 ENCOUNTER — CONSULT (OUTPATIENT)
Dept: INTERNAL MEDICINE CLINIC | Facility: CLINIC | Age: 68
End: 2023-02-06

## 2023-02-06 VITALS
DIASTOLIC BLOOD PRESSURE: 80 MMHG | SYSTOLIC BLOOD PRESSURE: 128 MMHG | BODY MASS INDEX: 24.8 KG/M2 | TEMPERATURE: 97.6 F | OXYGEN SATURATION: 98 % | HEART RATE: 83 BPM | HEIGHT: 63 IN | WEIGHT: 140 LBS

## 2023-02-06 DIAGNOSIS — E78.2 MIXED HYPERLIPIDEMIA: ICD-10-CM

## 2023-02-06 DIAGNOSIS — Z01.818 VISIT FOR PRE-OPERATIVE EXAMINATION: Primary | ICD-10-CM

## 2023-02-06 DIAGNOSIS — Z72.0 TOBACCO ABUSE: ICD-10-CM

## 2023-02-06 DIAGNOSIS — M17.11 PRIMARY OSTEOARTHRITIS OF RIGHT KNEE: ICD-10-CM

## 2023-02-06 NOTE — PROGRESS NOTES
Assessment/Plan:  Problem List Items Addressed This Visit        Musculoskeletal and Integument    Primary osteoarthritis of right knee       Other    Tobacco abuse    Hyperlipidemia   Other Visit Diagnoses     Visit for pre-operative examination    -  Primary           Diagnoses and all orders for this visit:    Visit for pre-operative examination    Tobacco abuse    Mixed hyperlipidemia    Primary osteoarthritis of right knee      No problem-specific Assessment & Plan notes found for this encounter  A/P: PAT tests was reviewed and c/o labs and an ekg  All were acceptable, but iron level is low and recommend replacement    Pt and co-morbidities are stable  Pt is a [employer] Class I and carries a cardiac risk of about 1%    Recommend holding any ASA, NSAID's, omega 3, and MVT one week prior to the procedure  On the day of sx, may take with a sip of water, her klonopin, effexor,welbutrin, and omeparzole  May resume the rest of her meds once eating  Aggressive pulmonary secretion control due to smoking  Aggressive DVT prophylaxis recommended  Make anesthesia aware of dental plates and N/V with prior anesthesia  Thanks and good luck  Subjective:      Patient ID: Ady Edwards is a 74 y o  female  WF  presents at the request of Dr Bela Rodriguez  for pre-op eval for upcoming right TKR  tentatively scheduled for 2/8/23  Since last visit, doing well and no recent illnesses  Remains active w/o difficulty and no falls  No travel history  Denies depression  No recent illnesses  No fever, chills, or sweats  No unexplained wt changes  Denies CP, SOB, or palpitations  No edema  No orthopnea or PND  No sz or syncope  No changes in bowel or bladder habits  PMH includes GERD, HLD, allergies, varicose veins, CTS, DJD, tobacco use, and ANAND  Past sx include breast bx, tubal, hyto, bladder sx, colonoscopy, colposcopy, and dental sx and reports no problems with prior procedures or anesthesia except for some N/V  Jennifer Prier  Daily smoking and denies ETOH use  No history of DVT or PE  NO history of bleeding issues and is not on anti-coagulants,but takes NSAID's  Admits to  dental plates  Denies C spine issues  No objections to getting blood products if deemed necessary  Had PAT testing done  The following portions of the patient's history were reviewed and updated as appropriate:   She has a past medical history of Abnormal finding in urine, Anxiety, Arthralgia of multiple joints, Balance disorder, Biceps tendinitis, right, Bursitis of right knee, Bursitis of right shoulder, Chondrocalcinosis, Chronic bilateral low back pain without sciatica (11/29/2022), Chronic pain disorder, COPD exacerbation ([facility] [address] ), 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, Drug intolerance, Dysfunction of right eustachian tube, Dysfunctional uterine bleeding, Edema, Elevated d-dimer, Exposure to hepatitis C, Fracture of fibula, Generalized anxiety disorder, GERD (gastroesophageal reflux disease), Hyperlipidemia, Mild cervical dysplasia, Myalgia, Myositis, Palpitations, PONV (postoperative nausea and vomiting), Pre-syncope, Seasonal allergies, Stress incontinence, Thrombocytopenia ([facility] [address] ), Urinary retention, Urinary tract infection, and Uterine leiomyoma  ,  does not have any pertinent problems on file  ,   has a past surgical history that includes Bladder surgery; Breast biopsy (Right); Colposcopy (01/06/1998); Dental surgery; Endometrial biopsy (05/21/1993); Hysteroscopy; Tubal ligation; US guided breast biopsy right complete (Right, 08/08/2018); Hysterectomy; and Colonoscopy  ,  family history includes Aortic aneurysm in her family; Arrhythmia in her mother; Arthritis in her family; Coronary artery disease in her father and mother; Diabetes in her family and father; Heart disease in her family; No Known Problems in her daughter, daughter, maternal aunt, maternal grandmother, and paternal grandmother; Ovarian cancer in her maternal aunt; Stroke in her mother  , reports that she has been smoking cigarettes  She has a 67 50 pack-year smoking history  She has never used smokeless tobacco  She reports that she does not drink alcohol and does not use drugs  ,  has No Known Allergies     Current Outpatient Medications   Medication Sig Dispense Refill   • acetaminophen (TYLENOL) 500 mg tablet Take 500 mg by mouth every 6 (six) hours as needed for mild pain     • alendronate (FOSAMAX) 70 mg tablet take 1 tablet by mouth every 7 days 8 tablet 1   • ascorbic acid (VITAMIN C) 500 MG tablet Take 1 tablet (500 mg total) by mouth 2 (two) times a day (Patient taking differently: Take 1,000 mg by mouth 2 (two) times a day) 60 tablet 1   • atorvastatin (LIPITOR) 20 mg tablet take 1 tablet by mouth once daily (Patient taking differently: Take 20 mg by mouth daily after dinner) 90 tablet 3   • buPROPion (WELLBUTRIN XL) 150 mg 24 hr tablet 150 mg every morning     • clonazePAM (KlonoPIN) 1 mg tablet 3 (three) times a day     • gabapentin (Neurontin) 300 mg capsule ONE po q day x 3 and then one po bid x3 and then one tid   (Patient taking differently: Take 100 mg by mouth 2 (two) times a day ONE po q day x 3 and then one po bid x3 and then one tid ) 90 capsule 1   • ibuprofen (MOTRIN) 800 mg tablet Take 1 tablet (800 mg total) by mouth every 6 (six) hours as needed for mild pain 120 tablet 1   • methocarbamol (ROBAXIN) 750 mg tablet Take 750 mg by mouth every 6 (six) hours as needed     • omeprazole (PriLOSEC) 40 MG capsule Take 1 capsule (40 mg total) by mouth daily 90 capsule 1   • SODIUM FLUORIDE, DENTAL GEL, 1 1 % GEL SF 5000 Plus 1 1 % dental cream     • tretinoin (RETIN-A) 0 1 % cream Apply topically daily at bedtime 45 g 5   • venlafaxine (EFFEXOR-XR) 150 mg 24 hr capsule Take 1 capsule (150 mg total) by mouth daily 90 capsule 1   • venlafaxine (EFFEXOR-XR) 75 mg 24 hr capsule Take 1 capsule (75 mg total) by mouth daily 90 capsule 1   • busPIRone (BUSPAR) 30 MG tablet Take 30 mg by mouth 3 (three) times a day (Patient not taking: Reported on 2/6/2023)     • diclofenac sodium (VOLTAREN) 50 mg EC tablet Take 1 tablet (50 mg total) by mouth 2 (two) times a day as needed (pain) (Patient not taking: Reported on 11/29/2022) 60 tablet 3   • ferrous sulfate 324 (65 Fe) mg Take 1 tablet (324 mg total) by mouth 2 (two) times a day before meals (Patient not taking: Reported on 11/29/2022) 60 tablet 1   • folic acid (FOLVITE) 1 mg tablet Take 1 tablet (1 mg total) by mouth daily (Patient not taking: Reported on 11/29/2022) 30 tablet 1   • meloxicam (MOBIC) 15 mg tablet Take 1 tablet (15 mg total) by mouth daily (Patient not taking: Reported on 11/29/2022) 30 tablet 1   • Multiple Vitamins-Minerals (multivitamin with minerals) tablet Take 1 tablet by mouth daily (Patient not taking: Reported on 2/6/2023) 30 tablet 1   • nystatin (MYCOSTATIN) 500,000 units/5 mL suspension Apply 5 mL (500,000 Units total) to the mouth or throat 4 (four) times a day (Patient not taking: Reported on 11/29/2022) 473 mL 0   • ondansetron (ZOFRAN) 4 mg tablet Take 1 tablet (4 mg total) by mouth every 8 (eight) hours as needed for nausea or vomiting (Patient not taking: Reported on 11/29/2022) 20 tablet 0   • tretinoin (REFISSA) 0 05 % cream tretinoin 0 05 % topical cream (Patient not taking: Reported on 2/6/2023)       No current facility-administered medications for this visit  Review of Systems   Constitutional: Positive for activity change  Negative for chills, diaphoresis, fatigue and fever  HENT: Negative  Eyes: Negative for visual disturbance  Respiratory: Negative for cough, chest tightness, shortness of breath and wheezing  Cardiovascular: Negative for chest pain, palpitations and leg swelling  Gastrointestinal: Negative for abdominal pain, constipation, diarrhea, nausea and vomiting  Endocrine: Negative for cold intolerance and heat intolerance     Genitourinary: Negative for difficulty urinating, dysuria and frequency  Musculoskeletal: Positive for arthralgias and gait problem  Negative for myalgias  Neurological: Negative for dizziness, seizures, syncope, weakness, light-headedness and headaches  Psychiatric/Behavioral: Negative for confusion and dysphoric mood  The patient is not nervous/anxious  PHQ-2/9 Depression Screening          Objective:  Vitals:    02/06/23 0846   BP: 128/80   Pulse: 83   Temp: 97 6 °F (36 4 °C)   SpO2: 98%   Weight: 63 5 kg (140 lb)   Height: 5' 3" (1 6 m)     Body mass index is 24 8 kg/m²  Physical Exam  Vitals and nursing note reviewed  Constitutional:       General: She is not in acute distress  Appearance: Normal appearance  She is not ill-appearing  HENT:      Head: Normocephalic and atraumatic  Comments: NO oropharyngeal obstructions  Partial dental plate noted  Mouth/Throat:      Mouth: Mucous membranes are moist    Eyes:      Extraocular Movements: Extraocular movements intact  Conjunctiva/sclera: Conjunctivae normal       Pupils: Pupils are equal, round, and reactive to light  Neck:      Vascular: No carotid bruit  Comments: No C spine restrictions  Cardiovascular:      Rate and Rhythm: Normal rate and regular rhythm  Heart sounds: Normal heart sounds  No murmur heard  Pulmonary:      Effort: Pulmonary effort is normal  No respiratory distress  Breath sounds: Normal breath sounds  No wheezing, rhonchi or rales  Abdominal:      General: Bowel sounds are normal  There is no distension  Palpations: Abdomen is soft  Tenderness: There is no abdominal tenderness  Musculoskeletal:         General: Tenderness present  No swelling or deformity  Cervical back: Normal range of motion and neck supple  No rigidity or tenderness  Right lower leg: No edema  Left lower leg: No edema  Lymphadenopathy:      Cervical: No cervical adenopathy  Neurological:      General: No focal deficit present        Mental Status: She is alert and oriented to person, place, and time  Mental status is at baseline  Cranial Nerves: No cranial nerve deficit  Sensory: No sensory deficit  Motor: No weakness  Gait: Gait normal       Deep Tendon Reflexes: Reflexes normal    Psychiatric:         Mood and Affect: Mood normal          Behavior: Behavior normal          Thought Content:  Thought content normal          Judgment: Judgment normal

## 2023-02-07 ENCOUNTER — EVALUATION (OUTPATIENT)
Dept: PHYSICAL THERAPY | Facility: CLINIC | Age: 68
End: 2023-02-07

## 2023-02-07 DIAGNOSIS — M17.11 PRIMARY OSTEOARTHRITIS OF RIGHT KNEE: Primary | ICD-10-CM

## 2023-02-07 NOTE — PROGRESS NOTES
PT Evaluation     Today's date: 2023  Patient name: Ady Edwards  : 1955  MRN: [id number]  Referring provider: Pia Heck DO  Dx:   Encounter Diagnosis     ICD-10-CM    1  Primary osteoarthritis of right knee  M17 11 Ambulatory referral to Physical Therapy                     Assessment  Assessment details: Pt is a 74 YOF to OPPT for pre-op for R TKA  Noted impairments including pain, impaired soft tissue mobility, reduced range of motion (R knee less compared to L), reduced strength, reduced postural awareness, altered gait, and reduced activity tolerance  Due to noted impairments, the patient's present functional limitations include difficulty with ADLs with increased need for assistance, reliance on medication and/or modalities for pain relief, reduced tolerance for functional mobility and activity, and difficulty completing home and self care responsibilities  She is scheduled for R TKA on 23  Patient to benefit from skilled outpatient physical therapy 2x/week for 8 weeks in order to reduce pain, maximize pain free range of motion, increase strength and stability, and improve functional mobility/functional activity in order to maximize function with reduced limitations  Home exercise program was provided and all questions answered to patient's level of satisfaction  Pt tolerated initial exercises well without incident  Goals  ST-6 weeks  1  Pt will report reduced R knee pain levels "at worst" by at least 2 points (0-10 scale) in order to allow improved tolerance for functional mobility and reduced reliance on medication or modalities for pain relief  2  Pt will demonstrate improved strength of R knee and hip by at least 1/2-1 MMT in order to improve weight bearing joint stability and allow for restoration of normal joint mechanics to reduce pain and improve function     3  Pt will demonstrate normalized gait with least restrictive AD without increase in pain/sx for at least functional/household distances  LT weeks   1  Pt will be independent with HEP, demonstrating proper technique with exercises and understanding of self progression of program without need for cueing or assistance  2  Pt will report minimized pain levels with at least 75% reduction in pain since [facility]  3  Pt will demonstrate WFL AROM and strength of R knee/LE without sx  4  Pt will demonstrate normalized gait without deviations or need for assistive device or external support  5  Pt will be able to garden with minimal difficulty  6  FOTO will reflect score at discharge that is greater than or equal to predicted level  Plan  Planned modality interventions: cryotherapy  Planned therapy interventions: manual therapy, balance, patient education, neuromuscular re-education, coordination, home exercise program, gait training, therapeutic training, therapeutic exercise, therapeutic activities, stretching and strengthening  Frequency: 2x week  Duration in weeks: 8  Plan of Care beginning date: 2023  Plan of Care expiration date: 2023  Treatment plan discussed with: patient        Subjective Evaluation    History of Present Illness  Mechanism of injury: Patient is here pre-opt for her elective R knee replacement on 23 due to severe OA  She lives with  and has a first floor set up  She has 3 cats and 3 dogs  Pain  Current pain ratin  At best pain ratin  At worst pain rating: 10  Location: R knee  Aggravating factors: walking and standing    Social Support  Steps to enter house: yes  Stairs in house: yes   Lives in: multiple-level home (bedroom/bathroom on first floor)  Lives with: spouse    Employment status: not working    Diagnostic Tests    FCE comments: 22 Xray of R knee: Degenerative osteoarthritis without evidence of acute osseous abnormality  Patient Goals  Patient goals for therapy: decreased pain          Objective    RAPT: 6    Knee AROM Left Right   Flexion 139 126*   Extension -1 -5*       Manual Muscle Testing - Hip Left Right   Flexion 5/5 4+/5   Extension 4+/5 4/5   Abduction 4/5 4+/5   External Rotation       Manual Muscle Testing - Knee Left Right   Flexion 5/5 4+/5*   Extension 5/5 4+/5*     Manual Muscle Testing - Ankle Left Right   Doriflexion 5/5 4/5   Plantarflexion 5/5 5/5   EHL     *= pain w/ movement       Re-eval Date: 3/7    Date 2/7       Visit Count 1       FOTO        Pain In        Pain Out             Precautions none        Manuals                                        Neuro Re-Ed                                                                 Ther Ex        Nustep          Reviewed HEP for TKA                                                        Ther Activity                        Gait Training                        Modalities

## 2023-02-08 ENCOUNTER — HOSPITAL ENCOUNTER (INPATIENT)
Facility: HOSPITAL | Age: 68
LOS: 5 days | Discharge: HOME | End: 2023-02-14
Attending: ORTHOPAEDIC SURGERY | Admitting: ORTHOPAEDIC SURGERY

## 2023-02-08 ENCOUNTER — APPOINTMENT (OUTPATIENT)
Dept: RADIOLOGY | Facility: HOSPITAL | Age: 68
End: 2023-02-08

## 2023-02-08 DIAGNOSIS — G93.40 ENCEPHALOPATHY ACUTE: ICD-10-CM

## 2023-02-08 DIAGNOSIS — Z01.812 PRE-OPERATIVE LABORATORY EXAMINATION: ICD-10-CM

## 2023-02-08 DIAGNOSIS — M17.11 PRIMARY OSTEOARTHRITIS OF RIGHT KNEE: Primary | ICD-10-CM

## 2023-02-08 DIAGNOSIS — R50.9 FEVER, UNSPECIFIED FEVER CAUSE: ICD-10-CM

## 2023-02-08 DIAGNOSIS — I26.99 ACUTE PULMONARY EMBOLISM WITHOUT ACUTE COR PULMONALE, UNSPECIFIED PULMONARY EMBOLISM TYPE (HCC): ICD-10-CM

## 2023-02-08 DIAGNOSIS — F13.939 BENZODIAZEPINE WITHDRAWAL (HCC): ICD-10-CM

## 2023-02-08 LAB
ABO GROUP BLD: NORMAL
RH BLD: POSITIVE

## 2023-02-08 PROCEDURE — 0SRC0J9 REPLACEMENT OF RIGHT KNEE JOINT WITH SYNTHETIC SUBSTITUTE, CEMENTED, OPEN APPROACH: ICD-10-PCS | Performed by: ORTHOPAEDIC SURGERY

## 2023-02-08 DEVICE — INSERT ARTCSURF SZ E-F 3-4 10MM FIXED UHMWPE NEXGEN: Type: IMPLANTABLE DEVICE | Site: KNEE | Status: FUNCTIONAL

## 2023-02-08 DEVICE — IMPLANTABLE DEVICE
Type: IMPLANTABLE DEVICE | Site: KNEE | Status: FUNCTIONAL
Brand: NEXGEN®

## 2023-02-08 DEVICE — PALACOS® R+G IS A FAST SETTING POLYMER CONTAINING GENTAMICIN, FOR USE IN BONE SURGERY. MIXING OF THE TWO COMPONENT SYSTEM, CONSISTING OF A POWDER AND A LIQUID, INITIALLY PRODUCES A LIQUID AND THEN A PASTE, WHICH IS USED TO ANCHOR THE PROSTHESIS TO THE BONE. THE HARDENED BONE CEMENT ALLOWS STABLE FIXATION OF THE PROSTHESIS AND TRANSFERS ALL STRESSES PRODUCED IN A MOVEMENT TO THE BONE VIA THE LARGE INTERFACE. INSOLUBLE ZIRCONIUM DIOXIDE IS INCLUDED IN THE CEMENT POWDER AS AN X RAY CONTRAST MEDIUM. THE CHLOROPHYLL ADDITIVE SERVES AS OPTICAL MARKING OF THE BONE CEMENT AT THE SITE OF THE OPERATION.
Type: IMPLANTABLE DEVICE | Site: KNEE | Status: FUNCTIONAL
Brand: PALACOS®

## 2023-02-08 DEVICE — COMPONENT PATELLAR 9MM SZ 35 NEXGEN: Type: IMPLANTABLE DEVICE | Site: KNEE | Status: FUNCTIONAL

## 2023-02-08 DEVICE — PALACOS® R IS A FAST-CURING, RADIOPAQUE, POLY(METHYL METHACRYLATE)-BASED BONE CEMENT.PALACOS ® R CONTAINS THE X-RAY CONTRAST MEDIUM ZIRCONIUM DIOXIDE. TO IMPROVE VISIBILITY IN THE SURGICAL FIELD PALACOS ® R HAS BEEN COLOURED WITH CHLOROPHYLL (E141). THE BONE CEMENT IS PREPARED DIRECTLY BEFORE USE BY MIXING A POLYMER POWDER COMPONENT WITH A LIQUID MONOMER COMPONENT. A DUCTILE DOUGH FORMS WHICH CURES WITHIN A FEW MINUTES.
Type: IMPLANTABLE DEVICE | Site: KNEE | Status: FUNCTIONAL
Brand: PALACOS®

## 2023-02-08 DEVICE — IMPLANTABLE DEVICE
Type: IMPLANTABLE DEVICE | Site: KNEE | Status: FUNCTIONAL
Brand: NEXGEN® LEGACY® GENDER SOLUTIONS™

## 2023-02-08 RX ORDER — PROPOFOL 10 MG/ML
INJECTION, EMULSION INTRAVENOUS AS NEEDED
Status: DISCONTINUED | OUTPATIENT
Start: 2023-02-08 | End: 2023-02-08

## 2023-02-08 RX ORDER — BUPIVACAINE HYDROCHLORIDE 5 MG/ML
INJECTION, SOLUTION PERINEURAL
Status: COMPLETED | OUTPATIENT
Start: 2023-02-08 | End: 2023-02-08

## 2023-02-08 RX ORDER — POVIDONE-IODINE 10 MG/G
OINTMENT TOPICAL AS NEEDED
Status: DISCONTINUED | OUTPATIENT
Start: 2023-02-08 | End: 2023-02-08 | Stop reason: HOSPADM

## 2023-02-08 RX ORDER — CHLORHEXIDINE GLUCONATE 0.12 MG/ML
15 RINSE ORAL ONCE
Status: COMPLETED | OUTPATIENT
Start: 2023-02-08 | End: 2023-02-08

## 2023-02-08 RX ORDER — DEXMEDETOMIDINE HYDROCHLORIDE 100 UG/ML
INJECTION, SOLUTION INTRAVENOUS AS NEEDED
Status: DISCONTINUED | OUTPATIENT
Start: 2023-02-08 | End: 2023-02-08

## 2023-02-08 RX ORDER — CHLORHEXIDINE GLUCONATE 0.12 MG/ML
RINSE ORAL
Status: DISPENSED
Start: 2023-02-08 | End: 2023-02-08

## 2023-02-08 RX ORDER — FOLIC ACID 1 MG/1
1 TABLET ORAL DAILY
Status: DISCONTINUED | OUTPATIENT
Start: 2023-02-09 | End: 2023-02-14 | Stop reason: HOSPADM

## 2023-02-08 RX ORDER — FONDAPARINUX SODIUM 2.5 MG/.5ML
2.5 INJECTION SUBCUTANEOUS DAILY
Status: DISCONTINUED | OUTPATIENT
Start: 2023-02-09 | End: 2023-02-09

## 2023-02-08 RX ORDER — FENTANYL CITRATE 50 UG/ML
INJECTION, SOLUTION INTRAMUSCULAR; INTRAVENOUS AS NEEDED
Status: DISCONTINUED | OUTPATIENT
Start: 2023-02-08 | End: 2023-02-08

## 2023-02-08 RX ORDER — VENLAFAXINE HYDROCHLORIDE 150 MG/1
150 CAPSULE, EXTENDED RELEASE ORAL DAILY
Status: DISCONTINUED | OUTPATIENT
Start: 2023-02-09 | End: 2023-02-14 | Stop reason: HOSPADM

## 2023-02-08 RX ORDER — FERROUS SULFATE 325(65) MG
325 TABLET ORAL 2 TIMES DAILY WITH MEALS
Status: DISCONTINUED | OUTPATIENT
Start: 2023-02-08 | End: 2023-02-14 | Stop reason: HOSPADM

## 2023-02-08 RX ORDER — SODIUM CHLORIDE, SODIUM LACTATE, POTASSIUM CHLORIDE, CALCIUM CHLORIDE 600; 310; 30; 20 MG/100ML; MG/100ML; MG/100ML; MG/100ML
INJECTION, SOLUTION INTRAVENOUS CONTINUOUS PRN
Status: DISCONTINUED | OUTPATIENT
Start: 2023-02-08 | End: 2023-02-08

## 2023-02-08 RX ORDER — CHLORHEXIDINE GLUCONATE 0.12 MG/ML
15 RINSE ORAL ONCE
Status: DISCONTINUED | OUTPATIENT
Start: 2023-02-08 | End: 2023-02-08

## 2023-02-08 RX ORDER — GABAPENTIN 300 MG/1
300 CAPSULE ORAL 3 TIMES DAILY
Status: DISCONTINUED | OUTPATIENT
Start: 2023-02-08 | End: 2023-02-09

## 2023-02-08 RX ORDER — MIDAZOLAM HYDROCHLORIDE 2 MG/2ML
INJECTION, SOLUTION INTRAMUSCULAR; INTRAVENOUS
Status: COMPLETED | OUTPATIENT
Start: 2023-02-08 | End: 2023-02-08

## 2023-02-08 RX ORDER — SCOLOPAMINE TRANSDERMAL SYSTEM 1 MG/1
1 PATCH, EXTENDED RELEASE TRANSDERMAL ONCE
Status: DISCONTINUED | OUTPATIENT
Start: 2023-02-08 | End: 2023-02-08

## 2023-02-08 RX ORDER — HYDROMORPHONE HCL/PF 1 MG/ML
0.5 SYRINGE (ML) INJECTION
Status: DISCONTINUED | OUTPATIENT
Start: 2023-02-08 | End: 2023-02-09

## 2023-02-08 RX ORDER — CEFAZOLIN SODIUM 1 G/50ML
1000 SOLUTION INTRAVENOUS EVERY 8 HOURS
Status: COMPLETED | OUTPATIENT
Start: 2023-02-08 | End: 2023-02-09

## 2023-02-08 RX ORDER — PROPOFOL 10 MG/ML
INJECTION, EMULSION INTRAVENOUS CONTINUOUS PRN
Status: DISCONTINUED | OUTPATIENT
Start: 2023-02-08 | End: 2023-02-08

## 2023-02-08 RX ORDER — PANTOPRAZOLE SODIUM 40 MG/1
40 TABLET, DELAYED RELEASE ORAL
Status: DISCONTINUED | OUTPATIENT
Start: 2023-02-09 | End: 2023-02-14 | Stop reason: HOSPADM

## 2023-02-08 RX ORDER — TRANEXAMIC ACID 100 MG/ML
INJECTION, SOLUTION INTRAVENOUS AS NEEDED
Status: DISCONTINUED | OUTPATIENT
Start: 2023-02-08 | End: 2023-02-08

## 2023-02-08 RX ORDER — SODIUM CHLORIDE, SODIUM LACTATE, POTASSIUM CHLORIDE, CALCIUM CHLORIDE 600; 310; 30; 20 MG/100ML; MG/100ML; MG/100ML; MG/100ML
125 INJECTION, SOLUTION INTRAVENOUS CONTINUOUS
Status: DISCONTINUED | OUTPATIENT
Start: 2023-02-08 | End: 2023-02-09

## 2023-02-08 RX ORDER — OXYCODONE HYDROCHLORIDE AND ACETAMINOPHEN 5; 325 MG/1; MG/1
1 TABLET ORAL EVERY 4 HOURS PRN
Status: DISCONTINUED | OUTPATIENT
Start: 2023-02-08 | End: 2023-02-09

## 2023-02-08 RX ORDER — ATORVASTATIN CALCIUM 20 MG/1
20 TABLET, FILM COATED ORAL
Status: DISCONTINUED | OUTPATIENT
Start: 2023-02-08 | End: 2023-02-14 | Stop reason: HOSPADM

## 2023-02-08 RX ORDER — ACETAMINOPHEN 325 MG/1
650 TABLET ORAL EVERY 4 HOURS PRN
Status: DISCONTINUED | OUTPATIENT
Start: 2023-02-08 | End: 2023-02-14 | Stop reason: HOSPADM

## 2023-02-08 RX ORDER — BUPIVACAINE HYDROCHLORIDE 7.5 MG/ML
INJECTION, SOLUTION INTRASPINAL AS NEEDED
Status: DISCONTINUED | OUTPATIENT
Start: 2023-02-08 | End: 2023-02-08

## 2023-02-08 RX ORDER — CLONAZEPAM 1 MG/1
1 TABLET ORAL 3 TIMES DAILY
Status: DISCONTINUED | OUTPATIENT
Start: 2023-02-08 | End: 2023-02-09

## 2023-02-08 RX ORDER — CEFAZOLIN SODIUM 2 G/50ML
SOLUTION INTRAVENOUS AS NEEDED
Status: DISCONTINUED | OUTPATIENT
Start: 2023-02-08 | End: 2023-02-08

## 2023-02-08 RX ORDER — ONDANSETRON 2 MG/ML
4 INJECTION INTRAMUSCULAR; INTRAVENOUS ONCE AS NEEDED
Status: DISCONTINUED | OUTPATIENT
Start: 2023-02-08 | End: 2023-02-08 | Stop reason: HOSPADM

## 2023-02-08 RX ORDER — CEFAZOLIN SODIUM 2 G/50ML
2000 SOLUTION INTRAVENOUS ONCE
Status: DISCONTINUED | OUTPATIENT
Start: 2023-02-08 | End: 2023-02-08 | Stop reason: HOSPADM

## 2023-02-08 RX ORDER — SODIUM CHLORIDE, SODIUM LACTATE, POTASSIUM CHLORIDE, CALCIUM CHLORIDE 600; 310; 30; 20 MG/100ML; MG/100ML; MG/100ML; MG/100ML
20 INJECTION, SOLUTION INTRAVENOUS CONTINUOUS
Status: DISCONTINUED | OUTPATIENT
Start: 2023-02-08 | End: 2023-02-09

## 2023-02-08 RX ORDER — DOCUSATE SODIUM 100 MG/1
100 CAPSULE, LIQUID FILLED ORAL 2 TIMES DAILY
Status: DISCONTINUED | OUTPATIENT
Start: 2023-02-08 | End: 2023-02-14 | Stop reason: HOSPADM

## 2023-02-08 RX ORDER — GABAPENTIN 100 MG/1
100 CAPSULE ORAL 2 TIMES DAILY
Status: DISCONTINUED | OUTPATIENT
Start: 2023-02-08 | End: 2023-02-08

## 2023-02-08 RX ORDER — LIDOCAINE HYDROCHLORIDE 10 MG/ML
INJECTION, SOLUTION EPIDURAL; INFILTRATION; INTRACAUDAL; PERINEURAL AS NEEDED
Status: DISCONTINUED | OUTPATIENT
Start: 2023-02-08 | End: 2023-02-08

## 2023-02-08 RX ORDER — OXYCODONE HYDROCHLORIDE AND ACETAMINOPHEN 5; 325 MG/1; MG/1
2 TABLET ORAL EVERY 6 HOURS PRN
Status: DISCONTINUED | OUTPATIENT
Start: 2023-02-08 | End: 2023-02-09

## 2023-02-08 RX ORDER — ONDANSETRON 2 MG/ML
INJECTION INTRAMUSCULAR; INTRAVENOUS AS NEEDED
Status: DISCONTINUED | OUTPATIENT
Start: 2023-02-08 | End: 2023-02-08

## 2023-02-08 RX ORDER — DEXAMETHASONE SODIUM PHOSPHATE 10 MG/ML
INJECTION, SOLUTION INTRAMUSCULAR; INTRAVENOUS AS NEEDED
Status: DISCONTINUED | OUTPATIENT
Start: 2023-02-08 | End: 2023-02-08

## 2023-02-08 RX ORDER — FENTANYL CITRATE/PF 50 MCG/ML
25 SYRINGE (ML) INJECTION
Status: DISCONTINUED | OUTPATIENT
Start: 2023-02-08 | End: 2023-02-08 | Stop reason: HOSPADM

## 2023-02-08 RX ORDER — ASCORBIC ACID 500 MG
500 TABLET ORAL 2 TIMES DAILY
Status: DISCONTINUED | OUTPATIENT
Start: 2023-02-08 | End: 2023-02-14 | Stop reason: HOSPADM

## 2023-02-08 RX ORDER — ONDANSETRON 2 MG/ML
4 INJECTION INTRAMUSCULAR; INTRAVENOUS EVERY 6 HOURS PRN
Status: DISCONTINUED | OUTPATIENT
Start: 2023-02-08 | End: 2023-02-14 | Stop reason: HOSPADM

## 2023-02-08 RX ORDER — BUPROPION HYDROCHLORIDE 150 MG/1
150 TABLET ORAL EVERY MORNING
Status: DISCONTINUED | OUTPATIENT
Start: 2023-02-09 | End: 2023-02-14 | Stop reason: HOSPADM

## 2023-02-08 RX ORDER — CHLORHEXIDINE GLUCONATE 4 G/100ML
SOLUTION TOPICAL DAILY PRN
Status: DISCONTINUED | OUTPATIENT
Start: 2023-02-08 | End: 2023-02-08 | Stop reason: HOSPADM

## 2023-02-08 RX ORDER — MAGNESIUM HYDROXIDE/ALUMINUM HYDROXICE/SIMETHICONE 120; 1200; 1200 MG/30ML; MG/30ML; MG/30ML
30 SUSPENSION ORAL EVERY 6 HOURS PRN
Status: DISCONTINUED | OUTPATIENT
Start: 2023-02-08 | End: 2023-02-14 | Stop reason: HOSPADM

## 2023-02-08 RX ADMIN — BUPIVACAINE HYDROCHLORIDE 20 ML: 5 INJECTION, SOLUTION PERINEURAL at 07:50

## 2023-02-08 RX ADMIN — OXYCODONE HYDROCHLORIDE AND ACETAMINOPHEN 1 TABLET: 5; 325 TABLET ORAL at 19:08

## 2023-02-08 RX ADMIN — MIDAZOLAM HYDROCHLORIDE 2 MG: 1 INJECTION, SOLUTION INTRAMUSCULAR; INTRAVENOUS at 07:41

## 2023-02-08 RX ADMIN — FENTANYL CITRATE 25 MCG: 50 INJECTION, SOLUTION INTRAMUSCULAR; INTRAVENOUS at 09:58

## 2023-02-08 RX ADMIN — HYDROMORPHONE HYDROCHLORIDE 0.5 MG: 1 INJECTION, SOLUTION INTRAMUSCULAR; INTRAVENOUS; SUBCUTANEOUS at 22:04

## 2023-02-08 RX ADMIN — OXYCODONE HYDROCHLORIDE AND ACETAMINOPHEN 500 MG: 500 TABLET ORAL at 20:00

## 2023-02-08 RX ADMIN — SODIUM CHLORIDE, SODIUM LACTATE, POTASSIUM CHLORIDE, AND CALCIUM CHLORIDE: .6; .31; .03; .02 INJECTION, SOLUTION INTRAVENOUS at 09:31

## 2023-02-08 RX ADMIN — SODIUM CHLORIDE, SODIUM LACTATE, POTASSIUM CHLORIDE, AND CALCIUM CHLORIDE 20 ML/HR: .6; .31; .03; .02 INJECTION, SOLUTION INTRAVENOUS at 20:10

## 2023-02-08 RX ADMIN — OXYCODONE HYDROCHLORIDE AND ACETAMINOPHEN 2 TABLET: 5; 325 TABLET ORAL at 12:57

## 2023-02-08 RX ADMIN — ONDANSETRON 4 MG: 2 INJECTION INTRAMUSCULAR; INTRAVENOUS at 10:27

## 2023-02-08 RX ADMIN — FENTANYL CITRATE 25 MCG: 50 INJECTION INTRAMUSCULAR; INTRAVENOUS at 11:26

## 2023-02-08 RX ADMIN — HYDROMORPHONE HYDROCHLORIDE 0.5 MG: 1 INJECTION, SOLUTION INTRAMUSCULAR; INTRAVENOUS; SUBCUTANEOUS at 14:00

## 2023-02-08 RX ADMIN — FENTANYL CITRATE 25 MCG: 50 INJECTION, SOLUTION INTRAMUSCULAR; INTRAVENOUS at 08:56

## 2023-02-08 RX ADMIN — GABAPENTIN 300 MG: 300 CAPSULE ORAL at 20:11

## 2023-02-08 RX ADMIN — LIDOCAINE HYDROCHLORIDE 3 MG: 10 INJECTION, SOLUTION EPIDURAL; INFILTRATION; INTRACAUDAL at 08:38

## 2023-02-08 RX ADMIN — CLONAZEPAM 1 MG: 1 TABLET ORAL at 20:11

## 2023-02-08 RX ADMIN — PROPOFOL 50 MG: 10 INJECTION, EMULSION INTRAVENOUS at 08:43

## 2023-02-08 RX ADMIN — PHENYLEPHRINE HYDROCHLORIDE 100 MCG/MIN: 10 INJECTION INTRAVENOUS at 08:43

## 2023-02-08 RX ADMIN — CEFAZOLIN SODIUM 2000 MG: 2 SOLUTION INTRAVENOUS at 08:31

## 2023-02-08 RX ADMIN — SODIUM CHLORIDE, SODIUM LACTATE, POTASSIUM CHLORIDE, AND CALCIUM CHLORIDE 125 ML/HR: .6; .31; .03; .02 INJECTION, SOLUTION INTRAVENOUS at 07:53

## 2023-02-08 RX ADMIN — BUPIVACAINE HYDROCHLORIDE IN DEXTROSE 1.6 ML: 7.5 INJECTION, SOLUTION SUBARACHNOID at 08:39

## 2023-02-08 RX ADMIN — DEXMEDETOMIDINE HCL 8 MCG: 100 INJECTION INTRAVENOUS at 08:43

## 2023-02-08 RX ADMIN — ATORVASTATIN CALCIUM 20 MG: 20 TABLET, FILM COATED ORAL at 20:10

## 2023-02-08 RX ADMIN — FENTANYL CITRATE 25 MCG: 50 INJECTION, SOLUTION INTRAMUSCULAR; INTRAVENOUS at 09:04

## 2023-02-08 RX ADMIN — SODIUM CHLORIDE, SODIUM LACTATE, POTASSIUM CHLORIDE, AND CALCIUM CHLORIDE: .6; .31; .03; .02 INJECTION, SOLUTION INTRAVENOUS at 08:29

## 2023-02-08 RX ADMIN — DEXAMETHASONE SODIUM PHOSPHATE 6 MG: 10 INJECTION, SOLUTION INTRAMUSCULAR; INTRAVENOUS at 08:43

## 2023-02-08 RX ADMIN — DOCUSATE SODIUM 100 MG: 100 CAPSULE, LIQUID FILLED ORAL at 20:10

## 2023-02-08 RX ADMIN — FENTANYL CITRATE 25 MCG: 50 INJECTION INTRAMUSCULAR; INTRAVENOUS at 11:18

## 2023-02-08 RX ADMIN — TRANEXAMIC ACID 1 G: 100 INJECTION, SOLUTION INTRAVENOUS at 08:44

## 2023-02-08 RX ADMIN — FENTANYL CITRATE 25 MCG: 50 INJECTION, SOLUTION INTRAMUSCULAR; INTRAVENOUS at 09:19

## 2023-02-08 RX ADMIN — CEFAZOLIN SODIUM 1000 MG: 1 SOLUTION INTRAVENOUS at 20:12

## 2023-02-08 RX ADMIN — FERROUS SULFATE TAB 325 MG (65 MG ELEMENTAL FE) 325 MG: 325 (65 FE) TAB at 20:00

## 2023-02-08 RX ADMIN — CHLORHEXIDINE GLUCONATE 0.12% ORAL RINSE 15 ML: 1.2 LIQUID ORAL at 07:36

## 2023-02-08 RX ADMIN — SCOPALAMINE 1 PATCH: 1 PATCH, EXTENDED RELEASE TRANSDERMAL at 07:38

## 2023-02-08 RX ADMIN — PROPOFOL 120 MCG/KG/MIN: 10 INJECTION, EMULSION INTRAVENOUS at 08:43

## 2023-02-08 NOTE — ANESTHESIA PROCEDURE NOTES
Spinal Block    Patient location during procedure: OR  Start time: 2/8/2023 8:39 AM  Reason for block: procedure for pain, at surgeon's request and post-op pain management  Staffing  Performed: CRNA   Anesthesiologist: Kamlesh Burns DO  Resident/CRNA: Kevin De La Vega CRNA  Preanesthetic Checklist  Completed: patient identified, IV checked, site marked, risks and benefits discussed, surgical consent, monitors and equipment checked, pre-op evaluation and timeout performed  Spinal Block  Patient position: sitting  Prep: ChloraPrep  Patient monitoring: cardiac monitor, frequent blood pressure checks, continuous pulse ox and heart rate  Approach: midline  Location: L3-4  Injection technique: single-shot  Needle  Needle type: pencil-tip   Needle gauge: 25 G  Needle length: 10 cm  Assessment  Sensory level: T10  Events: cerebrospinal fluid  Injection Assessment:  negative aspiration for heme, no paresthesia on injection and positive aspiration for clear CSF    Post-procedure:  adhesive bandage applied, pressure dressing applied, secured with tape, site cleaned and sterile dressing applied

## 2023-02-08 NOTE — H&P
H&P Exam - Orthopedics   Smita Morgan 74 y o  female MRN: [id number]  Unit/Bed#: OR [place] Encounter: [id number]    Assessment/Plan     Assessment:  Primary osteoarthritis of right knee  Plan:  Elective right total knee replacement    History of Present Illness   HPI:  Smita Morgan is a 74 y o  female who initially presented to our office complaining of right knee pain  The patient was treated conservatively for several years with corticosteroid and Visco injections  Unfortunately, the patient's symptoms continued to worsen and are not affecting her quality of life  X-rays were performed which are consistent with advanced arthritic changes  After exhausting conservative treatment, the risks and benefits of surgery were discussed with the patient  She decided on an elective right total knee replacement  Review of Systems   Constitutional: Negative for chills, fever and unexpected weight change  HENT: Negative for nosebleeds and sore throat  Eyes: Negative for pain, redness and visual disturbance  Respiratory: Negative for cough, shortness of breath and wheezing  Cardiovascular: Negative for chest pain, palpitations and leg swelling  Gastrointestinal: Negative for abdominal pain, nausea and vomiting  Endocrine: Negative for polydipsia and polyuria  Genitourinary: Negative for dysuria and hematuria  Musculoskeletal: Positive for arthralgias, gait problem and myalgias  As noted in HPI   Skin: Negative for rash and wound  Neurological: Negative for dizziness, numbness and headaches  Psychiatric/Behavioral: Negative for decreased concentration and suicidal ideas  The patient is not nervous/anxious          Historical Information   Past Medical History:   Diagnosis Date   • Abnormal finding in urine    • Anxiety    • Arthralgia of multiple joints    • Balance disorder    • Biceps tendinitis, right    • Bursitis of right knee    • Bursitis of right shoulder    • Chondrocalcinosis • Chronic bilateral low back pain without sciatica 11/29/2022   • Chronic pain disorder    • COPD exacerbation (HCC)    • 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    • Drug intolerance    • Dysfunction of right eustachian tube    • Dysfunctional uterine bleeding    • Edema    • Elevated d-dimer    • Exposure to hepatitis C    • Fracture of fibula    • Generalized anxiety disorder    • GERD (gastroesophageal reflux disease)    • Hyperlipidemia    • Mild cervical dysplasia    • Myalgia    • Myositis    • Palpitations    • PONV (postoperative nausea and vomiting)    • Pre-syncope    • Seasonal allergies    • Stress incontinence    • Thrombocytopenia ([address] )    • Urinary retention    • Urinary tract infection    • Uterine leiomyoma      Past Surgical History:   Procedure Laterality Date   • BLADDER SURGERY     • BREAST BIOPSY Right     US guided   • COLONOSCOPY     • COLPOSCOPY  01/06/1998   • DENTAL SURGERY     • ENDOMETRIAL BIOPSY  05/21/1993   • HYSTERECTOMY     • HYSTEROSCOPY      uterus- 1/22/98, 7/1/02 and 4/10/07   • TUBAL LIGATION     • US GUIDED BREAST BIOPSY RIGHT COMPLETE Right 08/08/2018     Social History   Social History     Substance and Sexual Activity   Alcohol Use No     Social History     Substance and Sexual Activity   Drug Use No     Social History     Tobacco Use   Smoking Status Every Day   • Packs/day: 1 50   • Years: 45 00   • Pack years: 67 50   • Types: Cigarettes   Smokeless Tobacco Never     Family History:   Family History   Problem Relation Age of Onset   • Stroke Mother         cerebral artery occlusion with cerebral infarction   • Coronary artery disease Mother    • Arrhythmia Mother         sinus   • Coronary artery disease Father    • Diabetes Father    • Aortic aneurysm Family    • Arthritis Family    • Diabetes Family    • Heart disease Family    • No Known Problems Daughter    • No Known Problems Maternal Grandmother    • No Known Problems Paternal Grandmother    • No Known Problems Daughter    • No Known Problems Maternal Aunt    • Ovarian cancer Maternal Aunt        Meds/Allergies   PTA meds:   Prior to Admission Medications   Prescriptions Last Dose Informant Patient Reported? Taking?    Multiple Vitamins-Minerals (multivitamin with minerals) tablet   No Yes   Sig: Take 1 tablet by mouth daily   Patient not taking: Reported on 2023   SODIUM FLUORIDE, DENTAL GEL, 1 1 % GEL Unknown  Yes No   Sig: SF 5000 Plus 1 1 % dental cream   acetaminophen (TYLENOL) 500 mg tablet 2023  Yes Yes   Sig: Take 500 mg by mouth every 6 (six) hours as needed for mild pain   alendronate (FOSAMAX) 70 mg tablet Past Week  No Yes   Sig: take 1 tablet by mouth every 7 days   ascorbic acid (VITAMIN C) 500 MG tablet 2023  No Yes   Sig: Take 1 tablet (500 mg total) by mouth 2 (two) times a day   Patient taking differently: Take 1,000 mg by mouth 2 (two) times a day   atorvastatin (LIPITOR) 20 mg tablet 2023  No Yes   Sig: take 1 tablet by mouth once daily   Patient taking differently: Take 20 mg by mouth daily after dinner   buPROPion (WELLBUTRIN XL) 150 mg 24 hr tablet 2023 at 0500  Yes Yes   Si mg every morning   busPIRone (BUSPAR) 30 MG tablet   Yes No   Sig: Take 30 mg by mouth 3 (three) times a day   Patient not taking: Reported on 2023   clonazePAM (KlonoPIN) 1 mg tablet 2023 at 0500  Yes Yes   Sig: 3 (three) times a day   diclofenac sodium (VOLTAREN) 50 mg EC tablet   No Yes   Sig: Take 1 tablet (50 mg total) by mouth 2 (two) times a day as needed (pain)   Patient not taking: Reported on 2022   ferrous sulfate 324 (65 Fe) mg   No Yes   Sig: Take 1 tablet (324 mg total) by mouth 2 (two) times a day before meals   Patient not taking: Reported on    folic acid (FOLVITE) 1 mg tablet   No Yes   Sig: Take 1 tablet (1 mg total) by mouth daily   Patient not taking: Reported on 2022   gabapentin (Neurontin) 300 mg capsule 2023  No Yes   Sig: ONE po q day x 3 and then one po bid x3 and then one tid  Patient taking differently: Take 100 mg by mouth 2 (two) times a day ONE po q day x 3 and then one po bid x3 and then one tid  ibuprofen (MOTRIN) 800 mg tablet Past Week  No Yes   Sig: Take 1 tablet (800 mg total) by mouth every 6 (six) hours as needed for mild pain   meloxicam (MOBIC) 15 mg tablet   No No   Sig: Take 1 tablet (15 mg total) by mouth daily   Patient not taking: Reported on 11/29/2022   methocarbamol (ROBAXIN) 750 mg tablet Unknown  Yes No   Sig: Take 750 mg by mouth every 6 (six) hours as needed   nystatin (MYCOSTATIN) 500,000 units/5 mL suspension   No No   Sig: Apply 5 mL (500,000 Units total) to the mouth or throat 4 (four) times a day   Patient not taking: Reported on 11/29/2022   omeprazole (PriLOSEC) 40 MG capsule 2/7/2023  No Yes   Sig: Take 1 capsule (40 mg total) by mouth daily   ondansetron (ZOFRAN) 4 mg tablet   No No   Sig: Take 1 tablet (4 mg total) by mouth every 8 (eight) hours as needed for nausea or vomiting   Patient not taking: Reported on 11/29/2022   tretinoin (REFISSA) 0 05 % cream   Yes No   Sig: tretinoin 0 05 % topical cream   Patient not taking: Reported on 2/6/2023   tretinoin (RETIN-A) 0 1 % cream Unknown  No No   Sig: Apply topically daily at bedtime   venlafaxine (EFFEXOR-XR) 150 mg 24 hr capsule 2/7/2023  No Yes   Sig: Take 1 capsule (150 mg total) by mouth daily   venlafaxine (EFFEXOR-XR) 75 mg 24 hr capsule   No No   Sig: Take 1 capsule (75 mg total) by mouth daily      Facility-Administered Medications: None     No Known Allergies    Objective   Vitals: There were no vitals taken for this visit  ,There is no height or weight on file to calculate BMI  No intake or output data in the 24 hours ending 02/08/23 0725    No intake/output data recorded      Invasive Devices     None                 Physical Exam  Ortho Exam  Right lower extremity is neurovascularly intact  Toes are pink and mobile   Compartments are soft  No warmth, erythema or ecchymosis  ROM of knee is from 5-115 degrees  Negative Lachman, drawer or pivot shift  No medial instability  Medial joint line tenderness, slight lateral joint line tenderness  Patellofemoral crepitation  Lungs CTA  Heart RRR  Abdomen soft, nontender, nondistended  Lab Results: I have personally reviewed pertinent lab results  Imaging: I have personally reviewed pertinent films in PACS  EKG, Pathology, and Other Studies: I have personally reviewed pertinent films in PACS    Code Status: No Order  Advance Directive and Living Will:      Power of :    POLST:      Counseling / Coordination of Care  Total floor / unit time spent today 30 minutes  Greater than 50% of total time was spent with the patient and / or family counseling and / or coordination of care

## 2023-02-08 NOTE — OP NOTE
OPERATIVE REPORT  PATIENT NAME: Farzaneh Puente    :  1955  MRN: [id number]  Pt Location: [facility] OR ROOM 01    SURGERY DATE: 2023    Surgeon(s) and Role:     * DO Lilliana Garsia Primary    Assistant: Theodore Watson PA-C    Preop Diagnosis:  Primary osteoarthritis of right knee M17 11    Post-Op Diagnosis Codes:     * Primary osteoarthritis of right knee [M17 11]    Procedure(s):  Right - ARTHROPLASTY KNEE TOTAL using the [employer] system with the following sizes: E LPS GSF femoral component, #3 tibial tray, 10 mm polyarticular surface, and a 35 mm patella button    Specimen(s):  Bone/synovium    Estimated Blood Loss:   50 cc    Drains:  solcotrans    Anesthesia Type:   Spinal with adductor canal and iPAK blocks    Operative Indications:  Primary osteoarthritis of right knee M17 11      Operative Findings:  TT: 80    Complications:   None    Procedure and Technique:    Procedure and Technique:  The patient was properly identified and brought to the operative suite  After successful induction of the anesthetic, a tourniquet was placed on the patient's right proximal thigh  The right lower extremity was prepped and draped in the usual usual sterile fashion  It was medically necessary that the physician's assistant be in the room to aid in positioning placing the appropriate amount of retraction the patient  A qualified resident was not available  The physician assistant's role was very integral to the success of this case  He assisted in positioning, draping, retraction of soft tissue, retraction neurovascular bundles, assist with implantation of prosthesis, assisted with reduction the prosthesis, and assisted with closure of a complex wound      She was given a dose of intravenous antibiotics  Surgical landmarks were outline  With  a skin marker  An Esmarch was used to examine the right lower extremity and the tourniquet was then inflated    Using a #10  Scalpel  Blade, an anterior incision was made on patient's right knee  Hemostasis was achieved with the use of electrocautery     Through a significant amount of dissection through adipose tissue the capsule and  quadriceps tendon  Was  then located  Using a 2nd #10  Scalpel blade a medial parapatellar arthrotomy did occur with a rush of clear synovial fluid  A posteriormedial sleeve was developed to the level of the semi membranous  tendon  The patella then everted and  knee was flexed  The retro and  superior fat pads were excised  The cruciate ligaments were  divided  At this point the proximal tibia could  be anteriorly subluxed  An intramedullary drill hole was placed in the proximal tibia, followed by the external cutting jig  The  smallest portion of deficient medial side bone was to  be removed, with its corresponding lateral side  Varus and  valgus angulation was also checked  The collateral ligaments were then  protected  The neurovascular bundle was then protected  The proximal tibia was then osteotomized  Attention then paid to the preparation of distal femur  The intramedullary garrett was placed with a 6 degree valgus cut placed approximately 3° of external rotation  The external  cutting jig was placed on top of this  An additional 2 mm cut the because of a flexion contracture  The collateral ligaments were protected  The  distal femur was then osteotomized  The femur was then sized  A size E did have the best fit  Both gender and  standard cutting blocks were checked and a gender specific  cutting block did  Have the best fit  Without any excessive notching of the anterior condyle  The collateral ligaments were then protected, the distal femur was then osteotomized in the following sequence 1 anterior condyle 2  posterior condyle 3 posterior chamfer and 4 anterior chamfer  At this point all the bone fragments and then removed    A lamina  was placed both the medial and lateral sides and the redundant menisci and posterior osteophytes were then removed  Flexion-extension blocks were placed next and a 10 mm block gave good symmetric balancing  The femur was then finished with the trochlear recess and notch block placed in a slightly lateral position to facilitate tracking patella  The tibial was sized next  A size #3 did have  best fit  The size E LPS GSF femoral component placed, followed by a number 3  Tibial tray, followed by several polyarticular services and a 10 mm tray did the best fit  The rotation of the  tibia was then marked  The patella was inspected next  There was a tremendous amount of arthrosis  The  peripheral osteophytes were removed  It was  Confirmed with a caliper that there is adequate bone stock  And then using the [employer] reaming system approximately 9 mm of undersurface patella then reamed  The patella sized to a 35 mm patella button  This guide was drilled in a slightly superior medial position facilitate tracking patella  The tibia was then finished with a drill hole drill hole and keel punch  At this point there was good stability and range of motion in the knee  All the trial components removed  The wound was copiously irrigated sterile antibiotic solution  Cement  was being mixed on the back table was used 3rd generation cementing techniques  The proximal tibia was cemented in placed followed by the distal femur  A trial poly articular surface was placed till the cement fully cured  The patella was  cemented in place and held with a patellar clamp till the cement fully cured as well  Once the cement fully cured, it was irrigated  The polyarticular surfaces were  tried once again and a 10 mm tray to the best fit  The final 10 mm tray was placed reduced 1 final time and found to be quite stable  After it was irrigated,  a quarter-inch solcotrans  Was  placed near the superior aspect  Of the incision  The quadriceps  and capsule were closed with #1 Vicryl    Deep layer fascia closed multiple layers of 0 Vicryl  Superficial was closed with 2-0 Vicryl  The skin was approximated  With staples  The wounds were dressed with ointment Xeroform 4x4s ABDs sterile Webril and Ace bandage  There was no complication during procedure  She was successfully awoken,  transferred  To the hospital bed,  And went to the recovery room stable  In condition            I was present for the entire procedure, A qualified resident physician was not available and A physician assistant was required during the procedure for retraction tissue handling,dissection and suturing    Patient Disposition:  PACU         SIGNATURE: Meme Claire DO  DATE: February 8, 2023  TIME: 8:31 AM

## 2023-02-08 NOTE — ANESTHESIA PREPROCEDURE EVALUATION
Procedure:  ARTHROPLASTY KNEE TOTAL (Right: Knee)    Relevant Problems   CARDIO   (+) Hyperlipidemia      GI/HEPATIC   (+) Esophageal reflux      MUSCULOSKELETAL   (+) Arthritis of right knee   (+) Chronic bilateral low back pain without sciatica      NEURO/PSYCH   (+) Chronic bilateral low back pain without sciatica   (+) Chronic pain syndrome   (+) Generalized anxiety disorder             Anesthesia Plan  ASA Score- 2     Anesthesia Type- spinal with ASA Monitors  Additional Monitors:   Airway Plan:           Plan Factors-    Chart reviewed  Induction- intravenous  Postoperative Plan-     Informed Consent- Anesthetic plan and risks discussed with patient  I personally reviewed this patient with the CRNA  Discussed and agreed on the Anesthesia Plan with the CRNA  Thomasina Cooks

## 2023-02-08 NOTE — ANESTHESIA POSTPROCEDURE EVALUATION
Post-Op Assessment Note    CV Status:  Stable  Pain Score: 0    Pain management: adequate     Mental Status:  Alert and awake   Hydration Status:  Euvolemic   PONV Controlled:  Controlled   Airway Patency:  Patent      Post Op Vitals Reviewed: Yes      Staff: CRNA         No notable events documented      BP   100/50   Temp  98   Pulse  78   Resp   14   SpO2   96

## 2023-02-08 NOTE — NURSING NOTE
AWAKE AND ALERT/ORIENTED X4   02 IS MAINTAINED   3 LITERS N/C  DRESSING IS DRY AND INTACT TO RT LEG    MOVES RT FOOT WELL ADEQ CAP REFILL    PAIN IS 5/10 TO RT KNEE

## 2023-02-08 NOTE — PLAN OF CARE
Problem: Prexisting or High Potential for Compromised Skin Integrity  Goal: Skin integrity is maintained or improved  Description: INTERVENTIONS:  - Identify patients at risk for skin breakdown  - Assess and monitor skin integrity  - Assess and monitor nutrition and hydration status  - Monitor labs   - Assess for incontinence   - Turn and reposition patient  - Assist with mobility/ambulation  - Relieve pressure over bony prominences  - Avoid friction and shearing  - Provide appropriate hygiene as needed including keeping skin clean and dry  - Evaluate need for skin moisturizer/barrier cream  - Collaborate with interdisciplinary team   - Patient/family teaching  - Consider wound care consult   Outcome: Progressing     Problem: MOBILITY - ADULT  Goal: Maintain or return to baseline ADL function  Description: INTERVENTIONS:  -  Assess patient's ability to carry out ADLs; assess patient's baseline for ADL function and identify physical deficits which impact ability to perform ADLs (bathing, care of mouth/teeth, toileting, grooming, dressing, etc )  - Assess/evaluate cause of self-care deficits   - Assess range of motion  - Assess patient's mobility; develop plan if impaired  - Assess patient's need for assistive devices and provide as appropriate  - Encourage maximum independence but intervene and supervise when necessary  - Involve family in performance of ADLs  - Assess for home care needs following discharge   - Consider OT consult to assist with ADL evaluation and planning for discharge  - Provide patient education as appropriate  Outcome: Progressing  Goal: Maintains/Returns to pre admission functional level  Description: INTERVENTIONS:  - Perform BMAT or MOVE assessment daily    - Set and communicate daily mobility goal to care team and patient/family/caregiver  - Collaborate with rehabilitation services on mobility goals if consulted  - Perform Range of Motion *** times a day    - Reposition patient every *** hours   - Dangle patient *** times a day  - Stand patient *** times a day  - Ambulate patient *** times a day  - Out of bed to chair *** times a day   - Out of bed for meals *** times a day  - Out of bed for toileting  - Record patient progress and toleration of activity level   Outcome: Progressing     Problem: PAIN - ADULT  Goal: Verbalizes/displays adequate comfort level or baseline comfort level  Description: Interventions:  - Encourage patient to monitor pain and request assistance  - Assess pain using appropriate pain scale  - Administer analgesics based on type and severity of pain and evaluate response  - Implement non-pharmacological measures as appropriate and evaluate response  - Consider cultural and social influences on pain and pain management  - Notify physician/advanced practitioner if interventions unsuccessful or patient reports new pain  Outcome: Progressing     Problem: SAFETY ADULT  Goal: Maintain or return to baseline ADL function  Description: INTERVENTIONS:  -  Assess patient's ability to carry out ADLs; assess patient's baseline for ADL function and identify physical deficits which impact ability to perform ADLs (bathing, care of mouth/teeth, toileting, grooming, dressing, etc )  - Assess/evaluate cause of self-care deficits   - Assess range of motion  - Assess patient's mobility; develop plan if impaired  - Assess patient's need for assistive devices and provide as appropriate  - Encourage maximum independence but intervene and supervise when necessary  - Involve family in performance of ADLs  - Assess for home care needs following discharge   - Consider OT consult to assist with ADL evaluation and planning for discharge  - Provide patient education as appropriate  Outcome: Progressing  Goal: Maintains/Returns to pre admission functional level  Description: INTERVENTIONS:  - Perform BMAT or MOVE assessment daily    - Set and communicate daily mobility goal to care team and patient/family/caregiver  - Collaborate with rehabilitation services on mobility goals if consulted  - Perform Range of Motion *** times a day  - Reposition patient every *** hours    - Dangle patient *** times a day  - Stand patient *** times a day  - Ambulate patient *** times a day  - Out of bed to chair *** times a day   - Out of bed for meals *** times a day  - Out of bed for toileting  - Record patient progress and toleration of activity level   Outcome: Progressing  Goal: Patient will remain free of falls  Description: INTERVENTIONS:  - Educate patient/family on patient safety including physical limitations  - Instruct patient to call for assistance with activity   - Consult OT/PT to assist with strengthening/mobility   - Keep Call bell within reach  - Keep bed low and locked with side rails adjusted as appropriate  - Keep care items and personal belongings within reach  - Initiate and maintain comfort rounds  - Make Fall Risk Sign visible to staff  - Offer Toileting every *** Hours, in advance of need  - Initiate/Maintain ***alarm  - Obtain necessary fall risk management equipment: ***  - Apply yellow socks and bracelet for high fall risk patients  - Consider moving patient to room near nurses station  Outcome: Progressing

## 2023-02-08 NOTE — ANESTHESIA PROCEDURE NOTES
Peripheral Block    Patient location during procedure: pre-op  Start time: 2/8/2023 7:40 AM  Reason for block: at surgeon's request and post-op pain management  Staffing  Performed: Anesthesiologist   Anesthesiologist: Gonzalo Coy,   Preanesthetic Checklist  Completed: patient identified, IV checked, site marked, risks and benefits discussed, surgical consent, monitors and equipment checked, pre-op evaluation and timeout performed  Peripheral Block  Patient position: supine  Prep: ChloraPrep  Patient monitoring: continuous pulse ox and frequent blood pressure checks  Block type: adductor canal block and ipack block  Procedures: ultrasound guided, Ultrasound guidance required for the procedure to increase accuracy and safety of medication placement and decrease risk of complications    Ultrasound permanent image savedbupivacaine (MARCAINE) 0 5 % - Perineural   20 mL - 2/8/2023 7:50:00 AM  midazolam (VERSED) 2 mg/2 mL - Intravenous   2 mg - 2/8/2023 7:41:00 AM  Needle  Needle type: Stimuplex   Needle length: 4 in  Needle localization: ultrasound guidance  Test dose: negative  Assessment  Injection assessment: incremental injection and local visualized surrounding nerve on ultrasound  Paresthesia pain: none  Post-procedure:  site cleaned  patient tolerated the procedure well with no immediate complications  Additional Notes  Exparel 20 cc total injected

## 2023-02-09 ENCOUNTER — APPOINTMENT (INPATIENT)
Dept: CT IMAGING | Facility: HOSPITAL | Age: 68
End: 2023-02-09

## 2023-02-09 ENCOUNTER — APPOINTMENT (OUTPATIENT)
Dept: CT IMAGING | Facility: HOSPITAL | Age: 68
End: 2023-02-09

## 2023-02-09 ENCOUNTER — APPOINTMENT (OUTPATIENT)
Dept: NON INVASIVE DIAGNOSTICS | Facility: HOSPITAL | Age: 68
End: 2023-02-09

## 2023-02-09 PROBLEM — J95.89 ACUTE POSTOPERATIVE RESPIRATORY INSUFFICIENCY: Status: ACTIVE | Noted: 2023-02-09

## 2023-02-09 PROBLEM — G93.40 ENCEPHALOPATHY ACUTE: Status: ACTIVE | Noted: 2023-02-09

## 2023-02-09 LAB
ANION GAP SERPL CALCULATED.3IONS-SCNC: 8 MMOL/L (ref 4–13)
BUN SERPL-MCNC: 14 MG/DL (ref 5–25)
CALCIUM SERPL-MCNC: 9.1 MG/DL (ref 8.4–10.2)
CARDIAC TROPONIN I PNL SERPL HS: 4 NG/L (ref 8–18)
CHLORIDE SERPL-SCNC: 104 MMOL/L (ref 96–108)
CO2 SERPL-SCNC: 25 MMOL/L (ref 21–32)
CREAT SERPL-MCNC: 0.6 MG/DL (ref 0.6–1.3)
ERYTHROCYTE [DISTWIDTH] IN BLOOD BY AUTOMATED COUNT: 14.4 % (ref 11.6–15.1)
FLUAV RNA RESP QL NAA+PROBE: NEGATIVE
FLUBV RNA RESP QL NAA+PROBE: NEGATIVE
GFR SERPL CREATININE-BSD FRML MDRD: 95 ML/MIN/1.73SQ M
GLUCOSE SERPL-MCNC: 106 MG/DL (ref 65–140)
HCT VFR BLD AUTO: 31.4 % (ref 34.8–46.1)
HGB BLD-MCNC: 9.8 G/DL (ref 11.5–15.4)
MCH RBC QN AUTO: 30.3 PG (ref 26.8–34.3)
MCHC RBC AUTO-ENTMCNC: 31.2 G/DL (ref 31.4–37.4)
MCV RBC AUTO: 97 FL (ref 82–98)
PLATELET # BLD AUTO: 234 THOUSANDS/UL (ref 149–390)
PMV BLD AUTO: 11.1 FL (ref 8.9–12.7)
POTASSIUM SERPL-SCNC: 3.6 MMOL/L (ref 3.5–5.3)
PROCALCITONIN SERPL-MCNC: 0.08 NG/ML
RBC # BLD AUTO: 3.23 MILLION/UL (ref 3.81–5.12)
RSV RNA RESP QL NAA+PROBE: NEGATIVE
SARS-COV-2 RNA RESP QL NAA+PROBE: NEGATIVE
SODIUM SERPL-SCNC: 137 MMOL/L (ref 135–147)
WBC # BLD AUTO: 10.72 THOUSAND/UL (ref 4.31–10.16)

## 2023-02-09 RX ORDER — CLONAZEPAM 1 MG/1
1 TABLET ORAL
Status: DISCONTINUED | OUTPATIENT
Start: 2023-02-09 | End: 2023-02-11

## 2023-02-09 RX ORDER — ALBUTEROL SULFATE 2.5 MG/3ML
SOLUTION RESPIRATORY (INHALATION)
Status: COMPLETED
Start: 2023-02-09 | End: 2023-02-09

## 2023-02-09 RX ORDER — LEVALBUTEROL INHALATION SOLUTION 0.63 MG/3ML
0.63 SOLUTION RESPIRATORY (INHALATION)
Status: DISCONTINUED | OUTPATIENT
Start: 2023-02-09 | End: 2023-02-09

## 2023-02-09 RX ORDER — ALBUTEROL SULFATE 2.5 MG/3ML
2.5 SOLUTION RESPIRATORY (INHALATION) EVERY 4 HOURS PRN
Status: DISCONTINUED | OUTPATIENT
Start: 2023-02-09 | End: 2023-02-14 | Stop reason: HOSPADM

## 2023-02-09 RX ADMIN — DOCUSATE SODIUM 100 MG: 100 CAPSULE, LIQUID FILLED ORAL at 17:45

## 2023-02-09 RX ADMIN — ALBUTEROL SULFATE 2.5 MG: 2.5 SOLUTION RESPIRATORY (INHALATION) at 15:31

## 2023-02-09 RX ADMIN — Medication 1 TABLET: at 10:31

## 2023-02-09 RX ADMIN — ATORVASTATIN CALCIUM 20 MG: 20 TABLET, FILM COATED ORAL at 17:45

## 2023-02-09 RX ADMIN — OXYCODONE HYDROCHLORIDE AND ACETAMINOPHEN 500 MG: 500 TABLET ORAL at 10:29

## 2023-02-09 RX ADMIN — APIXABAN 10 MG: 5 TABLET, FILM COATED ORAL at 10:30

## 2023-02-09 RX ADMIN — CEFAZOLIN SODIUM 1000 MG: 1 SOLUTION INTRAVENOUS at 10:29

## 2023-02-09 RX ADMIN — BUPROPION HYDROCHLORIDE 150 MG: 150 TABLET, EXTENDED RELEASE ORAL at 10:30

## 2023-02-09 RX ADMIN — FOLIC ACID 1 MG: 1 TABLET ORAL at 10:30

## 2023-02-09 RX ADMIN — PANTOPRAZOLE SODIUM 40 MG: 40 TABLET, DELAYED RELEASE ORAL at 05:26

## 2023-02-09 RX ADMIN — VENLAFAXINE HYDROCHLORIDE 150 MG: 150 CAPSULE, EXTENDED RELEASE ORAL at 10:30

## 2023-02-09 RX ADMIN — DOCUSATE SODIUM 100 MG: 100 CAPSULE, LIQUID FILLED ORAL at 10:29

## 2023-02-09 RX ADMIN — FERROUS SULFATE TAB 325 MG (65 MG ELEMENTAL FE) 325 MG: 325 (65 FE) TAB at 07:46

## 2023-02-09 RX ADMIN — ACETAMINOPHEN 650 MG: 325 TABLET ORAL at 16:14

## 2023-02-09 RX ADMIN — CLONAZEPAM 1 MG: 1 TABLET ORAL at 21:17

## 2023-02-09 RX ADMIN — OXYCODONE HYDROCHLORIDE AND ACETAMINOPHEN 500 MG: 500 TABLET ORAL at 17:45

## 2023-02-09 RX ADMIN — OXYCODONE HYDROCHLORIDE AND ACETAMINOPHEN 2 TABLET: 5; 325 TABLET ORAL at 02:44

## 2023-02-09 RX ADMIN — CEFAZOLIN SODIUM 1000 MG: 1 SOLUTION INTRAVENOUS at 02:38

## 2023-02-09 RX ADMIN — APIXABAN 10 MG: 5 TABLET, FILM COATED ORAL at 17:44

## 2023-02-09 RX ADMIN — IOHEXOL 85 ML: 350 INJECTION, SOLUTION INTRAVENOUS at 09:36

## 2023-02-09 RX ADMIN — OXYCODONE HYDROCHLORIDE AND ACETAMINOPHEN 1 TABLET: 5; 325 TABLET ORAL at 07:50

## 2023-02-09 RX ADMIN — FERROUS SULFATE TAB 325 MG (65 MG ELEMENTAL FE) 325 MG: 325 (65 FE) TAB at 16:14

## 2023-02-09 NOTE — CASE MANAGEMENT
Case Management Assessment & Discharge Planning Note    Patient name Shefali Whyte  Location /-65 MRN [id number]  : 1955 Date 2023       Current Admission Date: 2023  Current Admission Diagnosis:Arthritis of right knee   Patient Active Problem List    Diagnosis Date Noted   • Encephalopathy acute 2023   • Acute postoperative respiratory insufficiency 2023   • Chronic bilateral low back pain without sciatica 2022   • Arthritis of right knee 2020   • Uterine prolapse 2020   • Chronic pain syndrome 10/11/2019   • Tobacco abuse 2018   • Acquired trigger finger 2018   • Carpal tunnel syndrome 2018   • Constipation 2017   • Spider veins of both lower extremities 2016   • Varicosities of leg 03/10/2016   • Esophageal reflux 2015   • Allergic rhinitis 2013   • Symptomatic menopausal or female climacteric states 2013   • Chronic cough 2013   • Generalized anxiety disorder 2013   • Hyperlipidemia 2013      LOS (days): 0  Geometric Mean LOS (GMLOS) (days):   Days to GMLOS:     OBJECTIVE:        Current admission status: Outpatient Surgery  Referral Reason: Other (Assist with discharge needs)    Preferred Pharmacy:   [address] [place] [address] [address]  [address]  [address]  [place] [address]  Phone: [phone] Fax: [phone]    Primary Care Provider: Kiara Gaytan DO    Primary Insurance: MEDICARE  Secondary Insurance: Jay Counts:  [place] [facility] Proxies    There are no active Health Care Proxies on file         Advance Directives  Does patient have a Health Care POA?: No  Was patient offered paperwork?: Yes (Provided to spouse at bedside)  Does patient currently have a Health Care decision maker?: No  Does patient have Advance Directives?: No  Was patient offered paperwork?: Yes (Provided to spouse at bedside)  Primary Contact: JakubGiles goldberg (Spouse)   [phone] (Home Phone)      Readmission Root Cause  30 Day Readmission: No    Patient Information  Admitted from[de-identified] Home  Mental Status: Alert  During Assessment patient was accompanied by: Spouse  Assessment information provided by[de-identified] Spouse  Primary Caregiver: Self  Support Systems: Self, Spouse/significant other  [facility] [address] do you live in?: Via [address] entry access options  Select all that apply : Stairs  Number of steps to enter home  : 1  Do the steps have railings?: No  Type of Current Residence: 2 story home  Upon entering residence, is there a bedroom on the main floor (no further steps)?: Yes  Upon entering residence, is there a bathroom on the main floor (no further steps)?: Yes  In the last 12 months, was there a time when you were not able to pay the mortgage or rent on time?: No  In the last 12 months, how many places have you lived?: 1  In the last 12 months, was there a time when you did not have a steady place to sleep or slept in a shelter (including now)?: No  Homeless/housing insecurity resource given?: N/A  Living Arrangements: Lives w/ Spouse/significant other  Is patient a ?: No    Activities of Daily Living Prior to Admission  Functional Status: Independent  Completes ADLs independently?: Yes  Ambulates independently?: Yes  Does patient use assisted devices?: No  Does patient currently own DME?: No  Does patient have a history of Outpatient Therapy (PT/OT)?: Yes ([facility])  Does the patient have a history of Short-Term Rehab?: No  Does patient have a history of [facility]?: No  [place] [address] No    Patient Information Continued  Income Source: Pension/longterm  Does patient have prescription coverage?: Yes  Within the past 12 months, you worried that your food would run out before you got the money to buy more : Never true  Within the past 12 months, the food you bought just didn't last and you didn't have money to get more : Never true  Food insecurity resource given?: N/A  Does patient receive dialysis treatments?: No  Does patient have a history of substance abuse?: No  Does patient have a history of Mental Health Diagnosis?: Yes (Anxiety and depression)  Is patient receiving treatment for mental health?: Yes (PCP)  Has patient received inpatient treatment related to mental health in the last 2 years?: No    Means of Transportation  Means of Transport to Appts[de-identified] Drives Self  In the past 12 months, has lack of transportation kept you from medical appointments or from getting medications?: No  In the past 12 months, has lack of transportation kept you from meetings, work, or from getting things needed for daily living?: No  Was application for public transport provided?: N/A    DISCHARGE DETAILS:    Discharge planning discussed with[de-identified] Spouse  Freedom of Choice: Yes     CM contacted family/caregiver?: Yes  Were Treatment Team discharge recommendations reviewed with patient/caregiver?: Yes  Did patient/caregiver verbalize understanding of patient care needs?: Yes    Contacts  Patient Contacts: Giles Tenorio (Spouse)   [phone] (Home Phone)  Relationship to Patient[de-identified] Family  Contact Method: In Person  Reason/Outcome: Emergency Contact     Additional Comments: Patient off floor getting a test   Met with spouse at bedside    Wait therapy evals for discharge recs

## 2023-02-09 NOTE — CONSULTS
[address] 1955, 74 y o  female MRN: [id number]  Unit/Bed#: -01 Encounter: [id number]  Primary Care Provider: Cinthya Domingo DO   Date and time admitted to hospital: 2/8/2023  6:59 AM    Inpatient consult to Internal Medicine  Consult performed by: Yadira Noel MD  Consult ordered by: Elisha Ynu PA-C      Addendum at 10:20 AM- patient's CTA chest PE protocol has the following findings: -   Small segmental and subsegmental emboli in the right upper lobe with no pulmonary infarct or right heart strain  Prophylactic Arixtra has been discontinued, Eliquis has been started-10 mg p o  twice daily x7 days, then 5 mg p o  twice daily from day 8 onwards for at least 3 to 6 months  Orthopedic surgery notified they are okay with this change  Additionally, the patient's  was bedside and I updated him also with the CTA chest PE protocol results, and with the work-up in place to help ensure that her mental status change resolves very soon    * Arthritis of right knee  Assessment & Plan  · Patient is status post postop day 1 a total right knee arthroplasty by the primary service which is orthopedic surgery because of a pre-existing history of severe osteoarthritis of the right knee  · Continue postop surgical management as per the primary care service  · Current DVT prophylaxis is with Arixtra  · Continue PT and OT as able    Acute postoperative respiratory insufficiency  Assessment & Plan  · Since the surgery, the patient has had a post op acute respiratory insufficiency requiring 3 to 4 L of supplemental oxygen via nasal cannula  · Currently, she is on 3 L of supplemental oxygen via nasal cannula with a resultant O2 sat of 89%  · Differential diagnosis as follows:-  · #1    Respiratory insufficiency secondary to narcotic and sedative use in the setting of also receiving anesthesia over the past 24 hours -all narcotics have been stopped, all sedatives that were being given scheduled have been stopped, nursing has been notified to advise us of any recurrent pain complaints which will be handled at that particular point in time with one-time doses of medication  · #2  in the setting of having a right total knee arthroplasty- at this point we will check bilateral lower extremity venous Dopplers, and will also check a CTA chest PE protocol  · Wean oxygen as able    Encephalopathy acute  Assessment & Plan  · Once again most likely secondary to narcotic and sedative use in the setting of receiving anesthesia also over the past 24 hours  Patient has received clonazepam, gabapentin, fentanyl, Precedex, Dilaudid, Percocet, and propofol over the past 24 hours  · Patient is currently alert, awake, follows all simple one-step commands, can tell us her name, and where she is, but then has intermittent episodes of confusion  · Once again medications have been discontinued  · Monitor closely for now    Esophageal reflux  Assessment & Plan  · Continue Protonix    Hyperlipidemia  Assessment & Plan  · Continue Lipitor    Generalized anxiety disorder  Assessment & Plan  · With depression  · Continue Effexor, and Wellbutrin  · Hold off on clonazepam        Recommendations for Discharge:  · Per Primary Service    Counseling / Coordination of Care Time: 1 hour  Greater than 50% of total time spent on patient counseling and coordination of care  History of Present Illness:  Theador Bending is a 74 y o  female who is originally admitted to the orthopedic service due to severe right knee osteoarthritis  We are consulted for postop medical management  In brief, the patient is a 71-year-old female, that was originally admitted to the orthopedic service yesterday on 2/8/2023 to have an elective right total knee arthroplasty due to a pre-existing history of severe right knee osteoarthritis    Hospital medicine is being consulted postoperatively for medical management, continued respiratory insufficiency, and an altered mental status  Patient has currently been seen and examined in room 204 bed 1  She is resting in bed  Upon direct questioning, she is able to tell me her name, and the fact that she is in the hospital, however she is unable to tell me the month, today's date, the time, the city, and is periodically confused to the point that she just rambles nonsensically  Upon direct questioning, she denies any chest pain, nausea, vomiting, diarrhea, fevers, chills, palpitations, numbness, tingling, and or right calf pain  Nursing reports that the patient had complained of an 8 out of 10 right knee pain a little while ago which required her getting a dose of Percocet      Review of Systems:  Review of Systems   Unable to perform ROS: Mental status change       Past Medical and Surgical History:   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    • Drug intolerance    • Dysfunction of right eustachian tube    • Dysfunctional uterine bleeding    • Edema    • Elevated d-dimer    • Exposure to hepatitis C    • Fracture of fibula    • Generalized anxiety disorder    • GERD (gastroesophageal reflux disease)    • Hyperlipidemia    • Mild cervical dysplasia    • Myalgia    • Myositis    • Palpitations    • PONV (postoperative nausea and vomiting)    • Pre-syncope    • Seasonal allergies    • Stress incontinence    • Thrombocytopenia ([address] )    • Urinary retention    • Urinary tract infection    • Uterine leiomyoma        Past Surgical History:   Procedure Laterality Date   • BLADDER SURGERY     • BREAST BIOPSY Right     US guided   • COLONOSCOPY     • COLPOSCOPY  01/06/1998   • DENTAL SURGERY     • ENDOMETRIAL BIOPSY  05/21/1993   • HYSTERECTOMY     • HYSTEROSCOPY      uterus- 1/22/98, 7/1/02 and 4/10/07   • TUBAL LIGATION     • US GUIDED BREAST BIOPSY RIGHT COMPLETE Right 08/08/2018       Meds/Allergies:  all medications and allergies reviewed    Allergies: No Known Allergies    Social History:     Marital Status: /Civil Union    Substance Use History:   Social History     Substance and Sexual Activity   Alcohol Use Never     Social History     Tobacco Use   Smoking Status Every Day   • Packs/day: 1 50   • Years: 45 00   • Pack years: 67 50   • Types: Cigarettes   Smokeless Tobacco Never     Social History     Substance and Sexual Activity   Drug Use No       Family History:  I have reviewed the patients family history -unable to review at this time in view of the patient's altered mental status    Physical Exam:   Vitals:   Blood Pressure: 118/88 (02/09/23 0720)  Pulse: 75 (02/09/23 0720)  Temperature: 99 6 °F (37 6 °C) (02/09/23 0720)  Temp Source: Oral (02/09/23 0720)  Respirations: 20 (02/09/23 0720)  Height: 5' 3" (160 cm) (02/08/23 0724)  Weight - Scale: 63 5 kg (140 lb) (02/08/23 0724)  SpO2: (!) 89 % (02/09/23 0720)    Physical Exam  Constitutional:       General: She is not in acute distress  Appearance: Normal appearance  She is normal weight  She is not ill-appearing  HENT:      Head: Normocephalic and atraumatic  Nose: Nose normal       Mouth/Throat:      Mouth: Mucous membranes are moist    Eyes:      Extraocular Movements: Extraocular movements intact  Pupils: Pupils are equal, round, and reactive to light  Cardiovascular:      Rate and Rhythm: Normal rate and regular rhythm  Pulses: Normal pulses  Heart sounds: Normal heart sounds  No murmur heard  No friction rub  No gallop     Pulmonary:      Effort: Pulmonary effort is normal  No respiratory distress  Breath sounds: Normal breath sounds  No wheezing, rhonchi or rales  Abdominal:      General: There is no distension  Palpations: Abdomen is soft  There is no mass  Tenderness: There is no abdominal tenderness  Hernia: No hernia is present  Musculoskeletal:         General: No swelling or tenderness  Normal range of motion  Cervical back: Normal range of motion and neck supple  No rigidity  Right lower leg: No edema  Left lower leg: No edema  Comments: Postop right knee drain is in place  Dressing is also in place, dry, and intact   Skin:     General: Skin is warm  Capillary Refill: Capillary refill takes less than 2 seconds  Findings: No erythema or rash  Neurological:      General: No focal deficit present  Mental Status: She is alert  Mental status is at baseline  Cranial Nerves: No cranial nerve deficit  Motor: No weakness  Comments: Alert, awake, and oriented to person only, may be place but definitively not to time  Is periodically confused   Psychiatric:         Mood and Affect: Mood normal          Behavior: Behavior normal          Additional Data:   Lab Results: I have personally reviewed pertinent reports  Results from last 7 days   Lab Units 02/09/23  0433   WBC Thousand/uL 10 72*   HEMOGLOBIN g/dL 9 8*   HEMATOCRIT % 31 4*   PLATELETS Thousands/uL 234     Results from last 7 days   Lab Units 02/09/23  0433   SODIUM mmol/L 137   POTASSIUM mmol/L 3 6   CHLORIDE mmol/L 104   CO2 mmol/L 25   BUN mg/dL 14   CREATININE mg/dL 0 60   CALCIUM mg/dL 9 1             Lab Results   Component Value Date/Time    HGBA1C 5 8 (H) 01/16/2023 01:22 PM    HGBA1C 5 5 10/21/2022 12:32 PM    HGBA1C 6 0 (H) 04/26/2019 09:38 AM    HGBA1C 6 0 04/26/2019 12:00 AM           Imaging: I have personally reviewed pertinent reports      XR knee right 1 or 2 views    (Results Pending)   VAS lower limb venous duplex study, complete bilateral    (Results Pending)   CTA chest pe study    (Results Pending)       EKG, Pathology, and Other Studies Reviewed on Admission:   · EKG: None reviewed    ** Please Note: This note has been constructed using a voice recognition system   **

## 2023-02-09 NOTE — RESPIRATORY THERAPY NOTE
RT Protocol Note  Jihan Padilla 74 y o  female MRN: [id number]  Unit/Bed#: -01 Encounter: [id number]    Assessment    Principal Problem:    Arthritis of right knee  Active Problems:    Esophageal reflux    Generalized anxiety disorder    Hyperlipidemia    Encephalopathy acute    Acute postoperative respiratory insufficiency      Home Pulmonary Medications:  none       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     Drug intolerance     Dysfunction of right eustachian tube     Dysfunctional uterine bleeding     Edema     Elevated d-dimer     Exposure to hepatitis C     Fracture of fibula     Generalized anxiety disorder     GERD (gastroesophageal reflux disease)     Hyperlipidemia     Mild cervical dysplasia     Myalgia     Myositis     Palpitations     PONV (postoperative nausea and vomiting)     Pre-syncope     Seasonal allergies     Stress incontinence     Thrombocytopenia (HCC)     Urinary retention     Urinary tract infection     Uterine leiomyoma      Social History     Socioeconomic History    Marital status: /Civil Union     Spouse name: None    Number of children: None    Years of education: None    Highest education level: None   Occupational History    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[employer]    Occupation: housewife   Tobacco Use    Smoking status: Every Day     Packs/day: 1 50     Years: 45 00     Pack years: 67 50     Types: Cigarettes    Smokeless tobacco: Never   Vaping Use    Vaping Use: Never used   Substance and Sexual Activity    Alcohol use: Never    Drug use: No    Sexual activity: Not Currently Other Topics Concern    None   Social History Narrative    Caffeine use     Social Determinants of Health     Financial Resource Strain: Not on file   Food Insecurity: No Food Insecurity    Worried About [employer] in the Last Year: Never true    Ran Out of Food in the Last Year: Never true   Transportation Needs: No Transportation Needs    Lack of Transportation (Medical): No    Lack of Transportation (Non-Medical): No   Physical Activity: Not on file   Stress: Not on file   Social Connections: Not on file   Intimate Partner Violence: Not on file   Housing Stability: Low Risk     Unable to Pay for Housing in the Last Year: No    Number of Places Lived in the Last Year: 1    Unstable Housing in the Last Year: No       Subjective         Objective    Physical Exam:   Assessment Type: Pre-treatment  General Appearance: Awake, Alert  Respiratory Pattern: Normal  Chest Assessment: Chest expansion symmetrical  Bilateral Breath Sounds: Diminished    Vitals:  Blood pressure 128/64, pulse 82, temperature (!) 101 2 °F (38 4 °C), resp  rate (!) 26, height 5' 3" (1 6 m), weight 63 5 kg (140 lb), SpO2 93 %  Imaging and other studies: I have personally reviewed pertinent reports  Plan    Respiratory Plan: No distress/Pulmonary history        Resp Comments: Pt evaluated per respiratory protocal  Pt admitted for knee arthroplasty  Pt found to have PE  Pt denies any pulmonary hx, however is a current smoker  BS are decreased but clera at this time  Pt denies sob  0 63mg xopenex/0 5mg atrovent changed to 2 5mg albuterol PRN only

## 2023-02-09 NOTE — NURSING NOTE
Patient oriented x4, but forgetful/confused at times  Patient SOB on exertion, increased oxygen to 4L  Patient not completely following directions  Ortho PA-C and hospitalist aware   Patient down for a stat CTA of chest

## 2023-02-09 NOTE — PLAN OF CARE
Problem: OCCUPATIONAL THERAPY ADULT  Goal: Performs self-care activities at highest level of function for planned discharge setting  See evaluation for individualized goals  Description: Treatment Interventions: ADL retraining, Functional transfer training, Endurance training, Cognitive reorientation, Patient/family training, Equipment evaluation/education, Compensatory technique education, Energy conservation, Activityengagement     See flowsheet documentation for full assessment, interventions and recommendations  Note: Limitation: Decreased ADL status, Decreased Safe judgement during ADL, Decreased cognition, Decreased endurance, Decreased self-care trans, Decreased high-level ADLs  Prognosis: Good  Assessment: Pt is a 74 y o  female seen for OT evaluation s/p admit to [facility] on 2/8/2023 w/ Arthritis of right knee  Comorbidities affecting pt's functional performance at time of assessment include: Anxiety, HLD, Encephalopathy  Personal factors affecting pt at time of IE include:steps to enter environment, difficulty performing ADLS, limited insight into deficits and decreased initiation and engagement   Prior to admission, pt was Mod I with ADLs  Upon evaluation: the following deficits impact occupational performance: decreased balance, decreased tolerance, impaired attention, impaired initiation, impaired memory, impaired sequencing, impaired problem solving, decreased safety awareness, increased pain and orthopedic restrictions  Pt to benefit from continued skilled OT tx while in the hospital to address deficits as defined above and maximize level of functional independence w ADL's and functional mobility  Occupational Performance areas to address include: bathing/shower, toilet hygiene, dressing, functional mobility and clothing management  From OT standpoint, recommendation at time of d/c would be STR       OT Discharge Recommendation: Post acute rehabilitation services     [address] [place] [address], MS, OTR/L

## 2023-02-09 NOTE — ASSESSMENT & PLAN NOTE
· Once again most likely secondary to narcotic and sedative use in the setting of receiving anesthesia also over the past 24 hours  Patient has received clonazepam, gabapentin, fentanyl, Precedex, Dilaudid, Percocet, and propofol over the past 24 hours    · Patient is currently alert, awake, follows all simple one-step commands, can tell us her name, and where she is, but then has intermittent episodes of confusion  · Once again medications have been discontinued  · Monitor closely for now

## 2023-02-09 NOTE — PLAN OF CARE
Problem: PHYSICAL THERAPY ADULT  Goal: Performs mobility at highest level of function for planned discharge setting  See evaluation for individualized goals  Description: Treatment/Interventions: ADL retraining, Functional transfer training, LE strengthening/ROM, Therapeutic exercise, Bed mobility, Patient/family training, Cognitive reorientation, Endurance training, Equipment eval/education, Gait training, Spoke to advanced practitioner, Spoke to MD, Spoke to nursing (Spoke to Zaire Leonard, MD Ana Rosa Chavarria, and RN Christopher Trejo)  Equipment Recommended: Maira Drake (PRAVEEN)       See flowsheet documentation for full assessment, interventions and recommendations  Note: Prognosis: Fair  Problem List: Decreased strength, Decreased range of motion, Decreased endurance, Impaired balance, Decreased mobility, Decreased skin integrity, Orthopedic restrictions, Pain, Decreased cognition  Assessment: Pt is a 74 y o  female admitted to [place] [address] on 02/08/2023 for R TKA, resultant of worsening primary OA of R knee and exhaustion of conservative treatment  PT evaluation is necessary at this time to assess D/C needs  Co-morbidites effecting pt outcomes at this time are hx of severe OA of  R knee, chronic LBP, generalizeed anxiety disorder, and acute encephalopathy  At time of PT assessment, pt cognition impairment limited acurate history taking  Follow up regarding correct and acurate history hakeem take place at time of next session barring cognition improvement  PTA, pt reports that she was independent for functional mobilty and ADLs such as bathing and dressing, as well as IADLs such as driving  During assessment, pt was able to complete bed mobility w/ min A, transfer activities such as sit to stand and stand to sit w/ mod A assist x 2 and ambulation withRW and mod A assist x 2  Verbal and tactile cues were also provided during ambulation tasks to maintain safety   Pt would benefit from skilled PT to address decreased LE strength and ROM, gait dysfunction, impaired balance, pain, decreased mobility, and decreased activity tolerance  At this time, reccomendation upon D/C is post-acute rehabilitation services  PT Discharge Recommendation: Post acute rehabilitation services    See flowsheet documentation for full assessment

## 2023-02-09 NOTE — NURSING NOTE
Patient awake and alert forgetful at times  Complains of pain in R Knee  [place] drain and post op dressing intact  PO pain med given  Call bell and belongings within reach and bed alarm on

## 2023-02-09 NOTE — PHYSICAL THERAPY NOTE
Physical Therapy Cancellation Note         02/09/23 1159   PT Last Visit   PT Visit Date 02/09/23   Note Type   Note Type BID visit/treatment   Cancel Reasons Medical status  (positive PE finding)     Boneta Arm

## 2023-02-09 NOTE — NURSING NOTE
Patient daughther at bedside  Patient continues to be confused  No weakness or deficits noted  CT of head ordered

## 2023-02-09 NOTE — ASSESSMENT & PLAN NOTE
· Since the surgery, the patient has had a post op acute respiratory insufficiency requiring 3 to 4 L of supplemental oxygen via nasal cannula  · Currently, she is on 3 L of supplemental oxygen via nasal cannula with a resultant O2 sat of 89%  · Differential diagnosis as follows:-  · #1  Respiratory insufficiency secondary to narcotic and sedative use in the setting of also receiving anesthesia over the past 24 hours -all narcotics have been stopped, all sedatives that were being given scheduled have been stopped, nursing has been notified to advise us of any recurrent pain complaints which will be handled at that particular point in time with one-time doses of medication  · #2   in the setting of having a right total knee arthroplasty- at this point we will check bilateral lower extremity venous Dopplers, and will also check a CTA chest PE protocol  · Wean oxygen as able

## 2023-02-09 NOTE — PROGRESS NOTES
Progress Note - Orthopedics   Surekha Tenorio 74 y o  female MRN: [id number]  Unit/Bed#: -01 Encounter: [id number]    Assessment:  POD 1 s/p elective right total knee replacement  Plan:  Continue physical therapy and Occupational Therapy weightbearing as tolerated  We will remove narcotic and sedative medications to help improve her confusion  CT scan of chest to rule out PE and venous duplex to rule out DVT  Continue Arixtra for DVT prophylaxis  Acute postop blood loss anemia continue to monitor  Weight bearing: Weightbearing as tolerated right lower extremity    VTE Pharmacologic Prophylaxis: Fondaparinux (Arixtra)  VTE Mechanical Prophylaxis: sequential compression device    Subjective: The patient is confused this morning, however at times she is alert and oriented x3  She is having increased O2 requirements via nasal cannula this morning  Vitals: Blood pressure 118/88, pulse 75, temperature 99 6 °F (37 6 °C), temperature source Oral, resp  rate 20, height 5' 3" (1 6 m), weight 63 5 kg (140 lb), SpO2 (!) 89 %  ,Body mass index is 24 8 kg/m²        Intake/Output Summary (Last 24 hours) at 2/9/2023 1000  Last data filed at 2/9/2023 0601  Gross per 24 hour   Intake 1081 14 ml   Output 2550 ml   Net -1468 86 ml       Invasive Devices     Peripheral Intravenous Line  Duration           Peripheral IV 02/08/23 Right;Ventral (anterior) Hand 1 day    Peripheral IV 02/09/23 Right;Ventral (anterior) Forearm <1 day          Drain  Duration           Autologus Reinfusion/Drain Device 1 Right Knee 1 day                Physical Exam:   Right lower extremity is neurovascularly intact  Toes are pink and mobile  Compartments are soft  Incision is clean, dry and intact  Brisk cap refill  Sensation intact  No warmth or erythema    Lab, Imaging and other studies:   CBC:   Lab Results   Component Value Date    WBC 10 72 (H) 02/09/2023    HGB 9 8 (L) 02/09/2023    HCT 31 4 (L) 02/09/2023    MCV 97 02/09/2023     02/09/2023    MCH 30 3 02/09/2023    MCHC 31 2 (L) 02/09/2023    RDW 14 4 02/09/2023    MPV 11 1 02/09/2023     CMP:   Lab Results   Component Value Date    SODIUM 137 02/09/2023     02/09/2023    CO2 25 02/09/2023    BUN 14 02/09/2023    CREATININE 0 60 02/09/2023    CALCIUM 9 1 02/09/2023    EGFR 95 02/09/2023

## 2023-02-09 NOTE — OCCUPATIONAL THERAPY NOTE
1. Labs every 6 months.  2. Follow-up in 1 year with labs.    Please call patient closer to appt date.    Occupational Therapy Evaluation      Skylar Lopez    2/9/2023    Principal Problem:    Arthritis of right knee  Active Problems:    Esophageal reflux    Generalized anxiety disorder    Hyperlipidemia    Encephalopathy acute    Acute postoperative respiratory insufficiency      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     Drug intolerance     Dysfunction of right eustachian tube     Dysfunctional uterine bleeding     Edema     Elevated d-dimer     Exposure to hepatitis C     Fracture of fibula     Generalized anxiety disorder     GERD (gastroesophageal reflux disease)     Hyperlipidemia     Mild cervical dysplasia     Myalgia     Myositis     Palpitations     PONV (postoperative nausea and vomiting)     Pre-syncope     Seasonal allergies     Stress incontinence     Thrombocytopenia ([address] )     Urinary retention     Urinary tract infection     Uterine leiomyoma        Past Surgical History:   Procedure Laterality Date    BLADDER SURGERY      BREAST BIOPSY Right     US guided    COLONOSCOPY      COLPOSCOPY  01/06/1998    DENTAL SURGERY      ENDOMETRIAL BIOPSY  05/21/1993    HYSTERECTOMY      HYSTEROSCOPY      uterus- 1/22/98, 7/1/02 and 4/10/07    TUBAL LIGATION      US GUIDED BREAST BIOPSY RIGHT COMPLETE Right 08/08/2018 02/09/23 0856   OT Last Visit   OT Visit Date 02/09/23   Note Type   Note type Evaluation   Pain Assessment   Pain Assessment Tool 0-10   Pain Score 9   Pain Location/Orientation Orientation: Right;Location: Knee   Pain Onset/Description Frequency: Intermittent   Restrictions/Precautions   Weight Bearing Precautions Per Order Yes   RLE Weight Bearing Per Order FWB   Other Precautions Cognitive; Chair Alarm; Bed Alarm;WBS;Multiple lines;O2;Fall Risk;Pain  (4L of O2, Pt does not wear at home, R [place] drain)   Home Living   Type of Home House  (1 LUCIO)   Home Layout Multi-level;Stairs to enter with rails; Able to live on main level with bedroom/bathroom   Bathroom Shower/Tub Tub only   Bathroom Toilet Standard   Bathroom Equipment Grab bars in shower; Shower chair   [address]   (Pt reports that she does not use AD at home or for community distances )   Prior Function   Level of [place] Independent with ADLs   Lives With Spouse  (Lives w/ )   IADLs Family/Friend/Other provides meals; Independent with driving   Vocational Retired   Comments Questionable accuracy of Home S/U and PLOF due to cognition   ADL   UB Bathing Assistance 4  Minimal Assistance   LB Bathing Assistance 3  Moderate Assistance   UB Dressing Assistance 4  [address] 3  Moderate Assistance   Bed Mobility   Supine to Sit 4  Minimal assistance   Additional items Assist x 1;HOB elevated; Bedrails; Increased time required;Verbal cues;LE management   Additional Comments Pt remained OOB in recliner upon conclusion   Transfers   Sit to Stand 3  Moderate assistance   Additional items Assist x 2; Increased time required;Verbal cues   Stand to Sit 3  Moderate assistance   Additional items Assist x 2;Armrests; Increased time required;Verbal cues   Stand pivot 3  Moderate assistance   Additional items Assist x 2; Increased time required;Verbal cues  (RW)   Balance   Static Sitting Fair +   Dynamic Sitting Fair   Static Standing Fair -   Dynamic Standing Poor +   Ambulatory Poor +   Activity Tolerance   Activity Tolerance Patient limited by fatigue;Patient limited by pain; Other (Comment)  (decreased cognition)   Medical Staff Made Aware MD Ascencion Cuevas   Nurse Made Aware THEA Ambriz, RN Hector Marino Assessment   RUE Assessment WFL   LUE Assessment   LUE Assessment WFL   Vision-Basic Assessment   Current Vision Wears glasses only for reading   Cognition   Overall Cognitive Status Impaired   Arousal/Participation Cooperative;Lethargic   Attention Attends with cues to redirect   Orientation Level Oriented to person;Oriented to place;Oriented to time;Disoriented to situation   Memory Decreased recall of biographical information;Decreased short term memory;Decreased recall of recent events;Decreased recall of precautions   Following Commands Follows one step commands with increased time or repetition   Assessment   Limitation Decreased ADL status; Decreased Safe judgement during ADL;Decreased cognition;Decreased endurance;Decreased self-care trans;Decreased high-level ADLs   Prognosis Good   Assessment Pt is a 74 y o  female seen for OT evaluation s/p admit to [place] [id number] on 2/8/2023 w/ Arthritis of right knee  Comorbidities affecting pt's functional performance at time of assessment include: Anxiety, HLD, Encephalopathy  Personal factors affecting pt at time of IE include:steps to enter environment, difficulty performing ADLS, limited insight into deficits and decreased initiation and engagement   Prior to admission, pt was Mod I with ADLs  Upon evaluation: the following deficits impact occupational performance: decreased balance, decreased tolerance, impaired attention, impaired initiation, impaired memory, impaired sequencing, impaired problem solving, decreased safety awareness, increased pain and orthopedic restrictions  Pt to benefit from continued skilled OT tx while in the hospital to address deficits as defined above and maximize level of functional independence w ADL's and functional mobility  Occupational Performance areas to address include: bathing/shower, toilet hygiene, dressing, functional mobility and clothing management  From OT standpoint, recommendation at time of d/c would be STR     Goals Patient Goals To feel less groggy   Plan   Treatment Interventions ADL retraining;Functional transfer training; Endurance training;Cognitive reorientation;Patient/family training;Equipment evaluation/education; Compensatory technique education; Energy conservation; Activityengagement   Goal Expiration Date 02/19/23   OT Treatment Day 0   OT Frequency 3-5x/wk   Recommendation   OT Discharge Recommendation Post acute rehabilitation services   Additional Comments  Pt seen as a co-eval with PT due to the patient's co-morbidities, clinically unstable presentation, and present impairments which are a regression from the patient's baseline  Additional Comments 2 The patient's raw score on the AM-PAC Daily Activity Inpatient Short Form is 16  A raw score of less than 19 suggests the patient may benefit from discharge to post-acute rehabilitation services  Please refer to the recommendation of the Occupational Therapist for safe discharge planning     AM-PAC Daily Activity Inpatient   Lower Body Dressing 2   Bathing 2   Toileting 1   Upper Body Dressing 3   Grooming 4   Eating 4   Daily Activity Raw Score 16   Daily Activity Standardized Score (Calc for Raw Score >=11) 35 96   AM-[facility] Applied Cognition Inpatient   Following a Speech/Presentation 3   Understanding Ordinary Conversation 3   Taking Medications 1   Remembering Where Things Are Placed or Put Away 1   Remembering List of 4-5 Errands 1   Taking Care of Complicated Tasks 1   Applied Cognition Raw Score 10   Applied Cognition Standardized Score 24 98     GOALS:    Pt will achieve the following within specified time frame: STG  Pt will achieve the following goals within 5 days    *ADL transfers with Mod (A) for inc'd independence with ADLs/purposeful tasks    *UB ADL with (S) for inc'd independence with self cares    *LB ADL with Min (A) using AE prn for inc'd independence with self cares    *Toileting with Mod (A) for clothing management and hygiene for return to [facility] with personal care    *Increase static stand balance to F and dyn stand balance to F- for inc'd safety with standing purposeful tasks    *Increase stand tolerance x3 m for inc'd tolerance with standing purposeful tasks    *Participate in 10m UE therex to increase overall stamina/activity tolerance for purposeful tasks    *Bed mobility- CGA for inc'd independence to manage own comfort and initiate EOB & OOB purposeful tasks    Pt will achieve the following within specified time frame: LTG  Pt will achieve the following goals within 10 days    *ADL transfers with Min (A) for inc'd independence with ADLs/purposeful tasks    *UB ADL with (I) for inc'd independence with self cares    *LB ADL with CGA using AE prn for inc'd independence with self cares    *Toileting with Min (A) for clothing management and hygiene for return to PLOF with personal care    *Increase static stand balance to F+ and dyn stand balance to F for inc'd safety with standing purposeful tasks    *Increase stand tolerance x5 m for inc'd tolerance with standing purposeful tasks    *Bed mobility- (S) for inc'd independence to manage own comfort and initiate EOB & OOB purposeful tasks      Jese Fischer, MS, OTR/L

## 2023-02-09 NOTE — OCCUPATIONAL THERAPY NOTE
02/09/23 1110   OT Last Visit   OT Visit Date 02/09/23   Note Type   Note Type Cancelled Session   Cancel Reasons Other  (patient having a test done at bedside)       VIKAS Martin/SHERI

## 2023-02-09 NOTE — PHYSICAL THERAPY NOTE
PT Evaluation    Time In: 0830 Time Out: 2315 02/09/23 0687   PT Last Visit   PT Visit Date 02/09/23   Note Type   Note type Evaluation   Additional Comments Pt found supine in bed at start of PT session  Acurate history intake limited due to decreased cognition  Pain Assessment   Pain Assessment Tool 0-10   Pain Score 9   Pain Location/Orientation Orientation: Right;Location: Knee   Pain Onset/Description Frequency: Intermittent   Multiple Pain Sites No   Restrictions/Precautions   Weight Bearing Precautions Per Order Yes   RLE Weight Bearing Per Order FWB   Other Precautions Chair Alarm;Cognitive;WBS;Multiple lines;O2;Fall Risk;Pain  (R [place] drain, 4 L O2 not used at baseline, increased risk of skin disruption)   Home Living   Type of Home House  (1 LUCIO)   Home Layout Multi-level;Stairs to enter with rails; Able to live on main level with bedroom/bathroom   Bathroom Shower/Tub Tub only   [address]   (Pt reports that she does not use AD at home or for community distances )   Additional Comments Pt unable to give acurate home set up at this time due to cognition  Pt describes herself as "groggy"   Prior Function   Level of [place] Independent with ADLs   Lives With Spouse  (Lives w/ )   IADLs Family/Friend/Other provides meals; Independent with driving   Vocational Retired   Comments Questionable accuracy of Home S/U and PLOF due to cognition   General   Additional Pertinent History Co-assessment w/ OT Neal Veloz, as pt requires the skilled interventions of two clinicians for appropraite care delivery     Family/Caregiver Present No   Cognition   Overall Cognitive Status Impaired  (Pt was able to recall person, place and time but unable to recall event that brought her to the hospital)   Arousal/Participation Responsive   Attention Attends with cues to redirect   Orientation Level Oriented to person;Oriented to place;Oriented to time;Disoriented to situation Memory Decreased short term memory   Following Commands Follows one step commands with increased time or repetition   RLE Assessment   RLE Assessment X  (R TKA performed  Pt RLE strength and ROM limited 2/5)   LLE Assessment   LLE Assessment WFL   Vision-Basic Assessment   Current Vision Wears glasses only for reading   Vestibular   Spontaneous Nystagmus (-) no evidence of nystagmus at rest in room light   Gaze Holding Nystagmus (-) no evidence of nystagmus   Gaze Holding Nystagmus / Lateral Gaze (-) Evidence of nystagmus   Coordination   Movements are Fluid and Coordinated 1   Light Touch   RLE Light Touch Not tested   LLE Light Touch Not tested   Sharp/Dull   RLE Sharp/Dull Not tested   LLE Sharp/Dull Not tested   Proprioception   RLE Proprioception Not tested   LLE Proprioception Not tested   Bed Mobility   Supine to Sit 4  Minimal assistance  (Pt does not wear O2 at baseline  When 2L O2 NC was attempted to be removed at time of session start, pt desaturated to 84% and supplemental O2 was immediately replaced and increased to 5L, returning O2 saturation to 94%  )   Additional items Assist x 1;HOB elevated; Increased time required;Verbal cues;LE management  (VC were provided to maintain pt safety during activity )   Sit to Supine   (DNT; pt remained OOB in recliner chair w/ all needs in reach and chair alarm engaged upon session conclusion )   Additional Comments Upon session conclusion, pt remained OOB in recliner chair w/ all needs in reach and chair alarm engaged  Transfers   Sit to Stand 3  Moderate assistance   Additional items Assist x 2; Increased time required;Verbal cues  (VC were provided to maintain pt safety during activity )   Stand to Sit 3  Moderate assistance   Additional items Assist x 2; Increased time required;Verbal cues  (VC were provided to maintain pt safety during activity )   Stand pivot 3  Moderate assistance   Additional items Assist x 2; Increased time required;Verbal cues  (VC were provided to maintain pt safety during activity )   Ambulation/Elevation   Gait pattern Improper Weight shift; Antalgic;Narrow MENDOZA; Inconsistent surekha; Short stride; Step to;Excessively slow;Decreased heel strike;Decreased toe off   Gait Assistance 3  Moderate assist   Additional items Assist x 2;Verbal cues; Tactile cues  (Verbal cues were provided to maintain pt safety and tactile dues were used to facilitate appropriate quadricep contraction )   Assistive Device Rolling walker   Distance 2 ft  (Pt ambulated 2 ft to recliner chair w/ RW and mod A assist x 2  Verbal and tactile cues also provided )   Stair Management Assistance Not tested  (DNT due to pt cognitive status )   Balance   Static Sitting Fair +   Dynamic Sitting Fair   Static Standing Fair -   Dynamic Standing Poor +   Ambulatory Poor +   Endurance Deficit   Endurance Deficit Yes   Endurance Deficit Description PT session limited due to pt fatigue  Activity Tolerance   Activity Tolerance Patient limited by fatigue;Patient limited by pain  (Pt was limited due to pain, fatigue, and decreased cognition )   Medical Staff Made Aware MD David Guajardo   Nurse Made Aware THEA Owen, THEA Milligan   Assessment   Prognosis Fair   Problem List Decreased strength;Decreased range of motion;Decreased endurance; Impaired balance;Decreased mobility; Decreased skin integrity;Orthopedic restrictions;Pain;Decreased cognition   Assessment Pt is a 74 y o  female admitted to [place] [id number] on 02/08/2023 for R TKA, resultant of worsening primary OA of R knee and exhaustion of conservative treatment  PT evaluation is necessary at this time to assess D/C needs  Co-morbidites effecting pt outcomes at this time are hx of severe OA of  R knee, chronic LBP, generalizeed anxiety disorder, and acute encephalopathy  At time of PT assessment, pt cognition impairment limited acurate history taking   Follow up regarding correct and acurate history hakeem take place at time of next session barring cognition improvement  PTA, pt reports that she was independent for functional mobilty and ADLs such as bathing and dressing, as well as IADLs such as driving  During assessment, pt was able to complete bed mobility w/ min A, transfer activities such as sit to stand and stand to sit w/ mod A assist x 2 and ambulation withRW and mod A assist x 2  Verbal and tactile cues were also provided during ambulation tasks to maintain safety  Pt would benefit from skilled PT to address decreased LE strength and ROM, gait dysfunction, impaired balance, pain, decreased mobility, and decreased activity tolerance  At this time, reccomendation upon D/C will be post acute rehabilitation services  Goals   Patient Goals to feel less groggy   STG Expiration Date 02/19/23   Short Term Goal #1 In 10 days, pt will ambulate 25 ft w/ RW and S assist x 1 to progress towards prior level of independence  In 10 days, pt will increase weight shift into RLE to advance towards increased standing tolerance  In 10 days, pt will imrpove ambulatory balance from poor + to fair - to facilitate safe household navigation  In 10 days, pt will follow one step commands without difficulty  Plan   Treatment/Interventions ADL retraining;Functional transfer training;LE strengthening/ROM; Therapeutic exercise; Bed mobility; Patient/family training;Cognitive reorientation; Endurance training;Equipment eval/education;Gait training;Spoke to advanced practitioner;Spoke to MD;Spoke to nursing  (Spoke to Jorden Morales, MD Shane Sosa, and THEA Silva)   PT Frequency Twice a day   Recommendation   PT Discharge Recommendation Post-acute rehabilitation services   Equipment Recommended Osmel Schwarz  (PRAVEEN)   AM-PAC Basic Mobility Inpatient   Turning in Flat Bed Without Bedrails 3   Lying on Back to Sitting on Edge of Flat Bed Without Bedrails 3   Moving Bed to Chair 2   Standing Up From Chair Using Arms 2   Walk in Room 2   Climb 3-5 Stairs With Railing 2   Basic Mobility Inpatient Raw Score 14   Basic Mobility Standardized Score 35 55   Highest Level Of Mobility   -HL Goal 4: Move to chair/commode   -HLM Achieved 4: Move to chair/commode  (Pt was able to move from bed to recliner chair w/ RW and mod A assist x 2  Verbal cues were provided to maintain pt safety and tactile cues were provided to facilitate quadricep contraction )   End of Consult   Patient Position at End of Consult Bedside chair;Bed/Chair alarm activated; All needs within reach  (At session conclusion, pt remained OOB in recliner w/ chair alarm engaged and all needs in reach )   End of Consult Comments Will follow up next session to ensure that history is acurate and complete  Co-morbidities: Hx of severe OA of  R knee, chronic LBP, generalizeed anxiety disorder, and acute encephalopathy  Impairments: Decreased LE strength and ROM, gait dysfunction, impaired balance, pain, decreased mobility, and decreased activity tolerance  Presentation: Presentation at this time is unstable, due to impaired cognition, assist level required to complete tasks, high pain level, and decreased RLE stability, strength, and ROM         Abida Pritchard, SPT

## 2023-02-09 NOTE — ASSESSMENT & PLAN NOTE
· Patient is status post postop day 1 a total right knee arthroplasty by the primary service which is orthopedic surgery because of a pre-existing history of severe osteoarthritis of the right knee  · Continue postop surgical management as per the primary care service  · Current DVT prophylaxis is with Arixtra  · Continue PT and OT as able

## 2023-02-10 ENCOUNTER — APPOINTMENT (OUTPATIENT)
Dept: RADIOLOGY | Facility: HOSPITAL | Age: 68
End: 2023-02-10

## 2023-02-10 ENCOUNTER — APPOINTMENT (INPATIENT)
Dept: NON INVASIVE DIAGNOSTICS | Facility: HOSPITAL | Age: 68
End: 2023-02-10

## 2023-02-10 PROBLEM — R50.9 FEVER: Status: ACTIVE | Noted: 2023-02-10

## 2023-02-10 PROBLEM — I26.99 ACUTE PULMONARY EMBOLISM WITHOUT ACUTE COR PULMONALE (HCC): Status: ACTIVE | Noted: 2023-02-09

## 2023-02-10 LAB
ANION GAP SERPL CALCULATED.3IONS-SCNC: 9 MMOL/L (ref 4–13)
AORTIC ROOT: 3.4 CM
APICAL FOUR CHAMBER EJECTION FRACTION: 71 %
BACTERIA UR QL AUTO: ABNORMAL /HPF
BILIRUB UR QL STRIP: NEGATIVE
BUN SERPL-MCNC: 11 MG/DL (ref 5–25)
CALCIUM SERPL-MCNC: 8.5 MG/DL (ref 8.4–10.2)
CHLORIDE SERPL-SCNC: 102 MMOL/L (ref 96–108)
CLARITY UR: CLEAR
CO2 SERPL-SCNC: 25 MMOL/L (ref 21–32)
COLOR UR: YELLOW
CREAT SERPL-MCNC: 0.5 MG/DL (ref 0.6–1.3)
DOP CALC RVOT PEAK VEL: 1.09 M/S
DOP CALC RVOT VTI: 20.86 CM
E WAVE DECELERATION TIME: 288 MS
ERYTHROCYTE [DISTWIDTH] IN BLOOD BY AUTOMATED COUNT: 14.6 % (ref 11.6–15.1)
FRACTIONAL SHORTENING: 44 (ref 28–44)
GFR SERPL CREATININE-BSD FRML MDRD: 101 ML/MIN/1.73SQ M
GLUCOSE SERPL-MCNC: 107 MG/DL (ref 65–140)
GLUCOSE SERPL-MCNC: 166 MG/DL (ref 65–140)
GLUCOSE UR STRIP-MCNC: NEGATIVE MG/DL
HCT VFR BLD AUTO: 29 % (ref 34.8–46.1)
HGB BLD-MCNC: 9.3 G/DL (ref 11.5–15.4)
HGB UR QL STRIP.AUTO: NEGATIVE
INTERVENTRICULAR SEPTUM IN DIASTOLE (PARASTERNAL SHORT AXIS VIEW): 0.9 CM
INTERVENTRICULAR SEPTUM: 0.9 CM (ref 0.6–1.1)
KETONES UR STRIP-MCNC: NEGATIVE MG/DL
LAAS-AP2: 10.2 CM2
LAAS-AP4: 11.9 CM2
LEFT ATRIUM SIZE: 3.6 CM
LEFT INTERNAL DIMENSION IN SYSTOLE: 2.8 CM (ref 2.1–4)
LEFT VENTRICULAR INTERNAL DIMENSION IN DIASTOLE: 5 CM (ref 3.5–6)
LEFT VENTRICULAR POSTERIOR WALL IN END DIASTOLE: 0.7 CM
LEFT VENTRICULAR STROKE VOLUME: 88 ML
LEUKOCYTE ESTERASE UR QL STRIP: NEGATIVE
LVSV (TEICH): 88 ML
MCH RBC QN AUTO: 30.8 PG (ref 26.8–34.3)
MCHC RBC AUTO-ENTMCNC: 32.1 G/DL (ref 31.4–37.4)
MCV RBC AUTO: 96 FL (ref 82–98)
MV E'TISSUE VEL-SEP: 9 CM/S
MV PEAK A VEL: 0.93 M/S
MV PEAK E VEL: 72 CM/S
MV STENOSIS PRESSURE HALF TIME: 83 MS
MV VALVE AREA P 1/2 METHOD: 2.65
NITRITE UR QL STRIP: NEGATIVE
NON-SQ EPI CELLS URNS QL MICRO: ABNORMAL /HPF
PH UR STRIP.AUTO: 7 [PH]
PLATELET # BLD AUTO: 294 THOUSANDS/UL (ref 149–390)
PMV BLD AUTO: 10.9 FL (ref 8.9–12.7)
POTASSIUM SERPL-SCNC: 3.6 MMOL/L (ref 3.5–5.3)
PROT UR STRIP-MCNC: ABNORMAL MG/DL
RBC # BLD AUTO: 3.02 MILLION/UL (ref 3.81–5.12)
RBC #/AREA URNS AUTO: ABNORMAL /HPF
RIGHT VENTRICLE ID DIMENSION: 2.2 CM
SL CV LEFT ATRIUM LENGTH A2C: 4 CM
SL CV LV EF: 60
SL CV PED ECHO LEFT VENTRICLE DIASTOLIC VOLUME (MOD BIPLANE) 2D: 118 ML
SL CV PED ECHO LEFT VENTRICLE SYSTOLIC VOLUME (MOD BIPLANE) 2D: 30 ML
SL CV RVOT MAX GRADIENT: 4 MMHG
SL CV RVOT MEAN GRADIENT: 3 MMHG
SL CV RVOT VMEAN: 0.75 M/S
SODIUM SERPL-SCNC: 136 MMOL/L (ref 135–147)
SP GR UR STRIP.AUTO: 1.02
TR MAX PG: 28 MMHG
TR PEAK VELOCITY: 2.6 M/S
TRICUSPID ANNULAR PLANE SYSTOLIC EXCURSION: 2.6 CM
TRICUSPID VALVE PEAK REGURGITATION VELOCITY: 2.63 M/S
UROBILINOGEN UR QL STRIP.AUTO: 0.2 E.U./DL
WBC # BLD AUTO: 12.45 THOUSAND/UL (ref 4.31–10.16)
WBC #/AREA URNS AUTO: ABNORMAL /HPF

## 2023-02-10 RX ORDER — BUDESONIDE 0.5 MG/2ML
0.5 INHALANT ORAL
Status: DISCONTINUED | OUTPATIENT
Start: 2023-02-10 | End: 2023-02-12

## 2023-02-10 RX ORDER — LEVALBUTEROL 1.25 MG/.5ML
1.25 SOLUTION, CONCENTRATE RESPIRATORY (INHALATION)
Status: DISCONTINUED | OUTPATIENT
Start: 2023-02-10 | End: 2023-02-12

## 2023-02-10 RX ORDER — NICOTINE 21 MG/24HR
21 PATCH, TRANSDERMAL 24 HOURS TRANSDERMAL DAILY
Status: DISCONTINUED | OUTPATIENT
Start: 2023-02-10 | End: 2023-02-14 | Stop reason: HOSPADM

## 2023-02-10 RX ADMIN — FERROUS SULFATE TAB 325 MG (65 MG ELEMENTAL FE) 325 MG: 325 (65 FE) TAB at 17:31

## 2023-02-10 RX ADMIN — PANTOPRAZOLE SODIUM 40 MG: 40 TABLET, DELAYED RELEASE ORAL at 05:27

## 2023-02-10 RX ADMIN — FOLIC ACID 1 MG: 1 TABLET ORAL at 09:23

## 2023-02-10 RX ADMIN — CLONAZEPAM 1 MG: 1 TABLET ORAL at 21:38

## 2023-02-10 RX ADMIN — Medication 1 TABLET: at 09:23

## 2023-02-10 RX ADMIN — BUPROPION HYDROCHLORIDE 150 MG: 150 TABLET, EXTENDED RELEASE ORAL at 09:23

## 2023-02-10 RX ADMIN — OXYCODONE HYDROCHLORIDE AND ACETAMINOPHEN 500 MG: 500 TABLET ORAL at 17:31

## 2023-02-10 RX ADMIN — OXYCODONE HYDROCHLORIDE AND ACETAMINOPHEN 500 MG: 500 TABLET ORAL at 09:23

## 2023-02-10 RX ADMIN — LEVALBUTEROL HYDROCHLORIDE 1.25 MG: 1.25 SOLUTION, CONCENTRATE RESPIRATORY (INHALATION) at 20:37

## 2023-02-10 RX ADMIN — FERROUS SULFATE TAB 325 MG (65 MG ELEMENTAL FE) 325 MG: 325 (65 FE) TAB at 09:23

## 2023-02-10 RX ADMIN — VENLAFAXINE HYDROCHLORIDE 150 MG: 150 CAPSULE, EXTENDED RELEASE ORAL at 09:23

## 2023-02-10 RX ADMIN — DOCUSATE SODIUM 100 MG: 100 CAPSULE, LIQUID FILLED ORAL at 09:23

## 2023-02-10 RX ADMIN — ATORVASTATIN CALCIUM 20 MG: 20 TABLET, FILM COATED ORAL at 17:31

## 2023-02-10 RX ADMIN — APIXABAN 10 MG: 5 TABLET, FILM COATED ORAL at 17:32

## 2023-02-10 RX ADMIN — BUDESONIDE 0.5 MG: 0.5 INHALANT ORAL at 20:37

## 2023-02-10 RX ADMIN — DOCUSATE SODIUM 100 MG: 100 CAPSULE, LIQUID FILLED ORAL at 17:32

## 2023-02-10 RX ADMIN — NICOTINE 21 MG: 21 PATCH, EXTENDED RELEASE TRANSDERMAL at 13:25

## 2023-02-10 RX ADMIN — IPRATROPIUM BROMIDE 0.5 MG: 0.5 SOLUTION RESPIRATORY (INHALATION) at 20:37

## 2023-02-10 RX ADMIN — APIXABAN 10 MG: 5 TABLET, FILM COATED ORAL at 09:23

## 2023-02-10 NOTE — PLAN OF CARE
Problem: PHYSICAL THERAPY ADULT  Goal: Performs mobility at highest level of function for planned discharge setting  See evaluation for individualized goals  Description: Treatment/Interventions: ADL retraining, Functional transfer training, LE strengthening/ROM, Therapeutic exercise, Endurance training, Cognitive reorientation, Patient/family training  Equipment Recommended: Elan Stalling (ongoing RW use)       See flowsheet documentation for full assessment, interventions and recommendations  Outcome: Progressing  Note: Prognosis: Fair  Problem List: Decreased strength, Decreased range of motion, Decreased endurance, Impaired balance, Decreased mobility, Decreased cognition, Decreased skin integrity, Orthopedic restrictions, Pain  Assessment: Pt seen for PT treatment session this date, consisting of ther act focused on bed mobility & functional transfer training, ther ex focused on strengthening and gt training on level surfaces to improve pt safety in household environment  Since previous session, pt has made minimal progress in terms of improved OOB mobility task performance, requiring ongoing physical assistance for mobility & redirection d/t impulsivity and poor safety awareness  Pertinent barriers during this session include pain, cognitive status, awareness and impulsivity  Current goals and POC remain appropriate, pt continues to have rehab potential and is making minimal progress towards STGs  Pt prognosis for achieving goals is fair, pending pt progress with hospitalization/medical status improvements, and indicated by Stimulability, eye contact, initiation level and previous response to intervention  Pt limited d/t inability to concentrate under maximum structure, lack of self-control (impulsivity) and lack of ability to demonstrate mobility and/or self-care activities  PT recommends post acute rehabilitation services upon discharge   Pt continues to be functioning below baseline level, and remains limited 2* factors listed above  PT will continue to see pt during current hospitalization in order to address the deficits listed above and provide interventions consistent w/ POC in effort to achieve STGs  PT Discharge Recommendation: Post acute rehabilitation services    See flowsheet documentation for full assessment

## 2023-02-10 NOTE — ASSESSMENT & PLAN NOTE
· Tmax over the past 24 hours 101 2, for the most part remaining temperatures have been above 99 degrees  · Work-up thus far:-  · #1  Testing for COVID-19, respiratory syncytial virus, and influenza-negative  · #2  Blood cultures x2 sets collected on 2/9/2023-are pending  · #3  Question the possibility of an aspiration event in the perioperative setting- we will check a portable chest x-ray today  · #4  Mild bump in white blood cell count noted  · #5  Procalcitonin testing:-0 08  · #6    We will check a urinalysis    Fever could be secondary to venous thromboembolism, will ask for an ID evaluation

## 2023-02-10 NOTE — [facility] ADMISSION
[facility] admissions team received referral on patient’s case for possible [facility] placement  [facility] admissions team will continue to review and follow patient’s progress and correspond with PT/OT therapies / [facility] physician and case management until a determination of acceptance to [facility] is made

## 2023-02-10 NOTE — PHYSICAL THERAPY NOTE
Physical Therapy Treatment Note       02/10/23 1423   PT Last Visit   PT Visit Date 02/10/23   Note Type   Note Type BID visit/treatment   Pain Assessment   Pain Assessment Tool 0-10   Pain Score 7   Pain Location/Orientation Orientation: Right;Location: Knee; Location: Leg   Pain Onset/Description Onset: Ongoing;Frequency: Constant/Continuous   Patient's Stated Pain Goal No pain   Hospital Pain Intervention(s) Repositioned; Emotional support   Wound 02/08/23 Knee Right   Date First Assessed/Time First Assessed: 02/08/23 9871   Location: Knee  Wound Location Orientation: Right  Wound Description (Comments): betadine ointment to incision line  Incision's 1st Dressing: DRESSING XEROFORM 5 X 9 (x1), SPONGE GAUZE 4 X 4 16   Wound Description BLESSING   Restrictions/Precautions   Weight Bearing Precautions Per Order Yes   RLE Weight Bearing Per Order FWB   Other Precautions Fall Risk;Pain;WBS;Chair Alarm; Bed Alarm;Cognitive; Impulsive;O2  (initially 2 L O2 -> 1 L O2 via NC)   General   Chart Reviewed Yes   Response to Previous Treatment Patient with no complaints from previous session  Family/Caregiver Present No   Cognition   Arousal/Participation Alert; Responsive; Cooperative   Attention Attends with cues to redirect   Orientation Level Oriented to person;Oriented to place;Oriented to time;Disoriented to situation   Memory Decreased recall of biographical information;Decreased recall of precautions   Following Commands Follows one step commands with increased time or repetition   Comments pt agreeable to PT session   Subjective   Subjective "I can get up to the commode"   Bed Mobility   Supine to Sit 4  Minimal assistance   Additional items Assist x 1;HOB elevated; Increased time required; Impulsive   Sit to Supine 4  Minimal assistance   Additional items Assist x 1;HOB elevated; Increased time required; Impulsive   Transfers   Sit to Stand 3  Moderate assistance   Additional items Assist x 1; Impulsive;Verbal cues   Stand to Sit 4  Minimal assistance   Additional items Assist x 1; Impulsive;Verbal cues   Stand pivot 3  Moderate assistance   Additional items Assist x 1; Impulsive;Verbal cues  (poor sequencing and advancement of LEs)   Toilet transfer 3  Moderate assistance   Additional items Assist x 1;Commode;Verbal cues; Increased time required;Armrests   Ambulation/Elevation   Gait pattern Improper Weight shift; Antalgic;Narrow MENDOZA; Decreased foot clearance; Step to;Excessively slow   Gait Assistance   (min/mod)   Additional items Assist x 1   Assistive Device Rolling walker   Distance 3 ft   Balance   Static Sitting Fair +   Dynamic Sitting Fair   Static Standing Fair -   Dynamic Standing Poor +   Ambulatory Poor +   Endurance Deficit   Endurance Deficit Yes   Activity Tolerance   Activity Tolerance Patient limited by fatigue;Patient limited by pain   Nurse Made Aware Yes, RN Davey Jorge made aware of outcomes/recs   Exercises   Quad Sets Supine;20 reps;AROM; Bilateral   Heelslides Supine;AROM; Right;20 reps   Glute Sets Supine;20 reps;AROM; Bilateral   Ankle Pumps Supine;20 reps;AROM; Bilateral   Assessment   Prognosis Fair   Problem List Decreased strength;Decreased range of motion;Decreased endurance; Impaired balance;Decreased mobility; Decreased cognition;Decreased skin integrity;Orthopedic restrictions;Pain   Assessment Pt seen for PT treatment session this date, consisting of ther act focused on bed mobility & functional transfer training, ther ex focused on strengthening and gt training on level surfaces to improve pt safety in household environment  Since previous session, pt has made minimal progress in terms of improved OOB mobility task performance, requiring ongoing physical assistance for mobility & redirection d/t impulsivity and poor safety awareness  Pertinent barriers during this session include pain, cognitive status, awareness and impulsivity   Current goals and POC remain appropriate, pt continues to have rehab potential and is making minimal progress towards STGs  Pt prognosis for achieving goals is fair, pending pt progress with hospitalization/medical status improvements, and indicated by Stimulability, eye contact, initiation level and previous response to intervention  Pt limited d/t inability to concentrate under maximum structure, lack of self-control (impulsivity) and lack of ability to demonstrate mobility and/or self-care activities  PT recommends post acute rehabilitation services upon discharge  Pt continues to be functioning below baseline level, and remains limited 2* factors listed above  PT will continue to see pt during current hospitalization in order to address the deficits listed above and provide interventions consistent w/ POC in effort to achieve STGs  Goals   Patient Goals "to get home"   STG Expiration Date 02/19/23   Short Term Goal #1 goals remain appropriate   PT Treatment Day 2   Plan   Treatment/Interventions ADL retraining;Functional transfer training;LE strengthening/ROM; Therapeutic exercise; Endurance training;Cognitive reorientation;Patient/family training   Progress Slow progress, decreased activity tolerance   PT Frequency Twice a day   Recommendation   PT Discharge Recommendation Post acute rehabilitation services   Equipment Recommended Walker  (ongoing RW use)   AM-PAC Basic Mobility Inpatient   Turning in Flat Bed Without Bedrails 4   Lying on Back to Sitting on Edge of Flat Bed Without Bedrails 3   Moving Bed to Chair 3   Standing Up From Chair Using Arms 3   Walk in Room 2   Climb 3-5 Stairs With Railing 2   Basic Mobility Inpatient Raw Score 17   Basic Mobility Standardized Score 39 67   Highest Level Of Mobility   JH-HLM Goal 5: Stand one or more mins   JH-HLM Achieved 4: Move to chair/commode   Education   Education Provided Mobility training;Home exercise program;Assistive device   Patient Explanation/teachback used; Reinforcement needed   End of Consult   Patient Position at End of Consult Supine; All needs within reach;Bed/Chair alarm activated       James Mondragon PT, DPT    Time of PT treatment session: 5072-7685 (total treatment time = 24 minutes)

## 2023-02-10 NOTE — PHYSICAL THERAPY NOTE
PT Evaluation    Time In: 1004 Time Out: 1046       02/10/23 1010   PT Last Visit   PT Visit Date 02/10/23   Pain Assessment   Pain Assessment Tool 0-10   Pain Score 8   Pain Location/Orientation Location: Knee;Orientation: Right   Pain Onset/Description Frequency: Constant/Continuous   Hospital Pain Intervention(s) Ambulation/increased activity;MD notified (Comment); Environmental changes; Emotional support   Multiple Pain Sites No   Restrictions/Precautions   Weight Bearing Precautions Per Order Yes   RLE Weight Bearing Per Order (S)  FWB   General   Family/Caregiver Present No   Cognition   Overall Cognitive Status Impaired   Attention Attends with cues to redirect   Orientation Level Oriented to person;Oriented to place;Oriented to time;Disoriented to situation   Memory Decreased recall of recent events   Following Commands Follows one step commands inconsistently  (Pt is able to follow commands when cued and constantly redirected)   Bed Mobility   Rolling R   (DNT)   Rolling L 6  Modified independent   Additional items HOB elevated; Increased time required   Supine to Sit 6  Modified independent  (Supine >> Rolling L >> Sitting  EOB)   Additional items HOB elevated; Increased time required   Sit to Supine 6  Modified independent   Additional items HOB elevated; Bedrails; Increased time required   Transfers   Sit to Stand 4  Minimal assistance   Additional items Assist x 2; Increased time required;Verbal cues  (VC were provided to maintain pt safety and appropriate body mechanics)   Stand to Sit 4  Minimal assistance   Additional items Assist x 2;HOB elevated; Increased time required;Verbal cues  (VC were provided to maintain pt safety and appropriate body mechanics)   Stand pivot 4  Minimal assistance   Additional items Assist x 2; Increased time required;Verbal cues  (VC were provided to maintain pt safety and appropriate body mechanics)   Ambulation/Elevation   Gait pattern Improper Weight shift; Antalgic;Narrow MENDOZA;Decreased foot clearance;Circumduction; Inconsistent surekha; Excessively slow; Step to;Short stride;Decreased heel strike;Decreased toe off;Decreased R stance  (During the initial 15 ft of ambulation, Robyn required a mod A assist x 2 w/ RW  The next 60 ft, she only required min A assist x 1 w/ verbal and tactile cues  The verbal cues were provided for safety, while the tactile cues were provide to [facility] )   Gait Assistance 4  Minimal assist   Additional items Assist x 1;Verbal cues; Tactile cues  (Circumduction occured with the opposite  leg during weight acceptace in the RLE )   Assistive Device Rolling walker   Distance 75 ft   Stair Management Assistance Not tested  (DNT due to decreased pt direction following, resulting in a safety concern )   Balance   Static Sitting Good   Dynamic Sitting Good   Static Standing Fair -   Dynamic Standing Fair -   Ambulatory Fair -   Endurance Deficit   Endurance Deficit Yes   Endurance Deficit Description Pt is limited due to fatigue and activity intolerance  Activity Tolerance   Activity Tolerance Patient tolerated treatment well;Patient limited by pain; Patient limited by fatigue  (Although PT session was limited due to cognition, pain, and fatigue  However, she was compliant when allowed increased time and task redirection )   Nurse Made Aware Yes  (RN Asa Semen)   Assessment   Prognosis Fair   Problem List Decreased strength;Decreased range of motion;Decreased endurance; Impaired balance;Decreased mobility; Decreased cognition;Decreased skin integrity;Orthopedic restrictions;Pain   Assessment Patient informed of the benefits of continued activity post TKA  Pt cognition still does not appear at baseline, however seems to be improving  Agreeable to PT treatment session   Pt seen for PT treatment session this date with interventions consisting of gait training w/ emphasis on improving pt's ability to ambulate level surfaces x 75 ft with min A provided by therapist with RW and Therapeutic exercise consisting of: AROM 20 reps R LE in supine and sitting position  Pt agreeable to PT treatment session upon arrival, pt found supine in bed w/ HOB elevated, in no apparent distress, A&O x 3 and responsive  In comparison to previous session, pt with improvements in activity tolerance, endurance, LE stability, strength, ROM, and direction folllowing  Continue to recommend post acute rehabilitation services at time of d/c in order to maximize pt's functional independence and safety w/ mobility  Pt continues to be functioning below baseline level, and remains limited 2* factors listed above and including arthritis, acute encephalopathy, and generalized anxiety disorder  PT treatment sessions will continue to advance activity level to pt  PT will continue to see pt during current hospitalization in order to address the deficits listed above and provide interventions consistent w/ POC in effort to achieve LTGs  Goals   Patient Goals to feel better   [facility] Expiration Date 02/19/23   Short Term Goal #1 Goals still appropriate  Plan   Treatment/Interventions ADL retraining;Functional transfer training;LE strengthening/ROM; Therapeutic exercise; Endurance training;Cognitive reorientation;Patient/family training   PT Frequency Twice a day   Recommendation   PT Discharge Recommendation Post acute rehabilitation services   Equipment Recommended Walker  (RW)   AM-PAC Basic Mobility Inpatient   Turning in Flat Bed Without Bedrails 4   Lying on Back to Sitting on Edge of Flat Bed Without Bedrails 4   Moving Bed to Chair 2   Standing Up From Chair Using Arms 2   Walk in Room 2   Climb 3-5 Stairs With Railing 2   Basic Mobility Inpatient Raw Score 16   Basic Mobility Standardized Score 38 32   Highest Level Of Mobility   JH-HLM Goal 5: Stand one or more mins   JH-HLM Achieved 7: Walk 25 feet or more   Exercises   Quad Sets Sitting;20 reps;AROM; Right  (20 reps x 2)   Glute Sets Supine;25 reps;AROM; Bilateral   Hip Abduction Supine;20 reps;AROM; Bilateral   Knee AROM Short Arc Quad Supine;20 reps;AROM; Bilateral   Ankle Pumps Supine;20 reps;AROM; Bilateral   Balance training  Pt ambulated 75 ft w/ RW and min A assist x 1  (Verbal and tacticle cues were provided  During ambulation task initiation w/ a RW, she required a mod A assist x 2)   End of Consult   Patient Position at End of Consult Bed/Chair alarm activated;Supine; All needs within reach  (At conclusion of session   pt remained supine in bed w/ soft restraints on and all needs within reach )       Linda Polk, SPT

## 2023-02-10 NOTE — NURSING NOTE
Awake and oriented  Restless  Acts in appropriately at times   02 is maintained 02 3 lites n/c maintained  Lungs decreased loose prod cough for clear white sputum  Rt knee dressing dry and intact  No distress   Bed alarm and call belll near

## 2023-02-10 NOTE — ASSESSMENT & PLAN NOTE
· Persists -as per nursing, the patient required restraints for safety last evening  · Unspecified exact etiology  · Possibly medication induced versus possible withdrawal of some sorts  · Narcotics and sedatives were discontinued yesterday, however last evening Klonopin was put back on  · CT brain- pending  · We will consult neurology for their opinion in regards to the etiology of this acute encephalopathy

## 2023-02-10 NOTE — TELEMEDICINE
TeleConsultation - Neurology   [facility] 74 y o  female MRN: [id number]   Encounter: [id number]      REQUIRED DOCUMENTATION:     1  This service was provided via Telemedicine  2  Provider located in [place]   3  TeleMed provider: Ngozi Galvan MD   4  Identify all parties in room with patient during tele consult:  , Ana  5  After connecting through [employer], patient was identified by name and date of birth and assistant checked wristband  Patient was then informed that this was a Telemedicine visit and that the exam was being conducted confidentially over secure lines  My office door was closed  No one else was in the room  Patient acknowledged consent and understanding of privacy and security of the Telemedicine visit, and gave us permission to have the assistant stay in the room in order to assist with the history and to conduct the exam   I informed the patient that I have reviewed their record in Epic and presented the opportunity for them to ask any questions regarding the visit today  The patient agreed to participate  Assessment/Plan   Assessment:  Transient encephalopathy  Likely toxic metabolic in setting of anesthetics and analgesics use, PE and suspected infection  Overall improving  She is doing well at the moment  CT head is normal    You can c/w watchful monitoring  Reach out to us if improvement heralds or she regresses      History of Present Illness     Reason for Consult / Principal Problem: confusion  Hx and PE limited by: none  HPI: [facility] is a 74 y o  female who presents for knee replacement  POD #2  During hospital stay she was found to have DVT, PE and she had a fever yesterday  During the course, she was found to be confused, and at times combative requiring restraints, for which we were consulted   at this time states that she is seeing clear improvement  She still has moments of confusion, but is largely oriented      Inpatient consult to Neurology  Consult performed by: Anjelica Macedo MD  Consult ordered by:  Mark Anthony Morris MD          Review of Systems  Complete ROS was done and is negative other than what is mentioned in HPI    Historical Information   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    • Drug intolerance    • Dysfunction of right eustachian tube    • Dysfunctional uterine bleeding    • Edema    • Elevated d-dimer    • Exposure to hepatitis C    • Fracture of fibula    • Generalized anxiety disorder    • GERD (gastroesophageal reflux disease)    • Hyperlipidemia    • Mild cervical dysplasia    • Myalgia    • Myositis    • Palpitations    • PONV (postoperative nausea and vomiting)    • Pre-syncope    • Seasonal allergies    • Stress incontinence    • Thrombocytopenia ([address] )    • Urinary retention    • Urinary tract infection    • Uterine leiomyoma      Past Surgical History:   Procedure Laterality Date   • BLADDER SURGERY     • BREAST BIOPSY Right     US guided   • COLONOSCOPY     • COLPOSCOPY  01/06/1998   • DENTAL SURGERY     • ENDOMETRIAL BIOPSY  05/21/1993   • HYSTERECTOMY     • HYSTEROSCOPY      uterus- 1/22/98, 7/1/02 and 4/10/07   • TUBAL LIGATION     • US GUIDED BREAST BIOPSY RIGHT COMPLETE Right 08/08/2018     Social History   Social History     Substance and Sexual Activity   Alcohol Use Never     Social History     Substance and Sexual Activity   Drug Use No     Social History     Tobacco Use   Smoking Status Every Day   • Packs/day: 1 50   • Years: 45 00   • Pack years: 67 50   • Types: Cigarettes   Smokeless Tobacco Never     Family History: non-contributory    Review of previous medical records was completed  Meds/Allergies   current meds:   Current Facility-Administered Medications   Medication Dose Route Frequency   • acetaminophen (TYLENOL) tablet 650 mg  650 mg Oral Q4H PRN   • albuterol inhalation solution 2 5 mg  2 5 mg Nebulization Q4H PRN   • aluminum-magnesium hydroxide-simethicone (MYLANTA) oral suspension 30 mL  30 mL Oral Q6H PRN   • apixaban (ELIQUIS) tablet 10 mg  10 mg Oral BID   • [START ON 2/16/2023] apixaban (ELIQUIS) tablet 5 mg  5 mg Oral BID   • ascorbic acid (VITAMIN C) tablet 500 mg  500 mg Oral BID   • atorvastatin (LIPITOR) tablet 20 mg  20 mg Oral After Dinner   • buPROPion (WELLBUTRIN XL) 24 hr tablet 150 mg  150 mg Oral QAM   • clonazePAM (KlonoPIN) tablet 1 mg  1 mg Oral HS   • docusate sodium (COLACE) capsule 100 mg  100 mg Oral BID   • ferrous sulfate tablet 325 mg  325 mg Oral BID With Meals   • folic acid (FOLVITE) tablet 1 mg  1 mg Oral Daily   • multivitamin-minerals (CENTRUM) tablet 1 tablet  1 tablet Oral Daily   • ondansetron (ZOFRAN) injection 4 mg  4 mg Intravenous Q6H PRN   • pantoprazole (PROTONIX) EC tablet 40 mg  40 mg Oral Early Morning   • venlafaxine (EFFEXOR-XR) 24 hr capsule 150 mg  150 mg Oral Daily       No Known Allergies    Objective   Vitals:Blood pressure 118/80, pulse 70, temperature 99 2 °F (37 3 °C), resp  rate 18, height 5' 3" (1 6 m), weight 63 5 kg (140 lb), SpO2 92 %  ,Body mass index is 24 8 kg/m²  Intake/Output Summary (Last 24 hours) at 2/10/2023 0945  Last data filed at 2/10/2023 0501  Gross per 24 hour   Intake 200 ml   Output --   Net 200 ml       Invasive Devices:    Invasive Devices     Peripheral Intravenous Line  Duration           Peripheral IV 02/08/23 Right;Ventral (anterior) Hand 2 days    Peripheral IV 02/09/23 Right;Ventral (anterior) Forearm 1 day          Drain  Duration           Autologus Reinfusion/Drain Device 1 Right Knee 1 day                Physical Exam NAD  Skin: no seen lesions  HEENT: ATNC  Chest: breathing comfortably on room air  GI: no apparent distension  Neurologic Exam   Alert and oriented for self, place and time  Memory is intact to recent and remote events  Language is intact to comprehension and expression  No neglect  Speech is normal  EOMI  Pupils are symmetric  Face is symmetric  Able to move all extremities against gravity  No dysmetria noted  Lab Results:   CBC:   Results from last 7 days   Lab Units 02/10/23  0447 02/09/23  0433   WBC Thousand/uL 12 45* 10 72*   RBC Million/uL 3 02* 3 23*   HEMOGLOBIN g/dL 9 3* 9 8*   HEMATOCRIT % 29 0* 31 4*   MCV fL 96 97   PLATELETS Thousands/uL 294 234   , BMP/CMP:   Results from last 7 days   Lab Units 02/10/23  0447 02/09/23  0433   SODIUM mmol/L 136 137   POTASSIUM mmol/L 3 6 3 6   CHLORIDE mmol/L 102 104   CO2 mmol/L 25 25   BUN mg/dL 11 14   CREATININE mg/dL 0 50* 0 60   CALCIUM mg/dL 8 5 9 1   EGFR ml/min/1 73sq m 101 95     Imaging Studies: I have personally reviewed pertinent films in PACS  EKG, Pathology, and Other Studies: I have personally reviewed pertinent reports      VTE Prophylaxis: Sequential compression device Lili Wallis)     Code Status: No Order  Advance Directive and Living Will:      Power of :    POLST:      Counseling / Coordination of Care  N/A

## 2023-02-10 NOTE — NURSING NOTE
Soft wrist restraints discontinued  Bed alarm on  Patient and spouse verbalized understanding of safety measures

## 2023-02-10 NOTE — CONSULTS
Consultation - [facility] Infectious Disease   Josefina Dural 74 y o  female MRN: [id number]  Unit/Bed#: -01 Encounter: [id number]    IMPRESSION & RECOMMENDATIONS:   1  Post-operative fever  Patient with isolated temp 101 2*F on POD1  Mild leukocytosis noted and likely reactive  PCT is normal  Hgb noted to drop 2g though no evidence of active bleeding  CT chest shows L>R postoperative atelectasis and acute RUL pulmonary embolism  Venous duplex also shows acute LLE DVT  Blood cultures x2 are pending  UA pending  COVID/Flu/RSV neg  Low suspicion for infectious etiology  There is also question of possible aspiration post-op with respiratory insufficiency, thus consider possibility of chemical pneumonitis  Suspect fever due to post-operative atelectasis and acute VTE    -continue to monitor off antibiotics   -follow-up blood cultures    -monitor CBCd, temperature and hemodynamics  -encourage incentive spirometry  -anticoagulation per medicine team      2  Acute RUL PE and LLE DVT  Provoked, in the setting of #5  Started on [facility] per medicine    -continue anticoagulation      3  Post-operative respiratory insufficiency with hypoxia  Likely due to acute PE, atelectasis, and polypharmacy  Unlikely infectious etiology  4  Acute, toxic/metabolic encephalopathy  Unlikely infectious etiology  Ongoing work-up per medicine  5  R knee OA s/p elective R TKA 2/8/23  Complicated by the above  Management per primary team     6  Tobacco abuse  Ongoing risk factor for inadequate wound healing  Antibiotics:  None     I have discussed the above management plan in detail with patient  I have discussed the above management plan in detail with patient's RN, primary service Ortho  We will see patient again 2/13/23 if here  Please call ID on call physician in meantime if questions      Extensive review of the medical records in epic including review of the notes, radiographs, and laboratory results     HISTORY OF PRESENT ILLNESS:  Reason for Consult: Fever    HPI: Kathy Small is a 74y o  year old female with past medical history significant for arthritis, anxiety, hyperlipidemia, GERD, history of UTIs and urinary retention, who initially presented to the hospital for a planned elective right knee replacement on 2/8/2023  Procedure was uncomplicated with minimal blood loss  Ordonez catheter was discontinued postop day 1  She was seen by PT and OT and commended for rehab  Primary team, Ortho consulted internal medicine on 2/9 due to hypoxia and altered mental status  Altered mental status likely due to anesthesia, sedatives and narcotics with polypharmacy for pain control  Appears that neurology has been consulted  Postoperatively patient was requiring up to 4 L nasal cannula O2  Flu, RSV and COVID were negative  Patient had isolated fever 101 2 °F on 2/9 at approximately 1445  Work-up for hypoxia included a lower extremity venous duplex and CTA chest PE protocol  She was found to have a small segmental and subsegmental PE in the right upper lobe without infarct or heart strain  No evidence for pneumonia  Venous duplex showed a acute nonocclusive DVT in the left popliteal vein  Leukocytosis is mild and noted to uptrend slightly  Procalcitonin is normal   Blood cultures are pending  There was concern for infectious etiology and infectious disease was consulted  Patient reports subjective fever yesterday but no chills  Denies diarrhea or dysuria  No abdominal pain  Skin integrity intact  No other prosthetic or intravascular devices apart from new R TKA  REVIEW OF SYSTEMS:  A complete review of systems is negative other than that noted in the HPI      PAST MEDICAL HISTORY:  Past Medical History:   Diagnosis Date   • Abnormal finding in urine    • Anxiety    • Arthralgia of multiple joints    • Balance disorder    • Biceps tendinitis, right    • Bursitis of right knee    • Bursitis of right shoulder    • Chondrocalcinosis    • Chronic bilateral low back pain without sciatica 2022   • Chronic pain disorder    • COPD exacerbation (HCC)    • 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    • Drug intolerance    • Dysfunction of right eustachian tube    • Dysfunctional uterine bleeding    • Edema    • Elevated d-dimer    • Exposure to hepatitis C    • Fracture of fibula    • Generalized anxiety disorder    • GERD (gastroesophageal reflux disease)    • Hyperlipidemia    • Mild cervical dysplasia    • Myalgia    • Myositis    • Palpitations    • PONV (postoperative nausea and vomiting)    • Pre-syncope    • Seasonal allergies    • Stress incontinence    • Thrombocytopenia ([address] )    • Urinary retention    • Urinary tract infection    • Uterine leiomyoma      Past Surgical History:   Procedure Laterality Date   • BLADDER SURGERY     • BREAST BIOPSY Right     US guided   • COLONOSCOPY     • COLPOSCOPY  1998   • DENTAL SURGERY     • ENDOMETRIAL BIOPSY  1993   • HYSTERECTOMY     • HYSTEROSCOPY      uterus- 98, 02 and 4/10/07   • TUBAL LIGATION     • US GUIDED BREAST BIOPSY RIGHT COMPLETE Right 2018       FAMILY HISTORY:  Non-contributory    SOCIAL HISTORY:  Social History   Social History     Substance and Sexual Activity   Alcohol Use Never     Social History     Substance and Sexual Activity   Drug Use No     Social History     Tobacco Use   Smoking Status Every Day   • Packs/day: 1 50   • Years: 45 00   • Pack years: 67 50   • Types: Cigarettes   Smokeless Tobacco Never     ALLERGIES:  No Known Allergies    MEDICATIONS:  All current active medications have been reviewed      PHYSICAL EXAM:  Temp:  [99 1 °F (37 3 °C)-101 2 °F (38 4 °C)] 99 2 °F (37 3 °C)  HR:  [70-90] 70  Resp:  [18-26] 18  BP: (103-128)/(61-80) 118/80  SpO2:  [90 %-96 %] 92 %  Temp (24hrs), Av 8 °F (37 7 °C), Min:99 1 °F (37 3 °C), Max:101 2 °F (38 4 °C)  Current: Temperature: 99 2 °F (37 3 °C)    Intake/Output Summary (Last 24 hours) at 2/10/2023 0957  Last data filed at 2/10/2023 0501  Gross per 24 hour   Intake 200 ml   Output --   Net 200 ml     General Appearance:  Appearing well, nontoxic, and in no distress; confused but redirectable    HEENT: Normocephalic, without obvious abnormality, atraumatic  Conjunctiva pink and sclera anicteric  Oropharynx moist without lesions  Lungs:   Clear to auscultation bilaterally, respirations unlabored   Heart:  RRR; no murmur, rub or gallop   Abdomen:   Soft, non-tender, non-distended, positive bowel sounds    Extremities: No cyanosis, clubbing or edema  Musculoskeletal: R knee post op dressing with ace wrap intact without streaking drainage  Skin: No rashes or lesions  No draining wounds noted  IV in place without warmth, erythema, or exudate  LABS, IMAGING, & OTHER STUDIES:  Lab Results:  I have personally reviewed pertinent labs  Results from last 7 days   Lab Units 02/10/23  0447 02/09/23  0433   WBC Thousand/uL 12 45* 10 72*   HEMOGLOBIN g/dL 9 3* 9 8*   PLATELETS Thousands/uL 294 234     Results from last 7 days   Lab Units 02/10/23  0447 02/09/23  0433   SODIUM mmol/L 136 137   POTASSIUM mmol/L 3 6 3 6   CHLORIDE mmol/L 102 104   CO2 mmol/L 25 25   BUN mg/dL 11 14   CREATININE mg/dL 0 50* 0 60   EGFR ml/min/1 73sq m 101 95   CALCIUM mg/dL 8 5 9 1     Results from last 7 days   Lab Units 02/09/23  1512   BLOOD CULTURE  Received in Microbiology Lab  Culture in Progress  Received in Microbiology Lab  Culture in Progress  Results from last 7 days   Lab Units 02/09/23  1513   PROCALCITONIN ng/ml 0 08                   Imaging Studies:   I have personally reviewed pertinent imaging study reports and images in PACS  Other Studies:   I have personally reviewed pertinent reports

## 2023-02-10 NOTE — ASSESSMENT & PLAN NOTE
· Current oxygen requirements remain at 3 to 4 L of supplemental oxygen via nasal cannula with a resultant O2 sat in the low to mid 90s  · CTA chest PE protocol-Small segmental and subsegmental emboli in the right upper lobe with no pulmonary infarct or right heart strain     · Bilateral lower extremity venous Doppler testing-There is evidence of acute, non-occlusive deep vein thrombosis in the left popliteal vein  · 2D echocardiogram- pending  · Patient had surgery on the right knee, however DVT was found in the left lower extremity by making this DVT present on admission  · Patient was initially on Arixtra prophylactic dosing, patient was transitioned to full dose Eliquis -10 mg p o  twice daily through 2/16/2023, after which she will have her dose reduced to 5 mg p o  twice daily for at least the next 3 to 6 months  · We will additionally consult pulmonary for their opinion

## 2023-02-10 NOTE — OCCUPATIONAL THERAPY NOTE
02/10/23 0915   OT Last Visit   OT Visit Date 02/10/23   Note Type   Note Type Treatment  (completed [id number])   Pain Assessment   Pain Assessment Tool 0-10   Pain Score 5   Pain Location/Orientation Orientation: Right;Location: Knee  ("especially when I try to straighten it")   Restrictions/Precautions   Weight Bearing Precautions Per Order Yes   RLE Weight Bearing Per Order FWB   Other Precautions Cognitive; Chair Alarm;WBS; Telemetry; Fall Risk;Pain;O2   Lifestyle   Intrinsic Gratification housework and doing things outside   ADL   Where Assessed   (patient was just bathed by PCA, who reported that patient could do approx  a third of it/mostly Upper Body with verbal cues)   Equipment Provided   (provided education in LH/LB AE > patient has a Reacher at home and may not need/want other equipment)   LB Dressing Comments Patient demos  'd ability to reach to operated / Right foot while seated in recliner   Therapeutic Excerise-Strength   UE Strength Yes   Right Upper Extremity- Strength   R Shoulder Flexion; Horizontal ABduction; Other (Comment)  (pro/re-traction)   R Elbow Elbow flexion;Elbow extension  (in forward and reverse;  Also: supin/pron-ation)   R Weight/Reps/Sets 1 pound weight - 15 reps shoulders and 20 reps elbows   RUE Strength Comment *patient had difficultly keeping count (several times) during session AND occasionally changed motion pattern without being aware of this   Left Upper Extremity-Strength   L Weights/Reps/Sets all exers   same as RUE above   Coordination   Gross Motor slightly impaired   Dexterity slightly impaired   Cognition   Overall Cognitive Status Impaired   Arousal/Participation Responsive   Attention Attends with cues to redirect   Following Commands Follows one step commands inconsistently   Activity Tolerance   Activity Tolerance Patient limited by fatigue   Medical Staff Made Aware Nurse Dana Yoder aware of session and PCA Annika aware of session's outcome   Assessment   Assessment Patient participated in Skilled OT session this date with interventions consisting of ADL re training with the use of correct body mechnaics, safety awareness and fall prevention techniques,  long handle equipment, therapeutic exercise to: increase functional use of BUEs, increase BUE muscle strength , increase postural control and increase OOB/ sitting tolerance   Patient agreeable to OT treatment session, upon arrival patient was found seated OOB to Recliner - session completed with patient in recliner and with  present + attentive  In comparison to previous session, patient with improvements in attentiveness and ability to follow direction  Patient requiring frequent re-direction, verbal (and sometimes physical) cues for correct technique, occasional rest periods, cognitive/re-orientation assistance at times, and frequent cues for safety  Patient continues to be functioning below baseline level, occupational performance remains limited secondary to factors listed above and increased risk for falls and injury  From OT standpoint, recommendation at time of d/c would be post-acute rehabilitation services  Patient to benefit from continued Occupational Therapy treatment while in the hospital to address deficits as defined above and maximize level of functional independence with ADLs and functional mobility  Plan   Treatment Interventions ADL retraining;UE strengthening/ROM; Patient/family training;Equipment evaluation/education; Activityengagement; Compensatory technique education  ( present and attentive; Some safety education included)   Goal Expiration Date 02/19/23   OT Treatment Day 1   Recommendation   OT Discharge Recommendation Post acute rehabilitation services   Additional Comments 2 The patient's raw score on the AM-PAC Daily Activity Inpatient Short Form is 15  A raw score of less than 19 suggests the patient may benefit from discharge to post-acute rehabilitation services   Please refer to the recommendation of the Occupational Therapist for safe discharge planning     AM-PAC Daily Activity Inpatient   Lower Body Dressing 2   Bathing 2   Toileting 1   Upper Body Dressing 3   Grooming 3   Eating 4   Daily Activity Raw Score 15   Daily Activity Standardized Score (Calc for Raw Score >=11) 34 69   AM-PAC Applied Cognition Inpatient   Following a Speech/Presentation 3   Understanding Ordinary Conversation 3   Taking Medications 1   Remembering Where Things Are Placed or Put Away 1   Remembering List of 4-5 Errands 1   Taking Care of Complicated Tasks 1   Applied Cognition Raw Score 10   Applied Cognition Standardized Score 24 98     VIKAS Byrne/L

## 2023-02-10 NOTE — CONSULTS
Pulmonary Medicine-Consultation  Nandini Last 74 y o  female MRN: [id number]  Unit/Bed#: -01 Encounter: [id number]      Assessment:  1  Postop respiratory failure W/hypoxia  2  Acute pulmonary embolism  3  Acute LLE DVT  4  COPD/emphysema  5  Active tobacco abuse    Recommendations/discussion:  • Current hypoxemia seems to be acute/on chronic, mainly due to postop atelectasis/hypoventilation from analgesics/narcotics  On a background of at least moderate COPD/emphysema radiographically no PFT before actively smokes more than 2 PPD  • Encouraged to continue OOB/PT OT, supervised her using incentive spirometer  • Added treatment for COPD/chronic bronchitis budesonide every 12 Atrovent/Xopenex 3 times daily, no current indication for steroids  • Wean supplemental oxygen for SPO2 above 88%  • Treatment for pulmonary embolism per the primary team, on apixaban loading dose    Physician Requesting Consult: Concepcion Fregoso DO    Reason for Consult / Principal Problem: Hypoxic respiratory failure    HPI:   74 y o  female with a h/o acid reflux, osteoarthritis, dyslipidemia, COPD, seasonal allergies, chronic pain syndrome, active tobacco abuse 67-pack-year    Scented to the ED 2/8 for elective right knee placement, estimated blood loss 50 cc   2/9 noted acute encephalopathy, thought to be post anesthesia/narcotic related she was received clonazepam, gabapentin, fentanyl, Precedex, Dilaudid, Percocet/propofol over 24 hours  At that time she required 3-4 LPM supplemental oxygen, underwent a CT PE showed moderate emphysema, moderate LLL atelectasis, mild RLL atelectasis, moderate hiatal hernia  Also noted segmental/subsegmental pulmonary embolism at the RUL  Lower remedy duplex was positive for acute DVT in the left popliteal vein  Anticoagulation is started on loading dose of Eliquis, from fondaparinux she was on before    TTE showed no signs of RV dysfunction, normal size/systolic function    Chest x-ray earlier today showed persistent atelectatic changes, given the assistant hypoxemia on room air/not on oxygen at home pulmonary consulted to assist in management  On my assessment, patient is resting in bed awake and interactive oriented x2   and daughter are at bedside  Reports no prior formal diagnosis of asthma or COPD, however smokes more than 2 0 SymPak  A, also having a chronic minimally productive cough never been on inhalers before  Currently denies respiratory symptoms at rest, feeling some chest congestion otherwise no significant wheezing, chest pain or dyspnea at rest   Able to take a walk with PT, required 2 LPM     Inpatient consult to Pulmonology  Consult performed by: Misha Dean MD  Consult ordered by:  Alex Mohamud MD          Review of Systems  As per hpi, all other systems reviewed and were negative      Studies:    Imaging Studies: I have personally reviewed pertinent films in PACS    EKG, Pathology, and Other Studies: I have personally reviewed pertinent films in PACS    Pulmonary Results (PFTs, PSG): None in file    Historical Information   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    • Drug intolerance    • Dysfunction of right eustachian tube    • Dysfunctional uterine bleeding    • Edema    • Elevated d-dimer    • Exposure to hepatitis C    • Fracture of fibula    • Generalized anxiety disorder    • GERD (gastroesophageal reflux disease)    • Hyperlipidemia    • Mild cervical dysplasia    • Myalgia    • Myositis    • Palpitations    • PONV (postoperative nausea and vomiting)    • Pre-syncope    • Seasonal allergies    • Stress incontinence    • Thrombocytopenia ([address] )    • Urinary retention    • Urinary tract infection    • Uterine leiomyoma      Past Surgical History:   Procedure Laterality Date   • BLADDER SURGERY     • BREAST BIOPSY Right     US guided   • COLONOSCOPY     • COLPOSCOPY  01/06/1998   • DENTAL SURGERY     • ENDOMETRIAL BIOPSY  05/21/1993   • HYSTERECTOMY     • HYSTEROSCOPY      uterus- 1/22/98, 7/1/02 and 4/10/07   • TUBAL LIGATION     • US GUIDED BREAST BIOPSY RIGHT COMPLETE Right 08/08/2018     Social History   Social History     Substance and Sexual Activity   Alcohol Use Never     Social History     Substance and Sexual Activity   Drug Use No     Social History     Tobacco Use   Smoking Status Every Day   • Packs/day: 1 50   • Years: 45 00   • Pack years: 67 50   • Types: Cigarettes   Smokeless Tobacco Never     E-Cigarette/Vaping   • E-Cigarette Use Never User      E-Cigarette/Vaping Substances   • Nicotine No    • THC No    • CBD No    • Flavoring No    • Other No    • Unknown No          Family History:   Family History   Problem Relation Age of Onset   • Stroke Mother         cerebral artery occlusion with cerebral infarction   • Coronary artery disease Mother    • Arrhythmia Mother         sinus   • Coronary artery disease Father    • Diabetes Father    • Aortic aneurysm Family    • Arthritis Family    • Diabetes Family    • Heart disease Family    • No Known Problems Daughter    • No Known Problems Maternal Grandmother    • No Known Problems Paternal Grandmother    • No Known Problems Daughter    • No Known Problems Maternal Aunt    • Ovarian cancer Maternal Aunt        Meds/Allergies   all current active meds have been reviewed    No Known Allergies    Objective   Vitals: Blood pressure 124/67, pulse 85, temperature 98 5 °F (36 9 °C), resp  rate 20, height 5' 3" (1 6 m), weight 63 5 kg (140 lb), SpO2 91 %  ,Body mass index is 24 8 kg/m²  Intake/Output Summary (Last 24 hours) at 2/10/2023 1625  Last data filed at 2/10/2023 0501  Gross per 24 hour   Intake 200 ml   Output --   Net 200 ml     Invasive Devices     Peripheral Intravenous Line  Duration           Peripheral IV 02/08/23 Right;Ventral (anterior) Hand 2 days    Peripheral IV 02/09/23 Right;Ventral (anterior) Forearm 1 day          Drain  Duration           Autologus Reinfusion/Drain Device 1 Right Knee 2 days                Physical Exam  Body mass index is 24 8 kg/m²  Gen: not in acute distress,   Neck/Eyes: supple, no adenopathy, PERRL  Ear: normal appearance, no significant hearing impairment  Nose:  normal nasal mucosa, no drainage  Mouth:  unremarkable/normal appearance of lips, teeth and gums  Salivary glands: soft nontender  Chest: normal respiratory efforts, mild/scattered end expiratory wheeze, otherwise clear breath sounds bilaterally, incentive spirometer and did about 1350 cc  CV: no murmurs appreciated, no edema  Abdomen: soft, non tender  Extremities: Osteoarthritic hands deformities at the MCP, right knee in Ace wrap  Neuro: AAO X2, no focal motor deficit       Lab Results: I have personally reviewed pertinent lab results  Counseling/Coordination of Care: Total floor / unit time spent today 3 minutes  Greater than 50% of total time was spent with the patient and / or family counseling and / or coordination of care  A description of the counseling / coordination of care: Smoking cessation    Portions of the record may have been created with voice recognition software  Occasional wrong word or "sound a like" substitutions may have occurred due to the inherent limitations of voice recognition software  Read the chart carefully and recognize, using context, where substitutions have occurred

## 2023-02-10 NOTE — PROGRESS NOTES
[facility] [address]  Progress Note Flakita Watkins 1955, 74 y o  female MRN: [id number]  Unit/Bed#: -01 Encounter: [id number]  Primary Care Provider: James Rendon DO   Date and time admitted to hospital: 2/8/2023  6:59 AM    * Arthritis of right knee  Assessment & Plan  · Patient is status post  - postop day 2 a total right knee arthroplasty by the primary service which is orthopedic surgery because of a pre-existing history of severe osteoarthritis of the right knee  · Continue postop surgical management as per the primary care service  · DVT prophylaxis is with Eliquis  · Continue PT and OT  · Patient will eventually need Post acute rehab - case management is working on post discharge placement options    Acute pulmonary embolism without acute cor pulmonale (HCC)  Assessment & Plan  · Current oxygen requirements remain at 3 to 4 L of supplemental oxygen via nasal cannula with a resultant O2 sat in the low to mid 90s  · CTA chest PE protocol-Small segmental and subsegmental emboli in the right upper lobe with no pulmonary infarct or right heart strain     · Bilateral lower extremity venous Doppler testing-There is evidence of acute, non-occlusive deep vein thrombosis in the left popliteal vein  · 2D echocardiogram- pending  · Patient had surgery on the right knee, however DVT was found in the left lower extremity by making this DVT present on admission  · Patient was initially on Arixtra prophylactic dosing, patient was transitioned to full dose Eliquis -10 mg p o  twice daily through 2/16/2023, after which she will have her dose reduced to 5 mg p o  twice daily for at least the next 3 to 6 months  · We will additionally consult pulmonary for their opinion    Encephalopathy acute  Assessment & Plan  · Persists -as per nursing, the patient required restraints for safety last evening  · Unspecified exact etiology  · Possibly medication induced versus possible withdrawal of some sorts  · Narcotics and sedatives were discontinued yesterday, however last evening Klonopin was put back on  · CT brain- pending  · We will consult neurology for their opinion in regards to the etiology of this acute encephalopathy    Fever  Assessment & Plan  · Tmax over the past 24 hours 101 2, for the most part remaining temperatures have been above 99 degrees  · Work-up thus far:-  · #1  Testing for COVID-19, respiratory syncytial virus, and influenza-negative  · #2  Blood cultures x2 sets collected on 2/9/2023-are pending  · #3  Question the possibility of an aspiration event in the perioperative setting- we will check a portable chest x-ray today  · #4  Mild bump in white blood cell count noted  · #5  Procalcitonin testing:-0 08  · #6  We will check a urinalysis    Fever could be secondary to venous thromboembolism, will ask for an ID evaluation    Hyperlipidemia  Assessment & Plan  · Continue Lipitor    Esophageal reflux  Assessment & Plan  · Continue Protonix    Generalized anxiety disorder  Assessment & Plan  · With depression  · Continue Effexor, Wellbutrin and clonazepam at at bedtime        VTE Prophylaxis:  Apixaban (Eliquis)    Patient Centered Rounds: I have performed bedside rounds with nursing staff today      Discussions with Specialists or Other Care Team Provider: Orthopedic surgery, case management, nursing  Education and Discussions with Family / Patient: Patient's  was brought up to par last evening, will update him again once he comes into the hospital today to visit    Current Length of Stay: 1 day(s)    Current Patient Status: Inpatient   Certification Statement: The patient will continue to require additional inpatient hospital stay due to The need for continued management of an altered mental status, fever, and pulmonary embolism in the setting of recently having a right knee replacement    Discharge Plan: As per the primary service    Code Status: Full CODE STATUS level 1    Subjective:   Patient seen and examined, remains confused, can tell me her name, but is not oriented to place and time    Objective:     Vitals:   Temp (24hrs), Av 8 °F (37 7 °C), Min:99 1 °F (37 3 °C), Max:101 2 °F (38 4 °C)    Temp:  [99 1 °F (37 3 °C)-101 2 °F (38 4 °C)] 99 2 °F (37 3 °C)  HR:  [80-90] 83  Resp:  [18-26] 18  BP: (103-128)/(61-71) 122/71  SpO2:  [90 %-96 %] 92 %  Body mass index is 24 8 kg/m²  Input and Output Summary (last 24 hours): Intake/Output Summary (Last 24 hours) at 2/10/2023 0644  Last data filed at 2/10/2023 0501  Gross per 24 hour   Intake 200 ml   Output --   Net 200 ml       Physical Exam:   Physical Exam  Constitutional:       General: She is not in acute distress  Appearance: Normal appearance  She is normal weight  She is not ill-appearing  HENT:      Head: Normocephalic and atraumatic  Nose: Nose normal       Mouth/Throat:      Mouth: Mucous membranes are moist    Eyes:      Extraocular Movements: Extraocular movements intact  Pupils: Pupils are equal, round, and reactive to light  Cardiovascular:      Rate and Rhythm: Normal rate and regular rhythm  Pulses: Normal pulses  Heart sounds: Normal heart sounds  No murmur heard  No friction rub  No gallop  Pulmonary:      Effort: Pulmonary effort is normal  No respiratory distress  Breath sounds: Normal breath sounds  No wheezing, rhonchi or rales  Abdominal:      General: There is no distension  Palpations: Abdomen is soft  There is no mass  Tenderness: There is no abdominal tenderness  Hernia: No hernia is present  Musculoskeletal:         General: No swelling or tenderness  Normal range of motion  Cervical back: Normal range of motion and neck supple  No rigidity  Right lower leg: No edema  Left lower leg: No edema  Comments: Right knee dressing is in place, dry, and intact   Skin:     General: Skin is warm        Capillary Refill: Capillary refill takes less than 2 seconds  Findings: No erythema or rash  Neurological:      General: No focal deficit present  Mental Status: She is alert  Mental status is at baseline  Cranial Nerves: No cranial nerve deficit  Motor: No weakness  Comments: Confused disoriented   Psychiatric:         Mood and Affect: Mood normal          Behavior: Behavior normal          Additional Data:     Labs:    Results from last 7 days   Lab Units 02/10/23  0447   WBC Thousand/uL 12 45*   HEMOGLOBIN g/dL 9 3*   HEMATOCRIT % 29 0*   PLATELETS Thousands/uL 294     Results from last 7 days   Lab Units 02/10/23  0447   SODIUM mmol/L 136   POTASSIUM mmol/L 3 6   CHLORIDE mmol/L 102   CO2 mmol/L 25   BUN mg/dL 11   CREATININE mg/dL 0 50*   CALCIUM mg/dL 8 5         Results from last 7 days   Lab Units 02/10/23  0753   POC GLUCOSE mg/dl 166*           * I Have Reviewed All Lab Data Listed Above  * Additional Pertinent Lab Tests Reviewed: [facility]  Reviewed    Imaging:  Imaging Reports Reviewed Today Include: CT chest-right upper lobe PE, bilateral lower extremity venous Doppler testing, left lower extremity popliteal vein DVT    Recent Cultures (last 7 days):     Results from last 7 days   Lab Units 02/09/23  1512   BLOOD CULTURE  Received in Microbiology Lab  Culture in Progress  Received in Microbiology Lab  Culture in Progress         Last 24 Hours Medication List:   Current Facility-Administered Medications   Medication Dose Route Frequency Provider Last Rate   • acetaminophen  650 mg Oral Q4H PRN Bruna Arciniega PA-C     • albuterol  2 5 mg Nebulization Q4H PRN Dany Price DO     • aluminum-magnesium hydroxide-simethicone  30 mL Oral Q6H PRN Bruna Arciniega PA-C     • apixaban  10 mg Oral BID Gus Mitchell MD     • [START ON 2/16/2023] apixaban  5 mg Oral BID Gus Mitchell MD     • ascorbic acid  500 mg Oral BID Travis Centeno SAMRA Rawls     • atorvastatin  20 mg Oral After Gracie Monson PA-C     • buPROPion  150 mg Oral QAM Kristen Tyler PA-C     • clonazePAM  1 mg Oral HS Corbin Cabot, CRNP     • docusate sodium  100 mg Oral BID Yadiel Rawls PA-C     • ferrous sulfate  325 mg Oral BID With Meals Kristen Tyler PA-C     • folic acid  1 mg Oral Daily Kristen Tyler PA-C     • multivitamin-minerals  1 tablet Oral Daily Kristen Tyler PA-C     • ondansetron  4 mg Intravenous Q6H PRN Yadiel Rawls PA-C     • pantoprazole  40 mg Oral Early Morning Kristen Tyler PA-C     • venlafaxine  150 mg Oral Daily Kristen Tyler PA-C          Today, Patient Was Seen By: Irma Ribeiro MD    ** Please Note: Dictation voice to text software may have been used in the creation of this document   **

## 2023-02-10 NOTE — NURSING NOTE
Disoriented  Keeps removing 02 n/c and desats  With 02 4 liters n/c  Pt sat rebounds to 94%   Lorenzo rahman aware and b/l soft wrist restraints applied

## 2023-02-10 NOTE — OCCUPATIONAL THERAPY NOTE
02/10/23 0915   OT Last Visit   OT Visit Date 02/10/23   Note Type   Note Type Treatment   Pain Assessment   Pain Assessment Tool 0-10   Pain Score 5   Pain Location/Orientation Orientation: Right;Location: Knee  ("especially when I try to straighten it")   Restrictions/Precautions   Weight Bearing Precautions Per Order Yes   RLE Weight Bearing Per Order FWB   Other Precautions Cognitive; Chair Alarm;WBS; Telemetry; Fall Risk;Pain;O2   Lifestyle   Intrinsic Gratification housework and doing things outside   ADL   Where Assessed   (patient was just bathed by PCA, who reported that patient could do approx  a third of it/mostly Upper Body with verbal cues)   Equipment Provided   (provided education in LH/LB AE > patient has a Reacher at home and may not need/want other equipment)   LB Dressing Comments Patient demos  'd ability to reach to operated / Right foot while seated in recliner   Therapeutic Excerise-Strength   UE Strength Yes   Right Upper Extremity- Strength   R Shoulder Flexion; Horizontal ABduction; Other (Comment)  (pro/re-traction)   R Elbow Elbow flexion;Elbow extension  (in forward and reverse;  Also: supin/pron-ation)   R Weight/Reps/Sets 1 pound weight - 15 reps shoulders and 20 reps elbows   RUE Strength Comment *patient had difficultly keeping count (several times) during session AND occasionally changed motion pattern without being aware of this   Left Upper Extremity-Strength   L Weights/Reps/Sets all exers   same as RUE above   Coordination   Gross Motor slightly impaired   Dexterity slightly impaired   Cognition   Overall Cognitive Status Impaired   Arousal/Participation Responsive   Attention Attends with cues to redirect   Following Commands Follows one step commands inconsistently   Activity Tolerance   Activity Tolerance Patient limited by fatigue   Medical Staff Made Aware Nurse Carla Ambriz aware of session and PCA Job Emiliano aware of session's outcome   Assessment   Assessment Patient participated in Skilled OT session this date with interventions consisting of ADL re training with the use of correct body mechnaics, safety awareness and fall prevention techniques,  long handle equipment, therapeutic exercise to: increase functional use of BUEs, increase BUE muscle strength , increase postural control and increase OOB/ sitting tolerance   Patient agreeable to OT treatment session, upon arrival patient was found seated OOB to Recliner - session completed with patient in recliner and with  present + attentive  In comparison to previous session, patient with improvements in attentiveness and ability to follow direction  Patient requiring frequent re-direction, verbal (and sometimes physical) cues for correct technique, occasional rest periods, cognitive/re-orientation assistance at times, and frequent cues for safety  Patient continues to be functioning below baseline level, occupational performance remains limited secondary to factors listed above and increased risk for falls and injury  From OT standpoint, recommendation at time of d/c would be post-acute rehabilitation services  Patient to benefit from continued Occupational Therapy treatment while in the hospital to address deficits as defined above and maximize level of functional independence with ADLs and functional mobility  Plan   Treatment Interventions ADL retraining;UE strengthening/ROM; Patient/family training;Equipment evaluation/education; Activityengagement; Compensatory technique education  ( present and attentive; Some safety education included)   Goal Expiration Date 02/19/23   OT Treatment Day 1   Recommendation   OT Discharge Recommendation Post acute rehabilitation services   Additional Comments 2 The patient's raw score on the AM-PAC Daily Activity Inpatient Short Form is 15  A raw score of less than 19 suggests the patient may benefit from discharge to post-acute rehabilitation services   Please refer to the recommendation of the Occupational Therapist for safe discharge planning     AM-PAC Daily Activity Inpatient   Lower Body Dressing 2   Bathing 2   Toileting 1   Upper Body Dressing 3   Grooming 3   Eating 4   Daily Activity Raw Score 15   Daily Activity Standardized Score (Calc for Raw Score >=11) 34 69   AM-PAC Applied Cognition Inpatient   Following a Speech/Presentation 3   Understanding Ordinary Conversation 3   Taking Medications 1   Remembering Where Things Are Placed or Put Away 1   Remembering List of 4-5 Errands 1   Taking Care of Complicated Tasks 1   Applied Cognition Raw Score 10   Applied Cognition Standardized Score 24 98       Denece VIKAS Rolle/SHERI

## 2023-02-10 NOTE — PLAN OF CARE
Problem: OCCUPATIONAL THERAPY ADULT  Goal: Performs self-care activities at highest level of function for planned discharge setting  See evaluation for individualized goals  Description: Treatment Interventions: ADL retraining, Functional transfer training, Endurance training, Cognitive reorientation, Patient/family training, Equipment evaluation/education, Compensatory technique education, Energy conservation, Activityengagement     See flowsheet documentation for full assessment, interventions and recommendations  2/10/2023 1316 by TANYA Hoang  Outcome: Progressing  Note: Limitation: Decreased ADL status, Decreased Safe judgement during ADL, Decreased cognition, Decreased endurance, Decreased self-care trans, Decreased high-level ADLs  Prognosis: Good  Assessment: Patient participated in Skilled OT session this date with interventions consisting of ADL re training with the use of correct body mechnaics, safety awareness and fall prevention techniques,  long handle equipment, therapeutic exercise to: increase functional use of BUEs, increase BUE muscle strength , increase postural control and increase OOB/ sitting tolerance   Patient agreeable to OT treatment session, upon arrival patient was found seated OOB to Recliner - session completed with patient in recliner and with  present + attentive  In comparison to previous session, patient with improvements in attentiveness and ability to follow direction  Patient requiring frequent re-direction, verbal (and sometimes physical) cues for correct technique, occasional rest periods, cognitive/re-orientation assistance at times, and frequent cues for safety  Patient continues to be functioning below baseline level, occupational performance remains limited secondary to factors listed above and increased risk for falls and injury  From OT standpoint, recommendation at time of d/c would be post-acute rehabilitation services     Patient to benefit from continued Occupational Therapy treatment while in the hospital to address deficits as defined above and maximize level of functional independence with ADLs and functional mobility  OT Discharge Recommendation: Post acute rehabilitation services       2/10/2023 1308 by TANYA Rodas  Outcome: Progressing  Note: Limitation: Decreased ADL status, Decreased Safe judgement during ADL, Decreased cognition, Decreased endurance, Decreased self-care trans, Decreased high-level ADLs  Prognosis: Good  Assessment: Patient participated in Skilled OT session this date with interventions consisting of ADL re training with the use of correct body mechnaics, safety awareness and fall prevention techniques,  long handle equipment, therapeutic exercise to: increase functional use of BUEs, increase BUE muscle strength , increase postural control and increase OOB/ sitting tolerance   Patient agreeable to OT treatment session, upon arrival patient was found seated OOB to Recliner - session completed with patient in recliner and with  present + attentive  In comparison to previous session, patient with improvements in attentiveness and ability to follow direction  Patient requiring frequent re-direction, verbal (and sometimes physical) cues for correct technique, occasional rest periods, cognitive/re-orientation assistance at times, and frequent cues for safety  Patient continues to be functioning below baseline level, occupational performance remains limited secondary to factors listed above and increased risk for falls and injury  From OT standpoint, recommendation at time of d/c would be post-acute rehabilitation services  Patient to benefit from continued Occupational Therapy treatment while in the hospital to address deficits as defined above and maximize level of functional independence with ADLs and functional mobility       OT Discharge Recommendation: Post acute rehabilitation services     VIKAS Rodas/SHERI

## 2023-02-10 NOTE — PLAN OF CARE
Problem: PHYSICAL THERAPY ADULT  Goal: Performs mobility at highest level of function for planned discharge setting  See evaluation for individualized goals  Description: Treatment/Interventions: ADL retraining, Functional transfer training, LE strengthening/ROM, Therapeutic exercise, Endurance training, Cognitive reorientation, Patient/family training  Equipment Recommended: Amanda Garcia (PRAVEEN)       See flowsheet documentation for full assessment, interventions and recommendations  Note: Prognosis: Fair  Problem List: Decreased strength, Decreased range of motion, Decreased endurance, Impaired balance, Decreased mobility, Decreased cognition, Decreased skin integrity, Orthopedic restrictions, Pain  Assessment: Patient informed of the benefits of continued activity post TKA  Pt cognition still does not appear at baseline, however seems to be improving  Agreeable to PT treatment session  Pt seen for PT treatment session this date with interventions consisting of gait training w/ emphasis on improving pt's ability to ambulate level surfaces x 75 ft with min A provided by therapist with RW and Therapeutic exercise consisting of: AROM 20 reps R LE in supine and sitting position  Pt agreeable to PT treatment session upon arrival, pt found supine in bed w/ HOB elevated, in no apparent distress, A&O x 3 and responsive  In comparison to previous session, pt with improvements in activity tolerance, endurance, LE stability, strength, ROM, and direction folllowing  Continue to recommend post acute rehabilitation services at time of d/c in order to maximize pt's functional independence and safety w/ mobility  Pt continues to be functioning below baseline level, and remains limited 2* factors listed above and including arthritis, acute encephalopathy, and generalized anxiety disorder  PT treatment sessions will continue to advance activity level to pt   PT will continue to see pt during current hospitalization in order to address the deficits listed above and provide interventions consistent w/ POC in effort to achieve LTGs  PT Discharge Recommendation: Post acute rehabilitation services    See flowsheet documentation for full assessment

## 2023-02-10 NOTE — ASSESSMENT & PLAN NOTE
· Patient is status post  - postop day 2 a total right knee arthroplasty by the primary service which is orthopedic surgery because of a pre-existing history of severe osteoarthritis of the right knee  · Continue postop surgical management as per the primary care service  · DVT prophylaxis is with Eliquis  · Continue PT and OT  · Patient will eventually need Post acute rehab - case management is working on post discharge placement options

## 2023-02-10 NOTE — PROGRESS NOTES
Progress Note - Orthopedics   Chandrakant Fernandez 74 y o  female MRN: [id number]  Unit/Bed#: -01 Encounter: [id number]    Assessment:  Postop day #2, status post right total knee replacement    Confusion    Pulmonary emboli by CT scan    Plan:  In regards to her right total knee replacement, she is doing quite well  Continue to ambulate with assistance  Out of bed when medically stable  Weightbearing as tolerated right lower extremity  Eliquis for DVT prophylaxis    In regards to her confusion, it is unclear what specific etiology is coming from  The patient is being treated with the slim service  A work-up will continue  Continue to monitor    Weight bearing: As tolerated    VTE Pharmacologic Prophylaxis: Eliquis    VTE Mechanical Prophylaxis: sequential compression device    Subjective: Patient in bed, still a little confused  Oriented to person and place  She knows the hospital she is located in  She does not know the year  She knows the president [place]  Complaining of right knee pain, albeit improved    Vitals: Blood pressure 103/61, pulse 90, temperature 99 1 °F (37 3 °C), resp  rate 20, height 5' 3" (1 6 m), weight 63 5 kg (140 lb), SpO2 90 %  ,Body mass index is 24 8 kg/m²  Intake/Output Summary (Last 24 hours) at 2/10/2023 0724  Last data filed at 2/10/2023 0501  Gross per 24 hour   Intake 200 ml   Output --   Net 200 ml       Invasive Devices     Peripheral Intravenous Line  Duration           Peripheral IV 02/08/23 Right;Ventral (anterior) Hand 2 days    Peripheral IV 02/09/23 Right;Ventral (anterior) Forearm <1 day          Drain  Duration           Autologus Reinfusion/Drain Device 1 Right Knee 1 day                Physical Exam:   Ortho Exam:     Right lower extremity is neuro vas intact  Toes are pink and mobile  Compartments are soft  Incision is clean, dry, intact  Negative Homans  Good dorsiflexion of foot  Good quad tone    There is decreased tenderness, albeit present to palpation  Negative Homans    No obvious neurological deficits    Lab, Imaging and other studies:   CBC:   Lab Results   Component Value Date    WBC 12 45 (H) 02/10/2023    HGB 9 3 (L) 02/10/2023    HCT 29 0 (L) 02/10/2023    MCV 96 02/10/2023     02/10/2023    MCH 30 8 02/10/2023    MCHC 32 1 02/10/2023    RDW 14 6 02/10/2023    MPV 10 9 02/10/2023     CMP:   Lab Results   Component Value Date    SODIUM 136 02/10/2023     02/10/2023    CO2 25 02/10/2023    BUN 11 02/10/2023    CREATININE 0 50 (L) 02/10/2023    CALCIUM 8 5 02/10/2023    EGFR 101 02/10/2023

## 2023-02-10 NOTE — CASE MANAGEMENT
Case Management Discharge Planning Note    Patient name Theador Bending  Location /-38 MRN [id number]  : 1955 Date 2/10/2023       Current Admission Date: 2023  Current Admission Diagnosis:Arthritis of right knee   Patient Active Problem List    Diagnosis Date Noted   • Fever 02/10/2023   • Encephalopathy acute 2023   • Acute pulmonary embolism without acute cor pulmonale ([address] ) 2023   • Chronic bilateral low back pain without sciatica 2022   • Arthritis of right knee 2020   • Uterine prolapse 2020   • Chronic pain syndrome 10/11/2019   • Tobacco abuse 2018   • Acquired trigger finger 2018   • Carpal tunnel syndrome 2018   • Constipation 2017   • Spider veins of both lower extremities 2016   • Varicosities of leg 03/10/2016   • Esophageal reflux 2015   • Allergic rhinitis 2013   • Symptomatic menopausal or female climacteric states 2013   • Chronic cough 2013   • Generalized anxiety disorder 2013   • Hyperlipidemia 2013      LOS (days): 1  Geometric Mean LOS (GMLOS) (days): 3 10  Days to GMLOS:2 3     OBJECTIVE:  Risk of Unplanned Readmission Score: 8 13         Current admission status: Inpatient   Preferred Pharmacy:   [address] [place] [address] [facility]  [address]   [address] [place] [address]  Phone: [phone] Fax: [phone]    Primary Care Provider: Noam Orellana DO    Primary Insurance: MEDICARE  Secondary Insurance: Marlon Mckinnon    DISCHARGE DETAILS:    Discharge planning discussed with[de-identified] patient and spouse was called at 11:11 am  Freedom of Choice: Yes  Comments - Freedom of Choice: recommendation is for rehab-   permission was given to place a blenket referrals for snf rehba & referral sent to [facility] 1st choice  CM contacted family/caregiver?: Yes             Contacts  Patient Contacts: Keiko Yan  [phone] (Home Phone)  Relationship to Patient[de-identified] Family (spouse)  Contact Method: Phone  Reason/Outcome: Discharge [address]         Is the patient interested in [address] at discharge?: No    DME Referral Provided  Referral made for DME?: No    Other Referral/Resources/Interventions Provided:  Interventions: Acute Rehab, Short Term Rehab  Referral Comments: recommendation is for rehab - referral sent to [facility] facilities and referral sent to [facility]    Would you like to participate in our [facility] [address] service program?  : No - Declined    Treatment Team Recommendation:  (d/c pln tbd- tbd)

## 2023-02-10 NOTE — PROGRESS NOTES
Brief note: I received a Tiger text from Ferne Romberg of infectious disease, noting they were consulted for isolated fever that is "unlikely infectious and monitor off antibiotics  "

## 2023-02-10 NOTE — PLAN OF CARE
Problem: OCCUPATIONAL THERAPY ADULT  Goal: Performs self-care activities at highest level of function for planned discharge setting  See evaluation for individualized goals  Description: Treatment Interventions: ADL retraining, Functional transfer training, Endurance training, Cognitive reorientation, Patient/family training, Equipment evaluation/education, Compensatory technique education, Energy conservation, Activityengagement     See flowsheet documentation for full assessment, interventions and recommendations  Outcome: Progressing, gradually   Note: Limitation: Decreased ADL status, Decreased Safe judgement during ADL, Decreased cognition, Decreased endurance, Decreased self-care trans, Decreased high-level ADLs  Prognosis: Good  Assessment: Patient participated in Skilled OT session this date with interventions consisting of ADL re training with the use of correct body mechnaics, safety awareness and fall prevention techniques,  long handle equipment, therapeutic exercise to: increase functional use of BUEs, increase BUE muscle strength , increase postural control and increase OOB/ sitting tolerance   Patient agreeable to OT treatment session, upon arrival patient was found seated OOB to Recliner - session completed with patient in recliner and with  present + attentive  In comparison to previous session, patient with improvements in attentiveness and ability to follow direction  Patient requiring frequent re-direction, verbal (and sometimes physical) cues for correct technique, occasional rest periods, cognitive/re-orientation assistance at times, and frequent cues for safety  Patient continues to be functioning below baseline level, occupational performance remains limited secondary to factors listed above and increased risk for falls and injury  From OT standpoint, recommendation at time of d/c would be post-acute rehabilitation services     Patient to benefit from continued Occupational Therapy treatment while in the hospital to address deficits as defined above and maximize level of functional independence with ADLs and functional mobility       OT Discharge Recommendation: Post acute rehabilitation services        [place], BEAN/L

## 2023-02-10 NOTE — DISCHARGE INSTR - AVS FIRST PAGE
TOTAL KNEE/TOTAL HIP REPLACEMENT  POST OPERATIVE INFORMATION    1  You should use a walker or crutches, but you may put as much weight on the leg as is comfortable unless instructed otherwise  2  You may use ice packs as needed for pain and swelling  20 minutes is required to allow the cold to penetrate deep enough  Cover skin with a layer of cloth before applying the ice pack  3  Pump your ankle up and down frequently throughout the day to facilitate circulation, maintain muscle tone, and to aid in reduction of swelling  4  You may shower after 5 days, but remember to keep the dressings dry  5  You should call our office if you experience pain not controlled by your medication, develop a fever, and experience increased drainage or redness around the incision  6  Do not drive a vehicle until you see your physician at the follow-up appointment  7  Refer to your total joint card regarding the need for antibiotics for the following procedures:  Any dental procedures, any infection, especially skin infections, vaginal or GYN exams or surgery, and colonoscopy  8  If you have had a total hip replacement following instructions below to prevent the hip from sliding out a position:   -DO NOT bend your hip more than 90°  This means no squatting, no bending over to tie your shoes, and no bending from the waist to  things from the floor, even if seated  -DO NOT cross your operated leg/foot inward ([place]-toed)   -ALWAYS sleep with pillow or cushion between your legs, as instructed by your doctor   -After surgery these precautions will be again covered by your therapists on a daily basis  9  Call our office for an appointment to see Dr Jared Uribe in about 14 days  10  You should start outpatient therapy as soon as possible after discharge  11   The patient was instructed to paint incision with betadine two times per day

## 2023-02-11 ENCOUNTER — APPOINTMENT (OUTPATIENT)
Dept: RADIOLOGY | Facility: HOSPITAL | Age: 68
End: 2023-02-11

## 2023-02-11 ENCOUNTER — APPOINTMENT (INPATIENT)
Dept: CT IMAGING | Facility: HOSPITAL | Age: 68
End: 2023-02-11

## 2023-02-11 LAB
ANION GAP SERPL CALCULATED.3IONS-SCNC: 9 MMOL/L (ref 4–13)
BUN SERPL-MCNC: 9 MG/DL (ref 5–25)
CALCIUM SERPL-MCNC: 9 MG/DL (ref 8.4–10.2)
CHLORIDE SERPL-SCNC: 103 MMOL/L (ref 96–108)
CO2 SERPL-SCNC: 25 MMOL/L (ref 21–32)
CREAT SERPL-MCNC: 0.58 MG/DL (ref 0.6–1.3)
ERYTHROCYTE [DISTWIDTH] IN BLOOD BY AUTOMATED COUNT: 14.8 % (ref 11.6–15.1)
GFR SERPL CREATININE-BSD FRML MDRD: 97 ML/MIN/1.73SQ M
GLUCOSE SERPL-MCNC: 110 MG/DL (ref 65–140)
HCT VFR BLD AUTO: 31.4 % (ref 34.8–46.1)
HGB BLD-MCNC: 10 G/DL (ref 11.5–15.4)
MCH RBC QN AUTO: 30.9 PG (ref 26.8–34.3)
MCHC RBC AUTO-ENTMCNC: 31.8 G/DL (ref 31.4–37.4)
MCV RBC AUTO: 97 FL (ref 82–98)
PLATELET # BLD AUTO: 374 THOUSANDS/UL (ref 149–390)
PMV BLD AUTO: 10.4 FL (ref 8.9–12.7)
POTASSIUM SERPL-SCNC: 3.4 MMOL/L (ref 3.5–5.3)
RBC # BLD AUTO: 3.24 MILLION/UL (ref 3.81–5.12)
SODIUM SERPL-SCNC: 137 MMOL/L (ref 135–147)
WBC # BLD AUTO: 13.02 THOUSAND/UL (ref 4.31–10.16)

## 2023-02-11 RX ORDER — CLONAZEPAM 1 MG/1
1 TABLET ORAL 3 TIMES DAILY
Status: DISCONTINUED | OUTPATIENT
Start: 2023-02-11 | End: 2023-02-14 | Stop reason: HOSPADM

## 2023-02-11 RX ADMIN — Medication 1 TABLET: at 08:10

## 2023-02-11 RX ADMIN — IPRATROPIUM BROMIDE 0.5 MG: 0.5 SOLUTION RESPIRATORY (INHALATION) at 13:31

## 2023-02-11 RX ADMIN — FERROUS SULFATE TAB 325 MG (65 MG ELEMENTAL FE) 325 MG: 325 (65 FE) TAB at 08:10

## 2023-02-11 RX ADMIN — CLONAZEPAM 1 MG: 1 TABLET ORAL at 15:26

## 2023-02-11 RX ADMIN — NICOTINE 21 MG: 21 PATCH, EXTENDED RELEASE TRANSDERMAL at 08:14

## 2023-02-11 RX ADMIN — BUDESONIDE 0.5 MG: 0.5 INHALANT ORAL at 20:17

## 2023-02-11 RX ADMIN — OXYCODONE HYDROCHLORIDE AND ACETAMINOPHEN 500 MG: 500 TABLET ORAL at 08:09

## 2023-02-11 RX ADMIN — CLONAZEPAM 1 MG: 1 TABLET ORAL at 21:14

## 2023-02-11 RX ADMIN — DOCUSATE SODIUM 100 MG: 100 CAPSULE, LIQUID FILLED ORAL at 08:10

## 2023-02-11 RX ADMIN — ACETAMINOPHEN 650 MG: 325 TABLET ORAL at 15:25

## 2023-02-11 RX ADMIN — IPRATROPIUM BROMIDE 0.5 MG: 0.5 SOLUTION RESPIRATORY (INHALATION) at 20:17

## 2023-02-11 RX ADMIN — APIXABAN 10 MG: 5 TABLET, FILM COATED ORAL at 17:45

## 2023-02-11 RX ADMIN — LEVALBUTEROL HYDROCHLORIDE 1.25 MG: 1.25 SOLUTION, CONCENTRATE RESPIRATORY (INHALATION) at 20:17

## 2023-02-11 RX ADMIN — LEVALBUTEROL HYDROCHLORIDE 1.25 MG: 1.25 SOLUTION, CONCENTRATE RESPIRATORY (INHALATION) at 13:31

## 2023-02-11 RX ADMIN — DOCUSATE SODIUM 100 MG: 100 CAPSULE, LIQUID FILLED ORAL at 17:45

## 2023-02-11 RX ADMIN — CLONAZEPAM 1 MG: 1 TABLET ORAL at 08:09

## 2023-02-11 RX ADMIN — FERROUS SULFATE TAB 325 MG (65 MG ELEMENTAL FE) 325 MG: 325 (65 FE) TAB at 16:24

## 2023-02-11 RX ADMIN — BUPROPION HYDROCHLORIDE 150 MG: 150 TABLET, EXTENDED RELEASE ORAL at 08:10

## 2023-02-11 RX ADMIN — ACETAMINOPHEN 650 MG: 325 TABLET ORAL at 23:27

## 2023-02-11 RX ADMIN — ACETAMINOPHEN 650 MG: 325 TABLET ORAL at 08:10

## 2023-02-11 RX ADMIN — ATORVASTATIN CALCIUM 20 MG: 20 TABLET, FILM COATED ORAL at 17:45

## 2023-02-11 RX ADMIN — APIXABAN 10 MG: 5 TABLET, FILM COATED ORAL at 08:10

## 2023-02-11 RX ADMIN — OXYCODONE HYDROCHLORIDE AND ACETAMINOPHEN 500 MG: 500 TABLET ORAL at 17:45

## 2023-02-11 RX ADMIN — VENLAFAXINE HYDROCHLORIDE 150 MG: 150 CAPSULE, EXTENDED RELEASE ORAL at 08:10

## 2023-02-11 RX ADMIN — FOLIC ACID 1 MG: 1 TABLET ORAL at 08:09

## 2023-02-11 NOTE — NURSING NOTE
Pt increasingly became restless, agitated, combative overnight  Pt attempting to get out of bed, pulling off dressing, taking off oxygen, removing masimo, and SCDS  Pt states multiple times she is going to bed with her , attempted to reorient multiple times but pt became more agitated in doing so  Pt desat upon removing o2, pt states she does not care and to leave her alone  Pt out of bed stating she is going to fight staff  On call hospitalist made aware, hitesh wrist restraints applied 0100  Pt continues to be restless/agitated constantly trying to remove restraints  Pt bladder scanned 0330 for 250ml  Encouraged pt to void  Pt states she sometimes takes awhile to void  Rescanned 0545 for 450mls  Pt educated on the need to empty bladder  Pt yelling, demanding to be left alone, as well as trying to kick staff  On call hospitalist made aware, will give pt more time to void

## 2023-02-11 NOTE — ASSESSMENT & PLAN NOTE
· Continue Eliquis 10 mg p o  twice daily through 2/16/2023 transition to 5 mg p o  twice daily thereafter for 3 to 6 months  · Appreciate pulmonary input  · Oxygenation requirements have been as high as 4 L of supplemental oxygen by nasal cannula -currently on room air  · Multifactorial: -   1  Postop respiratory failure with hypoxia-resolved  2  Acute pulmonary embolism -secondary to an acute left lower extremity DVT -see the management as outlined above  3  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  4  Active tobacco abuse -continue nicotine patch  5   Right pulmonary atelectasis -continue incentive spirometry

## 2023-02-11 NOTE — ASSESSMENT & PLAN NOTE
· Patient is status post  - postop day 3 a total right knee arthroplasty by the primary service which is orthopedic surgery because of a pre-existing history of severe osteoarthritis of the right knee  · Continue postop surgical management as per the primary care service  · DVT prophylaxis is with Eliquis  · Continue PT and OT  · Patient will eventually need Post acute rehab - case management is working on post discharge placement options

## 2023-02-11 NOTE — PLAN OF CARE
Problem: OCCUPATIONAL THERAPY ADULT  Goal: Performs self-care activities at highest level of function for planned discharge setting  See evaluation for individualized goals  Description: Treatment Interventions: ADL retraining, Functional transfer training, Endurance training, Cognitive reorientation, Patient/family training, Equipment evaluation/education, Compensatory technique education, Energy conservation, Activityengagement     See flowsheet documentation for full assessment, interventions and recommendations  Outcome: Progressing  Note: Limitation: Decreased ADL status, Decreased Safe judgement during ADL, Decreased cognition, Decreased endurance, Decreased self-care trans, Decreased high-level ADLs  Prognosis: Good  Assessment: Patient participated in Skilled OT session this date with interventions consisting of Energy Conservation techniques, therapeutic exercise to: increase functional use of BUEs, increase BUE muscle strength  and  therapeutic activities to: increase activity tolerance   Patient agreeable to OT treatment session, upon arrival patient was found supine in bed  Patient requiring verbal cues for safety, verbal cues for correct technique and frequent rest periods  Patient continues to be functioning below baseline level, occupational performance remains limited secondary to factors listed above and increased risk for falls and injury  From OT standpoint, recommendation at time of d/c would be Short Term Rehab  Patient to benefit from continued Occupational Therapy treatment while in the hospital to address deficits as defined above and maximize level of functional independence with ADLs and functional mobility       OT Discharge Recommendation: Post acute rehabilitation services     [address] [place] [address], [place], OTR/L

## 2023-02-11 NOTE — PLAN OF CARE
Problem: MOBILITY - ADULT  Goal: Maintain or return to baseline ADL function  Description: INTERVENTIONS:  -  Assess patient's ability to carry out ADLs; assess patient's baseline for ADL function and identify physical deficits which impact ability to perform ADLs (bathing, care of mouth/teeth, toileting, grooming, dressing, etc )  - Assess/evaluate cause of self-care deficits   - Assess range of motion  - Assess patient's mobility; develop plan if impaired  - Assess patient's need for assistive devices and provide as appropriate  - Encourage maximum independence but intervene and supervise when necessary  - Involve family in performance of ADLs  - Assess for home care needs following discharge   - Consider OT consult to assist with ADL evaluation and planning for discharge  - Provide patient education as appropriate    Outcome: Progressing

## 2023-02-11 NOTE — ASSESSMENT & PLAN NOTE
· Improved   · Patient noted to have a better mentation throughout the day yesterday, however overnight had some behavioral issues which required restraints again   · CT head-no acute pathology  · Appreciate neurology input  · We will restart the patient's Klonopin at 3 times a day  · Monitor closely otherwise

## 2023-02-11 NOTE — PROGRESS NOTES
Progress Note - Orthopedics   Grace Jiang 74 y o  female MRN: [id number]  Unit/Bed#: -01 Encounter: [id number]    Assessment:  Postop day #3, status post right total knee replacement    Slowly improving confusion      Plan:  From an orthopedic standpoint, the patient can go home  It was recommended from the internal medicine service that she go to a skilled nursing facility which can occur after she is out of restraints for 24 hours at this point, it would recommend the patient remain out of bed with observation, weightbearing as tolerated, wound care was discussed  Continue to monitor    Weight bearing: As tolerated    VTE Pharmacologic Prophylaxis: Eliquis  VTE Mechanical Prophylaxis: sequential compression device    Subjective: Patient in chair,  at her side, no apparent distress  On room air  Desires to go outside and smoke a cigarette    Vitals: Blood pressure 109/68, pulse 89, temperature 98 3 °F (36 8 °C), temperature source Oral, resp  rate 18, height 5' 3" (1 6 m), weight 63 5 kg (140 lb), SpO2 90 %  ,Body mass index is 24 8 kg/m²  Intake/Output Summary (Last 24 hours) at 2/11/2023 1348  Last data filed at 2/11/2023 1336  Gross per 24 hour   Intake 880 ml   Output 800 ml   Net 80 ml       Invasive Devices     Peripheral Intravenous Line  Duration           Peripheral IV 02/09/23 Right;Ventral (anterior) Forearm 2 days                Physical Exam:   Ortho Exam:     Right lower extremity is neuro vas intact  Toes are pink and mobile  Compartments are soft  Incision is clean, dry, and intact  Negative Homans  No drainage  Good quad tone  Good dorsiflexion of the foot    Negative Homans    Lab, Imaging and other studies:   CBC:   Lab Results   Component Value Date    WBC 13 02 (H) 02/11/2023    HGB 10 0 (L) 02/11/2023    HCT 31 4 (L) 02/11/2023    MCV 97 02/11/2023     02/11/2023    MCH 30 9 02/11/2023    MCHC 31 8 02/11/2023    RDW 14 8 02/11/2023    MPV 10 4 02/11/2023 CMP:   Lab Results   Component Value Date    SODIUM 137 02/11/2023     02/11/2023    CO2 25 02/11/2023    BUN 9 02/11/2023    CREATININE 0 58 (L) 02/11/2023    CALCIUM 9 0 02/11/2023    EGFR 97 02/11/2023

## 2023-02-11 NOTE — PROGRESS NOTES
[facility]  Progress Note Kellie Figueroa 1955, 74 y o  female MRN: [id number]  Unit/Bed#: -01 Encounter: [id number]  Primary Care Provider: Laura Abbott DO   Date and time admitted to hospital: 2/8/2023  6:59 AM    * Arthritis of right knee  Assessment & Plan  · Patient is status post  - postop day 3 a total right knee arthroplasty by the primary service which is orthopedic surgery because of a pre-existing history of severe osteoarthritis of the right knee  · Continue postop surgical management as per the primary care service  · DVT prophylaxis is with Eliquis  · Continue PT and OT  · Patient will eventually need Post acute rehab - case management is working on post discharge placement options    Acute pulmonary embolism without acute cor pulmonale (HCC)  Assessment & Plan  · Continue Eliquis 10 mg p o  twice daily through 2/16/2023 transition to 5 mg p o  twice daily thereafter for 3 to 6 months  · Appreciate pulmonary input  · Oxygenation requirements have been as high as 4 L of supplemental oxygen by nasal cannula -currently on room air  · Multifactorial: -   1  Postop respiratory failure with hypoxia-resolved  2  Acute pulmonary embolism -secondary to an acute left lower extremity DVT -see the management as outlined above  3  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  4  Active tobacco abuse -continue nicotine patch  5  Right pulmonary atelectasis -continue incentive spirometry    Encephalopathy acute  Assessment & Plan  · Improved   · Patient noted to have a better mentation throughout the day yesterday, however overnight had some behavioral issues which required restraints again   · CT head-no acute pathology  · Appreciate neurology input  · We will restart the patient's Klonopin at 3 times a day  · Monitor closely otherwise    Fever  Assessment & Plan  · Resolved  · Patient has been afebrile over the past 24 hours  · Work-up thus far:-  · #1    Testing for COVID-19, respiratory syncytial virus, and influenza-negative  · #2  Blood cultures x2 sets collected on 2023- no growth at 24 hours  · #3  Chest x-ray from 2/10/2023 revealed atelectasis  · #4  Mild bump in white blood cell count noted -repeat blood work for 2023 is pending  · #5  Procalcitonin testing:-0 08  · #6  Urinalysis not suggestive of a UTI    Appreciate ID input  No further work-up at this time    Fever could be secondary to venous thromboembolism, will ask for an ID evaluation    Hyperlipidemia  Assessment & Plan  · Continue Lipitor    Esophageal reflux  Assessment & Plan  · Continue Protonix    Generalized anxiety disorder  Assessment & Plan  · With depression  · Continue Effexor, Wellbutrin   · Restart scheduled clonazepam        VTE Prophylaxis:  Apixaban (Eliquis)    Patient Centered Rounds: I have performed bedside rounds with nursing staff today      Discussions with Specialists or Other Care Team Provider: Orthopedic surgery, infectious disease, neurology, pulmonary, case management  Education and Discussions with Family / Patient: Patient's  was brought up to par    Current Length of Stay: 2 day(s)    Current Patient Status: Inpatient   Certification Statement: The patient will continue to require additional inpatient hospital stay due to Continued monitoring of mentation, as well as placement    Discharge Plan: Hopeful discharge planning in 24 to 48 hours    Code Status: Full CODE STATUS level 1    Subjective:   Patient seen and examined, resting in bed, no new complaints, mentation is a little bit better, she is alert, awake, and oriented to person, and time, knows that she is in the hospital but does not know the name of this particular facility    Objective:     Vitals:   Temp (24hrs), Av 6 °F (37 °C), Min:98 3 °F (36 8 °C), Max:99 °F (37 2 °C)    Temp:  [98 3 °F (36 8 °C)-99 °F (37 2 °C)] 98 3 °F (36 8 °C)  HR:  [70-91] 89  Resp:  [18-20] 18  BP: (109-124)/(67-80) 109/68  SpO2:  [90 %-93 %] 90 %  Body mass index is 24 8 kg/m²  Input and Output Summary (last 24 hours): Intake/Output Summary (Last 24 hours) at 2/11/2023 0800  Last data filed at 2/10/2023 2030  Gross per 24 hour   Intake 400 ml   Output --   Net 400 ml       Physical Exam:   Physical Exam  Constitutional:       General: She is not in acute distress  Appearance: Normal appearance  She is normal weight  She is not ill-appearing  HENT:      Head: Normocephalic and atraumatic  Nose: Nose normal       Mouth/Throat:      Mouth: Mucous membranes are moist    Eyes:      Extraocular Movements: Extraocular movements intact  Pupils: Pupils are equal, round, and reactive to light  Cardiovascular:      Rate and Rhythm: Normal rate and regular rhythm  Pulses: Normal pulses  Heart sounds: Normal heart sounds  No murmur heard  No friction rub  No gallop  Pulmonary:      Effort: Pulmonary effort is normal  No respiratory distress  Breath sounds: Normal breath sounds  No wheezing, rhonchi or rales  Abdominal:      General: There is no distension  Palpations: Abdomen is soft  There is no mass  Tenderness: There is no abdominal tenderness  Hernia: No hernia is present  Musculoskeletal:         General: No swelling or tenderness  Normal range of motion  Cervical back: Normal range of motion and neck supple  No rigidity  Right lower leg: No edema  Left lower leg: No edema  Skin:     General: Skin is warm  Capillary Refill: Capillary refill takes less than 2 seconds  Findings: No erythema or rash  Neurological:      General: No focal deficit present  Mental Status: She is alert and oriented to person, place, and time  Mental status is at baseline  Cranial Nerves: No cranial nerve deficit  Motor: No weakness     Psychiatric:         Mood and Affect: Mood normal          Behavior: Behavior normal          Additional Data: Labs:    Results from last 7 days   Lab Units 02/10/23  0447   WBC Thousand/uL 12 45*   HEMOGLOBIN g/dL 9 3*   HEMATOCRIT % 29 0*   PLATELETS Thousands/uL 294     Results from last 7 days   Lab Units 02/10/23  0447   SODIUM mmol/L 136   POTASSIUM mmol/L 3 6   CHLORIDE mmol/L 102   CO2 mmol/L 25   BUN mg/dL 11   CREATININE mg/dL 0 50*   CALCIUM mg/dL 8 5         Results from last 7 days   Lab Units 02/10/23  0753   POC GLUCOSE mg/dl 166*           * I Have Reviewed All Lab Data Listed Above  * Additional Pertinent Lab Tests Reviewed: [facility] Admission  Reviewed    Imaging:  Imaging Reports Reviewed Today Include: Chest x-ray-right sided atelectasis    Recent Cultures (last 7 days):     Results from last 7 days   Lab Units 02/09/23  1512   BLOOD CULTURE  No Growth at 24 hrs  No Growth at 24 hrs         Last 24 Hours Medication List:   Current Facility-Administered Medications   Medication Dose Route Frequency Provider Last Rate   • acetaminophen  650 mg Oral Q4H PRN Bruna Arciniega PA-C     • albuterol  2 5 mg Nebulization Q4H PRN Dany Price DO     • aluminum-magnesium hydroxide-simethicone  30 mL Oral Q6H PRN Bruna Arciniega PA-C     • apixaban  10 mg Oral BID Gus Mitchell MD     • [START ON 2/16/2023] apixaban  5 mg Oral BID Gus Mitchell MD     • ascorbic acid  500 mg Oral BID Bruna Arciniega PA-C     • atorvastatin  20 mg Oral After Karlos Harrington PA-C     • budesonide  0 5 mg Nebulization Q12H Alpesh Becerra MD     • buPROPion  150 mg Oral QAM Bruna Arciniega PA-C     • clonazePAM  1 mg Oral TID Gus Mitchell MD     • docusate sodium  100 mg Oral BID Travis Rawls PA-C     • ferrous sulfate  325 mg Oral BID With Meals Bruna Arciniega PA-C     • folic acid  1 mg Oral Daily Bruna Arciniega PA-C     • ipratropium  0 5 mg Nebulization TID Alpesh Becerra MD     • levalbuterol  1 25 mg Nebulization TID Yuriy Escalante MD     • multivitamin-minerals  1 tablet Oral Daily Guerda Hess     • nicotine  21 mg Transdermal Daily Todd Granda MD     • ondansetron  4 mg Intravenous Q6H PRN Alem Estrada PA-C     • pantoprazole  40 mg Oral Early Morning Alem Estrada PA-C     • venlafaxine  150 mg Oral Daily Alem Estrada PA-C          Today, Patient Was Seen By: Todd Granda MD    ** Please Note: Dictation voice to text software may have been used in the creation of this document   **

## 2023-02-11 NOTE — PLAN OF CARE
Problem: PHYSICAL THERAPY ADULT  Goal: Performs mobility at highest level of function for planned discharge setting  See evaluation for individualized goals  Description: Treatment/Interventions: Functional transfer training, ADL retraining, LE strengthening/ROM, Therapeutic exercise, Endurance training, Cognitive reorientation, Patient/family training, Spoke to nursing, Gait training, Equipment eval/education  Equipment Recommended: Gianni Landrum (PRAVEEN)       See flowsheet documentation for full assessment, interventions and recommendations  Note: Prognosis: Fair  Problem List: Decreased strength, Decreased range of motion, Decreased endurance, Impaired balance, Decreased mobility, Decreased cognition, Impaired judgement, Decreased safety awareness, Decreased skin integrity, Orthopedic restrictions, Pain  Assessment: Pt seen for PT treatment session this date with interventions consisting of gait training to continue to increase activity tolerance and improve gait w/ emphasis on improving pt's ability to ambulate level surfaces x 50 ft x 2  with min A assist x 2 provided by therapists with RW and Therapeutic exercise to continue to improve stability, strength, and ROM consisting of: AROM 20 reps B LE and R LE in sitting position  Pt agreeable to PT treatment session upon arrival and provides cooperation throughout session when redirected to task goals  Pt found supine in bed w/ HOB elevated, in no apparent distress, A&O x 3 and disoriented to situation  In comparison to previous session, pt with improvements in activity tolerance, endurance, LE stability, strength and ROM  Pt educated on the importance of exercise post TKA and safe use of assistive device, PRAVEEN  Continue to recommend post acute rehabilitation services at time of d/c in order to maximize pt's functional independence and safety w/ mobility  PT sessions will continue to advance to progress towards pt goals   Pt continues to be functioning below baseline level, and remains limited 2* factors listed above and including hx of severe OA of R knee, chronic LBP, generalized anxiety disorder, acute encephalopathy, and hyperlipidemia  PT will continue to see pt during current hospitalization in order to address the deficits listed above and provide interventions consistent w/ POC in effort to achieve LTGs  PT Discharge Recommendation: Post acute rehabilitation services    See flowsheet documentation for full assessment

## 2023-02-11 NOTE — ASSESSMENT & PLAN NOTE
· Resolved  · Patient has been afebrile over the past 24 hours  · Work-up thus far:-  · #1  Testing for COVID-19, respiratory syncytial virus, and influenza-negative  · #2  Blood cultures x2 sets collected on 2/9/2023- no growth at 24 hours  · #3  Chest x-ray from 2/10/2023 revealed atelectasis  · #4  Mild bump in white blood cell count noted -repeat blood work for 2/11/2023 is pending  · #5  Procalcitonin testing:-0 08  · #6    Urinalysis not suggestive of a UTI    Appreciate ID input  No further work-up at this time    Fever could be secondary to venous thromboembolism, will ask for an ID evaluation

## 2023-02-11 NOTE — PHYSICAL THERAPY NOTE
PT Treatment Session     Time In: 1627 Time Out: 0534 02/11/23 0841   PT Last Visit   PT Visit Date 02/11/23   Note Type   Note Type Treatment  (Upon session start, pt found supine in bed w/ HOB raised  Pt agreeable to participate in treatment )   Pain Assessment   Pain Assessment Tool 0-10   Pain Score 7   Pain Location/Orientation Orientation: Right;Location: Knee   Pain Onset/Description Onset: Ongoing;Frequency: Constant/Continuous; Descriptor: Aching   Patient's Stated Pain Goal No pain   Hospital Pain Intervention(s) Ambulation/increased activity;Repositioned; Environmental changes; Emotional support;Elevated   Multiple Pain Sites No   Restrictions/Precautions   Weight Bearing Precautions Per Order Yes   RLE Weight Bearing Per Order (S)  FWB   Other Precautions Impulsive;WBS; Bed Alarm; Chair Alarm; Fall Risk;Fluid restriction;Cognitive;Pain   General   Chart Reviewed No   Additional Pertinent History Co-treatment w/ OT [place], as pt requires skilled interventions of two clinicians for appropriate care delivery  Family/Caregiver Present No   Cognition   Overall Cognitive Status Impaired   Arousal/Participation Alert; Cooperative   Attention Attends with cues to redirect   Orientation Level Oriented to person;Oriented to place;Oriented to time;Disoriented to situation   Memory Decreased recall of biographical information;Decreased recall of precautions   Following Commands Follows one step commands with increased time or repetition   Comments Pt cognition still does not appear to be at baseline  Bed Mobility   Rolling L 6  Modified independent   Additional items HOB elevated; Bedrails; Increased time required;LE management; Impulsive  (Pt provided her own LE mangement  Pt also demonstrated impulsive behavior and had to be redirected to task and safety precautions )   Supine to Sit 6  Modified independent  (Supine >> rolling L >> sitting EOB)   Additional items HOB elevated; Bedrails; Increased time required; Impulsive;LE management  (Pt provided her own LE mangement  Pt also demonstrated impulsive behavior and had to be redirected to task and safety precautions )   Sit to Supine   (DNT; pt remained in recliner chair w/ all needs in reach and chair alarm engaged )   Transfers   Sit to Stand 6  Modified independent   Additional items HOB elevated; Impulsive; Bedrails  (Pt completed STS without waiting for safety instructtions and proper guarding )   Stand to Sit 3  Moderate assistance   Additional items Assist x 2; Increased time required;Verbal cues  (VC were provided to maintain safety)   Stand pivot 3  Moderate assistance   Additional items Assist x 2; Increased time required;Verbal cues  (VC were provided to maintain safety)   Ambulation/Elevation   Gait pattern Narrow MENDOZA; Antalgic; Improper Weight shift;Decreased R stance;Decreased foot clearance; Step to;Excessively slow; Short stride;Decreased heel strike;Decreased toe off   Gait Assistance 4  Minimal assist   Additional items Assist x 2;Verbal cues  (VC were provided to maintain patient safety)   Assistive Device Rolling walker   Distance 50 ft x 2   Stair Management Assistance Not tested  (DNT due to impaired ability to follow directions and safety concerns)   Balance   Static Sitting Fair   Dynamic Sitting Fair   Static Standing Fair -   Dynamic Standing Poor +   Ambulatory Poor +   Endurance Deficit   Endurance Deficit Yes   Endurance Deficit Description PT session limited due to pt fatigue  Activity Tolerance   Activity Tolerance Patient limited by pain; Patient limited by fatigue  (Progression of treatment session was limited due to pain, activity intolerance, and decreased cognition )   Nurse Made Aware Yes  (THEA De Los Santos)   Exercises   Knee AROM Long Arc Quad Sitting;20 reps;AROM; Right   Ankle Pumps Sitting;20 reps;AROM; Bilateral   Balance Training Standing  (Pt ambulated 50 ft x 2 w/ RW and min A assist x 2 )   Marching Sitting;20 reps;AROM; Bilateral Assessment   Prognosis Fair   Problem List Decreased strength;Decreased range of motion;Decreased endurance; Impaired balance;Decreased mobility; Decreased cognition; Impaired judgement;Decreased safety awareness;Decreased skin integrity;Orthopedic restrictions;Pain   Assessment Pt seen for PT treatment session this date with interventions consisting of gait training to continue to increase activity tolerance and improve gait w/ emphasis on improving pt's ability to ambulate level surfaces x 50 ft x 2  with min A assist x 2 provided by therapists with RW and Therapeutic exercise to continue to improve stability, strength, and ROM consisting of: AROM 20 reps B LE and R LE in sitting position  Pt agreeable to PT treatment session upon arrival and provides cooperation throughout session when redirected to task goals  Pt found supine in bed w/ HOB elevated, in no apparent distress, A&O x 3 and disoriented to situation  In comparison to previous session, pt with improvements in activity tolerance, endurance, LE stability, strength and ROM  Pt educated on the importance of exercise post TKA and safe use of assistive device, RW  Continue to recommend post acute rehabilitation services at time of d/c in order to maximize pt's functional independence and safety w/ mobility  PT sessions will continue to advance to progress towards pt goals  Pt continues to be functioning below baseline level, and remains limited 2* factors listed above and including hx of severe OA of R knee, chronic LBP, generalized anxiety disorder, acute encephalopathy, and hyperlipidemia  PT will continue to see pt during current hospitalization in order to address the deficits listed above and provide interventions consistent w/ POC in effort to achieve LTGs  Goals   Patient Goals To go home   STG Expiration Date 02/20/23   Short Term Goal #1 Goals still appropriate  PT Treatment Day 3   Plan   Treatment/Interventions Functional transfer training; ADL retraining;LE strengthening/ROM; Therapeutic exercise; Endurance training;Cognitive reorientation;Patient/family training;Spoke to nursing;Gait training;Equipment eval/education   Progress Slow progress, decreased activity tolerance   PT Frequency Twice a day   Recommendation   PT Discharge Recommendation Post acute rehabilitation services   Equipment Recommended Walker  (RW)   AM-PAC Basic Mobility Inpatient   Turning in Flat Bed Without Bedrails 4   Lying on Back to Sitting on Edge of Flat Bed Without Bedrails 3   Moving Bed to Chair 3   Standing Up From Chair Using Arms 3   Walk in Room 3   Climb 3-5 Stairs With Railing 2   Basic Mobility Inpatient Raw Score 18   Basic Mobility Standardized Score 41 05   Highest Level Of Mobility   JH-HLM Goal 6: Walk 10 steps or more   JH-HLM Achieved 7: Walk 25 feet or more   Education   Education Provided Mobility training;Assistive device  (Safety cues and appropriate use of assistive device were provided to decrease fall risk )   End of Consult   Patient Position at End of Consult Bedside chair;Bed/Chair alarm activated; All needs within reach  (At sessison conclusion, pt remained OOB in recliner chair w/ alarm engaged and all needs in reach )       Tereasa Simpler, SPT

## 2023-02-11 NOTE — PROGRESS NOTES
Progress Note - Pulmonary   Tima Mon 74 y o  female MRN: [id number]  Unit/Bed#: -01 Encounter: [id number]      Assessment:  1  Postop hypoxic respiratory failure-likely acute on chronic, mainly postop atelectasis/hypoventilation on a background of moderate COPD/emphysema  2  Acute pulmonary embolism-seems to be unprovoked, noted+ lower extremity duplex and a day after having the knee surgery  3  Acute LLE DVT  4  COPD/emphysema-at least moderate/severe per radiographic appearance, no PFT on file  5  Active tobacco abuse-more than 2 packs/day    Plan:  · Improving oxygenation compared to yesterday, no oxygen needed at rest  · Encouraged to continue using incentive spirometer frequently, OOB/PT OT  · Continue nebulized only treatment while inpatient budesonide every 12, Atrovent/Xopenex 3 times daily  · Wean FiO2 for SPO2 above 88%  · On discharge: Needs to be on triple inhalers as a Breztri, Trelegy Ellipta, or Advair/Spiriva consult  for insurance coverage  · Acute PE: Seems to be unprovoked, noted 1 day after the knee surgery  No signs of RV strain, stable HD  Continue apixaban per the primary team, will reassess as an outpatient for length of therapy    We will arrange outpatient follow-up with pulmonary  Pulmonary will sign off, please do not hesitate to call with any questions      Subjective:   No overnight acute events  Feeling better, easier to breathe/less chest congestion with the nebulized inhaler therapy    Vitals: Blood pressure 109/68, pulse 89, temperature 98 3 °F (36 8 °C), temperature source Oral, resp  rate 18, height 5' 3" (1 6 m), weight 63 5 kg (140 lb), SpO2 90 %  , Body mass index is 24 8 kg/m²  Intake/Output Summary (Last 24 hours) at 2/11/2023 1313  Last data filed at 2/11/2023 1134  Gross per 24 hour   Intake 640 ml   Output 800 ml   Net -160 ml       Physical Exam  Gen: not in acute distress, Body mass index is 24 8 kg/m²     HEENT:supple, no accessory muscle use, no JVD appreciated  Cardiac: RRR, no murmurs appreciated  Lungs: normal respiratory effort, mild expiratory wheeze abdomen: soft, nontender, normoactive bowel sounds  Extremities: no cyanosis, no clubbing, no peripheral edema  Neuro: AAO X2, no focal motor deficit    Labs: I have personally reviewed pertinent lab results          Lottie Mancilla MD

## 2023-02-11 NOTE — OCCUPATIONAL THERAPY NOTE
Occupational Therapy Treatment Note      Regina Oshea    2/11/2023    Principal Problem:    Arthritis of right knee  Active Problems:    Esophageal reflux    Generalized anxiety disorder    Hyperlipidemia    Encephalopathy acute    Acute pulmonary embolism without acute cor pulmonale (HCC)    Fever      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     Drug intolerance     Dysfunction of right eustachian tube     Dysfunctional uterine bleeding     Edema     Elevated d-dimer     Exposure to hepatitis C     Fracture of fibula     Generalized anxiety disorder     GERD (gastroesophageal reflux disease)     Hyperlipidemia     Mild cervical dysplasia     Myalgia     Myositis     Palpitations     PONV (postoperative nausea and vomiting)     Pre-syncope     Seasonal allergies     Stress incontinence     Thrombocytopenia ([address] )     Urinary retention     Urinary tract infection     Uterine leiomyoma        Past Surgical History:   Procedure Laterality Date    BLADDER SURGERY      BREAST BIOPSY Right     US guided    COLONOSCOPY      COLPOSCOPY  01/06/1998    DENTAL SURGERY      ENDOMETRIAL BIOPSY  05/21/1993    HYSTERECTOMY      HYSTEROSCOPY      uterus- 1/22/98, 7/1/02 and 4/10/07    TUBAL LIGATION      US GUIDED BREAST BIOPSY RIGHT COMPLETE Right 08/08/2018 02/11/23 0842   OT Last Visit   OT Visit Date 02/11/23   Note Type   Note Type Treatment   Pain Assessment   Pain Assessment Tool 0-10   Pain Score 7   Pain Location/Orientation Orientation: Right;Location: Knee   Pain Onset/Description Onset: Ongoing;Frequency: Constant/Continuous; Descriptor: Aching   Patient's Stated Pain Goal No pain   Hospital Pain Intervention(s) Ambulation/increased activity;Repositioned; Environmental changes; Emotional support;Elevated   Multiple Pain Sites No   Restrictions/Precautions   Weight Bearing Precautions Per Order Yes   RLE Weight Bearing Per Order FWB   Other Precautions Impulsive;Cognitive; Chair Alarm; Bed Alarm;WBS;Fall Risk;Pain   Bed Mobility   Supine to Sit 5  Supervision   Additional items HOB elevated; Bedrails; Increased time required; Impulsive;Verbal cues   Additional Comments Pt remained OOB in recliner upon conclusion   Transfers   Sit to Stand   (CGA)   Additional items Assist x 1; Increased time required; Impulsive;Verbal cues   Stand to Sit   (CGA)   Additional items Assist x 1; Armrests; Increased time required; Impulsive;Verbal cues   Stand pivot 4  Minimal assistance   Additional items Assist x 2; Increased time required;Verbal cues  (RW)   Functional Mobility   Functional Mobility 4  Minimal assistance   Additional Comments A x2   Additional items Rolling walker   Right Upper Extremity- Strength   R Shoulder Flexion; Extension  (pro/ret)   R Elbow Elbow flexion;Elbow extension   R Position Seated  (in recliner)   R Weight/Reps/Sets no weight, 20 reps, 1 set   Left Upper Extremity-Strength   L Shoulder Flexion; Extension   L Elbow Elbow flexion;Elbow extension  (pro/ret)   L Position Seated  (in recliner)   L Weights/Reps/Sets no weight, 20 reps, 1 set   Cognition   Overall Cognitive Status Impaired   Arousal/Participation Alert; Cooperative   Attention Attends with cues to redirect   Orientation Level Oriented to person;Oriented to time;Disoriented to place; Disoriented to situation   Memory Decreased recall of biographical information;Decreased recall of precautions   Following Commands Follows one step commands with increased time or repetition   Activity Tolerance   Activity Tolerance Patient limited by fatigue;Patient limited by pain;Other (Comment)  (decreased cognition)   Assessment   Assessment Patient participated in Skilled OT session this date with interventions consisting of Energy Conservation techniques, therapeutic exercise to: increase functional use of BUEs, increase BUE muscle strength  and  therapeutic activities to: increase activity tolerance   Patient agreeable to OT treatment session, upon arrival patient was found supine in bed  Patient requiring verbal cues for safety, verbal cues for correct technique and frequent rest periods  Patient continues to be functioning below baseline level, occupational performance remains limited secondary to factors listed above and increased risk for falls and injury  From OT standpoint, recommendation at time of d/c would be Short Term Rehab  Patient to benefit from continued Occupational Therapy treatment while in the hospital to address deficits as defined above and maximize level of functional independence with ADLs and functional mobility  Plan   Treatment Interventions ADL retraining;Functional transfer training; Endurance training;Cognitive reorientation;Patient/family training;Equipment evaluation/education; Compensatory technique education; Energy conservation; Activityengagement   Goal Expiration Date 02/19/23   OT Treatment Day 2   OT Frequency 3-5x/wk   Recommendation   OT Discharge Recommendation Post acute rehabilitation services   Additional Comments  Co treatment with PT completed secondary to complex medical condition of pt, Min A of 2 required to achieve and maintain transitional movements, requiring the need of skilled therapeutic intervention of 2 therapists to achieve delivery of services  Additional Comments 2 The patient's raw score on the AM-PAC Daily Activity Inpatient Short Form is 15  A raw score of less than 19 suggests the patient may benefit from discharge to post-acute rehabilitation services   Please refer to the recommendation of the Occupational Therapist for safe discharge planning     AM-PAC Daily Activity Inpatient   Lower Body Dressing 2   Bathing 2   Toileting 1   Upper Body Dressing 3   Grooming 3   Eating 4   Daily Activity Raw Score 15   Daily Activity Standardized Score (Calc for Raw Score >=11) 34 69   AM-PAC Applied Cognition Inpatient   Following a Speech/Presentation 3   Understanding Ordinary Conversation 3   Taking Medications 1   Remembering Where Things Are Placed or Put Away 1   Remembering List of 4-5 Errands 1   Taking Care of Complicated Tasks 1   Applied Cognition Raw Score 10   Applied Cognition Standardized Score 24 98     Jese Gaytan MS, OTR/L

## 2023-02-12 RX ORDER — OLANZAPINE 10 MG/1
5 INJECTION, POWDER, LYOPHILIZED, FOR SOLUTION INTRAMUSCULAR ONCE
Status: COMPLETED | OUTPATIENT
Start: 2023-02-12 | End: 2023-02-12

## 2023-02-12 RX ADMIN — DOCUSATE SODIUM 100 MG: 100 CAPSULE, LIQUID FILLED ORAL at 17:14

## 2023-02-12 RX ADMIN — ACETAMINOPHEN 650 MG: 325 TABLET ORAL at 07:34

## 2023-02-12 RX ADMIN — APIXABAN 10 MG: 5 TABLET, FILM COATED ORAL at 17:13

## 2023-02-12 RX ADMIN — FERROUS SULFATE TAB 325 MG (65 MG ELEMENTAL FE) 325 MG: 325 (65 FE) TAB at 16:27

## 2023-02-12 RX ADMIN — APIXABAN 10 MG: 5 TABLET, FILM COATED ORAL at 09:14

## 2023-02-12 RX ADMIN — IPRATROPIUM BROMIDE 0.5 MG: 0.5 SOLUTION RESPIRATORY (INHALATION) at 13:29

## 2023-02-12 RX ADMIN — Medication 1 TABLET: at 09:18

## 2023-02-12 RX ADMIN — FOLIC ACID 1 MG: 1 TABLET ORAL at 09:18

## 2023-02-12 RX ADMIN — LEVALBUTEROL HYDROCHLORIDE 1.25 MG: 1.25 SOLUTION, CONCENTRATE RESPIRATORY (INHALATION) at 13:29

## 2023-02-12 RX ADMIN — CLONAZEPAM 1 MG: 1 TABLET ORAL at 09:15

## 2023-02-12 RX ADMIN — NICOTINE 21 MG: 21 PATCH, EXTENDED RELEASE TRANSDERMAL at 09:19

## 2023-02-12 RX ADMIN — ATORVASTATIN CALCIUM 20 MG: 20 TABLET, FILM COATED ORAL at 17:14

## 2023-02-12 RX ADMIN — ACETAMINOPHEN 650 MG: 325 TABLET ORAL at 14:45

## 2023-02-12 RX ADMIN — VENLAFAXINE HYDROCHLORIDE 150 MG: 150 CAPSULE, EXTENDED RELEASE ORAL at 09:15

## 2023-02-12 RX ADMIN — CLONAZEPAM 1 MG: 1 TABLET ORAL at 16:27

## 2023-02-12 RX ADMIN — BUPROPION HYDROCHLORIDE 150 MG: 150 TABLET, EXTENDED RELEASE ORAL at 09:15

## 2023-02-12 RX ADMIN — CLONAZEPAM 1 MG: 1 TABLET ORAL at 20:55

## 2023-02-12 RX ADMIN — OLANZAPINE 5 MG: 10 INJECTION, POWDER, FOR SOLUTION INTRAMUSCULAR at 04:47

## 2023-02-12 RX ADMIN — DOCUSATE SODIUM 100 MG: 100 CAPSULE, LIQUID FILLED ORAL at 09:18

## 2023-02-12 RX ADMIN — OXYCODONE HYDROCHLORIDE AND ACETAMINOPHEN 500 MG: 500 TABLET ORAL at 09:18

## 2023-02-12 RX ADMIN — FERROUS SULFATE TAB 325 MG (65 MG ELEMENTAL FE) 325 MG: 325 (65 FE) TAB at 07:34

## 2023-02-12 RX ADMIN — OXYCODONE HYDROCHLORIDE AND ACETAMINOPHEN 500 MG: 500 TABLET ORAL at 17:14

## 2023-02-12 NOTE — PROGRESS NOTES
Orthopedics   Madeline Moise 74 y o  female MRN: [id number]  Unit/Bed#: [facility] CT SCAN      Subjective:  74 y  o female post operative day 4 right total knee arthroplasty  Pt doing well  Pain controlled      Labs:  0   Lab Value Date/Time    HCT 31 4 (L) 02/11/2023 0757    HCT 29 0 (L) 02/10/2023 0447    HCT 31 4 (L) 02/09/2023 0433    HCT 42 3 10/15/2015 0824    HGB 10 0 (L) 02/11/2023 0757    HGB 9 3 (L) 02/10/2023 0447    HGB 9 8 (L) 02/09/2023 0433    HGB 14 4 10/15/2015 0824    INR 0 90 01/16/2023 1322    WBC 13 02 (H) 02/11/2023 0757    WBC 12 45 (H) 02/10/2023 0447    WBC 10 72 (H) 02/09/2023 0433    WBC 6 71 10/15/2015 0824    CRP <3 0 10/21/2022 1232     Meds:    Current Facility-Administered Medications:   •  acetaminophen (TYLENOL) tablet 650 mg, 650 mg, Oral, Q4H PRN, Beatrice Rawls PA-C, 650 mg at 02/12/23 0814  •  albuterol inhalation solution 2 5 mg, 2 5 mg, Nebulization, Q4H PRN, Rilla Fast, DO, 2 5 mg at 02/09/23 1531  •  aluminum-magnesium hydroxide-simethicone (MYLANTA) oral suspension 30 mL, 30 mL, Oral, Q6H PRN, Beatrice Rawls PA-C  •  apixaban (ELIQUIS) tablet 10 mg, 10 mg, Oral, BID, Laverne Loja MD, 10 mg at 02/12/23 0914  •  [START ON 2/16/2023] apixaban (ELIQUIS) tablet 5 mg, 5 mg, Oral, BID, Laverne oLja MD  •  ascorbic acid (VITAMIN C) tablet 500 mg, 500 mg, Oral, BID, Beatrice Rawls PA-C, 500 mg at 02/12/23 8624  •  atorvastatin (LIPITOR) tablet 20 mg, 20 mg, Oral, After Dinner, Bin Magaña PA-C, 20 mg at 02/11/23 1745  •  budesonide (PULMICORT) inhalation solution 0 5 mg, 0 5 mg, Nebulization, Q12H, Pam Forman MD, 0 5 mg at 02/11/23 2017  •  buPROPion (WELLBUTRIN XL) 24 hr tablet 150 mg, 150 mg, Oral, QAM, Beatrice Rawls PA-C, 150 mg at 02/12/23 0915  •  clonazePAM (KlonoPIN) tablet 1 mg, 1 mg, Oral, TID, Laverne Loja MD, 1 mg at 02/12/23 0915  •  docusate sodium (COLACE) capsule 100 mg, 100 mg, Oral, BID, Gemma Delay, PA-C, 100 mg at 02/12/23 6597  •  ferrous sulfate tablet 325 mg, 325 mg, Oral, BID With Meals, Gemma Delay, PA-C, 325 mg at 26/15/65 1155  •  folic acid (FOLVITE) tablet 1 mg, 1 mg, Oral, Daily, Sharlie Koyanagi Odgus, PA-C, 1 mg at 02/12/23 9464  •  ipratropium (ATROVENT) 0 02 % inhalation solution 0 5 mg, 0 5 mg, Nebulization, TID, Monse Benito MD, 0 5 mg at 02/11/23 2017  •  levalbuterol (Patsi Sill) inhalation solution 1 25 mg, 1 25 mg, Nebulization, TID, Monse Benito MD, 1 25 mg at 02/11/23 2017  •  multivitamin-minerals (CENTRUM) tablet 1 tablet, 1 tablet, Oral, Daily, Gemma Delay, PA-C, 1 tablet at 02/12/23 1082  •  nicotine (NICODERM CQ) 21 mg/24 hr TD 24 hr patch 21 mg, 21 mg, Transdermal, Daily, Lesa Sandhu MD, 21 mg at 02/12/23 0919  •  ondansetron [facility]) injection 4 mg, 4 mg, Intravenous, Q6H PRN, Sharlie Koyanagi Odierbrandon, PA-C  •  pantoprazole (PROTONIX) EC tablet 40 mg, 40 mg, Oral, Early Morning, Gemma Delay, PA-C, 40 mg at 02/10/23 1057  •  venlafaxine (EFFEXOR-XR) 24 hr capsule 150 mg, 150 mg, Oral, Daily, Gemma Delay, PA-C, 150 mg at 02/12/23 0915    Blood Culture:   Lab Results   Component Value Date    BLOODCX No Growth at 48 hrs  02/09/2023    BLOODCX No Growth at 48 hrs  02/09/2023     Wound Culture:   No results found for: WOUNDCULT    Ins and Outs:  I/O last 24 hours: In: 480 [P O :480]  Out: 800 [Urine:800]    Physical: Patient in restraints  Vitals:    02/12/23 0920   BP:    Pulse: 85   Resp: 16   Temp: 98 7 °F (37 1 °C)   SpO2: 91%     right lower extremity:  · No dressings in place right knee  Incision healing well without default  No drainage or erythema nor signs of infection  Trace postop effusion as expected  · Betadine ointment and ABDs applied to right knee  · Denies bilateral calf pain  No gross calf swelling  · Compartments soft    · Good plantar and dorsiflexion bilateral ankles  · Less confused    Assessment: 74 y  o female post operative day 4 right total knee arthroplasty  Doing well    Plan:  · Weight Bearing as tolerated right lower extremity with walker total knee replacement protocol precautions  · Hospitalist note from today appreciated  · Waiting on ID evaluation  · No discharge today  · DVT prophylaxis/ PE tx Eliquis  · Awaiting today's blood work results  · Analgesics as needed with attempts at avoiding narcotics secondary to confusion/encephalopathy  · PT/OT bearing as tolerated and total knee replacement protocol  · Per orthopedic standpoint patient could be discharged has multiple medical issues rehab basement is being evaluated    · Will continue to assess for acute blood loss anemia    Owen Lopez PA-C

## 2023-02-12 NOTE — QUICK NOTE
Fady Read off bed   Unwitnessed fall   Reported confusion while inpatient and was in hand restrains; took off the L and fell to the R side of bed     Relevant Exam Findings    AOx 4   L pupil > R and slower response to light than right ; Rt hand surgical staples intact, no bleeding, discharge or erythema , but relative R knee swelling and tenderness   Otherwise neuro exam unremarkable     Vitals:    02/10/23 1454 02/10/23 2231 02/11/23 0736 02/11/23 1537   BP: 124/67 109/68  116/51   Pulse: 85 89  85   Resp: 20 18  20   Temp: 98 5 °F (36 9 °C) 98 3 °F (36 8 °C)  98 3 °F (36 8 °C)   TempSrc:  Oral     SpO2: 91% 93% 90% (!) 77%   Weight:       Height:           PLAN   CT Head STAT wo contrast   Xray R knee STAT   Waist belt added to b/l soft wrist   Continuous virtual observation  Continue monitor & revaluate

## 2023-02-12 NOTE — PROGRESS NOTES
Brenda 47  Progress Note Radha Bishop 1955, 74 y o  female MRN: [id number]  Unit/Bed#: -01 Encounter: [id number]  Primary Care Provider: Fawad Guido DO   Date and time admitted to hospital: 2/8/2023  6:59 AM    * Arthritis of right knee  Assessment & Plan  · Patient is status post  - postop day 4 a total right knee arthroplasty by the primary service which is orthopedic surgery because of a pre-existing history of severe osteoarthritis of the right knee  · Continue postop surgical management as per the primary care service  · Continue Eliquis  · Continue PT and OT  · Patient will eventually need Post acute rehab - case management is working on post discharge placement options    Fever  Assessment & Plan  · Resolved  · Patient has been afebrile over the past 24 hours  · Work-up thus far:-  · #1  Testing for COVID-19, respiratory syncytial virus, and influenza-negative  · #2  Blood cultures x2 sets collected on 2/9/2023- no growth at 24 hours  · #3  Chest x-ray from 2/10/2023 revealed atelectasis  · #4  Mild bump in white blood cell count noted -repeat blood work for 2/11/2023 is pending  · #5  Procalcitonin testing:-0 08  · #6  Urinalysis not suggestive of a UTI    Appreciate ID input  No further work-up at this time    Fever could be secondary to venous thromboembolism, will ask for an ID evaluation    Acute pulmonary embolism without acute cor pulmonale (HCC)  Assessment & Plan  · Continue Eliquis 10 mg p o  twice daily through 2/16/2023 transition to 5 mg p o  twice daily thereafter for 3 to 6 months  · Appreciate pulmonary input  · Oxygenation requirements have been as high as 4 L of supplemental oxygen by nasal cannula -currently on room air  · Multifactorial: -   1  Postop respiratory failure with hypoxia-resolved  2  Acute pulmonary embolism -secondary to an acute left lower extremity DVT -see the management as outlined above  3   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  4  Active tobacco abuse -continue nicotine patch  5  Right pulmonary atelectasis -continue incentive spirometry    Encephalopathy acute  Assessment & Plan  · Improved   · Per patient's family, patient appears improved today however like to continue monitoring throughout the day  · 2/11, patient with unwitnessed fall  CT head again negative for any acute intracranial pathology, xray right knee without fracture  · Appreciate neurology input  · Continue Klonopin at 3 times a day  · Monitor closely otherwise  · Toxicology consulted, appreciate recommendations    Hyperlipidemia  Assessment & Plan  · Continue Lipitor    Generalized anxiety disorder  Assessment & Plan  · With depression  · Continue Effexor, Wellbutrin   · Continue scheduled clonazepam          VTE Pharmacologic Prophylaxis: VTE Score: 9 High Risk (Score >/= 5) - Pharmacological DVT Prophylaxis Ordered: apixaban (Eliquis)  Sequential Compression Devices Ordered  Patient Centered Rounds: I performed bedside rounds with nursing staff today  Discussions with Specialists or Other Care Team Provider: Toxicology    Education and Discussions with Family / Patient: Discussed with patient and family members at bedside    Time Spent for Care: 30 minutes  More than 50% of total time spent on counseling and coordination of care as described above  Current Length of Stay: 3 day(s)  Current Patient Status: Inpatient   Certification Statement: The patient will continue to require additional inpatient hospital stay due to Encephalopathy requiring further monitoring  Discharge Plan: Pending clinical course    Code Status: No Order    Subjective:   Patient seen and examined at bedside  Patient resting in bed no apparent distress  Patient with fall overnight due to acute encephalopathy  CT imaging revealed no acute intracranial abnormalities    Per family, patient does appear to be better morning however we will monitor patient throughout today    Objective:     Vitals:   Temp (24hrs), Av 1 °F (37 3 °C), Min:98 3 °F (36 8 °C), Max:100 °F (37 8 °C)    Temp:  [98 3 °F (36 8 °C)-100 °F (37 8 °C)] 98 7 °F (37 1 °C)  HR:  [85-90] 85  Resp:  [16-20] 16  BP: (116-137)/(51-69) 137/69  SpO2:  [77 %-93 %] 91 %  Body mass index is 24 8 kg/m²  Input and Output Summary (last 24 hours): Intake/Output Summary (Last 24 hours) at 2023 1049  Last data filed at 2023 0855  Gross per 24 hour   Intake 240 ml   Output 300 ml   Net -60 ml       Physical Exam:   Physical Exam  HENT:      Head: Normocephalic  Cardiovascular:      Rate and Rhythm: Normal rate and regular rhythm  Pulses: Normal pulses  Heart sounds: Normal heart sounds  Pulmonary:      Effort: Pulmonary effort is normal       Breath sounds: Normal breath sounds  Abdominal:      General: Abdomen is flat  Bowel sounds are normal       Palpations: Abdomen is soft  Musculoskeletal:         General: Normal range of motion  Skin:     General: Skin is warm and dry  Neurological:      General: No focal deficit present  Mental Status: She is alert and oriented to person, place, and time     Psychiatric:         Mood and Affect: Mood normal           Additional Data:     Labs:  Results from last 7 days   Lab Units 23  0757   WBC Thousand/uL 13 02*   HEMOGLOBIN g/dL 10 0*   HEMATOCRIT % 31 4*   PLATELETS Thousands/uL 374     Results from last 7 days   Lab Units 23  0757   SODIUM mmol/L 137   POTASSIUM mmol/L 3 4*   CHLORIDE mmol/L 103   CO2 mmol/L 25   BUN mg/dL 9   CREATININE mg/dL 0 58*   ANION GAP mmol/L 9   CALCIUM mg/dL 9 0   GLUCOSE RANDOM mg/dL 110         Results from last 7 days   Lab Units 02/10/23  0753   POC GLUCOSE mg/dl 166*         Results from last 7 days   Lab Units 23  1513   PROCALCITONIN ng/ml 0 08       Lines/Drains:  Invasive Devices     Peripheral Intravenous Line  Duration           Peripheral IV 23 Right;Ventral (anterior) Forearm 3 days                      Imaging: No pertinent imaging reviewed  Recent Cultures (last 7 days):   Results from last 7 days   Lab Units 02/09/23  1512   BLOOD CULTURE  No Growth at 48 hrs  No Growth at 48 hrs  Last 24 Hours Medication List:   Current Facility-Administered Medications   Medication Dose Route Frequency Provider Last Rate   • acetaminophen  650 mg Oral Q4H PRN CLINT CariasC     • albuterol  2 5 mg Nebulization Q4H PRN Loni Newman DO     • aluminum-magnesium hydroxide-simethicone  30 mL Oral Q6H PRN CLINT CariasC     • apixaban  10 mg Oral BID Renetta Pihllips MD     • [START ON 2/16/2023] apixaban  5 mg Oral BID Renetta Phillips MD     • ascorbic acid  500 mg Oral BID Christophe Wilson PA-C     • atorvastatin  20 mg Oral After Kenyetta Luis PA-C     • budesonide  0 5 mg Nebulization Q12H Jerson Velázquez MD     • buPROPion  150 mg Oral QAM Christophe Wilson PA-C     • clonazePAM  1 mg Oral TID Renetta Phillips MD     • docusate sodium  100 mg Oral BID Marlon Rawls PA-C     • ferrous sulfate  325 mg Oral BID With Meals Christophe Wilson PA-C     • folic acid  1 mg Oral Daily Christophe Wilson PA-C     • ipratropium  0 5 mg Nebulization TID Jerson Velázquez MD     • levalbuterol  1 25 mg Nebulization TID Jerson Velázquez MD     • multivitamin-minerals  1 tablet Oral Daily Christophe Wilson PA-C     • nicotine  21 mg Transdermal Daily Renetta Phillips MD     • ondansetron  4 mg Intravenous Q6H PRN Christophe Wilson PA-C     • pantoprazole  40 mg Oral Early Morning CLINT CariasC     • venlafaxine  150 mg Oral Daily Christophe Wilson PA-C          Today, Patient Was Seen By: Sophie Robertson DO    **Please Note: This note may have been constructed using a voice recognition system  **

## 2023-02-12 NOTE — ASSESSMENT & PLAN NOTE
· Improved   · Per patient's family, patient appears improved today however like to continue monitoring throughout the day  · 2/11, patient with unwitnessed fall    CT head again negative for any acute intracranial pathology, xray right knee without fracture  · Appreciate neurology input  · Continue Klonopin at 3 times a day  · Monitor closely otherwise  · Toxicology consulted, appreciate recommendations

## 2023-02-12 NOTE — ASSESSMENT & PLAN NOTE
· Patient is status post  - postop day 4 a total right knee arthroplasty by the primary service which is orthopedic surgery because of a pre-existing history of severe osteoarthritis of the right knee  · Continue postop surgical management as per the primary care service  · Continue Eliquis  · Continue PT and OT  · Patient will eventually need Post acute rehab - case management is working on post discharge placement options

## 2023-02-12 NOTE — NURSING NOTE
Pt was bathed by nurse and aid pt was not out of bed pt was had 3 point restriants for being combatitive RN aware

## 2023-02-12 NOTE — PHYSICAL THERAPY NOTE
Physical Therapy Cancellation Note         02/12/23 1042   PT Last Visit   PT Visit Date 02/12/23   Note Type   Note Type Cancelled Session   Cancel Reasons Medical status     Temitope Doss

## 2023-02-12 NOTE — PLAN OF CARE
Problem: Prexisting or High Potential for Compromised Skin Integrity  Goal: Skin integrity is maintained or improved  Description: INTERVENTIONS:  - Identify patients at risk for skin breakdown  - Assess and monitor skin integrity  - Assess and monitor nutrition and hydration status  - Monitor labs   - Assess for incontinence   - Turn and reposition patient  - Assist with mobility/ambulation  - Relieve pressure over bony prominences  - Avoid friction and shearing  - Provide appropriate hygiene as needed including keeping skin clean and dry  - Evaluate need for skin moisturizer/barrier cream  - Collaborate with interdisciplinary team   - Patient/family teaching  - Consider wound care consult   Outcome: Progressing     Problem: MOBILITY - ADULT  Goal: Maintain or return to baseline ADL function  Description: INTERVENTIONS:  -  Assess patient's ability to carry out ADLs; assess patient's baseline for ADL function and identify physical deficits which impact ability to perform ADLs (bathing, care of mouth/teeth, toileting, grooming, dressing, etc )  - Assess/evaluate cause of self-care deficits   - Assess range of motion  - Assess patient's mobility; develop plan if impaired  - Assess patient's need for assistive devices and provide as appropriate  - Encourage maximum independence but intervene and supervise when necessary  - Involve family in performance of ADLs  - Assess for home care needs following discharge   - Consider OT consult to assist with ADL evaluation and planning for discharge  - Provide patient education as appropriate  Outcome: Progressing  Goal: Maintains/Returns to pre admission functional level  Description: INTERVENTIONS:  - Perform BMAT or MOVE assessment daily    - Set and communicate daily mobility goal to care team and patient/family/caregiver     - Collaborate with rehabilitation services on mobility goals if consulted  - Out of bed for toileting  - Record patient progress and toleration of activity level   Outcome: Progressing     Problem: PAIN - ADULT  Goal: Verbalizes/displays adequate comfort level or baseline comfort level  Description: Interventions:  - Encourage patient to monitor pain and request assistance  - Assess pain using appropriate pain scale  - Administer analgesics based on type and severity of pain and evaluate response  - Implement non-pharmacological measures as appropriate and evaluate response  - Consider cultural and social influences on pain and pain management  - Notify physician/advanced practitioner if interventions unsuccessful or patient reports new pain  Outcome: Progressing     Problem: SAFETY ADULT  Goal: Maintain or return to baseline ADL function  Description: INTERVENTIONS:  -  Assess patient's ability to carry out ADLs; assess patient's baseline for ADL function and identify physical deficits which impact ability to perform ADLs (bathing, care of mouth/teeth, toileting, grooming, dressing, etc )  - Assess/evaluate cause of self-care deficits   - Assess range of motion  - Assess patient's mobility; develop plan if impaired  - Assess patient's need for assistive devices and provide as appropriate  - Encourage maximum independence but intervene and supervise when necessary  - Involve family in performance of ADLs  - Assess for home care needs following discharge   - Consider OT consult to assist with ADL evaluation and planning for discharge  - Provide patient education as appropriate  Outcome: Progressing  Goal: Maintains/Returns to pre admission functional level  Description: INTERVENTIONS:  - Perform BMAT or MOVE assessment daily    - Set and communicate daily mobility goal to care team and patient/family/caregiver     - Collaborate with rehabilitation services on mobility goals if consulted  - Out of bed for toileting  - Record patient progress and toleration of activity level   Outcome: Progressing  Goal: Patient will remain free of falls  Description: INTERVENTIONS:  - Educate patient/family on patient safety including physical limitations  - Instruct patient to call for assistance with activity   - Consult OT/PT to assist with strengthening/mobility   - Keep Call bell within reach  - Keep bed low and locked with side rails adjusted as appropriate  - Keep care items and personal belongings within reach  - Initiate and maintain comfort rounds  - Make Fall Risk Sign visible to staff  - Offer Toileting in advance of need  - Initiate/Maintain bed alarm  - Obtain necessary fall risk management equipment:   - Apply yellow socks and bracelet for high fall risk patients  - Consider moving patient to room near nurses station  Outcome: Progressing     Problem: DISCHARGE PLANNING  Goal: Discharge to home or other facility with appropriate resources  Description: INTERVENTIONS:  - Identify barriers to discharge w/patient and caregiver  - Arrange for needed discharge resources and transportation as appropriate  - Identify discharge learning needs (meds, wound care, etc )  - Refer to Case Management Department for coordinating discharge planning if the patient needs post-hospital services based on physician/advanced practitioner order or complex needs related to functional status, cognitive ability, or social support system  Outcome: Progressing     Problem: Knowledge Deficit  Goal: Patient/family/caregiver demonstrates understanding of disease process, treatment plan, medications, and discharge instructions  Description: Complete learning assessment and assess knowledge base    Interventions:  - Provide teaching at level of understanding  - Provide teaching via preferred learning methods  Outcome: Progressing     Problem: MUSCULOSKELETAL - ADULT  Goal: Maintain or return mobility to safest level of function  Description: INTERVENTIONS:  - Assess patient's ability to carry out ADLs; assess patient's baseline for ADL function and identify physical deficits which impact ability to perform ADLs (bathing, care of mouth/teeth, toileting, grooming, dressing, etc )  - Assess/evaluate cause of self-care deficits   - Assess range of motion  - Assess patient's mobility  - Assess patient's need for assistive devices and provide as appropriate  - Encourage maximum independence but intervene and supervise when necessary  - Involve family in performance of ADLs  - Assess for home care needs following discharge   - Consider OT consult to assist with ADL evaluation and planning for discharge  - Provide patient education as appropriate  Outcome: Progressing  Goal: Maintain proper alignment of affected body part  Description: INTERVENTIONS:  - Support, maintain and protect limb and body alignment  - Provide patient/ family with appropriate education  Outcome: Progressing     Problem: SAFETY,RESTRAINT: NV/NON-SELF DESTRUCTIVE BEHAVIOR  Goal: Remains free of harm/injury (restraint for non violent/non self-detsructive behavior)  Description: INTERVENTIONS:  - Instruct patient/family regarding restraint use   - Assess and monitor physiologic and psychological status   - Provide interventions and comfort measures to meet assessed patient needs   - Identify and implement measures to help patient regain control  - Assess readiness for release of restraint   Outcome: Progressing  Goal: Returns to optimal restraint-free functioning  Description: INTERVENTIONS:  - Assess the patient's behavior and symptoms that indicate continued need for restraint  - Identify and implement measures to help patient regain control  - Assess readiness for release of restraint   Outcome: Progressing

## 2023-02-12 NOTE — CONSULTS
INTERPROFESSIONAL (PHONE) [facility] Callahan Toxicology  [facility] 74 y o  female MRN: [id number]  Unit/Bed#: -01 Encounter: [id number]      Reason for Consult / Principal Problem: encephalopathy   Inpatient consult to Toxicology  Consult performed by: Laurie Goldmann, DO  Consult ordered by: Noa Mckeon DO        02/12/23      ASSESSMENT:  63-year-old female with encephalopathy  1  Encephalopathy, postsurgical  2  Recent fever  3  Benzodiazepine use    RECOMMENDATIONS:  Please continue supportive care and routine medical management  From chart review and discussion with primary team, it appears thorough evaluation conducted regarding acute encephalopathy without further recommendations from toxicological standpoint  CT scan, infectious evaluation, neurology consultation have already taken place  Although clonazepam was held briefly prior to surgery, and then slowly restarted afterwards, her recent encephalopathy may be more multifactorial   With consideration of clonazepam withdrawal being a component, there appeared to be encephalopathy and agitation without any tachycardia or tremor which would often be seen, so additional factors may have contributed  Other contributing factors may have included postanesthesia reaction, fever, hospital delirium  Nonetheless, it appears she is improving with restarting her clonazepam regimen along with other supportive measures  I would recommend maintaining her current clonazepam regimen as already being done by primary team and deferring to her regular outpatient provider for any further adjustments  Please reach out with any additional concerns  For further questions, please contact the medical  on call via [employer] or throughl the [employer]  Service or Patient [employer]cler  Please see additional teaching note below:    Hx and PE limited by the dynamics of a phone consultation   I have not personally interviewed or evaluated the patient, but only advised based on the information provided to me  Primary provider is responsible for all clinical decisions  Pertinent history, physical exam and clinical findings and course discussed: Sunday Rey is a 74y o  year old female who presents with encephalopathy after surgery  Daughter was concerned that she was taking too much clonazepam but seems to be appropriately prescribed by her outpatient provider with primary team cross-referencing PDMP  Review of systems and physical exam not performed by me      Historical Information   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    • Drug intolerance    • Dysfunction of right eustachian tube    • Dysfunctional uterine bleeding    • Edema    • Elevated d-dimer    • Exposure to hepatitis C    • Fracture of fibula    • Generalized anxiety disorder    • GERD (gastroesophageal reflux disease)    • Hyperlipidemia    • Mild cervical dysplasia    • Myalgia    • Myositis    • Palpitations    • PONV (postoperative nausea and vomiting)    • Pre-syncope    • Seasonal allergies    • Stress incontinence    • Thrombocytopenia ([address] )    • Urinary retention    • Urinary tract infection    • Uterine leiomyoma      Past Surgical History:   Procedure Laterality Date   • BLADDER SURGERY     • BREAST BIOPSY Right     US guided   • COLONOSCOPY     • COLPOSCOPY  01/06/1998   • DENTAL SURGERY     • ENDOMETRIAL BIOPSY  05/21/1993   • HYSTERECTOMY     • HYSTEROSCOPY      uterus- 1/22/98, 7/1/02 and 4/10/07   • TUBAL LIGATION     • US GUIDED BREAST BIOPSY RIGHT COMPLETE Right 08/08/2018     Social History   Social History     Substance and Sexual Activity   Alcohol Use Never     Social History     Substance and Sexual Activity   Drug Use No     Social History     Tobacco Use   Smoking Status Every Day   • Packs/day: 1 50   • Years: 45 00   • Pack years: 67 50   • Types: Cigarettes   Smokeless Tobacco Never     Family History   Problem Relation Age of Onset   • Stroke Mother         cerebral artery occlusion with cerebral infarction   • Coronary artery disease Mother    • Arrhythmia Mother         sinus   • Coronary artery disease Father    • Diabetes Father    • Aortic aneurysm Family    • Arthritis Family    • Diabetes Family    • Heart disease Family    • No Known Problems Daughter    • No Known Problems Maternal Grandmother    • No Known Problems Paternal Grandmother    • No Known Problems Daughter    • No Known Problems Maternal Aunt    • Ovarian cancer Maternal Aunt         Prior to Admission medications    Medication Sig Start Date End Date Taking?  Authorizing Provider   acetaminophen (TYLENOL) 500 mg tablet Take 500 mg by mouth every 6 (six) hours as needed for mild pain   Yes Historical Provider, MD   alendronate (FOSAMAX) 70 mg tablet take 1 tablet by mouth every 7 days 10/16/22  Yes Sarmad Ibarra DO   ascorbic acid (VITAMIN C) 500 MG tablet Take 1 tablet (500 mg total) by mouth 2 (two) times a day  Patient taking differently: Take 1,000 mg by mouth 2 (two) times a day 9/16/22 2/8/23 Yes Kristen Tyler PA-C   atorvastatin (LIPITOR) 20 mg tablet take 1 tablet by mouth once daily  Patient taking differently: Take 20 mg by mouth daily after dinner 3/16/22  Yes Sarmad Ibarra,    buPROPion (WELLBUTRIN XL) 150 mg 24 hr tablet 150 mg every morning 9/20/21  Yes Historical Provider, MD   clonazePAM (KlonoPIN) 1 mg tablet 3 (three) times a day   Yes Historical Provider, MD   diclofenac sodium (VOLTAREN) 50 mg EC tablet Take 1 tablet (50 mg total) by mouth 2 (two) times a day as needed (pain)  Patient not taking: Reported on 11/29/2022 10/4/22  Yes Jackeline Robles MD   ferrous sulfate 324 (65 Fe) mg Take 1 tablet (324 mg total) by mouth 2 (two) times a day before meals  Patient not taking: Reported on 11/29/2022 9/16/22 11/15/22 Yes Vianey Collado PA-C   folic acid (FOLVITE) 1 mg tablet Take 1 tablet (1 mg total) by mouth daily  Patient not taking: Reported on 11/29/2022 9/16/22 11/15/22 Yes Vianey Collado PA-C   gabapentin (Neurontin) 300 mg capsule ONE po q day x 3 and then one po bid x3 and then one tid  Patient taking differently: Take 100 mg by mouth 2 (two) times a day ONE po q day x 3 and then one po bid x3 and then one tid   10/21/22  Yes Dariela Grace DO   ibuprofen (MOTRIN) 800 mg tablet Take 1 tablet (800 mg total) by mouth every 6 (six) hours as needed for mild pain 4/19/22  Yes Dariela Grace DO   Multiple Vitamins-Minerals (multivitamin with minerals) tablet Take 1 tablet by mouth daily  Patient not taking: Reported on 2/6/2023 9/16/22 2/6/23 Yes Vianey Collado PA-C   omeprazole (PriLOSEC) 40 MG capsule Take 1 capsule (40 mg total) by mouth daily 10/21/22  Yes Dariela Grace DO   venlafaxine (EFFEXOR-XR) 150 mg 24 hr capsule Take 1 capsule (150 mg total) by mouth daily 9/6/22  Yes Dariela Grace DO   busPIRone (BUSPAR) 30 MG tablet Take 30 mg by mouth 3 (three) times a day  Patient not taking: Reported on 2/6/2023 10/26/22   Historical Provider, MD   meloxicam (MOBIC) 15 mg tablet Take 1 tablet (15 mg total) by mouth daily  Patient not taking: Reported on 11/29/2022 9/2/22   Dariela Grace DO   methocarbamol (ROBAXIN) 750 mg tablet Take 750 mg by mouth every 6 (six) hours as needed 1/1/23   Historical Provider, MD   nystatin (MYCOSTATIN) 500,000 units/5 mL suspension Apply 5 mL (500,000 Units total) to the mouth or throat 4 (four) times a day  Patient not taking: Reported on 11/29/2022 10/22/21   Dariela Grace DO   ondansetron (ZOFRAN) 4 mg tablet Take 1 tablet (4 mg total) by mouth every 8 (eight) hours as needed for nausea or vomiting  Patient not taking: Reported on 11/29/2022 10/19/21   Aleksandr Thao DO   SODIUM FLUORIDE, DENTAL GEL, 1 1 % GEL SF 5000 Plus 1 1 % dental cream    Historical Provider, MD   tretinoin (REFISSA) 0 05 % cream tretinoin 0 05 % topical cream  Patient not taking: Reported on 2/6/2023    Historical Provider, MD   tretinoin (RETIN-A) 0 1 % cream Apply topically daily at bedtime 3/28/22   Aleksandr Thao DO   venlafaxine (EFFEXOR-XR) 75 mg 24 hr capsule Take 1 capsule (75 mg total) by mouth daily 9/6/22   Aleksandr Thao DO       Current Facility-Administered Medications   Medication Dose Route Frequency   • acetaminophen (TYLENOL) tablet 650 mg  650 mg Oral Q4H PRN   • albuterol inhalation solution 2 5 mg  2 5 mg Nebulization Q4H PRN   • aluminum-magnesium hydroxide-simethicone (MYLANTA) oral suspension 30 mL  30 mL Oral Q6H PRN   • apixaban (ELIQUIS) tablet 10 mg  10 mg Oral BID   • [START ON 2/16/2023] apixaban (ELIQUIS) tablet 5 mg  5 mg Oral BID   • ascorbic acid (VITAMIN C) tablet 500 mg  500 mg Oral BID   • atorvastatin (LIPITOR) tablet 20 mg  20 mg Oral After Dinner   • budesonide (PULMICORT) inhalation solution 0 5 mg  0 5 mg Nebulization Q12H   • buPROPion (WELLBUTRIN XL) 24 hr tablet 150 mg  150 mg Oral QAM   • clonazePAM (KlonoPIN) tablet 1 mg  1 mg Oral TID   • docusate sodium (COLACE) capsule 100 mg  100 mg Oral BID   • ferrous sulfate tablet 325 mg  325 mg Oral BID With Meals   • folic acid (FOLVITE) tablet 1 mg  1 mg Oral Daily   • ipratropium (ATROVENT) 0 02 % inhalation solution 0 5 mg  0 5 mg Nebulization TID   • levalbuterol (XOPENEX) inhalation solution 1 25 mg  1 25 mg Nebulization TID   • multivitamin-minerals (CENTRUM) tablet 1 tablet  1 tablet Oral Daily   • nicotine (NICODERM CQ) 21 mg/24 hr TD 24 hr patch 21 mg  21 mg Transdermal Daily   • ondansetron (ZOFRAN) injection 4 mg  4 mg Intravenous Q6H PRN • pantoprazole (PROTONIX) EC tablet 40 mg  40 mg Oral Early Morning   • venlafaxine (EFFEXOR-XR) 24 hr capsule 150 mg  150 mg Oral Daily       No Known Allergies    Objective       Intake/Output Summary (Last 24 hours) at 2/12/2023 1031  Last data filed at 2/12/2023 0855  Gross per 24 hour   Intake 240 ml   Output 300 ml   Net -60 ml       Invasive Devices:   Peripheral IV 02/09/23 Right;Ventral (anterior) Forearm (Active)   Site Assessment WDL 02/12/23 1015   Dressing Type Transparent 02/12/23 1015   Line Status Flushed;Saline locked 02/12/23 1015   Dressing Status Clean;Dry; Intact 02/12/23 1015   Dressing Intervention Dressing reinforced 02/12/23 1015   Reason Not Rotated Not due 02/10/23 2030       Vitals   Vitals:    02/11/23 1537 02/11/23 2241 02/12/23 0722 02/12/23 0920   BP: 116/51 128/69 137/69    TempSrc:       Pulse: 85 88 90 85   Resp: 20 18 20 16   Patient Position - Orthostatic VS:       Temp: 98 3 °F (36 8 °C) 100 °F (37 8 °C) 99 4 °F (37 4 °C) 98 7 °F (37 1 °C)       Lab Results: I have personally reviewed pertinent reports  Labs:    Results from last 7 days   Lab Units 02/11/23  0757   WBC Thousand/uL 13 02*   HEMOGLOBIN g/dL 10 0*   HEMATOCRIT % 31 4*   PLATELETS Thousands/uL 374      Results from last 7 days   Lab Units 02/11/23  0757   SODIUM mmol/L 137   POTASSIUM mmol/L 3 4*   CHLORIDE mmol/L 103   CO2 mmol/L 25   BUN mg/dL 9   CREATININE mg/dL 0 58*   CALCIUM mg/dL 9 0              No results found for: TROPONINI          Invalid input(s): EXTPREGUR      Imaging Studies: I have personally reviewed pertinent reports  Counseling / Coordination of Care  Total time spent today 22 minutes  This was a phone consultation

## 2023-02-12 NOTE — NURSING NOTE
Bed alarm activated staff to room  Pt fell and was to the right side of bed  Upon observing pt took L restraint off  Per pt she was rolling and rolled out of bed onto floor  Hospitalist was notified and assessed pt @ bedside  New orders noted  Pt is restrained with new posy belt and soft nv wrist restraints  Bed alarm reactivated  Pt is at rest with call bell and belongings within reach

## 2023-02-13 ENCOUNTER — APPOINTMENT (OUTPATIENT)
Dept: PHYSICAL THERAPY | Facility: CLINIC | Age: 68
End: 2023-02-13

## 2023-02-13 LAB
ANION GAP SERPL CALCULATED.3IONS-SCNC: 9 MMOL/L (ref 4–13)
BUN SERPL-MCNC: 17 MG/DL (ref 5–25)
CALCIUM SERPL-MCNC: 9.1 MG/DL (ref 8.4–10.2)
CHLORIDE SERPL-SCNC: 106 MMOL/L (ref 96–108)
CO2 SERPL-SCNC: 26 MMOL/L (ref 21–32)
CREAT SERPL-MCNC: 0.65 MG/DL (ref 0.6–1.3)
ERYTHROCYTE [DISTWIDTH] IN BLOOD BY AUTOMATED COUNT: 15.3 % (ref 11.6–15.1)
GFR SERPL CREATININE-BSD FRML MDRD: 93 ML/MIN/1.73SQ M
GLUCOSE SERPL-MCNC: 100 MG/DL (ref 65–140)
HCT VFR BLD AUTO: 30.9 % (ref 34.8–46.1)
HGB BLD-MCNC: 9.5 G/DL (ref 11.5–15.4)
MAGNESIUM SERPL-MCNC: 2.2 MG/DL (ref 1.9–2.7)
MCH RBC QN AUTO: 30.2 PG (ref 26.8–34.3)
MCHC RBC AUTO-ENTMCNC: 30.7 G/DL (ref 31.4–37.4)
MCV RBC AUTO: 98 FL (ref 82–98)
PLATELET # BLD AUTO: 507 THOUSANDS/UL (ref 149–390)
PMV BLD AUTO: 9.4 FL (ref 8.9–12.7)
POTASSIUM SERPL-SCNC: 3.5 MMOL/L (ref 3.5–5.3)
PROCALCITONIN SERPL-MCNC: 0.09 NG/ML
RBC # BLD AUTO: 3.15 MILLION/UL (ref 3.81–5.12)
SODIUM SERPL-SCNC: 141 MMOL/L (ref 135–147)
WBC # BLD AUTO: 7.99 THOUSAND/UL (ref 4.31–10.16)

## 2023-02-13 RX ORDER — VENLAFAXINE HYDROCHLORIDE 75 MG/1
75 CAPSULE, EXTENDED RELEASE ORAL DAILY
Status: DISCONTINUED | OUTPATIENT
Start: 2023-02-13 | End: 2023-02-14 | Stop reason: HOSPADM

## 2023-02-13 RX ADMIN — NICOTINE 21 MG: 21 PATCH, EXTENDED RELEASE TRANSDERMAL at 09:13

## 2023-02-13 RX ADMIN — VENLAFAXINE HYDROCHLORIDE 75 MG: 75 CAPSULE, EXTENDED RELEASE ORAL at 12:19

## 2023-02-13 RX ADMIN — FERROUS SULFATE TAB 325 MG (65 MG ELEMENTAL FE) 325 MG: 325 (65 FE) TAB at 08:08

## 2023-02-13 RX ADMIN — CLONAZEPAM 1 MG: 1 TABLET ORAL at 21:27

## 2023-02-13 RX ADMIN — DOCUSATE SODIUM 100 MG: 100 CAPSULE, LIQUID FILLED ORAL at 17:59

## 2023-02-13 RX ADMIN — FERROUS SULFATE TAB 325 MG (65 MG ELEMENTAL FE) 325 MG: 325 (65 FE) TAB at 16:29

## 2023-02-13 RX ADMIN — OXYCODONE HYDROCHLORIDE AND ACETAMINOPHEN 500 MG: 500 TABLET ORAL at 17:59

## 2023-02-13 RX ADMIN — ACETAMINOPHEN 650 MG: 325 TABLET ORAL at 21:31

## 2023-02-13 RX ADMIN — Medication 1 TABLET: at 09:12

## 2023-02-13 RX ADMIN — DOCUSATE SODIUM 100 MG: 100 CAPSULE, LIQUID FILLED ORAL at 09:12

## 2023-02-13 RX ADMIN — VENLAFAXINE HYDROCHLORIDE 150 MG: 150 CAPSULE, EXTENDED RELEASE ORAL at 09:12

## 2023-02-13 RX ADMIN — APIXABAN 10 MG: 5 TABLET, FILM COATED ORAL at 17:59

## 2023-02-13 RX ADMIN — OXYCODONE HYDROCHLORIDE AND ACETAMINOPHEN 500 MG: 500 TABLET ORAL at 09:12

## 2023-02-13 RX ADMIN — BUPROPION HYDROCHLORIDE 150 MG: 150 TABLET, EXTENDED RELEASE ORAL at 09:12

## 2023-02-13 RX ADMIN — CLONAZEPAM 1 MG: 1 TABLET ORAL at 16:29

## 2023-02-13 RX ADMIN — ACETAMINOPHEN 650 MG: 325 TABLET ORAL at 12:23

## 2023-02-13 RX ADMIN — ATORVASTATIN CALCIUM 20 MG: 20 TABLET, FILM COATED ORAL at 17:59

## 2023-02-13 RX ADMIN — CLONAZEPAM 1 MG: 1 TABLET ORAL at 09:12

## 2023-02-13 RX ADMIN — FOLIC ACID 1 MG: 1 TABLET ORAL at 09:12

## 2023-02-13 RX ADMIN — PANTOPRAZOLE SODIUM 40 MG: 40 TABLET, DELAYED RELEASE ORAL at 05:14

## 2023-02-13 RX ADMIN — APIXABAN 10 MG: 5 TABLET, FILM COATED ORAL at 09:11

## 2023-02-13 RX ADMIN — ACETAMINOPHEN 650 MG: 325 TABLET ORAL at 01:10

## 2023-02-13 NOTE — ASSESSMENT & PLAN NOTE
· Patient is status post total right knee arthroplasty on 2/8/23 by Dr Craig Gutierrez for pre-existing history of severe osteoarthritis of the right knee  · Continue Eliquis  · Continue PT and OT  · Patient will eventually need Post acute rehab - case management is working on post discharge placement options

## 2023-02-13 NOTE — PROGRESS NOTES
Progress Note - Orthopedics   Jihan Padilla 74 y o  female MRN: [id number]  Unit/Bed#: -01 Encounter: [id number]    Assessment:  74year old female POD#5 Right TKA, doing well  Plan:  · Pain control per primary  · Encouraged icing the leg   · PT/OT- continue   · Discharge to post-acute rehab when medically cleared   · WBAT to RLE with walker; total knee replacement protocol precautions  · Out of bed with assistance   · Hemoglobin 9 5 this morning from 10 yesterday  No signs of bleeding in the operative extremity at this time  Will continue to monitor at this time  · Wound care:  · Continue  painting incision with betadine 2x per day and keeping dressing clean   · Continue DVT prophylaxis  Patient should be discharged with anticoagulation per primary  · Patient is stable from an orthopedic standpoint  She will require follow-up with Dr Anali Olivera post-operatively  · Ortho signing off  Subjective: Patient seen and examined sitting upright in bed  She describes adequate pain control at rest, however, she is experiencing pain with flexion and with ambulation with physical therapy  She understands that her pain medication and pain control is now managed by the medicine team  She describes fevers earlier today, with her understanding that it could be due to her medications  She describes occasional muscle spasms that happen along her leg that resolve spontaneously without significant concern  Vitals: Blood pressure 104/60, pulse 74, temperature 98 7 °F (37 1 °C), resp  rate 18, height 5' 3" (1 6 m), weight 63 5 kg (140 lb), SpO2 93 %  ,Body mass index is 24 8 kg/m²        Intake/Output Summary (Last 24 hours) at 2/13/2023 1603  Last data filed at 2/12/2023 1831  Gross per 24 hour   Intake 240 ml   Output --   Net 240 ml       Invasive Devices     Peripheral Intravenous Line  Duration           Peripheral IV 02/09/23 Right;Ventral (anterior) Forearm 4 days                Ortho Exam:   Right knee: ABD pad with tape over incision c/d/i  staples and surgical site intact without drainage or loosening  Sensation grossly intact  Motor intact to EHL/FHL, ankle DF/PF  2+ DP pulse, toes well perfused  Calf compartments soft and compressible      Lab, Imaging and other studies: I have personally reviewed pertinent lab results         Linnette Calderon PA-C

## 2023-02-13 NOTE — ARC ADMISSION
[facility] admissions team continues to follow patient with no determination of acceptance from [address] [place] [address] at this time  Will continue to correspond with CM

## 2023-02-13 NOTE — ASSESSMENT & PLAN NOTE
· Continues to Improve- patient close to baseline   · 2/11, patient with unwitnessed fall    CT head again negative for any acute intracranial pathology, xray right knee without fracture  · Appreciate neurology input  · Continue Klonopin at 3 times a day  · Monitor closely otherwise  · Toxicology consulted, appreciate recommendations

## 2023-02-13 NOTE — NURSING NOTE
Patient awake, oriented to person, time, not sure of place or situation, reoriented to both  Complaint of discomfort to R knee area, dressing intact  Bilateral arm restraints on    Call bell in reach

## 2023-02-13 NOTE — PROGRESS NOTES
Progress Note - [facility] Infectious Disease   Jessica Aleyda 74 y o  female MRN: [id number]  Unit/Bed#: -01 Encounter: [id number]      IMPRESSION & RECOMMENDATIONS:   1  Post-operative fever  Patient with isolated temp 101 2*F on POD1  Mild leukocytosis noted and likely reactive  PCT is normal  Hgb noted to drop 2g though no evidence of active bleeding  CT chest shows L>R postoperative atelectasis and acute RUL pulmonary embolism  Venous duplex also shows acute LLE DVT  Blood cultures x2 are negative  UA benign  COVID/Flu/RSV neg  Low suspicion for infectious etiology  There is also question of possible aspiration post-op with respiratory insufficiency, thus consider possibility of chemical pneumonitis  Suspect fever due to post-operative atelectasis and acute VTE  Her WBC is now normal  Tmax 100 4*F    -continue to monitor off antibiotics   -follow-up blood cultures    -monitor CBCd, temperature and hemodynamics  -encourage incentive spirometry  -anticoagulation per medicine team      2  Acute RUL PE and LLE DVT  Provoked, in the setting of #5  Started on [facility] per medicine    -continue anticoagulation      3  Post-operative respiratory insufficiency with hypoxia  Likely due to acute PE, atelectasis, and polypharmacy  Unlikely infectious etiology  She is now on room air       4  Acute, toxic/metabolic encephalopathy  Unlikely infectious etiology  Ongoing work-up per medicine       5  R knee OA s/p elective R TKA 2/8/23  Complicated by the above  Management per primary team      6  Tobacco abuse  Ongoing risk factor for inadequate wound healing  Antibiotics:  None     I have discussed the above management plan in detail with patient  I have discussed the above management plan in detail with patient's RN, and the primary service, SLIM attending  Subjective:  Patient has no fever, chills, sweats; no nausea, vomiting, diarrhea; no cough, shortness of breath; no pain   No new symptoms  Objective:  Vitals:  Temp:  [100 4 °F (38 °C)] 100 4 °F (38 °C)  HR:  [] 90  Resp:  [17-20] 17  BP: ()/(67-80) 126/73  SpO2:  [87 %-92 %] 90 %  Temp (24hrs), Av 4 °F (38 °C), Min:100 4 °F (38 °C), Max:100 4 °F (38 °C)  Current: Temperature: 100 4 °F (38 °C)    PHYSICAL EXAM:  General Appearance:  Appearing well, nontoxic, and in no distress; talking with , confused but is redirectable    HEENT: Normocephalic, without obvious abnormality, atraumatic  Conjunctiva pink and sclera anicteric  Oropharynx moist without lesions  Lungs:   Clear to auscultation bilaterally, respirations unlabored   Heart:  RRR; no murmur, rub or gallop   Abdomen:   Soft, non-tender, non-distended, positive bowel sounds    Extremities: No cyanosis, clubbing or edema   Musculoskeletal: Right knee post op dressing with mild streaking    Skin: No rashes or lesions  No draining wounds noted  Peripheral IV intact without evidence of erythema, warmth, or exudate  LABS, IMAGING, & OTHER STUDIES:  Lab Results:  I have personally reviewed pertinent labs  Results from last 7 days   Lab Units 23  0515 23  0757 02/10/23  0447   WBC Thousand/uL 7 99 13 02* 12 45*   HEMOGLOBIN g/dL 9 5* 10 0* 9 3*   PLATELETS Thousands/uL 507* 374 294     Results from last 7 days   Lab Units 23  0515 23  0757 02/10/23  0447   SODIUM mmol/L 141 137 136   POTASSIUM mmol/L 3 5 3 4* 3 6   CHLORIDE mmol/L 106 103 102   CO2 mmol/L 26 25 25   BUN mg/dL 17 9 11   CREATININE mg/dL 0 65 0 58* 0 50*   EGFR ml/min/1 73sq m 93 97 101   CALCIUM mg/dL 9 1 9 0 8 5     Results from last 7 days   Lab Units 23  1512   BLOOD CULTURE  No Growth at 72 hrs  No Growth at 72 hrs       Results from last 7 days   Lab Units 23  0515 23  1513   PROCALCITONIN ng/ml 0 09 0 08

## 2023-02-13 NOTE — OCCUPATIONAL THERAPY NOTE
02/13/23 1014   OT Last Visit   OT Visit Date 02/13/23   Note Type   Note Type Treatment  (completed 2812-9610)   Pain Assessment   Pain Assessment Tool 0-10   Pain Score 5   Pain Location/Orientation Orientation: Right;Location: Knee   Restrictions/Precautions   Weight Bearing Precautions Per Order Yes   RLE Weight Bearing Per Order FWB   Other Precautions Impulsive;Cognitive; Bed Alarm;WBS;Fall Risk;Pain   Lifestyle   Reciprocal Relationships  left bedside, at my arrival, to go to work  ROSALINDA Otto notified)   Intrinsic Gratification spoke of grandchildren ("they're older now - one in [employer] and one in high school")   ADL   Where Assessed Edge of bed   Grooming Assistance 5  Supervision/Setup   Grooming Deficit Supervision/safety;Setup   Grooming Comments patient brushed teeth with prompting  (Stated she would work at [employer] her hair, using her own pick later)   [address]  Supervision/Setup   UB Bathing Deficit Setup;Verbal cueing;Supervision/safety; Increased time to complete   LB Bathing Assistance 4  Minimal Assistance   LB Bathing Deficit Setup;Verbal cueing;Supervision/safety; Impulsive;Right lower leg including foot   UB Dressing Assistance 4  Minimal Assistance   UB Dressing Deficit Setup;Verbal cueing;Supervision/safety; Fasteners   UB Dressing Comments *hospital gown change   LB Dressing Comments able to doff/don socks > was eager to do same with [place]   Bed Mobility   Supine to Sit 5  Supervision   Additional items HOB elevated; Bedrails; Increased time required   Sit to Supine 6  Modified independent  (to semi-reclined)   Additional items Bedrails; Increased time required   Additional Comments patient was pleased to get positive feedback regarding her bed mobility   Transfers   Sit to Stand   (CGA)   Additional items Assist x 1; Impulsive;Verbal cues   Stand to Sit   (CGA)   Additional items Assist x 1;Bedrails; Impulsive;Verbal cues  (patient stood X2 for adjustments of clothing and bed linens)   Coordination   Gross Motor WFL   Dexterity appears WFL   Subjective   Subjective "I can't believe I came in for a knee replacement, and so much has happened"   Cognition   Overall Cognitive Status Impaired  (Less so this AM)   Arousal/Participation Cooperative;Responsive   Attention Attends with cues to redirect   Following Commands Follows one step commands with increased time or repetition   Activity Tolerance   Activity Tolerance Patient tolerated treatment well   Medical Staff Made Aware Nurse Jarrell Leyva aware of session's outcome   Assessment   Assessment Patient participated in Skilled OT session this date with interventions consisting of ADL re training with the use of correct body mechnaics, safety awareness and fall prevention techniques,  therapeutic activities to: increase activity tolerance and increase dynamic sit/ stand balance during functional activity , and increase EOB/sitting tolerance   Patient agreeable to OT treatment session, upon arrival patient was found seated at edge of bed - most of session completed with patient at EOB  In comparison to previous session, patient with improvements in self-care ability and cognition  Patient requiring verbal cues for safety, verbal cues for correct technique, cognitive assistance to anticipate next step and one step directives, occasional re-direction, and self-care assistance as noted in flow sheet  Patient continues to be functioning below baseline level, occupational performance remains limited secondary to factors listed above and increased risk for falls and injury  From OT standpoint, recommendation at time of d/c would be post-acute rehabilitation services  Patient to benefit from continued Occupational Therapy treatment while in the hospital to address deficits as defined above and maximize level of functional independence with ADLs and functional mobility     Plan   Treatment Interventions ADL retraining;Functional transfer training;UE strengthening/ROM; Patient/family training; Activityengagement; Compensatory technique education   Goal Expiration Date 02/19/23   OT Treatment Day 3   Recommendation   OT Discharge Recommendation Post acute rehabilitation services   Additional Comments 2 The patient's raw score on the AM-PAC Daily Activity Inpatient Short Form is 18  A raw score of less than 19 suggests the patient may benefit from discharge to post-acute rehabilitation services  Please refer to the recommendation of the Occupational Therapist for safe discharge planning     AM-PAC Daily Activity Inpatient   Lower Body Dressing 3   Bathing 3   Toileting 2   Upper Body Dressing 3   Grooming 3   Eating 4   Daily Activity Raw Score 18   Daily Activity Standardized Score (Calc for Raw Score >=11) 38 66   AM-PAC Applied Cognition Inpatient   Following a Speech/Presentation 3   Understanding Ordinary Conversation 3   Taking Medications 1   Remembering Where Things Are Placed or Put Away 1   Remembering List of 4-5 Errands 1   Taking Care of Complicated Tasks 1   Applied Cognition Raw Score 10   Applied Cognition Standardized Score 24 98     VIKAS Urrutia/SHERI

## 2023-02-13 NOTE — NURSING NOTE
Pt being combative trying to get out of restraints and bed  Explained to her she just had knee surgery and best for her to stay in bed  Pt would not cooperate  Control team called  New orders noted  IM zyprexa administered per order  Pt restraints reapplied  Virtual 1:1 in place  Pt continues to be restless trying to remove restraints  RN and staff attempting to reason with patient without success  She is confused and keeps asking for her garage opener and wants to go upstairs to take a shower  Unable to reorient patient at this time

## 2023-02-13 NOTE — ASSESSMENT & PLAN NOTE
· Resolved  · Patient has been afebrile over the past 24 hours  · Work-up thus far:  · Testing for COVID/RSV/Flu- negative  · Blood cultures x2 (2023) NGTD  · Chest x-ray from 2/10/2023 revealed atelectasis  · WBC normal today   · Procalcitonin testin 08  · Urinalysis not suggestive of a UTI    · Appreciate ID input  · No further work-up at this time

## 2023-02-13 NOTE — CASE MANAGEMENT
Case Management Discharge Planning Note    Patient name Agnes Chung  Location /-63 MRN [id number]  : 1955 Date 2023       Current Admission Date: 2023  Current Admission Diagnosis:Arthritis of right knee   Patient Active Problem List    Diagnosis Date Noted   • Fever 02/10/2023   • Encephalopathy acute 2023   • Acute pulmonary embolism without acute cor pulmonale ([address] ) 2023   • Chronic bilateral low back pain without sciatica 2022   • Arthritis of right knee 2020   • Uterine prolapse 2020   • Chronic pain syndrome 10/11/2019   • Tobacco abuse 2018   • Acquired trigger finger 2018   • Carpal tunnel syndrome 2018   • Constipation 2017   • Spider veins of both lower extremities 2016   • Varicosities of leg 03/10/2016   • Esophageal reflux 2015   • Allergic rhinitis 2013   • Symptomatic menopausal or female climacteric states 2013   • Chronic cough 2013   • Generalized anxiety disorder 2013   • Hyperlipidemia 2013      LOS (days): 4  Geometric Mean LOS (GMLOS) (days): 3 10  Days to GMLOS:-0 6     OBJECTIVE:  Risk of Unplanned Readmission Score: 11 98         Current admission status: Inpatient   Preferred Pharmacy:   [facility] [facility], [address] [place] [address] [address] Viridiana [address]  [address]   DR Keri MICHELLE [id number]  Phone: [phone] Fax: [phone]    Primary Care Provider: Fabiola Moreira DO    Primary Insurance: MEDICARE  Secondary Insurance: Gustabo Zapata    DISCHARGE DETAILS:       Freedom of Choice: Yes  Comments - Freedom of Choice: recommendation is for rehab- referral sent to [address] [place] [address] referral for snf rehab- pt is in restrainsts- pt needs to be off restraisnts for 24hrs before she can be accepted a to a facility                               Other Referral/Resources/Interventions Provided:  Interventions: Short Term Rehab, Acute Rehab  Referral Comments: referral to [facility]  and blanket referrals sent   cm will referral accepting facilities with pt and famil-  pt needs to be out of restraints for 24 hrs    Would you like to participate in our [address] service program?  : No - Declined    Treatment Team Recommendation:  ([facility] vs snf rehab- tbd)

## 2023-02-13 NOTE — PROGRESS NOTES
[facility] [address]  Progress Note Yaw  1955, 74 y o  female MRN: [id number]  Unit/Bed#: -01 Encounter: [id number]  Primary Care Provider: Arnaud Sandoval DO   Date and time admitted to hospital: 2023  6:59 AM    * Arthritis of right knee  Assessment & Plan  · Patient is status post total right knee arthroplasty on 23 by Dr Chip Ramírez for pre-existing history of severe osteoarthritis of the right knee  · Continue Eliquis  · Continue PT and OT  · Patient will eventually need Post acute rehab - case management is working on post discharge placement options    Fever  Assessment & Plan  · Resolved  · Patient has been afebrile over the past 24 hours  · Work-up thus far:  · Testing for COVID/RSV/Flu- negative  · Blood cultures x2 (2023) NGTD  · Chest x-ray from 2/10/2023 revealed atelectasis  · WBC normal today   · Procalcitonin testin 08  · Urinalysis not suggestive of a UTI    · Appreciate ID input  · No further work-up at this time    Acute pulmonary embolism without acute cor pulmonale (HCC)  Assessment & Plan  · Continue Eliquis 10 mg p o  twice daily through 2023 transition to 5 mg p o  twice daily thereafter for 3 to 6 months  · Appreciate pulmonary input  · Oxygenation requirements have been as high as 4 L of supplemental oxygen by nasal cannula -currently on room air  · Multifactorial:   · Postop respiratory failure with hypoxia-resolved  · Acute pulmonary embolism -secondary to an acute left lower extremity DVT -see the management as outlined above  · 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  · Active tobacco abuse -continue nicotine patch  · Right pulmonary atelectasis -continue incentive spirometry    Encephalopathy acute  Assessment & Plan  · Continues to Improve- patient close to baseline   · , patient with unwitnessed fall    CT head again negative for any acute intracranial pathology, xray right knee without fracture  · Appreciate neurology input  · Continue Klonopin at 3 times a day  · Monitor closely otherwise  · Toxicology consulted, appreciate recommendations    Hyperlipidemia  Assessment & Plan  · Continue Lipitor    Generalized anxiety disorder  Assessment & Plan  · With depression  · Continue Effexor, Wellbutrin   · Continue scheduled clonazepam    Esophageal reflux  Assessment & Plan  · Continue Protonix        VTE Pharmacologic Prophylaxis: VTE Score: 9 High Risk (Score >/= 5) - Pharmacological DVT Prophylaxis Ordered: apixaban (Eliquis)  Sequential Compression Devices Ordered  Patient Centered Rounds: I performed bedside rounds with nursing staff today  Discussions with Specialists or Other Care Team Provider: nursingLORETA  Time Spent for Care: 20 minutes  More than 50% of total time spent on counseling and coordination of care as described above  Current Length of Stay: 4 day(s)  Current Patient Status: Inpatient   Certification Statement: The patient will continue to require additional inpatient hospital stay due to need for placement to [facility]  Discharge Plan: Anticipate discharge in 24-48 hrs to rehab facility  Code Status: No Order    Subjective: The patient was seen and examined  The patient is asking about going home today  Restraints were removed this morning  The patient denies any complaints  Objective:     Vitals:   Temp (24hrs), Av 6 °F (37 6 °C), Min:98 7 °F (37 1 °C), Max:100 4 °F (38 °C)    Temp:  [98 7 °F (37 1 °C)-100 4 °F (38 °C)] 98 7 °F (37 1 °C)  HR:  [74-97] 74  Resp:  [17-18] 18  BP: (104-126)/(60-73) 104/60  SpO2:  [87 %-93 %] 93 %  Body mass index is 24 8 kg/m²  Input and Output Summary (last 24 hours): Intake/Output Summary (Last 24 hours) at 2023 1547  Last data filed at 2023 1831  Gross per 24 hour   Intake 240 ml   Output --   Net 240 ml       Physical Exam:   Physical Exam  Vitals and nursing note reviewed  Constitutional:       General: She is awake        Appearance: Normal appearance  Cardiovascular:      Rate and Rhythm: Normal rate and regular rhythm  Pulmonary:      Effort: Pulmonary effort is normal       Breath sounds: Normal breath sounds  Abdominal:      Palpations: Abdomen is soft  Tenderness: There is no abdominal tenderness  Musculoskeletal:      Comments: Left knee with dressing in place   Skin:     General: Skin is warm and dry  Neurological:      General: No focal deficit present  Mental Status: She is alert and oriented to person, place, and time  Psychiatric:         Attention and Perception: Attention normal          Mood and Affect: Mood normal          Speech: Speech normal          Behavior: Behavior is cooperative  Additional Data:     Labs:  Results from last 7 days   Lab Units 02/13/23  0515   WBC Thousand/uL 7 99   HEMOGLOBIN g/dL 9 5*   HEMATOCRIT % 30 9*   PLATELETS Thousands/uL 507*     Results from last 7 days   Lab Units 02/13/23  0515   SODIUM mmol/L 141   POTASSIUM mmol/L 3 5   CHLORIDE mmol/L 106   CO2 mmol/L 26   BUN mg/dL 17   CREATININE mg/dL 0 65   ANION GAP mmol/L 9   CALCIUM mg/dL 9 1   GLUCOSE RANDOM mg/dL 100         Results from last 7 days   Lab Units 02/10/23  0753   POC GLUCOSE mg/dl 166*         Results from last 7 days   Lab Units 02/13/23  0515 02/09/23  1513   PROCALCITONIN ng/ml 0 09 0 08       Lines/Drains:  Invasive Devices     Peripheral Intravenous Line  Duration           Peripheral IV 02/09/23 Right;Ventral (anterior) Forearm 4 days                      Imaging: No pertinent imaging reviewed  Recent Cultures (last 7 days):   Results from last 7 days   Lab Units 02/09/23  1512   BLOOD CULTURE  No Growth at 72 hrs  No Growth at 72 hrs         Last 24 Hours Medication List:   Current Facility-Administered Medications   Medication Dose Route Frequency Provider Last Rate   • acetaminophen  650 mg Oral Q4H PRN Ronnie Nam PA-C     • albuterol  2 5 mg Nebulization Q4H PRN Majo Hussein DO • aluminum-magnesium hydroxide-simethicone  30 mL Oral Q6H PRN Migel Whitley PA-C     • apixaban  10 mg Oral BID Ernesto Somers MD     • [START ON 2/16/2023] apixaban  5 mg Oral BID Ernesto Somers MD     • ascorbic acid  500 mg Oral BID Migel Whitley PA-C     • atorvastatin  20 mg Oral After Keri Tapia PA-C     • buPROPion  150 mg Oral QAM Migel Whitley PA-C     • clonazePAM  1 mg Oral TID Ernesto Somers MD     • docusate sodium  100 mg Oral BID Awilda Bence Odierno, PA-C     • ferrous sulfate  325 mg Oral BID With Meals Migel Whitley PA-C     • folic acid  1 mg Oral Daily Migel Whitley PA-C     • multivitamin-minerals  1 tablet Oral Daily Migel Whitley PA-C     • nicotine  21 mg Transdermal Daily Ernesto Somers MD     • ondansetron  4 mg Intravenous Q6H PRN Migel Whitley PA-C     • pantoprazole  40 mg Oral Early Morning Migel Whitley PA-C     • venlafaxine  150 mg Oral Daily Vanda Henriquez PA-C     • venlafaxine  75 mg Oral Daily Vanda Henriquez PA-C          Today, Patient Was Seen By: Vanda Henriquez PA-C    **Please Note: This note may have been constructed using a voice recognition system  **

## 2023-02-13 NOTE — ASSESSMENT & PLAN NOTE
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

## 2023-02-14 VITALS
RESPIRATION RATE: 18 BRPM | DIASTOLIC BLOOD PRESSURE: 65 MMHG | SYSTOLIC BLOOD PRESSURE: 123 MMHG | WEIGHT: 140 LBS | TEMPERATURE: 100.2 F | BODY MASS INDEX: 24.8 KG/M2 | HEIGHT: 63 IN | OXYGEN SATURATION: 95 % | HEART RATE: 74 BPM

## 2023-02-14 LAB
BACTERIA BLD CULT: NORMAL
BACTERIA BLD CULT: NORMAL

## 2023-02-14 RX ADMIN — OXYCODONE HYDROCHLORIDE AND ACETAMINOPHEN 500 MG: 500 TABLET ORAL at 08:52

## 2023-02-14 RX ADMIN — DOCUSATE SODIUM 100 MG: 100 CAPSULE, LIQUID FILLED ORAL at 08:52

## 2023-02-14 RX ADMIN — VENLAFAXINE HYDROCHLORIDE 75 MG: 75 CAPSULE, EXTENDED RELEASE ORAL at 09:02

## 2023-02-14 RX ADMIN — ACETAMINOPHEN 650 MG: 325 TABLET ORAL at 08:54

## 2023-02-14 RX ADMIN — PANTOPRAZOLE SODIUM 40 MG: 40 TABLET, DELAYED RELEASE ORAL at 06:14

## 2023-02-14 RX ADMIN — FOLIC ACID 1 MG: 1 TABLET ORAL at 08:54

## 2023-02-14 RX ADMIN — CLONAZEPAM 1 MG: 1 TABLET ORAL at 08:53

## 2023-02-14 RX ADMIN — FERROUS SULFATE TAB 325 MG (65 MG ELEMENTAL FE) 325 MG: 325 (65 FE) TAB at 08:53

## 2023-02-14 RX ADMIN — VENLAFAXINE HYDROCHLORIDE 150 MG: 150 CAPSULE, EXTENDED RELEASE ORAL at 09:03

## 2023-02-14 RX ADMIN — NICOTINE 21 MG: 21 PATCH, EXTENDED RELEASE TRANSDERMAL at 08:55

## 2023-02-14 RX ADMIN — Medication 1 TABLET: at 08:53

## 2023-02-14 RX ADMIN — BUPROPION HYDROCHLORIDE 150 MG: 150 TABLET, EXTENDED RELEASE ORAL at 08:53

## 2023-02-14 RX ADMIN — APIXABAN 10 MG: 5 TABLET, FILM COATED ORAL at 08:54

## 2023-02-14 NOTE — PLAN OF CARE
Problem: PHYSICAL THERAPY ADULT  Goal: Performs mobility at highest level of function for planned discharge setting  See evaluation for individualized goals  Description: Treatment/Interventions: Functional transfer training, LE strengthening/ROM, Elevations, Therapeutic exercise, Patient/family training, Gait training, Spoke to nursing, Spoke to case management  Equipment Recommended: Doyne Gosselin (continue RW)       See flowsheet documentation for full assessment, interventions and recommendations  2/14/2023 1255 by Dejan Riddle, PT  Outcome: Progressing  Note: Prognosis: Excellent  Problem List: Decreased strength, Decreased range of motion, Impaired balance, Decreased mobility, Decreased safety awareness, Decreased skin integrity, Orthopedic restrictions, Pain  Assessment: Pt seen for PT treatment session this date, consisting of ther act focused on functional transfer training, ther ex focused on strengthening, gt training on level surfaces to improve pt safety in household environment and elevation training for enter/exit home  Since previous session, pt has made excellent progress in terms of improved tolerance and performance of OOB activity  Pertinent barriers during this session include pain  Current goals and POC remain appropriate, pt continues to have rehab potential and is making excellent progress towards STGs  Pt prognosis for achieving goals is excellent, pending pt progress with hospitalization/medical status improvements, and indicated by attention span, self-expression (thoughts, feelings, needs) and motivation  Pt limited d/t fear of pain provocation  PT recommends home with outpatient rehabilitation vs HHPT, upon discharge  Pt continues to be functioning below baseline level, and remains limited 2* factors listed above   PT will continue to see pt during current hospitalization in order to address the deficits listed above and provide interventions consistent w/ POC in effort to achieve STGs   Barriers to Discharge: None     PT Discharge Recommendation: Home with outpatient rehabilitation (vs HHPT, pending pt decision (PT discussed benefits to pursing OP PT post op))    See flowsheet documentation for full assessment  2/14/2023 0840 by Sunitha Sanches PT  Outcome: Adequate for Discharge  Note: Prognosis: Excellent  Problem List: Decreased strength, Decreased range of motion, Impaired balance, Decreased mobility, Decreased safety awareness, Decreased skin integrity, Orthopedic restrictions, Pain  Assessment: Pt seen for PT treatment session this date, consisting of ther act focused on functional transfer training, ther ex focused on strengthening, gt training on level surfaces to improve pt safety in household environment and elevation training for enter/exit home  Since previous session, pt has made excellent progress in terms of improved tolerance and performance of OOB activity  Pertinent barriers during this session include pain  Current goals and POC remain appropriate, pt continues to have rehab potential and is making excellent progress towards STGs  Pt prognosis for achieving goals is excellent, pending pt progress with hospitalization/medical status improvements, and indicated by attention span, self-expression (thoughts, feelings, needs) and motivation  Pt limited d/t fear of pain provocation  PT recommends home with outpatient rehabilitation vs HHPT, upon discharge  Pt continues to be functioning below baseline level, and remains limited 2* factors listed above  PT will continue to see pt during current hospitalization in order to address the deficits listed above and provide interventions consistent w/ POC in effort to achieve STGs  Barriers to Discharge: None     PT Discharge Recommendation: Home with outpatient rehabilitation (vs HHPT, pending pt decision (PT discussed benefits to pursing OP PT post op))    See flowsheet documentation for full assessment

## 2023-02-14 NOTE — NURSING NOTE
Discharged, discharge instructions given and verbalizes understanding of same   Virtual monitoring d/lorenzo

## 2023-02-14 NOTE — ASSESSMENT & PLAN NOTE
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

## 2023-02-14 NOTE — ASSESSMENT & PLAN NOTE
· Likely due to anesthesia and post-op pain/medications  · 2/11, patient with unwitnessed fall    CT head again negative for any acute intracranial pathology, xray right knee without fracture  · Appreciate neurology input  · Continue Klonopin at 3 times a day  · Monitor closely otherwise  · Toxicology consulted, appreciate recommendations  · Resolved- patient back to baseline

## 2023-02-14 NOTE — PLAN OF CARE
Problem: OCCUPATIONAL THERAPY ADULT  Goal: Performs self-care activities at highest level of function for planned discharge setting  See evaluation for individualized goals  Description: Treatment Interventions: ADL retraining, Functional transfer training, Endurance training, Cognitive reorientation, Patient/family training, Equipment evaluation/education, Compensatory technique education, Energy conservation, Activityengagement     See flowsheet documentation for full assessment, interventions and recommendations  Outcome: Progressing  Note: Limitation: Decreased ADL status, Decreased Safe judgement during ADL, Decreased cognition, Decreased endurance, Decreased self-care trans, Decreased high-level ADLs  Prognosis: Good  Assessment: Patient participated in Skilled OT session this date with interventions consisting of ADL re training with the use of correct body mechnaics, safety awareness and fall prevention techniques,  therapeutic activities to: increase activity tolerance and increase dynamic sit/ stand balance during functional activity , and increase EOB/sitting tolerance   Patient agreeable to OT treatment session, upon arrival patient was found seated at edge of bed - most of session completed with patient at EOB  In comparison to previous session, patient with improvements in self-care ability and cognition  Patient requiring verbal cues for safety, verbal cues for correct technique, cognitive assistance to anticipate next step and one step directives, occasional re-direction, and self-care assistance as noted in flow sheet  Patient continues to be functioning below baseline level, occupational performance remains limited secondary to factors listed above and increased risk for falls and injury  From OT standpoint, recommendation at time of d/c would be post-acute rehabilitation services     Patient to benefit from continued Occupational Therapy treatment while in the hospital to address deficits as defined above and maximize level of functional independence with ADLs and functional mobility       OT Discharge Recommendation: Post acute rehabilitation services     Vira Scanlon, [address] [facility]

## 2023-02-14 NOTE — PROGRESS NOTES
Progress Note - [facility] Infectious Disease   Armand Escalante 74 y o  female MRN: [id number]  Unit/Bed#: -01 Encounter: [id number]      IMPRESSION & RECOMMENDATIONS:   1  Post-operative fever  Patient with isolated temp 101 2*F on POD1  Mild leukocytosis noted and likely reactive  PCT is normal  Hgb noted to drop 2g though no evidence of active bleeding  CT chest shows L>R postoperative atelectasis and acute RUL pulmonary embolism  Venous duplex also shows acute LLE DVT  Blood cultures x2 are negative  UA benign  COVID/Flu/RSV neg  Low suspicion for infectious etiology  Suspect fever due to post-operative atelectasis and acute VTE  Her WBC is now normal  Tmax 100 2*F    -continue to monitor off antibiotics   -follow-up blood cultures    -monitor CBCd, temperature and hemodynamics  -encourage incentive spirometry, OOB  -anticoagulation per medicine team      2  Acute RUL PE and LLE DVT  Provoked, in the setting of #5  Started on [facility] per medicine    -continue anticoagulation      3  Post-operative respiratory insufficiency with hypoxia  Likely due to acute PE, atelectasis, and polypharmacy  Unlikely infectious etiology  She is now on room air       4  Acute, toxic/metabolic encephalopathy  Likely due to anesthesia and post-op pain/medications  This continues to improve  Unlikely infectious etiology        5  R knee OA s/p elective R TKA 2/8/23  Complicated by the above  Management per primary team      6  Tobacco abuse  Ongoing risk factor for inadequate wound healing  Antibiotics:  None     I have discussed the above management plan in detail with patient  I have discussed the above management plan in detail with patient's RN, and the primary service, [facility]  Subjective:  Patient denies subjective fever, chills, sweats; no nausea, vomiting, diarrhea; no cough, shortness of breath; no pain   She is working with PT      Objective:  Vitals:  Temp:  [98 5 °F (36 9 °C)-100 2 °F (37 9 °C)] 100 2 °F (37 9 °C)  HR:  [74-91] 74  Resp:  [16-18] 18  BP: (104-136)/(60-76) 123/65  SpO2:  [92 %-95 %] 94 %  Temp (24hrs), Av 1 °F (37 3 °C), Min:98 5 °F (36 9 °C), Max:100 2 °F (37 9 °C)  Current: Temperature: 100 2 °F (37 9 °C)    PHYSICAL EXAM:  General Appearance:  Appearing well, nontoxic, and in no distress; sitting in bedside recliner    HEENT: Normocephalic, without obvious abnormality, atraumatic  Conjunctiva pink and sclera anicteric  Oropharynx moist without lesions  Lungs:   Clear to auscultation bilaterally, respirations unlabored   Heart:  RRR; no murmur, rub or gallop   Abdomen:   Soft, non-tender, non-distended, positive bowel sounds    Extremities: No cyanosis, clubbing or edema   Musculoskeletal: R knee post op dressing intact; ice packs in place    Skin: No rashes or lesions  No draining wounds noted  Peripheral IV intact without evidence of erythema, warmth, or exudate  LABS, IMAGING, & OTHER STUDIES:  Lab Results:  I have personally reviewed pertinent labs  Results from last 7 days   Lab Units 23  0515 23  0757 02/10/23  0447   WBC Thousand/uL 7 99 13 02* 12 45*   HEMOGLOBIN g/dL 9 5* 10 0* 9 3*   PLATELETS Thousands/uL 507* 374 294     Results from last 7 days   Lab Units 23  0515 23  0757 02/10/23  0447   SODIUM mmol/L 141 137 136   POTASSIUM mmol/L 3 5 3 4* 3 6   CHLORIDE mmol/L 106 103 102   CO2 mmol/L 26 25 25   BUN mg/dL 17 9 11   CREATININE mg/dL 0 65 0 58* 0 50*   EGFR ml/min/1 73sq m 93 97 101   CALCIUM mg/dL 9 1 9 0 8 5     Results from last 7 days   Lab Units 23  1512   BLOOD CULTURE  No Growth After 4 Days  No Growth After 4 Days       Results from last 7 days   Lab Units 23  0515 23  1513   PROCALCITONIN ng/ml 0 09 0 08

## 2023-02-14 NOTE — PLAN OF CARE
Problem: PHYSICAL THERAPY ADULT  Goal: Performs mobility at highest level of function for planned discharge setting  See evaluation for individualized goals  Description: Treatment/Interventions: Functional transfer training, LE strengthening/ROM, Elevations, Therapeutic exercise, Patient/family training, Gait training, Spoke to nursing, Spoke to case management  Equipment Recommended: Esther Aguilar (continue RW)       See flowsheet documentation for full assessment, interventions and recommendations  Outcome: Adequate for Discharge  Note: Prognosis: Excellent  Problem List: Decreased strength, Decreased range of motion, Impaired balance, Decreased mobility, Decreased safety awareness, Decreased skin integrity, Orthopedic restrictions, Pain  Assessment: Pt seen for PT treatment session this date, consisting of ther act focused on functional transfer training, ther ex focused on strengthening, gt training on level surfaces to improve pt safety in household environment and elevation training for enter/exit home  Since previous session, pt has made excellent progress in terms of improved tolerance and performance of OOB activity  Pertinent barriers during this session include pain  Current goals and POC remain appropriate, pt continues to have rehab potential and is making excellent progress towards STGs  Pt prognosis for achieving goals is excellent, pending pt progress with hospitalization/medical status improvements, and indicated by attention span, self-expression (thoughts, feelings, needs) and motivation  Pt limited d/t fear of pain provocation  PT recommends home with outpatient rehabilitation vs HHPT, upon discharge  Pt continues to be functioning below baseline level, and remains limited 2* factors listed above  PT will continue to see pt during current hospitalization in order to address the deficits listed above and provide interventions consistent w/ POC in effort to achieve STGs    Barriers to Discharge: None     PT Discharge Recommendation: Home with outpatient rehabilitation (vs HHPT, pending pt decision (PT discussed benefits to pursing OP PT post op))    See flowsheet documentation for full assessment

## 2023-02-14 NOTE — PHYSICAL THERAPY NOTE
Physical Therapy Treatment Note       02/14/23 0809   PT Last Visit   PT Visit Date 02/14/23   Note Type   Note Type BID visit/treatment   Pain Assessment   Pain Assessment Tool 0-10   Pain Score 7   Pain Location/Orientation Orientation: Right;Location: Knee   Pain Onset/Description Onset: Ongoing;Frequency: Constant/Continuous   Patient's Stated Pain Goal No pain   Hospital Pain Intervention(s) Cold applied; Emotional support; Rest   Wound 02/08/23 Knee Right   Date First Assessed/Time First Assessed: 02/08/23 7175   Location: Knee  Wound Location Orientation: Right  Wound Description (Comments): betadine ointment to incision line  Incision's 1st Dressing: DRESSING XEROFORM 5 X 9 (x1), SPONGE GAUZE 4 X 4 16   Wound Description BLESSING   Dressing Status Clean;Dry; Intact   Restrictions/Precautions   Weight Bearing Precautions Per Order Yes   RLE Weight Bearing Per Order WBAT   Other Precautions Chair Alarm; Bed Alarm; Fall Risk;Pain;WBS   General   Chart Reviewed Yes   Response to Previous Treatment Patient with no complaints from previous session  Family/Caregiver Present No   Cognition   Arousal/Participation Alert; Responsive; Cooperative   Attention Within functional limits   Orientation Level Oriented X4   Memory Decreased recall of precautions   Following Commands Follows one step commands without difficulty   Comments pt agreeable to PT session   Subjective   Subjective "I am feeling pretty good"   Bed Mobility   Supine to Sit Unable to assess  (pt received seated at EOB)   Transfers   Sit to Stand 6  Modified independent   Additional items Assist x 1; Increased time required   Stand to Sit 6  Modified independent   Additional items Assist x 1; Increased time required   Additional Comments SpO2 on RA = 95%, HR = 84 bpm post mobility   Ambulation/Elevation   Gait pattern Improper Weight shift;Decreased foot clearance; Antalgic; Step to   Gait Assistance 6  Modified independent  (distant SUP/ MOD I)   Additional items Assist x 1   Assistive Device Rolling walker  (chair follow for safety provided)   Distance 155 ft   Stair Management Assistance 5  Supervision   Additional items Assist x 1   Stair Management Technique Two rails; Step to pattern; Foreward   Number of Stairs 3  (practice step )   Balance   Static Sitting Normal   Dynamic Sitting Good   Static Standing Fair   Dynamic Standing Fair -   Ambulatory Fair -   Endurance Deficit   Endurance Deficit No   Activity Tolerance   Activity Tolerance Patient limited by pain  (frequent rest periods provided for pt comfort)   Medical Staff Made Aware LORETA Burks made aware of pt progress and updated recommendation   Nurse Made Aware Yes, RN Miri made aware of pt pain reports and request for pain meds   Exercises   Quad Sets Sitting;20 reps;AROM; Bilateral   Glute Sets Sitting;20 reps;AROM; Bilateral   Hip Abduction Sitting;20 reps;AROM; Right;Left   Hip Adduction Sitting;20 reps;AROM; Right;Left   Knee AROM Short Arc Quad Sitting;20 reps;AROM; Right   Knee AROM Long Arc Quad Sitting;20 reps;AROM; Right;Left   Ankle Pumps Sitting;20 reps;AROM; Bilateral   Marching Sitting;20 reps;AROM; Right;Left   Assessment   Prognosis Excellent   Problem List Decreased strength;Decreased range of motion; Impaired balance;Decreased mobility; Decreased safety awareness;Decreased skin integrity;Orthopedic restrictions;Pain   Assessment Pt seen for PT treatment session this date, consisting of ther act focused on functional transfer training, ther ex focused on strengthening, gt training on level surfaces to improve pt safety in household environment and elevation training for enter/exit home  Since previous session, pt has made excellent progress in terms of improved tolerance and performance of OOB activity  Pertinent barriers during this session include pain  Current goals and POC remain appropriate, pt continues to have rehab potential and is making excellent progress towards STGs   Pt prognosis for achieving goals is excellent, pending pt progress with hospitalization/medical status improvements, and indicated by attention span, self-expression (thoughts, feelings, needs) and motivation  Pt limited d/t fear of pain provocation  PT recommends home with outpatient rehabilitation vs HHPT, upon discharge  Pt continues to be functioning below baseline level, and remains limited 2* factors listed above  PT will continue to see pt during current hospitalization in order to address the deficits listed above and provide interventions consistent w/ POC in effort to achieve STGs  Barriers to Discharge None   Goals   Patient Goals "to get my strength back"   STG Expiration Date 02/20/23   Short Term Goal #1 updated goals below:   Short Term Goal #2 Pt will: Perform all bed mobility tasks independently to decrease caregiver burden, Perform all transfers modified independent to improve independence, Ambulate > 250 ft  with RW modified independent w/o LOB and w/ normalized gait pattern 100% of the time, Navigate 3-5 stairs modified independent with unilateral handrail to facilitate return to previous living environment, Increase all balance 1/2 grade to decrease risk for falls and Complete exercise program independently   PT Treatment Day 4   Plan   Treatment/Interventions Functional transfer training;LE strengthening/ROM; Elevations; Therapeutic exercise;Patient/family training;Gait training;Spoke to nursing;Spoke to case management   Progress Progressing toward goals   PT Frequency Twice a day   Recommendation   PT Discharge Recommendation Home with outpatient rehabilitation  (vs HHPT, pending pt decision (PT discussed benefits to pursing OP PT post op))   Equipment Recommended Walker  (continue RW)   [employer] walker   Additional Comments Pt's raw score on the AM-PAC Basic Mobility inpatient short form is 22, standardized score is 47 4   Patients at this level are likely to benefit from DC to home with no services, however, please refer to therapist recommendation for safe DC planning  AM-PAC Basic Mobility Inpatient   Turning in Flat Bed Without Bedrails 4   Lying on Back to Sitting on Edge of Flat Bed Without Bedrails 4   Moving Bed to Chair 4   Standing Up From Chair Using Arms 4   Walk in Room 3   Climb 3-5 Stairs With Railing 3   Basic Mobility Inpatient Raw Score 22   Basic Mobility Standardized Score 47 4   Highest Level Of Mobility   JH-HLM Goal 7: Walk 25 feet or more   JH-HLM Achieved 7: Walk 25 feet or more   Education   Education Provided Mobility training;Home exercise program;Assistive device   Patient Demonstrates acceptance/verbal understanding;Explanation/teachback used   End of Consult   Patient Position at End of Consult Bedside chair; All needs within reach       Jenelle Avila PT, DPT    Time of PT treatment session: 8083-6030 (total treatment time = 70 minutes)

## 2023-02-14 NOTE — ASSESSMENT & PLAN NOTE
· Patient is status post total right knee arthroplasty on 2/8/23 by Dr Kierra Cerna for pre-existing history of severe osteoarthritis of the right knee  · Continue Eliquis  · Continue PT and OT- re-evaluated patient today- patient no cleared for discharge home to outpatient PT   · Outpatient follow-up with Dr Kierra Cerna

## 2023-02-14 NOTE — DISCHARGE SUMMARY
Brigette U  68   Discharge- Kirstin Webster 1955, 74 y o  female MRN: [id number]  Unit/Bed#: -01 Encounter: [id number]  Primary Care Provider: Debora Blas DO   Date and time admitted to hospital: 2023  6:59 AM    * Arthritis of right knee  Assessment & Plan  · Patient is status post total right knee arthroplasty on 23 by Dr Bina Pederson for pre-existing history of severe osteoarthritis of the right knee  · Continue Eliquis  · Continue PT and OT- re-evaluated patient today- patient no cleared for discharge home to outpatient PT   · Outpatient follow-up with Dr Immanuel López  · Resolved  · Patient has been afebrile over the past 24 hours  · Work-up thus far:  · Testing for COVID/RSV/Flu- negative  · Blood cultures x2 (2023) NGTD  · Chest x-ray from 2/10/2023 revealed atelectasis  · WBC normal today   · Procalcitonin testin 08  · Urinalysis not suggestive of a UTI  · Appreciate ID input  · No further work-up at this time    Acute pulmonary embolism without acute cor pulmonale (HCC)  Assessment & Plan  · Continue Eliquis 10 mg p o  twice daily through 2023 transition to 5 mg p o  twice daily thereafter for 3 to 6 months  · Appreciate pulmonary input  · Oxygenation requirements have been as high as 4 L of supplemental oxygen by nasal cannula -now on room air  · Multifactorial:   · Postop respiratory failure with hypoxia-resolved  · Acute pulmonary embolism -secondary to an acute left lower extremity DVT -see the management as outlined above  · 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  · Active tobacco abuse -continue nicotine patch  · Right pulmonary atelectasis -continue incentive spirometry    Encephalopathy acute  Assessment & Plan  · Likely due to anesthesia and post-op pain/medications  · , patient with unwitnessed fall    CT head again negative for any acute intracranial pathology, xray right knee without fracture  · Appreciate neurology input  · Continue Klonopin at 3 times a day  · Monitor closely otherwise  · Toxicology consulted, appreciate recommendations  · Resolved- patient back to baseline    Hyperlipidemia  Assessment & Plan  · Continue Lipitor    Generalized anxiety disorder  Assessment & Plan  · With depression  · Continue Effexor, Wellbutrin   · Continue scheduled clonazepam    Esophageal reflux  Assessment & Plan  · Continue Protonix      Medical Problems     Resolved Problems  Date Reviewed: 2/14/2023   None       Discharging Physician / Practitioner: Austin Lewis PA-C  PCP: Arely Sepulveda DO  Admission Date:   Admission Orders (From admission, onward)     Ordered        02/09/23 1558  Inpatient Admission  Once                      Discharge Date: 02/14/23    Consultations During Hospital Stay:  · Internal Medicine   · Infectious disease  · Pulmonology  · Toxicology    Procedures Performed:   · Right total knee replacement    Significant Findings / Test Results:   XR chest portable    Result Date: 2/10/2023  Impression: Patchy consolidative airspace opacity in the right lung base consistent with atelectasis, better demonstrated on CT of one day earlier  Lungs are otherwise clear  No pneumothorax or pleural effusion  Hiatal hernia  Workstation performed: [id number]     XR knee right 1 or 2 views    Result Date: 2/9/2023  Impression: Unremarkable immediate postop appearance of total knee arthroplasty  Workstation performed: [id number]     XR knee 4+ vw right injury    Result Date: 2/12/2023  Impression: Expected postoperative findings status post right total knee arthroplasty  Workstation performed: [id number]     CT head wo contrast    Result Date: 2/11/2023  Impression: No acute intracranial abnormality  Workstation performed: [id number]     CT head wo contrast    Result Date: 2/10/2023  Impression: No acute intracranial abnormality   Workstation performed: [id number]     CTA chest pe study    Result Date: 2/9/2023  · Impression: Small segmental and subsegmental emboli in the right upper lobe with no pulmonary infarct or right heart strain  Moderate hiatal hernia  I notified Dr Kristine Garza on 2/9/2023 at 9:59 AM by secure text  He responded immediately  Workstation performed: [id number]   ·     Incidental Findings:   · none    Test Results Pending at Discharge (will require follow up):   · none     Outpatient Tests Requested:  · none    Complications:  Acute pulmonary embolism, DVT    Reason for Admission: elective right total knee replacement    Hospital Course:   Ady Edwards is a 74 y o  female patient who originally presented to the hospital on 2/8/2023 due to elective right total knee replacement by Dr Bela Rodriguez  Please see H&P by Jessica Hallman PA-C dated 2/9/2023 for complete details of presentation   The patient was initially admitted for an elective total right knee arthroplasty by Dr Bela Rodriguez  Postoperatively the patient was noted to be hypoxic, confused and had a fever  CTA chest showed an acute pulmonary embolism, she was also noted to have a DVT of the left LE  The patient was discharged on eliquis  Infectious workup was negative  Patient underwent workup for change in mental status which was negative as well  It was thought to be due to anesthesia and post-op pain/medications  PT/OT recommended outpatient PT  Patient instructed to follow-up in your office and with Dr Bela Rodriguez  Please see above list of diagnoses and related plan for additional information  Condition at Discharge: good    Discharge Day Visit / Exam:   Subjective:    Vitals: Blood Pressure: 123/65 (02/14/23 0716)  Pulse: 74 (02/14/23 0716)  Temperature: 100 2 °F (37 9 °C) (02/14/23 0716)  Temp Source: Oral (02/14/23 0716)  Respirations: 18 (02/14/23 0716)  Height: 5' 3" (160 cm) (02/10/23 1318)  Weight - Scale: 63 5 kg (140 lb) (02/10/23 0902)  SpO2: 95 % (02/14/23 0740)  Exam:   Physical Exam  Vitals and nursing note reviewed     Constitutional: Appearance: Normal appearance  HENT:      Head: Normocephalic and atraumatic  Cardiovascular:      Rate and Rhythm: Normal rate and regular rhythm  Heart sounds: Normal heart sounds  Pulmonary:      Effort: Pulmonary effort is normal       Breath sounds: Normal breath sounds  Abdominal:      General: There is no distension  Palpations: Abdomen is soft  Tenderness: There is no abdominal tenderness  Skin:     General: Skin is warm and dry  Neurological:      General: No focal deficit present  Mental Status: She is alert and oriented to person, place, and time  Psychiatric:         Mood and Affect: Mood normal          Behavior: Behavior normal           Discussion with Family: Patient declined call to   Discharge instructions/Information to patient and family:   See after visit summary for information provided to patient and family  Provisions for Follow-Up Care:  See after visit summary for information related to follow-up care and any pertinent home health orders  Disposition:   Home    Planned Readmission: no     Discharge Statement:  I spent 25 minutes discharging the patient  This time was spent on the day of discharge  I had direct contact with the patient on the day of discharge  Greater than 50% of the total time was spent examining patient, answering all patient questions, arranging and discussing plan of care with patient as well as directly providing post-discharge instructions  Additional time then spent on discharge activities  Discharge Medications:  See after visit summary for reconciled discharge medications provided to patient and/or family        **Please Note: This note may have been constructed using a voice recognition system**

## 2023-02-14 NOTE — CASE MANAGEMENT
Case Management Discharge Planning Note    Patient name Theador Bending  Location /-99 MRN [id number]  : 1955 Date 2023       Current Admission Date: 2023  Current Admission Diagnosis:Arthritis of right knee   Patient Active Problem List    Diagnosis Date Noted   • Fever 02/10/2023   • Encephalopathy acute 2023   • Acute pulmonary embolism without acute cor pulmonale ([address] ) 2023   • Chronic bilateral low back pain without sciatica 2022   • Arthritis of right knee 2020   • Uterine prolapse 2020   • Chronic pain syndrome 10/11/2019   • Tobacco abuse 2018   • Acquired trigger finger 2018   • Carpal tunnel syndrome 2018   • Constipation 2017   • Spider veins of both lower extremities 2016   • Varicosities of leg 03/10/2016   • Esophageal reflux 2015   • Allergic rhinitis 2013   • Symptomatic menopausal or female climacteric states 2013   • Chronic cough 2013   • Generalized anxiety disorder 2013   • Hyperlipidemia 2013      LOS (days): 5  Geometric Mean LOS (GMLOS) (days): 3 10  Days to GMLOS:-1 9     OBJECTIVE:  Risk of Unplanned Readmission Score: 12 5         Current admission status: Inpatient   Preferred Pharmacy:   [address] [place] [address] [facility]    [address]   [address] [place] [address]  Phone: [phone] Fax: [phone]    Primary Care Provider: Noam Orellana DO    Primary Insurance: MEDICARE  Secondary Insurance: Marlon Mckinnon    DISCHARGE DETAILS:    Discharge planning discussed with[de-identified] patient and spouse was called at 12:21 pm  Freedom of Choice: Yes  Comments - Freedom of Choice: recommendation has been changed to outpt rehab-  pt has appointments set up at [address] [place] [address] contacted family/caregiver?: Yes  Were Treatment Team discharge recommendations reviewed with patient/caregiver?: Yes  Did patient/caregiver verbalize understanding of patient care needs?: Yes  Were patient/caregiver advised of the risks associated with not following Treatment Team discharge recommendations?: Yes    Contacts  Patient Contacts: Jacques Puente (Spouse)   [phone] (Home Phone)  Relationship to Patient[de-identified] Family (spouse)  Contact Method: Phone  Phone Number: [phone]  Reason/Outcome: Discharge [address]         Is the patient interested in May[facility] Shanks at discharge?: No    DME Referral Provided  Referral made for DME?: Yes  DME referral completed for the following items[de-identified] [facility]side [employer]  DME Supplier Name[de-identified] [employer]    Other Referral/Resources/Interventions Provided:  Interventions: Outpatient OT, Outpatient PT, DME, Prescription Price Check  Referral Comments: recommendation is for outpt rehab    pt requested a commode- she gave permission to use [employer]   -cm gave permission to issue a commode pt was made aware she has to pay  $27 49 copay  - commode was issued as requested- pt was made aware she has a $530 49 decductible for her medications and then she will have her copay for the eliquis as per the pharmacy-  pt was given a 30 day free coupon for the eliquis    Would you like to participate in our [address] service program?  : No - Declined    Treatment Team Recommendation: Home (home with spouse & outpt rehab & outpt follow up- spouse)  Discharge Destination Plan[de-identified] Home (home with spouse & outpt rehab& outpt follow up)  Transport at Discharge : Automobile, Family         pt and family are in agreement with the d/c and d/c plan                    IMM Given (Date):: 02/14/23  IMM Given to[de-identified] Patient  Family notified[de-identified] spouse was called

## 2023-02-14 NOTE — PHYSICAL THERAPY NOTE
PHYSICAL THERAPY NOTE      Patient Name: Tia Millan  [id number] Date: 2/14/2023   Time spent for PT session: 2616-0335 (10 minutes)    Pt received for PT session seated at EOB upon arrival  PT delivered handout re: information sheet for s/p TKA & LB TE HEP that pt will perform 3x/day for 30 reps each time upon return home, and educated on each LB TE & provided appropriate demonstration as required for pt understanding  Pt verbalized understanding of all LB Mariam, no questions by pt provided  LB Mariam HEP provided to patient:  · Ankle pumps  · Glute set  · Quad set  · Hip abduction/adduction in long sit  · LAQs  · Seated marching  · Seated heelslides    PT continues to recommend home with outpatient rehabilitation vs HHPT, at time of d/c in order to maximize pt's functional independence and safety w/ mobility  Pt continues to be functioning below baseline level, and remains limited as demonstrated in mobility session earlier this date  PT will continue to see pt during current hospitalization in order to address the deficits listed above and provide interventions consistent w/ POC in effort to achieve STGs          Prakash Kulkarni, PT, DPT

## 2023-02-15 ENCOUNTER — APPOINTMENT (OUTPATIENT)
Dept: PHYSICAL THERAPY | Facility: CLINIC | Age: 68
End: 2023-02-15

## 2023-02-15 ENCOUNTER — TRANSITIONAL CARE MANAGEMENT (OUTPATIENT)
Dept: INTERNAL MEDICINE CLINIC | Facility: CLINIC | Age: 68
End: 2023-02-15

## 2023-02-15 ENCOUNTER — TELEPHONE (OUTPATIENT)
Dept: INTERNAL MEDICINE CLINIC | Facility: CLINIC | Age: 68
End: 2023-02-15

## 2023-02-15 ENCOUNTER — TELEPHONE (OUTPATIENT)
Dept: OBGYN CLINIC | Facility: HOSPITAL | Age: 68
End: 2023-02-15

## 2023-02-15 LAB
DME PARACHUTE DELIVERY DATE ACTUAL: NORMAL
DME PARACHUTE DELIVERY DATE REQUESTED: NORMAL
DME PARACHUTE ITEM DESCRIPTION: NORMAL
DME PARACHUTE ORDER STATUS: NORMAL
DME PARACHUTE SUPPLIER NAME: NORMAL
DME PARACHUTE SUPPLIER PHONE: NORMAL

## 2023-02-15 NOTE — TELEPHONE ENCOUNTER
Pt called states she needs something for pain  She had a right knee replacement done 2 weeks ago       She said "I called Dr Catrachita Nuñez and they wont give me pain meds and told me to call my PCP "

## 2023-02-16 ENCOUNTER — APPOINTMENT (OUTPATIENT)
Dept: LAB | Facility: CLINIC | Age: 68
End: 2023-02-16

## 2023-02-16 ENCOUNTER — OFFICE VISIT (OUTPATIENT)
Dept: INTERNAL MEDICINE CLINIC | Facility: CLINIC | Age: 68
End: 2023-02-16

## 2023-02-16 VITALS
OXYGEN SATURATION: 97 % | DIASTOLIC BLOOD PRESSURE: 60 MMHG | HEART RATE: 76 BPM | SYSTOLIC BLOOD PRESSURE: 120 MMHG | TEMPERATURE: 98.1 F | HEIGHT: 63 IN | WEIGHT: 133 LBS | BODY MASS INDEX: 23.57 KG/M2

## 2023-02-16 DIAGNOSIS — J43.9 PULMONARY EMPHYSEMA, UNSPECIFIED EMPHYSEMA TYPE (HCC): ICD-10-CM

## 2023-02-16 DIAGNOSIS — Z72.0 TOBACCO ABUSE: ICD-10-CM

## 2023-02-16 DIAGNOSIS — D64.9 ANEMIA, UNSPECIFIED TYPE: ICD-10-CM

## 2023-02-16 DIAGNOSIS — Z02.89 ENCOUNTER FOR COMPLETION OF FORM WITH PATIENT: ICD-10-CM

## 2023-02-16 DIAGNOSIS — G89.18 POST-OP PAIN: ICD-10-CM

## 2023-02-16 DIAGNOSIS — I26.99 ACUTE PULMONARY EMBOLISM WITHOUT ACUTE COR PULMONALE, UNSPECIFIED PULMONARY EMBOLISM TYPE (HCC): ICD-10-CM

## 2023-02-16 DIAGNOSIS — R50.9 FEVER, UNSPECIFIED FEVER CAUSE: ICD-10-CM

## 2023-02-16 DIAGNOSIS — G93.40 ACUTE ENCEPHALOPATHY: ICD-10-CM

## 2023-02-16 DIAGNOSIS — Z96.659 STATUS POST TOTAL KNEE REPLACEMENT, UNSPECIFIED LATERALITY: ICD-10-CM

## 2023-02-16 DIAGNOSIS — Z79.01 CHRONIC ANTICOAGULATION: ICD-10-CM

## 2023-02-16 DIAGNOSIS — Z96.659 STATUS POST TOTAL KNEE REPLACEMENT, UNSPECIFIED LATERALITY: Primary | ICD-10-CM

## 2023-02-16 PROBLEM — M35.3 POLYMYALGIA (HCC): Status: RESOLVED | Noted: 2023-02-16 | Resolved: 2023-02-16

## 2023-02-16 PROBLEM — F13.930 BENZODIAZEPINE WITHDRAWAL WITHOUT COMPLICATION (HCC): Status: RESOLVED | Noted: 2023-02-16 | Resolved: 2023-02-16

## 2023-02-16 PROBLEM — F13.930 BENZODIAZEPINE WITHDRAWAL WITHOUT COMPLICATION (HCC): Status: ACTIVE | Noted: 2023-02-16

## 2023-02-16 PROBLEM — M35.3 POLYMYALGIA (HCC): Status: ACTIVE | Noted: 2023-02-16

## 2023-02-16 LAB
ANION GAP SERPL CALCULATED.3IONS-SCNC: 7 MMOL/L (ref 4–13)
BASOPHILS # BLD AUTO: 0.05 THOUSANDS/ÂΜL (ref 0–0.1)
BASOPHILS NFR BLD AUTO: 1 % (ref 0–1)
BUN SERPL-MCNC: 17 MG/DL (ref 5–25)
CALCIUM SERPL-MCNC: 9.4 MG/DL (ref 8.3–10.1)
CHLORIDE SERPL-SCNC: 108 MMOL/L (ref 96–108)
CO2 SERPL-SCNC: 24 MMOL/L (ref 21–32)
CREAT SERPL-MCNC: 0.55 MG/DL (ref 0.6–1.3)
EOSINOPHIL # BLD AUTO: 0.29 THOUSAND/ÂΜL (ref 0–0.61)
EOSINOPHIL NFR BLD AUTO: 3 % (ref 0–6)
ERYTHROCYTE [DISTWIDTH] IN BLOOD BY AUTOMATED COUNT: 15.6 % (ref 11.6–15.1)
GFR SERPL CREATININE-BSD FRML MDRD: 98 ML/MIN/1.73SQ M
GLUCOSE SERPL-MCNC: 91 MG/DL (ref 65–140)
HCT VFR BLD AUTO: 31.3 % (ref 34.8–46.1)
HGB BLD-MCNC: 9.6 G/DL (ref 11.5–15.4)
IMM GRANULOCYTES # BLD AUTO: 0.08 THOUSAND/UL (ref 0–0.2)
IMM GRANULOCYTES NFR BLD AUTO: 1 % (ref 0–2)
LYMPHOCYTES # BLD AUTO: 1.54 THOUSANDS/ÂΜL (ref 0.6–4.47)
LYMPHOCYTES NFR BLD AUTO: 16 % (ref 14–44)
MCH RBC QN AUTO: 30.6 PG (ref 26.8–34.3)
MCHC RBC AUTO-ENTMCNC: 30.7 G/DL (ref 31.4–37.4)
MCV RBC AUTO: 100 FL (ref 82–98)
MONOCYTES # BLD AUTO: 0.77 THOUSAND/ÂΜL (ref 0.17–1.22)
MONOCYTES NFR BLD AUTO: 8 % (ref 4–12)
NEUTROPHILS # BLD AUTO: 7.14 THOUSANDS/ÂΜL (ref 1.85–7.62)
NEUTS SEG NFR BLD AUTO: 71 % (ref 43–75)
NRBC BLD AUTO-RTO: 0 /100 WBCS
PLATELET # BLD AUTO: 581 THOUSANDS/UL (ref 149–390)
PMV BLD AUTO: 10.7 FL (ref 8.9–12.7)
POTASSIUM SERPL-SCNC: 4.2 MMOL/L (ref 3.5–5.3)
RBC # BLD AUTO: 3.14 MILLION/UL (ref 3.81–5.12)
SODIUM SERPL-SCNC: 139 MMOL/L (ref 135–147)
WBC # BLD AUTO: 9.87 THOUSAND/UL (ref 4.31–10.16)

## 2023-02-16 NOTE — PATIENT INSTRUCTIONS
Apixaban (By mouth)   Apixaban (a-PIX-a-ban)  Treats and prevents blood clots  This medicine is a blood thinner  Brand Name(s): Eliquis Eliquis 30 Day DVT/PE Starter Pack   There may be other brand names for this medicine  When This Medicine Should Not Be Used: This medicine is not right for everyone  Do not use it if you had an allergic reaction to apixaban or you have active bleeding  How to Use This Medicine:   Tablet  Your doctor will tell you how much medicine to use  Do not use more than directed  If you are not able to swallow the tablets whole, they may be crushed and mixed in water, 5% dextrose in water (D5W), apple juice, or applesauce  The crushed tablets may be mixed with 60 mL of water or D5W dose and given through a nasogastric tube (NGT)  This medicine should come with a Medication Guide  Ask your pharmacist for a copy if you do not have one  Missed dose: Take a dose as soon as you remember  If it is almost time for your next dose, wait until then and take a regular dose  Do not take extra medicine to make up for a missed dose  Store the medicine in a closed container at room temperature, away from heat, moisture, and direct light  Drugs and Foods to Avoid:   Ask your doctor or pharmacist before using any other medicine, including over-the-counter medicines, vitamins, and herbal products  Some medicines can affect how apixaban works  Tell your doctor if you are using any of the following:   Carbamazepine, itraconazole, ketoconazole, phenytoin, rifampin, ritonavir, Santhosh's wort  Blood thinner (including clopidogrel, heparin, prasugrel, warfarin)  Medicine to treat depression  NSAID pain or arthritis medicine (including aspirin, celecoxib, diclofenac, ibuprofen, naproxen)  Warnings While Using This Medicine:   Tell your doctor if you are pregnant or breastfeeding, or if you have kidney disease, liver disease, bleeding problems, antiphospholipid syndrome, or an artificial heart valve    Do not stop using this medicine suddenly without asking your doctor  You might have a higher risk of stroke for a short time after you stop using this medicine  This medicine increases your risk for bleeding that can become serious if not controlled  You may also bruise easily, and it may take longer than usual for bleeding to stop  This medicine may increase your risk for a hematoma (blood clot) in your spine or back if you undergo an epidural or spinal puncture  This could lead to paralysis  Tell your doctor if you ever had spine problems or back surgery  Tell any doctor or dentist who treats you that you are using this medicine  With your doctor's supervision, you may need to stop using this medicine several days before you have surgery or medical tests  Your doctor will do lab tests at regular visits to check on the effects of this medicine  Keep all appointments  Keep all medicine out of the reach of children  Never share your medicine with anyone  Possible Side Effects While Using This Medicine:   Call your doctor right away if you notice any of these side effects: Allergic reaction: Itching or hives, swelling in your face or hands, swelling or tingling in your mouth or throat, chest tightness, trouble breathing  Change in how much or how often you urinate, red or pink urine  Chest pain, trouble breathing  Coughing up blood, vomiting blood or material that looks like coffee grounds  Numbness, tingling, or muscle weakness in your legs or feet  Red or black, tarry stools  Unusual bleeding, bruising, or weakness  If you notice other side effects that you think are caused by this medicine, tell your doctor  Call your doctor for medical advice about side effects  You may report side effects to FDA at 9-466-FDA-9416    © Copyright [employer] 2022 Information is for End User's use only and may not be sold, redistributed or otherwise used for commercial purposes    The above information is an  only  It is not intended as medical advice for individual conditions or treatments  Talk to your doctor, nurse or pharmacist before following any medical regimen to see if it is safe and effective for you

## 2023-02-16 NOTE — PROGRESS NOTES
Assessment & Plan     1  Status post total knee replacement, unspecified laterality  -     Basic metabolic panel; Future  -     CBC and differential; Future    2  Post-op pain    3  Fever, unspecified fever cause    4  Acute pulmonary embolism without acute cor pulmonale, unspecified pulmonary embolism type ([address] )    5  Acute encephalopathy  -     Basic metabolic panel; Future    6  Anemia, unspecified type  -     Basic metabolic panel; Future  -     CBC and differential; Future    7  Chronic anticoagulation    8  Encounter for completion of form with patient    9  Pulmonary emphysema, unspecified emphysema type ([address] )    10  Tobacco abuse  A/P: Doing ok and appears back to baseline in regards to MS and constipation  Pain appears to be controlled currently  To start PT tomorrow  Discussed trying to avoid narcotics given recent MS change, COPD, and constipation  Pt to start tylenol ATC and ice  Once staples out, can add topical  Avoid NSAID while on DOAC  Discussed COPD and needs to stop smoking and will need PFT's  Appears to have a provoked DVT and will need three months DOAC  Will repeat labs for anemia  Placard form filled out  COntinue current treatment otherwise  Keep f/u with PT and ortho  RTC one month for routine, further COPD eval, and DOAC monitoring  Subjective     Transitional Care Management Review:   Regina Oshea is a 74 y o  female here for TCM follow up  During the TCM phone call patient stated:  TCM Call     Date and time call was made  2/16/2023  9:57 AM    Hospital care reviewed  Records reviewed    Patient was hospitialized at  [address]    Date of Admission  02/08/23    Date of discharge  02/14/23    Diagnosis  Arthritis of right knee    Disposition  Home    Were the patients medications reviewed and updated  Yes    Current Symptoms  None      TCM Call     Post hospital issues  None    Should patient be enrolled in anticoag monitoring? No    Scheduled for follow up?   Yes Did you obtain your prescribed medications  Yes    Do you need help managing your prescriptions or medications  No    Is transportation to your appointment needed  No    I have advised the patient to call PCP with any new or worsening symptoms  abdulkadir ludwig ma    Are you recieving any outpatient services  No    Are you recieving home care services  No    Are you using any community resources  No    Current waiver services  No    Have you fallen in the last 12 months  No    Interperter language line needed  No    Counseling  Patient    Counseling topics  Diagnostic results; instructions for management; Risk factor reduction; patient and family education; Prognosis; Activities of daily living; Importance of RX compliance        WF presents for f/u elective admission for TKR  Post op course complicated by fever and MS change  Evaluation showed DVT and MS change felt to be due to anesthesia and pain meds  Reportedly some constipation as well  Post labs with some anemia  Now on DOAC  No PMH or FHX of DVT  Since d/c, doing ok, but having a lot of pain that is decreasing her PT potential  Report ortho not willing to give any pain meds  Not much pain at this time  Activity level slowly improving  No fevers  MS back to baseline  Appetite improving  Tolerating the meds and remains on chronic anticoagulation and no bleeding reported  No constipation at this time  Denies CP, SOB, palpitations, etc  Requesting pain meds  Requests a placard for parking  Review of Systems   Constitutional: Positive for activity change and appetite change  Negative for chills, diaphoresis, fatigue and fever  Respiratory: Negative for cough, chest tightness, shortness of breath and wheezing  Cardiovascular: Negative for chest pain, palpitations and leg swelling  Gastrointestinal: Negative for abdominal pain, constipation, diarrhea, nausea and vomiting  Genitourinary: Negative for difficulty urinating, dysuria and frequency  Musculoskeletal: Positive for arthralgias, gait problem and joint swelling  Negative for myalgias  Neurological: Negative for dizziness, seizures, syncope, weakness, light-headedness and headaches  Psychiatric/Behavioral: Positive for sleep disturbance  Negative for agitation, behavioral problems, confusion, decreased concentration, dysphoric mood and hallucinations  The patient is not nervous/anxious and is not hyperactive  Objective     /60   Pulse 76   Temp 98 1 °F (36 7 °C)   Ht 5' 3" (1 6 m)   Wt 60 3 kg (133 lb)   SpO2 97%   BMI 23 56 kg/m²      Physical Exam  Vitals and nursing note reviewed  Constitutional:       General: She is not in acute distress  Appearance: Normal appearance  She is not ill-appearing  HENT:      Head: Normocephalic and atraumatic  Mouth/Throat:      Mouth: Mucous membranes are moist    Eyes:      Extraocular Movements: Extraocular movements intact  Conjunctiva/sclera: Conjunctivae normal       Pupils: Pupils are equal, round, and reactive to light  Cardiovascular:      Rate and Rhythm: Normal rate and regular rhythm  Heart sounds: Normal heart sounds  No murmur heard  Pulmonary:      Effort: Pulmonary effort is normal  No respiratory distress  Breath sounds: Normal breath sounds  No wheezing, rhonchi or rales  Abdominal:      General: Bowel sounds are normal  There is no distension  Palpations: Abdomen is soft  Tenderness: There is no abdominal tenderness  Musculoskeletal:      Right lower leg: No edema  Left lower leg: No edema  Neurological:      General: No focal deficit present  Mental Status: She is alert and oriented to person, place, and time  Mental status is at baseline  Psychiatric:         Mood and Affect: Mood normal          Behavior: Behavior normal          Thought Content:  Thought content normal          Judgment: Judgment normal        Medications have been reviewed by provider in current encounter    Arnaud Sandoval, DO

## 2023-02-17 ENCOUNTER — EVALUATION (OUTPATIENT)
Dept: PHYSICAL THERAPY | Facility: CLINIC | Age: 68
End: 2023-02-17

## 2023-02-17 DIAGNOSIS — M17.11 PRIMARY OSTEOARTHRITIS OF RIGHT KNEE: Primary | ICD-10-CM

## 2023-02-17 NOTE — PROGRESS NOTES
Mulu     Today's date: 2023  Patient name: Tima Mon  : 1955  MRN: [id number]  Referring provider: Sophia Reina DO  Dx:   Encounter Diagnosis     ICD-10-CM    1  Primary osteoarthritis of right knee  M17 11                      Subjective: Does not remember much of anything of her hospital stay  Was discharged on Tuesday  Has been walking a little bit without RW  She is not on any pain medications except OTC  Took tylenol before she came  Pain is worst when she goes to bed  Feels like she is baseline cog      R knee pain   Current: 3/10  Worst: 6/10  Best: 2/10    Per EMR, pt "presented to the hospital on 2023 due to elective right total knee replacement by Dr Frank Weinstein patient was initially admitted for an elective total right knee arthroplasty by Dr Abhishek Thomas  Postoperatively the patient was noted to be hypoxic, confused and had a fever  CTA chest showed an acute pulmonary embolism, she was also noted to have a DVT of the left LE  The patient was discharged on eliquis  Infectious workup was negative  Patient underwent workup for change in mental status which was negative as well  It was thought to be due to anesthesia and post-op pain/medications "     Objective: See treatment diary below      Assessment: Mulu completed this session since pt is post op TKA on  with complications  Overall pt reported that she is at baseline cog level and currently ambulating with RW but has ambulated without AD a few times at home  She demo limited R knee ROM and decreased R LE strength floresita in hips  Incision appeared to be healing well and she still has staples in  She demo good weightbearing with ambulation and demo fair tolerance to TE  She would benefit from skilled PT 2x/week for 8 weeks to improve knee ROM, increased R LE strength, and improve gait in order to maximize functional mobility and return to PLOF  Goals  ST-6 weeks  1   Pt will report reduced R knee pain levels "at worst" by at least 2 points (0-10 scale) in order to allow improved tolerance for functional mobility and reduced reliance on medication or modalities for pain relief  2  Pt will demonstrate improved strength of R knee and hip by at least 1/2-1 MMT in order to improve weight bearing joint stability and allow for restoration of normal joint mechanics to reduce pain and improve function  3  Pt will demonstrate normalized gait with least restrictive AD without increase in pain/sx for at least functional/household distances  LT weeks   1  Pt will be independent with HEP, demonstrating proper technique with exercises and understanding of self progression of program without need for cueing or assistance  2  Pt will report minimized pain levels with at least 75% reduction in pain since [facility]  3  Pt will demonstrate WFL AROM and strength of R knee/LE without sx  4  Pt will demonstrate normalized gait without deviations or need for assistive device or external support  5  Pt will be able to garden with minimal difficulty  6  FOTO will reflect score at discharge that is greater than or equal to predicted level        Plan  Planned modality interventions: cryotherapy  Planned therapy interventions: manual therapy, balance, patient education, neuromuscular re-education, coordination, home exercise program, gait training, therapeutic training, therapeutic exercise, therapeutic activities, stretching and strengthening  Frequency: 2x week  Duration in weeks: 8  Plan of Care beginning date: 2023  Plan of Care expiration date: 2023  Treatment plan discussed with: patient         Knee ROM Left Right AROM-PROM   Flexion 139 110-120*   Extension -1 Neg 18 - neg 14*    * = knee pain      Manual Muscle Testing - Hip Left Right   Flexion 4/5 3+/5   Extension 4+/5 3-/5   Abduction 4/5 3-/5   External Rotation          Manual Muscle Testing - Knee Left Right   Flexion 5/5 4/5   Extension 5/5 4/5      Manual Muscle Testing - Ankle Left Right   Doriflexion 5/ 4-/5   Plantarflexion 5/ 4/5   EHL         TU 65 sec with RW     Observation: incision has staples but no signs of infection        Re-eval Date: 3/7    Date       Visit Count 1 2      FOTO        Pain In        Pain Out             Precautions none        Manuals        Knee ext/flex                                Neuro Re-Ed                                                                 Ther Ex        Nustep          Reviewed HEP for TKA        Quad set  2 x 10       R heel slides with strap   2 x 10, supine        Hooklying clamshells with TB  2 x 10      Standing HR/TR  20x       Standing marches   2 x 10       Gastroc S        HS         Ther Activity                        Gait Training                        Modalities

## 2023-02-20 ENCOUNTER — OFFICE VISIT (OUTPATIENT)
Dept: PHYSICAL THERAPY | Facility: CLINIC | Age: 68
End: 2023-02-20

## 2023-02-20 DIAGNOSIS — M17.11 PRIMARY OSTEOARTHRITIS OF RIGHT KNEE: Primary | ICD-10-CM

## 2023-02-22 ENCOUNTER — OFFICE VISIT (OUTPATIENT)
Dept: PHYSICAL THERAPY | Facility: CLINIC | Age: 68
End: 2023-02-22

## 2023-02-22 DIAGNOSIS — M17.11 PRIMARY OSTEOARTHRITIS OF RIGHT KNEE: Primary | ICD-10-CM

## 2023-02-22 NOTE — PROGRESS NOTES
Daily Note     Today's date: 2023  Patient name: Earnest Becerra  : 1955  MRN: [id number]  Referring provider: Govind Calixto DO  Dx:   Encounter Diagnosis     ICD-10-CM    1  Primary osteoarthritis of right knee  M17 11                      Subjective: Sore this morning  Objective: See treatment diary below      Assessment: Demo good tolerance to TE and overall good knee flexion ROM at this time  She is more limited in knee ext but able to tolerate manual stretching fair  Patient would benefit from continued PT      Plan: Progress treatment as tolerated  Re-eval Date: 3/7    Date     Visit Count 1 2 3 4    FOTO        Pain In        Pain Out             Precautions none        Manuals        Knee ext/flex   Knee flx/ext grade 2-3 mobilizations, gastroc/quad  massage                             Neuro Re-Ed                                                                 Ther Ex        Nustep          Reviewed HEP for TKA   Level 2, 10 min to work CV endurance and knee ROM    L3, 10 min for CV endurance and knee ROM     Quad set  2 x 10       R heel slides with strap   2 x 10, supine   2 x 15, supine  2 x 15 supine     Bridges   2 x 10  2 x 15     Hooklying clamshells with TB  2 x 10      Standing HR/TR  20x  2 x 20 20 x 2     Quad stretch        STS        Toe taps        Standing marches   2 x 10   20x     Gastroc S        Standing SLR - hip abd/ext     2 x 15    HS         Ther Activity                        Gait Training                        Modalities

## 2023-02-23 ENCOUNTER — APPOINTMENT (OUTPATIENT)
Dept: PHYSICAL THERAPY | Facility: CLINIC | Age: 68
End: 2023-02-23

## 2023-02-24 ENCOUNTER — TELEPHONE (OUTPATIENT)
Dept: OBGYN CLINIC | Facility: HOSPITAL | Age: 68
End: 2023-02-24

## 2023-02-24 NOTE — TELEPHONE ENCOUNTER
Advised per surgeon Dr Aldo Dickson  "There are no limitations  Would not recommend bathing till the staples are removed  Would not recommend kneeling as well  I believe she has an appointment next week  Chills are normal  "  Dr Aldo Dickson 2/28  She denies other questions or needs at this time  Pt encouraged to call with any questions, concerns or issues

## 2023-02-24 NOTE — TELEPHONE ENCOUNTER
Caller: Patient     Doctor: Ayah Pryor    Reason for call:  Patient would like to know when she can bathe  Also, she gets chills in the afternoon is that normal? Patient would like to know if she has any limitations of what she should be doing      Call back#:  [phone]

## 2023-02-27 ENCOUNTER — APPOINTMENT (OUTPATIENT)
Dept: PHYSICAL THERAPY | Facility: CLINIC | Age: 68
End: 2023-02-27

## 2023-02-27 ENCOUNTER — OFFICE VISIT (OUTPATIENT)
Dept: PHYSICAL THERAPY | Facility: CLINIC | Age: 68
End: 2023-02-27

## 2023-02-27 DIAGNOSIS — M17.11 PRIMARY OSTEOARTHRITIS OF RIGHT KNEE: Primary | ICD-10-CM

## 2023-02-27 NOTE — PROGRESS NOTES
Daily Note     Today's date: 2023  Patient name: Wilder Bai  : 1955  MRN: [id number]  Referring provider: Susu Joe DO  Dx:   Encounter Diagnosis     ICD-10-CM    1  Primary osteoarthritis of right knee  M17 11                      Subjective: I have pain at night from R TKR, pain keeps me up at night  I have swelling R knee to ankle   I RTD tmrw and hope to get staples out  I have been walking around the house w/o my RW  Use RW 1* when out of house        Objective: See treatment diary below      Assessment: Tolerated treatment well  AROM ext -18*, flex 120*  Noted edema R knee/BL malleoi/foot region  Pt review ice/CBAN with trial of compression R knee this date  Noted intact staples R TKR with redness / no visible signs of drainage/weeping  Patient demonstrated fatigue post treatment and would benefit from continued PT      Plan: Continue per plan of care  Re-eval Date: 3/7    Date    Visit Count 1 2 3 4 5   FOTO        Pain In     R TKR 4   Pain Out     better        Precautions none        Manuals        Knee ext/flex   Knee flx/ext grade 2-3 mobilizations, gastroc/quad  massage  HS, gastroc stretch  Pt place tubigrip                             Neuro Re-Ed                                                                 Ther Ex        Nustep          Reviewed HEP for TKA   Level 2, 10 min to work CV endurance and knee ROM    L3, 10 min for CV endurance and knee ROM  L3, 10 min for CV endurance and knee ROM    Quad set  2 x 10       R heel slides with strap   2 x 10, supine   2 x 15, supine  2 x 15 supine  10x 10"   Bridges   2 x 10  2 x 15  2x15   Hooklying clamshells with TB  2 x 10      Standing HR/TR  20x  2 x 20 20 x 2  30x ea   Quad stretch        STS        Toe taps        Standing marches   2 x 10   20x  20x   Gastroc S        SLR     Flex supine  2x10  Abd SL  2x10   Standing SLR - hip abd/ext     2 x 15    Stairs     2x 2 HR step over step   HS Ther Activity                        Gait Training                        Modalities

## 2023-02-28 ENCOUNTER — OFFICE VISIT (OUTPATIENT)
Dept: OBGYN CLINIC | Facility: CLINIC | Age: 68
End: 2023-02-28

## 2023-02-28 ENCOUNTER — APPOINTMENT (OUTPATIENT)
Dept: RADIOLOGY | Facility: CLINIC | Age: 68
End: 2023-02-28

## 2023-02-28 VITALS
WEIGHT: 133 LBS | HEIGHT: 63 IN | SYSTOLIC BLOOD PRESSURE: 133 MMHG | BODY MASS INDEX: 23.57 KG/M2 | DIASTOLIC BLOOD PRESSURE: 76 MMHG | HEART RATE: 82 BPM

## 2023-02-28 DIAGNOSIS — Z96.651 S/P TOTAL KNEE REPLACEMENT, RIGHT: ICD-10-CM

## 2023-02-28 DIAGNOSIS — Z96.651 S/P TOTAL KNEE REPLACEMENT, RIGHT: Primary | ICD-10-CM

## 2023-02-28 NOTE — PROGRESS NOTES
ASSESSMENT/PLAN:    Diagnoses and all orders for this visit:    S/P total knee replacement, right  -     XR knee 3 vw right non injury; Future        X-rays of the patient's right knee are consistent with a well-seated right total knee replacement  The prosthesis is in good alignment  There are no signs of loosening  There are no fractures or dislocations  The patient is continuing to progress since surgery  She may continue physical therapy to work on strengthening, stretching and range of motion  She will follow-up with our office in 1 month for strength and motion check  She is acceptable to this plan  Return in about 1 month (around 3/28/2023)  The patient is doing quite well from a right total knee replacement  She is on Eliquis  Flexion past 110 degrees  Continue therapy  Return back 1 month evaluation  May take Tylenol for pain      _____________________________________________________  CHIEF COMPLAINT:  Chief Complaint   Patient presents with   • Right Knee - Post-op         SUBJECTIVE:  Sil Harris is a 74 y o  female who presents to our office for a postop visit  The patient is status post right total knee replacement from 2/8/2023  She has been doing well since surgery  She does complain of some surgical site pain  She denies any numbness or tingling  She denies any fever or chills  She has been participating in physical therapy      The following portions of the patient's history were reviewed and updated as appropriate: allergies, current medications, past family history, past medical history, past social history, past surgical history and problem list     PAST MEDICAL HISTORY:  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    • Drug intolerance    • Dysfunction of right eustachian tube    • Dysfunctional uterine bleeding    • Edema    • Elevated d-dimer    • Exposure to hepatitis C    • Fracture of fibula    • Generalized anxiety disorder    • GERD (gastroesophageal reflux disease)    • Hyperlipidemia    • Mild cervical dysplasia    • Myalgia    • Myositis    • Palpitations    • PONV (postoperative nausea and vomiting)    • Pre-syncope    • Seasonal allergies    • Stress incontinence    • Thrombocytopenia ([address] )    • Urinary retention    • Urinary tract infection    • Uterine leiomyoma        PAST SURGICAL HISTORY:  Past Surgical History:   Procedure Laterality Date   • BLADDER SURGERY     • BREAST BIOPSY Right     US guided   • COLONOSCOPY     • COLPOSCOPY  01/06/1998   • DENTAL SURGERY     • ENDOMETRIAL BIOPSY  05/21/1993   • HYSTERECTOMY     • HYSTEROSCOPY      uterus- 1/22/98, 7/1/02 and 4/10/07   • [place] ARTHRP KNE CONDYLE&PLATU MEDIAL&LAT COMPARTMENTS Right 2/8/2023    Procedure: ARTHROPLASTY KNEE TOTAL;  Surgeon: Loni Newman DO;  Location: [facility];  Service: Orthopedics   • TUBAL LIGATION     • US GUIDED BREAST BIOPSY RIGHT COMPLETE Right 08/08/2018       FAMILY HISTORY:  Family History   Problem Relation Age of Onset   • Stroke Mother         cerebral artery occlusion with cerebral infarction   • Coronary artery disease Mother    • Arrhythmia Mother         sinus   • Coronary artery disease Father    • Diabetes Father    • Aortic aneurysm Family    • Arthritis Family    • Diabetes Family    • Heart disease Family    • No Known Problems Daughter    • No Known Problems Maternal Grandmother    • No Known Problems Paternal Grandmother    • No Known Problems Daughter    • No Known Problems Maternal Aunt    • Ovarian cancer Maternal Aunt        SOCIAL HISTORY:  Social History     Tobacco Use   • Smoking status: Every Day     Packs/day: 1 50     Years: 45 00     Pack years: 67 50     Types: Cigarettes   • Smokeless tobacco: Never   Vaping Use   • Vaping Use: Never used   Substance Use Topics   • Alcohol use: Never   • Drug use: No       MEDICATIONS:    Current Outpatient Medications:   •  acetaminophen (TYLENOL) 500 mg tablet, Take 500 mg by mouth every 6 (six) hours as needed for mild pain, Disp: , Rfl:   •  alendronate (FOSAMAX) 70 mg tablet, take 1 tablet by mouth every 7 days, Disp: 8 tablet, Rfl: 1  •  apixaban (ELIQUIS) 5 mg, Take 2 tablets (10 mg total) by mouth 2 (two) times a day for 2 days, THEN 1 tablet (5 mg total) 2 (two) times a day for 28 days  , Disp: 64 tablet, Rfl: 0  •  atorvastatin (LIPITOR) 20 mg tablet, take 1 tablet by mouth once daily (Patient taking differently: Take 20 mg by mouth daily after dinner), Disp: 90 tablet, Rfl: 3  •  buPROPion (WELLBUTRIN XL) 150 mg 24 hr tablet, 150 mg every morning, Disp: , Rfl:   •  clonazePAM (KlonoPIN) 1 mg tablet, 3 (three) times a day, Disp: , Rfl:   •  gabapentin (Neurontin) 300 mg capsule, ONE po q day x 3 and then one po bid x3 and then one tid   (Patient taking differently: Take 100 mg by mouth 2 (two) times a day ONE po q day x 3 and then one po bid x3 and then one tid ), Disp: 90 capsule, Rfl: 1  •  methocarbamol (ROBAXIN) 750 mg tablet, Take 750 mg by mouth every 6 (six) hours as needed, Disp: , Rfl:   •  omeprazole (PriLOSEC) 40 MG capsule, Take 1 capsule (40 mg total) by mouth daily, Disp: 90 capsule, Rfl: 1  •  SODIUM FLUORIDE, DENTAL GEL, 1 1 % GEL, SF 5000 Plus 1 1 % dental cream, Disp: , Rfl:   •  tretinoin (RETIN-A) 0 1 % cream, Apply topically daily at bedtime, Disp: 45 g, Rfl: 5  •  venlafaxine (EFFEXOR-XR) 150 mg 24 hr capsule, Take 1 capsule (150 mg total) by mouth daily, Disp: 90 capsule, Rfl: 1  •  ascorbic acid (VITAMIN C) 500 MG tablet, Take 1 tablet (500 mg total) by mouth 2 (two) times a day (Patient taking differently: Take 1,000 mg by mouth 2 (two) times a day), Disp: 60 tablet, Rfl: 1  •  ferrous sulfate 324 (65 Fe) mg, Take 1 tablet (324 mg total) by mouth 2 (two) times a day before meals, Disp: 60 tablet, Rfl: 1  •  folic acid (FOLVITE) 1 mg tablet, Take 1 tablet (1 mg total) by mouth daily, Disp: 30 tablet, Rfl: 1  •  ibuprofen (MOTRIN) 800 mg tablet, Take 1 tablet (800 mg total) by mouth every 6 (six) hours as needed for mild pain (Patient not taking: Reported on 2/28/2023), Disp: 120 tablet, Rfl: 1  •  Multiple Vitamins-Minerals (multivitamin with minerals) tablet, Take 1 tablet by mouth daily, Disp: 30 tablet, Rfl: 1  •  venlafaxine (EFFEXOR-XR) 75 mg 24 hr capsule, Take 1 capsule (75 mg total) by mouth daily (Patient not taking: Reported on 2/28/2023), Disp: 90 capsule, Rfl: 1    ALLERGIES:  No Known Allergies    ROS:  Review of Systems     Constitutional: Negative for fatigue, fever or loss of appetite  HENT: Negative  Respiratory: Negative for shortness of breath, dyspnea  Cardiovascular: Negative for chest pain/tightness  Gastrointestinal: Negative for abdominal pain, N/V  Endocrine: Negative for cold/heat intolerance, unexplained weight loss/gain  Genitourinary: Negative for flank pain, dysuria, hematuria  Musculoskeletal: Negative for arthralgia   Skin: Negative for rash  Neurological: Negative for numbness or tingling  Psychiatric/Behavioral: Negative for agitation  _____________________________________________________  PHYSICAL EXAMINATION:    Blood pressure 133/76, pulse 82, height 5' 3" (1 6 m), weight 60 3 kg (133 lb)  Constitutional: Oriented to person, place, and time  Appears well-developed and well-nourished  No distress  HENT:   Head: Normocephalic  Eyes: Conjunctivae are normal  Right eye exhibits no discharge  Left eye exhibits no discharge  No scleral icterus  Cardiovascular: Normal rate      Pulmonary/Chest: Effort normal    Neurological: Alert and oriented to person, place, and time  Skin: Skin is warm and dry  No rash noted  Not diaphoretic  No erythema  No pallor  Psychiatric: Normal mood and affect  Behavior is normal  Judgment and thought content normal       MUSCULOSKELETAL EXAMINATION:   Physical Exam  Ortho Exam    Right lower extremity is neurovascularly intact  Toes are pink and mobile  Compartments are soft  Range of motion of the knee is from 0 to 95 degrees  No ligament laxity  Incision is clean, dry and intact  Brisk cap refill  Sensation intact  No effusion present  Objective:  BP Readings from Last 1 Encounters:   02/28/23 133/76      Wt Readings from Last 1 Encounters:   02/28/23 60 3 kg (133 lb)        BMI:   Estimated body mass index is 23 56 kg/m² as calculated from the following:    Height as of this encounter: 5' 3" (1 6 m)  Weight as of this encounter: 60 3 kg (133 lb)          Scribe Attestation    I,:  Lashay Calzada PA-C am acting as a scribe while in the presence of the attending physician :       I,:  Dot Aguilar, DO personally performed the services described in this documentation    as scribed in my presence :

## 2023-03-01 ENCOUNTER — OFFICE VISIT (OUTPATIENT)
Dept: PHYSICAL THERAPY | Facility: CLINIC | Age: 68
End: 2023-03-01

## 2023-03-01 DIAGNOSIS — M17.11 PRIMARY OSTEOARTHRITIS OF RIGHT KNEE: Primary | ICD-10-CM

## 2023-03-02 ENCOUNTER — TELEPHONE (OUTPATIENT)
Dept: OBGYN CLINIC | Facility: HOSPITAL | Age: 68
End: 2023-03-02

## 2023-03-03 ENCOUNTER — APPOINTMENT (OUTPATIENT)
Dept: PHYSICAL THERAPY | Facility: CLINIC | Age: 68
End: 2023-03-03

## 2023-03-06 ENCOUNTER — TELEPHONE (OUTPATIENT)
Dept: OBGYN CLINIC | Facility: HOSPITAL | Age: 68
End: 2023-03-06

## 2023-03-06 ENCOUNTER — APPOINTMENT (OUTPATIENT)
Dept: LAB | Facility: CLINIC | Age: 68
End: 2023-03-06

## 2023-03-06 ENCOUNTER — OFFICE VISIT (OUTPATIENT)
Dept: INTERNAL MEDICINE CLINIC | Facility: CLINIC | Age: 68
End: 2023-03-06

## 2023-03-06 ENCOUNTER — OFFICE VISIT (OUTPATIENT)
Dept: PHYSICAL THERAPY | Facility: CLINIC | Age: 68
End: 2023-03-06

## 2023-03-06 VITALS
OXYGEN SATURATION: 97 % | WEIGHT: 132.38 LBS | HEART RATE: 87 BPM | DIASTOLIC BLOOD PRESSURE: 68 MMHG | HEIGHT: 63 IN | TEMPERATURE: 98.7 F | SYSTOLIC BLOOD PRESSURE: 118 MMHG | BODY MASS INDEX: 23.46 KG/M2

## 2023-03-06 DIAGNOSIS — Z13.29 SCREENING FOR THYROID DISORDER: ICD-10-CM

## 2023-03-06 DIAGNOSIS — G89.4 CHRONIC PAIN SYNDROME: ICD-10-CM

## 2023-03-06 DIAGNOSIS — Z13.0 SCREENING FOR DEFICIENCY ANEMIA: ICD-10-CM

## 2023-03-06 DIAGNOSIS — K21.9 GASTROESOPHAGEAL REFLUX DISEASE WITHOUT ESOPHAGITIS: ICD-10-CM

## 2023-03-06 DIAGNOSIS — Z72.0 TOBACCO ABUSE: ICD-10-CM

## 2023-03-06 DIAGNOSIS — Z00.00 MEDICARE ANNUAL WELLNESS VISIT, INITIAL: ICD-10-CM

## 2023-03-06 DIAGNOSIS — E78.2 MIXED HYPERLIPIDEMIA: ICD-10-CM

## 2023-03-06 DIAGNOSIS — Z12.11 SCREEN FOR COLON CANCER: ICD-10-CM

## 2023-03-06 DIAGNOSIS — Z13.220 SCREENING FOR LIPID DISORDERS: ICD-10-CM

## 2023-03-06 DIAGNOSIS — J43.9 PULMONARY EMPHYSEMA, UNSPECIFIED EMPHYSEMA TYPE (HCC): Primary | ICD-10-CM

## 2023-03-06 DIAGNOSIS — Z86.711 HISTORY OF PULMONARY EMBOLISM: ICD-10-CM

## 2023-03-06 DIAGNOSIS — F41.1 GENERALIZED ANXIETY DISORDER: ICD-10-CM

## 2023-03-06 DIAGNOSIS — Z12.31 ENCOUNTER FOR SCREENING MAMMOGRAM FOR MALIGNANT NEOPLASM OF BREAST: ICD-10-CM

## 2023-03-06 DIAGNOSIS — M17.11 PRIMARY OSTEOARTHRITIS OF RIGHT KNEE: Primary | ICD-10-CM

## 2023-03-06 PROBLEM — G93.40 ENCEPHALOPATHY ACUTE: Status: RESOLVED | Noted: 2023-02-09 | Resolved: 2023-03-06

## 2023-03-06 LAB
ALBUMIN SERPL BCP-MCNC: 3.3 G/DL (ref 3.5–5)
ALP SERPL-CCNC: 104 U/L (ref 46–116)
ALT SERPL W P-5'-P-CCNC: 26 U/L (ref 12–78)
ANION GAP SERPL CALCULATED.3IONS-SCNC: 4 MMOL/L (ref 4–13)
AST SERPL W P-5'-P-CCNC: 21 U/L (ref 5–45)
BASOPHILS # BLD AUTO: 0.03 THOUSANDS/ÂΜL (ref 0–0.1)
BASOPHILS NFR BLD AUTO: 0 % (ref 0–1)
BILIRUB SERPL-MCNC: 0.39 MG/DL (ref 0.2–1)
BUN SERPL-MCNC: 14 MG/DL (ref 5–25)
CALCIUM ALBUM COR SERPL-MCNC: 9.8 MG/DL (ref 8.3–10.1)
CALCIUM SERPL-MCNC: 9.2 MG/DL (ref 8.3–10.1)
CHLORIDE SERPL-SCNC: 112 MMOL/L (ref 96–108)
CHOLEST SERPL-MCNC: 164 MG/DL
CO2 SERPL-SCNC: 25 MMOL/L (ref 21–32)
CREAT SERPL-MCNC: 0.57 MG/DL (ref 0.6–1.3)
EOSINOPHIL # BLD AUTO: 0.15 THOUSAND/ÂΜL (ref 0–0.61)
EOSINOPHIL NFR BLD AUTO: 2 % (ref 0–6)
ERYTHROCYTE [DISTWIDTH] IN BLOOD BY AUTOMATED COUNT: 15.9 % (ref 11.6–15.1)
GFR SERPL CREATININE-BSD FRML MDRD: 97 ML/MIN/1.73SQ M
GLUCOSE P FAST SERPL-MCNC: 95 MG/DL (ref 65–99)
HCT VFR BLD AUTO: 34.7 % (ref 34.8–46.1)
HDLC SERPL-MCNC: 45 MG/DL
HGB BLD-MCNC: 10.3 G/DL (ref 11.5–15.4)
IMM GRANULOCYTES # BLD AUTO: 0.02 THOUSAND/UL (ref 0–0.2)
IMM GRANULOCYTES NFR BLD AUTO: 0 % (ref 0–2)
LDLC SERPL CALC-MCNC: 71 MG/DL (ref 0–100)
LYMPHOCYTES # BLD AUTO: 1.57 THOUSANDS/ÂΜL (ref 0.6–4.47)
LYMPHOCYTES NFR BLD AUTO: 22 % (ref 14–44)
MCH RBC QN AUTO: 29.5 PG (ref 26.8–34.3)
MCHC RBC AUTO-ENTMCNC: 29.7 G/DL (ref 31.4–37.4)
MCV RBC AUTO: 99 FL (ref 82–98)
MONOCYTES # BLD AUTO: 0.63 THOUSAND/ÂΜL (ref 0.17–1.22)
MONOCYTES NFR BLD AUTO: 9 % (ref 4–12)
NEUTROPHILS # BLD AUTO: 4.76 THOUSANDS/ÂΜL (ref 1.85–7.62)
NEUTS SEG NFR BLD AUTO: 67 % (ref 43–75)
NRBC BLD AUTO-RTO: 0 /100 WBCS
PLATELET # BLD AUTO: 445 THOUSANDS/UL (ref 149–390)
PMV BLD AUTO: 10.5 FL (ref 8.9–12.7)
POTASSIUM SERPL-SCNC: 4.3 MMOL/L (ref 3.5–5.3)
PROT SERPL-MCNC: 6.9 G/DL (ref 6.4–8.4)
RBC # BLD AUTO: 3.49 MILLION/UL (ref 3.81–5.12)
SODIUM SERPL-SCNC: 141 MMOL/L (ref 135–147)
TRIGL SERPL-MCNC: 239 MG/DL
TSH SERPL DL<=0.05 MIU/L-ACNC: 1.98 UIU/ML (ref 0.45–4.5)
WBC # BLD AUTO: 7.16 THOUSAND/UL (ref 4.31–10.16)

## 2023-03-06 RX ORDER — ATORVASTATIN CALCIUM 20 MG/1
20 TABLET, FILM COATED ORAL
Qty: 90 TABLET | Refills: 1 | Status: SHIPPED | OUTPATIENT
Start: 2023-03-06

## 2023-03-06 NOTE — PROGRESS NOTES
Assessment and Plan:     Problem List Items Addressed This Visit        Digestive    Esophageal reflux       Respiratory    Pulmonary emphysema (HCC) - Primary       Other    Tobacco abuse    Hyperlipidemia    Relevant Orders    Comprehensive metabolic panel    Lipid Panel with Direct LDL reflex    History of pulmonary embolism    Generalized anxiety disorder    Relevant Orders    [facility] All Prescribed Meds and Special Instructions    Amphetamines, Methamphetamines    Butalbital    Phenobarbital    Secobarbital    Temazepam    Alprazolam    Clonazepam    Diazepam    Lorazepam    Oxazepam    Gabapentin    Pregabalin    Cocaine    Heroin    Buprenorphine    Levorphanol    Meperidine    Naltrexone    Fentanyl    Methadone    Oxycodone    Oxymorphone    Tapentadol    THC    Tramadol    Codeine, Hydrocodone, Hydropmorphone, Morphine    Bath Salts    Ethyl Glucuronide/Ethyl Sulfate    Kratom    Spice    Methylphenidate    Phentermine    Validity Oxidant    Validity Creatinine    Validity pH    Validity Specific    Chronic pain syndrome   Other Visit Diagnoses     Screen for colon cancer        Medicare annual wellness visit, initial        Screening for lipid disorders        Screening for thyroid disorder        Relevant Orders    TSH, 3rd generation with Free T4 reflex    Screening for deficiency anemia        Relevant Orders    CBC and differential    Encounter for screening mammogram for malignant neoplasm of breast        Relevant Orders    Mammo screening bilateral w 3d & cad           Preventive health issues were discussed with patient, and age appropriate screening tests were ordered as noted in patient's After Visit Summary  Personalized health advice and appropriate referrals for health education or preventive services given if needed, as noted in patient's After Visit Summary       History of Present Illness:     Patient presents for a Medicare Wellness Visit    HPI   Patient Care Team:  Yadiel Madrid DO Does NOT APPEAR VISUALLY SIGNIFICANT. as PCP - General (Internal Medicine)  Reeta Kawasaki, RN as Nurse Navigator (Orthopedics)     Review of Systems:     Review of Systems     Problem List:     Patient Active Problem List   Diagnosis   • Allergic rhinitis   • Chronic cough   • Constipation   • Esophageal reflux   • Generalized anxiety disorder   • Hyperlipidemia   • Symptomatic menopausal or female climacteric states   • Varicosities of leg   • Acquired trigger finger   • Carpal tunnel syndrome   • Spider veins of both lower extremities   • Tobacco abuse   • Chronic pain syndrome   • Uterine prolapse   • Chronic bilateral low back pain without sciatica   • History of pulmonary embolism   • Pulmonary emphysema (HCC)      Past Medical and Surgical History:     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    • Drug intolerance    • Dysfunction of right eustachian tube    • Dysfunctional uterine bleeding    • Edema    • Elevated d-dimer    • Exposure to hepatitis C    • Fracture of fibula    • Generalized anxiety disorder    • GERD (gastroesophageal reflux disease)    • Hyperlipidemia    • Mild cervical dysplasia    • Myalgia    • Myositis    • Palpitations    • PONV (postoperative nausea and vomiting)    • Pre-syncope    • Seasonal allergies    • Stress incontinence    • Thrombocytopenia (HCC)    • Urinary retention    • Urinary tract infection    • Uterine leiomyoma      Past Surgical History:   Procedure Laterality Date   • BLADDER SURGERY     • BREAST BIOPSY Right     US guided   • COLONOSCOPY     • COLPOSCOPY  01/06/1998   • DENTAL SURGERY     • ENDOMETRIAL BIOPSY 05/21/1993   • HYSTERECTOMY     • HYSTEROSCOPY      uterus- 1/22/98, 7/1/02 and 4/10/07   • [place] ARTHRP KNE CONDYLE&PLATU MEDIAL&LAT COMPARTMENTS Right 2/8/2023    Procedure: ARTHROPLASTY KNEE TOTAL;  Surgeon: Manuel Velasquez DO;  Location: [facility];  Service: Orthopedics   • TUBAL LIGATION     • US GUIDED BREAST BIOPSY RIGHT COMPLETE Right 08/08/2018      Family History:     Family History   Problem Relation Age of Onset   • Stroke Mother         cerebral artery occlusion with cerebral infarction   • Coronary artery disease Mother    • Arrhythmia Mother         sinus   • Coronary artery disease Father    • Diabetes Father    • Aortic aneurysm Family    • Arthritis Family    • Diabetes Family    • Heart disease Family    • No Known Problems Daughter    • No Known Problems Maternal Grandmother    • No Known Problems Paternal Grandmother    • No Known Problems Daughter    • No Known Problems Maternal Aunt    • Ovarian cancer Maternal Aunt       Social History:     Social History     Socioeconomic History   • Marital status: /Civil Union     Spouse name: None   • Number of children: None   • Years of education: None   • Highest education level: None   Occupational History   • 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[employer]   • Occupation: housewife   Tobacco Use   • Smoking status: Every Day     Packs/day: 1 50     Years: 45 00     Pack years: 67 50     Types: Cigarettes   • Smokeless tobacco: Never   Vaping Use   • Vaping Use: Never used   Substance and Sexual Activity   • Alcohol use: Never   • Drug use: No   • Sexual activity: Not Currently   Other Topics Concern   • None   Social History Narrative    Caffeine use     Social Determinants of Health     Financial Resource Strain: Low Risk    • Difficulty of Paying Living Expenses: Not hard at all   Food Insecurity: No Food Insecurity   • Worried About Running Out of Food in the Last Year: Never true   • Ran Out of Food in the Last Year: Never true Transportation Needs: No Transportation Needs   • Lack of Transportation (Medical): No   • Lack of Transportation (Non-Medical): No   Physical Activity: Not on file   Stress: Not on file   Social Connections: Not on file   Intimate Partner Violence: Not on file   Housing Stability: Low Risk    • Unable to Pay for Housing in the Last Year: No   • Number of Places Lived in the Last Year: 1   • Unstable Housing in the Last Year: No      Medications and Allergies:     Current Outpatient Medications   Medication Sig Dispense Refill   • acetaminophen (TYLENOL) 500 mg tablet Take 500 mg by mouth every 6 (six) hours as needed for mild pain     • alendronate (FOSAMAX) 70 mg tablet take 1 tablet by mouth every 7 days 8 tablet 1   • apixaban (ELIQUIS) 5 mg Take 2 tablets (10 mg total) by mouth 2 (two) times a day for 2 days, THEN 1 tablet (5 mg total) 2 (two) times a day for 28 days  64 tablet 0   • ascorbic acid (VITAMIN C) 500 MG tablet Take 1 tablet (500 mg total) by mouth 2 (two) times a day (Patient taking differently: Take 1,000 mg by mouth 2 (two) times a day) 60 tablet 1   • atorvastatin (LIPITOR) 20 mg tablet take 1 tablet by mouth once daily (Patient taking differently: Take 20 mg by mouth daily after dinner) 90 tablet 3   • buPROPion (WELLBUTRIN XL) 150 mg 24 hr tablet 150 mg every morning     • clonazePAM (KlonoPIN) 1 mg tablet 3 (three) times a day     • ferrous sulfate 324 (65 Fe) mg Take 1 tablet (324 mg total) by mouth 2 (two) times a day before meals 60 tablet 1   • folic acid (FOLVITE) 1 mg tablet Take 1 tablet (1 mg total) by mouth daily 30 tablet 1   • gabapentin (Neurontin) 300 mg capsule ONE po q day x 3 and then one po bid x3 and then one tid   (Patient taking differently: Take 100 mg by mouth 2 (two) times a day ONE po q day x 3 and then one po bid x3 and then one tid ) 90 capsule 1   • methocarbamol (ROBAXIN) 750 mg tablet Take 750 mg by mouth every 6 (six) hours as needed     • Multiple Vitamins-Minerals (multivitamin with minerals) tablet Take 1 tablet by mouth daily 30 tablet 1   • omeprazole (PriLOSEC) 40 MG capsule Take 1 capsule (40 mg total) by mouth daily 90 capsule 1   • SODIUM FLUORIDE, DENTAL GEL, 1 1 % GEL SF 5000 Plus 1 1 % dental cream     • tretinoin (RETIN-A) 0 1 % cream Apply topically daily at bedtime 45 g 5   • venlafaxine (EFFEXOR-XR) 150 mg 24 hr capsule Take 1 capsule (150 mg total) by mouth daily 90 capsule 1   • venlafaxine (EFFEXOR-XR) 75 mg 24 hr capsule Take 1 capsule (75 mg total) by mouth daily 90 capsule 1   • ibuprofen (MOTRIN) 800 mg tablet Take 1 tablet (800 mg total) by mouth every 6 (six) hours as needed for mild pain (Patient not taking: Reported on 2/28/2023) 120 tablet 1     No current facility-administered medications for this visit       No Known Allergies   Immunizations:     Immunization History   Administered Date(s) Administered   • COVID-19 MODERNA VACC 0 5 ML IM 02/16/2021, 03/16/2021, 12/06/2021   • INFLUENZA 10/21/2015, 10/17/2016, 10/23/2017, 10/26/2018, 10/09/2020, 11/17/2021, 11/02/2022   • Influenza Injectable, MDCK, Preservative Free, Quadrivalent, 0 5 mL 10/09/2020   • Influenza Quadrivalent Preservative Free 3 years and older IM 10/21/2015, 10/17/2016, 10/23/2017   • Influenza, recombinant, quadrivalent,injectable, preservative free 10/26/2018, 10/11/2019, 11/02/2022   • Influenza, seasonal, injectable 10/23/2013, 10/28/2014   • Tdap 05/16/2013, 08/21/2014      Health Maintenance:         Topic Date Due   • Colorectal Cancer Screening  12/05/2017   • Cervical Cancer Screening  07/11/2021   • Breast Cancer Screening: Mammogram  05/20/2023   • DXA SCAN  05/20/2023   • Lung Cancer Screening  02/09/2024   • HIV Screening  Completed   • Hepatitis C Screening  Completed         Topic Date Due   • Pneumococcal Vaccine: 65+ Years (1 - PCV) Never done   • COVID-19 Vaccine (4 - Booster for Moderna series) 01/31/2022      Medicare Screening Tests and Risk Assessments:     Gillian Sosa is here for her Initial Wellness visit  Health Risk Assessment:   Patient rates overall health as good  Patient feels that their physical health rating is same  Patient is satisfied with their life  Eyesight was rated as same  Hearing was rated as same  Patient feels that their emotional and mental health rating is same  Patients states they are never, rarely angry  Patient states they are never, rarely unusually tired/fatigued  Pain experienced in the last 7 days has been some  Patient's pain rating has been 4/10  Patient states that she has experienced no weight loss or gain in last 6 months  Depression Screening:   PHQ-2 Score: 0      Fall Risk Screening: In the past year, patient has experienced: no history of falling in past year      Urinary Incontinence Screening:   Patient has not leaked urine accidently in the last six months  Home Safety:  Patient does not have trouble with stairs inside or outside of their home  Patient has working smoke alarms and has working carbon monoxide detector  Home safety hazards include: none  Nutrition:   Current diet is Regular  Medications:   Patient is currently taking over-the-counter supplements  OTC medications include: see medication list  Patient is able to manage medications  Activities of Daily Living (ADLs)/Instrumental Activities of Daily Living (IADLs):   Walk and transfer into and out of bed and chair?: Yes  Dress and groom yourself?: Yes    Bathe or shower yourself?: Yes    Feed yourself?  Yes  Do your laundry/housekeeping?: Yes  Manage your money, pay your bills and track your expenses?: Yes  Make your own meals?: Yes    Do your own shopping?: Yes    Previous Hospitalizations:   Any hospitalizations or ED visits within the last 12 months?: Yes    How many hospitalizations have you had in the last year?: 1-2    Advance Care Planning:   Living will: No    Durable POA for healthcare: No    Advanced directive: No Advanced directive counseling given: Yes    Five wishes given: Yes    Patient declined ACP directive: No    End of Life Decisions reviewed with patient: Yes    Provider agrees with end of life decisions: Yes      Comments: Will review further once she reviews the info given today  Cognitive Screening:   Provider or family/friend/caregiver concerned regarding cognition?: No    PREVENTIVE SCREENINGS      Cardiovascular Screening:    General: Screening Not Indicated and History Lipid Disorder    Due for: Lipid Panel      Diabetes Screening:     General: Screening Current    Due for: Blood Glucose      Colorectal Cancer Screening:     General: Risks and Benefits Discussed    Due for: Cologuard      Breast Cancer Screening:     General: Screening Current    Due for: Mammogram        Cervical Cancer Screening:    General: History Cervical Cancer      Osteoporosis Screening:    General: Screening Current      Abdominal Aortic Aneurysm (AAA) Screening:        General: Screening Not Indicated      Lung Cancer Screening:     General: Screening Current      Hepatitis C Screening:    General: Screening Current    Screening, Brief Intervention, and Referral to Treatment (SBIRT)    Screening  Typical number of drinks in a day: 0  Typical number of drinks in a week: 0  Interpretation: Low risk drinking behavior  Single Item Drug Screening:  How often have you used an illegal drug (including marijuana) or a prescription medication for non-medical reasons in the past year? never    Single Item Drug Screen Score: 0  Interpretation: Negative screen for possible drug use disorder    Other Counseling Topics:   Car/seat belt/driving safety, sunscreen and calcium and vitamin D intake and regular weightbearing exercise  No results found       Physical Exam:     /68   Pulse 87   Temp 98 7 °F (37 1 °C)   Ht 5' 3" (1 6 m)   Wt 60 kg (132 lb 6 oz)   SpO2 97%   BMI 23 45 kg/m²     Physical Exam   A/P: Doing well and no falls  Denies depression and feels safe at home  Diverse diet  No problems operating a MV and uses seat belts  No living will or POA  Information give for pt to review  No DME or referrals needed today  RTC in one year for medicare wellness       Magen Grewal, DO

## 2023-03-06 NOTE — PATIENT INSTRUCTIONS
Hyperlipidemia   WHAT YOU NEED TO KNOW:   What is hyperlipidemia? Hyperlipidemia is a high level of lipids (fats) in your blood  These lipids include cholesterol or triglycerides  Lipids are made by your body  They also come from the foods you eat  Your body needs lipids to work properly, but high levels increase your risk for heart disease, heart attack, and stroke  What increases my risk for hyperlipidemia? • Family history of high lipid levels    • Diet high in saturated fats, cholesterol, or calories    • High alcohol intake or smoking    • Lack of regular physical activity    • Medical conditions such as hypothyroidism, obesity, or type 2 diabetes    • Certain medicines, such as blood pressure medicines, hormones, and steroids    How is hyperlipidemia managed and treated? Your healthcare provider may first recommend that you make lifestyle changes to help decrease your lipid levels  Your provider may recommend you work with a team to manage hyperlipidemia  The team may include medical experts such as a dietitian, an exercise or physical therapist, and a behavior therapist  Your family members may be included in helping you create lifestyle changes  You may also need to take medicine to lower your lipid levels  Some of the lifestyle changes you may need to make include the followin  Maintain a healthy weight  Ask your healthcare provider what a healthy weight is for you  Ask him or her to help you create a weight loss plan if you are overweight  Weight loss can decrease your cholesterol and triglyceride levels  2  Be physically active throughout the day  Physical activity, such as exercise, lowers your cholesterol levels and helps you maintain a healthy weight  Get 30 minutes or more of aerobic exercise 4 to 6 days each week  You can split your exercise into four 10-minute workouts instead of 30 minutes at one time  Examples of aerobic exercises include walking briskly, swimming, or riding a bike  Work with your healthcare provider to plan the best exercise program for you  Also include strength training at least 2 times each week  Your healthcare providers can help you create a physical activity plan  3  Do not smoke  Nicotine and other chemicals in cigarettes and cigars can increase your risk for a heart attack and stroke  Ask your healthcare provider for information if you currently smoke and need help to quit  E-cigarettes or smokeless tobacco still contain nicotine  Talk to your healthcare provider before you use these products  4  Eat heart-healthy foods  A dietitian or your provider can give you more information on low-sodium plans or the DASH (Dietary Approaches to Stop Hypertension) eating plan  The DASH plan is low in sodium, processed sugar, unhealthy fats, and total fat  It is high in potassium, calcium, and fiber  It is high in potassium, calcium, and fiber  These can be found in vegetables, fruit, and whole-grain foods  The following are ways to get more heart-healthy foods:         ? Decrease the total amount of fat you eat  Choose lean meats, fat-free or 1% fat milk, and low-fat dairy products, such as yogurt and cheese  Limit or do not eat red meat  Red meats are high in fat and cholesterol  ? Replace unhealthy fats with healthy fats  Unhealthy fats include saturated fat, trans fat, and cholesterol  Choose soft margarines that are low in saturated fat and have little or no trans fat  Monounsaturated fats are healthy fats  These are found in olive oil, canola oil, avocado, and nuts  Polyunsaturated fats are also healthy  These are found in fish, flaxseed, walnuts, and soybeans  ? Eat 5 or more servings of fruits and vegetables every day  They are low in calories and fat, and a good source of essential vitamins  Include dark green, red, and orange vegetables  Examples include spinach, kale, broccoli, and carrots  ? Eat foods high in fiber    Fiber can help lower your cholesterol levels  Choose whole grain, high-fiber foods  Good choices include whole-wheat breads or cereals, beans, peas, fruits, and vegetables  ? Limit sodium (salt) as directed  Too much sodium can affect your fluid balance and blood pressure  Your healthcare provider will tell you how much sodium and potassium are safe for you to have in a day  He or she may recommend that you limit sodium to 2,300 mg a day  Your provider or a dietitian can help you find ways to limit sodium  For example, if you add salt while you cook, do not add more salt at the table  Check labels to find low-sodium or no-salt-added foods  Some low-sodium foods use potassium salts for flavor  Too much potassium can also cause health problems  5  Ask your healthcare provider if it is okay for you to drink alcohol  Alcohol can increase your cholesterol and triglyceride levels  Your provider can tell you how many drinks are okay to have within 24 hours and within 1 week  A drink of alcohol is 12 ounces of beer, 5 ounces of wine, or 1½ ounces of liquor  Call your local emergency number [phone] in the [address] [facility] [facility]) or have someone call if:   1  You have any of the following signs of a heart attack:      ? Squeezing, pressure, or pain in your chest    ? You may  also have any of the following:     ? Discomfort or pain in your back, neck, jaw, stomach, or arm    ? Shortness of breath    ? Nausea or vomiting    ? Lightheadedness or a sudden cold sweat    2  You have any of the following signs of a stroke:      ? Numbness or drooping on one side of your face     ? Weakness in an arm or leg    ? Confusion or difficulty speaking    ? Dizziness, a severe headache, or vision loss    When should I call my doctor? • You have questions or concerns about your condition or care  CARE AGREEMENT:   You have the right to help plan your care  Learn about your health condition and how it may be treated   Discuss treatment options with your healthcare providers to decide what care you want to receive  You always have the right to refuse treatment  The above information is an  only  It is not intended as medical advice for individual conditions or treatments  Talk to your doctor, nurse or pharmacist before following any medical regimen to see if it is safe and effective for you  © Copyright [employer] 2022 Information is for End User's use only and may not be sold, redistributed or otherwise used for commercial purposes  Medicare Preventive Visit Patient Instructions  Thank you for completing your Welcome to Medicare Visit or Medicare Annual Wellness Visit today  Your next wellness visit will be due in one year (3/6/2024)  The screening/preventive services that you may require over the next 5-10 years are detailed below  Some tests may not apply to you based off risk factors and/or age  Screening tests ordered at today's visit but not completed yet may show as past due  Also, please note that scanned in results may not display below  Preventive Screenings:  Service Recommendations Previous Testing/Comments   Colorectal Cancer Screening  * Colonoscopy    * Fecal Occult Blood Test (FOBT)/Fecal Immunochemical Test (FIT)  * Fecal DNA/Cologuard Test  * Flexible Sigmoidoscopy Age: 39-70 years old   Colonoscopy: every 10 years (may be performed more frequently if at higher risk)  OR  FOBT/FIT: every 1 year  OR  Cologuard: every 3 years  OR  Sigmoidoscopy: every 5 years  Screening may be recommended earlier than age 39 if at higher risk for colorectal cancer  Also, an individualized decision between you and your healthcare provider will decide whether screening between the ages of 74-80 would be appropriate   Colonoscopy: 12/05/2007  FOBT/FIT: Not on file  Cologuard: Not on file  Sigmoidoscopy: Not on file          Breast Cancer Screening Age: 36 years old  Frequency: every 1-2 years  Not required if history of left and right mastectomy Mammogram: 05/20/2021    Screening Current   Cervical Cancer Screening Between the ages of 21-29, pap smear recommended once every 3 years  Between the ages of 33-67, can perform pap smear with HPV co-testing every 5 years  Recommendations may differ for women with a history of total hysterectomy, cervical cancer, or abnormal pap smears in past  Pap Smear: 07/11/2018    History Cervical Cancer   Hepatitis C Screening Once for adults born between 1945 and 1965  More frequently in patients at high risk for Hepatitis C Hep C Antibody: 02/18/2022    Screening Current   Diabetes Screening 1-2 times per year if you're at risk for diabetes or have pre-diabetes Fasting glucose: 99 mg/dL (2/18/2022)  A1C: 5 8 % (1/16/2023)  Screening Current   Cholesterol Screening Once every 5 years if you don't have a lipid disorder  May order more often based on risk factors  Lipid panel: 02/18/2022    Screening Not Indicated  History Lipid Disorder     Other Preventive Screenings Covered by Medicare:  1  Abdominal Aortic Aneurysm (AAA) Screening: covered once if your at risk  You're considered to be at risk if you have a family history of AAA  2  Lung Cancer Screening: covers low dose CT scan once per year if you meet all of the following conditions: (1) Age 50-69; (2) No signs or symptoms of lung cancer; (3) Current smoker or have quit smoking within the last 15 years; (4) You have a tobacco smoking history of at least 20 pack years (packs per day multiplied by number of years you smoked); (5) You get a written order from a healthcare provider  3  Glaucoma Screening: covered annually if you're considered high risk: (1) You have diabetes OR (2) Family history of glaucoma OR (3)  aged 48 and older OR (3)  American aged 72 and older  3   Osteoporosis Screening: covered every 2 years if you meet one of the following conditions: (1) You're estrogen deficient and at risk for osteoporosis based off medical history and other findings; (2) Have a vertebral abnormality; (3) On glucocorticoid therapy for more than 3 months; (4) Have primary hyperparathyroidism; (5) On osteoporosis medications and need to assess response to drug therapy  · Last bone density test (DXA Scan): 05/20/2021   5  HIV Screening: covered annually if you're between the age of 15-65  Also covered annually if you are younger than 13 and older than 72 with risk factors for HIV infection  For pregnant patients, it is covered up to 3 times per pregnancy  Immunizations:  Immunization Recommendations   Influenza Vaccine Annual influenza vaccination during flu season is recommended for all persons aged >= 6 months who do not have contraindications   Pneumococcal Vaccine   * Pneumococcal conjugate vaccine = PCV13 (Prevnar 13), PCV15 (Vaxneuvance), PCV20 (Prevnar 20)  * Pneumococcal polysaccharide vaccine = PPSV23 (Pneumovax) Adults 25-60 years old: 1-3 doses may be recommended based on certain risk factors  Adults 72 years old: 1-2 doses may be recommended based off what pneumonia vaccine you previously received   Hepatitis B Vaccine 3 dose series if at intermediate or high risk (ex: diabetes, end stage renal disease, liver disease)   Tetanus (Td) Vaccine - COST NOT COVERED BY MEDICARE PART B Following completion of primary series, a booster dose should be given every 10 years to maintain immunity against tetanus  Td may also be given as tetanus wound prophylaxis  Tdap Vaccine - COST NOT COVERED BY MEDICARE PART B Recommended at least once for all adults  For pregnant patients, recommended with each pregnancy     Shingles Vaccine (Shingrix) - COST NOT COVERED BY MEDICARE PART B  2 shot series recommended in those aged 48 and above     Health Maintenance Due:      Topic Date Due   • Colorectal Cancer Screening  12/05/2017   • Cervical Cancer Screening  07/11/2021   • Breast Cancer Screening: Mammogram  05/20/2023   • DXA SCAN  05/20/2023   • Lung Cancer Screening  02/09/2024   • HIV Screening  Completed   • Hepatitis C Screening  Completed     Immunizations Due:      Topic Date Due   • Pneumococcal Vaccine: 65+ Years (1 - PCV) Never done   • COVID-19 Vaccine (4 - Booster for Silvio Pathak series) 01/31/2022     Advance Directives   What are advance directives? Advance directives are legal documents that state your wishes and plans for medical care  These plans are made ahead of time in case you lose your ability to make decisions for yourself  Advance directives can apply to any medical decision, such as the treatments you want, and if you want to donate organs  What are the types of advance directives? There are many types of advance directives, and each state has rules about how to use them  You may choose a combination of any of the following:  · Living will: This is a written record of the treatment you want  You can also choose which treatments you do not want, which to limit, and which to stop at a certain time  This includes surgery, medicine, IV fluid, and tube feedings  · Durable power of  for healthcare [facility]): This is a written record that states who you want to make healthcare choices for you when you are unable to make them for yourself  This person, called a proxy, is usually a family member or a friend  You may choose more than 1 proxy  · Do not resuscitate (DNR) order:  A DNR order is used in case your heart stops beating or you stop breathing  It is a request not to have certain forms of treatment, such as CPR  A DNR order may be included in other types of advance directives  · Medical directive: This covers the care that you want if you are in a coma, near death, or unable to make decisions for yourself  You can list the treatments you want for each condition  Treatment may include pain medicine, surgery, blood transfusions, dialysis, IV or tube feedings, and a ventilator (breathing machine)  · Values history:   This document has questions about your views, beliefs, and how you feel and think about life  This information can help others choose the care that you would choose  Why are advance directives important? An advance directive helps you control your care  Although spoken wishes may be used, it is better to have your wishes written down  Spoken wishes can be misunderstood, or not followed  Treatments may be given even if you do not want them  An advance directive may make it easier for your family to make difficult choices about your care  Cigarette Smoking and Your Health   Risks to your health if you smoke:  Nicotine and other chemicals found in tobacco damage every cell in your body  Even if you are a light smoker, you have an increased risk for cancer, heart disease, and lung disease  If you are pregnant or have diabetes, smoking increases your risk for complications  Benefits to your health if you stop smoking:   · You decrease respiratory symptoms such as coughing, wheezing, and shortness of breath  · You reduce your risk for cancers of the lung, mouth, throat, kidney, bladder, pancreas, stomach, and cervix  If you already have cancer, you increase the benefits of chemotherapy  You also reduce your risk for cancer returning or a second cancer from developing  · You reduce your risk for heart disease, blood clots, heart attack, and stroke  · You reduce your risk for lung infections, and diseases such as pneumonia, asthma, chronic bronchitis, and emphysema  · Your circulation improves  More oxygen can be delivered to your body  If you have diabetes, you lower your risk for complications, such as kidney, artery, and eye diseases  You also lower your risk for nerve damage  Nerve damage can lead to amputations, poor vision, and blindness  · You improve your body's ability to heal and to fight infections  For more information and support to stop smoking:   · Smokefree  gov  Phone: 1 [employer]  Web Address: www [place] [address] [address] Information is for End User's use only and may not be sold, redistributed or otherwise used for commercial purposes  All illustrations and images included in CareNotes® are the copyrighted property of [employer]  or [facility] Preventive Visit Patient Instructions  Thank you for completing your Welcome to Medicare Visit or Medicare Annual Wellness Visit today  Your next wellness visit will be due in one year (3/6/2024)  The screening/preventive services that you may require over the next 5-10 years are detailed below  Some tests may not apply to you based off risk factors and/or age  Screening tests ordered at today's visit but not completed yet may show as past due  Also, please note that scanned in results may not display below  Preventive Screenings:  Service Recommendations Previous Testing/Comments   Colorectal Cancer Screening  * Colonoscopy    * Fecal Occult Blood Test (FOBT)/Fecal Immunochemical Test (FIT)  * Fecal DNA/Cologuard Test  * Flexible Sigmoidoscopy Age: 39-70 years old   Colonoscopy: every 10 years (may be performed more frequently if at higher risk)  OR  FOBT/FIT: every 1 year  OR  Cologuard: every 3 years  OR  Sigmoidoscopy: every 5 years  Screening may be recommended earlier than age 39 if at higher risk for colorectal cancer  Also, an individualized decision between you and your healthcare provider will decide whether screening between the ages of 74-80 would be appropriate  Colonoscopy: 12/05/2007  FOBT/FIT: Not on file  Cologuard: Not on file  Sigmoidoscopy: Not on file          Breast Cancer Screening Age: 36 years old  Frequency: every 1-2 years  Not required if history of left and right mastectomy Mammogram: 05/20/2021    Screening Current   Cervical Cancer Screening Between the ages of 21-29, pap smear recommended once every 3 years     Between the ages of 33-67, can perform pap smear with HPV co-testing every 5 years  Recommendations may differ for women with a history of total hysterectomy, cervical cancer, or abnormal pap smears in past  Pap Smear: 07/11/2018    History Cervical Cancer   Hepatitis C Screening Once for adults born between 1945 and 1965  More frequently in patients at high risk for Hepatitis C Hep C Antibody: 02/18/2022    Screening Current   Diabetes Screening 1-2 times per year if you're at risk for diabetes or have pre-diabetes Fasting glucose: 99 mg/dL (2/18/2022)  A1C: 5 8 % (1/16/2023)  Screening Current   Cholesterol Screening Once every 5 years if you don't have a lipid disorder  May order more often based on risk factors  Lipid panel: 02/18/2022    Screening Not Indicated  History Lipid Disorder     Other Preventive Screenings Covered by Medicare:  6  Abdominal Aortic Aneurysm (AAA) Screening: covered once if your at risk  You're considered to be at risk if you have a family history of AAA  7  Lung Cancer Screening: covers low dose CT scan once per year if you meet all of the following conditions: (1) Age 50-69; (2) No signs or symptoms of lung cancer; (3) Current smoker or have quit smoking within the last 15 years; (4) You have a tobacco smoking history of at least 20 pack years (packs per day multiplied by number of years you smoked); (5) You get a written order from a healthcare provider  8  Glaucoma Screening: covered annually if you're considered high risk: (1) You have diabetes OR (2) Family history of glaucoma OR (3)  aged 48 and older OR (3)  American aged 72 and older  5   Osteoporosis Screening: covered every 2 years if you meet one of the following conditions: (1) You're estrogen deficient and at risk for osteoporosis based off medical history and other findings; (2) Have a vertebral abnormality; (3) On glucocorticoid therapy for more than 3 months; (4) Have primary hyperparathyroidism; (5) On osteoporosis medications and need to assess response to drug therapy  · Last bone density test (DXA Scan): 05/20/2021  10  HIV Screening: covered annually if you're between the age of 12-76  Also covered annually if you are younger than 13 and older than 72 with risk factors for HIV infection  For pregnant patients, it is covered up to 3 times per pregnancy  Immunizations:  Immunization Recommendations   Influenza Vaccine Annual influenza vaccination during flu season is recommended for all persons aged >= 6 months who do not have contraindications   Pneumococcal Vaccine   * Pneumococcal conjugate vaccine = PCV13 (Prevnar 13), PCV15 (Vaxneuvance), PCV20 (Prevnar 20)  * Pneumococcal polysaccharide vaccine = PPSV23 (Pneumovax) Adults 25-60 years old: 1-3 doses may be recommended based on certain risk factors  Adults 72 years old: 1-2 doses may be recommended based off what pneumonia vaccine you previously received   Hepatitis B Vaccine 3 dose series if at intermediate or high risk (ex: diabetes, end stage renal disease, liver disease)   Tetanus (Td) Vaccine - COST NOT COVERED BY MEDICARE PART B Following completion of primary series, a booster dose should be given every 10 years to maintain immunity against tetanus  Td may also be given as tetanus wound prophylaxis  Tdap Vaccine - COST NOT COVERED BY MEDICARE PART B Recommended at least once for all adults  For pregnant patients, recommended with each pregnancy     Shingles Vaccine (Shingrix) - COST NOT COVERED BY MEDICARE PART B  2 shot series recommended in those aged 48 and above     Health Maintenance Due:      Topic Date Due   • Colorectal Cancer Screening  12/05/2017   • Cervical Cancer Screening  07/11/2021   • Breast Cancer Screening: Mammogram  05/20/2023   • DXA SCAN  05/20/2023   • Lung Cancer Screening  02/09/2024   • HIV Screening  Completed   • Hepatitis C Screening  Completed     Immunizations Due:      Topic Date Due   • Pneumococcal Vaccine: 65+ Years (1 - PCV) Never done   • COVID-19 Vaccine (4 - Booster for Moderna series) 01/31/2022     Advance Directives   What are advance directives? Advance directives are legal documents that state your wishes and plans for medical care  These plans are made ahead of time in case you lose your ability to make decisions for yourself  Advance directives can apply to any medical decision, such as the treatments you want, and if you want to donate organs  What are the types of advance directives? There are many types of advance directives, and each state has rules about how to use them  You may choose a combination of any of the following:  · Living will: This is a written record of the treatment you want  You can also choose which treatments you do not want, which to limit, and which to stop at a certain time  This includes surgery, medicine, IV fluid, and tube feedings  · Durable power of  for healthcare [place] [facility]): This is a written record that states who you want to make healthcare choices for you when you are unable to make them for yourself  This person, called a proxy, is usually a family member or a friend  You may choose more than 1 proxy  · Do not resuscitate (DNR) order:  A DNR order is used in case your heart stops beating or you stop breathing  It is a request not to have certain forms of treatment, such as CPR  A DNR order may be included in other types of advance directives  · Medical directive: This covers the care that you want if you are in a coma, near death, or unable to make decisions for yourself  You can list the treatments you want for each condition  Treatment may include pain medicine, surgery, blood transfusions, dialysis, IV or tube feedings, and a ventilator (breathing machine)  · Values history: This document has questions about your views, beliefs, and how you feel and think about life  This information can help others choose the care that you would choose  Why are advance directives important?   An advance directive helps you control your care  Although spoken wishes may be used, it is better to have your wishes written down  Spoken wishes can be misunderstood, or not followed  Treatments may be given even if you do not want them  An advance directive may make it easier for your family to make difficult choices about your care  Cigarette Smoking and Your Health   Risks to your health if you smoke:  Nicotine and other chemicals found in tobacco damage every cell in your body  Even if you are a light smoker, you have an increased risk for cancer, heart disease, and lung disease  If you are pregnant or have diabetes, smoking increases your risk for complications  Benefits to your health if you stop smoking:   · You decrease respiratory symptoms such as coughing, wheezing, and shortness of breath  · You reduce your risk for cancers of the lung, mouth, throat, kidney, bladder, pancreas, stomach, and cervix  If you already have cancer, you increase the benefits of chemotherapy  You also reduce your risk for cancer returning or a second cancer from developing  · You reduce your risk for heart disease, blood clots, heart attack, and stroke  · You reduce your risk for lung infections, and diseases such as pneumonia, asthma, chronic bronchitis, and emphysema  · Your circulation improves  More oxygen can be delivered to your body  If you have diabetes, you lower your risk for complications, such as kidney, artery, and eye diseases  You also lower your risk for nerve damage  Nerve damage can lead to amputations, poor vision, and blindness  · You improve your body's ability to heal and to fight infections  For more information and support to stop smoking:   · Smokefree  gov  Phone: 0- 088 - 628-7968  Web Address: www [facility]   [employer] 2018 Information is for End User's use only and may not be sold, redistributed or otherwise used for commercial purposes   All illustrations and images included in [facility] are the copyrighted property of A D A M , [employer]  or [address] [place], [place] [address]elvi [facility]

## 2023-03-06 NOTE — PROGRESS NOTES
Assessment/Plan:  Problem List Items Addressed This Visit        Digestive    Esophageal reflux       Respiratory    Pulmonary emphysema (HCC) - Primary       Other    Tobacco abuse    Hyperlipidemia    Relevant Orders    Comprehensive metabolic panel    Lipid Panel with Direct LDL reflex    History of pulmonary embolism    Generalized anxiety disorder    Relevant Orders    [facility] All Prescribed Meds and Special Instructions    Amphetamines, Methamphetamines    Butalbital    Phenobarbital    Secobarbital    Temazepam    Alprazolam    Clonazepam    Diazepam    Lorazepam    Oxazepam    Gabapentin    Pregabalin    Cocaine    Heroin    Buprenorphine    Levorphanol    Meperidine    Naltrexone    Fentanyl    Methadone    Oxycodone    Oxymorphone    Tapentadol    THC    Tramadol    Codeine, Hydrocodone, Hydropmorphone, Morphine    Bath Salts    Ethyl Glucuronide/Ethyl Sulfate    Kratom    Spice    Methylphenidate    Phentermine    Validity Oxidant    Validity Creatinine    Validity pH    Validity Specific    Chronic pain syndrome   Other Visit Diagnoses     Screen for colon cancer        Medicare annual wellness visit, initial        Screening for lipid disorders        Screening for thyroid disorder        Relevant Orders    TSH, 3rd generation with Free T4 reflex    Screening for deficiency anemia        Relevant Orders    CBC and differential    Encounter for screening mammogram for malignant neoplasm of breast        Relevant Orders    Mammo screening bilateral w 3d & cad           Diagnoses and all orders for this visit:    Pulmonary emphysema, unspecified emphysema type ([facility] [address] )    History of pulmonary embolism    Gastroesophageal reflux disease without esophagitis    Generalized anxiety disorder  -     [facility] All Prescribed Meds and Special Instructions  -     Amphetamines, Methamphetamines  -     Butalbital  -     Phenobarbital  -     Secobarbital  -     Temazepam  -     Alprazolam  -     Clonazepam  - Diazepam  -     Lorazepam  -     Oxazepam  -     Gabapentin  -     Pregabalin  -     Cocaine  -     Heroin  -     Buprenorphine  -     Levorphanol  -     Meperidine  -     Naltrexone  -     Fentanyl  -     Methadone  -     Oxycodone  -     Oxymorphone  -     Tapentadol  -     THC  -     Tramadol  -     Codeine, Hydrocodone, Hydropmorphone, Morphine  -     Bath Salts  -     Ethyl Glucuronide/Ethyl Sulfate  -     Kratom  -     Spice  -     Methylphenidate  -     Phentermine  -     Validity Oxidant  -     Validity Creatinine  -     Validity pH  -     Validity Specific    Mixed hyperlipidemia  -     Comprehensive metabolic panel; Future  -     Lipid Panel with Direct LDL reflex; Future    Tobacco abuse    Chronic pain syndrome    Screen for colon cancer    Medicare annual wellness visit, initial    Screening for lipid disorders    Screening for thyroid disorder  -     TSH, 3rd generation with Free T4 reflex; Future    Screening for deficiency anemia  -     CBC and differential; Future    Encounter for screening mammogram for malignant neoplasm of breast  -     Mammo screening bilateral w 3d & cad; Future      No problem-specific Assessment & Plan notes found for this encounter  A/P: Doing ok and will check labs  Order mammo  Reminded to return  CRC  Defers vaccines  BMI is good and will continue current treatment  Wean tobacco  Continue current treatment and RTC six months for routine  Subjective:      Patient ID: Bill Del Valle is a 74 y o  female  WF RTC for f/u HLD, COPD, etc  Doing ok and no new issues  Continues to recover from knee sx  Remains active w/o difficulty and no falls  COPD is ok  Continues to smoke  No reflux  ANAND is controlled Chronic pain is manageable   Due for labs,mammo, vaccines, CRC, etc        The following portions of the patient's history were reviewed and updated as appropriate:   She has a past medical history of Abnormal finding in urine, Anxiety, Arthralgia of multiple joints, Balance disorder, Biceps tendinitis, right, Bursitis of right knee, Bursitis of right shoulder, Chondrocalcinosis, Chronic bilateral low back pain without sciatica (11/29/2022), Chronic pain disorder, COPD exacerbation ([facility] [address] ), 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, Drug intolerance, Dysfunction of right eustachian tube, Dysfunctional uterine bleeding, Edema, Elevated d-dimer, Exposure to hepatitis C, Fracture of fibula, Generalized anxiety disorder, GERD (gastroesophageal reflux disease), Hyperlipidemia, Mild cervical dysplasia, Myalgia, Myositis, Palpitations, PONV (postoperative nausea and vomiting), Pre-syncope, Seasonal allergies, Stress incontinence, Thrombocytopenia ([facility] ), Urinary retention, Urinary tract infection, and Uterine leiomyoma  ,  does not have any pertinent problems on file  ,   has a past surgical history that includes Bladder surgery; Breast biopsy (Right); Colposcopy (01/06/1998); Dental surgery; Endometrial biopsy (05/21/1993); Hysteroscopy; Tubal ligation; US guided breast biopsy right complete (Right, 08/08/2018); Hysterectomy; Colonoscopy; and pr arthrp kne condyle&platu medial&lat compartments (Right, 2/8/2023)  ,  family history includes Aortic aneurysm in her family; Arrhythmia in her mother; Arthritis in her family; Coronary artery disease in her father and mother; Diabetes in her family and father; Heart disease in her family; No Known Problems in her daughter, daughter, maternal aunt, maternal grandmother, and paternal grandmother; Ovarian cancer in her maternal aunt; Stroke in her mother  ,   reports that she has been smoking cigarettes  She has a 67 50 pack-year smoking history  She has never used smokeless tobacco  She reports that she does not drink alcohol and does not use drugs  ,  has No Known Allergies     Current Outpatient Medications   Medication Sig Dispense Refill   • acetaminophen (TYLENOL) 500 mg tablet Take 500 mg by mouth every 6 (six) hours as needed for mild pain     • alendronate (FOSAMAX) 70 mg tablet take 1 tablet by mouth every 7 days 8 tablet 1   • apixaban (ELIQUIS) 5 mg Take 2 tablets (10 mg total) by mouth 2 (two) times a day for 2 days, THEN 1 tablet (5 mg total) 2 (two) times a day for 28 days  64 tablet 0   • ascorbic acid (VITAMIN C) 500 MG tablet Take 1 tablet (500 mg total) by mouth 2 (two) times a day (Patient taking differently: Take 1,000 mg by mouth 2 (two) times a day) 60 tablet 1   • atorvastatin (LIPITOR) 20 mg tablet take 1 tablet by mouth once daily (Patient taking differently: Take 20 mg by mouth daily after dinner) 90 tablet 3   • buPROPion (WELLBUTRIN XL) 150 mg 24 hr tablet 150 mg every morning     • clonazePAM (KlonoPIN) 1 mg tablet 3 (three) times a day     • ferrous sulfate 324 (65 Fe) mg Take 1 tablet (324 mg total) by mouth 2 (two) times a day before meals 60 tablet 1   • folic acid (FOLVITE) 1 mg tablet Take 1 tablet (1 mg total) by mouth daily 30 tablet 1   • gabapentin (Neurontin) 300 mg capsule ONE po q day x 3 and then one po bid x3 and then one tid   (Patient taking differently: Take 100 mg by mouth 2 (two) times a day ONE po q day x 3 and then one po bid x3 and then one tid ) 90 capsule 1   • methocarbamol (ROBAXIN) 750 mg tablet Take 750 mg by mouth every 6 (six) hours as needed     • Multiple Vitamins-Minerals (multivitamin with minerals) tablet Take 1 tablet by mouth daily 30 tablet 1   • omeprazole (PriLOSEC) 40 MG capsule Take 1 capsule (40 mg total) by mouth daily 90 capsule 1   • SODIUM FLUORIDE, DENTAL GEL, 1 1 % GEL SF 5000 Plus 1 1 % dental cream     • tretinoin (RETIN-A) 0 1 % cream Apply topically daily at bedtime 45 g 5   • venlafaxine (EFFEXOR-XR) 150 mg 24 hr capsule Take 1 capsule (150 mg total) by mouth daily 90 capsule 1   • venlafaxine (EFFEXOR-XR) 75 mg 24 hr capsule Take 1 capsule (75 mg total) by mouth daily 90 capsule 1   • ibuprofen (MOTRIN) 800 mg tablet Take 1 tablet (800 mg total) by mouth every 6 (six) hours as needed for mild pain (Patient not taking: Reported on 2/28/2023) 120 tablet 1     No current facility-administered medications for this visit  Review of Systems   Constitutional: Negative for activity change, chills, diaphoresis, fatigue and fever  HENT: Negative  Eyes: Negative for visual disturbance  Respiratory: Negative for cough, chest tightness, shortness of breath and wheezing  Cardiovascular: Negative for chest pain, palpitations and leg swelling  Gastrointestinal: Negative for abdominal pain, constipation, diarrhea, nausea and vomiting  Endocrine: Negative for cold intolerance and heat intolerance  Genitourinary: Negative for difficulty urinating, dysuria and frequency  Musculoskeletal: Negative for arthralgias, gait problem and myalgias  Neurological: Negative for dizziness, seizures, syncope, weakness, light-headedness and headaches  Psychiatric/Behavioral: Negative for confusion, dysphoric mood and sleep disturbance  The patient is not nervous/anxious  PHQ-2/9 Depression Screening    Little interest or pleasure in doing things: 0 - not at all  Feeling down, depressed, or hopeless: 0 - not at all  PHQ-2 Score: 0  PHQ-2 Interpretation: Negative depression screen        Objective:  Vitals:    03/06/23 0909   BP: 118/68   Pulse: 87   Temp: 98 7 °F (37 1 °C)   SpO2: 97%   Weight: 60 kg (132 lb 6 oz)   Height: 5' 3" (1 6 m)     Body mass index is 23 45 kg/m²  Physical Exam  Vitals and nursing note reviewed  Constitutional:       General: She is not in acute distress  Appearance: Normal appearance  She is not ill-appearing  HENT:      Head: Normocephalic and atraumatic  Mouth/Throat:      Mouth: Mucous membranes are moist    Eyes:      Extraocular Movements: Extraocular movements intact  Conjunctiva/sclera: Conjunctivae normal       Pupils: Pupils are equal, round, and reactive to light  Neck:      Vascular: No carotid bruit  Cardiovascular:      Rate and Rhythm: Normal rate and regular rhythm  Heart sounds: Normal heart sounds  No murmur heard  Pulmonary:      Effort: Pulmonary effort is normal  No respiratory distress  Breath sounds: Normal breath sounds  No wheezing, rhonchi or rales  Abdominal:      General: Bowel sounds are normal  There is no distension  Palpations: Abdomen is soft  Tenderness: There is no abdominal tenderness  Musculoskeletal:      Cervical back: Neck supple  Right lower leg: No edema  Left lower leg: No edema  Neurological:      General: No focal deficit present  Mental Status: She is alert and oriented to person, place, and time  Mental status is at baseline  Psychiatric:         Mood and Affect: Mood normal          Behavior: Behavior normal          Thought Content:  Thought content normal          Judgment: Judgment normal

## 2023-03-06 NOTE — PROGRESS NOTES
Daily Note     Today's date: 3/6/2023  Patient name: Tara Johnson  : 1955  MRN: [id number]  Referring provider: Paulino Cornell DO  Dx:   Encounter Diagnosis     ICD-10-CM    1  Primary osteoarthritis of right knee  M17 11           Start Time: 930  Stop Time: 101  Total time in clinic (min): 45 minutes    Subjective: She reports she is feeling well and isn't using the walker anymore  Objective: See treatment diary below      Assessment: Tolerated treatment well  We added in Prone Quad hands to work on her Knee Ext ROM which is most limited  She continues to show progression w/ her FLX ROM evidenced by smoother gait pattern and decreased pain during exercises  Continue to progress POC as tolerated  Patient exhibited good technique with therapeutic exercises and would benefit from continued PT      Plan: Continue per plan of care  Re-eval Date: 3/17    Date 3/1 3/6   2/27   Visit Count 6 7   5   FOTO        Pain In     R TKR 4   Pain Out     better        Precautions none        Manuals  3/6      Knee ext/flex Knee flex mobs, knee ext s 10 min  Knee flex/ext mobs 10 min   HS, gastroc stretch  Pt place tubigrip                             Neuro Re-Ed         L LE step taps 20x, 6" no UE        Prone knee ext hangs  30 sec x 5 (5lbs)      Patient education w/ diagnoses, exercise progression  Performed                                       Ther Ex        Nustep  RCB L1, 10 min for CV endurance and knee ROM  RCB L3, 10 min for knee ROM and CV endurance              L3, 10 min for CV endurance and knee ROM    Quad set        R heel slides with strap      10x 10"   Bridges     2x15   Hooklying clamshells with TB        Standing HR/TR     30x ea   Quad stretch        STS  3 x 10       Toe taps        Standing marches      20x   Gastroc S        R SLR Flex 2 x 15  Abd 2 x 10      Flex supine  2x10  Abd SL  2x10   Standing SLR - hip abd/ext         Squats At stairs, B UE 20x        Stairs R LE Step ups 1 UE, 6" fw/lat 2 x 15     2x 2 HR step over step   HS and gastroc S  30" x 2 incline board gastroc    30" x 2 with strap supine        Ther Activity                        Gait Training                        Modalities

## 2023-03-06 NOTE — TELEPHONE ENCOUNTER
Caller: patient     Doctor: niru    Reason for call: patient has an appt on Thursday for your back injections   Patient wants to make sure it is ok to go    Call back#: [phone]

## 2023-03-08 ENCOUNTER — OFFICE VISIT (OUTPATIENT)
Dept: PHYSICAL THERAPY | Facility: CLINIC | Age: 68
End: 2023-03-08

## 2023-03-08 DIAGNOSIS — M17.11 PRIMARY OSTEOARTHRITIS OF RIGHT KNEE: Primary | ICD-10-CM

## 2023-03-08 LAB
6MAM UR QL CFM: NEGATIVE NG/ML
7AMINOCLONAZEPAM UR QL CFM: NORMAL NG/ML
A-OH ALPRAZ UR QL CFM: NEGATIVE NG/ML
ACCEPTABLE CREAT UR QL: NORMAL MG/DL
ACCEPTIBLE SP GR UR QL: NORMAL
AMPHET UR QL CFM: NEGATIVE NG/ML
BUPRENORPHINE UR QL CFM: NEGATIVE NG/ML
BUTALBITAL UR QL CFM: NEGATIVE NG/ML
BZE UR QL CFM: NEGATIVE NG/ML
CODEINE UR QL CFM: NEGATIVE NG/ML
EDDP UR QL CFM: NEGATIVE NG/ML
ETHYL GLUCURONIDE UR QL CFM: NEGATIVE NG/ML
ETHYL SULFATE UR QL SCN: NEGATIVE NG/ML
EUTYLONE UR QL: NEGATIVE NG/ML
FENTANYL UR QL CFM: NEGATIVE NG/ML
GLIADIN IGG SER IA-ACNC: NEGATIVE NG/ML
HYDROCODONE UR QL CFM: NEGATIVE NG/ML
HYDROMORPHONE UR QL CFM: NEGATIVE NG/ML
LORAZEPAM UR QL CFM: NEGATIVE NG/ML
ME-PHENIDATE UR QL CFM: NEGATIVE NG/ML
MEPERIDINE UR QL CFM: NEGATIVE NG/ML
METHADONE UR QL CFM: NEGATIVE NG/ML
METHAMPHET UR QL CFM: NEGATIVE NG/ML
MORPHINE UR QL CFM: NEGATIVE NG/ML
NALTREXONE UR QL CFM: NEGATIVE NG/ML
NITRITE UR QL: NORMAL UG/ML
NORBUPRENORPHINE UR QL CFM: NEGATIVE NG/ML
NORDIAZEPAM UR QL CFM: NEGATIVE NG/ML
NORFENTANYL UR QL CFM: NEGATIVE NG/ML
NORHYDROCODONE UR QL CFM: NEGATIVE NG/ML
NORMEPERIDINE UR QL CFM: NEGATIVE NG/ML
NOROXYCODONE UR QL CFM: NEGATIVE NG/ML
OXAZEPAM UR QL CFM: NEGATIVE NG/ML
OXYCODONE UR QL CFM: NEGATIVE NG/ML
OXYMORPHONE UR QL CFM: NEGATIVE NG/ML
OXYMORPHONE UR QL CFM: NEGATIVE NG/ML
PARA-FLUOROFENTANYL QUANTIFICATION: NORMAL NG/ML
PHENOBARB UR QL CFM: NEGATIVE NG/ML
RESULT ALL_PRESCRIBED MEDS AND SPECIAL INSTRUCTIONS: NORMAL
SECOBARBITAL UR QL CFM: NEGATIVE NG/ML
SL AMB 4-ANPP QUANTIFICATION: NORMAL NG/ML
SL AMB 5F-ADB-M7 METABOLITE QUANTIFICATION: NEGATIVE NG/ML
SL AMB 7-OH-MITRAGYNINE (KRATOM ALKALOID) QUANTIFICATION: NEGATIVE NG/ML
SL AMB AB-FUBINACA-M3 METABOLITE QUANTIFICATION: NEGATIVE NG/ML
SL AMB ACETYL FENTANYL QUANTIFICATION: NORMAL NG/ML
SL AMB ACETYL NORFENTANYL QUANTIFICATION: NORMAL NG/ML
SL AMB ACRYL FENTANYL QUANTIFICATION: NORMAL NG/ML
SL AMB CARFENTANIL QUANTIFICATION: NORMAL NG/ML
SL AMB CTHC (MARIJUANA METABOLITE) QUANTIFICATION: NEGATIVE NG/ML
SL AMB DEXTRORPHAN (DEXTROMETHORPHAN METABOLITE) QUANT: NEGATIVE NG/ML
SL AMB GABAPENTIN QUANTIFICATION: ABNORMAL
SL AMB JWH018 METABOLITE QUANTIFICATION: NEGATIVE NG/ML
SL AMB JWH073 METABOLITE QUANTIFICATION: NEGATIVE NG/ML
SL AMB MDMB-FUBINACA-M1 METABOLITE QUANTIFICATION: NEGATIVE NG/ML
SL AMB METHYLONE QUANTIFICATION: NEGATIVE NG/ML
SL AMB N-DESMETHYL-TRAMADOL QUANTIFICATION: NEGATIVE NG/ML
SL AMB PHENTERMINE QUANTIFICATION: NEGATIVE NG/ML
SL AMB PREGABALIN QUANTIFICATION: NEGATIVE
SL AMB RCS4 METABOLITE QUANTIFICATION: NEGATIVE NG/ML
SL AMB RITALINIC ACID QUANTIFICATION: NEGATIVE NG/ML
SMOOTH MUSCLE AB TITR SER IF: NEGATIVE NG/ML
SPECIMEN DRAWN SERPL: NEGATIVE NG/ML
SPECIMEN PH ACCEPTABLE UR: NORMAL
TAPENTADOL UR QL CFM: NEGATIVE NG/ML
TEMAZEPAM UR QL CFM: NEGATIVE NG/ML
TEMAZEPAM UR QL CFM: NEGATIVE NG/ML
TRAMADOL UR QL CFM: NEGATIVE NG/ML
URATE/CREAT 24H UR: NEGATIVE NG/ML

## 2023-03-08 NOTE — PROGRESS NOTES
Daily Note     Today's date: 3/8/2023  Patient name: Kathy Small  : 1955  MRN: [id number]  Referring provider: Tanika Aiken DO  Dx:   Encounter Diagnosis     ICD-10-CM    1  Primary osteoarthritis of right knee  M17 11                      Subjective: She reports feeling more stiff today  Objective: See treatment diary below      Assessment: Tolerated treatment well with added resistance and reports of improved stiffness as session progressed  Emphasis placed on sequencing and correct form for appropriate muscle activation during exercise  She continues to lack terminal knee extension  Patient would benefit from continued PT      Plan: Continue per plan of care  Progress treatment as tolerated  Re-eval Date: 3/17    Date 3/1 3/6 3/8  2/27   Visit Count 6 7 8  5   FOTO        Pain In     R TKR 4   Pain Out     better        Precautions none        Manuals  3/6 3/8      Knee ext/flex Knee flex mobs, knee ext s 10 min  Knee flex/ext mobs 10 min Knee flx/ext grade 3-4 mobs 10min  HS, gastroc stretch  Pt place tubigrip                             Neuro Re-Ed         L LE step taps 20x, 6" no UE        Prone knee ext hangs  30 sec x 5 (3lbs) 30 sec x 8 (4lbs)     Patient education w/ diagnoses, exercise progression  Performed  Performed                                     Ther Ex        Nustep  RCB L1, 10 min for CV endurance and knee ROM  RCB L3, 10 min for knee ROM and CV endurance    RCB L4, 10 min for knee ROM          L3, 10 min for CV endurance and knee ROM    Quad set        R heel slides with strap      10x 10"   Bridges     2x15   Hooklying clamshells with TB        Standing HR/TR     30x ea   Quad stretch        STS  3 x 10  3 x 10 w/ 5lbs     LAQ   2x8 4# AW     Toe taps        Standing marches      20x   Gastroc S        R SLR Flex 2 x 15  Abd 2 x 10    1# AW  Flex 2 x 15  Abd 2 x 10  Flex supine  2x10  Abd SL  2x10   Standing SLR - hip abd/ext         Squats At stairs, B UE 20x        Stairs R LE Step ups 1 UE, 6" fw/lat 2 x 15     2x 2 HR step over step   HS and gastroc S  30" x 2 incline board gastroc    30" x 2 with strap supine   30" x3 incline board      Standing TKE   GTB 2x15                     Ther Activity                        Gait Training                        Modalities

## 2023-03-13 ENCOUNTER — OFFICE VISIT (OUTPATIENT)
Dept: PHYSICAL THERAPY | Facility: CLINIC | Age: 68
End: 2023-03-13

## 2023-03-13 ENCOUNTER — TELEPHONE (OUTPATIENT)
Dept: OBGYN CLINIC | Facility: HOSPITAL | Age: 68
End: 2023-03-13

## 2023-03-13 DIAGNOSIS — M17.11 PRIMARY OSTEOARTHRITIS OF RIGHT KNEE: Primary | ICD-10-CM

## 2023-03-13 NOTE — TELEPHONE ENCOUNTER
Caller: Karley Montoya     Doctor: Bela Rodriguez    Reason for call: Patient called she has 11 pills left of her Eliquis  She is wondering if she needs to be on this after the 11 pills  If she does need to be on them she needs a refill and she is wondering if there is any coupons  She is also wondering if you are going to do an x ray to see if the blood clots are gone    Please advise    Call back#: [phone]

## 2023-03-13 NOTE — PROGRESS NOTES
Daily Note     Today's date: 3/13/2023  Patient name: Kirstin Webster  : 1955  MRN: [id number]  Referring provider: Luther Borrero DO  Dx:   Encounter Diagnosis     ICD-10-CM    1  Primary osteoarthritis of right knee  M17 11           Start Time: 0900  Stop Time: 0950  Total time in clinic (min): 50 minutes    Subjective: Pt stated her knee felt stiff this morning and she is slightly sore from the weekend  Objective: See treatment diary below      Assessment: Tolerated treatment well  She continues to progress her exercises adding reps and sets, showing a progression in her LE strength and motor control  We added in standing LE strength w/o s/s working her hip extensors and hip abductors  Continue to progress POC as tolerated, and work static balance and knee extension ROM next session  Patient exhibited good technique with therapeutic exercises and would benefit from continued PT      Plan: Continue per plan of care  Re-eval Date: 3/17    Date 3/1 3/6 3/8 3/13    Visit Count 6 7 8 9    FOTO        Pain In        Pain Out             Precautions none        Manuals  3/6 3/8  3/13    Knee ext/flex Knee flex mobs, knee ext s 10 min  Knee flex/ext mobs 10 min Knee flx/ext grade 3-4 mobs 10min Knee flx/ext grade 3-4 mobs 10 min                            Neuro Re-Ed         L LE step taps 20x, 6" no UE        Prone knee ext hangs  30 sec x 5 (3lbs) 30 sec x 8 (4lbs) 30 sec x 8   trial 5 lbs    Patient education w/ diagnoses, exercise progression  Performed  Performed     Static Balance    Trial NV                            Ther Ex        Nustep  RCB L1, 10 min for CV endurance and knee ROM  RCB L3, 10 min for knee ROM and CV endurance    RCB L4, 10 min for knee ROM RCB L4, 10 min for knee ROM            Quad set        R heel slides with strap         Bridges        Hooklying clamshells with TB        Standing HR/TR        Quad stretch        STS  3 x 10  3 x 10 w/ 5lbs     LAQ   2x8 4# AW 3 x 8 #5 AW    Toe taps        Standing marches         Gastroc S        R SLR Flex 2 x 15  Abd 2 x 10    1# AW  Flex 2 x 15  Abd 2 x 10     Standing SLR - hip abd/ext     3 x 8 w/ bodyweight     Squats At stairs, B UE 20x        Stairs R LE Step ups 1 UE, 6" fw/lat 2 x 15        HS and gastroc S  30" x 2 incline board gastroc    30" x 2 with strap supine   30" x3 incline board      Standing TKE   GTB 2x15 GTB 3 x 15    Sidestepping        Monster walks         Hamstring machine    Trial NV                            Ther Activity                        Gait Training                        Modalities

## 2023-03-13 NOTE — TELEPHONE ENCOUNTER
3/14 @ 2235:  Another VM left for Jeremías Rodas  left message that she needs to continue her blood thinner as prescribed, asked she call back to discuss surgeon's recommendations further  3/13 @ 0754: Returned pt's call, VM left for pt asking for her to call back to discuss surgeon's recommendations

## 2023-03-15 ENCOUNTER — OFFICE VISIT (OUTPATIENT)
Dept: PHYSICAL THERAPY | Facility: CLINIC | Age: 68
End: 2023-03-15

## 2023-03-15 DIAGNOSIS — M17.11 PRIMARY OSTEOARTHRITIS OF RIGHT KNEE: Primary | ICD-10-CM

## 2023-03-15 NOTE — PROGRESS NOTES
Daily Note     Today's date: 3/15/2023  Patient name: Cathi Mustafa  : 1955  MRN: [id number]  Referring provider: Herman Teixeira DO  Dx:   Encounter Diagnosis     ICD-10-CM    1  Primary osteoarthritis of right knee  M17 11                      Subjective: I felt a little sore this morning, but not feeling too bad  Objective: See treatment diary below      Assessment: Tolerated treatment well  She was able to increase her exercise tolerance today adding in the knee flx/ext machine working on LE strength  She didn't report any pain during exercises  She also added in side stepping today with minimal s/s working on LE motor control and glute med strength  Continue to progress POC and work static balance next session  Patient exhibited good technique with therapeutic exercises and would benefit from continued PT      Plan: Continue per plan of care  Re-eval next session  Re-eval Date: 3/17    Date 3/1 3/6 3/8 3/13 3/15   Visit Count 6 7 8 9 10 (9 post op)   FOTO        Pain In        Pain Out             Precautions none        Manuals  3/6 3/8  3/13 3/15   Knee ext/flex Knee flex mobs, knee ext s 10 min  Knee flex/ext mobs 10 min Knee flx/ext grade 3-4 mobs 10min Knee flx/ext grade 3-4 mobs 10 min Knee flx/ext grade 3-4 mobs x 10 min  Neuro Re-Ed         L LE step taps 20x, 6" no UE        Prone knee ext hangs  30 sec x 5 (3lbs) 30 sec x 8 (4lbs) 30 sec x 8   trial 5 lbs 1 min on/1 min off x 5 trials  Patient education w/ diagnoses, exercise progression  Performed  Performed     Static Balance     Trial NV                           Ther Ex        Nustep  RCB L1, 10 min for CV endurance and knee ROM  RCB L3, 10 min for knee ROM and CV endurance    RCB L4, 10 min for knee ROM RCB L4, 10 min for knee ROM RCB L5, 10 min for knee ROM           Quad stretch        STS  3 x 10  3 x 10 w/ 5lbs     LAQ   2x8 4# AW 3 x 8 #5 AW    Toe taps        Standing leandra R SLR Flex 2 x 15  Abd 2 x 10    1# AW  Flex 2 x 15  Abd 2 x 10     Standing SLR - hip abd/ext     3 x 8 w/ bodyweight     Squats At stairs, B UE 20x        Stairs R LE Step ups 1 UE, 6" fw/lat 2 x 15        HS and gastroc S  30" x 2 incline board gastroc    30" x 2 with strap supine   30" x3 incline board      Standing TKE   GTB 2x15 GTB 3 x 15    Sidestepping     RTB 2 rounds x 50 ft   Monster walks         Hamstring machine    Trial NV 3 x 8 w/ 22lbs   Quad machine     3 x 8 w/ 22lbs                                                   Ther Activity                        Gait Training                        Modalities

## 2023-03-16 ENCOUNTER — OFFICE VISIT (OUTPATIENT)
Dept: INTERNAL MEDICINE CLINIC | Facility: CLINIC | Age: 68
End: 2023-03-16

## 2023-03-16 VITALS
BODY MASS INDEX: 23.39 KG/M2 | DIASTOLIC BLOOD PRESSURE: 70 MMHG | WEIGHT: 132 LBS | HEART RATE: 67 BPM | HEIGHT: 63 IN | SYSTOLIC BLOOD PRESSURE: 120 MMHG | OXYGEN SATURATION: 96 % | TEMPERATURE: 96.8 F

## 2023-03-16 DIAGNOSIS — Z12.11 SCREEN FOR COLON CANCER: ICD-10-CM

## 2023-03-16 DIAGNOSIS — F32.9 REACTIVE DEPRESSION: ICD-10-CM

## 2023-03-16 DIAGNOSIS — Z72.0 TOBACCO ABUSE: ICD-10-CM

## 2023-03-16 DIAGNOSIS — J43.9 PULMONARY EMPHYSEMA, UNSPECIFIED EMPHYSEMA TYPE (HCC): Primary | ICD-10-CM

## 2023-03-16 DIAGNOSIS — K21.9 GASTROESOPHAGEAL REFLUX DISEASE WITHOUT ESOPHAGITIS: ICD-10-CM

## 2023-03-16 DIAGNOSIS — D64.9 ANEMIA, UNSPECIFIED TYPE: ICD-10-CM

## 2023-03-16 DIAGNOSIS — Z86.711 HISTORY OF PULMONARY EMBOLISM: ICD-10-CM

## 2023-03-16 DIAGNOSIS — E88.09 SERUM ALBUMIN DECREASED: ICD-10-CM

## 2023-03-16 DIAGNOSIS — F41.1 GENERALIZED ANXIETY DISORDER: ICD-10-CM

## 2023-03-16 DIAGNOSIS — G89.4 CHRONIC PAIN SYNDROME: ICD-10-CM

## 2023-03-16 DIAGNOSIS — I26.99 ACUTE PULMONARY EMBOLISM WITHOUT ACUTE COR PULMONALE, UNSPECIFIED PULMONARY EMBOLISM TYPE (HCC): ICD-10-CM

## 2023-03-16 DIAGNOSIS — M54.50 CHRONIC BILATERAL LOW BACK PAIN WITHOUT SCIATICA: ICD-10-CM

## 2023-03-16 DIAGNOSIS — G89.29 CHRONIC BILATERAL LOW BACK PAIN WITHOUT SCIATICA: ICD-10-CM

## 2023-03-16 DIAGNOSIS — E78.2 MIXED HYPERLIPIDEMIA: ICD-10-CM

## 2023-03-16 NOTE — PROGRESS NOTES
Assessment/Plan:  Problem List Items Addressed This Visit        Digestive    Esophageal reflux       Respiratory    Pulmonary emphysema (HCC) - Primary       Other    Tobacco abuse    Hyperlipidemia    History of pulmonary embolism    Relevant Medications    apixaban (ELIQUIS) 5 mg    Generalized anxiety disorder    Chronic pain syndrome    Chronic bilateral low back pain without sciatica   Other Visit Diagnoses     Anemia, unspecified type        Serum albumin decreased        Screen for colon cancer        Reactive depression        Acute pulmonary embolism without acute cor pulmonale, unspecified pulmonary embolism type (HCC)        Relevant Medications    apixaban (ELIQUIS) 5 mg           Diagnoses and all orders for this visit:    Pulmonary emphysema, unspecified emphysema type (HCC)    Gastroesophageal reflux disease without esophagitis    Generalized anxiety disorder    Mixed hyperlipidemia    Tobacco abuse    Chronic pain syndrome    Chronic bilateral low back pain without sciatica    Anemia, unspecified type    Serum albumin decreased    Screen for colon cancer    Reactive depression    History of pulmonary embolism  -     apixaban (ELIQUIS) 5 mg; Take 1 tablet (5 mg total) by mouth 2 (two) times a day    Acute pulmonary embolism without acute cor pulmonale, unspecified pulmonary embolism type (HCC)  -     apixaban (ELIQUIS) 5 mg; Take 1 tablet (5 mg total) by mouth 2 (two) times a day      No problem-specific Assessment & Plan notes found for this encounter  A/P: Doing ok and discussed labs  To watch her diet for the TG and albumin  Will recheck H/H in the future and suspect due to recent sx  Recommend she f/u with her CRC  Pt to continue DOAC for two more months  Reminded to not take NSAID's  Discussed MDD/ANAND  Defers med change for MDD, but recommend she re-start her buspar for the ANAND  Di Hoist BMI is good and will continue current program  Continue current treatment and RTC six months for routine  Continue with PT  Subjective:      Patient ID: Danuta Rhodes is a 74 y o  female  WF RTC for f/u HLD, GERD, etc  Doing ok and still recovering from recent TKR  No new issues except for some depression due to the sx, decreased activity level, and ongoing LBP  No prior MDD  No falls  No reflux  Still smoking  COPD is well controlled and no rescue MDI use  Chronic pain is manageable  Recent labs with low albumin, slight anemia, and elevated TG  Due for CRC  The following portions of the patient's history were reviewed and updated as appropriate:   She has a past medical history of Abnormal finding in urine, Anxiety, Arthralgia of multiple joints, Balance disorder, Biceps tendinitis, right, Bursitis of right knee, Bursitis of right shoulder, Chondrocalcinosis, Chronic bilateral low back pain without sciatica (11/29/2022), Chronic pain disorder, COPD exacerbation (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, Drug intolerance, Dysfunction of right eustachian tube, Dysfunctional uterine bleeding, Edema, Elevated d-dimer, Exposure to hepatitis C, Fracture of fibula, Generalized anxiety disorder, GERD (gastroesophageal reflux disease), Hyperlipidemia, Mild cervical dysplasia, Myalgia, Myositis, Palpitations, PONV (postoperative nausea and vomiting), Pre-syncope, Seasonal allergies, Stress incontinence, Thrombocytopenia ([address] ), Urinary retention, Urinary tract infection, and Uterine leiomyoma  ,  does not have any pertinent problems on file  ,   has a past surgical history that includes Bladder surgery; Breast biopsy (Right); Colposcopy (01/06/1998); Dental surgery; Endometrial biopsy (05/21/1993); Hysteroscopy; Tubal ligation; US guided breast biopsy right complete (Right, 08/08/2018); Hysterectomy; Colonoscopy; and pr arthrp kne condyle&platu medial&lat compartments (Right, 2/8/2023)  ,  family history includes Aortic aneurysm in her family; Arrhythmia in her mother; Arthritis in her family; Coronary artery disease in her father and mother; Diabetes in her family and father; Heart disease in her family; No Known Problems in her daughter, daughter, maternal aunt, maternal grandmother, and paternal grandmother; Ovarian cancer in her maternal aunt; Stroke in her mother  ,   reports that she has been smoking cigarettes  She has a 67 50 pack-year smoking history  She has never used smokeless tobacco  She reports that she does not drink alcohol and does not use drugs  ,  has No Known Allergies     Current Outpatient Medications   Medication Sig Dispense Refill   • acetaminophen (TYLENOL) 500 mg tablet Take 500 mg by mouth every 6 (six) hours as needed for mild pain     • alendronate (FOSAMAX) 70 mg tablet take 1 tablet by mouth every 7 days 8 tablet 1   • apixaban (ELIQUIS) 5 mg Take 1 tablet (5 mg total) by mouth 2 (two) times a day 120 tablet 0   • atorvastatin (LIPITOR) 20 mg tablet Take 1 tablet (20 mg total) by mouth daily after dinner 90 tablet 1   • buPROPion (WELLBUTRIN XL) 150 mg 24 hr tablet 150 mg every morning     • clonazePAM (KlonoPIN) 1 mg tablet 3 (three) times a day     • ibuprofen (MOTRIN) 800 mg tablet Take 1 tablet (800 mg total) by mouth every 6 (six) hours as needed for mild pain 120 tablet 1   • omeprazole (PriLOSEC) 40 MG capsule Take 1 capsule (40 mg total) by mouth daily 90 capsule 1   • SODIUM FLUORIDE, DENTAL GEL, 1 1 % GEL SF 5000 Plus 1 1 % dental cream     • tretinoin (RETIN-A) 0 1 % cream Apply topically daily at bedtime 45 g 5   • venlafaxine (EFFEXOR-XR) 150 mg 24 hr capsule Take 1 capsule (150 mg total) by mouth daily 90 capsule 1   • ascorbic acid (VITAMIN C) 500 MG tablet Take 1 tablet (500 mg total) by mouth 2 (two) times a day (Patient taking differently: Take 1,000 mg by mouth 2 (two) times a day) 60 tablet 1   • ferrous sulfate 324 (65 Fe) mg Take 1 tablet (324 mg total) by mouth 2 (two) times a day before meals 60 tablet 1   • folic acid (FOLVITE) 1 mg tablet Take 1 tablet (1 mg total) by mouth daily 30 tablet 1   • gabapentin (Neurontin) 300 mg capsule ONE po q day x 3 and then one po bid x3 and then one tid  (Patient not taking: Reported on 3/16/2023) 90 capsule 1   • methocarbamol (ROBAXIN) 750 mg tablet Take 750 mg by mouth every 6 (six) hours as needed (Patient not taking: Reported on 3/16/2023)     • Multiple Vitamins-Minerals (multivitamin with minerals) tablet Take 1 tablet by mouth daily 30 tablet 1   • venlafaxine (EFFEXOR-XR) 75 mg 24 hr capsule Take 1 capsule (75 mg total) by mouth daily (Patient not taking: Reported on 3/16/2023) 90 capsule 1     No current facility-administered medications for this visit  Review of Systems   Constitutional: Positive for activity change  Negative for chills, diaphoresis, fatigue and fever  HENT: Negative  Eyes: Negative for visual disturbance  Respiratory: Negative for cough, chest tightness, shortness of breath and wheezing  Cardiovascular: Negative for chest pain, palpitations and leg swelling  Gastrointestinal: Negative for abdominal pain, constipation, diarrhea, nausea and vomiting  Endocrine: Negative for cold intolerance and heat intolerance  Genitourinary: Negative for difficulty urinating, dysuria and frequency  Musculoskeletal: Positive for back pain  Negative for arthralgias, gait problem and myalgias  Neurological: Negative for dizziness, seizures, syncope, weakness, light-headedness and headaches  Psychiatric/Behavioral: Positive for dysphoric mood  Negative for confusion and sleep disturbance  The patient is nervous/anxious  PHQ-2/9 Depression Screening          Objective:  Vitals:    03/16/23 1136   BP: 120/70   BP Location: Left arm   Patient Position: Sitting   Cuff Size: Standard   Pulse: 67   Temp: (!) 96 8 °F (36 °C)   SpO2: 96%   Weight: 59 9 kg (132 lb)   Height: 5' 3" (1 6 m)     Body mass index is 23 38 kg/m²  Physical Exam  Vitals and nursing note reviewed  Constitutional:       General: She is not in acute distress  Appearance: Normal appearance  She is not ill-appearing  HENT:      Head: Normocephalic and atraumatic  Mouth/Throat:      Mouth: Mucous membranes are moist    Eyes:      Extraocular Movements: Extraocular movements intact  Conjunctiva/sclera: Conjunctivae normal       Pupils: Pupils are equal, round, and reactive to light  Neck:      Vascular: No carotid bruit  Cardiovascular:      Rate and Rhythm: Normal rate and regular rhythm  Heart sounds: Normal heart sounds  No murmur heard  Pulmonary:      Effort: Pulmonary effort is normal  No respiratory distress  Breath sounds: Normal breath sounds  No wheezing, rhonchi or rales  Abdominal:      General: Bowel sounds are normal  There is no distension  Palpations: Abdomen is soft  Tenderness: There is no abdominal tenderness  Musculoskeletal:         General: Tenderness present  Cervical back: Neck supple  Right lower leg: No edema  Left lower leg: No edema  Neurological:      General: No focal deficit present  Mental Status: She is alert and oriented to person, place, and time  Mental status is at baseline  Psychiatric:         Mood and Affect: Mood normal          Behavior: Behavior normal          Thought Content:  Thought content normal          Judgment: Judgment normal

## 2023-03-16 NOTE — PATIENT INSTRUCTIONS
Hyperlipidemia   WHAT YOU NEED TO KNOW:   What is hyperlipidemia? Hyperlipidemia is a high level of lipids (fats) in your blood  These lipids include cholesterol or triglycerides  Lipids are made by your body  They also come from the foods you eat  Your body needs lipids to work properly, but high levels increase your risk for heart disease, heart attack, and stroke  What increases my risk for hyperlipidemia? Family history of high lipid levels    Diet high in saturated fats, cholesterol, or calories    High alcohol intake or smoking    Lack of regular physical activity    Medical conditions such as hypothyroidism, obesity, or type 2 diabetes    Certain medicines, such as blood pressure medicines, hormones, and steroids    How is hyperlipidemia managed and treated? Your healthcare provider may first recommend that you make lifestyle changes to help decrease your lipid levels  Your provider may recommend you work with a team to manage hyperlipidemia  The team may include medical experts such as a dietitian, an exercise or physical therapist, and a behavior therapist  Your family members may be included in helping you create lifestyle changes  You may also need to take medicine to lower your lipid levels  Some of the lifestyle changes you may need to make include the following:  Maintain a healthy weight  Ask your healthcare provider what a healthy weight is for you  Ask him or her to help you create a weight loss plan if you are overweight  Weight loss can decrease your cholesterol and triglyceride levels  Be physically active throughout the day  Physical activity, such as exercise, lowers your cholesterol levels and helps you maintain a healthy weight  Get 30 minutes or more of aerobic exercise 4 to 6 days each week  You can split your exercise into four 10-minute workouts instead of 30 minutes at one time  Examples of aerobic exercises include walking briskly, swimming, or riding a bike   Work with your healthcare provider to plan the best exercise program for you  Also include strength training at least 2 times each week  Your healthcare providers can help you create a physical activity plan  Do not smoke  Nicotine and other chemicals in cigarettes and cigars can increase your risk for a heart attack and stroke  Ask your healthcare provider for information if you currently smoke and need help to quit  E-cigarettes or smokeless tobacco still contain nicotine  Talk to your healthcare provider before you use these products  Eat heart-healthy foods  A dietitian or your provider can give you more information on low-sodium plans or the DASH (Dietary Approaches to Stop Hypertension) eating plan  The DASH plan is low in sodium, processed sugar, unhealthy fats, and total fat  It is high in potassium, calcium, and fiber  It is high in potassium, calcium, and fiber  These can be found in vegetables, fruit, and whole-grain foods  The following are ways to get more heart-healthy foods:         Decrease the total amount of fat you eat  Choose lean meats, fat-free or 1% fat milk, and low-fat dairy products, such as yogurt and cheese  Limit or do not eat red meat  Red meats are high in fat and cholesterol  Replace unhealthy fats with healthy fats  Unhealthy fats include saturated fat, trans fat, and cholesterol  Choose soft margarines that are low in saturated fat and have little or no trans fat  Monounsaturated fats are healthy fats  These are found in olive oil, canola oil, avocado, and nuts  Polyunsaturated fats are also healthy  These are found in fish, flaxseed, walnuts, and soybeans  Eat 5 or more servings of fruits and vegetables every day  They are low in calories and fat, and a good source of essential vitamins  Include dark green, red, and orange vegetables  Examples include spinach, kale, broccoli, and carrots  Eat foods high in fiber  Fiber can help lower your cholesterol levels  Choose whole grain, high-fiber foods  Good choices include whole-wheat breads or cereals, beans, peas, fruits, and vegetables  Limit sodium (salt) as directed  Too much sodium can affect your fluid balance and blood pressure  Your healthcare provider will tell you how much sodium and potassium are safe for you to have in a day  He or she may recommend that you limit sodium to 2,300 mg a day  Your provider or a dietitian can help you find ways to limit sodium  For example, if you add salt while you cook, do not add more salt at the table  Check labels to find low-sodium or no-salt-added foods  Some low-sodium foods use potassium salts for flavor  Too much potassium can also cause health problems  Ask your healthcare provider if it is okay for you to drink alcohol  Alcohol can increase your cholesterol and triglyceride levels  Your provider can tell you how many drinks are okay to have within 24 hours and within 1 week  A drink of alcohol is 12 ounces of beer, 5 ounces of wine, or 1½ ounces of liquor  Call your local emergency number (911 in the [address] [place] [address],[address]) or have someone call if:   You have any of the following signs of a heart attack:      Squeezing, pressure, or pain in your chest    You may  also have any of the following:     Discomfort or pain in your back, neck, jaw, stomach, or arm    Shortness of breath    Nausea or vomiting    Lightheadedness or a sudden cold sweat    You have any of the following signs of a stroke:      Numbness or drooping on one side of your face     Weakness in an arm or leg    Confusion or difficulty speaking    Dizziness, a severe headache, or vision loss    When should I call my doctor? You have questions or concerns about your condition or care  CARE AGREEMENT:   You have the right to help plan your care  Learn about your health condition and how it may be treated  Discuss treatment options with your healthcare providers to decide what care you want to receive   You always have the right to refuse treatment  The above information is an  only  It is not intended as medical advice for individual conditions or treatments  Talk to your doctor, nurse or pharmacist before following any medical regimen to see if it is safe and effective for you  © Copyright [employer] 2022 Information is for End User's use only and may not be sold, redistributed or otherwise used for commercial purposes

## 2023-03-20 ENCOUNTER — APPOINTMENT (OUTPATIENT)
Dept: PHYSICAL THERAPY | Facility: CLINIC | Age: 68
End: 2023-03-20

## 2023-03-22 ENCOUNTER — OFFICE VISIT (OUTPATIENT)
Dept: PHYSICAL THERAPY | Facility: CLINIC | Age: 68
End: 2023-03-22

## 2023-03-22 DIAGNOSIS — M17.11 PRIMARY OSTEOARTHRITIS OF RIGHT KNEE: Primary | ICD-10-CM

## 2023-03-22 NOTE — PROGRESS NOTES
Daily Note     Today's date: 3/22/2023  Patient name: Renata Burch  : 1955  MRN: [id number]  Referring provider: Cachorro Steve DO  Dx:   Encounter Diagnosis     ICD-10-CM    1  Primary osteoarthritis of right knee  M17 11                      Subjective: I was outside in my yard all day  R knee is more sore  RTD end of month hoping to return to driving  Still have a lot of swelling        Objective: See treatment diary below      Assessment: Tolerated treatment well  Progressed POC to pt bindu  Updated HEP with static balance exer  Pt educ scar mobility / skin care   Pt lacks TKE -16* PROM, flex WFL  Edema 1* proximal patella region noted   Patient demonstrated fatigue post treatment and would benefit from continued PT      Plan: Continue per plan of care  Re-eval Date: 3/17    Date 3/22       Visit Count 11 (10 post op)       FOTO        Pain In        Pain Out             Precautions none        Manuals 3/15       Knee ext/flex Knee flx/ext Patella  mobs , scar massage x 10 min  Neuro Re-Ed         L LE step taps        Prone knee ext hangs  3 min  3#       Patient education w/ diagnoses, exercise progression           Static Balance FT/Foam  2x30"    Tandem/firm  2x30" ea dir                               Ther Ex        Nustep  RCB L5, 10 min for knee ROM               Quad stretch        STS        LAQ DC HEP       Toe taps        Standing marches         R SLR        Standing SLR - hip abd/ext         Squats resume       Stairs Fwd/lat step ups/down  6" lat  8" fwd  15x ea        HS and gastroc S         Standing TKE GTB 3 x 15       Sidestepping Resume  RTB 2 rounds x 50 ft       Monster walks         Hamstring machine 3 x 10 w/ 22lbs       Quad machine 3 x 10 w/ 22lbs                                                       Ther Activity                        Gait Training                        Modalities                            HEP 3/22/23  Rhomberg, SLS, Tandem stance EO/EC 3x30" ea

## 2023-03-24 NOTE — PROGRESS NOTES
"Daily Note     Today's date: 3/27/2023  Patient name: Rich Sepulveda  : 1955  MRN: [id number]  Referring provider: Carl Hearn DO  Dx:   Encounter Diagnosis     ICD-10-CM    1  Primary osteoarthritis of right knee  M17 11                      Subjective: My knee is just stiff today      Objective: See treatment diary below      Assessment: Tolerated treatment well  Pt reports inconsistent with carryover with HEP  Encourage prolong static knee ext stretches   Pt demon improved knee ext with -5* this date  Cont's with min edema/non viable tissue/incision site  Pt educ scar mobility  Encourage carryover with HEP  Patient demonstrated fatigue post treatment and would benefit from continued PT      Plan: Continue per plan of care  See RA for additional findings       Re-eval Date: 3/17    Date 3/22 3/27      Visit Count 11 (10 post op) 12      FOTO        Pain In        Pain Out             Precautions none        Manuals 3/15       Knee ext/flex Knee flx/ext Patella  mobs , scar massage x 10 min  Knee flx/ext Patella  mobs , scar massage x 10 min  Neuro Re-Ed         L LE step taps        Prone knee ext hangs  3 min  3# 3 min  3#      Patient education w/ diagnoses, exercise progression           Static Balance FT/Foam  2x30\"    Tandem/firm  2x30\" ea dir                               Ther Ex        Nustep  RCB L5, 10 min for knee ROM RCB L5, 10 min for knee ROM              Quad stretch        STS        LAQ DC HEP       Toe taps        Standing marches         R SLR        Standing SLR - hip abd/ext         Squats resume 3x10      Stairs Fwd/lat step ups/down  6\" lat  8\" fwd  15x ea  Fwd/lat step ups/down  6\" lat  8\" fwd  15x2 ea       HS and gastroc S         Standing TKE GTB 3 x 15 GTB 3 x 10      Sidestepping Resume  RTB 2 rounds x 50 ft RTB 2 rounds x 50 ft      Monster walks   2x40ft      Hamstring machine 3 x 10 w/ 22lbs 3 x 10 w/ 22lbs      Quad machine 3 x 10 w/ " "22lbs 3 x 10 w/ 22lbs                                                      Ther Activity                        Gait Training                        Modalities                            HEP 3/22/23  Rhomberg, SLS, Tandem stance EO/EC 3x30\" ea       "

## 2023-03-27 ENCOUNTER — EVALUATION (OUTPATIENT)
Dept: PHYSICAL THERAPY | Facility: CLINIC | Age: 68
End: 2023-03-27

## 2023-03-27 DIAGNOSIS — M17.11 PRIMARY OSTEOARTHRITIS OF RIGHT KNEE: Primary | ICD-10-CM

## 2023-03-27 NOTE — PROGRESS NOTES
"Progress Note     Today's date: 3/27/2023  Patient name: Clarissa Graham  : 1955  MRN: [id number]  Referring provider: Blank Portillo DO  Dx:   Encounter Diagnosis     ICD-10-CM    1  Primary osteoarthritis of right knee  M17 11           Start Time: 930  Stop Time: 101  Total time in clinic (min): 45 minutes    Subjective: No pain currently  Has bene walking without AD  Pt's goal: improve knee ROM      Objective: See treatment diary below      Assessment: Progress note completed this session  She demo improved knee ROM but is still lacking in knee extension  Overall she is making good progress with PT and would benefit from continued PT to improve gait and mobility  Goals  ST-6 weeks  1  Pt will report reduced R knee pain levels \"at worst\" by at least 2 points (0-10 scale) in order to allow improved tolerance for functional mobility and reduced reliance on medication or modalities for pain relief  - MET  2  Pt will demonstrate improved strength of R knee and hip by at least 1/2-1 MMT in order to improve weight bearing joint stability and allow for restoration of normal joint mechanics to reduce pain and improve function  - MET  3  Pt will demonstrate normalized gait with least restrictive AD without increase in pain/sx for at least functional/household distances  - progressing     LT weeks   1  Pt will be independent with HEP, demonstrating proper technique with exercises and understanding of self progression of program without need for cueing or assistance  2  Pt will report minimized pain levels with at least 75% reduction in pain since [facility]  3  Pt will demonstrate WFL AROM and strength of R knee/LE without sx  4  Pt will demonstrate normalized gait without deviations or need for assistive device or external support  5  Pt will be able to garden with minimal difficulty  6  FOTO will reflect score at discharge that is greater than or equal to predicted level   "    Plan  Planned modality interventions: cryotherapy  Planned therapy interventions: manual therapy, balance, patient education, neuromuscular re-education, coordination, home exercise program, gait training, therapeutic training, therapeutic exercise, therapeutic activities, stretching and strengthening  Frequency: 2x week  Duration in weeks: 4  Plan of Care beginning date: 2023  Plan of Care expiration date: 2023  Treatment plan discussed with: patient         Knee ROM Left Right AROM-PROM   Flexion 139 110-120*   Extension -1 Neg 18 - neg 5*    * = knee pain      Manual Muscle Testing - Hip Left Right   Flexion 4/5 4-/5   Extension 4+/5 3/5   Abduction 4/5 3/5   External Rotation          Manual Muscle Testing - Knee Left Right   Flexion 5/5 4/5   Extension 5/5 4/5      Manual Muscle Testing - Ankle Left Right   Doriflexion 5/5 4/5   Plantarflexion 5/5 4/5   EHL          TU/17: 13 65 sec with RW

## 2023-03-28 ENCOUNTER — OFFICE VISIT (OUTPATIENT)
Dept: OBGYN CLINIC | Facility: CLINIC | Age: 68
End: 2023-03-28

## 2023-03-28 VITALS
HEIGHT: 63 IN | HEART RATE: 84 BPM | BODY MASS INDEX: 23.57 KG/M2 | DIASTOLIC BLOOD PRESSURE: 76 MMHG | SYSTOLIC BLOOD PRESSURE: 135 MMHG | WEIGHT: 133 LBS

## 2023-03-28 DIAGNOSIS — Z96.651 S/P TOTAL KNEE REPLACEMENT, RIGHT: Primary | ICD-10-CM

## 2023-03-28 NOTE — PROGRESS NOTES
Assessment:  1  S/P total knee replacement, right            Plan:    Status post right total knee replacement from 2/8/2023 overall doing well  Patient is only lacking 2-3 degree's from full extension  Continue physical therapy and HEP to get last bit of extension and continued strengthening  Patient is allowed to drive and take baths at this time  See patient back in 6 weeks with imaging  Call for anti biotics prior to dental procedures  To do next visit:  Return in about 6 weeks (around 5/9/2023) for Recheck  The above stated was discussed in layman's terms and the patient expressed understanding  All questions were answered to the patient's satisfaction  Patient is doing quite well from a replaced right total knee  Strength and motion intact  No instability  Continue home exercise program   Follow-up in 6 weeks evaluation with new x-rays of right knee- 3 views  If her condition changes, she would not hesitate to let us know      Scribe Attestation    I,:  Stanislav Robles am acting as a scribe while in the presence of the attending physician :       I,:  Domo Garcia, DO personally performed the services described in this documentation    as scribed in my presence :             Subjective:   Ravi Jordan is a 74 y o  female who presents status post right total knee replacement from 2/8/2023  She states pain is well controlled  She does have stiffness in morning and is working on last bit of extension in physical therapy and at home  She states physical therapy is going well  She denies any instability  Requiring no pain medication         Review of systems negative unless otherwise specified in HPI    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    • Drug intolerance    • Dysfunction of right eustachian tube    • Dysfunctional uterine bleeding    • Edema    • Elevated d-dimer    • Exposure to hepatitis C    • Fracture of fibula    • Generalized anxiety disorder    • GERD (gastroesophageal reflux disease)    • Hyperlipidemia    • Mild cervical dysplasia    • Myalgia    • Myositis    • Palpitations    • PONV (postoperative nausea and vomiting)    • Pre-syncope    • Seasonal allergies    • Stress incontinence    • Thrombocytopenia ([address] )    • Urinary retention    • Urinary tract infection    • Uterine leiomyoma        Past Surgical History:   Procedure Laterality Date   • BLADDER SURGERY     • BREAST BIOPSY Right     US guided   • COLONOSCOPY     • COLPOSCOPY  01/06/1998   • DENTAL SURGERY     • ENDOMETRIAL BIOPSY  05/21/1993   • HYSTERECTOMY     • HYSTEROSCOPY      uterus- 1/22/98, 7/1/02 and 4/10/07   • CT ARTHRP KNE CONDYLE&PLATU MEDIAL&LAT COMPARTMENTS Right 2/8/2023    Procedure: ARTHROPLASTY KNEE TOTAL;  Surgeon: Geetha Melchor DO;  Location: [facility];  Service: Orthopedics   • TUBAL LIGATION     • US GUIDED BREAST BIOPSY RIGHT COMPLETE Right 08/08/2018       Family History   Problem Relation Age of Onset   • Stroke Mother         cerebral artery occlusion with cerebral infarction   • Coronary artery disease Mother    • Arrhythmia Mother         sinus   • Coronary artery disease Father    • Diabetes Father    • Aortic aneurysm Family    • Arthritis Family    • Diabetes Family    • Heart disease Family    • No Known Problems Daughter    • No Known Problems Maternal Grandmother    • No Known Problems Paternal Grandmother    • No Known Problems Daughter    • No Known Problems Maternal Aunt    • Ovarian cancer Maternal Aunt        Social History     Occupational History   • 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   • Occupation: housewife   Tobacco Use   • Smoking status: Every Day     Packs/day: 1 50     Years: 45 00     Pack years: 67 50     Types: Cigarettes   • Smokeless tobacco: Never   Vaping Use   • Vaping Use: Never used   Substance and Sexual Activity   • Alcohol use: Never   • Drug use: No   • Sexual activity: Not Currently         Current Outpatient Medications:   •  acetaminophen (TYLENOL) 500 mg tablet, Take 500 mg by mouth every 6 (six) hours as needed for mild pain, Disp: , Rfl:   •  alendronate (FOSAMAX) 70 mg tablet, take 1 tablet by mouth every 7 days, Disp: 8 tablet, Rfl: 1  •  apixaban (ELIQUIS) 5 mg, Take 1 tablet (5 mg total) by mouth 2 (two) times a day, Disp: 120 tablet, Rfl: 0  •  ascorbic acid (VITAMIN C) 500 MG tablet, Take 1 tablet (500 mg total) by mouth 2 (two) times a day (Patient taking differently: Take 1,000 mg by mouth 2 (two) times a day), Disp: 60 tablet, Rfl: 1  •  atorvastatin (LIPITOR) 20 mg tablet, Take 1 tablet (20 mg total) by mouth daily after dinner, Disp: 90 tablet, Rfl: 1  •  buPROPion (WELLBUTRIN XL) 150 mg 24 hr tablet, 150 mg every morning, Disp: , Rfl:   •  clonazePAM (KlonoPIN) 1 mg tablet, 3 (three) times a day, Disp: , Rfl:   •  folic acid (FOLVITE) 1 mg tablet, Take 1 tablet (1 mg total) by mouth daily, Disp: 30 tablet, Rfl: 1  •  ibuprofen (MOTRIN) 800 mg tablet, Take 1 tablet (800 mg total) by mouth every 6 (six) hours as needed for mild pain, Disp: 120 tablet, Rfl: 1  •  methocarbamol (ROBAXIN) 750 mg tablet, Take 750 mg by mouth every 6 (six) hours as needed, Disp: , Rfl:   •  Multiple Vitamins-Minerals (multivitamin with minerals) tablet, Take 1 tablet by mouth daily, Disp: 30 tablet, Rfl: 1  •  omeprazole (PriLOSEC) 40 MG capsule, Take 1 capsule (40 mg total) by mouth daily, Disp: 90 capsule, Rfl: 1  •  SODIUM FLUORIDE, DENTAL GEL, 1 1 % GEL, SF 5000 Plus 1 1 % dental cream, Disp: , Rfl:   •  tretinoin (RETIN-A) 0 1 % cream, Apply topically daily "at bedtime, Disp: 45 g, Rfl: 5  •  venlafaxine (EFFEXOR-XR) 150 mg 24 hr capsule, Take 1 capsule (150 mg total) by mouth daily, Disp: 90 capsule, Rfl: 1  •  ferrous sulfate 324 (65 Fe) mg, Take 1 tablet (324 mg total) by mouth 2 (two) times a day before meals, Disp: 60 tablet, Rfl: 1  •  gabapentin (Neurontin) 300 mg capsule, ONE po q day x 3 and then one po bid x3 and then one tid  (Patient not taking: Reported on 3/16/2023), Disp: 90 capsule, Rfl: 1  •  venlafaxine (EFFEXOR-XR) 75 mg 24 hr capsule, Take 1 capsule (75 mg total) by mouth daily (Patient not taking: Reported on 3/16/2023), Disp: 90 capsule, Rfl: 1    No Known Allergies         Vitals:    03/28/23 0933   BP: 135/76   Pulse: 84       Objective:  Physical exam  · General: Awake, Alert, Oriented  · Eyes: Pupils equal, round and reactive to light  · Heart: regular rate and rhythm  · Lungs: No audible wheezing  · Abdomen: soft                    Right Knee Exam     Tenderness   The patient is experiencing no tenderness  Range of Motion   Right knee extension: -2 extension    Flexion: 130     Tests   Varus: negative Valgus: negative    Other   Erythema: absent  Scars: present (well healed incision )  Sensation: normal  Pulse: present  Swelling: none  Effusion: no effusion present    Comments: Well tracking patella               Diagnostics, reviewed and taken today if performed as documented:    None performed     Procedures, if performed today:    Procedures    None performed      Portions of the record may have been created with voice recognition software  Occasional wrong word or \"sound a like\" substitutions may have occurred due to the inherent limitations of voice recognition software  Read the chart carefully and recognize, using context, where substitutions have occurred      "

## 2023-03-29 ENCOUNTER — APPOINTMENT (OUTPATIENT)
Dept: PHYSICAL THERAPY | Facility: CLINIC | Age: 68
End: 2023-03-29

## 2023-03-29 DIAGNOSIS — M17.11 PRIMARY OSTEOARTHRITIS OF RIGHT KNEE: Primary | ICD-10-CM

## 2023-03-29 NOTE — PROGRESS NOTES
"Daily Note     Today's date: 3/29/2023  Patient name: Lachelle Levy  : 1955  MRN: [id number]  Referring provider: eVrnon Dillard DO  Dx:   Encounter Diagnosis     ICD-10-CM    1  Primary osteoarthritis of right knee  M17 11                      Subjective: ***      Objective: See treatment diary below      Assessment: Tolerated treatment {Tolerated treatment :3213246183}  Patient {assessment:}      Plan: {PLAN:4890777429}        Re-eval Date:     Date 3/22 3/27 3/29     Visit Count 11 (10 post op) 12 13     FOTO        Pain In        Pain Out             Precautions none        Manuals 3/15  3/29     Knee ext/flex Knee flx/ext Patella  mobs , scar massage x 10 min  Knee flx/ext Patella  mobs , scar massage x 10 min  Knee flx and ext mobilization, 10 minutes                              Neuro Re-Ed         L LE step taps        Prone knee ext hangs  3 min  3# 3 min  3# #5, 5 mintues     Patient education w/ diagnoses, exercise progression  Static Balance FT/Foam  2x30\"    Tandem/firm  2x30\" ea dir  SL ground trial today?                              Ther Ex        Nustep  RCB L5, 10 min for knee ROM RCB L5, 10 min for knee ROM RCB, L5 for 10 minutes              Quad stretch        STS        LAQ DC HEP       Toe taps        Standing marches         R SLR        Standing SLR - hip abd/ext         Squats resume 3x10      Stairs Fwd/lat step ups/down  6\" lat  8\" fwd  15x ea  Fwd/lat step ups/down  6\" lat  8\" fwd  15x2 ea       HS and gastroc S         Standing TKE GTB 3 x 15 GTB 3 x 10 ecc step down trial for quad      Sidestepping Resume  RTB 2 rounds x 50 ft RTB 2 rounds x 50 ft      Monster walks   2x40ft continue w/ TB     Hamstring machine 3 x 10 w/ 22lbs 3 x 10 w/ 22lbs Increase weight      Quad machine 3 x 10 w/ 22lbs 3 x 10 w/ 22lbs Increase weight                                                      Ther Activity                        Gait Training                      " Modalities                          {There is no content from the last Daily Treatment Diary section  }

## 2023-04-03 ENCOUNTER — OFFICE VISIT (OUTPATIENT)
Dept: PHYSICAL THERAPY | Facility: CLINIC | Age: 68
End: 2023-04-03

## 2023-04-03 DIAGNOSIS — M17.11 PRIMARY OSTEOARTHRITIS OF RIGHT KNEE: Primary | ICD-10-CM

## 2023-04-03 NOTE — PROGRESS NOTES
"Daily Note     Today's date: 4/3/2023  Patient name: Clarissa Graham  : 1955  MRN: [id number]  Referring provider: Blank Portillo DO  Dx:   Encounter Diagnosis     ICD-10-CM    1  Primary osteoarthritis of right knee  M17 11                      Subjective: My back is hurting horrible, did too much over the weekend  R knee is also achy today, 1st time   Maybe from all the activ over the weekend        Objective: See treatment diary below      Assessment: Tolerated treatment well  Modified TE 2* elevate LBP  Noted decrease patella mobility  Pt indicating increase kneeling in yard/gardening over weekend, pt encourage to trial using stool vs kneeling on R TKR  Cont's to lack TKE R knee with pt reporting not self stretching  Review prolong low load static stretching  Patient would benefit from continued PT      Plan: Continue per plan of care         e-eval Date:     Date 3/22 3/27 4/3     Visit Count 11 (10 post op) 12 13     FOTO        Pain In   R knee 4/10  LBP 7-810     Pain Out   better          Precautions none        Manuals 3/15       Knee ext/flex Knee flx/ext Patella  mobs , scar massage x 10 min  Knee flx/ext Patella  mobs , scar massage x 10 min  Knee flx/ext Patella  mobs , scar massage x 10 min  Neuro Re-Ed         L LE step taps        Prone knee ext hangs  3 min  3# 3 min  3# 3 min  3#     Patient education w/ diagnoses, exercise progression           Static Balance FT/Foam  2x30\"    Tandem/firm  2x30\" ea dir                               Ther Ex        Nustep  RCB L5, 10 min for knee ROM RCB L5, 10 min for knee ROM Nustep  L2 10 min for knee ROM  W/ MH to LB   * no adverse affect post rx               Quad stretch        STS        LAQ DC HEP       Toe taps        Standing marches         R SLR        Standing SLR - hip abd/ext    Supine  3 way  3x10   Cues QS R LE       Squats resume 3x10 Held x1 visit     Stairs Fwd/lat step ups/down  6\" lat  8\" " "fwd  15x ea  Fwd/lat step ups/down  6\" lat  8\" fwd  15x2 ea  Fwd/lat step ups/down  6\" lat  6\" fwd  15x2 ea     HS and gastroc S         Bridges   Purple  3x10, 5\"   Cues AH     Standing TKE GTB 3 x 15 GTB 3 x 10 GTB 3 x 10, 5\"     Sidestepping Resume  RTB 2 rounds x 50 ft RTB 2 rounds x 50 ft resume     Monster walks   2x40ft resume     Hamstring machine 3 x 10 w/ 22lbs 3 x 10 w/ 22lbs 22# 3x10     Quad machine 3 x 10 w/ 22lbs 3 x 10 w/ 22lbs 22# 3x10                                                     Ther Activity                        Gait Training                        Modalities                            HEP 3/22/23  Rhomberg, SLS, Tandem stance EO/EC 3x30\" ea          "

## 2023-04-12 ENCOUNTER — APPOINTMENT (OUTPATIENT)
Dept: PHYSICAL THERAPY | Facility: CLINIC | Age: 68
End: 2023-04-12

## 2023-04-26 ENCOUNTER — APPOINTMENT (OUTPATIENT)
Dept: PHYSICAL THERAPY | Facility: CLINIC | Age: 68
End: 2023-04-26

## 2023-04-26 NOTE — PROGRESS NOTES
"Daily Note     Today's date: 2023  Patient name: Jen Perry  : 1955  MRN: [id number]  Referring provider: Yobani Menendez DO  Dx: No diagnosis found  Subjective: ***      Objective: See treatment diary below      Assessment: Tolerated treatment {Tolerated treatment :}  Patient {assessment:3906804541}      Plan: {PLAN:5503797559}     e-eval Date:     Date        Visit Count 16       FOTO        Pain In        Pain Out             Precautions none        Manuals        Knee ext/flex                                Neuro Re-Ed         L LE step taps        Prone knee ext hangs  Seated prolong static knee ext stretch 5 min         Patient education w/ diagnoses, exercise progression           Static Balance FT/Foam, 3 x 30 sec   EC    Tandem  Firm/foam 2x30\" ea EO                                   Ther Ex        Nustep  RCB L3, 10 min`               Quad stretch        STS        LAQ        Toe taps        Standing marches         R SLR        Standing SLR - hip abd/ext         Squats STS w/ chair and BTB around knees 3 x 10  1  10lbs  2  10lbs  3  10lbs       Stairs Fwd/lat step ups/down  6\" lat 15x 2 UE  8\" fwd 2x10 0 UE       HS and gastroc S         Bridges        Standing TKE Green 20x 5\"    F/b kick outs  Green TB  15x 5\"  cues       Sidestepping  Purple  2x 40'       Monster walks  Purple  2x 40'       Hamstring machine 33# 3x10 w/ eccentric focus       Quad machine 22# 3x10                                                       Ther Activity                        Gait Training                        Modalities                            HEP 3/22/23  Rhomberg, SLS, Tandem stance EO/EC 3x30\" ea                    "

## 2023-05-01 ENCOUNTER — OFFICE VISIT (OUTPATIENT)
Dept: PULMONOLOGY | Facility: CLINIC | Age: 68
End: 2023-05-01

## 2023-05-01 VITALS
HEART RATE: 79 BPM | WEIGHT: 135.2 LBS | TEMPERATURE: 97.8 F | DIASTOLIC BLOOD PRESSURE: 66 MMHG | RESPIRATION RATE: 18 BRPM | BODY MASS INDEX: 23.96 KG/M2 | HEIGHT: 63 IN | SYSTOLIC BLOOD PRESSURE: 112 MMHG | OXYGEN SATURATION: 96 %

## 2023-05-01 DIAGNOSIS — J43.9 PULMONARY EMPHYSEMA, UNSPECIFIED EMPHYSEMA TYPE (HCC): Primary | ICD-10-CM

## 2023-05-01 DIAGNOSIS — Z72.0 TOBACCO ABUSE: ICD-10-CM

## 2023-05-01 DIAGNOSIS — Z86.711 HISTORY OF PULMONARY EMBOLISM: ICD-10-CM

## 2023-05-01 RX ORDER — ALBUTEROL SULFATE 90 UG/1
2 AEROSOL, METERED RESPIRATORY (INHALATION) EVERY 6 HOURS PRN
Qty: 18 G | Refills: 1 | Status: SHIPPED | OUTPATIENT
Start: 2023-05-01

## 2023-05-01 NOTE — ASSESSMENT & PLAN NOTE
Likely provoked  Occurred post-op s/p right total knee replacement  Positive DVT LLE  ECHO with no RV strain  She remains asymptomatic  Continue Eliquis 5 mg BID for completion of 3 months

## 2023-05-01 NOTE — PATIENT INSTRUCTIONS
Get PFTs  Continue Eliquis  Use Albuterol inhaler every 6 hours as needed for shortness of breath or wheezing

## 2023-05-01 NOTE — PROGRESS NOTES
Pulmonary Follow Up Note  Anna Dominguez 74 y o  female MRN: [id number]  5/1/2023    Assessment:    Pulmonary emphysema (HCC)  Moderate/severe emphysema on recent CT chest  Current tobacco user with 75 pack year history  No formal COPD diagnosis/testing  I have ordered patient to complete PFTs  Symptoms remain stable  Not requiring PRN albuterol inhaler  Not on daily maintenance therapy and not interested in starting  Continue PRN albuterol inhaler  Can further discuss inhaler therapy after PFTs and/or if symptoms return/worsen  Tobacco abuse  Continues to smoke 1 ppd  Counseled patient on importance of smoking cessation to improve her health  I offered Chantix and nicotine replacements, however, patient declined at this time  Will discuss with patient again at future visits  Qualifies for yearly CT lung screening  Next due 2/2024  Can be ordered at her future visit or by PCP  History of pulmonary embolism  Likely provoked  Occurred post-op s/p right total knee replacement  Positive DVT LLE  ECHO with no RV strain  She remains asymptomatic  Continue Eliquis 5 mg BID for completion of 3 months  Plan:    Diagnoses and all orders for this visit:    Pulmonary emphysema, unspecified emphysema type ([address] )  -     Complete PFT with post bronchodilator; Future  -     albuterol (Ventolin HFA) 90 mcg/act inhaler; Inhale 2 puffs every 6 (six) hours as needed for wheezing or shortness of breath    Tobacco abuse    History of pulmonary embolism        Education provided at this visit:   Need for Vaccination: Recommend pneumococcal vaccine  She declines it today  Might go to [employer] or her PCP  Pulmonary Rehab: not needed   Smoking Cessation: discussed smoking cessation  She is not interested in trying to quit yet  Declined nicotine replacement or pharmacotherapies  Will discuss again at future visit  Lung Cancer Screening: Eligible for yearly CT lung screening   Since she had recent CT chest in hospital, she would be due in 2/2024   Inhaler Use: Reviewed use of PRN albuterol inhaler therapy   Return in about 3 months (around 8/1/2023)  All of Robyn's questions were answered prior to leaving the office today  She will follow-up in 3 months or sooner should the need arise  She is aware to call our office with any further questions or concerns  History of Present Illness     Chief Complaint: No chief complaint on file  Patient ID: Edgardo Fragoso is a 74 y o  y o  female has a past medical history of Abnormal finding in urine, Anxiety, Arthralgia of multiple joints, Balance disorder, Biceps tendinitis, right, Bursitis of right knee, Bursitis of right shoulder, Chondrocalcinosis, Chronic bilateral low back pain without sciatica (11/29/2022), Chronic pain disorder, COPD exacerbation ([address] ), 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, Drug intolerance, Dysfunction of right eustachian tube, Dysfunctional uterine bleeding, Edema, Elevated d-dimer, Exposure to hepatitis C, Fracture of fibula, Generalized anxiety disorder, GERD (gastroesophageal reflux disease), Hyperlipidemia, Mild cervical dysplasia, Myalgia, Myositis, Palpitations, PONV (postoperative nausea and vomiting), Pre-syncope, Seasonal allergies, Stress incontinence, Thrombocytopenia ([address] ), Urinary retention, Urinary tract infection, and Uterine leiomyoma  5/1/2023  HPI: Jose Guadalupe Newman is a 74 y o  female who presents to the office today for a hospital follow up visit  On 2/8 she had an elective right total knee replacement  Post-Op she was noted to be hypoxic, confused, with fevers  CTA chest showed acute PE which was likely provoked with LE duplex positive for DVT in left lower extremity  Pulmonary was consulted while in the hospital  Also on her CT chest were findings of moderate/severe emphysema  She has a 2 ppd smoking history  She had no prior formal diagnosis or workup for COPD prior to her hospitalization       Since discharge, patient reports feeling back to her baseline  Denies SOB, wheezing, chest pain or tightness, LE pain or swelling  She has occasional cough that is worse in the morning  Sometimes productive of clear sputum  She continues to take Eliquis, which she will be on for 3 months to complete therapy for provoked PE  Was recommended by Pulmonary to be discharged triple inhaler therapy, however, patient reports she was not discharged on any inhaler therapy  She feels as if she does not need inhalers either  Prior to hospitalization, she denies any symptoms of shortness of breath or wheezing  She does not feel she needs to use rescue inhaler  Review of Systems   Constitutional: Negative for activity change, chills, diaphoresis, fever and unexpected weight change  HENT: Negative for congestion, postnasal drip, rhinorrhea, sore throat, trouble swallowing and voice change  Respiratory: Negative for cough, chest tightness, shortness of breath and wheezing  Cardiovascular: Negative for chest pain, palpitations and leg swelling  Allergic/Immunologic: Negative          Historical Information   Past Medical History:   Diagnosis Date    Abnormal finding in urine     Anxiety     Arthralgia of multiple joints     Balance disorder     Biceps tendinitis, right     Bursitis of right knee     Bursitis of right shoulder     Chondrocalcinosis     Chronic bilateral low back pain without sciatica 11/29/2022    Chronic pain disorder     COPD exacerbation (HCC)     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     Drug intolerance     Dysfunction of right eustachian tube     Dysfunctional uterine bleeding     Edema     Elevated d-dimer     Exposure to hepatitis C     Fracture of fibula     Generalized anxiety disorder     GERD (gastroesophageal reflux disease)     Hyperlipidemia     Mild cervical dysplasia     Myalgia     Myositis     Palpitations     PONV (postoperative nausea and vomiting)     Pre-syncope     Seasonal allergies     Stress incontinence     Thrombocytopenia (HCC)     Urinary retention     Urinary tract infection     Uterine leiomyoma      Past Surgical History:   Procedure Laterality Date    BLADDER SURGERY      BREAST BIOPSY Right     US guided    COLONOSCOPY      COLPOSCOPY  01/06/1998    DENTAL SURGERY      ENDOMETRIAL BIOPSY  05/21/1993    HYSTERECTOMY      HYSTEROSCOPY      uterus- 1/22/98, 7/1/02 and 4/10/07    [place] ARTHRP KNE CONDYLE&PLATU MEDIAL&LAT COMPARTMENTS Right 2/8/2023    Procedure: ARTHROPLASTY KNEE TOTAL;  Surgeon: Percy Ryan DO;  Location: [facility];  Service: Orthopedics    TUBAL LIGATION      US GUIDED BREAST BIOPSY RIGHT COMPLETE Right 08/08/2018     Family History   Problem Relation Age of Onset    Stroke Mother         cerebral artery occlusion with cerebral infarction    Coronary artery disease Mother     Arrhythmia Mother         sinus    Coronary artery disease Father     Diabetes Father     Aortic aneurysm Family     Arthritis Family     Diabetes Family     Heart disease Family     No Known Problems Daughter     No Known Problems Maternal Grandmother     No Known Problems Paternal Grandmother     No Known Problems Daughter     No Known Problems Maternal Aunt     Ovarian cancer Maternal Aunt        Smoking history: She reports that she has been smoking cigarettes  She started smoking about 51 years ago  She has a 75 00 pack-year smoking history   She has never used smokeless tobacco       Immunization History   Administered Date(s) Administered    COVID-19 MODERNA VACC 0 5 ML IM 02/16/2021, 03/16/2021, 12/06/2021    INFLUENZA 10/21/2015, 10/17/2016, 10/23/2017, 10/26/2018, 10/09/2020, 11/17/2021, 11/02/2022    Influenza Injectable, MDCK, Preservative Free, Quadrivalent, 0 5 mL 10/09/2020    Influenza Quadrivalent Preservative Free 3 years and older IM 10/21/2015, 10/17/2016, 10/23/2017    Influenza, recombinant, quadrivalent,injectable, preservative free 10/26/2018, 10/11/2019, 11/02/2022    Influenza, seasonal, injectable 10/23/2013, 10/28/2014    Tdap 05/16/2013, 08/21/2014       Meds/Allergies     Current Outpatient Medications:     acetaminophen (TYLENOL) 500 mg tablet, Take 500 mg by mouth every 6 (six) hours as needed for mild pain, Disp: , Rfl:     albuterol (Ventolin HFA) 90 mcg/act inhaler, Inhale 2 puffs every 6 (six) hours as needed for wheezing or shortness of breath, Disp: 18 g, Rfl: 1    alendronate (FOSAMAX) 70 mg tablet, take 1 tablet by mouth every 7 days, Disp: 8 tablet, Rfl: 1    apixaban (ELIQUIS) 5 mg, Take 1 tablet (5 mg total) by mouth 2 (two) times a day, Disp: 120 tablet, Rfl: 0    ascorbic acid (VITAMIN C) 500 MG tablet, Take 1 tablet (500 mg total) by mouth 2 (two) times a day (Patient taking differently: Take 1,000 mg by mouth 2 (two) times a day), Disp: 60 tablet, Rfl: 1    atorvastatin (LIPITOR) 20 mg tablet, Take 1 tablet (20 mg total) by mouth daily after dinner, Disp: 90 tablet, Rfl: 1    buPROPion (WELLBUTRIN XL) 150 mg 24 hr tablet, 150 mg every morning, Disp: , Rfl:     clonazePAM (KlonoPIN) 1 mg tablet, 3 (three) times a day, Disp: , Rfl:     gabapentin (Neurontin) 300 mg capsule, ONE po q day x 3 and then one po bid x3 and then one tid , Disp: 90 capsule, Rfl: 1    Multiple Vitamins-Minerals (multivitamin with minerals) tablet, Take 1 tablet by mouth daily, Disp: 30 tablet, Rfl: 1    omeprazole (PriLOSEC) 40 MG capsule, take 1 capsule by mouth once daily, Disp: 90 capsule, Rfl: 1    tretinoin (RETIN-A) 0 1 % cream, Apply topically daily at bedtime, Disp: 45 g, Rfl: 5    venlafaxine (EFFEXOR-XR) 150 mg 24 hr capsule, Take 1 capsule (150 mg total) by mouth daily, Disp: 90 capsule, Rfl: 1    ferrous sulfate 324 (65 Fe) mg, Take 1 tablet (324 mg total) by mouth 2 (two) times a day before meals (Patient not taking: Reported on 5/1/2023), Disp: 60 tablet, Rfl: "1    folic acid (FOLVITE) 1 mg tablet, Take 1 tablet (1 mg total) by mouth daily (Patient not taking: Reported on 5/1/2023), Disp: 30 tablet, Rfl: 1    ibuprofen (MOTRIN) 800 mg tablet, Take 1 tablet (800 mg total) by mouth every 6 (six) hours as needed for mild pain (Patient not taking: Reported on 5/1/2023), Disp: 120 tablet, Rfl: 1    methocarbamol (ROBAXIN) 750 mg tablet, Take 750 mg by mouth every 6 (six) hours as needed (Patient not taking: Reported on 5/1/2023), Disp: , Rfl:     SODIUM FLUORIDE, DENTAL GEL, 1 1 % GEL, SF 5000 Plus 1 1 % dental cream (Patient not taking: Reported on 5/1/2023), Disp: , Rfl:     venlafaxine (EFFEXOR-XR) 75 mg 24 hr capsule, Take 1 capsule (75 mg total) by mouth daily (Patient not taking: Reported on 3/16/2023), Disp: 90 capsule, Rfl: 1  Allergies: No Known Allergies      Vitals:  Vitals:    05/01/23 1013 05/01/23 1015   BP: 112/66    BP Location: Left arm    Patient Position: Sitting    Cuff Size: Standard    Pulse: 79    Resp: 18    Temp: 97 8 °F (36 6 °C)    TempSrc: Temporal    SpO2: 96% 96%   Weight: 61 3 kg (135 lb 3 2 oz)    Height: 5' 3\" (1 6 m)    Oxygen Therapy  SpO2: 96 %  Oxygen Therapy: None (Room air)    Wt Readings from Last 3 Encounters:   05/01/23 61 3 kg (135 lb 3 2 oz)   03/28/23 60 3 kg (133 lb)   03/16/23 59 9 kg (132 lb)     Body mass index is 23 95 kg/m²  Physical Exam  Vitals and nursing note reviewed  Constitutional:       General: She is not in acute distress  Appearance: Normal appearance  She is well-developed  HENT:      Head: Normocephalic and atraumatic  Cardiovascular:      Rate and Rhythm: Normal rate and regular rhythm  Heart sounds: Normal heart sounds, S1 normal and S2 normal  No murmur heard  Pulmonary:      Effort: Pulmonary effort is normal       Breath sounds: Normal breath sounds  No decreased breath sounds, wheezing, rhonchi or rales  Musculoskeletal:         General: No swelling  Right lower leg: No edema   " Left lower leg: No edema  Skin:     General: Skin is warm and dry  Capillary Refill: Capillary refill takes less than 2 seconds  Neurological:      General: No focal deficit present  Mental Status: She is alert  Psychiatric:         Mood and Affect: Mood and affect normal          Behavior: Behavior normal  Behavior is cooperative  Labs: I have personally reviewed pertinent lab results  Lab Results   Component Value Date    WBC 7 16 03/06/2023    HGB 10 3 (L) 03/06/2023    HCT 34 7 (L) 03/06/2023    MCV 99 (H) 03/06/2023     (H) 03/06/2023     Lab Results   Component Value Date    CALCIUM 9 2 03/06/2023    K 4 3 03/06/2023    CO2 25 03/06/2023     (H) 03/06/2023    BUN 14 03/06/2023    CREATININE 0 57 (L) 03/06/2023     No results found for: IGE  Lab Results   Component Value Date    ALT 26 03/06/2023    AST 21 03/06/2023    ALKPHOS 104 03/06/2023       Imaging and other studies: I have personally reviewed pertinent reports and I have personally reviewed pertinent films in PACS    XR chest portable 2/10/2023  Patchy consolidative airspace opacity in the right lung base consistent with atelectasis, better demonstrated on CT of one day earlier  Lungs are otherwise clear  No pneumothorax or pleural effusion  Hiatal hernia  CTA chest pe study: 2/9/2023  Small segmental and subsegmental emboli in the right upper lobe with no pulmonary infarct or right heart strain  ECHO 2/10/23  Left Ventricle: Left ventricular cavity size is normal  Wall thickness is normal  The left ventricular ejection fraction is 60%  Systolic function is normal  Wall motion is normal  Diastolic function is mildly abnormal, consistent with grade I (abnormal) relaxation  Right Ventricle: Right ventricular cavity size is normal  Systolic function is normal   Normal left atrium size  Normal right atrium size       Pulmonary function testing: None prior

## 2023-05-01 NOTE — ASSESSMENT & PLAN NOTE
Continues to smoke 1 ppd  Counseled patient on importance of smoking cessation to improve her health  I offered Chantix and nicotine replacements, however, patient declined at this time  Will discuss with patient again at future visits  Qualifies for yearly CT lung screening  Next due 2/2024  Can be ordered at her future visit or by PCP

## 2023-05-01 NOTE — ASSESSMENT & PLAN NOTE
Moderate/severe emphysema on recent CT chest  Current tobacco user with 75 pack year history  No formal COPD diagnosis/testing  I have ordered patient to complete PFTs  Symptoms remain stable  Not requiring PRN albuterol inhaler  Not on daily maintenance therapy and not interested in starting  Continue PRN albuterol inhaler  Can further discuss inhaler therapy after PFTs and/or if symptoms return/worsen

## 2023-05-09 ENCOUNTER — OFFICE VISIT (OUTPATIENT)
Dept: OBGYN CLINIC | Facility: CLINIC | Age: 68
End: 2023-05-09

## 2023-05-09 ENCOUNTER — APPOINTMENT (OUTPATIENT)
Dept: RADIOLOGY | Facility: CLINIC | Age: 68
End: 2023-05-09

## 2023-05-09 VITALS
BODY MASS INDEX: 23.92 KG/M2 | SYSTOLIC BLOOD PRESSURE: 126 MMHG | DIASTOLIC BLOOD PRESSURE: 79 MMHG | HEIGHT: 63 IN | HEART RATE: 71 BPM | WEIGHT: 135 LBS

## 2023-05-09 DIAGNOSIS — Z96.651 S/P TOTAL KNEE REPLACEMENT, RIGHT: ICD-10-CM

## 2023-05-09 DIAGNOSIS — Z96.651 S/P TOTAL KNEE REPLACEMENT, RIGHT: Primary | ICD-10-CM

## 2023-05-09 NOTE — PROGRESS NOTES
ASSESSMENT/PLAN:    Diagnoses and all orders for this visit:    S/P total knee replacement, right  -     XR knee 3 vw right non injury; Future        X-rays of the patient's right knee are consistent with a well-seated right total knee replacement  The prosthesis is in good alignment  There are no signs of loosening  There are no fractures or dislocations  Overall, the patient is continuing to do well since surgery  She may continue a home exercise program for strengthening, stretching and range of motion  She will follow-up with our office in 3 months with new x-rays of her right knee 3 views  The patient is acceptable to this plan  Return in about 3 months (around 8/9/2023)  The patient is doing quite well from a right total knee replacement  Strength and motion intact  Continue home exercise program   Follow-up 3 months evaluation with new x-rays of right knee-3 views  If her condition changes, she would not hesitate to let us know    _____________________________________________________  CHIEF COMPLAINT:  Chief Complaint   Patient presents with   • Right Knee - Follow-up         SUBJECTIVE:  Sudarshan Nieves is a 74 y o  female who presents to our office for a postop visit  The patient is status post right total knee replacement from 2/8/2023  She is very pleased with the results of surgery  She is ambulating without an antalgic gait or assist device  She denies any numbness or tingling  She denies any fever or chills      The following portions of the patient's history were reviewed and updated as appropriate: allergies, current medications, past family history, past medical history, past social history, past surgical history and problem list     PAST MEDICAL HISTORY:  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    • Drug intolerance    • Dysfunction of right eustachian tube    • Dysfunctional uterine bleeding    • Edema    • Elevated d-dimer    • Exposure to hepatitis C    • Fracture of fibula    • Generalized anxiety disorder    • GERD (gastroesophageal reflux disease)    • Hyperlipidemia    • Mild cervical dysplasia    • Myalgia    • Myositis    • Palpitations    • PONV (postoperative nausea and vomiting)    • Pre-syncope    • Seasonal allergies    • Stress incontinence    • Thrombocytopenia ([address] )    • Urinary retention    • Urinary tract infection    • Uterine leiomyoma        PAST SURGICAL HISTORY:  Past Surgical History:   Procedure Laterality Date   • BLADDER SURGERY     • BREAST BIOPSY Right     US guided   • COLONOSCOPY     • COLPOSCOPY  01/06/1998   • DENTAL SURGERY     • ENDOMETRIAL BIOPSY  05/21/1993   • HYSTERECTOMY     • HYSTEROSCOPY      uterus- 1/22/98, 7/1/02 and 4/10/07   • [place] ARTHRP KNE CONDYLE&PLATU MEDIAL&LAT COMPARTMENTS Right 2/8/2023    Procedure: ARTHROPLASTY KNEE TOTAL;  Surgeon: Jody Oconnell DO;  Location: [facility];  Service: Orthopedics   • TUBAL LIGATION     • US GUIDED BREAST BIOPSY RIGHT COMPLETE Right 08/08/2018       FAMILY HISTORY:  Family History   Problem Relation Age of Onset   • Stroke Mother         cerebral artery occlusion with cerebral infarction   • Coronary artery disease Mother    • Arrhythmia Mother         sinus   • Coronary artery disease Father    • Diabetes Father    • Aortic aneurysm Family    • Arthritis Family    • Diabetes Family    • Heart disease Family    • No Known Problems Daughter    • No Known Problems Maternal Grandmother    • No Known Problems Paternal Grandmother    • No Known Problems Daughter    • No Known Problems Maternal Aunt    • Ovarian cancer Maternal Aunt        SOCIAL HISTORY:  Social History     Tobacco Use   • Smoking status: Every Day     Packs/day: 1 50     Years: 50 00     Pack years: 75 00     Types: Cigarettes     Start date: 1972   • Smokeless tobacco: Never   Vaping Use   • Vaping Use: Never used   Substance Use Topics   • Alcohol use: Never   • Drug use: No       MEDICATIONS:    Current Outpatient Medications:   •  acetaminophen (TYLENOL) 500 mg tablet, Take 500 mg by mouth every 6 (six) hours as needed for mild pain, Disp: , Rfl:   •  albuterol (Ventolin HFA) 90 mcg/act inhaler, Inhale 2 puffs every 6 (six) hours as needed for wheezing or shortness of breath, Disp: 18 g, Rfl: 1  •  alendronate (FOSAMAX) 70 mg tablet, take 1 tablet by mouth every 7 days, Disp: 8 tablet, Rfl: 1  •  apixaban (ELIQUIS) 5 mg, Take 1 tablet (5 mg total) by mouth 2 (two) times a day, Disp: 120 tablet, Rfl: 0  •  atorvastatin (LIPITOR) 20 mg tablet, Take 1 tablet (20 mg total) by mouth daily after dinner, Disp: 90 tablet, Rfl: 1  •  buPROPion (WELLBUTRIN XL) 150 mg 24 hr tablet, 150 mg every morning, Disp: , Rfl:   •  clonazePAM (KlonoPIN) 1 mg tablet, 3 (three) times a day, Disp: , Rfl:   •  omeprazole (PriLOSEC) 40 MG capsule, take 1 capsule by mouth once daily, Disp: 90 capsule, Rfl: 1  •  tretinoin (RETIN-A) 0 1 % cream, Apply topically daily at bedtime, Disp: 45 g, Rfl: 5  •  venlafaxine (EFFEXOR-XR) 150 mg 24 hr capsule, Take 1 capsule (150 mg total) by mouth daily, Disp: 90 capsule, Rfl: 1  •  ascorbic acid (VITAMIN C) 500 MG tablet, Take 1 tablet (500 mg total) by mouth 2 (two) times a day (Patient taking differently: Take 1,000 mg by mouth 2 (two) times a day), Disp: 60 tablet, Rfl: 1  •  ferrous sulfate 324 (65 Fe) mg, Take 1 tablet (324 mg total) by mouth 2 (two) times a day before meals (Patient not taking: Reported on 5/1/2023), Disp: 60 tablet, Rfl: 1  •  folic acid (FOLVITE) 1 mg tablet, Take 1 tablet (1 mg total) by mouth daily "(Patient not taking: Reported on 5/1/2023), Disp: 30 tablet, Rfl: 1  •  gabapentin (Neurontin) 300 mg capsule, ONE po q day x 3 and then one po bid x3 and then one tid , Disp: 90 capsule, Rfl: 1  •  ibuprofen (MOTRIN) 800 mg tablet, Take 1 tablet (800 mg total) by mouth every 6 (six) hours as needed for mild pain (Patient not taking: Reported on 5/1/2023), Disp: 120 tablet, Rfl: 1  •  methocarbamol (ROBAXIN) 750 mg tablet, Take 750 mg by mouth every 6 (six) hours as needed (Patient not taking: Reported on 5/1/2023), Disp: , Rfl:   •  Multiple Vitamins-Minerals (multivitamin with minerals) tablet, Take 1 tablet by mouth daily, Disp: 30 tablet, Rfl: 1  •  SODIUM FLUORIDE, DENTAL GEL, 1 1 % GEL, SF 5000 Plus 1 1 % dental cream (Patient not taking: Reported on 5/1/2023), Disp: , Rfl:   •  venlafaxine (EFFEXOR-XR) 75 mg 24 hr capsule, Take 1 capsule (75 mg total) by mouth daily (Patient not taking: Reported on 3/16/2023), Disp: 90 capsule, Rfl: 1    ALLERGIES:  No Known Allergies    ROS:  Review of Systems     Constitutional: Negative for fatigue, fever or loss of appetite  HENT: Negative  Respiratory: Negative for shortness of breath, dyspnea  Cardiovascular: Negative for chest pain/tightness  Gastrointestinal: Negative for abdominal pain, N/V  Endocrine: Negative for cold/heat intolerance, unexplained weight loss/gain  Genitourinary: Negative for flank pain, dysuria, hematuria  Musculoskeletal: Negative for arthralgia   Skin: Negative for rash  Neurological: Negative for numbness or tingling  Psychiatric/Behavioral: Negative for agitation  _____________________________________________________  PHYSICAL EXAMINATION:    Blood pressure 126/79, pulse 71, height 5' 3\" (1 6 m), weight 61 2 kg (135 lb)  Constitutional: Oriented to person, place, and time  Appears well-developed and well-nourished  No distress  HENT:   Head: Normocephalic     Eyes: Conjunctivae are normal  Right eye exhibits no " "discharge  Left eye exhibits no discharge  No scleral icterus  Cardiovascular: Normal rate  Pulmonary/Chest: Effort normal    Neurological: Alert and oriented to person, place, and time  Skin: Skin is warm and dry  No rash noted  Not diaphoretic  No erythema  No pallor  Psychiatric: Normal mood and affect  Behavior is normal  Judgment and thought content normal       MUSCULOSKELETAL EXAMINATION:   Physical Exam  Ortho Exam    Right lower extremity is neurovascularly intact  Toes are pink and mobile  Compartments are soft  Range of motion of the knee is from 0 to 120 degrees  Brisk cap refill  Incision is well-healed  Sensation intact  No ligament laxity  Objective:  BP Readings from Last 1 Encounters:   05/09/23 126/79      Wt Readings from Last 1 Encounters:   05/09/23 61 2 kg (135 lb)        BMI:   Estimated body mass index is 23 91 kg/m² as calculated from the following:    Height as of this encounter: 5' 3\" (1 6 m)  Weight as of this encounter: 61 2 kg (135 lb)  Scribe Attestation    I,:  Audrey Hills PA-C am acting as a scribe while in the presence of the attending physician :       I,:  Elsa Coon DO personally performed the services described in this documentation    as scribed in my presence  :         "

## 2023-05-11 ENCOUNTER — TELEPHONE (OUTPATIENT)
Dept: INTERNAL MEDICINE CLINIC | Facility: CLINIC | Age: 68
End: 2023-05-11

## 2023-05-11 DIAGNOSIS — L30.9 DERMATITIS: ICD-10-CM

## 2023-05-11 RX ORDER — TRETINOIN 1 MG/G
CREAM TOPICAL
Qty: 45 G | Refills: 5 | Status: SHIPPED | OUTPATIENT
Start: 2023-05-11

## 2023-06-04 DIAGNOSIS — F41.9 ANXIETY: ICD-10-CM

## 2023-06-04 RX ORDER — VENLAFAXINE HYDROCHLORIDE 150 MG/1
CAPSULE, EXTENDED RELEASE ORAL
Qty: 90 CAPSULE | Refills: 1 | Status: SHIPPED | OUTPATIENT
Start: 2023-06-04

## 2023-06-04 RX ORDER — VENLAFAXINE HYDROCHLORIDE 75 MG/1
CAPSULE, EXTENDED RELEASE ORAL
Qty: 90 CAPSULE | Refills: 1 | Status: SHIPPED | OUTPATIENT
Start: 2023-06-04

## 2023-07-06 ENCOUNTER — TELEPHONE (OUTPATIENT)
Dept: OBGYN CLINIC | Facility: HOSPITAL | Age: 68
End: 2023-07-06

## 2023-07-06 DIAGNOSIS — Z96.651 S/P TOTAL KNEE REPLACEMENT, RIGHT: Primary | ICD-10-CM

## 2023-07-06 RX ORDER — CEPHALEXIN 500 MG/1
2000 CAPSULE ORAL ONCE
Qty: 4 CAPSULE | Refills: 3 | Status: SHIPPED | OUTPATIENT
Start: 2023-07-06 | End: 2023-07-06

## 2023-07-06 NOTE — TELEPHONE ENCOUNTER
Caller: Patient    Doctor: Nanine Lundborg     Reason for call: patient calls in stating that she is going to be having dental work done. She has 4 bad teeth that are going to be pulled. She spoke with her dental office and they advised her that she needs some strong antibiotics for her procedure. Is it okay to have all 4 pulled at the same time or should they be done separately? Please advise.      Pharmacy: [facility] in [facility]    Call back#: [phone] or [phone]

## 2023-07-14 ENCOUNTER — OFFICE VISIT (OUTPATIENT)
Dept: OBGYN CLINIC | Facility: CLINIC | Age: 68
End: 2023-07-14
Payer: MEDICARE

## 2023-07-14 VITALS
WEIGHT: 135 LBS | HEART RATE: 80 BPM | SYSTOLIC BLOOD PRESSURE: 122 MMHG | HEIGHT: 63 IN | DIASTOLIC BLOOD PRESSURE: 74 MMHG | BODY MASS INDEX: 23.92 KG/M2

## 2023-07-14 DIAGNOSIS — M65.9 TENOSYNOVITIS OF THUMB: Primary | ICD-10-CM

## 2023-07-14 PROCEDURE — 99213 OFFICE O/P EST LOW 20 MIN: CPT | Performed by: ORTHOPAEDIC SURGERY

## 2023-07-14 PROCEDURE — 20600 DRAIN/INJ JOINT/BURSA W/O US: CPT | Performed by: ORTHOPAEDIC SURGERY

## 2023-07-14 RX ORDER — BETAMETHASONE SODIUM PHOSPHATE AND BETAMETHASONE ACETATE 3; 3 MG/ML; MG/ML
3 INJECTION, SUSPENSION INTRA-ARTICULAR; INTRALESIONAL; INTRAMUSCULAR; SOFT TISSUE
Status: COMPLETED | OUTPATIENT
Start: 2023-07-14 | End: 2023-07-14

## 2023-07-14 RX ORDER — BUPIVACAINE HYDROCHLORIDE 2.5 MG/ML
0.5 INJECTION, SOLUTION INFILTRATION; PERINEURAL
Status: COMPLETED | OUTPATIENT
Start: 2023-07-14 | End: 2023-07-14

## 2023-07-14 RX ADMIN — BETAMETHASONE SODIUM PHOSPHATE AND BETAMETHASONE ACETATE 3 MG: 3; 3 INJECTION, SUSPENSION INTRA-ARTICULAR; INTRALESIONAL; INTRAMUSCULAR; SOFT TISSUE at 10:00

## 2023-07-14 RX ADMIN — BUPIVACAINE HYDROCHLORIDE 0.5 ML: 2.5 INJECTION, SOLUTION INFILTRATION; PERINEURAL at 10:00

## 2023-07-14 NOTE — PROGRESS NOTES
ASSESSMENT/PLAN:    Diagnoses and all orders for this visit:    Tenosynovitis of thumb    Other orders  -     Small joint arthrocentesis        The patient has tenosynovitis of bilateral thumbs. Possible treatment options were discussed with the patient. She elected for corticosteroid injections. The A1 pulleys of both thumbs were injected with Celestone and Marcaine. She tolerated the injections quite well. She will follow-up with our office in 2 months. She is acceptable to this plan. Return in about 2 months (around 9/14/2023). The patient has flexor tenosynovitis of the A1 pulleys of her bilateral thumbs. Both structures were injected with Celestone and Marcaine. She tolerated procedures quite well. Return back on a regular scheduled appointment. If her condition changes, she would not hesitate to let us know    _____________________________________________________  CHIEF COMPLAINT:  Chief Complaint   Patient presents with   • Right Thumb - Pain   • Left Thumb - Pain         SUBJECTIVE:  Pankaj De La Cruz is a 74 y.o. female who presents with pain along both thumbs. Most of her pain is along the A1 pulleys. She denies any locking or triggering. She denies any numbness or tingling. She denies any fever or chills. She states her pain is worsened with prolonged activity.     The following portions of the patient's history were reviewed and updated as appropriate: allergies, current medications, past family history, past medical history, past social history, past surgical history and problem list.    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    • Drug intolerance    • Dysfunction of right eustachian tube    • Dysfunctional uterine bleeding    • Edema    • Elevated d-dimer    • Exposure to hepatitis C    • Fracture of fibula    • Generalized anxiety disorder    • GERD (gastroesophageal reflux disease)    • Hyperlipidemia    • Mild cervical dysplasia    • Myalgia    • Myositis    • Palpitations    • PONV (postoperative nausea and vomiting)    • Pre-syncope    • Seasonal allergies    • Stress incontinence    • Thrombocytopenia ([address])    • Urinary retention    • Urinary tract infection    • Uterine leiomyoma        PAST SURGICAL HISTORY:  Past Surgical History:   Procedure Laterality Date   • BLADDER SURGERY     • BREAST BIOPSY Right     US guided   • COLONOSCOPY     • COLPOSCOPY  01/06/1998   • DENTAL SURGERY     • ENDOMETRIAL BIOPSY  05/21/1993   • HYSTERECTOMY     • HYSTEROSCOPY      uterus- 1/22/98, 7/1/02 and 4/10/07   • [place] ARTHRP KNE CONDYLE&PLATU MEDIAL&LAT COMPARTMENTS Right 2/8/2023    Procedure: ARTHROPLASTY KNEE TOTAL;  Surgeon: Marito Ford DO;  Location: [facility];  Service: Orthopedics   • TUBAL LIGATION     • US GUIDED BREAST BIOPSY RIGHT COMPLETE Right 08/08/2018       FAMILY HISTORY:  Family History   Problem Relation Age of Onset   • Stroke Mother         cerebral artery occlusion with cerebral infarction   • Coronary artery disease Mother    • Arrhythmia Mother         sinus   • Coronary artery disease Father    • Diabetes Father    • Aortic aneurysm Family    • Arthritis Family    • Diabetes Family    • Heart disease Family    • No Known Problems Daughter    • No Known Problems Maternal Grandmother    • No Known Problems Paternal Grandmother    • No Known Problems Daughter    • No Known Problems Maternal Aunt    • Ovarian cancer Maternal Aunt        SOCIAL HISTORY:  Social History     Tobacco Use   • Smoking status: Every Day     Packs/day: 1.50     Years: 50.00     Total pack years: 75.00     Types: Cigarettes     Start date: 1972   • Smokeless tobacco: Never   Vaping Use   • Vaping Use: Never used   Substance Use Topics   • Alcohol use: Never   • Drug use: No       MEDICATIONS:    Current Outpatient Medications:   •  acetaminophen (TYLENOL) 500 mg tablet, Take 500 mg by mouth every 6 (six) hours as needed for mild pain, Disp: , Rfl:   •  albuterol (Ventolin HFA) 90 mcg/act inhaler, Inhale 2 puffs every 6 (six) hours as needed for wheezing or shortness of breath, Disp: 18 g, Rfl: 1  •  alendronate (FOSAMAX) 70 mg tablet, take 1 tablet by mouth every 7 days, Disp: 8 tablet, Rfl: 1  •  atorvastatin (LIPITOR) 20 mg tablet, Take 1 tablet (20 mg total) by mouth daily after dinner, Disp: 90 tablet, Rfl: 1  •  buPROPion (WELLBUTRIN XL) 150 mg 24 hr tablet, 150 mg every morning, Disp: , Rfl:   •  clonazePAM (KlonoPIN) 1 mg tablet, 3 (three) times a day, Disp: , Rfl:   •  gabapentin (Neurontin) 300 mg capsule, ONE po q day x 3 and then one po bid x3 and then one tid., Disp: 90 capsule, Rfl: 1  •  omeprazole (PriLOSEC) 40 MG capsule, take 1 capsule by mouth once daily, Disp: 90 capsule, Rfl: 1  •  tretinoin (RETIN-A) 0.1 % cream, Apply topically daily at bedtime, Disp: 45 g, Rfl: 5  •  venlafaxine (EFFEXOR-XR) 150 mg 24 hr capsule, take 1 capsule by mouth once daily, Disp: 90 capsule, Rfl: 1  •  venlafaxine (EFFEXOR-XR) 75 mg 24 hr capsule, take 1 capsule by mouth once daily, Disp: 90 capsule, Rfl: 1  •  apixaban (ELIQUIS) 5 mg, Take 1 tablet (5 mg total) by mouth 2 (two) times a day, Disp: 120 tablet, Rfl: 0  •  ascorbic acid (VITAMIN C) 500 MG tablet, Take 1 tablet (500 mg total) by mouth 2 (two) times a day (Patient taking differently: Take 1,000 mg by mouth 2 (two) times a day), Disp: 60 tablet, Rfl: 1  •  ferrous sulfate 324 (65 Fe) mg, Take 1 tablet (324 mg total) by mouth 2 (two) times a day before meals (Patient not taking: Reported on 5/1/2023), Disp: 60 tablet, Rfl: 1  •  folic acid (FOLVITE) 1 mg tablet, Take 1 tablet (1 mg total) by mouth daily (Patient not taking: Reported on 5/1/2023), Disp: 30 tablet, Rfl: 1  •  ibuprofen (MOTRIN) 800 mg tablet, Take 1 tablet (800 mg total) by mouth every 6 (six) hours as needed for mild pain (Patient not taking: Reported on 5/1/2023), Disp: 120 tablet, Rfl: 1  •  methocarbamol (ROBAXIN) 750 mg tablet, Take 750 mg by mouth every 6 (six) hours as needed (Patient not taking: Reported on 5/1/2023), Disp: , Rfl:   •  Multiple Vitamins-Minerals (multivitamin with minerals) tablet, Take 1 tablet by mouth daily, Disp: 30 tablet, Rfl: 1  •  SODIUM FLUORIDE, DENTAL GEL, 1.1 % GEL, SF 5000 Plus 1.1 % dental cream (Patient not taking: Reported on 5/1/2023), Disp: , Rfl:     ALLERGIES:  No Known Allergies    ROS:  Review of Systems     Constitutional: Negative for fatigue, fever or loss of appetite. HENT: Negative. Respiratory: Negative for shortness of breath, dyspnea. Cardiovascular: Negative for chest pain/tightness. Gastrointestinal: Negative for abdominal pain, N/V. Endocrine: Negative for cold/heat intolerance, unexplained weight loss/gain. Genitourinary: Negative for flank pain, dysuria, hematuria. Musculoskeletal: Positive for arthralgia   Skin: Negative for rash. Neurological: Negative for numbness or tingling  Psychiatric/Behavioral: Negative for agitation. _____________________________________________________  PHYSICAL EXAMINATION:    Blood pressure 122/74, pulse 80, height 5' 3" (1.6 m), weight 61.2 kg (135 lb). Constitutional: Oriented to person, place, and time. Appears well-developed and well-nourished. No distress. HENT:   Head: Normocephalic. Eyes: Conjunctivae are normal. Right eye exhibits no discharge. Left eye exhibits no discharge. No scleral icterus. Cardiovascular: Normal rate. Pulmonary/Chest: Effort normal.   Neurological: Alert and oriented to person, place, and time. Skin: Skin is warm and dry.  No rash noted. Not diaphoretic. No erythema. No pallor. Psychiatric: Normal mood and affect. Behavior is normal. Judgment and thought content normal.      MUSCULOSKELETAL EXAMINATION:   Physical Exam  Ortho Exam    Bilateral upper extremities are neurovascularly intact  Fingers are pink and mobile  Compartments are soft  Tenderness to palpation along A1 pulleys of both thumbs  Brisk cap refill  Sensation intact  No warmth or erythema  No ecchymosis  No significant edema  Objective:  BP Readings from Last 1 Encounters:   07/14/23 122/74      Wt Readings from Last 1 Encounters:   07/14/23 61.2 kg (135 lb)        BMI:   Estimated body mass index is 23.91 kg/m² as calculated from the following:    Height as of this encounter: 5' 3" (1.6 m). Weight as of this encounter: 61.2 kg (135 lb).       PROCEDURES PERFORMED:  Small joint arthrocentesis: bilateral thumb CMC  Universal Protocol:  Risks and benefits: risks, benefits and alternatives were discussed  Consent given by: patient  Site marked: the operative site was marked  Supporting Documentation  Indications: pain   Procedure Details  Location: thumb - bilateral thumb CMC  Preparation: Patient was prepped and draped in the usual sterile fashion  Needle size: 27 G  Ultrasound guidance: no  Approach: dorsal    Medications (Right): 0.5 mL bupivacaine 0.25 %; 3 mg betamethasone acetate-betamethasone sodium phosphate 6 (3-3) mg/mLMedications (Left): 0.5 mL bupivacaine 0.25 %; 3 mg betamethasone acetate-betamethasone sodium phosphate 6 (3-3) mg/mL   Patient tolerance: patient tolerated the procedure well with no immediate complications  Dressing:  Sterile dressing applied            Scribe Attestation    I,:  Shefali Szymanski PA-C am acting as a scribe while in the presence of the attending physician.:       I,:  Greg Ureña DO personally performed the services described in this documentation    as scribed in my presence.:

## 2023-08-22 ENCOUNTER — OFFICE VISIT (OUTPATIENT)
Dept: OBGYN CLINIC | Facility: CLINIC | Age: 68
End: 2023-08-22
Payer: MEDICARE

## 2023-08-22 ENCOUNTER — APPOINTMENT (OUTPATIENT)
Dept: RADIOLOGY | Facility: CLINIC | Age: 68
End: 2023-08-22
Payer: MEDICARE

## 2023-08-22 VITALS
HEIGHT: 63 IN | BODY MASS INDEX: 24.03 KG/M2 | DIASTOLIC BLOOD PRESSURE: 69 MMHG | SYSTOLIC BLOOD PRESSURE: 127 MMHG | HEART RATE: 85 BPM | WEIGHT: 135.6 LBS

## 2023-08-22 DIAGNOSIS — Z96.651 S/P TOTAL KNEE REPLACEMENT, RIGHT: ICD-10-CM

## 2023-08-22 DIAGNOSIS — M77.8 TENDINITIS OF FINGER OF RIGHT HAND: ICD-10-CM

## 2023-08-22 DIAGNOSIS — Z96.651 S/P TOTAL KNEE REPLACEMENT, RIGHT: Primary | ICD-10-CM

## 2023-08-22 PROCEDURE — 99213 OFFICE O/P EST LOW 20 MIN: CPT | Performed by: ORTHOPAEDIC SURGERY

## 2023-08-22 PROCEDURE — 73562 X-RAY EXAM OF KNEE 3: CPT

## 2023-08-22 PROCEDURE — 20550 NJX 1 TENDON SHEATH/LIGAMENT: CPT | Performed by: ORTHOPAEDIC SURGERY

## 2023-08-22 RX ORDER — BETAMETHASONE SODIUM PHOSPHATE AND BETAMETHASONE ACETATE 3; 3 MG/ML; MG/ML
3 INJECTION, SUSPENSION INTRA-ARTICULAR; INTRALESIONAL; INTRAMUSCULAR; SOFT TISSUE
Status: COMPLETED | OUTPATIENT
Start: 2023-08-22 | End: 2023-08-22

## 2023-08-22 RX ORDER — BUPIVACAINE HYDROCHLORIDE 2.5 MG/ML
0.5 INJECTION, SOLUTION INFILTRATION; PERINEURAL
Status: COMPLETED | OUTPATIENT
Start: 2023-08-22 | End: 2023-08-22

## 2023-08-22 RX ADMIN — BUPIVACAINE HYDROCHLORIDE 0.5 ML: 2.5 INJECTION, SOLUTION INFILTRATION; PERINEURAL at 08:45

## 2023-08-22 RX ADMIN — BETAMETHASONE SODIUM PHOSPHATE AND BETAMETHASONE ACETATE 3 MG: 3; 3 INJECTION, SUSPENSION INTRA-ARTICULAR; INTRALESIONAL; INTRAMUSCULAR; SOFT TISSUE at 08:45

## 2023-08-22 NOTE — PROGRESS NOTES
Assessment/Plan:   Diagnoses and all orders for this visit:    S/P total knee replacement, right  -     XR knee 3 vw right non injury; Future    Tendinitis of finger of right hand  -     Hand/upper extremity injection: R index finger    Reviewed today's physical exam findings and x-ray findings with patient at time of visit. X-rays and physical exam demonstrate a stable total knee prosthesis. She is progressing well postoperatively. She will be seen in 6 months for her 1st annual follow-up, at which time we will repeat x-rays of the right knee. Her physical exam of her right hand is consistent with tendinosis of the right index finger flexor tendon. Patient was offered, and accepted, betamethasone and Marcaine injection(s) to the right index finger flexor tendon for relief of pain and inflammation. Patient tolerated treatment(s) well. She will be seen in 6 months for re-evaluation and consideration for repeat injections as necessary. Patient expresses understanding and is in agreement with this treatment plan. Patient is doing quite well in regards to her right total knee replacement. There is full strength and full motion. No instability. There is a painless arc of motion. The patient is having flexor tenosynovitis along the base of her right second finger. This was injected with Celestone and Marcaine. She tolerated procedure quite well. Return back in 6 months with new x-rays of right knee-3 views    Subjective:   Patient ID: Marcia Paige  1955     HPI  Patient is a 74 y.o. female who presents for 6-month follow-up evaluation s/p right TKA performed 2/8/2023. States that she is progressing well postoperatively in regards to her knee and has no complaints today. She denies any new onset bruising, swelling, numbness, tingling, feelings of instability, or mechanical symptoms. Secondarily she reports pain in the right index finger with active gripping motions.  She describes the pain as burning/sharp and localized to the index finger.     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    • Drug intolerance    • Dysfunction of right eustachian tube    • Dysfunctional uterine bleeding    • Edema    • Elevated d-dimer    • Exposure to hepatitis C    • Fracture of fibula    • Generalized anxiety disorder    • GERD (gastroesophageal reflux disease)    • Hyperlipidemia    • Mild cervical dysplasia    • Myalgia    • Myositis    • Palpitations    • PONV (postoperative nausea and vomiting)    • Pre-syncope    • Seasonal allergies    • Stress incontinence    • Thrombocytopenia ([address])    • Urinary retention    • Urinary tract infection    • Uterine leiomyoma        Past Surgical History:   Procedure Laterality Date   • BLADDER SURGERY     • BREAST BIOPSY Right     US guided   • COLONOSCOPY     • COLPOSCOPY  01/06/1998   • DENTAL SURGERY     • ENDOMETRIAL BIOPSY  05/21/1993   • HYSTERECTOMY     • HYSTEROSCOPY      uterus- 1/22/98, 7/1/02 and 4/10/07   • [place] ARTHRP KNE CONDYLE&PLATU MEDIAL&LAT COMPARTMENTS Right 2/8/2023    Procedure: ARTHROPLASTY KNEE TOTAL;  Surgeon: Terence Small DO;  Location: [facility];  Service: Orthopedics   • TUBAL LIGATION     • US GUIDED BREAST BIOPSY RIGHT COMPLETE Right 08/08/2018       Family History   Problem Relation Age of Onset   • Stroke Mother         cerebral artery occlusion with cerebral infarction   • Coronary artery disease Mother    • Arrhythmia Mother         sinus   • Coronary artery disease Father    • Diabetes Father    • Aortic aneurysm Family    • Arthritis Family    • Diabetes Family    • Heart disease Family    • No Known Problems Daughter    • No Known Problems Maternal Grandmother    • No Known Problems Paternal Grandmother    • No Known Problems Daughter    • No Known Problems Maternal Aunt    • Ovarian cancer Maternal Aunt        Social History     Socioeconomic History   • Marital status: /Civil Union     Spouse name: None   • Number of children: None   • Years of education: None   • Highest education level: None   Occupational History   • 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   • Occupation: housewife   Tobacco Use   • Smoking status: Every Day     Packs/day: 1.50     Years: 50.00     Total pack years: 75.00     Types: Cigarettes     Start date: 1972   • Smokeless tobacco: Never   Vaping Use   • Vaping Use: Never used   Substance and Sexual Activity   • Alcohol use: Never   • Drug use: No   • Sexual activity: Not Currently   Other Topics Concern   • None   Social History Narrative    Caffeine use     Social Determinants of Health     Financial Resource Strain: Low Risk  (3/6/2023)    Overall Financial Resource Strain (CARDIA)    • Difficulty of Paying Living Expenses: Not hard at all   Food Insecurity: No Food Insecurity (2/9/2023)    Hunger Vital Sign    • Worried About Running Out of Food in the Last Year: Never true    • Ran Out of Food in the Last Year: Never true   Transportation Needs: No Transportation Needs (3/6/2023)    PRAPARE - Transportation    • Lack of Transportation (Medical): No    • Lack of Transportation (Non-Medical):  No   Physical Activity: Not on file   Stress: Not on file   Social Connections: Not on file   Intimate Partner Violence: Not on file   Housing Stability: Low Risk  (2/9/2023)    Housing Stability Vital Sign    • Unable to Pay for Housing in the Last Year: No    • Number of Places Lived in the Last Year: 1    • Unstable Housing in the Last Year: No         Current Outpatient Medications:   •  acetaminophen (TYLENOL) 500 mg tablet, Take 500 mg by mouth every 6 (six) hours as needed for mild pain, Disp: , Rfl:   •  albuterol (Ventolin HFA) 90 mcg/act inhaler, Inhale 2 puffs every 6 (six) hours as needed for wheezing or shortness of breath, Disp: 18 g, Rfl: 1  •  alendronate (FOSAMAX) 70 mg tablet, take 1 tablet by mouth every 7 days, Disp: 8 tablet, Rfl: 1  •  atorvastatin (LIPITOR) 20 mg tablet, Take 1 tablet (20 mg total) by mouth daily after dinner, Disp: 90 tablet, Rfl: 1  •  buPROPion (WELLBUTRIN XL) 150 mg 24 hr tablet, 150 mg every morning, Disp: , Rfl:   •  clonazePAM (KlonoPIN) 1 mg tablet, 3 (three) times a day, Disp: , Rfl:   •  gabapentin (Neurontin) 300 mg capsule, ONE po q day x 3 and then one po bid x3 and then one tid., Disp: 90 capsule, Rfl: 1  •  omeprazole (PriLOSEC) 40 MG capsule, take 1 capsule by mouth once daily, Disp: 90 capsule, Rfl: 1  •  tretinoin (RETIN-A) 0.1 % cream, Apply topically daily at bedtime, Disp: 45 g, Rfl: 5  •  venlafaxine (EFFEXOR-XR) 150 mg 24 hr capsule, take 1 capsule by mouth once daily, Disp: 90 capsule, Rfl: 1  •  venlafaxine (EFFEXOR-XR) 75 mg 24 hr capsule, take 1 capsule by mouth once daily, Disp: 90 capsule, Rfl: 1  •  apixaban (ELIQUIS) 5 mg, Take 1 tablet (5 mg total) by mouth 2 (two) times a day (Patient not taking: Reported on 8/22/2023), Disp: 120 tablet, Rfl: 0  •  ascorbic acid (VITAMIN C) 500 MG tablet, Take 1 tablet (500 mg total) by mouth 2 (two) times a day (Patient taking differently: Take 1,000 mg by mouth 2 (two) times a day), Disp: 60 tablet, Rfl: 1  •  ferrous sulfate 324 (65 Fe) mg, Take 1 tablet (324 mg total) by mouth 2 (two) times a day before meals (Patient not taking: Reported on 5/1/2023), Disp: 60 tablet, Rfl: 1  •  folic acid (FOLVITE) 1 mg tablet, Take 1 tablet (1 mg total) by mouth daily (Patient not taking: Reported on 5/1/2023), Disp: 30 tablet, Rfl: 1  •  ibuprofen (MOTRIN) 800 mg tablet, Take 1 tablet (800 mg total) by mouth every 6 (six) hours as needed for mild pain (Patient not taking: Reported on 5/1/2023), Disp: 120 tablet, Rfl: 1  •  methocarbamol (ROBAXIN) 750 mg tablet, Take 750 mg by mouth every 6 (six) hours as needed (Patient not taking: Reported on 5/1/2023), Disp: , Rfl:   •  Multiple Vitamins-Minerals (multivitamin with minerals) tablet, Take 1 tablet by mouth daily, Disp: 30 tablet, Rfl: 1  •  SODIUM FLUORIDE, DENTAL GEL, 1.1 % GEL, SF 5000 Plus 1.1 % dental cream (Patient not taking: Reported on 5/1/2023), Disp: , Rfl:     No Known Allergies    Review of Systems   Constitutional: Negative for chills, fever and unexpected weight change. HENT: Negative for hearing loss, nosebleeds and sore throat. Eyes: Negative for pain, redness and visual disturbance. Respiratory: Negative for cough, shortness of breath and wheezing. Cardiovascular: Negative for chest pain, palpitations and leg swelling. Gastrointestinal: Negative for abdominal pain, nausea and vomiting. Endocrine: Negative for polydipsia and polyuria. Genitourinary: Negative for dysuria and hematuria. Musculoskeletal:        As noted in HPI   Skin: Negative for rash and wound. Neurological: Negative for dizziness, numbness and headaches. Psychiatric/Behavioral: Negative for decreased concentration and suicidal ideas. The patient is not nervous/anxious.          Objective:  /69 (BP Location: Left arm, Patient Position: Sitting, Cuff Size: Standard)   Pulse 85   Ht 5' 3" (1.6 m)   Wt 61.5 kg (135 lb 9.6 oz)   BMI 24.02 kg/m²     Ortho Exam  Right knee -   Weight-bearing status: Full WBAT without assistive device  Incision(s): Well-healed  Skin is warm and dry with no signs of erythema, ecchymosis, or infection  No soft tissue swelling or effusion  ROM: 0-125  Strength: 5/5 MMT throughout  Knee is stable to varus and valgus stress  Knee is stable to anterior and posterior stress  Calf compartments are soft  - Yoon's sign  2+ TP and DP pulses with brisk capillary refill to the toes  Sural, saphenous, tibial, superficial and deep peroneal motor and sensory distributions intact  Sensation light touch intact distally    Right hand/index finger -   Patient presents with no obvious anatomical deformity  Skin is warm and dry to touch with no signs of erythema, ecchymosis, infection  No soft tissue swelling or effusion noted   Full FDS, FDP, extensor mechanisms are intact   No rotational deformity with composite finger flexion  TTP over index finger flexor tendon local to A1 pulley without palpable nodule  No reproducible triggering on exam  Demonstrates normal wrist, elbow, and shoulder motion  Forearm compartments are soft and supple  2+ distal radial pulse with brisk capillary refill to the fingers  Radial, median, and ulnar motor and sensory distributions intact  Sensation light touch intact distally    Physical Exam  Vitals and nursing note reviewed. Constitutional:       General: She is not in acute distress. Appearance: She is well-developed. HENT:      Head: Normocephalic and atraumatic. Eyes:      Conjunctiva/sclera: Conjunctivae normal.   Cardiovascular:      Rate and Rhythm: Normal rate. Pulmonary:      Effort: Pulmonary effort is normal.   Musculoskeletal:      Cervical back: Neck supple. Skin:     General: Skin is warm and dry. Capillary Refill: Capillary refill takes less than 2 seconds. Neurological:      Mental Status: She is alert and oriented to person, place, and time.    Psychiatric:         Mood and Affect: Mood normal.         Behavior: Behavior normal.          Diagnostic Test Review:  Attending Physician has personally reviewed pertinent imaging in PACS, impression is as follows:    Review of radiographic series taken 8/22/2023 of the right knee shows stable total knee prosthesis with maintained alignment and no signs of loosening. .    Hand/upper extremity injection: R index finger  Universal Protocol:  Consent: Verbal consent obtained.   Risks and benefits: risks, benefits and alternatives were discussed  Consent given by: patient  Timeout called at: 8/22/2023 9:25 AM.  Patient understanding: patient states understanding of the procedure being performed  Site marked: the operative site was marked  Patient identity confirmed: verbally with patient    Supporting Documentation  Indications: tendon swelling and pain   Procedure Details  Condition:tendonitis Site: R index finger   Needle size: 25 G  Ultrasound guidance: no  Approach: volar  Medications administered: 0.5 mL bupivacaine 0.25 %; 3 mg betamethasone acetate-betamethasone sodium phosphate 6 (3-3) mg/mL    Patient tolerance: patient tolerated the procedure well with no immediate complications  Dressing:  Sterile dressing applied            Scribe Attestation    I,:  Virgen Proctor am acting as a scribe while in the presence of the attending physician.:       I,:  Terence Small, DO personally performed the services described in this documentation    as scribed in my presence.:

## 2023-09-06 DIAGNOSIS — E78.2 MIXED HYPERLIPIDEMIA: ICD-10-CM

## 2023-09-06 RX ORDER — ATORVASTATIN CALCIUM 20 MG/1
20 TABLET, FILM COATED ORAL
Qty: 90 TABLET | Refills: 1 | Status: SHIPPED | OUTPATIENT
Start: 2023-09-06

## 2023-09-07 ENCOUNTER — TELEPHONE (OUTPATIENT)
Age: 68
End: 2023-09-07

## 2023-09-07 NOTE — TELEPHONE ENCOUNTER
Caller: Lorrie Jaramillo    Doctor: Noemi Yu    Reason for call: Retuning a call unsure what it was in regards to message very muffled, will wait to see if anyone calls back ,no messages in chart    Call back#: [phone]

## 2023-09-18 DIAGNOSIS — G89.4 CHRONIC PAIN SYNDROME: ICD-10-CM

## 2023-09-18 DIAGNOSIS — Z82.61 FAMILY HISTORY OF RHEUMATOID ARTHRITIS: ICD-10-CM

## 2023-09-18 DIAGNOSIS — M50.30 DDD (DEGENERATIVE DISC DISEASE), CERVICAL: ICD-10-CM

## 2023-09-18 DIAGNOSIS — M41.80 DEXTROSCOLIOSIS: ICD-10-CM

## 2023-09-18 DIAGNOSIS — M54.50 CHRONIC MIDLINE LOW BACK PAIN WITHOUT SCIATICA: ICD-10-CM

## 2023-09-18 DIAGNOSIS — M48.062 SPINAL STENOSIS OF LUMBAR REGION WITH NEUROGENIC CLAUDICATION: ICD-10-CM

## 2023-09-18 DIAGNOSIS — S40.811A ABRASION OF SKIN OF RIGHT UPPER ARM: ICD-10-CM

## 2023-09-18 DIAGNOSIS — M79.10 MYALGIA: ICD-10-CM

## 2023-09-18 DIAGNOSIS — G89.29 CHRONIC MIDLINE LOW BACK PAIN WITHOUT SCIATICA: ICD-10-CM

## 2023-09-18 DIAGNOSIS — M51.04 HNP (HERNIATED NUCLEUS PULPOSUS WITH MYELOPATHY), THORACIC: ICD-10-CM

## 2023-09-18 DIAGNOSIS — M25.50 ARTHRALGIA, UNSPECIFIED JOINT: ICD-10-CM

## 2023-09-18 RX ORDER — GABAPENTIN 300 MG/1
CAPSULE ORAL
Qty: 90 CAPSULE | Refills: 1 | Status: SHIPPED | OUTPATIENT
Start: 2023-09-18

## 2023-09-18 NOTE — TELEPHONE ENCOUNTER
patient needs refill on   gabapentin (Neurontin) 300 mg capsule please send to [facility] [address]

## 2023-10-10 ENCOUNTER — APPOINTMENT (OUTPATIENT)
Dept: RADIOLOGY | Facility: CLINIC | Age: 68
End: 2023-10-10
Payer: MEDICARE

## 2023-10-10 ENCOUNTER — CONSULT (OUTPATIENT)
Dept: PAIN MEDICINE | Facility: CLINIC | Age: 68
End: 2023-10-10
Payer: MEDICARE

## 2023-10-10 VITALS
HEART RATE: 74 BPM | BODY MASS INDEX: 23.88 KG/M2 | WEIGHT: 134.8 LBS | HEIGHT: 63 IN | SYSTOLIC BLOOD PRESSURE: 135 MMHG | DIASTOLIC BLOOD PRESSURE: 75 MMHG

## 2023-10-10 DIAGNOSIS — G89.4 CHRONIC PAIN SYNDROME: Primary | ICD-10-CM

## 2023-10-10 DIAGNOSIS — M79.10 MYALGIA: ICD-10-CM

## 2023-10-10 DIAGNOSIS — M54.42 CHRONIC LEFT-SIDED LOW BACK PAIN WITH LEFT-SIDED SCIATICA: ICD-10-CM

## 2023-10-10 DIAGNOSIS — M25.552 LEFT HIP PAIN: ICD-10-CM

## 2023-10-10 DIAGNOSIS — G89.29 CHRONIC LEFT-SIDED LOW BACK PAIN WITH LEFT-SIDED SCIATICA: ICD-10-CM

## 2023-10-10 DIAGNOSIS — M70.62 GREATER TROCHANTERIC BURSITIS OF LEFT HIP: ICD-10-CM

## 2023-10-10 PROCEDURE — 99204 OFFICE O/P NEW MOD 45 MIN: CPT | Performed by: STUDENT IN AN ORGANIZED HEALTH CARE EDUCATION/TRAINING PROGRAM

## 2023-10-10 PROCEDURE — 73502 X-RAY EXAM HIP UNI 2-3 VIEWS: CPT

## 2023-10-10 NOTE — PROGRESS NOTES
Assessment:  1. Chronic pain syndrome    2. Myalgia    3. Greater trochanteric bursitis of left hip    4. Chronic left-sided low back pain with left-sided sciatica    5. Left hip pain      Portions of the record may have been created with voice recognition software. Occasional wrong word or "sound a like" substitutions may have occurred due to the inherent limitations of voice recognition software. Read the chart carefully and recognize, using context, where substitutions have occurred. Contact me with any questions. Plan:  44-year-old female referred by PCP, here for initial evaluation of chronic left-sided low back and LLE pain. Patient reports symptom limited difficulty with ambulation. Denies new or progressive weakness, saddle anesthesia, bowel/bladder incontinence. Concordant symptoms elicited with palpation over left greater trochanteric bursa. Symptoms likely due to left greater trochanteric bursitis versus lumbar radiculitis. 1.  Follow-up for left greater trochanteric bursa injection. 2.  Obtain left hip x-ray for further evaluation. 3.  Recommend course of physical therapy. 4.  Obtain lumbar spine MRI as ordered by another provider. 5.  Follow-up in 1 month after injection. Complete risks and benefits including bleeding, infection, tissue reaction, nerve injury and allergic reaction were discussed. The approach was demonstrated using models and literature was provided. Verbal and written consent was obtained. History of Present Illness: The patient is a 74 y.o. female who presents for consultation in regards to Back Pain. Symptoms have been present for several years. Symptoms began without any precipitating injury or trauma. Pain is reported to be 5 on the numeric rating scale. Symptoms are felt nearly constantly and worst in the no typical pattern. Symptoms are characterized as shooting, sharp, and pressure-like.   Symptoms are associated with left leg weakness. Aggravating factors include lying down and walking. Relieving factors include relaxation. Review of Systems:    Review of Systems   Constitutional:  Positive for chills. Negative for fever and unexpected weight change. HENT:  Negative for trouble swallowing. Eyes:  Negative for visual disturbance. Respiratory:  Positive for cough. Negative for shortness of breath and wheezing. Cardiovascular:  Negative for chest pain and palpitations. Gastrointestinal:  Positive for abdominal pain and nausea. Negative for constipation and diarrhea. Endocrine: Positive for polydipsia. Negative for cold intolerance and heat intolerance. Genitourinary:  Negative for difficulty urinating and frequency. Musculoskeletal:  Positive for arthralgias. Negative for gait problem, joint swelling and myalgias. Joint swelling   Skin:  Negative for rash. Neurological:  Negative for dizziness, seizures, weakness and headaches. Hematological:  Does not bruise/bleed easily. Psychiatric/Behavioral:  Negative for dysphoric mood.          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     Drug intolerance     Dysfunction of right eustachian tube     Dysfunctional uterine bleeding     Edema     Elevated d-dimer     Exposure to hepatitis C     Fracture of fibula     Generalized anxiety disorder     GERD (gastroesophageal reflux disease)     Hyperlipidemia     Mild cervical dysplasia     Myalgia     Myositis     Palpitations     PONV (postoperative nausea and vomiting)     Pre-syncope     Seasonal allergies Stress incontinence     Thrombocytopenia ([address])     Urinary retention     Urinary tract infection     Uterine leiomyoma        Past Surgical History:   Procedure Laterality Date    BLADDER SURGERY      BREAST BIOPSY Right     US guided    COLONOSCOPY      COLPOSCOPY  01/06/1998    DENTAL SURGERY      ENDOMETRIAL BIOPSY  05/21/1993    HYSTERECTOMY      HYSTEROSCOPY      uterus- 1/22/98, 7/1/02 and 4/10/07    [place] ARTHRP KNE CONDYLE&PLATU MEDIAL&LAT COMPARTMENTS Right 2/8/2023    Procedure: ARTHROPLASTY KNEE TOTAL;  Surgeon: Apple Dasilva DO;  Location: [facility];  Service: Orthopedics    TUBAL LIGATION      US GUIDED BREAST BIOPSY RIGHT COMPLETE Right 08/08/2018       Family History   Problem Relation Age of Onset    Stroke Mother         cerebral artery occlusion with cerebral infarction    Coronary artery disease Mother     Arrhythmia Mother         sinus    Coronary artery disease Father     Diabetes Father     Aortic aneurysm Family     Arthritis Family     Diabetes Family     Heart disease Family     No Known Problems Daughter     No Known Problems Maternal Grandmother     No Known Problems Paternal Grandmother     No Known Problems Daughter     No Known Problems Maternal Aunt     Ovarian cancer Maternal Aunt        Social History     Occupational History    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[employer]    Occupation: housewife   Tobacco Use    Smoking status: Every Day     Packs/day: 1.50     Years: 50.00     Total pack years: 75.00     Types: Cigarettes     Start date: 1972    Smokeless tobacco: Never   Vaping Use    Vaping Use: Never used   Substance and Sexual Activity    Alcohol use: Never    Drug use: No    Sexual activity: Not Currently         Current Outpatient Medications:     acetaminophen (TYLENOL) 500 mg tablet, Take 500 mg by mouth every 6 (six) hours as needed for mild pain, Disp: , Rfl:     ascorbic acid (VITAMIN C) 500 MG tablet, Take 1 tablet (500 mg total) by mouth 2 (two) times a day (Patient taking differently: Take 1,000 mg by mouth 2 (two) times a day), Disp: 60 tablet, Rfl: 1    atorvastatin (LIPITOR) 20 mg tablet, Take 1 tablet (20 mg total) by mouth daily after dinner, Disp: 90 tablet, Rfl: 1    buPROPion (WELLBUTRIN XL) 150 mg 24 hr tablet, 150 mg every morning, Disp: , Rfl:     clonazePAM (KlonoPIN) 1 mg tablet, 3 (three) times a day, Disp: , Rfl:     gabapentin (Neurontin) 300 mg capsule, ONE po q day x 3 and then one po bid x3 and then one tid., Disp: 90 capsule, Rfl: 1    Multiple Vitamins-Minerals (multivitamin with minerals) tablet, Take 1 tablet by mouth daily, Disp: 30 tablet, Rfl: 1    omeprazole (PriLOSEC) 40 MG capsule, take 1 capsule by mouth once daily, Disp: 90 capsule, Rfl: 1    tretinoin (RETIN-A) 0.1 % cream, Apply topically daily at bedtime, Disp: 45 g, Rfl: 5    venlafaxine (EFFEXOR-XR) 150 mg 24 hr capsule, take 1 capsule by mouth once daily, Disp: 90 capsule, Rfl: 1    venlafaxine (EFFEXOR-XR) 75 mg 24 hr capsule, take 1 capsule by mouth once daily, Disp: 90 capsule, Rfl: 1    albuterol (Ventolin HFA) 90 mcg/act inhaler, Inhale 2 puffs every 6 (six) hours as needed for wheezing or shortness of breath (Patient not taking: Reported on 10/10/2023), Disp: 18 g, Rfl: 1    alendronate (FOSAMAX) 70 mg tablet, take 1 tablet by mouth every 7 days (Patient not taking: Reported on 10/10/2023), Disp: 8 tablet, Rfl: 1    No Known Allergies    Physical Exam:    /75   Pulse 74   Ht 5' 3" (1.6 m)   Wt 61.1 kg (134 lb 12.8 oz)   BMI 23.88 kg/m²     Constitutional: normal, well developed, well nourished, alert, in no distress and non-toxic and no overt pain behavior.   Eyes: anicteric  HEENT: grossly intact  Neck: supple, symmetric, trachea midline and no masses   Pulmonary:even and unlabored  Cardiovascular:No edema or pitting edema present  Skin:Normal without rashes or lesions and well hydrated  Psychiatric:Mood and affect appropriate  Neurologic:Cranial Nerves II-XII grossly intact  Musculoskeletal:normal gait, pain to palpation over left GTB, limited lumbar ROM in all directions, grossly intact strength and sensation to light touch over BLE    Imaging    No relevant imaging available for review.     Orders Placed This Encounter   Procedures    XR hip/pelv 2-3 vws left if performed    Ambulatory referral to Physical Therapy

## 2023-10-10 NOTE — PATIENT INSTRUCTIONS
Obtain left hip xray. Follow up for left greater trochanter bursa injection. Start physical therapy.

## 2023-10-18 ENCOUNTER — HOSPITAL ENCOUNTER (OUTPATIENT)
Dept: MRI IMAGING | Facility: HOSPITAL | Age: 68
Discharge: HOME | End: 2023-10-18
Payer: MEDICARE

## 2023-10-18 ENCOUNTER — OFFICE VISIT (OUTPATIENT)
Dept: INTERNAL MEDICINE CLINIC | Facility: CLINIC | Age: 68
End: 2023-10-18
Payer: MEDICARE

## 2023-10-18 VITALS
DIASTOLIC BLOOD PRESSURE: 60 MMHG | TEMPERATURE: 98.7 F | HEART RATE: 81 BPM | SYSTOLIC BLOOD PRESSURE: 110 MMHG | BODY MASS INDEX: 23.21 KG/M2 | HEIGHT: 63 IN | WEIGHT: 131 LBS | OXYGEN SATURATION: 98 %

## 2023-10-18 DIAGNOSIS — M41.80 DEXTROSCOLIOSIS: ICD-10-CM

## 2023-10-18 DIAGNOSIS — G89.4 CHRONIC PAIN SYNDROME: ICD-10-CM

## 2023-10-18 DIAGNOSIS — M47.892 OTHER SPONDYLOSIS, CERVICAL REGION: ICD-10-CM

## 2023-10-18 DIAGNOSIS — Z72.0 TOBACCO ABUSE: ICD-10-CM

## 2023-10-18 DIAGNOSIS — M79.10 MYALGIA: ICD-10-CM

## 2023-10-18 DIAGNOSIS — M25.50 ARTHRALGIA, UNSPECIFIED JOINT: ICD-10-CM

## 2023-10-18 DIAGNOSIS — J43.9 PULMONARY EMPHYSEMA, UNSPECIFIED EMPHYSEMA TYPE (HCC): Primary | ICD-10-CM

## 2023-10-18 DIAGNOSIS — G89.29 CHRONIC MIDLINE LOW BACK PAIN WITHOUT SCIATICA: ICD-10-CM

## 2023-10-18 DIAGNOSIS — Z12.31 ENCOUNTER FOR SCREENING MAMMOGRAM FOR MALIGNANT NEOPLASM OF BREAST: ICD-10-CM

## 2023-10-18 DIAGNOSIS — M54.50 CHRONIC MIDLINE LOW BACK PAIN WITHOUT SCIATICA: ICD-10-CM

## 2023-10-18 DIAGNOSIS — M50.30 DDD (DEGENERATIVE DISC DISEASE), CERVICAL: ICD-10-CM

## 2023-10-18 DIAGNOSIS — Z12.12 ENCOUNTER FOR SCREENING FOR COLORECTAL MALIGNANT NEOPLASM: ICD-10-CM

## 2023-10-18 DIAGNOSIS — N32.81 OAB (OVERACTIVE BLADDER): ICD-10-CM

## 2023-10-18 DIAGNOSIS — K59.00 CONSTIPATION, UNSPECIFIED CONSTIPATION TYPE: ICD-10-CM

## 2023-10-18 DIAGNOSIS — K21.9 GASTROESOPHAGEAL REFLUX DISEASE WITHOUT ESOPHAGITIS: ICD-10-CM

## 2023-10-18 DIAGNOSIS — F41.1 GENERALIZED ANXIETY DISORDER: ICD-10-CM

## 2023-10-18 DIAGNOSIS — E78.2 MIXED HYPERLIPIDEMIA: ICD-10-CM

## 2023-10-18 DIAGNOSIS — S40.811A ABRASION OF SKIN OF RIGHT UPPER ARM: ICD-10-CM

## 2023-10-18 DIAGNOSIS — M47.896 OTHER SPONDYLOSIS, LUMBAR REGION: ICD-10-CM

## 2023-10-18 DIAGNOSIS — Z12.11 ENCOUNTER FOR SCREENING FOR COLORECTAL MALIGNANT NEOPLASM: ICD-10-CM

## 2023-10-18 DIAGNOSIS — Z82.61 FAMILY HISTORY OF RHEUMATOID ARTHRITIS: ICD-10-CM

## 2023-10-18 DIAGNOSIS — M48.062 SPINAL STENOSIS OF LUMBAR REGION WITH NEUROGENIC CLAUDICATION: ICD-10-CM

## 2023-10-18 DIAGNOSIS — Z23 ENCOUNTER FOR VACCINATION: ICD-10-CM

## 2023-10-18 DIAGNOSIS — E28.39 MENOPAUSE OVARIAN FAILURE: ICD-10-CM

## 2023-10-18 DIAGNOSIS — M51.04 HNP (HERNIATED NUCLEUS PULPOSUS WITH MYELOPATHY), THORACIC: ICD-10-CM

## 2023-10-18 PROCEDURE — G0008 ADMIN INFLUENZA VIRUS VAC: HCPCS

## 2023-10-18 PROCEDURE — 72141 MRI NECK SPINE W/O DYE: CPT

## 2023-10-18 PROCEDURE — 72148 MRI LUMBAR SPINE W/O DYE: CPT

## 2023-10-18 PROCEDURE — 99214 OFFICE O/P EST MOD 30 MIN: CPT | Performed by: INTERNAL MEDICINE

## 2023-10-18 PROCEDURE — 90662 IIV NO PRSV INCREASED AG IM: CPT

## 2023-10-18 PROCEDURE — G1004 CDSM NDSC: HCPCS

## 2023-10-18 RX ORDER — GABAPENTIN 400 MG/1
400 CAPSULE ORAL 3 TIMES DAILY
Qty: 270 CAPSULE | Refills: 1 | Status: SHIPPED | OUTPATIENT
Start: 2023-10-18

## 2023-10-18 RX ORDER — SOLIFENACIN SUCCINATE 10 MG/1
10 TABLET, FILM COATED ORAL DAILY
Qty: 90 TABLET | Refills: 1 | Status: SHIPPED | OUTPATIENT
Start: 2023-10-18

## 2023-10-18 NOTE — PATIENT INSTRUCTIONS
Cigarette Smoking and Your Health   AMBULATORY CARE:   Risks to your health if you smoke:  Nicotine and other chemicals found in tobacco and e-cigarettes can damage every cell in your body. Even if you are a light smoker, you have an increased risk for cancer, heart disease, and lung disease. If you are pregnant or have diabetes, smoking increases your risk for complications. Nicotine can affect an adolescent's developing brain. This can lead to trouble thinking, learning, or paying attention. Benefits to your health if you stop smoking: You decrease respiratory symptoms such as coughing, wheezing, and shortness of breath. You reduce your risk for cancers of the lung, mouth, throat, kidney, bladder, pancreas, stomach, and cervix. If you already have cancer, you increase the benefits of chemotherapy. You also reduce your risk for cancer returning or a second cancer from developing. You reduce your risk for heart disease, blood clots, heart attack, and stroke. You reduce your risk for lung infections, and diseases such as pneumonia, asthma, chronic bronchitis, and emphysema. Your circulation improves. More oxygen can be delivered to your body. If you have diabetes, you lower your risk for complications, such as kidney, artery, and eye diseases. You also lower your risk for nerve damage. Nerve damage can lead to amputations, poor vision, and blindness. You improve your body's ability to heal and to fight infections. An adolescent can help his or her brain and body develop in a healthy way. Talk to your adolescent about all the health risks of nicotine. If you can, start talking about nicotine when your child is younger than 12 years. This may make it easier for him or her not to start using nicotine as a teenager or adult. Explain to him or her that it is best never to start. It can be hard to try to quit later.     Benefits to the health of others if you stop smoking:  Tobacco is harmful to nonsmokers who breathe in your secondhand smoke. The following are ways the health of others around you may improve when you stop smoking: You lower the risks for lung cancer, heart disease, and stroke in nonsmoking adults. If you are pregnant, you lower the risk for miscarriage, early delivery, low birth weight, and stillbirth. You also lower your baby's risk for SIDS, obesity, developmental delay, and neurobehavioral problems, such as ADHD. If you have children, you lower their risk for ear infections, colds, pneumonia, bronchitis, and asthma. Follow up with your doctor as directed:  Write down your questions so you remember to ask them during your visits. For support and more information:   [employer]  [address] [facility]  Phone: [address]  Phone: [phone]  Web Address: [place]    Smoke[employer]. gov  Phone: 1- 654 - 372-8310  Web Address: www.smokefree. gov  © Copyright [employer] 2023 Information is for End User's use only and may not be sold, redistributed or otherwise used for commercial purposes. The above information is an  only. It is not intended as medical advice for individual conditions or treatments. Talk to your doctor, nurse or pharmacist before following any medical regimen to see if it is safe and effective for you.

## 2023-10-18 NOTE — PROGRESS NOTES
Depression Screening and Follow-up Plan: Patient was screened for depression during today's encounter. They screened negative with a PHQ-2 score of 0.     Assessment/Plan:  Problem List Items Addressed This Visit        Digestive    Esophageal reflux       Respiratory    Pulmonary emphysema (HCC) - Primary       Other    Tobacco abuse    Hyperlipidemia    Relevant Orders    Comprehensive metabolic panel    LDL cholesterol, direct    Triglycerides    Generalized anxiety disorder    Relevant Orders    TSH, 3rd generation    Constipation    Chronic pain syndrome    Relevant Medications    gabapentin (NEURONTIN) 400 mg capsule   Other Visit Diagnoses     Encounter for screening for colorectal malignant neoplasm        Relevant Orders    Cologuard    Encounter for screening mammogram for malignant neoplasm of breast        Relevant Orders    Mammo screening bilateral w 3d & cad    Menopause ovarian failure        Relevant Orders    DXA bone density spine hip and pelvis    Encounter for vaccination        Relevant Orders    influenza vaccine, high-dose, PF 0.5 mL    OAB (overactive bladder)        Relevant Medications    solifenacin (VESICARE) 10 MG tablet    Myalgia        DDD (degenerative disc disease), cervical        Family history of rheumatoid arthritis        HNP (herniated nucleus pulposus with myelopathy), thoracic        Spinal stenosis of lumbar region with neurogenic claudication        Dextroscoliosis        Chronic midline low back pain without sciatica        Arthralgia, unspecified joint        Abrasion of skin of right upper arm               Diagnoses and all orders for this visit:    Pulmonary emphysema, unspecified emphysema type ([address])    Encounter for screening for colorectal malignant neoplasm  -     Cologuard    Encounter for screening mammogram for malignant neoplasm of breast  -     Mammo screening bilateral w 3d & cad; Future    Mixed hyperlipidemia  -     Comprehensive metabolic panel; Future  -     LDL cholesterol, direct; Future  -     Triglycerides; Future    Tobacco abuse    Generalized anxiety disorder  -     TSH, 3rd generation; Future    Chronic pain syndrome  -     gabapentin (NEURONTIN) 400 mg capsule; Take 1 capsule (400 mg total) by mouth 3 (three) times a day    Constipation, unspecified constipation type    Gastroesophageal reflux disease without esophagitis    Menopause ovarian failure  -     DXA bone density spine hip and pelvis; Future    Encounter for vaccination  -     influenza vaccine, high-dose, PF 0.5 mL    OAB (overactive bladder)  -     solifenacin (VESICARE) 10 MG tablet; Take 1 tablet (10 mg total) by mouth daily    Myalgia    DDD (degenerative disc disease), cervical    Family history of rheumatoid arthritis    HNP (herniated nucleus pulposus with myelopathy), thoracic    Spinal stenosis of lumbar region with neurogenic claudication    Dextroscoliosis    Chronic midline low back pain without sciatica    Arthralgia, unspecified joint    Abrasion of skin of right upper arm        No problem-specific Assessment & Plan notes found for this encounter. A/P: Doing ok and will check labs. BMI is normal and will continue current program. Order mammo, dexa, and CRC. Discussed vaccines will update her f/u vaccine. Wean tobacco. Will increase her dusty and consider SNRI while pt awaits new pain management. Suspect OAB and will try vesicare. Continue current treatment and RTC for weeks for f/u labs, RSV, OAB, and pain. Subjective:      Patient ID: Jose Pina is a 74 y.o. female. WF RTC For f/u COPD, GERD, etc. Doing ok and no new issues,but ROS positive for frequent urination, incontinence, and urgency. No dysuria. . Remains active w/o difficulty and no falls. Breathing is stable and limited rescue MDI use. Still smoking. Chronic pain is manageable,but still bad. Pt switching pain management specialists. [facility] ANAND is controlled.  Due for labs, vaccines, mammo, dexa, and CRC.         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 ,  does not have any pertinent problems on file. ,   has a past surgical history that includes Bladder surgery; Breast biopsy (Right); Colposcopy (01/06/1998); Dental surgery; Endometrial biopsy (05/21/1993); Hysteroscopy; Tubal ligation; US guided breast biopsy right complete (Right, 08/08/2018); Hysterectomy; Colonoscopy; and pr arthrp kne condyle&platu medial&lat compartments (Right, 2/8/2023). ,  family history includes Aortic aneurysm in her family; Arrhythmia in her mother; Arthritis in her family; Coronary artery disease in her father and mother; Diabetes in her family and father; Heart disease in her family; No Known Problems in her daughter, daughter, maternal aunt, maternal grandmother, and paternal grandmother; Ovarian cancer in her maternal aunt; Stroke in her mother. ,   reports that she has been smoking cigarettes. She started smoking about 51 years ago. She has a 75.00 pack-year smoking history. She has never been exposed to tobacco smoke.  She has never used smokeless tobacco. She reports that she does not drink alcohol and does not use drugs. ,  has No Known Allergies. .  Current Outpatient Medications   Medication Sig Dispense Refill   • acetaminophen (TYLENOL) 500 mg tablet Take 500 mg by mouth every 6 (six) hours as needed for mild pain     • albuterol (Ventolin HFA) 90 mcg/act inhaler Inhale 2 puffs every 6 (six) hours as needed for wheezing or shortness of breath 18 g 1   • alendronate (FOSAMAX) 70 mg tablet take 1 tablet by mouth every 7 days 8 tablet 1   • ascorbic acid (VITAMIN C) 500 MG tablet Take 1 tablet (500 mg total) by mouth 2 (two) times a day (Patient taking differently: Take 1,000 mg by mouth 2 (two) times a day) 60 tablet 1   • atorvastatin (LIPITOR) 20 mg tablet Take 1 tablet (20 mg total) by mouth daily after dinner 90 tablet 1   • buPROPion (WELLBUTRIN XL) 150 mg 24 hr tablet 150 mg every morning     • clonazePAM (KlonoPIN) 1 mg tablet 2 (two) times a day as needed     • gabapentin (NEURONTIN) 400 mg capsule Take 1 capsule (400 mg total) by mouth 3 (three) times a day 270 capsule 1   • Multiple Vitamins-Minerals (multivitamin with minerals) tablet Take 1 tablet by mouth daily 30 tablet 1   • omeprazole (PriLOSEC) 40 MG capsule take 1 capsule by mouth once daily 90 capsule 1   • solifenacin (VESICARE) 10 MG tablet Take 1 tablet (10 mg total) by mouth daily 90 tablet 1   • venlafaxine (EFFEXOR-XR) 150 mg 24 hr capsule take 1 capsule by mouth once daily 90 capsule 1   • venlafaxine (EFFEXOR-XR) 75 mg 24 hr capsule take 1 capsule by mouth once daily 90 capsule 1   • tretinoin (RETIN-A) 0.1 % cream Apply topically daily at bedtime 45 g 5     No current facility-administered medications for this visit. Review of Systems   Constitutional:  Negative for activity change, chills, diaphoresis, fatigue and fever. HENT: Negative. Eyes:  Negative for visual disturbance.    Respiratory:  Negative for cough, chest tightness, shortness of breath and wheezing. Cardiovascular:  Negative for chest pain, palpitations and leg swelling. Gastrointestinal:  Negative for abdominal pain, constipation, diarrhea, nausea and vomiting. Endocrine: Negative for cold intolerance and heat intolerance. Genitourinary:  Positive for urgency. Negative for difficulty urinating, dysuria, flank pain, frequency and hematuria. Musculoskeletal:  Positive for arthralgias and back pain. Negative for gait problem and myalgias. Neurological:  Negative for dizziness, seizures, syncope, weakness, light-headedness and headaches. Psychiatric/Behavioral:  Negative for confusion, dysphoric mood and sleep disturbance. The patient is not nervous/anxious. PHQ-2/9 Depression Screening    Little interest or pleasure in doing things: 0 - not at all  Feeling down, depressed, or hopeless: 0 - not at all  PHQ-2 Score: 0  PHQ-2 Interpretation: Negative depression screen        Objective:  Vitals:    10/18/23 1401   BP: 110/60   Pulse: 81   Temp: 98.7 °F (37.1 °C)   SpO2: 98%   Weight: 59.4 kg (131 lb)   Height: 5' 3" (1.6 m)     Body mass index is 23.21 kg/m². Physical Exam  Vitals and nursing note reviewed. Constitutional:       General: She is not in acute distress. Appearance: Normal appearance. She is not ill-appearing. HENT:      Head: Normocephalic and atraumatic. Mouth/Throat:      Mouth: Mucous membranes are moist.   Eyes:      Extraocular Movements: Extraocular movements intact. Conjunctiva/sclera: Conjunctivae normal.      Pupils: Pupils are equal, round, and reactive to light. Neck:      Vascular: No carotid bruit. Cardiovascular:      Rate and Rhythm: Normal rate and regular rhythm. Heart sounds: Normal heart sounds. No murmur heard. Pulmonary:      Effort: Pulmonary effort is normal. No respiratory distress. Breath sounds: Normal breath sounds. No wheezing, rhonchi or rales.    Abdominal:      General: Bowel sounds are normal. There is no distension. Palpations: Abdomen is soft. Tenderness: There is no abdominal tenderness. Musculoskeletal:      Cervical back: Neck supple. Right lower leg: No edema. Left lower leg: No edema. Neurological:      General: No focal deficit present. Mental Status: She is alert and oriented to person, place, and time. Mental status is at baseline. Psychiatric:         Mood and Affect: Mood normal.         Behavior: Behavior normal.         Thought Content:  Thought content normal.         Judgment: Judgment normal.

## 2023-10-20 ENCOUNTER — OFFICE VISIT (OUTPATIENT)
Dept: OBGYN CLINIC | Facility: CLINIC | Age: 68
End: 2023-10-20

## 2023-10-20 VITALS
SYSTOLIC BLOOD PRESSURE: 126 MMHG | HEART RATE: 80 BPM | BODY MASS INDEX: 23.21 KG/M2 | HEIGHT: 63 IN | DIASTOLIC BLOOD PRESSURE: 64 MMHG | WEIGHT: 131 LBS

## 2023-10-20 DIAGNOSIS — M70.62 TROCHANTERIC BURSITIS OF LEFT HIP: Primary | ICD-10-CM

## 2023-10-20 RX ORDER — TRIAMCINOLONE ACETONIDE 40 MG/ML
80 INJECTION, SUSPENSION INTRA-ARTICULAR; INTRAMUSCULAR
Status: COMPLETED | OUTPATIENT
Start: 2023-10-20 | End: 2023-10-20

## 2023-10-20 RX ORDER — BUPIVACAINE HYDROCHLORIDE 2.5 MG/ML
4 INJECTION, SOLUTION INFILTRATION; PERINEURAL
Status: COMPLETED | OUTPATIENT
Start: 2023-10-20 | End: 2023-10-20

## 2023-10-20 RX ADMIN — TRIAMCINOLONE ACETONIDE 80 MG: 40 INJECTION, SUSPENSION INTRA-ARTICULAR; INTRAMUSCULAR at 09:15

## 2023-10-20 RX ADMIN — BUPIVACAINE HYDROCHLORIDE 4 ML: 2.5 INJECTION, SOLUTION INFILTRATION; PERINEURAL at 09:15

## 2023-10-20 NOTE — PROGRESS NOTES
ASSESSMENT/PLAN:    Diagnoses and all orders for this visit:    Trochanteric bursitis of left hip    Other orders  -     Large joint arthrocentesis      X-rays of the patient's left hip are negative for any fractures or dislocations. She has mild to moderate arthritic changes. The patient has trochanteric bursitis of her left hip. Possible treatment options were discussed with the patient. She elected for corticosteroid injection. The patient's left trochanteric bursa was injected with Kenalog and Marcaine. She tolerated the injection quite well. She will follow-up with our office in 3 months. She is acceptable to this plan. Return in about 3 months (around 1/20/2024). The patient has trochanteric bursitis of her left hip. Under aseptic technique, the left hip was injected with Kenalog and Marcaine. She tolerated procedure quite well. Return back 3 months for evaluation      _____________________________________________________  CHIEF COMPLAINT:  Chief Complaint   Patient presents with    Left Hip - Pain         SUBJECTIVE:  Gus Campa is a 74 y.o. female who presents to our office complaining of lateral left hip pain. The patient's area of maximal tenderness is along her trochanteric bursa. She denies any issue with range of motion of her hip. She denies any numbness or tingling. She denies any fever or chills.     The following portions of the patient's history were reviewed and updated as appropriate: allergies, current medications, past family history, past medical history, past social history, past surgical history and problem list.    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     Drug intolerance     Dysfunction of right eustachian tube     Dysfunctional uterine bleeding     Edema     Elevated d-dimer     Exposure to hepatitis C     Fracture of fibula     Generalized anxiety disorder     GERD (gastroesophageal reflux disease)     Hyperlipidemia     Mild cervical dysplasia     Myalgia     Myositis     Palpitations     PONV (postoperative nausea and vomiting)     Pre-syncope     Seasonal allergies     Stress incontinence     Thrombocytopenia ([address])     Urinary retention     Urinary tract infection     Uterine leiomyoma        PAST SURGICAL HISTORY:  Past Surgical History:   Procedure Laterality Date    BLADDER SURGERY      BREAST BIOPSY Right     US guided    COLONOSCOPY      COLPOSCOPY  01/06/1998    DENTAL SURGERY      ENDOMETRIAL BIOPSY  05/21/1993    HYSTERECTOMY      HYSTEROSCOPY      uterus- 1/22/98, 7/1/02 and 4/10/07    DE ARTHRP KNE CONDYLE&PLATU MEDIAL&LAT COMPARTMENTS Right 2/8/2023    Procedure: ARTHROPLASTY KNEE TOTAL;  Surgeon: Chandrakant Rojo DO;  Location: [facility];  Service: Orthopedics    TUBAL LIGATION      US GUIDED BREAST BIOPSY RIGHT COMPLETE Right 08/08/2018       FAMILY HISTORY:  Family History   Problem Relation Age of Onset    Stroke Mother         cerebral artery occlusion with cerebral infarction    Coronary artery disease Mother     Arrhythmia Mother         sinus    Coronary artery disease Father     Diabetes Father     Aortic aneurysm Family     Arthritis Family     Diabetes Family     Heart disease Family     No Known Problems Daughter     No Known Problems Maternal Grandmother     No Known Problems Paternal Grandmother     No Known Problems Daughter     No Known Problems Maternal Aunt     Ovarian cancer Maternal Aunt        SOCIAL HISTORY:  Social History     Tobacco Use    Smoking status: Every Day     Packs/day: 1.50     Years: 50.00     Total pack years: 75.00 Types: Cigarettes     Start date: 0     Passive exposure: Never    Smokeless tobacco: Never   Vaping Use    Vaping Use: Never used   Substance Use Topics    Alcohol use: Never    Drug use: No       MEDICATIONS:    Current Outpatient Medications:     acetaminophen (TYLENOL) 500 mg tablet, Take 500 mg by mouth every 6 (six) hours as needed for mild pain, Disp: , Rfl:     albuterol (Ventolin HFA) 90 mcg/act inhaler, Inhale 2 puffs every 6 (six) hours as needed for wheezing or shortness of breath, Disp: 18 g, Rfl: 1    alendronate (FOSAMAX) 70 mg tablet, take 1 tablet by mouth every 7 days, Disp: 8 tablet, Rfl: 1    atorvastatin (LIPITOR) 20 mg tablet, Take 1 tablet (20 mg total) by mouth daily after dinner, Disp: 90 tablet, Rfl: 1    buPROPion (WELLBUTRIN XL) 150 mg 24 hr tablet, 150 mg every morning, Disp: , Rfl:     clonazePAM (KlonoPIN) 1 mg tablet, 2 (two) times a day as needed, Disp: , Rfl:     gabapentin (NEURONTIN) 400 mg capsule, Take 1 capsule (400 mg total) by mouth 3 (three) times a day, Disp: 270 capsule, Rfl: 1    omeprazole (PriLOSEC) 40 MG capsule, take 1 capsule by mouth once daily, Disp: 90 capsule, Rfl: 1    solifenacin (VESICARE) 10 MG tablet, Take 1 tablet (10 mg total) by mouth daily, Disp: 90 tablet, Rfl: 1    tretinoin (RETIN-A) 0.1 % cream, Apply topically daily at bedtime, Disp: 45 g, Rfl: 5    venlafaxine (EFFEXOR-XR) 150 mg 24 hr capsule, take 1 capsule by mouth once daily, Disp: 90 capsule, Rfl: 1    venlafaxine (EFFEXOR-XR) 75 mg 24 hr capsule, take 1 capsule by mouth once daily, Disp: 90 capsule, Rfl: 1    ascorbic acid (VITAMIN C) 500 MG tablet, Take 1 tablet (500 mg total) by mouth 2 (two) times a day (Patient taking differently: Take 1,000 mg by mouth 2 (two) times a day), Disp: 60 tablet, Rfl: 1    Multiple Vitamins-Minerals (multivitamin with minerals) tablet, Take 1 tablet by mouth daily, Disp: 30 tablet, Rfl: 1    ALLERGIES:  No Known Allergies    ROS:  Review of Systems Constitutional: Negative for fatigue, fever or loss of appetite. HENT: Negative. Respiratory: Negative for shortness of breath, dyspnea. Cardiovascular: Negative for chest pain/tightness. Gastrointestinal: Negative for abdominal pain, N/V. Endocrine: Negative for cold/heat intolerance, unexplained weight loss/gain. Genitourinary: Negative for flank pain, dysuria, hematuria. Musculoskeletal: Positive for arthralgia   Skin: Negative for rash. Neurological: Negative for numbness or tingling  Psychiatric/Behavioral: Negative for agitation. _____________________________________________________  PHYSICAL EXAMINATION:    Blood pressure 126/64, pulse 80, height 5' 3" (1.6 m), weight 59.4 kg (131 lb). Constitutional: Oriented to person, place, and time. Appears well-developed and well-nourished. No distress. HENT:   Head: Normocephalic. Eyes: Conjunctivae are normal. Right eye exhibits no discharge. Left eye exhibits no discharge. No scleral icterus. Cardiovascular: Normal rate. Pulmonary/Chest: Effort normal.   Neurological: Alert and oriented to person, place, and time. Skin: Skin is warm and dry. No rash noted. Not diaphoretic. No erythema. No pallor. Psychiatric: Normal mood and affect. Behavior is normal. Judgment and thought content normal.      MUSCULOSKELETAL EXAMINATION:   Physical Exam  Ortho Exam    Left lower extremity is neurovascularly intact  Toes are pink and mobile  Compartments are soft  Tenderness to palpation along trochanteric bursa  Good range of motion of hip  Brisk cap refill  Sensation intact    Objective:  BP Readings from Last 1 Encounters:   10/20/23 126/64      Wt Readings from Last 1 Encounters:   10/20/23 59.4 kg (131 lb)        BMI:   Estimated body mass index is 23.21 kg/m² as calculated from the following:    Height as of this encounter: 5' 3" (1.6 m). Weight as of this encounter: 59.4 kg (131 lb).       PROCEDURES PERFORMED:  Large joint arthrocentesis: L greater trochanteric bursa  Universal Protocol:  Risks and benefits: risks, benefits and alternatives were discussed  Site marked: the operative site was marked  Supporting Documentation  Indications: pain   Procedure Details  Location: hip - L greater trochanteric bursa  Needle size: 22 G  Ultrasound guidance: no  Approach: lateral  Medications administered: 4 mL bupivacaine 0.25 %; 80 mg triamcinolone acetonide 40 mg/mL    Patient tolerance: patient tolerated the procedure well with no immediate complications  Dressing:  Sterile dressing applied            Scribe Attestation      I,:  Shannon Krause PA-C am acting as a scribe while in the presence of the attending physician.:       I,:  Aida Zimmerman DO personally performed the services described in this documentation    as scribed in my presence.:

## 2023-10-21 DIAGNOSIS — K21.9 GASTROESOPHAGEAL REFLUX DISEASE WITHOUT ESOPHAGITIS: ICD-10-CM

## 2023-10-21 RX ORDER — OMEPRAZOLE 40 MG/1
CAPSULE, DELAYED RELEASE ORAL
Qty: 90 CAPSULE | Refills: 1 | Status: SHIPPED | OUTPATIENT
Start: 2023-10-21

## 2023-10-23 ENCOUNTER — TELEPHONE (OUTPATIENT)
Dept: PAIN MEDICINE | Facility: CLINIC | Age: 68
End: 2023-10-23

## 2023-10-23 NOTE — TELEPHONE ENCOUNTER
----- Message from Donna Luke MD sent at 10/23/2023  2:53 PM EDT -----  Xr of left hip shows mild OA.

## 2023-10-24 ENCOUNTER — TELEPHONE (OUTPATIENT)
Age: 68
End: 2023-10-24

## 2023-10-24 NOTE — TELEPHONE ENCOUNTER
Caller: Robyn Tenorio     Doctor: Dr Oneill     Reason for call: Patient calling to cancel procedure for tomorrow patient states already had it done through Ortho    Call back#: [phone]

## 2023-10-26 ENCOUNTER — TELEPHONE (OUTPATIENT)
Dept: INTERNAL MEDICINE CLINIC | Facility: CLINIC | Age: 68
End: 2023-10-26

## 2023-10-26 NOTE — TELEPHONE ENCOUNTER
Patient called stating that pharmacy told her that   solifenacin (VESICARE) 10 MG tablet  needs a prior Hari Richter

## 2023-11-01 ENCOUNTER — TELEPHONE (OUTPATIENT)
Age: 68
End: 2023-11-01

## 2023-11-01 NOTE — TELEPHONE ENCOUNTER
MRI was ordered by another provider however pt was seen by Dr Bud Lei in the office prior to completion    Please advise upon review

## 2023-11-01 NOTE — TELEPHONE ENCOUNTER
Caller: pt    Doctor: Poonam Higgins    Reason for call: pt would like results of mri.     Call back#: [phone]

## 2023-11-01 NOTE — TELEPHONE ENCOUNTER
S/W pt. Advised pt of the same. Pt verbalized understanding. Pt requested sooner f/u OV d/t continued pain. Pt rescheduled for f/u OV on 11/7 with RD & OV scheduled on 11/27 canceled.

## 2023-11-03 ENCOUNTER — OFFICE VISIT (OUTPATIENT)
Dept: INTERNAL MEDICINE CLINIC | Facility: CLINIC | Age: 68
End: 2023-11-03
Payer: MEDICARE

## 2023-11-03 VITALS
TEMPERATURE: 98.7 F | HEART RATE: 76 BPM | BODY MASS INDEX: 23.74 KG/M2 | SYSTOLIC BLOOD PRESSURE: 118 MMHG | HEIGHT: 63 IN | WEIGHT: 134 LBS | OXYGEN SATURATION: 98 % | DIASTOLIC BLOOD PRESSURE: 60 MMHG

## 2023-11-03 DIAGNOSIS — J32.9 SINOBRONCHITIS: Primary | ICD-10-CM

## 2023-11-03 DIAGNOSIS — J43.9 PULMONARY EMPHYSEMA, UNSPECIFIED EMPHYSEMA TYPE (HCC): ICD-10-CM

## 2023-11-03 DIAGNOSIS — J40 SINOBRONCHITIS: Primary | ICD-10-CM

## 2023-11-03 PROCEDURE — 99213 OFFICE O/P EST LOW 20 MIN: CPT | Performed by: INTERNAL MEDICINE

## 2023-11-03 RX ORDER — GUAIFENESIN AND CODEINE PHOSPHATE 100; 10 MG/5ML; MG/5ML
5 SOLUTION ORAL 3 TIMES DAILY PRN
Qty: 120 ML | Refills: 0 | Status: SHIPPED | OUTPATIENT
Start: 2023-11-03

## 2023-11-03 RX ORDER — FLUTICASONE PROPIONATE 50 MCG
1 SPRAY, SUSPENSION (ML) NASAL DAILY
Qty: 16 G | Refills: 0 | Status: SHIPPED | OUTPATIENT
Start: 2023-11-03

## 2023-11-03 RX ORDER — GUAIFENESIN 600 MG/1
600 TABLET, EXTENDED RELEASE ORAL EVERY 12 HOURS SCHEDULED
Qty: 20 TABLET | Refills: 0 | Status: SHIPPED | OUTPATIENT
Start: 2023-11-03

## 2023-11-03 RX ORDER — AMOXICILLIN AND CLAVULANATE POTASSIUM 875; 125 MG/1; MG/1
1 TABLET, FILM COATED ORAL EVERY 12 HOURS SCHEDULED
Qty: 20 TABLET | Refills: 0 | Status: SHIPPED | OUTPATIENT
Start: 2023-11-03 | End: 2023-11-13

## 2023-11-03 NOTE — PATIENT INSTRUCTIONS
Cold Symptoms   AMBULATORY CARE:   Cold symptoms  include sneezing, dry throat, a stuffy nose, headache, watery eyes, and a cough. Your cough may be dry, or you may cough up mucus. You may also have muscle aches, joint pain, and tiredness. Rarely, you may have a fever. Cold symptoms occur from inflammation in your upper respiratory system caused by a virus. Most colds go away without treatment. Seek care immediately if:   You have increased tiredness and weakness. You are unable to eat. Your heart is beating much faster than usual for you. You see white spots in the back of your throat and your neck is swollen and sore to the touch. You see pinpoint or larger reddish-purple dots on your skin. Contact your healthcare provider if:   You have a fever higher than 102°F (38.9°C). You have new or worsening shortness of breath. You have thick nasal drainage for more than 2 days. Your symptoms do not improve or get worse within 5 days. You have questions or concerns about your condition or care. Treatment for cold symptoms  may include NSAIDS to decrease muscle aches and fever. Cold medicines may also be given to decrease coughing, nasal stuffiness, sneezing, and a runny nose. Manage your cold symptoms: The following may help relieve cold symptoms, such as a dry throat and congestion:  Gargle with mouthwash or warm salt water as directed. Suck on throat lozenges or hard candy. Use a cold or warm vaporizer or humidifier to ease your breathing. Rest for at least 2 days and then as needed to decrease tiredness and weakness. Use petroleum based jelly around your nostrils to decrease irritation from blowing your nose. Drink plenty of liquids. Liquids will help thin and loosen thick mucus so you can cough it up. Liquids will also keep you hydrated. Ask your healthcare provider which liquids are best for you and how much to drink each day.     Prevent the spread of germs by washing your hands often. You can spread your cold germs to others for at least 3 days after your symptoms start. Do not share items, such as eating utensils. Cover your nose and mouth when you cough or sneeze using the crook of your elbow instead of your hands. Throw used tissues in the garbage. Do not smoke:  Smoking may worsen your symptoms and increase the length of time you feel sick. Talk with your healthcare provider if you need help to stop smoking. Follow up with your doctor as directed:  Write down your questions so you remember to ask them during your visits. © Copyright [employer] 2023 Information is for End User's use only and may not be sold, redistributed or otherwise used for commercial purposes. The above information is an  only. It is not intended as medical advice for individual conditions or treatments. Talk to your doctor, nurse or pharmacist before following any medical regimen to see if it is safe and effective for you.

## 2023-11-03 NOTE — PROGRESS NOTES
Assessment/Plan:  Problem List Items Addressed This Visit        Respiratory    Pulmonary emphysema (HCC)    Relevant Medications    fluticasone (FLONASE) 50 mcg/act nasal spray    guaiFENesin (Mucinex) 600 mg 12 hr tablet    guaifenesin-codeine (GUAIFENESIN AC) 100-10 MG/5ML liquid   Other Visit Diagnoses     Sinobronchitis    -  Primary    Relevant Medications    amoxicillin-clavulanate (AUGMENTIN) 875-125 mg per tablet    fluticasone (FLONASE) 50 mcg/act nasal spray    guaiFENesin (Mucinex) 600 mg 12 hr tablet    guaifenesin-codeine (GUAIFENESIN AC) 100-10 MG/5ML liquid           Diagnoses and all orders for this visit:    Sinobronchitis  -     amoxicillin-clavulanate (AUGMENTIN) 875-125 mg per tablet; Take 1 tablet by mouth every 12 (twelve) hours for 10 days  -     fluticasone (FLONASE) 50 mcg/act nasal spray; 1 spray into each nostril daily  -     guaiFENesin (Mucinex) 600 mg 12 hr tablet; Take 1 tablet (600 mg total) by mouth every 12 (twelve) hours  -     guaifenesin-codeine (GUAIFENESIN AC) 100-10 MG/5ML liquid; Take 5 mL by mouth 3 (three) times a day as needed for cough    Pulmonary emphysema, unspecified emphysema type ([address])        No problem-specific Assessment & Plan notes found for this encounter. A/P: Stable. Rest and increase po fluids. Warm salt water gargles. OTC PRN motrin/tylenol. May boost immunity with vit c, vit d, and zinc. Will start  abx, mucinex, INS, and cough med. Hold steroid wean. No  need for imaging. Continue current treatment and RTC as scheduled. Subjective:      Patient ID: Miri Gallardo is a 74 y.o. female. Daily smoking COPD WF, presents for several week history of URI s/s. Vaccinated against flu and covid. No travel and no one else ill. Reports Initially fever and chills, but now headaches with nasal congestion with PND and intermittent sore throat. Productive cough with no SOB or wheezing.           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 ,  does not have any pertinent problems on file. ,   has a past surgical history that includes Bladder surgery; Breast biopsy (Right); Colposcopy (01/06/1998); Dental surgery; Endometrial biopsy (05/21/1993); Hysteroscopy; Tubal ligation; US guided breast biopsy right complete (Right, 08/08/2018); Hysterectomy; Colonoscopy; and pr arthrp kne condyle&platu medial&lat compartments (Right, 2/8/2023). ,  family history includes Aortic aneurysm in her family; Arrhythmia in her mother; Arthritis in her family; Coronary artery disease in her father and mother; Diabetes in her family and father; Heart disease in her family; No Known Problems in her daughter, daughter, maternal aunt, maternal grandmother, and paternal grandmother; Ovarian cancer in her maternal aunt; Stroke in her mother. ,   reports that she has been smoking cigarettes. She started smoking about 51 years ago. She has a 75.00 pack-year smoking history. She has never been exposed to tobacco smoke.  She has never used smokeless tobacco. She reports that she does not drink alcohol and does not use drugs. ,  has No Known Allergies. .  Current Outpatient Medications   Medication Sig Dispense Refill   • amoxicillin-clavulanate (AUGMENTIN) 875-125 mg per tablet Take 1 tablet by mouth every 12 (twelve) hours for 10 days 20 tablet 0   • fluticasone (FLONASE) 50 mcg/act nasal spray 1 spray into each nostril daily 16 g 0   • guaiFENesin (Mucinex) 600 mg 12 hr tablet Take 1 tablet (600 mg total) by mouth every 12 (twelve) hours 20 tablet 0   • guaifenesin-codeine (GUAIFENESIN AC) 100-10 MG/5ML liquid Take 5 mL by mouth 3 (three) times a day as needed for cough 120 mL 0   • acetaminophen (TYLENOL) 500 mg tablet Take 500 mg by mouth every 6 (six) hours as needed for mild pain     • albuterol (Ventolin HFA) 90 mcg/act inhaler Inhale 2 puffs every 6 (six) hours as needed for wheezing or shortness of breath 18 g 1   • alendronate (FOSAMAX) 70 mg tablet take 1 tablet by mouth every 7 days 8 tablet 1   • ascorbic acid (VITAMIN C) 500 MG tablet Take 1 tablet (500 mg total) by mouth 2 (two) times a day (Patient taking differently: Take 1,000 mg by mouth 2 (two) times a day) 60 tablet 1   • atorvastatin (LIPITOR) 20 mg tablet Take 1 tablet (20 mg total) by mouth daily after dinner 90 tablet 1   • buPROPion (WELLBUTRIN XL) 150 mg 24 hr tablet 150 mg every morning     • clonazePAM (KlonoPIN) 1 mg tablet 2 (two) times a day as needed     • gabapentin (NEURONTIN) 400 mg capsule Take 1 capsule (400 mg total) by mouth 3 (three) times a day 270 capsule 1   • Multiple Vitamins-Minerals (multivitamin with minerals) tablet Take 1 tablet by mouth daily 30 tablet 1   • omeprazole (PriLOSEC) 40 MG capsule take 1 capsule by mouth once daily 90 capsule 1   • solifenacin (VESICARE) 10 MG tablet Take 1 tablet (10 mg total) by mouth daily 90 tablet 1   • tretinoin (RETIN-A) 0.1 % cream Apply topically daily at bedtime 45 g 5   • venlafaxine (EFFEXOR-XR) 150 mg 24 hr capsule take 1 capsule by mouth once daily 90 capsule 1 • venlafaxine (EFFEXOR-XR) 75 mg 24 hr capsule take 1 capsule by mouth once daily 90 capsule 1     No current facility-administered medications for this visit. Review of Systems   Constitutional:  Positive for activity change and fatigue. Negative for chills, diaphoresis and fever. HENT:  Positive for congestion, postnasal drip, rhinorrhea, sinus pressure and sore throat. Negative for ear discharge, ear pain, facial swelling and sinus pain. Respiratory:  Positive for cough. Negative for chest tightness, shortness of breath and wheezing. Cardiovascular:  Negative for chest pain, palpitations and leg swelling. Gastrointestinal:  Negative for abdominal pain, constipation, diarrhea, nausea and vomiting. Genitourinary:  Negative for difficulty urinating, dysuria and frequency. Musculoskeletal:  Negative for arthralgias, gait problem and myalgias. Neurological:  Positive for headaches. Negative for light-headedness. Psychiatric/Behavioral:  Negative for confusion. The patient is not nervous/anxious. PHQ-2/9 Depression Screening          Objective:  Vitals:    11/03/23 1440   BP: 118/60   Pulse: 76   Temp: 98.7 °F (37.1 °C)   SpO2: 98%   Weight: 60.8 kg (134 lb)   Height: 5' 3" (1.6 m)     Body mass index is 23.74 kg/m². Physical Exam  Vitals and nursing note reviewed. Constitutional:       General: She is not in acute distress. Appearance: Normal appearance. She is not ill-appearing. HENT:      Head: Normocephalic and atraumatic. Right Ear: Tympanic membrane, ear canal and external ear normal. There is no impacted cerumen. Left Ear: Tympanic membrane, ear canal and external ear normal. There is no impacted cerumen. Nose: Congestion and rhinorrhea present. Mouth/Throat:      Mouth: Mucous membranes are moist.   Eyes:      Extraocular Movements: Extraocular movements intact.       Conjunctiva/sclera: Conjunctivae normal.      Pupils: Pupils are equal, round, and reactive to light. Cardiovascular:      Rate and Rhythm: Normal rate and regular rhythm. Heart sounds: Normal heart sounds. No murmur heard. Pulmonary:      Effort: Pulmonary effort is normal. No respiratory distress. Breath sounds: No wheezing, rhonchi or rales. Musculoskeletal:      Cervical back: Neck supple. Lymphadenopathy:      Cervical: Cervical adenopathy present. Neurological:      General: No focal deficit present. Mental Status: She is alert and oriented to person, place, and time. Mental status is at baseline. Psychiatric:         Mood and Affect: Mood normal.         Behavior: Behavior normal.         Thought Content:  Thought content normal.         Judgment: Judgment normal.

## 2023-11-07 ENCOUNTER — OFFICE VISIT (OUTPATIENT)
Dept: PAIN MEDICINE | Facility: CLINIC | Age: 68
End: 2023-11-07
Payer: MEDICARE

## 2023-11-07 VITALS
HEIGHT: 63 IN | BODY MASS INDEX: 23.74 KG/M2 | HEART RATE: 65 BPM | DIASTOLIC BLOOD PRESSURE: 67 MMHG | WEIGHT: 134 LBS | SYSTOLIC BLOOD PRESSURE: 123 MMHG

## 2023-11-07 DIAGNOSIS — M79.10 MYALGIA: ICD-10-CM

## 2023-11-07 DIAGNOSIS — G89.29 CHRONIC LEFT-SIDED LOW BACK PAIN WITH LEFT-SIDED SCIATICA: ICD-10-CM

## 2023-11-07 DIAGNOSIS — M54.42 CHRONIC LEFT-SIDED LOW BACK PAIN WITH LEFT-SIDED SCIATICA: ICD-10-CM

## 2023-11-07 DIAGNOSIS — G89.4 CHRONIC PAIN SYNDROME: Primary | ICD-10-CM

## 2023-11-07 PROCEDURE — 99214 OFFICE O/P EST MOD 30 MIN: CPT | Performed by: STUDENT IN AN ORGANIZED HEALTH CARE EDUCATION/TRAINING PROGRAM

## 2023-11-07 NOTE — PROGRESS NOTES
Assessment:  1. Chronic pain syndrome    2. Myalgia    3. Chronic left-sided low back pain with left-sided sciatica        Portions of the record may have been created with voice recognition software. Occasional wrong word or "sound a like" substitutions may have occurred due to the inherent limitations of voice recognition software. Read the chart carefully and recognize, using context, where substitutions have occurred. Contact me with any questions. Plan:  74 y o f referred by PCP, here for follow up. Since last visit, patient underwent left greater trochanteric bursa injection from Dr. Deja Goncalves on 10/20/2023. She notes some improvement in symptoms in the LLE since this injection but notes residual symptoms continue to be bothersome and affect her function and ambulation. Recent lumbar spine MRI reviewed and shows multilevel DDD, worst at L4-5. She denies new or progressive weakness, saddle anesthesia, bowel incontinence. She also underwent left hip x-ray which shows mild OA. 1.  Discussed interlaminar lumbar epidural steroid injection as an option for managing low back and leg pain symptoms. Patient will like to think about this prior to scheduling. 2.  Continue home exercise program.  3.  Continue gabapentin 400 mg 3 times daily as prescribed by PCP. 4.  Follow-up in 3 months or as needed. Complete risks and benefits including bleeding, infection, tissue reaction, nerve injury and allergic reaction were discussed. The approach was demonstrated using models and literature was provided. Verbal and written consent was obtained. History of Present Illness: The patient is a 74 y.o. female who presents for a follow up office visit in regards to Back Pain. The patient’s current symptoms include ongoing left-sided low back and LLE pain. Notes some improvement since left GTB CSI. Denies new issues or concerns today.     I have personally reviewed and/or updated the patient's past medical history, past surgical history, family history, social history, current medications, allergies, and vital signs today. Review of Systems  Review of Systems   Constitutional:  Negative for unexpected weight change. HENT:  Negative for hearing loss. Eyes:  Negative for visual disturbance. Respiratory:  Negative for shortness of breath. Cardiovascular:  Negative for leg swelling. Gastrointestinal:  Negative for constipation. Endocrine: Negative for polyuria. Genitourinary:  Negative for difficulty urinating. Musculoskeletal:  Negative for gait problem, joint swelling and myalgias. Joint stiffness  Pain in extremity- back   Skin:  Negative for rash. Neurological:  Positive for dizziness. Negative for weakness and headaches. Psychiatric/Behavioral:  Negative for decreased concentration. All other systems reviewed and are negative.         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     Drug intolerance     Dysfunction of right eustachian tube     Dysfunctional uterine bleeding     Edema     Elevated d-dimer     Exposure to hepatitis C     Fracture of fibula     Generalized anxiety disorder     GERD (gastroesophageal reflux disease)     Hyperlipidemia     Mild cervical dysplasia     Myalgia     Myositis     Palpitations     PONV (postoperative nausea and vomiting)     Pre-syncope     Seasonal allergies     Stress incontinence     Thrombocytopenia ([address])     Urinary retention     Urinary tract infection     Uterine leiomyoma        Past Surgical History:   Procedure Laterality Date    BLADDER SURGERY      BREAST BIOPSY Right     US guided    COLONOSCOPY      COLPOSCOPY  01/06/1998    DENTAL SURGERY      ENDOMETRIAL BIOPSY  05/21/1993    HYSTERECTOMY      HYSTEROSCOPY      uterus- 1/22/98, 7/1/02 and 4/10/07    [facility] ARTHRP KNE CONDYLE&PLATU MEDIAL&LAT COMPARTMENTS Right 2/8/2023    Procedure: ARTHROPLASTY KNEE TOTAL;  Surgeon: Cristal Ruiz DO;  Location: [facility];  Service: Orthopedics    TUBAL LIGATION      US GUIDED BREAST BIOPSY RIGHT COMPLETE Right 08/08/2018       Family History   Problem Relation Age of Onset    Stroke Mother         cerebral artery occlusion with cerebral infarction    Coronary artery disease Mother     Arrhythmia Mother         sinus    Coronary artery disease Father     Diabetes Father     Aortic aneurysm Family     Arthritis Family     Diabetes Family     Heart disease Family     No Known Problems Daughter     No Known Problems Maternal Grandmother     No Known Problems Paternal Grandmother     No Known Problems Daughter     No Known Problems Maternal Aunt     Ovarian cancer Maternal Aunt        Social History     Occupational History    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    Occupation: housewife   Tobacco Use    Smoking status: Every Day     Packs/day: 1.50     Years: 50.00     Total pack years: 75.00     Types: Cigarettes     Start date: 1972     Passive exposure: Never    Smokeless tobacco: Never   Vaping Use    Vaping Use: Never used   Substance and Sexual Activity    Alcohol use: Never    Drug use: No    Sexual activity: Not Currently         Current Outpatient Medications:     acetaminophen (TYLENOL) 500 mg tablet, Take 500 mg by mouth every 6 (six) hours as needed for mild pain, Disp: , Rfl:     albuterol (Ventolin HFA) 90 mcg/act inhaler, Inhale 2 puffs every 6 (six) hours as needed for wheezing or shortness of breath, Disp: 18 g, Rfl: 1    alendronate (FOSAMAX) 70 mg tablet, take 1 tablet by mouth every 7 days, Disp: 8 tablet, Rfl: 1    amoxicillin-clavulanate (AUGMENTIN) 875-125 mg per tablet, Take 1 tablet by mouth every 12 (twelve) hours for 10 days, Disp: 20 tablet, Rfl: 0    atorvastatin (LIPITOR) 20 mg tablet, Take 1 tablet (20 mg total) by mouth daily after dinner, Disp: 90 tablet, Rfl: 1    buPROPion (WELLBUTRIN XL) 150 mg 24 hr tablet, 150 mg every morning, Disp: , Rfl:     clonazePAM (KlonoPIN) 1 mg tablet, 2 (two) times a day as needed, Disp: , Rfl:     fluticasone (FLONASE) 50 mcg/act nasal spray, 1 spray into each nostril daily, Disp: 16 g, Rfl: 0    gabapentin (NEURONTIN) 400 mg capsule, Take 1 capsule (400 mg total) by mouth 3 (three) times a day, Disp: 270 capsule, Rfl: 1    guaiFENesin (Mucinex) 600 mg 12 hr tablet, Take 1 tablet (600 mg total) by mouth every 12 (twelve) hours, Disp: 20 tablet, Rfl: 0    guaifenesin-codeine (GUAIFENESIN AC) 100-10 MG/5ML liquid, Take 5 mL by mouth 3 (three) times a day as needed for cough, Disp: 120 mL, Rfl: 0    omeprazole (PriLOSEC) 40 MG capsule, take 1 capsule by mouth once daily, Disp: 90 capsule, Rfl: 1    solifenacin (VESICARE) 10 MG tablet, Take 1 tablet (10 mg total) by mouth daily, Disp: 90 tablet, Rfl: 1    tretinoin (RETIN-A) 0.1 % cream, Apply topically daily at bedtime, Disp: 45 g, Rfl: 5    venlafaxine (EFFEXOR-XR) 150 mg 24 hr capsule, take 1 capsule by mouth once daily, Disp: 90 capsule, Rfl: 1    venlafaxine (EFFEXOR-XR) 75 mg 24 hr capsule, take 1 capsule by mouth once daily, Disp: 90 capsule, Rfl: 1    ascorbic acid (VITAMIN C) 500 MG tablet, Take 1 tablet (500 mg total) by mouth 2 (two) times a day (Patient taking differently: Take 1,000 mg by mouth 2 (two) times a day), Disp: 60 tablet, Rfl: 1    Multiple Vitamins-Minerals (multivitamin with minerals) tablet, Take 1 tablet by mouth daily, Disp: 30 tablet, Rfl: 1    No Known Allergies    Physical Exam:    /67   Pulse 65   Ht 5' 3" (1.6 m)   Wt 60.8 kg (134 lb)   BMI 23.74 kg/m²     Constitutional:normal, well developed, well nourished, alert, in no distress and non-toxic and no overt pain behavior. Eyes:anicteric  HEENT:grossly intact  Neck:supple, symmetric, trachea midline and no masses   Pulmonary:even and unlabored  Cardiovascular:No edema or pitting edema present  Skin:Normal without rashes or lesions and well hydrated  Psychiatric:Mood and affect appropriate  Neurologic:Cranial Nerves II-XII grossly intact  Musculoskeletal:normal gait    Imaging    MRI LUMBAR SPINE WITHOUT CONTRAST     INDICATION: M47.896: Other spondylosis, lumbar region. COMPARISON:  None. TECHNIQUE:  Multiplanar, multisequence imaging of the lumbar spine was performed. .        IMAGE QUALITY:  Diagnostic     FINDINGS:     VERTEBRAL BODIES:  There are 5 lumbar type vertebral bodies. Rightward convex scoliosis apex L1. Slight anterolisthesis L5-S1. Modic type I degenerative marrow signal L4-5 eccentric to the right. SACRUM:  Normal signal within the sacrum. No evidence of insufficiency or stress fracture. DISTAL CORD AND CONUS:  Normal size and signal within the distal cord and conus. PARASPINAL SOFT TISSUES:  Paraspinal soft tissues are unremarkable. LOWER THORACIC DISC SPACES: T12-L1: Mild annular bulge with small central protrusion type disc herniation results in mild central stenosis. LUMBAR DISC SPACES:     L1-L2: Degenerative disc osteophyte complex with marginal osteophytes eccentric to the left results in moderate left foraminal narrowing. Mild central stenosis. L2-L3: Mild annular bulge and marginal osteophytes results in mild central stenosis. Foramina are patent. L3-L4: Moderate to severe facet hypertrophy with mild annular bulge results in moderate central stenosis and moderate left foraminal narrowing. L4-L5: Severe right and moderate left facet hypertrophy identified with diffuse annular bulge. Severe right foraminal narrowing is identified with severe central and right lateral recess stenosis.  This is the most severely affected level. L5-S1: Severe facet degenerative change bilaterally accounts for slight anterolisthesis and uncovering the posterior disc margin. Mild right foraminal narrowing. Minimal central stenosis. OTHER FINDINGS:  None. IMPRESSION:     Spondylotic degenerative changes are seen particularly L4-5 where there is severe central and right lateral recess stenosis as well as severe right foraminal narrowing. Correlate for right L4 or right L5 radiculopathy. Degenerative changes at remaining levels result in moderate central stenosis at L3-4 and mild central stenosis at T12-L1. LEFT HIP     INDICATION:   M25.552: Pain in left hip. COMPARISON:  None     VIEWS:  XR HIP/PELV 2-3 VWS LEFT  W PELVIS IF PERFORMED        FINDINGS:     There is no acute fracture or dislocation. Mild left hip osteoarthritis is seen. No lytic or blastic osseous lesion. Soft tissues are unremarkable. Degenerative changes visualized lower lumbar spine. IMPRESSION:     No acute osseous abnormality.

## 2023-11-16 ENCOUNTER — TELEPHONE (OUTPATIENT)
Dept: INTERNAL MEDICINE CLINIC | Facility: CLINIC | Age: 68
End: 2023-11-16

## 2023-11-17 ENCOUNTER — TELEMEDICINE (OUTPATIENT)
Dept: INTERNAL MEDICINE CLINIC | Facility: CLINIC | Age: 68
End: 2023-11-17
Payer: MEDICARE

## 2023-11-17 DIAGNOSIS — J06.9 ACUTE URI: Primary | ICD-10-CM

## 2023-11-17 PROCEDURE — 99213 OFFICE O/P EST LOW 20 MIN: CPT | Performed by: NURSE PRACTITIONER

## 2023-11-17 RX ORDER — FLUCONAZOLE 200 MG/1
200 TABLET ORAL DAILY
Qty: 5 TABLET | Refills: 0 | Status: SHIPPED | OUTPATIENT
Start: 2023-11-17 | End: 2023-11-22

## 2023-11-17 RX ORDER — AMOXICILLIN AND CLAVULANATE POTASSIUM 875; 125 MG/1; MG/1
1 TABLET, FILM COATED ORAL EVERY 12 HOURS SCHEDULED
Qty: 20 TABLET | Refills: 0 | Status: SHIPPED | OUTPATIENT
Start: 2023-11-17 | End: 2023-11-27

## 2023-11-17 NOTE — PROGRESS NOTES
Virtual Regular Visit    Verification of patient location:    Patient is located at Home in the following state in which I hold an active license [place]      Assessment/Plan:    Problem List Items Addressed This Visit        Respiratory    Acute URI - Primary     11/17/2023  C/o sore throat, cough and headache  11/3/2023  Was started on:   Augmentin, flonase, mucinex and guaifenesin with codeine cough syrup     She states she did feel better, but now is feeling worse  Will send her augmentin and diflucan         Relevant Medications    amoxicillin-clavulanate (AUGMENTIN) 875-125 mg per tablet    fluconazole (DIFLUCAN) 200 mg tablet         Depression Screening and Follow-up Plan: Patient was screened for depression during today's encounter. They screened negative with a PHQ-2 score of 2. Reason for visit is   Chief Complaint   Patient presents with   • Sore Throat   • Cough   • Headache        Encounter provider ZUHAIR Munoz    Provider located at [address] [place] [id number]      Recent Visits  Date Type Provider Dept   11/16/23 Telephone [address] [place] [address] 30 recent visits within past 7 days and meeting all other requirements  Today's Visits  Date Type Provider Dept   11/17/23 [address], CRNP  [address] [facility] today's visits and meeting all other requirements  Future Appointments  No visits were found meeting these conditions. Showing future appointments within next 150 days and meeting all other requirements       The patient was identified by name and date of birth. Severa Parents was informed that this is a telemedicine visit and that the visit is being conducted through the [address] [place] [address] [employer] platform. She agrees to proceed. .  My office door was closed. No one else was in the room. She acknowledged consent and understanding of privacy and security of the video platform.  The patient has agreed to participate and understands they can discontinue the visit at any time. Patient is aware this is a billable service. Subjective  Jessa Dorantes is a 74 y.o. female   The patient is here today to discuss her continued URI symptoms. Please continue to the [facility] section of the note for details of today's visit.            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    • Drug intolerance    • Dysfunction of right eustachian tube    • Dysfunctional uterine bleeding    • Edema    • Elevated d-dimer    • Exposure to hepatitis C    • Fracture of fibula    • Generalized anxiety disorder    • GERD (gastroesophageal reflux disease)    • Hyperlipidemia    • Mild cervical dysplasia    • Myalgia    • Myositis    • Palpitations    • PONV (postoperative nausea and vomiting)    • Pre-syncope    • Seasonal allergies    • Stress incontinence    • Thrombocytopenia ([address])    • Urinary retention    • Urinary tract infection    • Uterine leiomyoma        Past Surgical History:   Procedure Laterality Date   • BLADDER SURGERY     • BREAST BIOPSY Right     US guided   • COLONOSCOPY     • COLPOSCOPY  01/06/1998   • DENTAL SURGERY     • ENDOMETRIAL BIOPSY  05/21/1993   • HYSTERECTOMY     • HYSTEROSCOPY      uterus- 1/22/98, 7/1/02 and 4/10/07   • [place] ARTHRP KNE CONDYLE&PLATU MEDIAL&LAT COMPARTMENTS Right 2/8/2023    Procedure: ARTHROPLASTY KNEE TOTAL;  Surgeon: Stefanie Mckay DO;  Location: [facility];  Service: Orthopedics   • TUBAL LIGATION     • US GUIDED BREAST BIOPSY RIGHT COMPLETE Right 08/08/2018       Current Outpatient Medications   Medication Sig Dispense Refill   • acetaminophen (TYLENOL) 500 mg tablet Take 500 mg by mouth every 6 (six) hours as needed for mild pain     • alendronate (FOSAMAX) 70 mg tablet take 1 tablet by mouth every 7 days 8 tablet 1   • amoxicillin-clavulanate (AUGMENTIN) 875-125 mg per tablet Take 1 tablet by mouth every 12 (twelve) hours for 10 days 20 tablet 0   • ascorbic acid (VITAMIN C) 500 MG tablet Take 1 tablet (500 mg total) by mouth 2 (two) times a day (Patient taking differently: Take 1,000 mg by mouth 2 (two) times a day) 60 tablet 1   • atorvastatin (LIPITOR) 20 mg tablet Take 1 tablet (20 mg total) by mouth daily after dinner 90 tablet 1   • buPROPion (WELLBUTRIN XL) 150 mg 24 hr tablet 150 mg every morning     • clonazePAM (KlonoPIN) 1 mg tablet 2 (two) times a day as needed     • fluconazole (DIFLUCAN) 200 mg tablet Take 1 tablet (200 mg total) by mouth daily for 5 days 5 tablet 0   • fluticasone (FLONASE) 50 mcg/act nasal spray 1 spray into each nostril daily 16 g 0   • gabapentin (NEURONTIN) 400 mg capsule Take 1 capsule (400 mg total) by mouth 3 (three) times a day 270 capsule 1   • guaiFENesin (Mucinex) 600 mg 12 hr tablet Take 1 tablet (600 mg total) by mouth every 12 (twelve) hours 20 tablet 0   • omeprazole (PriLOSEC) 40 MG capsule take 1 capsule by mouth once daily 90 capsule 1   • tretinoin (RETIN-A) 0.1 % cream Apply topically daily at bedtime 45 g 5   • venlafaxine (EFFEXOR-XR) 150 mg 24 hr capsule take 1 capsule by mouth once daily 90 capsule 1   • venlafaxine (EFFEXOR-XR) 75 mg 24 hr capsule take 1 capsule by mouth once daily 90 capsule 1   • albuterol (Ventolin HFA) 90 mcg/act inhaler Inhale 2 puffs every 6 (six) hours as needed for wheezing or shortness of breath (Patient not taking: Reported on 11/17/2023) 18 g 1   • guaifenesin-codeine (GUAIFENESIN AC) 100-10 MG/5ML liquid Take 5 mL by mouth 3 (three) times a day as needed for cough (Patient not taking: Reported on 11/17/2023) 120 mL 0   • Multiple Vitamins-Minerals (multivitamin with minerals) tablet Take 1 tablet by mouth daily 30 tablet 1   • solifenacin (VESICARE) 10 MG tablet Take 1 tablet (10 mg total) by mouth daily 90 tablet 1     No current facility-administered medications for this visit. No Known Allergies    Review of Systems   HENT:  Positive for congestion and sinus pressure. Respiratory:  Positive for cough. Video Exam    There were no vitals filed for this visit. Physical Exam  HENT:      Head:      Comments: Head congestion, sinus pressure  Pulmonary:      Comments: Harsh cough          Visit Time  I spent 20 minutes with patient today in which greater than 50% of the time was spent in counseling/coordination of care regarding medications, treatment plan, follow up care.

## 2023-11-17 NOTE — PATIENT INSTRUCTIONS
Problem List Items Addressed This Visit          Respiratory    Acute URI - Primary     11/17/2023  C/o sore throat, cough and headache  11/3/2023  Was started on:   Augmentin, flonase, mucinex and guaifenesin with codeine cough syrup     She states she did feel better, but now is feeling worse  Will send her augmentin and diflucan         Relevant Medications    amoxicillin-clavulanate (AUGMENTIN) 875-125 mg per tablet    fluconazole (DIFLUCAN) 200 mg tablet

## 2023-11-22 ENCOUNTER — OFFICE VISIT (OUTPATIENT)
Dept: INTERNAL MEDICINE CLINIC | Facility: CLINIC | Age: 68
End: 2023-11-22
Payer: MEDICARE

## 2023-11-22 VITALS
DIASTOLIC BLOOD PRESSURE: 70 MMHG | OXYGEN SATURATION: 97 % | SYSTOLIC BLOOD PRESSURE: 144 MMHG | TEMPERATURE: 98.7 F | BODY MASS INDEX: 23.74 KG/M2 | HEIGHT: 63 IN | WEIGHT: 134 LBS | HEART RATE: 87 BPM

## 2023-11-22 DIAGNOSIS — N32.81 OAB (OVERACTIVE BLADDER): ICD-10-CM

## 2023-11-22 PROCEDURE — 99213 OFFICE O/P EST LOW 20 MIN: CPT | Performed by: INTERNAL MEDICINE

## 2023-11-22 RX ORDER — SOLIFENACIN SUCCINATE 10 MG/1
10 TABLET, FILM COATED ORAL DAILY
Qty: 90 TABLET | Refills: 1 | Status: SHIPPED | OUTPATIENT
Start: 2023-11-22

## 2023-11-22 NOTE — PROGRESS NOTES
Assessment/Plan:  Problem List Items Addressed This Visit    None  Visit Diagnoses     OAB (overactive bladder)        Relevant Medications    solifenacin (VESICARE) 10 MG tablet           Diagnoses and all orders for this visit:    OAB (overactive bladder)  -     solifenacin (VESICARE) 10 MG tablet; Take 1 tablet (10 mg total) by mouth daily        No problem-specific Assessment & Plan notes found for this encounter. A/P: Stable, but didn't get her labs or the new meds. Appreciate pain management input. Await epidurals. Pt to  vesicare and get labs done. Keep f/u with the specialist. Continue current treatment and RTC four months for routine. Subjective:      Patient ID: Dali Urbano is a 74 y.o. female. [facility]F RTC for four week f/u for chronic LBP after increasing her dusty and referred to new pain management, OAB after adding vesicare, and labs. Pt has not yet gotten her labs and has not picked up the vesicare. OAB s/s continue. Pt LBP unchanged and seen by pain management. Pain management wants to do injections and pt agrees, but is deciding on a date. Reports increase in dusty is improving.           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 ,  does not have any pertinent problems on file. ,   has a past surgical history that includes Bladder surgery; Breast biopsy (Right); Colposcopy (01/06/1998); Dental surgery; Endometrial biopsy (05/21/1993); Hysteroscopy; Tubal ligation; US guided breast biopsy right complete (Right, 08/08/2018); Hysterectomy; Colonoscopy; and pr arthrp kne condyle&platu medial&lat compartments (Right, 2/8/2023). ,  family history includes Aortic aneurysm in her family; Arrhythmia in her mother; Arthritis in her family; Coronary artery disease in her father and mother; Diabetes in her family and father; Heart disease in her family; No Known Problems in her daughter, daughter, maternal aunt, maternal grandmother, and paternal grandmother; Ovarian cancer in her maternal aunt; Stroke in her mother. ,   reports that she has been smoking cigarettes. She started smoking about 51 years ago. She has a 75.00 pack-year smoking history. She has never been exposed to tobacco smoke. She has never used smokeless tobacco. She reports that she does not drink alcohol and does not use drugs. ,  has No Known Allergies. .  Current Outpatient Medications   Medication Sig Dispense Refill   • solifenacin (VESICARE) 10 MG tablet Take 1 tablet (10 mg total) by mouth daily 90 tablet 1   • acetaminophen (TYLENOL) 500 mg tablet Take 500 mg by mouth every 6 (six) hours as needed for mild pain     • alendronate (FOSAMAX) 70 mg tablet take 1 tablet by mouth every 7 days 8 tablet 1   • amoxicillin-clavulanate (AUGMENTIN) 875-125 mg per tablet Take 1 tablet by mouth every 12 (twelve) hours for 10 days 20 tablet 0   • ascorbic acid (VITAMIN C) 500 MG tablet Take 1 tablet (500 mg total) by mouth 2 (two) times a day (Patient taking differently: Take 1,000 mg by mouth 2 (two) times a day) 60 tablet 1   • atorvastatin (LIPITOR) 20 mg tablet Take 1 tablet (20 mg total) by mouth daily after dinner 90 tablet 1   • buPROPion (WELLBUTRIN XL) 150 mg 24 hr tablet 150 mg every morning     • clonazePAM (KlonoPIN) 1 mg tablet 2 (two) times a day as needed     • fluconazole (DIFLUCAN) 200 mg tablet Take 1 tablet (200 mg total) by mouth daily for 5 days 5 tablet 0   • fluticasone (FLONASE) 50 mcg/act nasal spray 1 spray into each nostril daily 16 g 0   • gabapentin (NEURONTIN) 400 mg capsule Take 1 capsule (400 mg total) by mouth 3 (three) times a day 270 capsule 1   • guaiFENesin (Mucinex) 600 mg 12 hr tablet Take 1 tablet (600 mg total) by mouth every 12 (twelve) hours 20 tablet 0   • guaifenesin-codeine (GUAIFENESIN AC) 100-10 MG/5ML liquid Take 5 mL by mouth 3 (three) times a day as needed for cough (Patient not taking: Reported on 11/17/2023) 120 mL 0   • Multiple Vitamins-Minerals (multivitamin with minerals) tablet Take 1 tablet by mouth daily 30 tablet 1   • omeprazole (PriLOSEC) 40 MG capsule take 1 capsule by mouth once daily 90 capsule 1   • tretinoin (RETIN-A) 0.1 % cream Apply topically daily at bedtime 45 g 5   • venlafaxine (EFFEXOR-XR) 150 mg 24 hr capsule take 1 capsule by mouth once daily 90 capsule 1   • venlafaxine (EFFEXOR-XR) 75 mg 24 hr capsule take 1 capsule by mouth once daily 90 capsule 1     No current facility-administered medications for this visit. Review of Systems   Constitutional:  Positive for activity change. Negative for chills, diaphoresis, fatigue and fever. HENT: Negative. Eyes:  Negative for visual disturbance. Respiratory:  Negative for cough, chest tightness, shortness of breath and wheezing. Cardiovascular:  Negative for chest pain, palpitations and leg swelling. Gastrointestinal:  Negative for abdominal pain, constipation, diarrhea, nausea and vomiting. Endocrine: Negative for cold intolerance and heat intolerance. Genitourinary:  Positive for urgency.  Negative for difficulty urinating, dysuria and frequency. Musculoskeletal:  Positive for back pain and gait problem. Negative for arthralgias and myalgias. Neurological:  Negative for dizziness, seizures, syncope, weakness, light-headedness and headaches. Psychiatric/Behavioral:  Negative for confusion. The patient is not nervous/anxious. PHQ-2/9 Depression Screening          Objective:  Vitals:    11/22/23 1040   BP: 144/70   Pulse: 87   Temp: 98.7 °F (37.1 °C)   SpO2: 97%   Weight: 60.8 kg (134 lb)   Height: 5' 3" (1.6 m)     Body mass index is 23.74 kg/m². Physical Exam  Vitals and nursing note reviewed. Constitutional:       General: She is not in acute distress. Appearance: Normal appearance. She is not ill-appearing. HENT:      Head: Normocephalic and atraumatic. Mouth/Throat:      Mouth: Mucous membranes are moist.   Eyes:      Extraocular Movements: Extraocular movements intact. Conjunctiva/sclera: Conjunctivae normal.      Pupils: Pupils are equal, round, and reactive to light. Neck:      Vascular: No carotid bruit. Cardiovascular:      Rate and Rhythm: Normal rate and regular rhythm. Heart sounds: Normal heart sounds. No murmur heard. Pulmonary:      Effort: Pulmonary effort is normal. No respiratory distress. Breath sounds: Normal breath sounds. No wheezing, rhonchi or rales. Abdominal:      General: Bowel sounds are normal. There is no distension. Palpations: Abdomen is soft. Tenderness: There is no abdominal tenderness. Musculoskeletal:      Cervical back: Neck supple. Right lower leg: No edema. Left lower leg: No edema. Neurological:      General: No focal deficit present. Mental Status: She is alert and oriented to person, place, and time. Mental status is at baseline. Psychiatric:         Mood and Affect: Mood normal.         Behavior: Behavior normal.         Thought Content:  Thought content normal.         Judgment: Judgment normal.

## 2023-12-07 DIAGNOSIS — F41.9 ANXIETY: ICD-10-CM

## 2023-12-07 RX ORDER — VENLAFAXINE HYDROCHLORIDE 75 MG/1
CAPSULE, EXTENDED RELEASE ORAL
Qty: 90 CAPSULE | Refills: 1 | Status: SHIPPED | OUTPATIENT
Start: 2023-12-07

## 2023-12-07 RX ORDER — VENLAFAXINE HYDROCHLORIDE 150 MG/1
CAPSULE, EXTENDED RELEASE ORAL
Qty: 90 CAPSULE | Refills: 1 | Status: SHIPPED | OUTPATIENT
Start: 2023-12-07

## 2023-12-11 ENCOUNTER — APPOINTMENT (OUTPATIENT)
Dept: LAB | Facility: CLINIC | Age: 68
End: 2023-12-11
Payer: MEDICARE

## 2023-12-11 DIAGNOSIS — F41.1 GENERALIZED ANXIETY DISORDER: ICD-10-CM

## 2023-12-11 DIAGNOSIS — E78.2 MIXED HYPERLIPIDEMIA: ICD-10-CM

## 2023-12-11 LAB
ALBUMIN SERPL BCP-MCNC: 4 G/DL (ref 3.5–5)
ALP SERPL-CCNC: 75 U/L (ref 34–104)
ALT SERPL W P-5'-P-CCNC: 12 U/L (ref 7–52)
ANION GAP SERPL CALCULATED.3IONS-SCNC: 6 MMOL/L
AST SERPL W P-5'-P-CCNC: 14 U/L (ref 13–39)
BILIRUB SERPL-MCNC: 0.36 MG/DL (ref 0.2–1)
BUN SERPL-MCNC: 10 MG/DL (ref 5–25)
CALCIUM SERPL-MCNC: 8.8 MG/DL (ref 8.4–10.2)
CHLORIDE SERPL-SCNC: 107 MMOL/L (ref 96–108)
CO2 SERPL-SCNC: 27 MMOL/L (ref 21–32)
CREAT SERPL-MCNC: 0.59 MG/DL (ref 0.6–1.3)
GFR SERPL CREATININE-BSD FRML MDRD: 95 ML/MIN/1.73SQ M
GLUCOSE P FAST SERPL-MCNC: 94 MG/DL (ref 65–99)
LDLC SERPL DIRECT ASSAY-MCNC: 76 MG/DL (ref 0–100)
POTASSIUM SERPL-SCNC: 3.9 MMOL/L (ref 3.5–5.3)
PROT SERPL-MCNC: 6.7 G/DL (ref 6.4–8.4)
SODIUM SERPL-SCNC: 140 MMOL/L (ref 135–147)
TRIGL SERPL-MCNC: 120 MG/DL
TSH SERPL DL<=0.05 MIU/L-ACNC: 2.01 UIU/ML (ref 0.45–4.5)

## 2023-12-11 PROCEDURE — 84443 ASSAY THYROID STIM HORMONE: CPT

## 2023-12-11 PROCEDURE — 36415 COLL VENOUS BLD VENIPUNCTURE: CPT

## 2023-12-11 PROCEDURE — 83721 ASSAY OF BLOOD LIPOPROTEIN: CPT

## 2023-12-11 PROCEDURE — 84478 ASSAY OF TRIGLYCERIDES: CPT

## 2023-12-11 PROCEDURE — 80053 COMPREHEN METABOLIC PANEL: CPT

## 2023-12-19 ENCOUNTER — APPOINTMENT (OUTPATIENT)
Dept: RADIOLOGY | Facility: CLINIC | Age: 68
End: 2023-12-19
Payer: MEDICARE

## 2023-12-19 ENCOUNTER — OFFICE VISIT (OUTPATIENT)
Dept: OBGYN CLINIC | Facility: CLINIC | Age: 68
End: 2023-12-19
Payer: MEDICARE

## 2023-12-19 VITALS
HEIGHT: 63 IN | HEART RATE: 79 BPM | DIASTOLIC BLOOD PRESSURE: 75 MMHG | BODY MASS INDEX: 23.74 KG/M2 | WEIGHT: 134 LBS | SYSTOLIC BLOOD PRESSURE: 146 MMHG

## 2023-12-19 DIAGNOSIS — M25.521 PAIN IN RIGHT ELBOW: ICD-10-CM

## 2023-12-19 DIAGNOSIS — M70.21 OLECRANON BURSITIS OF RIGHT ELBOW: ICD-10-CM

## 2023-12-19 DIAGNOSIS — S52.124A CLOSED NONDISPLACED FRACTURE OF HEAD OF RIGHT RADIUS, INITIAL ENCOUNTER: Primary | ICD-10-CM

## 2023-12-19 PROCEDURE — 99213 OFFICE O/P EST LOW 20 MIN: CPT | Performed by: ORTHOPAEDIC SURGERY

## 2023-12-19 PROCEDURE — 24650 CLTX RDL HEAD/NCK FX WO MNPJ: CPT | Performed by: ORTHOPAEDIC SURGERY

## 2023-12-19 PROCEDURE — 73080 X-RAY EXAM OF ELBOW: CPT

## 2023-12-19 PROCEDURE — 20605 DRAIN/INJ JOINT/BURSA W/O US: CPT | Performed by: ORTHOPAEDIC SURGERY

## 2023-12-19 NOTE — PROGRESS NOTES
Assessment/Plan:   Diagnoses and all orders for this visit:    Closed nondisplaced fracture of head of right radius, initial encounter  -     Comfort Arm Sling  -     Fracture / Dislocation Treatment    Pain in right elbow  -     XR elbow 3+ vw right; Future    Olecranon bursitis of right elbow  -     Medium joint arthrocentesis: R olecranon bursa         Reviewed physical exam and imaging with patient at time of visit. Radiographic findings demonstrate a fracture of the radial head. Her symptoms are also consistent with olecranon bursitis, secondary to the fracture. She was offered and accepted an aspiration of her Right elbow for symptomatic relief. 8cc of clear fluid was aspirated. She tolerated the procedure well. Ice and post injection protocol advised. She was provided a sling for immobilization as tolerated. She will follow-up in 5 weeks for repeat x-rays of her right elbow. The patient expresses understanding and is in agreement with today's treatment plan.     The patient has an occult fracture of her radial head of her right proximal radius.  It is nondisplaced.  Is starting to heal.  She was placed in a sling.  She also has olecranon bursitis in which 8 cc of clear fluid were injected aspirated.  Return back in 1 month evaluation with new x-rays of right elbow-2 views    Subjective:   Patient ID: Surekha Tenorio  1955     HPI  Patient is a 68 y.o. female who presents for initial evaluation of her right elbow. The patient states that she experienced a fall directly onto her elbow approximately 2 weeks ago. She states that she experienced immediate pain and swelling. She states that the swelling remained the same. She reports significant pain with movement and with point tenderness. She denies numbness and tingling.     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     Drug intolerance     Dysfunction of right eustachian tube     Dysfunctional uterine bleeding     Edema     Elevated d-dimer     Exposure to hepatitis C     Fracture of fibula     Generalized anxiety disorder     GERD (gastroesophageal reflux disease)     Hyperlipidemia     Mild cervical dysplasia     Myalgia     Myositis     Palpitations     PONV (postoperative nausea and vomiting)     Pre-syncope     Seasonal allergies     Stress incontinence     Thrombocytopenia (HCC)     Urinary retention     Urinary tract infection     Uterine leiomyoma        Past Surgical History:   Procedure Laterality Date    BLADDER SURGERY      BREAST BIOPSY Right     US guided    COLONOSCOPY      COLPOSCOPY  01/06/1998    DENTAL SURGERY      ENDOMETRIAL BIOPSY  05/21/1993    HYSTERECTOMY      HYSTEROSCOPY      uterus- 1/22/98, 7/1/02 and 4/10/07    ND ARTHRP KNE CONDYLE&PLATU MEDIAL&LAT COMPARTMENTS Right 2/8/2023    Procedure: ARTHROPLASTY KNEE TOTAL;  Surgeon: Miguel Gleason DO;  Location: [facility];  Service: Orthopedics    TUBAL LIGATION      US GUIDED BREAST BIOPSY RIGHT COMPLETE Right 08/08/2018       Family History   Problem Relation Age of Onset    Stroke Mother         cerebral artery occlusion with cerebral infarction    Coronary artery disease Mother     Arrhythmia Mother         sinus    Coronary artery disease Father     Diabetes Father     Aortic aneurysm Family     Arthritis Family     Diabetes Family     Heart disease Family     No Known Problems Daughter     No Known  Problems Maternal Grandmother     No Known Problems Paternal Grandmother     No Known Problems Daughter     No Known Problems Maternal Aunt     Ovarian cancer Maternal Aunt        Social History     Socioeconomic History    Marital status: /Civil Union     Spouse name: None    Number of children: None    Years of education: None    Highest education level: None   Occupational History    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    Occupation: housewife   Tobacco Use    Smoking status: Every Day     Current packs/day: 1.50     Average packs/day: 1.5 packs/day for 52.0 years (77.9 ttl pk-yrs)     Types: Cigarettes     Start date: 1972     Passive exposure: Never    Smokeless tobacco: Never   Vaping Use    Vaping status: Never Used   Substance and Sexual Activity    Alcohol use: Never    Drug use: No    Sexual activity: Not Currently   Other Topics Concern    None   Social History Narrative    Caffeine use     Social Determinants of Health     Financial Resource Strain: Low Risk  (3/6/2023)    Overall Financial Resource Strain (CARDIA)     Difficulty of Paying Living Expenses: Not hard at all   Food Insecurity: No Food Insecurity (2/9/2023)    Hunger Vital Sign     Worried About Running Out of Food in the Last Year: Never true     Ran Out of Food in the Last Year: Never true   Transportation Needs: No Transportation Needs (3/6/2023)    PRAPARE - Transportation     Lack of Transportation (Medical): No     Lack of Transportation (Non-Medical): No   Physical Activity: Not on file   Stress: Not on file   Social Connections: Not on file   Intimate Partner Violence: Not on file   Housing Stability: Low Risk  (2/9/2023)    Housing Stability Vital Sign     Unable to Pay for Housing in the Last Year: No     Number of Places Lived in the Last Year: 1     Unstable Housing in the Last Year: No         Current Outpatient Medications:     acetaminophen (TYLENOL) 500 mg tablet, Take 500 mg by mouth every 6 (six)  hours as needed for mild pain, Disp: , Rfl:     alendronate (FOSAMAX) 70 mg tablet, take 1 tablet by mouth every 7 days, Disp: 8 tablet, Rfl: 1    atorvastatin (LIPITOR) 20 mg tablet, Take 1 tablet (20 mg total) by mouth daily after dinner, Disp: 90 tablet, Rfl: 1    buPROPion (WELLBUTRIN XL) 150 mg 24 hr tablet, 150 mg every morning, Disp: , Rfl:     clonazePAM (KlonoPIN) 1 mg tablet, 2 (two) times a day as needed, Disp: , Rfl:     fluticasone (FLONASE) 50 mcg/act nasal spray, 1 spray into each nostril daily, Disp: 16 g, Rfl: 0    gabapentin (NEURONTIN) 400 mg capsule, Take 1 capsule (400 mg total) by mouth 3 (three) times a day, Disp: 270 capsule, Rfl: 1    guaiFENesin (Mucinex) 600 mg 12 hr tablet, Take 1 tablet (600 mg total) by mouth every 12 (twelve) hours, Disp: 20 tablet, Rfl: 0    omeprazole (PriLOSEC) 40 MG capsule, take 1 capsule by mouth once daily, Disp: 90 capsule, Rfl: 1    solifenacin (VESICARE) 10 MG tablet, Take 1 tablet (10 mg total) by mouth daily, Disp: 90 tablet, Rfl: 1    tretinoin (RETIN-A) 0.1 % cream, Apply topically daily at bedtime, Disp: 45 g, Rfl: 5    venlafaxine (EFFEXOR-XR) 150 mg 24 hr capsule, take 1 capsule by mouth once daily, Disp: 90 capsule, Rfl: 1    venlafaxine (EFFEXOR-XR) 75 mg 24 hr capsule, take 1 capsule by mouth once daily, Disp: 90 capsule, Rfl: 1    ascorbic acid (VITAMIN C) 500 MG tablet, Take 1 tablet (500 mg total) by mouth 2 (two) times a day (Patient taking differently: Take 1,000 mg by mouth 2 (two) times a day), Disp: 60 tablet, Rfl: 1    guaifenesin-codeine (GUAIFENESIN AC) 100-10 MG/5ML liquid, Take 5 mL by mouth 3 (three) times a day as needed for cough (Patient not taking: Reported on 11/17/2023), Disp: 120 mL, Rfl: 0    Multiple Vitamins-Minerals (multivitamin with minerals) tablet, Take 1 tablet by mouth daily, Disp: 30 tablet, Rfl: 1    No Known Allergies    Review of Systems   Constitutional:  Negative for chills, fever and unexpected weight change.  "  HENT:  Negative for hearing loss, nosebleeds and sore throat.    Eyes:  Negative for pain, redness and visual disturbance.   Respiratory:  Negative for cough, shortness of breath and wheezing.    Cardiovascular:  Negative for chest pain, palpitations and leg swelling.   Gastrointestinal:  Negative for abdominal pain, nausea and vomiting.   Endocrine: Negative for polydipsia and polyuria.   Genitourinary:  Negative for dysuria and hematuria.   Skin:  Negative for rash and wound.   Neurological:  Negative for dizziness, numbness and headaches.   Psychiatric/Behavioral:  Negative for decreased concentration and suicidal ideas. The patient is not nervous/anxious.    All other systems reviewed and are negative.       Objective:  /75 (BP Location: Right arm, Patient Position: Sitting, Cuff Size: Standard)   Pulse 79   Ht 5' 3\" (1.6 m)   Wt 60.8 kg (134 lb) Comment: reported  BMI 23.74 kg/m²     Ortho Exam  right Elbow -   Skin is warm and dry to touch with no signs of erythema, ecchymosis, or infection   Moderate bursal tissue swelling or effusion noted  No palpable crepitus with passive motion  TTP olecranon process, radial head  ROM: flexion 130°, extension 0°, pronation 90°,  and supination 90°  Pain with active range of motion  Demonstrates normal shoulder, wrist, and finger motion  No radial head instability  No ulnar nerve subluxation  2+ distal radial pulse with brisk capillary refill to the fingers  Radial, median, and ulnar motor and sensory distrubution intact  Sensation to light touch intact distally       Physical Exam  HENT:      Head: Normocephalic and atraumatic.      Nose: Nose normal.   Eyes:      Conjunctiva/sclera: Conjunctivae normal.   Cardiovascular:      Rate and Rhythm: Normal rate.   Pulmonary:      Effort: Pulmonary effort is normal.   Musculoskeletal:      Cervical back: Neck supple.   Skin:     General: Skin is warm and dry.      Capillary Refill: Capillary refill takes less than 2 " "seconds.   Neurological:      Mental Status: She is alert and oriented to person, place, and time.   Psychiatric:         Mood and Affect: Mood normal.         Behavior: Behavior normal.          Diagnostic Test Review:  The attending physician has personally reviewed the pertinent films in PACS and the interpretation is as follows:    X-Ray of right elbow taken on 12/19/23 were reviewed and showed  fracture of the radial head .      Fracture / Dislocation Treatment    Date/Time: 12/19/2023 9:00 AM    Performed by: Miguel Gleason DO  Authorized by: Miguel Gleason DO    Patient Location:  Clinic  [place] Protocol:  Consent: Verbal consent obtained.  Risks and benefits: risks, benefits and alternatives were discussed  Consent given by: patient  Time out: Immediately prior to procedure a \"time out\" was called to verify the correct patient, procedure, equipment, support staff and site/side marked as required.  Timeout called at: 12/19/2023 9:25 AM.  Patient understanding: patient states understanding of the procedure being performed  Site marked: the operative site was marked  Patient identity confirmed: verbally with patient    Injury location:  Elbow  Location details:  Right elbow  Injury type:  Fracture  Fracture type: radial head    Fracture type: radial head    Neurovascular status: Neurovascularly intact    Distal perfusion: normal    Neurological function: normal    Range of motion: reduced    Local anesthesia used?: No    Manipulation performed?: No    Immobilization:  Sling  Neurovascular status: Neurovascularly intact    Distal perfusion: normal    Neurological function: normal    Range of motion: unchanged    Patient tolerance:  Patient tolerated the procedure well with no immediate complications  Medium joint arthrocentesis: R olecranon bursa  Universal Protocol:  Consent: Verbal consent obtained.  Risks and benefits: risks, benefits and alternatives were discussed  Consent given by: parent and " patient  Timeout called at: 12/19/2023 9:25 AM.  Patient understanding: patient states understanding of the procedure being performed  Site marked: the operative site was marked  Radiology Images displayed and confirmed. If images not available, report reviewed: imaging studies available  Patient identity confirmed: verbally with patient  Supporting Documentation  Indications: joint swelling and pain   Procedure Details  Location: elbow - R olecranon bursa  Needle size: 18 G  Ultrasound guidance: no  Approach: posterior    Aspirate amount: 8 mL  Aspirate: clear  Patient tolerance: patient tolerated the procedure well with no immediate complications  Dressing:  Sterile dressing applied             Scribe Attestation      I,:  Bouchra Willis am acting as a scribe while in the presence of the attending physician.:       I,:  Miguel Gleason, DO personally performed the services described in this documentation    as scribed in my presence.:

## 2023-12-19 NOTE — PROGRESS NOTES
Assessment/Plan:   Diagnoses and all orders for this visit:    Pain in right elbow  -     XR elbow 3+ vw right; Future         Discussed with patient that her symptoms are consistent with a flare-up of greater trochanteric bursitis of her left hip. She was offered and accepted an injection(s) of Kenalog and Marcaine to her Left hip(s) for symptomatic relief of pain and inflammation. Patient tolerated the treatment(s) well. Ice and post injection protocol advised. Weightbearing activities as tolerated. She will be seen for follow-up in 3 months for re-evaluation and consideration for repeat injections as necessary. Patient expresses understanding and is in agreement with this treatment plan. The patient was given the opportunity to ask questions or present concerns.      Subjective:   Patient ID: Surekha Tenorio  1955     HPI  Patient is a 68 y.o. female who presents for follow-up evaluation of her Left hip. The patient was last seen on 10/20/23, where she received a CS injection for treatment of greater trochanteric bursitis of her Left hip. The patient reports relief following the previous CS injection. However, the pain returned approximately 2 weeks ago. On today's presentation, she reports pain along the lateral aspect of their hip. She states that the pain is exacerbated by weight bearing activities, walking. She also reports point tenderness. She is currently being treated for lumbar and cervical pathologies. She denies feelings of instability or weakness.       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    • Drug intolerance    • Dysfunction of right eustachian tube    • Dysfunctional uterine bleeding    • Edema    • Elevated d-dimer    • Exposure to hepatitis C    • Fracture of fibula    • Generalized anxiety disorder    • GERD (gastroesophageal reflux disease)    • Hyperlipidemia    • Mild cervical dysplasia    • Myalgia    • Myositis    • Palpitations    • PONV (postoperative nausea and vomiting)    • Pre-syncope    • Seasonal allergies    • Stress incontinence    • Thrombocytopenia (HCC)    • Urinary retention    • Urinary tract infection    • Uterine leiomyoma        Past Surgical History:   Procedure Laterality Date   • BLADDER SURGERY     • BREAST BIOPSY Right     US guided   • COLONOSCOPY     • COLPOSCOPY  01/06/1998   • DENTAL SURGERY     • ENDOMETRIAL BIOPSY  05/21/1993   • HYSTERECTOMY     • HYSTEROSCOPY      uterus- 1/22/98, 7/1/02 and 4/10/07   • RI ARTHRP KNE CONDYLE&PLATU MEDIAL&LAT COMPARTMENTS Right 2/8/2023    Procedure: ARTHROPLASTY KNEE TOTAL;  Surgeon: Miguel Gleason DO;  Location: [facility];  Service: Orthopedics   • TUBAL LIGATION     • US GUIDED BREAST BIOPSY RIGHT COMPLETE Right 08/08/2018       Family History   Problem Relation Age of Onset   • Stroke Mother         cerebral artery occlusion with cerebral infarction   • Coronary artery disease Mother    • Arrhythmia Mother         sinus   • Coronary artery disease Father    • Diabetes Father    • Aortic aneurysm Family    • Arthritis Family    • Diabetes Family    • Heart disease Family    • No Known Problems Daughter    • No Known Problems Maternal Grandmother    • No Known Problems Paternal Grandmother    • No Known Problems Daughter    • No Known Problems Maternal Aunt    • Ovarian cancer Maternal Aunt        Social History      Socioeconomic History   • Marital status: /Civil Union     Spouse name: None   • Number of children: None   • Years of education: None   • Highest education level: None   Occupational History   • 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   • Occupation: housewife   Tobacco Use   • Smoking status: Every Day     Current packs/day: 1.50     Average packs/day: 1.5 packs/day for 52.0 years (77.9 ttl pk-yrs)     Types: Cigarettes     Start date: 1972     Passive exposure: Never   • Smokeless tobacco: Never   Vaping Use   • Vaping status: Never Used   Substance and Sexual Activity   • Alcohol use: Never   • Drug use: No   • Sexual activity: Not Currently   Other Topics Concern   • None   Social History Narrative    Caffeine use     Social Determinants of Health     Financial Resource Strain: Low Risk  (3/6/2023)    Overall Financial Resource Strain (CARDIA)    • Difficulty of Paying Living Expenses: Not hard at all   Food Insecurity: No Food Insecurity (2/9/2023)    Hunger Vital Sign    • Worried About Running Out of Food in the Last Year: Never true    • Ran Out of Food in the Last Year: Never true   Transportation Needs: No Transportation Needs (3/6/2023)    PRAPARE - Transportation    • Lack of Transportation (Medical): No    • Lack of Transportation (Non-Medical): No   Physical Activity: Not on file   Stress: Not on file   Social Connections: Not on file   Intimate Partner Violence: Not on file   Housing Stability: Low Risk  (2/9/2023)    Housing Stability Vital Sign    • Unable to Pay for Housing in the Last Year: No    • Number of Places Lived in the Last Year: 1    • Unstable Housing in the Last Year: No         Current Outpatient Medications:   •  acetaminophen (TYLENOL) 500 mg tablet, Take 500 mg by mouth every 6 (six) hours as needed for mild pain, Disp: , Rfl:   •  alendronate (FOSAMAX) 70 mg tablet, take 1 tablet by mouth every 7 days, Disp: 8 tablet, Rfl: 1  •  atorvastatin (LIPITOR) 20  mg tablet, Take 1 tablet (20 mg total) by mouth daily after dinner, Disp: 90 tablet, Rfl: 1  •  buPROPion (WELLBUTRIN XL) 150 mg 24 hr tablet, 150 mg every morning, Disp: , Rfl:   •  clonazePAM (KlonoPIN) 1 mg tablet, 2 (two) times a day as needed, Disp: , Rfl:   •  fluticasone (FLONASE) 50 mcg/act nasal spray, 1 spray into each nostril daily, Disp: 16 g, Rfl: 0  •  gabapentin (NEURONTIN) 400 mg capsule, Take 1 capsule (400 mg total) by mouth 3 (three) times a day, Disp: 270 capsule, Rfl: 1  •  guaiFENesin (Mucinex) 600 mg 12 hr tablet, Take 1 tablet (600 mg total) by mouth every 12 (twelve) hours, Disp: 20 tablet, Rfl: 0  •  omeprazole (PriLOSEC) 40 MG capsule, take 1 capsule by mouth once daily, Disp: 90 capsule, Rfl: 1  •  solifenacin (VESICARE) 10 MG tablet, Take 1 tablet (10 mg total) by mouth daily, Disp: 90 tablet, Rfl: 1  •  tretinoin (RETIN-A) 0.1 % cream, Apply topically daily at bedtime, Disp: 45 g, Rfl: 5  •  venlafaxine (EFFEXOR-XR) 150 mg 24 hr capsule, take 1 capsule by mouth once daily, Disp: 90 capsule, Rfl: 1  •  venlafaxine (EFFEXOR-XR) 75 mg 24 hr capsule, take 1 capsule by mouth once daily, Disp: 90 capsule, Rfl: 1  •  ascorbic acid (VITAMIN C) 500 MG tablet, Take 1 tablet (500 mg total) by mouth 2 (two) times a day (Patient taking differently: Take 1,000 mg by mouth 2 (two) times a day), Disp: 60 tablet, Rfl: 1  •  guaifenesin-codeine (GUAIFENESIN AC) 100-10 MG/5ML liquid, Take 5 mL by mouth 3 (three) times a day as needed for cough (Patient not taking: Reported on 11/17/2023), Disp: 120 mL, Rfl: 0  •  Multiple Vitamins-Minerals (multivitamin with minerals) tablet, Take 1 tablet by mouth daily, Disp: 30 tablet, Rfl: 1    No Known Allergies    Review of Systems   Constitutional:  Negative for chills, fever and unexpected weight change.   HENT:  Negative for hearing loss, nosebleeds and sore throat.    Eyes:  Negative for pain, redness and visual disturbance.   Respiratory:  Negative for cough,  "shortness of breath and wheezing.    Cardiovascular:  Negative for chest pain, palpitations and leg swelling.   Gastrointestinal:  Negative for abdominal pain, nausea and vomiting.   Endocrine: Negative for polydipsia and polyuria.   Genitourinary:  Negative for dysuria and hematuria.   Skin:  Negative for rash and wound.   Neurological:  Negative for dizziness, numbness and headaches.   Psychiatric/Behavioral:  Negative for decreased concentration and suicidal ideas. The patient is not nervous/anxious.    All other systems reviewed and are negative.       Objective:  /75 (BP Location: Right arm, Patient Position: Sitting, Cuff Size: Standard)   Pulse 79   Ht 5' 3\" (1.6 m)   Wt 60.8 kg (134 lb) Comment: reported  BMI 23.74 kg/m²     Ortho Exam  {RIGHT LEFT BILATERAL:20709} Hip -  Patient presents with ***no anatomical deformity  Ambulates with {Steady Antalgic:56939} gait pattern  Uses {Device: 64083} assistive devices  *** Incision is well healed with no signs of drainage or infection  TTP over *** greater trochanter / trochanteric bursa, *** PSIS, *** piriformis / glute medius, *** anterior hip joint, *** groin  ***No tenderness upon palpation  ROM: *** seated IR, *** seated ER. *** seated hip flexion  *** passive circumduction   MMT: {MMT:44872}  Knee flexor and extensor mechanism intact  Ankle DF and PF mechanism intact  *** Leg Length Discrepancy   *** DIOR, *** FADIR  *** Log roll  2+ TP and DP pulses with brisk capillary refill to the toes  Sural, saphenous, tibial, superficial and deep peroneal motor and sensory distribution intact  Sensation to light touch ***      Physical Exam  HENT:      Head: Normocephalic and atraumatic.      Nose: Nose normal.   Eyes:      Conjunctiva/sclera: Conjunctivae normal.   Cardiovascular:      Rate and Rhythm: Normal rate.   Pulmonary:      Effort: Pulmonary effort is normal.   Musculoskeletal:      Cervical back: Neck supple.   Skin:     General: Skin is warm and " dry.      Capillary Refill: Capillary refill takes less than 2 seconds.   Neurological:      Mental Status: She is alert and oriented to person, place, and time.   Psychiatric:         Mood and Affect: Mood normal.         Behavior: Behavior normal.          Diagnostic Test Review:  ***    Procedures   ***    Scribe Attestation    I,:   am acting as a scribe while in the presence of the attending physician.:       I,:   personally performed the services described in this documentation    as scribed in my presence.:

## 2024-01-10 ENCOUNTER — HOSPITAL ENCOUNTER (OUTPATIENT)
Dept: RADIOLOGY | Facility: HOSPITAL | Age: 69
End: 2024-01-10
Attending: STUDENT IN AN ORGANIZED HEALTH CARE EDUCATION/TRAINING PROGRAM
Payer: MEDICARE

## 2024-01-10 NOTE — TELEPHONE ENCOUNTER
CLM on VM with Dr Greg Olea and request callback to confirm that she got msg 
CLM on pt's vm  if concerns re: liver call PCP; no further pain meds recommended by Dr Bina Pederson; and can discuss length of time on Eliquis at appt on 3/28/23  Can call if any questions/concerns  Have a good weekend 
Caller: Patient     Doctor: Lena Miller    Reason for call: Patient states that the Tylenol is not helping with the pain in the right knee  Is there something else she can take over the counter?     Call back#: [phone]
Pt returned call and states Tylenol is not helping  She is taking more than 3000mg in a 24 period, per pt's statement  States she is taking 4 tablets of unknown strength at a time  Advised that Tylenol is hard on the liver and can damage liver  Need to be careful as ETOH and other meds go through liver as well  Sometimes, pt don't know until someone notices skin looks a little yellow  Pt asked how do you know if your liver is damaged  Explained that she could call PCP and have bloodwork done to check LFTs  Is there anything else that can be prescribed for her pain? Pt is wondering how long she will be on Eliquis? Please advise 
Decreased functional mobility/capacity secondary to impairments listed below

## 2024-01-12 ENCOUNTER — OFFICE VISIT (OUTPATIENT)
Dept: URGENT CARE | Facility: CLINIC | Age: 69
End: 2024-01-12
Payer: MEDICARE

## 2024-01-12 ENCOUNTER — TELEPHONE (OUTPATIENT)
Dept: INTERNAL MEDICINE CLINIC | Facility: CLINIC | Age: 69
End: 2024-01-12

## 2024-01-12 VITALS
DIASTOLIC BLOOD PRESSURE: 75 MMHG | HEART RATE: 74 BPM | SYSTOLIC BLOOD PRESSURE: 145 MMHG | TEMPERATURE: 98.1 F | OXYGEN SATURATION: 96 % | RESPIRATION RATE: 18 BRPM

## 2024-01-12 DIAGNOSIS — G89.29 CHRONIC BILATERAL LOW BACK PAIN WITHOUT SCIATICA: Primary | ICD-10-CM

## 2024-01-12 DIAGNOSIS — M54.50 CHRONIC BILATERAL LOW BACK PAIN WITHOUT SCIATICA: Primary | ICD-10-CM

## 2024-01-12 LAB
SL AMB  POCT GLUCOSE, UA: NEGATIVE
SL AMB LEUKOCYTE ESTERASE,UA: NEGATIVE
SL AMB POCT BILIRUBIN,UA: NEGATIVE
SL AMB POCT BLOOD,UA: NEGATIVE
SL AMB POCT CLARITY,UA: CLEAR
SL AMB POCT COLOR,UA: YELLOW
SL AMB POCT KETONES,UA: NEGATIVE
SL AMB POCT NITRITE,UA: NEGATIVE
SL AMB POCT PH,UA: 6
SL AMB POCT SPECIFIC GRAVITY,UA: 1
SL AMB POCT URINE PROTEIN: NEGATIVE
SL AMB POCT UROBILINOGEN: 0.2

## 2024-01-12 PROCEDURE — G0463 HOSPITAL OUTPT CLINIC VISIT: HCPCS | Performed by: PHYSICIAN ASSISTANT

## 2024-01-12 PROCEDURE — 81002 URINALYSIS NONAUTO W/O SCOPE: CPT | Performed by: PHYSICIAN ASSISTANT

## 2024-01-12 PROCEDURE — 99213 OFFICE O/P EST LOW 20 MIN: CPT | Performed by: PHYSICIAN ASSISTANT

## 2024-01-12 RX ORDER — PREDNISONE 10 MG/1
TABLET ORAL
Qty: 26 TABLET | Refills: 0 | Status: SHIPPED | OUTPATIENT
Start: 2024-01-12

## 2024-01-12 RX ORDER — PREDNISONE 10 MG/1
TABLET ORAL
Qty: 26 TABLET | Refills: 0 | Status: SHIPPED | OUTPATIENT
Start: 2024-01-12 | End: 2024-01-12

## 2024-01-12 RX ORDER — PREDNISONE 10 MG/1
TABLET ORAL
Qty: 26 TABLET | Refills: 0 | Status: SHIPPED | OUTPATIENT
Start: 2024-01-12 | End: 2024-01-15

## 2024-01-12 NOTE — PROGRESS NOTES
[facility]    NAME: Surekha Tenorio is a 68 y.o. female  : 1955    MRN: [id number]  DATE: 2024  TIME: 3:23 PM    Assessment and Plan   Chronic bilateral low back pain without sciatica [M54.50, G89.29]  1. Chronic bilateral low back pain without sciatica  POCT urine dip    predniSONE 10 mg tablet          Patient Instructions     Patient Instructions   Start prednisone to help reduce inflammation.  Take as directed.  Continue muscle relaxer as prescribed.  Alternate ice and heat.  Apply ice to the area 3-4 times daily for 20-30 minutes. start using moist heat a few times a day and do gentle stretches.  Can try over the counter lidocaine patches.  Follow up with pain management.    Chief Complaint     Chief Complaint   Patient presents with    Back Pain     Started 2 weeks ago had epidural now has pain since        History of Present Illness   68-year-old female here with complaint of back pain.  Patient has a history of chronic back pain and has had epidurals in the past.  She had an epidural 2 weeks ago and her pain just seems to be getting worse.  It is on the lower back.  Does not radiate anywhere.  No urinary complaints.  No fever or chills.  She denies any bowel or bladder issues.  No related numbness or tingling in her lower extremities.  She does have a history of spinal stenosis.        Review of Systems   Review of Systems   Constitutional:  Negative for activity change, appetite change, chills, fatigue and fever.   Respiratory:  Negative for cough.    Cardiovascular:  Negative for chest pain.   Gastrointestinal:  Negative for abdominal pain, constipation, diarrhea, nausea and vomiting.   Genitourinary:  Negative for difficulty urinating, dysuria, flank pain, frequency, hematuria and urgency.   Musculoskeletal:  Positive for back pain. Negative for myalgias.   All other systems reviewed and are negative.      Current Medications     Current Outpatient Medications:     predniSONE  10 mg tablet, Take 3 tabs BID X 2 days, 2 tabs BID X 2 days, 1 tab BID X 2 days, 1 tab daily X 2 days, Disp: 26 tablet, Rfl: 0    acetaminophen (TYLENOL) 500 mg tablet, Take 500 mg by mouth every 6 (six) hours as needed for mild pain, Disp: , Rfl:     alendronate (FOSAMAX) 70 mg tablet, take 1 tablet by mouth every 7 days, Disp: 8 tablet, Rfl: 1    ascorbic acid (VITAMIN C) 500 MG tablet, Take 1 tablet (500 mg total) by mouth 2 (two) times a day (Patient taking differently: Take 1,000 mg by mouth 2 (two) times a day), Disp: 60 tablet, Rfl: 1    atorvastatin (LIPITOR) 20 mg tablet, Take 1 tablet (20 mg total) by mouth daily after dinner, Disp: 90 tablet, Rfl: 1    buPROPion (WELLBUTRIN XL) 150 mg 24 hr tablet, 150 mg every morning, Disp: , Rfl:     clonazePAM (KlonoPIN) 1 mg tablet, 2 (two) times a day as needed, Disp: , Rfl:     fluticasone (FLONASE) 50 mcg/act nasal spray, 1 spray into each nostril daily, Disp: 16 g, Rfl: 0    gabapentin (NEURONTIN) 400 mg capsule, Take 1 capsule (400 mg total) by mouth 3 (three) times a day, Disp: 270 capsule, Rfl: 1    guaiFENesin (Mucinex) 600 mg 12 hr tablet, Take 1 tablet (600 mg total) by mouth every 12 (twelve) hours, Disp: 20 tablet, Rfl: 0    guaifenesin-codeine (GUAIFENESIN AC) 100-10 MG/5ML liquid, Take 5 mL by mouth 3 (three) times a day as needed for cough (Patient not taking: Reported on 11/17/2023), Disp: 120 mL, Rfl: 0    Multiple Vitamins-Minerals (multivitamin with minerals) tablet, Take 1 tablet by mouth daily, Disp: 30 tablet, Rfl: 1    omeprazole (PriLOSEC) 40 MG capsule, take 1 capsule by mouth once daily, Disp: 90 capsule, Rfl: 1    solifenacin (VESICARE) 10 MG tablet, Take 1 tablet (10 mg total) by mouth daily, Disp: 90 tablet, Rfl: 1    tretinoin (RETIN-A) 0.1 % cream, Apply topically daily at bedtime, Disp: 45 g, Rfl: 5    venlafaxine (EFFEXOR-XR) 150 mg 24 hr capsule, take 1 capsule by mouth once daily, Disp: 90 capsule, Rfl: 1    venlafaxine (EFFEXOR-XR)  75 mg 24 hr capsule, take 1 capsule by mouth once daily, Disp: 90 capsule, Rfl: 1    Current Allergies     Allergies as of 01/12/2024    (No Known Allergies)          The following portions of the patient's history were reviewed and updated as appropriate: allergies, current medications, past family history, past medical history, past social history, past surgical history and problem list.   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     Drug intolerance     Dysfunction of right eustachian tube     Dysfunctional uterine bleeding     Edema     Elevated d-dimer     Exposure to hepatitis C     Fracture of fibula     Generalized anxiety disorder     GERD (gastroesophageal reflux disease)     Hyperlipidemia     Mild cervical dysplasia     Myalgia     Myositis     Palpitations     PONV (postoperative nausea and vomiting)     Pre-syncope     Seasonal allergies     Stress incontinence     Thrombocytopenia (HCC)     Urinary retention     Urinary tract infection     Uterine leiomyoma      Past Surgical History:   Procedure Laterality Date    BLADDER SURGERY      BREAST BIOPSY Right     US guided    COLONOSCOPY      COLPOSCOPY  01/06/1998    DENTAL SURGERY      ENDOMETRIAL BIOPSY  05/21/1993    HYSTERECTOMY      HYSTEROSCOPY      uterus- 1/22/98, 7/1/02 and 4/10/07    NM ARTHRP KNE CONDYLE&PLATU MEDIAL&LAT COMPARTMENTS Right 2/8/2023    Procedure: ARTHROPLASTY KNEE TOTAL;  Surgeon: Miguel Gleason DO;  Location: [facility];  Service: Orthopedics    TUBAL LIGATION      US GUIDED BREAST BIOPSY RIGHT COMPLETE Right 08/08/2018     Family History    Problem Relation Age of Onset    Stroke Mother         cerebral artery occlusion with cerebral infarction    Coronary artery disease Mother     Arrhythmia Mother         sinus    Coronary artery disease Father     Diabetes Father     Aortic aneurysm Family     Arthritis Family     Diabetes Family     Heart disease Family     No Known Problems Daughter     No Known Problems Maternal Grandmother     No Known Problems Paternal Grandmother     No Known Problems Daughter     No Known Problems Maternal Aunt     Ovarian cancer Maternal Aunt      Social History     Socioeconomic History    Marital status: /Civil Union     Spouse name: Not on file    Number of children: Not on file    Years of education: Not on file    Highest education level: Not on file   Occupational History    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    Occupation: housewife   Tobacco Use    Smoking status: Every Day     Current packs/day: 1.50     Average packs/day: 1.5 packs/day for 52.0 years (78.0 ttl pk-yrs)     Types: Cigarettes     Start date: 1972     Passive exposure: Never    Smokeless tobacco: Never   Vaping Use    Vaping status: Never Used   Substance and Sexual Activity    Alcohol use: Never    Drug use: No    Sexual activity: Not Currently   Other Topics Concern    Not on file   Social History Narrative    Caffeine use     Social Determinants of Health     Financial Resource Strain: Low Risk  (3/6/2023)    Overall Financial Resource Strain (CARDIA)     Difficulty of Paying Living Expenses: Not hard at all   Food Insecurity: No Food Insecurity (2/9/2023)    Hunger Vital Sign     Worried About Running Out of Food in the Last Year: Never true     Ran Out of Food in the Last Year: Never true   Transportation Needs: No Transportation Needs (3/6/2023)    PRAPARE - Transportation     Lack of Transportation (Medical): No     Lack of Transportation (Non-Medical): No   Physical Activity: Not on file   Stress: Not on file   Social  Connections: Not on file   Intimate Partner Violence: Not on file   Housing Stability: Low Risk  (2/9/2023)    Housing Stability Vital Sign     Unable to Pay for Housing in the Last Year: No     Number of Places Lived in the Last Year: 1     Unstable Housing in the Last Year: No     Medications have been verified.    Objective   /75   Pulse 74   Temp 98.1 °F (36.7 °C)   Resp 18   SpO2 96%      Physical Exam   Physical Exam  Vitals and nursing note reviewed.   Constitutional:       General: She is not in acute distress.     Appearance: Normal appearance. She is well-developed.   HENT:      Head: Normocephalic and atraumatic.   Cardiovascular:      Rate and Rhythm: Normal rate and regular rhythm.      Heart sounds: Normal heart sounds. No murmur heard.  Pulmonary:      Effort: Pulmonary effort is normal. No respiratory distress.      Breath sounds: Normal breath sounds.   Abdominal:      General: Bowel sounds are normal.      Tenderness: There is no abdominal tenderness.   Musculoskeletal:      Lumbar back: Spasms and tenderness present. Decreased range of motion.   Neurological:      General: No focal deficit present.      Sensory: Sensation is intact.      Motor: Motor function is intact.      Deep Tendon Reflexes: Reflexes are normal and symmetric.

## 2024-01-12 NOTE — TELEPHONE ENCOUNTER
Patient called stating she had MRI's done of the cervical spine and lumbar spine on 10/18.     Patient states she never heard anything back about those MRI's and is concerned about the results.       She states one of her friends told her if your spinal stenosis gets really bad you can lose the ability to walk and she is very concerned.     Please advise.

## 2024-01-12 NOTE — PATIENT INSTRUCTIONS
Start prednisone to help reduce inflammation.  Take as directed.  Continue muscle relaxer as prescribed.  Alternate ice and heat.  Apply ice to the area 3-4 times daily for 20-30 minutes. start using moist heat a few times a day and do gentle stretches.  Can try over the counter lidocaine patches.  Follow up with pain management.

## 2024-01-15 ENCOUNTER — OFFICE VISIT (OUTPATIENT)
Dept: INTERNAL MEDICINE CLINIC | Facility: CLINIC | Age: 69
End: 2024-01-15
Payer: MEDICARE

## 2024-01-15 VITALS
OXYGEN SATURATION: 97 % | SYSTOLIC BLOOD PRESSURE: 142 MMHG | HEIGHT: 63 IN | TEMPERATURE: 98.3 F | WEIGHT: 132.25 LBS | DIASTOLIC BLOOD PRESSURE: 88 MMHG | HEART RATE: 80 BPM | BODY MASS INDEX: 23.43 KG/M2

## 2024-01-15 DIAGNOSIS — F41.1 GENERALIZED ANXIETY DISORDER: ICD-10-CM

## 2024-01-15 DIAGNOSIS — M51.37 DDD (DEGENERATIVE DISC DISEASE), LUMBOSACRAL: ICD-10-CM

## 2024-01-15 DIAGNOSIS — Z12.11 SCREENING FOR COLON CANCER: ICD-10-CM

## 2024-01-15 DIAGNOSIS — Z23 ENCOUNTER FOR IMMUNIZATION: ICD-10-CM

## 2024-01-15 DIAGNOSIS — T50.905D ADVERSE EFFECT OF DRUG, SUBSEQUENT ENCOUNTER: ICD-10-CM

## 2024-01-15 DIAGNOSIS — G89.4 CHRONIC PAIN SYNDROME: ICD-10-CM

## 2024-01-15 DIAGNOSIS — M48.07 SPINAL STENOSIS OF LUMBOSACRAL REGION: ICD-10-CM

## 2024-01-15 DIAGNOSIS — M50.30 DDD (DEGENERATIVE DISC DISEASE), CERVICAL: ICD-10-CM

## 2024-01-15 DIAGNOSIS — M54.50 CHRONIC BILATERAL LOW BACK PAIN WITHOUT SCIATICA: Primary | ICD-10-CM

## 2024-01-15 DIAGNOSIS — G89.29 CHRONIC BILATERAL LOW BACK PAIN WITHOUT SCIATICA: Primary | ICD-10-CM

## 2024-01-15 PROBLEM — J96.01 ACUTE RESPIRATORY FAILURE WITH HYPOXIA (HCC): Status: ACTIVE | Noted: 2024-01-15

## 2024-01-15 PROBLEM — J95.821 ACUTE POSTPROCEDURAL RESPIRATORY FAILURE (HCC): Status: ACTIVE | Noted: 2024-01-15

## 2024-01-15 PROBLEM — I26.99 ACUTE PULMONARY EMBOLISM WITHOUT ACUTE COR PULMONALE, UNSPECIFIED PULMONARY EMBOLISM TYPE (HCC): Status: ACTIVE | Noted: 2024-01-15

## 2024-01-15 PROCEDURE — G0009 ADMIN PNEUMOCOCCAL VACCINE: HCPCS

## 2024-01-15 PROCEDURE — 99214 OFFICE O/P EST MOD 30 MIN: CPT | Performed by: INTERNAL MEDICINE

## 2024-01-15 PROCEDURE — 90677 PCV20 VACCINE IM: CPT

## 2024-01-15 RX ORDER — DULOXETIN HYDROCHLORIDE 60 MG/1
60 CAPSULE, DELAYED RELEASE ORAL DAILY
Qty: 90 CAPSULE | Refills: 3 | Status: SHIPPED | OUTPATIENT
Start: 2024-01-15 | End: 2024-01-23 | Stop reason: ALTCHOICE

## 2024-01-15 NOTE — PROGRESS NOTES
Assessment/Plan:  Problem List Items Addressed This Visit          Other    Generalized anxiety disorder    Relevant Medications    DULoxetine (CYMBALTA) 60 mg delayed release capsule    Chronic pain syndrome    Relevant Medications    DULoxetine (CYMBALTA) 60 mg delayed release capsule    Other Relevant Orders    Ambulatory referral to Spine & Pain Management    Ambulatory Referral to Neurosurgery    Chronic bilateral low back pain without sciatica - Primary    Relevant Medications    DULoxetine (CYMBALTA) 60 mg delayed release capsule    Other Relevant Orders    Ambulatory referral to Spine & Pain Management    Ambulatory Referral to Neurosurgery     Other Visit Diagnoses       Screening for colon cancer        Relevant Orders    Cologuard    Encounter for immunization        Relevant Orders    Pneumococcal Conjugate Vaccine 20-valent (Pcv20) (Completed)    DDD (degenerative disc disease), cervical        Relevant Medications    DULoxetine (CYMBALTA) 60 mg delayed release capsule    Other Relevant Orders    Ambulatory referral to Spine & Pain Management    Ambulatory Referral to Neurosurgery    DDD (degenerative disc disease), lumbosacral        Relevant Medications    DULoxetine (CYMBALTA) 60 mg delayed release capsule    Other Relevant Orders    Ambulatory referral to Spine & Pain Management    Ambulatory Referral to Neurosurgery    Spinal stenosis of lumbosacral region        Relevant Medications    DULoxetine (CYMBALTA) 60 mg delayed release capsule    Other Relevant Orders    Ambulatory referral to Spine & Pain Management    Ambulatory Referral to Neurosurgery    Adverse effect of drug, subsequent encounter                 Diagnoses and all orders for this visit:    Chronic bilateral low back pain without sciatica  -     Ambulatory referral to Spine & Pain Management; Future  -     Ambulatory Referral to Neurosurgery; Future  -     DULoxetine (CYMBALTA) 60 mg delayed release capsule; Take 1 capsule (60 mg  total) by mouth daily    Screening for colon cancer  -     Cologuard    Encounter for immunization  -     Pneumococcal Conjugate Vaccine 20-valent (Pcv20)    Chronic pain syndrome  -     Ambulatory referral to Spine & Pain Management; Future  -     Ambulatory Referral to Neurosurgery; Future  -     DULoxetine (CYMBALTA) 60 mg delayed release capsule; Take 1 capsule (60 mg total) by mouth daily    DDD (degenerative disc disease), cervical  -     Ambulatory referral to Spine & Pain Management; Future  -     Ambulatory Referral to Neurosurgery; Future  -     DULoxetine (CYMBALTA) 60 mg delayed release capsule; Take 1 capsule (60 mg total) by mouth daily    DDD (degenerative disc disease), lumbosacral  -     Ambulatory referral to Spine & Pain Management; Future  -     Ambulatory Referral to Neurosurgery; Future  -     DULoxetine (CYMBALTA) 60 mg delayed release capsule; Take 1 capsule (60 mg total) by mouth daily    Spinal stenosis of lumbosacral region  -     Ambulatory referral to Spine & Pain Management; Future  -     Ambulatory Referral to Neurosurgery; Future  -     DULoxetine (CYMBALTA) 60 mg delayed release capsule; Take 1 capsule (60 mg total) by mouth daily    Generalized anxiety disorder  -     DULoxetine (CYMBALTA) 60 mg delayed release capsule; Take 1 capsule (60 mg total) by mouth daily    Adverse effect of drug, subsequent encounter        No problem-specific Assessment & Plan notes found for this encounter.    A/P: Doing ok and ?cause of the results delay. Discussed findings. Will refer back to pain management and also neurosx. Continue with steroids and muscle relaxants. May need a longer coarse. Will d/c effexor and try cymbalta due to the pain. Discussed PT and will refer. RTC as one week for f/u.     Subjective:      Patient ID: Surekha Tenorio is a 68 y.o. female.    WF with chronic C and LS spine pain with radiculopathy and seeing pain management in the past, presents due to ongoing pain and  because she has not heard anything about her MRI. Appears pt had C and LS spine MRI several months ago and showed DDD and severe spinal stenosis. Some how results never got back to the pt. Continues with pain. No saddle anesthesia. No changes in bowel or bladder habits. Still with radicular s/s. Reports having an epidural and pain is worse. Seen in the UC the other day and started a fast steroid wean. No improvement. Stopped dusty due to intolerance.         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, Drug intolerance, Dysfunction of right eustachian tube, Dysfunctional uterine bleeding, Edema, Elevated d-dimer, Exposure to hepatitis C, Fracture of fibula, Generalized anxiety disorder, GERD (gastroesophageal reflux disease), Hyperlipidemia, Mild cervical dysplasia, Myalgia, Myositis, Palpitations, PONV (postoperative nausea and vomiting), Pre-syncope, Seasonal allergies, Stress incontinence, Thrombocytopenia (HCC), Urinary retention, Urinary tract infection, and Uterine leiomyoma.,  does not have any pertinent problems on file.,   has a past surgical history that includes Bladder surgery; Breast biopsy (Right); Colposcopy (01/06/1998); Dental surgery; Endometrial biopsy (05/21/1993); Hysteroscopy; Tubal ligation; US guided breast biopsy right complete (Right, 08/08/2018); Hysterectomy; Colonoscopy; and pr arthrp kne condyle&platu medial&lat compartments (Right, 2/8/2023).,  family history includes Aortic aneurysm in her family; Arrhythmia in her mother; Arthritis in her family; Coronary  artery disease in her father and mother; Diabetes in her family and father; Heart disease in her family; No Known Problems in her daughter, daughter, maternal aunt, maternal grandmother, and paternal grandmother; Ovarian cancer in her maternal aunt; Stroke in her mother.,   reports that she has been smoking cigarettes. She started smoking about 52 years ago. She has a 78.1 pack-year smoking history. She has never been exposed to tobacco smoke. She has never used smokeless tobacco. She reports that she does not drink alcohol and does not use drugs.,  is allergic to gabapentin..  Current Outpatient Medications   Medication Sig Dispense Refill    acetaminophen (TYLENOL) 500 mg tablet Take 500 mg by mouth every 6 (six) hours as needed for mild pain      alendronate (FOSAMAX) 70 mg tablet take 1 tablet by mouth every 7 days 8 tablet 1    ascorbic acid (VITAMIN C) 500 MG tablet Take 1 tablet (500 mg total) by mouth 2 (two) times a day (Patient taking differently: Take 1,000 mg by mouth 2 (two) times a day) 60 tablet 1    atorvastatin (LIPITOR) 20 mg tablet Take 1 tablet (20 mg total) by mouth daily after dinner 90 tablet 1    buPROPion (WELLBUTRIN XL) 150 mg 24 hr tablet 150 mg every morning      clonazePAM (KlonoPIN) 1 mg tablet 2 (two) times a day as needed      DULoxetine (CYMBALTA) 60 mg delayed release capsule Take 1 capsule (60 mg total) by mouth daily 90 capsule 3    fluticasone (FLONASE) 50 mcg/act nasal spray 1 spray into each nostril daily 16 g 0    Multiple Vitamins-Minerals (multivitamin with minerals) tablet Take 1 tablet by mouth daily 30 tablet 1    omeprazole (PriLOSEC) 40 MG capsule take 1 capsule by mouth once daily 90 capsule 1    predniSONE 10 mg tablet Take 3 tabs BID X 2 days, 2 tabs BID X 2 days, 1 tab BID X 2 days, 1 tab daily X 2 days 26 tablet 0    solifenacin (VESICARE) 10 MG tablet Take 1 tablet (10 mg total) by mouth daily 90 tablet 1    tretinoin (RETIN-A) 0.1 % cream Apply topically daily at  "bedtime 45 g 5     No current facility-administered medications for this visit.       Review of Systems   Constitutional:  Positive for activity change. Negative for chills, diaphoresis, fatigue and fever.   Respiratory:  Negative for cough, chest tightness, shortness of breath and wheezing.    Cardiovascular:  Negative for chest pain, palpitations and leg swelling.   Gastrointestinal:  Negative for abdominal pain, constipation, diarrhea, nausea and vomiting.   Genitourinary:  Negative for difficulty urinating, dysuria and frequency.   Musculoskeletal:  Positive for back pain, neck pain and neck stiffness. Negative for arthralgias, gait problem and myalgias.   Neurological:  Positive for numbness. Negative for weakness, light-headedness and headaches.   Psychiatric/Behavioral:  Negative for confusion. The patient is not nervous/anxious.        PHQ-2/9 Depression Screening            Objective:  Vitals:    01/15/24 0859   BP: 142/88   Pulse: 80   Temp: 98.3 °F (36.8 °C)   SpO2: 97%   Weight: 60 kg (132 lb 4 oz)   Height: 5' 3\" (1.6 m)     Body mass index is 23.43 kg/m².     Physical Exam  Vitals and nursing note reviewed.   Constitutional:       General: She is not in acute distress.     Appearance: Normal appearance. She is not ill-appearing.   HENT:      Head: Normocephalic and atraumatic.      Mouth/Throat:      Mouth: Mucous membranes are moist.   Eyes:      Extraocular Movements: Extraocular movements intact.      Conjunctiva/sclera: Conjunctivae normal.      Pupils: Pupils are equal, round, and reactive to light.   Musculoskeletal:         General: Tenderness present. No deformity.      Comments: C Spine w/o gross deformities, increase temp, erythema, swelling, or lesions. Tenderness midline C3-C6. ROM ok except extension decreased 50%. . Spasms none. UE strength 5/5 with tone/ROM WNL. DTR 2/4. Grasp wnl  LS Spine w/o gross deformities, increase temp, erythema, swelling, or lesions. Tenderness midline L2-S1. " ROM wnl except extension decreased 50%. Spasms noted. . LE strength 5/5 with tone/ROM WNL. DTR 2/4. . No gait abnormalities(toe/heel walking),but on toe walking difficult. .       Neurological:      General: No focal deficit present.      Mental Status: She is alert and oriented to person, place, and time. Mental status is at baseline.   Psychiatric:         Mood and Affect: Mood normal.         Behavior: Behavior normal.         Thought Content: Thought content normal.         Judgment: Judgment normal.

## 2024-01-16 PROBLEM — J06.9 ACUTE URI: Status: RESOLVED | Noted: 2023-11-17 | Resolved: 2024-01-16

## 2024-01-17 ENCOUNTER — TELEPHONE (OUTPATIENT)
Age: 69
End: 2024-01-17

## 2024-01-17 NOTE — TELEPHONE ENCOUNTER
Caller: Robyn Tenorio    Doctor: Dr Oneill    Reason for call: Patient called to ask for [facility] fax # provided patient with fax#    Call back#: [phone]

## 2024-01-23 ENCOUNTER — TELEPHONE (OUTPATIENT)
Dept: NEUROSURGERY | Facility: CLINIC | Age: 69
End: 2024-01-23

## 2024-01-23 ENCOUNTER — OFFICE VISIT (OUTPATIENT)
Dept: OBGYN CLINIC | Facility: CLINIC | Age: 69
End: 2024-01-23

## 2024-01-23 ENCOUNTER — APPOINTMENT (OUTPATIENT)
Dept: RADIOLOGY | Facility: CLINIC | Age: 69
End: 2024-01-23
Payer: MEDICARE

## 2024-01-23 ENCOUNTER — TELEPHONE (OUTPATIENT)
Age: 69
End: 2024-01-23

## 2024-01-23 ENCOUNTER — OFFICE VISIT (OUTPATIENT)
Dept: NEUROSURGERY | Facility: CLINIC | Age: 69
End: 2024-01-23
Payer: MEDICARE

## 2024-01-23 ENCOUNTER — TELEPHONE (OUTPATIENT)
Dept: PAIN MEDICINE | Facility: CLINIC | Age: 69
End: 2024-01-23

## 2024-01-23 VITALS
SYSTOLIC BLOOD PRESSURE: 134 MMHG | HEIGHT: 63 IN | HEART RATE: 74 BPM | DIASTOLIC BLOOD PRESSURE: 72 MMHG | BODY MASS INDEX: 23.39 KG/M2 | WEIGHT: 132 LBS

## 2024-01-23 VITALS
SYSTOLIC BLOOD PRESSURE: 138 MMHG | HEIGHT: 63 IN | WEIGHT: 129 LBS | OXYGEN SATURATION: 97 % | RESPIRATION RATE: 16 BRPM | DIASTOLIC BLOOD PRESSURE: 68 MMHG | TEMPERATURE: 98.2 F | HEART RATE: 66 BPM | BODY MASS INDEX: 22.86 KG/M2

## 2024-01-23 DIAGNOSIS — G89.29 CHRONIC BILATERAL LOW BACK PAIN WITHOUT SCIATICA: ICD-10-CM

## 2024-01-23 DIAGNOSIS — M62.830 SPASM OF THORACOLUMBAR MUSCLE: Primary | ICD-10-CM

## 2024-01-23 DIAGNOSIS — M54.50 CHRONIC BILATERAL LOW BACK PAIN WITHOUT SCIATICA: ICD-10-CM

## 2024-01-23 DIAGNOSIS — G89.29 CHRONIC LOW BACK PAIN, UNSPECIFIED BACK PAIN LATERALITY, UNSPECIFIED WHETHER SCIATICA PRESENT: ICD-10-CM

## 2024-01-23 DIAGNOSIS — M51.35 DDD (DEGENERATIVE DISC DISEASE), THORACOLUMBAR: ICD-10-CM

## 2024-01-23 DIAGNOSIS — G89.4 CHRONIC PAIN SYNDROME: ICD-10-CM

## 2024-01-23 DIAGNOSIS — M50.30 DDD (DEGENERATIVE DISC DISEASE), CERVICAL: ICD-10-CM

## 2024-01-23 DIAGNOSIS — S52.124D CLOSED NONDISPLACED FRACTURE OF HEAD OF RIGHT RADIUS WITH ROUTINE HEALING, SUBSEQUENT ENCOUNTER: Primary | ICD-10-CM

## 2024-01-23 DIAGNOSIS — S52.124D CLOSED NONDISPLACED FRACTURE OF HEAD OF RIGHT RADIUS WITH ROUTINE HEALING, SUBSEQUENT ENCOUNTER: ICD-10-CM

## 2024-01-23 DIAGNOSIS — M51.37 DDD (DEGENERATIVE DISC DISEASE), LUMBOSACRAL: ICD-10-CM

## 2024-01-23 DIAGNOSIS — M48.07 SPINAL STENOSIS OF LUMBOSACRAL REGION: ICD-10-CM

## 2024-01-23 DIAGNOSIS — M41.9 SCOLIOSIS DEFORMITY OF SPINE: ICD-10-CM

## 2024-01-23 DIAGNOSIS — M54.50 CHRONIC LOW BACK PAIN, UNSPECIFIED BACK PAIN LATERALITY, UNSPECIFIED WHETHER SCIATICA PRESENT: ICD-10-CM

## 2024-01-23 PROCEDURE — 99215 OFFICE O/P EST HI 40 MIN: CPT | Performed by: NURSE PRACTITIONER

## 2024-01-23 PROCEDURE — 73080 X-RAY EXAM OF ELBOW: CPT

## 2024-01-23 PROCEDURE — 99024 POSTOP FOLLOW-UP VISIT: CPT | Performed by: ORTHOPAEDIC SURGERY

## 2024-01-23 RX ORDER — METHOCARBAMOL 500 MG/1
500 TABLET, FILM COATED ORAL EVERY 6 HOURS PRN
Qty: 120 TABLET | Refills: 1 | Status: SHIPPED | OUTPATIENT
Start: 2024-01-23 | End: 2024-03-23

## 2024-01-23 RX ORDER — VENLAFAXINE 100 MG/1
225 TABLET ORAL
COMMUNITY

## 2024-01-23 NOTE — PROGRESS NOTES
Assessment/Plan:    Scoliosis deformity of spine  As addressed in HPI  She denies bowel or bladder dysfunction, saddle anesthesia, weakness in her lower extremities.  She has failed multimodal treatment over the years pain management with Dr. Waggoner, physical therapy, and medicinal treatment.  She consulted with neurosurgery 11/29/2022 and was examined for the scoliosis deformity.  At which time she indicated she does not want to undergo surgery unless she absolutely has to.  It was recommended she continue PT, pain management, and undergo imaging scoliosis x-rays in flexion and extension which she did not complete.  She does examine for a significant right lateral curvature of the thoracolumbar spine and thoracic kyphosis.  Nicotine dependence smoking 1.5 packs of cigarettes a day.  DEXA scan of 2021 demonstrates osteopenia-she reports she is currently taking 2 times a day 500 mg of calcium each and is treating with vitamin D but does not know the dose will check at home that that the dose is 1000 international units a day.  Reports she transferred from Dr. Felix To Dr. Harper because she had several appointments that were canceled, she is now scheduled for an interventional treatment with Dr. Harper.    Imaging  10/18/2023 MRI  lumbar spine without contrast--Spondylotic degenerative changes are seen particularly L4-5 where there is severe central and right lateral recess stenosis as well as severe right foraminal narrowing. Correlate for right L4 or right L5 radiculopathy.Degenerative changes at remaining levels result in moderate central stenosis at L3-4 and mild central stenosis at T12-L1.    Plan  Reviewed MRI lumbar spine with patient  Ordered physical therapyEvaluate and Treat for thoraco-lumbar scoliosis please provide strengthening exercises for thoracic/lumbar paraspinal/and core muscle strengthening in preparation for likely scoliosis surgery ,. Has a significant thoracolumbar curvature .rationale  explaining rationale in great detail to patient and the need for muscle strengthening.    Ordered imaging x-ray lumbar spine 6 views, and scoliosis series the entire spine upright/standing.  Scheduled for return to office after January 29 when the DEXA scan will be completed as a solo appointment with Dr. Mars.  Explained in great detail the need for smoking cessation and an independent risk factor if patient were to undergo correction of scoliosis deformity.  Recommend scheduled acetaminophen dosing 500 mg by mouth every 8 hours and/or 375 3 tabs every 8 hours not to exceed 3000 mg/day.  And to decrease the use of NSAIDs, currently treating with the acetaminophen.  Documented in a usual AVS information from DEXA scan, WHO recommendation for osteopenia treatment dosing for calcium and vitamin D.  Patient verifies she will check all the dosing when she returns home and start taking the dose as per recommendation.  Recommend scheduled methocarbamol dosing was prescribed by the Dr. Waggoner and she is no longer seeing him.  Ordered methocarbamol 500 mg every 6 hours as needed muscle spasm, explained in detail how she could vary the dosing to get optimal relief and combat sedating effect.  Advised if she has additional questions or concerns she should call the neurosurgery office.    .     -          Subjective: Referral from PCP for lumbar spine.    Patient ID: Surekha Tenorio is a 68 y.o. female     HPI   This is a return to office visit for her lumbar spine, last visit was 11/29/2022.  She did not follow through on plan of last visit to complete x-ray lumbar spine flexion and extension, scoliosis series, and for physical therapy.  She has a longstanding history of thoracic, lumbar pain without radicular symptoms into her buttocks or lower extremities.  He has a significant thoracolumbar scoliosis for which she denies knowledge of when this develop cannot remember if she had it as a child, in her teen or early adult  life.  Her  reports she did not have it years ago they noticed it when the pain started and it has progressively worsened.  She reports the scoliosis was diagnosed by the doctor when she started treating for back pain.  Physical assessment demonstrates a significant right lateral curvature of the thoracolumbar spine.  Pain starts in the upper lumbar area with distribution up into the thoracic area up into her neck and at times up into her right scapular area.  She denies previous spine surgery.  She reports the onset of the symptoms to be about 5 to 6 years ago, without any inciting event.  She reports the quality of her symptoms as shooting, stabbing, sharp, like hit in her back with a hammer, intermittent, but is present every day to varying degrees of intensity.  Reports aggravating factors as any type of body movement doing housework, any physical activity moving her back or her arms.  She reports relieving symptoms that somewhat eased the discomfort is not doing anything or her medicinal treatment.   She reports the severity of her symptoms is 10/10, medicinal treatment decreases the pain to about 4-5/10.  She reports today in the office the severity of her symptoms is 4-5/10.  He reports occasionally she has numbness and tingling in her right hand and involving fingers 1 2 and 3 and intermittently in the right leg, and the left hand and fingers to an 4.  Multimodal Treatments::  PT--2022 at Dr Nunez's office   PM  ---has referral [facility] ,  no longer seeing DR Nunez comprehensive spine ,  cancel appointment and injection  w/ DR Mayra Oneill --injection  nerve blocks  , RF ablations  temporary relief,  recent back injection from  DR Nunez --no help gave you more pain    Acetaminophen arthritis  once in the morning 3 arthritis  and one rapid relief  ---aleve  once daily OTC dose  along w/th acetaminophen , on bad days will repeat dose in the afternoon ,  prednisone taper  ---temporary relief  --- recent  treatment prescribed by urgent care   1 weeks ago Friday , Wellbutrin  for depression -does not notice any effect on pain relief., methocarbamol   BID  take when very bad,   CAM topical lidocaine no help  , heating pad  or ice pack ,  TENS   self purchased intermittent relief.       Social:    , children --ages are adults and do not living at home Lives with her , she does not work. Her  supports her he is an  ,   Smoke 1.5 PPD  Drink -socially-        REVIEW OF SYSTEMS  Review of Systems   Respiratory:  Positive for shortness of breath (with exertion). Negative for chest tightness.    Gastrointestinal: Negative.    Genitourinary:         OAB, leakage improved some with meds   Musculoskeletal:  Positive for back pain (LBP radiating up to about the bra line, at times to bi/hips). Negative for gait problem (loss of balance) and myalgias.   Skin: Negative.    Neurological:  Positive for weakness (legs) and numbness (N/T sometimes in right hand and fingers 1,2,3, right leg, and seldom/occasional left hand and fingers 2,4 and leg). Negative for seizures.   Psychiatric/Behavioral:  Positive for sleep disturbance (sometimes).          Meds/Allergies     Current Outpatient Medications   Medication Sig Dispense Refill    acetaminophen (TYLENOL) 500 mg tablet Take 500 mg by mouth every 6 (six) hours as needed for mild pain      alendronate (FOSAMAX) 70 mg tablet take 1 tablet by mouth every 7 days 8 tablet 1    ascorbic acid (VITAMIN C) 500 MG tablet Take 1 tablet (500 mg total) by mouth 2 (two) times a day (Patient taking differently: Take 1,000 mg by mouth daily) 60 tablet 1    atorvastatin (LIPITOR) 20 mg tablet Take 1 tablet (20 mg total) by mouth daily after dinner 90 tablet 1    buPROPion (WELLBUTRIN XL) 150 mg 24 hr tablet 150 mg every morning      clonazePAM (KlonoPIN) 1 mg tablet 2 (two) times a day as needed      methocarbamol (ROBAXIN) 500 mg tablet Take 1 tablet (500 mg  total) by mouth every 6 (six) hours as needed for muscle spasms (Thoracic muscle spasms post op) 120 tablet 1    Multiple Vitamins-Minerals (multivitamin with minerals) tablet Take 1 tablet by mouth daily (Patient taking differently: Take 1 tablet by mouth as needed) 30 tablet 1    omeprazole (PriLOSEC) 40 MG capsule take 1 capsule by mouth once daily 90 capsule 1    solifenacin (VESICARE) 10 MG tablet Take 1 tablet (10 mg total) by mouth daily 90 tablet 1    tretinoin (RETIN-A) 0.1 % cream Apply topically daily at bedtime 45 g 5    venlafaxine (EFFEXOR) 100 MG tablet Take 225 mg by mouth daily at bedtime      fluticasone (FLONASE) 50 mcg/act nasal spray 1 spray into each nostril daily (Patient not taking: Reported on 1/23/2024) 16 g 0    predniSONE 10 mg tablet Take 3 tabs BID X 2 days, 2 tabs BID X 2 days, 1 tab BID X 2 days, 1 tab daily X 2 days (Patient not taking: Reported on 1/23/2024) 26 tablet 0     No current facility-administered medications for this visit.       Allergies   Allergen Reactions    Gabapentin Drowsiness       PAST HISTORY    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     Drug intolerance     Dysfunction of right eustachian tube     Dysfunctional uterine bleeding     Edema     Elevated d-dimer     Exposure to hepatitis C     Fracture of fibula     Generalized anxiety disorder     GERD (gastroesophageal reflux disease)     Hyperlipidemia     Mild cervical dysplasia     Myalgia     Myositis     Palpitations     PONV (postoperative nausea and vomiting)     Pre-syncope     Scoliosis deformity of spine 1/23/2024    Seasonal  allergies     Stress incontinence     Thrombocytopenia (HCC)     Urinary retention     Urinary tract infection     Uterine leiomyoma        Past Surgical History:   Procedure Laterality Date    BLADDER SURGERY      BREAST BIOPSY Right     US guided    COLONOSCOPY      COLPOSCOPY  01/06/1998    DENTAL SURGERY      ENDOMETRIAL BIOPSY  05/21/1993    HYSTERECTOMY      HYSTEROSCOPY      uterus- 1/22/98, 7/1/02 and 4/10/07    MI ARTHRP KNE CONDYLE&PLATU MEDIAL&LAT COMPARTMENTS Right 2/8/2023    Procedure: ARTHROPLASTY KNEE TOTAL;  Surgeon: Migeul Gleason DO;  Location: [facility];  Service: Orthopedics    TUBAL LIGATION      US GUIDED BREAST BIOPSY RIGHT COMPLETE Right 08/08/2018       Social History     Tobacco Use    Smoking status: Every Day     Current packs/day: 1.50     Average packs/day: 1.5 packs/day for 52.1 years (78.1 ttl pk-yrs)     Types: Cigarettes     Start date: 1972     Passive exposure: Never    Smokeless tobacco: Never   Vaping Use    Vaping status: Never Used   Substance Use Topics    Alcohol use: Never    Drug use: No       Family History   Problem Relation Age of Onset    Stroke Mother         cerebral artery occlusion with cerebral infarction    Coronary artery disease Mother     Arrhythmia Mother         sinus    Coronary artery disease Father     Diabetes Father     Aortic aneurysm Family     Arthritis Family     Diabetes Family     Heart disease Family     No Known Problems Daughter     No Known Problems Maternal Grandmother     No Known Problems Paternal Grandmother     No Known Problems Daughter     No Known Problems Maternal Aunt     Ovarian cancer Maternal Aunt        The following portions of the patient's history were reviewed and updated as appropriate: allergies, current medications, past family history, past medical history, past social history, past surgical history and problem list.      EXAM    Vitals:Blood pressure 138/68, pulse 66, temperature 98.2 °F (36.8 °C), temperature  "source Temporal, resp. rate 16, height 5' 3\" (1.6 m), weight 58.5 kg (129 lb), SpO2 97%.,Body mass index is 22.85 kg/m².     Physical Exam  Vitals and nursing note reviewed. Exam conducted with a chaperone present ().   Constitutional:       General: She is not in acute distress.     Appearance: Normal appearance. She is not ill-appearing or diaphoretic.   HENT:      Head: Normocephalic and atraumatic.   Eyes:      General: No scleral icterus.        Right eye: No discharge.         Left eye: No discharge.      Extraocular Movements: Extraocular movements intact.      Conjunctiva/sclera: Conjunctivae normal.   Pulmonary:      Effort: Pulmonary effort is normal. No respiratory distress.   Musculoskeletal:         General: Deformity present.      Right lower leg: No edema.      Left lower leg: No edema.      Comments: Thoracolumbar scoliosis and kyphosis   Neurological:      Mental Status: She is alert and oriented to person, place, and time.      Sensory: No sensory deficit.      Motor: No weakness.      Coordination: Coordination normal.      Gait: Gait is intact. Gait normal.      Deep Tendon Reflexes: Reflexes normal.      Reflex Scores:       Tricep reflexes are 2+ on the right side and 2+ on the left side.       Bicep reflexes are 2+ on the right side and 2+ on the left side.       Brachioradialis reflexes are 2+ on the right side and 2+ on the left side.       Patellar reflexes are 2+ on the right side and 2+ on the left side.       Achilles reflexes are 1+ on the right side and 1+ on the left side.  Psychiatric:         Mood and Affect: Mood normal.         Behavior: Behavior normal.         Neurologic Exam     Mental Status   Oriented to person, place, and time.   Level of consciousness: alert    Motor Exam   Overall muscle tone: decreased    Strength   Right iliopsoas: 5/5  Left iliopsoas: 5/5  Right quadriceps: 5/5  Left quadriceps: 5/5  Right hamstrin/5  Left hamstrin/5  Right glutei: " 5/5  Left glutei: 5/5  Right anterior tibial: 5/5  Left anterior tibial: 5/5  Right posterior tibial: 5/5  Left posterior tibial: 5/5  Right gastroc: 5/5  Left gastroc: 5/5    Sensory Exam   Right arm light touch: normal  Left arm light touch: normal  Right leg light touch: normal  Left leg light touch: normal    Gait, Coordination, and Reflexes     Gait  Gait: normal    Reflexes   Right brachioradialis: 2+  Left brachioradialis: 2+  Right biceps: 2+  Left biceps: 2+  Right triceps: 2+  Left triceps: 2+  Right patellar: 2+  Left patellar: 2+  Right achilles: 1+  Left achilles: 1+  Right ankle clonus: absent  Left ankle clonus: absent      Imaging Studies    5/20/21 . DXA Based on the WHO classification, the T-score of -1.5 in the left hip is consistent with LOW BONE MINERAL DENSITY (OSTEOPENIA).     10/18/2023 MRI Lumbar Spine   MRI LUMBAR SPINE WITHOUT CONTRAST     INDICATION: M47.896: Other spondylosis, lumbar region.     COMPARISON:  None.     TECHNIQUE:  Multiplanar, multisequence imaging of the lumbar spine was performed. .        IMAGE QUALITY:  Diagnostic     FINDINGS:     VERTEBRAL BODIES:  There are 5 lumbar type vertebral bodies. Rightward convex scoliosis apex L1. Slight anterolisthesis L5-S1. Modic type I degenerative marrow signal L4-5 eccentric to the right.     SACRUM:  Normal signal within the sacrum. No evidence of insufficiency or stress fracture.     DISTAL CORD AND CONUS:  Normal size and signal within the distal cord and conus.     PARASPINAL SOFT TISSUES:  Paraspinal soft tissues are unremarkable.     LOWER THORACIC DISC SPACES: T12-L1: Mild annular bulge with small central protrusion type disc herniation results in mild central stenosis.     LUMBAR DISC SPACES:     L1-L2: Degenerative disc osteophyte complex with marginal osteophytes eccentric to the left results in moderate left foraminal narrowing. Mild central stenosis.     L2-L3: Mild annular bulge and marginal osteophytes results in mild  central stenosis. Foramina are patent.     L3-L4: Moderate to severe facet hypertrophy with mild annular bulge results in moderate central stenosis and moderate left foraminal narrowing.     L4-L5: Severe right and moderate left facet hypertrophy identified with diffuse annular bulge. Severe right foraminal narrowing is identified with severe central and right lateral recess stenosis. This is the most severely affected level.     L5-S1: Severe facet degenerative change bilaterally accounts for slight anterolisthesis and uncovering the posterior disc margin. Mild right foraminal narrowing. Minimal central stenosis.     OTHER FINDINGS:  None.     IMPRESSION:     Spondylotic degenerative changes are seen particularly L4-5 where there is severe central and right lateral recess stenosis as well as severe right foraminal narrowing. Correlate for right L4 or right L5 radiculopathy.     Degenerative changes at remaining levels result in moderate central stenosis at L3-4 and mild central stenosis at T12-L1.           I have personally reviewed pertinent reports.   and I have personally reviewed pertinent films in PACS    I have spent a total time of 60 minutes on 01/23/24 in caring for this patient including Diagnostic results, Prognosis, Risks and benefits of tx options, Instructions for management, Patient and family education, Importance of tx compliance, Risk factor reductions, Impressions, Counseling / Coordination of care, Documenting in the medical record, Reviewing / ordering tests, medicine, procedures  , Obtaining or reviewing history  , and Communicating with other healthcare professionals .

## 2024-01-23 NOTE — TELEPHONE ENCOUNTER
Caller: patient     Doctor: Rosibel    Reason for call: patient called to say Thank You to Dr Gleason  No cb needed.

## 2024-01-23 NOTE — TELEPHONE ENCOUNTER
Requested injection notes 2023-present, office notes lumbar spine/anthony follow up    Dr. Sadiq Nunez

## 2024-01-23 NOTE — PROGRESS NOTES
Assessment/Plan:   Diagnoses and all orders for this visit:    Closed nondisplaced fracture of head of right radius with routine healing, subsequent encounter  -     XR elbow 3+ vw right; Future    Chronic low back pain, unspecified back pain laterality, unspecified whether sciatica present  -     Ambulatory referral to Spine & Pain Management; Future         Reviewed physical exam and imaging with patient at time of visit. Clinically and radiographically, the right radial head fracture is healed. She denies pain in the elbow. She has thickening of her olecranon bursitis. Discussed treatment options with patient. She is not interested in surgical intervention at this time. Continue weightbearing activities. She will follow-up as needed for her right elbow. The patient expresses understanding and is in agreement with today's treatment plan.        The fracture is healed clinically and radiographically.  There is evidence of bursitis still present albeit there is no more fluid but a sac.  There is no fluid to retrieved.  Continue home exercise program.  Follow-up on an as-needed basis for her elbow.  She will however be coming back next month for annual examination of her knee with x-rays.    Subjective:   Patient ID: Surekha Tenorio  1955     HPI  Patient is a 68 y.o. female who presents for follow-up evaluation of her right elbow. The patient is approximately 6 weeks s/p radial head fracture. She was last seen in office on 12/19/23 where she received an aspiration for treatment of olecranon bursitis.     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     Drug intolerance     Dysfunction of right eustachian tube     Dysfunctional uterine bleeding     Edema     Elevated d-dimer     Exposure to hepatitis C     Fracture of fibula     Generalized anxiety disorder     GERD (gastroesophageal reflux disease)     Hyperlipidemia     Mild cervical dysplasia     Myalgia     Myositis     Palpitations     PONV (postoperative nausea and vomiting)     Pre-syncope     Seasonal allergies     Stress incontinence     Thrombocytopenia (HCC)     Urinary retention     Urinary tract infection     Uterine leiomyoma        Past Surgical History:   Procedure Laterality Date    BLADDER SURGERY      BREAST BIOPSY Right     US guided    COLONOSCOPY      COLPOSCOPY  01/06/1998    DENTAL SURGERY      ENDOMETRIAL BIOPSY  05/21/1993    HYSTERECTOMY      HYSTEROSCOPY      uterus- 1/22/98, 7/1/02 and 4/10/07    MD ARTHRP KNE CONDYLE&PLATU MEDIAL&LAT COMPARTMENTS Right 2/8/2023    Procedure: ARTHROPLASTY KNEE TOTAL;  Surgeon: Miguel Gleason DO;  Location: [facility];  Service: Orthopedics    TUBAL LIGATION      US GUIDED BREAST BIOPSY RIGHT COMPLETE Right 08/08/2018       Family History   Problem Relation Age of Onset    Stroke Mother         cerebral artery occlusion with cerebral infarction    Coronary artery disease Mother     Arrhythmia Mother         sinus    Coronary artery disease Father     Diabetes Father     Aortic aneurysm Family     Arthritis Family     Diabetes Family     Heart disease Family     No Known Problems Daughter     No Known Problems Maternal Grandmother     No Known Problems Paternal Grandmother     No Known Problems Daughter     No Known Problems Maternal Aunt     Ovarian cancer Maternal Aunt        Social History     Socioeconomic History    Marital  status: /Civil Union     Spouse name: None    Number of children: None    Years of education: None    Highest education level: None   Occupational History    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    Occupation: housewife   Tobacco Use    Smoking status: Every Day     Current packs/day: 1.50     Average packs/day: 1.5 packs/day for 52.1 years (78.1 ttl pk-yrs)     Types: Cigarettes     Start date: 1972     Passive exposure: Never    Smokeless tobacco: Never   Vaping Use    Vaping status: Never Used   Substance and Sexual Activity    Alcohol use: Never    Drug use: No    Sexual activity: Not Currently   Other Topics Concern    None   Social History Narrative    Caffeine use     Social Determinants of Health     Financial Resource Strain: Low Risk  (3/6/2023)    Overall Financial Resource Strain (CARDIA)     Difficulty of Paying Living Expenses: Not hard at all   Food Insecurity: No Food Insecurity (2/9/2023)    Hunger Vital Sign     Worried About Running Out of Food in the Last Year: Never true     Ran Out of Food in the Last Year: Never true   Transportation Needs: No Transportation Needs (3/6/2023)    PRAPARE - Transportation     Lack of Transportation (Medical): No     Lack of Transportation (Non-Medical): No   Physical Activity: Not on file   Stress: Not on file   Social Connections: Not on file   Intimate Partner Violence: Not on file   Housing Stability: Low Risk  (2/9/2023)    Housing Stability Vital Sign     Unable to Pay for Housing in the Last Year: No     Number of Places Lived in the Last Year: 1     Unstable Housing in the Last Year: No         Current Outpatient Medications:     acetaminophen (TYLENOL) 500 mg tablet, Take 500 mg by mouth every 6 (six) hours as needed for mild pain, Disp: , Rfl:     alendronate (FOSAMAX) 70 mg tablet, take 1 tablet by mouth every 7 days, Disp: 8 tablet, Rfl: 1    atorvastatin (LIPITOR) 20 mg tablet, Take 1 tablet (20 mg total) by mouth daily after  dinner, Disp: 90 tablet, Rfl: 1    buPROPion (WELLBUTRIN XL) 150 mg 24 hr tablet, 150 mg every morning, Disp: , Rfl:     clonazePAM (KlonoPIN) 1 mg tablet, 2 (two) times a day as needed, Disp: , Rfl:     DULoxetine (CYMBALTA) 60 mg delayed release capsule, Take 1 capsule (60 mg total) by mouth daily, Disp: 90 capsule, Rfl: 3    fluticasone (FLONASE) 50 mcg/act nasal spray, 1 spray into each nostril daily, Disp: 16 g, Rfl: 0    omeprazole (PriLOSEC) 40 MG capsule, take 1 capsule by mouth once daily, Disp: 90 capsule, Rfl: 1    predniSONE 10 mg tablet, Take 3 tabs BID X 2 days, 2 tabs BID X 2 days, 1 tab BID X 2 days, 1 tab daily X 2 days, Disp: 26 tablet, Rfl: 0    solifenacin (VESICARE) 10 MG tablet, Take 1 tablet (10 mg total) by mouth daily, Disp: 90 tablet, Rfl: 1    tretinoin (RETIN-A) 0.1 % cream, Apply topically daily at bedtime, Disp: 45 g, Rfl: 5    ascorbic acid (VITAMIN C) 500 MG tablet, Take 1 tablet (500 mg total) by mouth 2 (two) times a day (Patient taking differently: Take 1,000 mg by mouth 2 (two) times a day), Disp: 60 tablet, Rfl: 1    Multiple Vitamins-Minerals (multivitamin with minerals) tablet, Take 1 tablet by mouth daily, Disp: 30 tablet, Rfl: 1    Allergies   Allergen Reactions    Gabapentin Drowsiness       Review of Systems   Constitutional:  Negative for chills, fever and unexpected weight change.   HENT:  Negative for hearing loss, nosebleeds and sore throat.    Eyes:  Negative for pain, redness and visual disturbance.   Respiratory:  Negative for cough, shortness of breath and wheezing.    Cardiovascular:  Negative for chest pain, palpitations and leg swelling.   Gastrointestinal:  Negative for abdominal pain, nausea and vomiting.   Endocrine: Negative for polydipsia and polyuria.   Genitourinary:  Negative for dysuria and hematuria.   Skin:  Negative for rash and wound.   Neurological:  Negative for dizziness, numbness and headaches.   Psychiatric/Behavioral:  Negative for decreased  "concentration and suicidal ideas. The patient is not nervous/anxious.    All other systems reviewed and are negative.       Objective:  /72   Pulse 74   Ht 5' 3\" (1.6 m)   Wt 59.9 kg (132 lb)   BMI 23.38 kg/m²     Ortho Exam  right Elbow -   No anatomical deformity  Skin is warm and dry to touch with no signs of erythema, ecchymosis, or infection   No soft tissue swelling or effusion noted  No palpable crepitus with passive motion  Bursal thickening noted over the olecranon, no fluid palpated   TTP olecranon  ROM: flexion 130°, extension 0°, pronation 90°,  and supination 90°  Strength: 5/5 throughout  Demonstrates normal shoulder, wrist, and finger motion  2+ distal radial pulse with brisk capillary refill to the fingers  Radial, median, and ulnar motor and sensory distrubution intact  Sensation to light touch intact distally       Physical Exam  HENT:      Head: Normocephalic and atraumatic.      Nose: Nose normal.   Eyes:      Conjunctiva/sclera: Conjunctivae normal.   Cardiovascular:      Rate and Rhythm: Normal rate.   Pulmonary:      Effort: Pulmonary effort is normal.   Musculoskeletal:      Cervical back: Neck supple.   Skin:     General: Skin is warm and dry.      Capillary Refill: Capillary refill takes less than 2 seconds.   Neurological:      Mental Status: She is alert and oriented to person, place, and time.   Psychiatric:         Mood and Affect: Mood normal.         Behavior: Behavior normal.          Diagnostic Test Review:  The attending physician has personally reviewed the pertinent films in PACS and the interpretation is as follows:    X-Ray of right elbow taken on 1/23/24 were reviewed and showed healed radial head fracture.      Procedures   None performed.    Scribe Attestation      I,:   am acting as a scribe while in the presence of the attending physician.:       I,:   personally performed the services described in this documentation    as scribed in my presence.:              "

## 2024-01-23 NOTE — PATIENT INSTRUCTIONS
A daily intake of at least 1200 mg calcium and 800 to 1000 IU of Vitamin D, as well as weight bearing and muscle strengthening exercise, fall prevention and avoidance of tobacco and excessive alcohol intake as basic preventive measures are suggested.

## 2024-01-23 NOTE — ASSESSMENT & PLAN NOTE
As addressed in HPI  She denies bowel or bladder dysfunction, saddle anesthesia, weakness in her lower extremities.  She has failed multimodal treatment over the years pain management with Dr. Waggoner, physical therapy, and medicinal treatment.  She consulted with neurosurgery 11/29/2022 and was examined for the scoliosis deformity.  At which time she indicated she does not want to undergo surgery unless she absolutely has to.  It was recommended she continue PT, pain management, and undergo imaging scoliosis x-rays in flexion and extension which she did not complete.  She does examine for a significant right lateral curvature of the thoracolumbar spine and thoracic kyphosis.  Nicotine dependence smoking 1.5 packs of cigarettes a day.  DEXA scan of 2021 demonstrates osteopenia-she reports she is currently taking 2 times a day 500 mg of calcium each and is treating with vitamin D but does not know the dose will check at home that that the dose is 1000 international units a day.  Reports she transferred from Dr. Felix To Dr. Harper because she had several appointments that were canceled, she is now scheduled for an interventional treatment with Dr. Harper.    Imaging  10/18/2023 MRI  lumbar spine without contrast--Spondylotic degenerative changes are seen particularly L4-5 where there is severe central and right lateral recess stenosis as well as severe right foraminal narrowing. Correlate for right L4 or right L5 radiculopathy.Degenerative changes at remaining levels result in moderate central stenosis at L3-4 and mild central stenosis at T12-L1.    Plan  Reviewed MRI lumbar spine with patient  Ordered physical therapyEvaluate and Treat for thoraco-lumbar scoliosis please provide strengthening exercises for thoracic/lumbar paraspinal/and core muscle strengthening in preparation for likely scoliosis surgery ,. Has a significant thoracolumbar curvature .rationale explaining rationale in great detail to patient and the  need for muscle strengthening.    Ordered imaging x-ray lumbar spine 6 views, and scoliosis series the entire spine upright/standing.  Scheduled for return to office after January 29 when the DEXA scan will be completed as a solo appointment with Dr. Mars.  Explained in great detail the need for smoking cessation and an independent risk factor if patient were to undergo correction of scoliosis deformity.  Recommend scheduled acetaminophen dosing 500 mg by mouth every 8 hours and/or 375 3 tabs every 8 hours not to exceed 3000 mg/day.  And to decrease the use of NSAIDs, currently treating with the acetaminophen.  Documented in a usual AVS information from DEXA scan, WHO recommendation for osteopenia treatment dosing for calcium and vitamin D.  Patient verifies she will check all the dosing when she returns home and start taking the dose as per recommendation.  Recommend scheduled methocarbamol dosing was prescribed by the Dr. Waggoner and she is no longer seeing him.  Ordered methocarbamol 500 mg every 6 hours as needed muscle spasm, explained in detail how she could vary the dosing to get optimal relief and combat sedating effect.  Advised if she has additional questions or concerns she should call the neurosurgery office.    .

## 2024-01-29 ENCOUNTER — HOSPITAL ENCOUNTER (OUTPATIENT)
Dept: MAMMOGRAPHY | Facility: HOSPITAL | Age: 69
Discharge: HOME | End: 2024-01-29
Attending: INTERNAL MEDICINE
Payer: MEDICARE

## 2024-01-29 ENCOUNTER — APPOINTMENT (OUTPATIENT)
Dept: RADIOLOGY | Facility: CLINIC | Age: 69
End: 2024-01-29
Payer: MEDICARE

## 2024-01-29 ENCOUNTER — HOSPITAL ENCOUNTER (OUTPATIENT)
Dept: BONE DENSITY | Facility: HOSPITAL | Age: 69
Discharge: HOME | End: 2024-01-29
Attending: INTERNAL MEDICINE
Payer: MEDICARE

## 2024-01-29 VITALS — HEIGHT: 65 IN | BODY MASS INDEX: 21.49 KG/M2 | WEIGHT: 129 LBS

## 2024-01-29 VITALS — WEIGHT: 129 LBS | BODY MASS INDEX: 22.86 KG/M2 | HEIGHT: 63 IN

## 2024-01-29 DIAGNOSIS — G89.29 CHRONIC BILATERAL LOW BACK PAIN WITHOUT SCIATICA: ICD-10-CM

## 2024-01-29 DIAGNOSIS — M54.50 CHRONIC BILATERAL LOW BACK PAIN WITHOUT SCIATICA: ICD-10-CM

## 2024-01-29 DIAGNOSIS — M48.07 SPINAL STENOSIS OF LUMBOSACRAL REGION: ICD-10-CM

## 2024-01-29 DIAGNOSIS — E28.39 MENOPAUSE OVARIAN FAILURE: ICD-10-CM

## 2024-01-29 DIAGNOSIS — G89.4 CHRONIC PAIN SYNDROME: ICD-10-CM

## 2024-01-29 DIAGNOSIS — M41.9 SCOLIOSIS DEFORMITY OF SPINE: ICD-10-CM

## 2024-01-29 DIAGNOSIS — Z12.31 ENCOUNTER FOR SCREENING MAMMOGRAM FOR MALIGNANT NEOPLASM OF BREAST: ICD-10-CM

## 2024-01-29 DIAGNOSIS — M51.35 DDD (DEGENERATIVE DISC DISEASE), THORACOLUMBAR: ICD-10-CM

## 2024-01-29 DIAGNOSIS — M51.37 DDD (DEGENERATIVE DISC DISEASE), LUMBOSACRAL: ICD-10-CM

## 2024-01-29 PROCEDURE — 77080 DXA BONE DENSITY AXIAL: CPT

## 2024-01-29 PROCEDURE — 77067 SCR MAMMO BI INCL CAD: CPT

## 2024-01-29 PROCEDURE — 72114 X-RAY EXAM L-S SPINE BENDING: CPT

## 2024-01-29 PROCEDURE — 77063 BREAST TOMOSYNTHESIS BI: CPT

## 2024-01-29 PROCEDURE — 72084 X-RAY EXAM ENTIRE SPI 6/> VW: CPT

## 2024-01-30 ENCOUNTER — OFFICE VISIT (OUTPATIENT)
Age: 69
End: 2024-01-30
Payer: MEDICARE

## 2024-01-30 VITALS
HEIGHT: 65 IN | WEIGHT: 129 LBS | SYSTOLIC BLOOD PRESSURE: 129 MMHG | DIASTOLIC BLOOD PRESSURE: 64 MMHG | HEART RATE: 70 BPM | BODY MASS INDEX: 21.49 KG/M2

## 2024-01-30 DIAGNOSIS — G89.4 CHRONIC PAIN SYNDROME: ICD-10-CM

## 2024-01-30 DIAGNOSIS — M47.816 LUMBAR SPONDYLOSIS: ICD-10-CM

## 2024-01-30 DIAGNOSIS — I26.99 ACUTE PULMONARY EMBOLISM WITHOUT ACUTE COR PULMONALE, UNSPECIFIED PULMONARY EMBOLISM TYPE (HCC): ICD-10-CM

## 2024-01-30 DIAGNOSIS — M51.16 LUMBAR DISC DISEASE WITH RADICULOPATHY: Primary | ICD-10-CM

## 2024-01-30 DIAGNOSIS — M48.061 SPINAL STENOSIS OF LUMBAR REGION WITHOUT NEUROGENIC CLAUDICATION: ICD-10-CM

## 2024-01-30 PROCEDURE — 99214 OFFICE O/P EST MOD 30 MIN: CPT | Performed by: ANESTHESIOLOGY

## 2024-01-30 RX ORDER — DICLOFENAC SODIUM 75 MG/1
75 TABLET, DELAYED RELEASE ORAL 2 TIMES DAILY
Qty: 60 TABLET | Refills: 1 | Status: SHIPPED | OUTPATIENT
Start: 2024-01-30 | End: 2024-02-29

## 2024-01-30 NOTE — H&P (VIEW-ONLY)
Assessment:  1. Lumbar disc disease with radiculopathy    2. Lumbar spondylosis    3. Spinal stenosis of lumbar region without neurogenic claudication    4. Chronic pain syndrome    5. Acute pulmonary embolism without acute cor pulmonale, unspecified pulmonary embolism type (HCC)        Plan:  Patient is a 68-year-old female complains of low back pain with chronic patient secondary to scoliosis,  spondylosis, spinal stenosis presents to office for follow-up visit.  MRI of lumbar spine was reviewed patient in the PACS system shows severe spinal canal stenosis, degenerative disc disease, facet arthropathy.  1.  We will schedule patient for a L5-S1 interlaminar epidural steroid injection  2.  We will trial diclofenac 75 mg p.o. twice daily for arthritic low back pain  3.  We will provide patient with water therapy lumbar core strengthening      Complete risks and benefits including bleeding, infection, tissue reaction, nerve injury and allergic reaction were discussed. The approach was demonstrated using models and literature was provided. Verbal and written consent was obtained.    [place] Prescription Drug Monitoring Program report was reviewed and was appropriate       History of Present Illness:  The patient is a 68 y.o. female who presents for a follow up office visit in regards to Neck Pain and Back Pain.   The patient’s current symptoms include 6 out of 10 constant doses aching sharp,, pain with any particular time to time.    Current pain medications includes: None.      I have personally reviewed and/or updated the patient's past medical history, past surgical history, family history, social history, current medications, allergies, and vital signs today.         Review of Systems  Review of Systems   Constitutional:  Negative for unexpected weight change.   HENT:  Negative for hearing loss.    Eyes:  Negative for visual disturbance.   Respiratory:  Negative for shortness of breath.    Cardiovascular:   Negative for leg swelling.   Gastrointestinal:  Positive for nausea. Negative for constipation.   Endocrine: Negative for polyuria.   Genitourinary:  Negative for difficulty urinating and vaginal discharge.   Musculoskeletal:  Negative for gait problem, joint swelling and myalgias.        Joint stiffness  Swelling- back   Skin:  Negative for rash.   Neurological:  Negative for weakness and headaches.        Memory loss   Psychiatric/Behavioral:  Negative for decreased concentration.    All other systems reviewed and are negative.      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     Drug intolerance     Dysfunction of right eustachian tube     Dysfunctional uterine bleeding     Edema     Elevated d-dimer     Exposure to hepatitis C     Fracture of fibula     Generalized anxiety disorder     GERD (gastroesophageal reflux disease)     Hyperlipidemia     Mild cervical dysplasia     Myalgia     Myositis     Palpitations     PONV (postoperative nausea and vomiting)     Pre-syncope     Scoliosis deformity of spine 1/23/2024    Seasonal allergies     Stress incontinence     Thrombocytopenia (HCC)     Urinary retention     Urinary tract infection     Uterine leiomyoma        Past Surgical History:   Procedure Laterality Date    BLADDER SURGERY      BREAST BIOPSY Right     US guided    COLONOSCOPY      COLPOSCOPY  01/06/1998    DENTAL SURGERY      ENDOMETRIAL BIOPSY  05/21/1993    HYSTERECTOMY      HYSTEROSCOPY      uterus- 1/22/98, 7/1/02 and 4/10/07    [facility] ARTHRP KNE CONDYLE&PLATU MEDIAL&LAT COMPARTMENTS Right 2/8/2023    Procedure: ARTHROPLASTY KNEE  TOTAL;  Surgeon: Miguel Gleason DO;  Location: [facility];  Service: Orthopedics    TUBAL LIGATION      US GUIDED BREAST BIOPSY RIGHT COMPLETE Right 08/08/2018       Family History   Problem Relation Age of Onset    Stroke Mother         cerebral artery occlusion with cerebral infarction    Coronary artery disease Mother     Arrhythmia Mother         sinus    Coronary artery disease Father     Diabetes Father     No Known Problems Daughter     No Known Problems Daughter     No Known Problems Maternal Grandmother     No Known Problems Paternal Grandmother     No Known Problems Maternal Aunt     Ovarian cancer Maternal Aunt     Aortic aneurysm Family     Arthritis Family     Diabetes Family     Heart disease Family     Breast cancer Neg Hx        Social History     Occupational History    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[employer]    Occupation: housewife   Tobacco Use    Smoking status: Every Day     Current packs/day: 1.50     Average packs/day: 1.5 packs/day for 52.1 years (78.1 ttl pk-yrs)     Types: Cigarettes     Start date: 1972     Passive exposure: Never    Smokeless tobacco: Never   Vaping Use    Vaping status: Never Used   Substance and Sexual Activity    Alcohol use: Never    Drug use: No    Sexual activity: Not Currently         Current Outpatient Medications:     acetaminophen (TYLENOL) 500 mg tablet, Take 500 mg by mouth every 6 (six) hours as needed for mild pain, Disp: , Rfl:     alendronate (FOSAMAX) 70 mg tablet, take 1 tablet by mouth every 7 days, Disp: 8 tablet, Rfl: 1    atorvastatin (LIPITOR) 20 mg tablet, Take 1 tablet (20 mg total) by mouth daily after dinner, Disp: 90 tablet, Rfl: 1    buPROPion (WELLBUTRIN XL) 150 mg 24 hr tablet, 150 mg every morning, Disp: , Rfl:     clonazePAM (KlonoPIN) 1 mg tablet, 2 (two) times a day as needed, Disp: , Rfl:     methocarbamol (ROBAXIN) 500 mg tablet, Take 1 tablet (500 mg total) by mouth every 6 (six) hours as needed for muscle spasms  "(Thoracic muscle spasms post op), Disp: 120 tablet, Rfl: 1    omeprazole (PriLOSEC) 40 MG capsule, take 1 capsule by mouth once daily, Disp: 90 capsule, Rfl: 1    solifenacin (VESICARE) 10 MG tablet, Take 1 tablet (10 mg total) by mouth daily, Disp: 90 tablet, Rfl: 1    tretinoin (RETIN-A) 0.1 % cream, Apply topically daily at bedtime, Disp: 45 g, Rfl: 5    venlafaxine (EFFEXOR) 100 MG tablet, Take 225 mg by mouth daily at bedtime, Disp: , Rfl:     ascorbic acid (VITAMIN C) 500 MG tablet, Take 1 tablet (500 mg total) by mouth 2 (two) times a day (Patient taking differently: Take 1,000 mg by mouth daily), Disp: 60 tablet, Rfl: 1    fluticasone (FLONASE) 50 mcg/act nasal spray, 1 spray into each nostril daily (Patient not taking: Reported on 1/23/2024), Disp: 16 g, Rfl: 0    Multiple Vitamins-Minerals (multivitamin with minerals) tablet, Take 1 tablet by mouth daily (Patient taking differently: Take 1 tablet by mouth as needed), Disp: 30 tablet, Rfl: 1    predniSONE 10 mg tablet, Take 3 tabs BID X 2 days, 2 tabs BID X 2 days, 1 tab BID X 2 days, 1 tab daily X 2 days (Patient not taking: Reported on 1/23/2024), Disp: 26 tablet, Rfl: 0    Allergies   Allergen Reactions    Gabapentin Drowsiness       Physical Exam:    /64   Pulse 70   Ht 5' 5\" (1.651 m)   Wt 58.5 kg (129 lb)   BMI 21.47 kg/m²     Constitutional:normal, well developed, well nourished, alert, in no distress and non-toxic and no overt pain behavior.  Eyes:anicteric  HEENT:grossly intact  Neck:supple, symmetric, trachea midline and no masses   Pulmonary:even and unlabored  Cardiovascular:No edema or pitting edema present  Skin:Normal without rashes or lesions and well hydrated  Psychiatric:Mood and affect appropriate  Neurologic:Cranial Nerves II-XII grossly intact  Musculoskeletal:antalgic    Lumbar/Sacral Spine examination demonstrates.  Decreased range of motion lumbar spine with pain upon: flexion, lateral rotation to the left/right, and " bending to the left/right.  Bilateral lumbar paraspinals tender to palpation. Muscle spasms noted in the lumbar area bilaterally. 4/5 lower extremity strength in all muscle groups bilaterally. Positive seated straight leg raise for bilateral lower extremities.  Sensitivity to light touch intact bilateral lower extremities. 2+ reflexes in the patella and Achilles.  No ankle clonus    Imaging  No orders to display       No orders of the defined types were placed in this encounter.

## 2024-01-30 NOTE — PROGRESS NOTES
Assessment:  1. Lumbar disc disease with radiculopathy    2. Lumbar spondylosis    3. Spinal stenosis of lumbar region without neurogenic claudication    4. Chronic pain syndrome    5. Acute pulmonary embolism without acute cor pulmonale, unspecified pulmonary embolism type (HCC)        Plan:  Patient is a 68-year-old female complains of low back pain with chronic patient secondary to scoliosis,  spondylosis, spinal stenosis presents to office for follow-up visit.  MRI of lumbar spine was reviewed patient in the PACS system shows severe spinal canal stenosis, degenerative disc disease, facet arthropathy.  1.  We will schedule patient for a L5-S1 interlaminar epidural steroid injection  2.  We will trial diclofenac 75 mg p.o. twice daily for arthritic low back pain  3.  We will provide patient with water therapy lumbar core strengthening      Complete risks and benefits including bleeding, infection, tissue reaction, nerve injury and allergic reaction were discussed. The approach was demonstrated using models and literature was provided. Verbal and written consent was obtained.    [place] Prescription Drug Monitoring Program report was reviewed and was appropriate       History of Present Illness:  The patient is a 68 y.o. female who presents for a follow up office visit in regards to Neck Pain and Back Pain.   The patient’s current symptoms include 6 out of 10 constant doses aching sharp,, pain with any particular time to time.    Current pain medications includes: None.      I have personally reviewed and/or updated the patient's past medical history, past surgical history, family history, social history, current medications, allergies, and vital signs today.         Review of Systems  Review of Systems   Constitutional:  Negative for unexpected weight change.   HENT:  Negative for hearing loss.    Eyes:  Negative for visual disturbance.   Respiratory:  Negative for shortness of breath.    Cardiovascular:   Negative for leg swelling.   Gastrointestinal:  Positive for nausea. Negative for constipation.   Endocrine: Negative for polyuria.   Genitourinary:  Negative for difficulty urinating and vaginal discharge.   Musculoskeletal:  Negative for gait problem, joint swelling and myalgias.        Joint stiffness  Swelling- back   Skin:  Negative for rash.   Neurological:  Negative for weakness and headaches.        Memory loss   Psychiatric/Behavioral:  Negative for decreased concentration.    All other systems reviewed and are negative.      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     Drug intolerance     Dysfunction of right eustachian tube     Dysfunctional uterine bleeding     Edema     Elevated d-dimer     Exposure to hepatitis C     Fracture of fibula     Generalized anxiety disorder     GERD (gastroesophageal reflux disease)     Hyperlipidemia     Mild cervical dysplasia     Myalgia     Myositis     Palpitations     PONV (postoperative nausea and vomiting)     Pre-syncope     Scoliosis deformity of spine 1/23/2024    Seasonal allergies     Stress incontinence     Thrombocytopenia (HCC)     Urinary retention     Urinary tract infection     Uterine leiomyoma        Past Surgical History:   Procedure Laterality Date    BLADDER SURGERY      BREAST BIOPSY Right     US guided    COLONOSCOPY      COLPOSCOPY  01/06/1998    DENTAL SURGERY      ENDOMETRIAL BIOPSY  05/21/1993    HYSTERECTOMY      HYSTEROSCOPY      uterus- 1/22/98, 7/1/02 and 4/10/07    [place] ARTHRP KNE CONDYLE&PLATU MEDIAL&LAT COMPARTMENTS Right 2/8/2023    Procedure: ARTHROPLASTY KNEE  TOTAL;  Surgeon: Miguel Gleason DO;  Location: [facility];  Service: Orthopedics    TUBAL LIGATION      US GUIDED BREAST BIOPSY RIGHT COMPLETE Right 08/08/2018       Family History   Problem Relation Age of Onset    Stroke Mother         cerebral artery occlusion with cerebral infarction    Coronary artery disease Mother     Arrhythmia Mother         sinus    Coronary artery disease Father     Diabetes Father     No Known Problems Daughter     No Known Problems Daughter     No Known Problems Maternal Grandmother     No Known Problems Paternal Grandmother     No Known Problems Maternal Aunt     Ovarian cancer Maternal Aunt     Aortic aneurysm Family     Arthritis Family     Diabetes Family     Heart disease Family     Breast cancer Neg Hx        Social History     Occupational History    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[employer]    Occupation: housewife   Tobacco Use    Smoking status: Every Day     Current packs/day: 1.50     Average packs/day: 1.5 packs/day for 52.1 years (78.1 ttl pk-yrs)     Types: Cigarettes     Start date: 1972     Passive exposure: Never    Smokeless tobacco: Never   Vaping Use    Vaping status: Never Used   Substance and Sexual Activity    Alcohol use: Never    Drug use: No    Sexual activity: Not Currently         Current Outpatient Medications:     acetaminophen (TYLENOL) 500 mg tablet, Take 500 mg by mouth every 6 (six) hours as needed for mild pain, Disp: , Rfl:     alendronate (FOSAMAX) 70 mg tablet, take 1 tablet by mouth every 7 days, Disp: 8 tablet, Rfl: 1    atorvastatin (LIPITOR) 20 mg tablet, Take 1 tablet (20 mg total) by mouth daily after dinner, Disp: 90 tablet, Rfl: 1    buPROPion (WELLBUTRIN XL) 150 mg 24 hr tablet, 150 mg every morning, Disp: , Rfl:     clonazePAM (KlonoPIN) 1 mg tablet, 2 (two) times a day as needed, Disp: , Rfl:     methocarbamol (ROBAXIN) 500 mg tablet, Take 1 tablet (500 mg total) by mouth every 6 (six) hours as needed for muscle spasms  "(Thoracic muscle spasms post op), Disp: 120 tablet, Rfl: 1    omeprazole (PriLOSEC) 40 MG capsule, take 1 capsule by mouth once daily, Disp: 90 capsule, Rfl: 1    solifenacin (VESICARE) 10 MG tablet, Take 1 tablet (10 mg total) by mouth daily, Disp: 90 tablet, Rfl: 1    tretinoin (RETIN-A) 0.1 % cream, Apply topically daily at bedtime, Disp: 45 g, Rfl: 5    venlafaxine (EFFEXOR) 100 MG tablet, Take 225 mg by mouth daily at bedtime, Disp: , Rfl:     ascorbic acid (VITAMIN C) 500 MG tablet, Take 1 tablet (500 mg total) by mouth 2 (two) times a day (Patient taking differently: Take 1,000 mg by mouth daily), Disp: 60 tablet, Rfl: 1    fluticasone (FLONASE) 50 mcg/act nasal spray, 1 spray into each nostril daily (Patient not taking: Reported on 1/23/2024), Disp: 16 g, Rfl: 0    Multiple Vitamins-Minerals (multivitamin with minerals) tablet, Take 1 tablet by mouth daily (Patient taking differently: Take 1 tablet by mouth as needed), Disp: 30 tablet, Rfl: 1    predniSONE 10 mg tablet, Take 3 tabs BID X 2 days, 2 tabs BID X 2 days, 1 tab BID X 2 days, 1 tab daily X 2 days (Patient not taking: Reported on 1/23/2024), Disp: 26 tablet, Rfl: 0    Allergies   Allergen Reactions    Gabapentin Drowsiness       Physical Exam:    /64   Pulse 70   Ht 5' 5\" (1.651 m)   Wt 58.5 kg (129 lb)   BMI 21.47 kg/m²     Constitutional:normal, well developed, well nourished, alert, in no distress and non-toxic and no overt pain behavior.  Eyes:anicteric  HEENT:grossly intact  Neck:supple, symmetric, trachea midline and no masses   Pulmonary:even and unlabored  Cardiovascular:No edema or pitting edema present  Skin:Normal without rashes or lesions and well hydrated  Psychiatric:Mood and affect appropriate  Neurologic:Cranial Nerves II-XII grossly intact  Musculoskeletal:antalgic    Lumbar/Sacral Spine examination demonstrates.  Decreased range of motion lumbar spine with pain upon: flexion, lateral rotation to the left/right, and " bending to the left/right.  Bilateral lumbar paraspinals tender to palpation. Muscle spasms noted in the lumbar area bilaterally. 4/5 lower extremity strength in all muscle groups bilaterally. Positive seated straight leg raise for bilateral lower extremities.  Sensitivity to light touch intact bilateral lower extremities. 2+ reflexes in the patella and Achilles.  No ankle clonus    Imaging  No orders to display       No orders of the defined types were placed in this encounter.

## 2024-01-30 NOTE — PATIENT INSTRUCTIONS
What is the epidural space?    The covering over the nerve roots in the spine is call the dura.  The space surrounding this dura is call the epidural space.  This space is commonly used to deliver medications to the spine.  Nerves travel through the epidural space before they travel down into your legs.  The nerves leave the spine from small nerve holes, foramen.  Inflammation of these nerve roots may cause pain in your neck regions including shoulders and arms, and lower back regions including hip, buttocks and legs.  These nerve roots may become inflamed due to irritation from a damaged disc or from contact with the bone spur.                                                                                                    What is an epidural steroid injection and why that helps?    An epidural steroid injection places anti-inflammatory medicine into the epidural space to stop nerve root inflammation, therefore hopefully reducing hip, buttock and leg pain. By stopping or limiting nerve root inflammation we may be able to reduce your pain. The epidural injection may assist the injury to heal by reducing inflammation.  Although not always helpful, it usually reduces pain within 3 to 7 days and can take up to 2 weeks for full affect.  It may provide permanent relief or provide a period of relief that will allow other treatments like physical therapy to be more effective.    What happens during the procedure?    While laying face down on an x-ray table your skin will be well cleaned.  The physician will numb a small area of skin in your back which may sting for a few seconds. Next, the physician will use x-ray guidance to direct a small needle into the epidural space. He will then inject contrast dye to confirm that the medicine spreads to the affected nerve root in the epidural space. After this, the physician will inject the combination of numbing medicine and anti-inflammatory steroid.        What happens after  the procedure?    A dressing may be applied to the injection site.  Your will remain in the procedure suite for about 15 to 20 minutes and the nurse will monitor blood pressure and pulse.  The nurse will review your discharge instructions and you will be able to go home.  You may experience numbness or weakness to the affected limb for few hours after the procedure. If this happens do not walk without assistance.  Your physician may refer you to a physical therapist or chiropractor immediately afterwards while the numbing medicine is effective and over the next two weeks while the cortisone is working.    It may be beneficial to repeat the procedure in about 2 to 4 weeks.  A series of treatments off improved more helpful than a single injection.

## 2024-02-01 ENCOUNTER — TELEPHONE (OUTPATIENT)
Dept: INTERNAL MEDICINE CLINIC | Facility: CLINIC | Age: 69
End: 2024-02-01

## 2024-02-01 NOTE — TELEPHONE ENCOUNTER
Hi, my name is Robyn Quintanilla. My birthday is [phone] and my phone number is [phone]. I'm calling to reschedule my appointment with Doctor Barb. So when you have time, give me a call. Alright. Thanks. Ainsley.    Left voicemail for patient to reschedule.

## 2024-02-01 NOTE — TELEPHONE ENCOUNTER
Robyn did reschedule her appt with you for awv at the end of march. She also asked about her bone scan. She thinks it is worse than before.    She wants to know what you are going to do about this, she asked is there anything else she can do?    thanks   Simple Myopia OD/Compound Myopic Astigmatism OS w/Presbyopia-  discussed findings w/patient-  new spectacle/CL Rx issued-  switched patient to B&L lenses-  monitor yearly or prn; 's Notes:  9/19/2019<br />DFE 9/19/2019<br />

## 2024-02-07 ENCOUNTER — TELEPHONE (OUTPATIENT)
Dept: INTERNAL MEDICINE CLINIC | Facility: CLINIC | Age: 69
End: 2024-02-07

## 2024-02-07 ENCOUNTER — EVALUATION (OUTPATIENT)
Dept: PHYSICAL THERAPY | Facility: CLINIC | Age: 69
End: 2024-02-07
Payer: MEDICARE

## 2024-02-07 DIAGNOSIS — M41.9 SCOLIOSIS DEFORMITY OF SPINE: ICD-10-CM

## 2024-02-07 DIAGNOSIS — G89.29 CHRONIC BILATERAL LOW BACK PAIN WITHOUT SCIATICA: Primary | ICD-10-CM

## 2024-02-07 DIAGNOSIS — M51.37 DDD (DEGENERATIVE DISC DISEASE), LUMBOSACRAL: ICD-10-CM

## 2024-02-07 DIAGNOSIS — M51.35 DDD (DEGENERATIVE DISC DISEASE), THORACOLUMBAR: ICD-10-CM

## 2024-02-07 DIAGNOSIS — G89.4 CHRONIC PAIN SYNDROME: ICD-10-CM

## 2024-02-07 DIAGNOSIS — M54.50 CHRONIC BILATERAL LOW BACK PAIN WITHOUT SCIATICA: Primary | ICD-10-CM

## 2024-02-07 DIAGNOSIS — M48.07 SPINAL STENOSIS OF LUMBOSACRAL REGION: ICD-10-CM

## 2024-02-07 PROCEDURE — 97161 PT EVAL LOW COMPLEX 20 MIN: CPT | Performed by: PHYSICAL MEDICINE & REHABILITATION

## 2024-02-07 PROCEDURE — 97530 THERAPEUTIC ACTIVITIES: CPT | Performed by: PHYSICAL MEDICINE & REHABILITATION

## 2024-02-07 NOTE — PROGRESS NOTES
PT Evaluation     Today's date: 2024  Patient name: Surekha Tenorio  : 1955  MRN: [id number]  Referring provider: Felisa Benson MD  Dx:   Encounter Diagnosis     ICD-10-CM    1. Chronic bilateral low back pain without sciatica  M54.50 Ambulatory Referral to Physical Therapy    G89.29       2. Chronic pain syndrome  G89.4 Ambulatory Referral to Physical Therapy      3. DDD (degenerative disc disease), lumbosacral  M51.37 Ambulatory Referral to Physical Therapy      4. Spinal stenosis of lumbosacral region  M48.07 Ambulatory Referral to Physical Therapy      5. DDD (degenerative disc disease), thoracolumbar  M51.35 Ambulatory Referral to Physical Therapy      6. Scoliosis deformity of spine  M41.9 Ambulatory Referral to Physical Therapy                     Assessment  Assessment details: Surekha Tenorio is a 68 y.o. female who was referred to physical therapy for management of chronic LBP secondary to noted dx. Hx of chronic pain with multiple failed prior treatments.  Primary impairments include B L/S pain, decreased L/S ROM, decreased hip mobility, decreased hip and knee strength, abnormal posture, and reduced activity tolerance.  Consequently, patient has difficulty completing ADLs including standing, walking, bending, lifting, etc.  Surekha would benefit from skilled intervention to address all deficits and improve functional capability.  Patient is a good candidate for therapy, pending compliance with HEP and consistent participation in physical therapy.  Thank you for the referral and please do not hesitate to contact me with any questions or concerns regarding Surekha's care!      Plan  Frequency:1-2x/week   Duration in weeks: 4-6   POC start date: 2024  POC end date: 3/8/24  Therapeutic exercise/activity, neuromuscular reeducation, manual therapy, and modalities.   Patient understands and agrees to plan of care.    Goals  Short Term--4 weeks  1. Patient will demonstrate 2 point  "decrease in pain levels.  2. Patient will demonstrate 1/2 point increase in all deficient MMT scores.  3. Pt will report ability to stand/walk at least 25% longer vs SOC .     Long Term--By Discharge  1. Patient will achieve expected FOTO score.  2. Pt will be I with HEP  3. Pt with report ability to stand/walk at least 1 hr to perform ADLS without increased back pain/sx.     Patient's Goal: Decrease pain         Understanding of Dx/Px/POC: good   Prognosis: fair    Plan  Patient would benefit from: skilled physical therapy  Treatment plan discussed with: patient        Subjective Evaluation    History of Present Illness  Mechanism of injury: Chronic LBP x at least 4 years. Has been f/u with pain management. Has had injections with benefit only one time per pt. Most recent injection was an epidural and it made things hurt more per pt. Has also had PT in the past; minimal overall benefit per pt. No relief with chiropractic tx. Continues to see pain management. Will be getting more injections this Friday in L/S. Referred to OPPT at this time.   Pt notes LBP is worse with any sort of movement /activity (standing, walking, bending, lifting etc). Pain is better sitting/laying. Some pain relief with CP. Pain relief with HP. Taking tylenol for pain; also medications as Rx by MD for \"arthritis\"; also takes mm relaxers which she feels helps. Notes her back spasms at times and really hurts. B LBP. No pain into Les. No n/t or sensory changes into Les. No night pain, unexplained weight loss, no recent trauma, no consitutional sx, no change in bowel/bladder function, and no saddle anesthesia. 2 episodes of sudden weakness B LEs; lasted a few minutes; no constant or progressive weakness. Notes stiffness t/o her L/S. Limited ROM; worse in the morning; better with movement sometimes. Doing stretches at home she found online; L/S rotation R/L, side bending, extension etc; feels good per pt. Notes the pain can cause her to \"hunch " "over\" at times. Notes she is active however reports less activity over the past 1-2 years. Doesn't really get out or do things anymore. Stays inside.       Quality of life: fair    Patient Goals  Patient goals for therapy: decreased pain and increased motion  Patient's goals regarding treatment: be back on the go, work outside.  Pain  Current pain rating: 3  At best pain ratin  At worst pain rating: 10  Location: B L/S locally  Quality: dull ache, sharp, knife-like and tight  Progression: no change    Social Support  Lives with: spouse    Employment status: not working  Hand dominance: right      Diagnostic Tests  X-ray: abnormal  Treatments  Previous treatment: medication, physical therapy, injection treatment and chiropractic  Current treatment: injection treatment, medication and physical therapy        Objective    GAIT:  Forward flexed, forward/rounded shoulders   Slight knee flexion   Lateral flex to L     Squat assess: NT  ¼ squat assess: NT  SLS: RLE: NT,   LLE: NT       Lumbar  % of normal   Flex. Palms to floor- no pain    Extn. 25% \"stiff\" only    SB Left 50% no sx   SB Right 50% no sx    ROT Left 50% no sx   ROT Right 50% no sx    Repetitive testing: NT        MMT         AROM          PROM    Hip       L       R        L           R      L     R   Flex. 4- 4- WFL WFL     Extn.   Decreased Decreased     Abd. 4+ 4+       IR. 4- 4- Decreased  Decreased     ER. 4- 4- Decreased Decreased                   MMT    Knee         L        R   Flex. 4 4   Extn. 4- 4-                MMT    Ankle       L        R   PF 4 4   DF. 4- 4-   EHL     Inv.     Ever.       Posture:  Seated:  Forward head/rounded shoulders  Increased thoracic kyphosis   B scapular depression and protraction with mild winging   PPT    Palpation:   Increased mm tension B L/S PVMS   No pain L/S with palpation at IE    Myotomes (L/R):intact   L2:      L3:     L4:       L5:        S1:         S2:  Dermatome: (light touch- L/R):  intact L2:     "    L3:        L4:        L5:        S1:         S2:     Reflexes:  (L/R) L4: WNL B       S1:   WNL B          Babinski=  NT    Clonus= Negative B     Slump test: L= Neg    R=   Neg    Straight leg raise:   L= NT     R=  NT              Transverse abdominis: Bent knee fall out= Fair supine march=Poor         Precautions: anxiety, ANAND, chronic pain, scoliosis        Re-eval Date:  3/8    Date 2/7/2024        Visit Count 1       FOTO 2/7/2024        Pain In See IE       Pain Out See IE           Manuals 2/7/2024                                        Neuro Re-Ed        TA         TA with BKFO        TA with ALT heel slides      Dead bugs         Pallof press                                         Ther Ex        Nustep - B LE ROM/conditioning         LTR                Hip flexor stretch Reviewed L/S AROM exercises she has been doing at home        HS stretch      SLRs        Bridges      clamshells        Mini squats      Step ups         Sit to stands        Knee ext        HS curl                 Ther Activity 10' total  pt education regarding pathophysiology/pathoanatomy of present pain/sx and condition, role of PT in improving pain/sx and function, pt education regarding activity modification to avoid exacerbation of sx and delayed recovery; brief education about chronic pain and pain neuroscience and role of exercise in helping to reduce pain and improve function                          Gait Training                        Modalities

## 2024-02-09 ENCOUNTER — HOSPITAL ENCOUNTER (OUTPATIENT)
Dept: RADIOLOGY | Facility: HOSPITAL | Age: 69
End: 2024-02-09
Payer: MEDICARE

## 2024-02-09 VITALS
SYSTOLIC BLOOD PRESSURE: 120 MMHG | DIASTOLIC BLOOD PRESSURE: 65 MMHG | RESPIRATION RATE: 20 BRPM | OXYGEN SATURATION: 97 % | HEART RATE: 75 BPM | TEMPERATURE: 97.1 F

## 2024-02-09 DIAGNOSIS — G89.4 CHRONIC PAIN SYNDROME: ICD-10-CM

## 2024-02-09 DIAGNOSIS — M47.816 LUMBAR SPONDYLOSIS: ICD-10-CM

## 2024-02-09 DIAGNOSIS — M51.16 LUMBAR DISC DISEASE WITH RADICULOPATHY: ICD-10-CM

## 2024-02-09 PROCEDURE — 62323 NJX INTERLAMINAR LMBR/SAC: CPT | Performed by: ANESTHESIOLOGY

## 2024-02-09 RX ORDER — LIDOCAINE HYDROCHLORIDE 10 MG/ML
2 INJECTION, SOLUTION EPIDURAL; INFILTRATION; INTRACAUDAL; PERINEURAL ONCE
Status: COMPLETED | OUTPATIENT
Start: 2024-02-09 | End: 2024-02-09

## 2024-02-09 RX ORDER — METHYLPREDNISOLONE ACETATE 80 MG/ML
80 INJECTION, SUSPENSION INTRA-ARTICULAR; INTRALESIONAL; INTRAMUSCULAR; PARENTERAL; SOFT TISSUE ONCE
Status: COMPLETED | OUTPATIENT
Start: 2024-02-09 | End: 2024-02-09

## 2024-02-09 RX ADMIN — IOHEXOL 1 ML: 300 INJECTION, SOLUTION INTRAVENOUS at 08:44

## 2024-02-09 RX ADMIN — METHYLPREDNISOLONE ACETATE 80 MG: 80 INJECTION, SUSPENSION INTRA-ARTICULAR; INTRALESIONAL; INTRAMUSCULAR; PARENTERAL; SOFT TISSUE at 08:44

## 2024-02-09 RX ADMIN — LIDOCAINE HYDROCHLORIDE 2 ML: 10 INJECTION, SOLUTION EPIDURAL; INFILTRATION; INTRACAUDAL; PERINEURAL at 08:42

## 2024-02-09 NOTE — INTERVAL H&P NOTE
Update: (This section must be completed if the H&P was completed greater than 24 hrs to procedure or admission)    H&P reviewed. After examining the patient, I find no changed to the H&P since it had been written.    Patient re-evaluated. Accept as history and physical.    Felisa Benson MD/February 9, 2024/8:34 AM

## 2024-02-09 NOTE — DISCHARGE INSTRUCTIONS
Epidural Steroid Injection   WHAT YOU NEED TO KNOW:   An epidural steroid injection (JUVE) is a procedure to inject steroid medicine into the epidural space. The epidural space is between your spinal cord and vertebrae. Steroids reduce inflammation and fluid buildup in your spine that may be causing pain. You may be given pain medicine along with the steroids.          ACTIVITY  Do not drive or operate machinery today.  No strenuous activity today - bending, lifting, etc.  You may resume normal activites starting tomorrow - start slowly and as tolerated.  You may shower today, but no tub baths or hot tubs.  You may have numbness for several hours from the local anesthetic. Please use caution and common sense, especially with weight-bearing activities.    CARE OF THE INJECTION SITE  If you have soreness or pain, apply ice to the area today (20 minutes on/20 minutes off).  Starting tomorrow, you may use warm, moist heat or ice if needed.  You may have an increase or change in your discomfort for 36-48 hours after your treatment.  Apply ice and continue with any pain medication you have been prescribed.  Notify the [facility] if you have any of the following: redness, drainage, swelling, headache, stiff neck or fever above 100°F.    SPECIAL INSTRUCTIONS  Our office will contact you in approximately 7 days for a progress report.    MEDICATIONS  Continue to take all routine medications.  Our office may have instructed you to hold some medications.    As no general anesthesia was used in today's procedure, you should not experience any side effects related to anesthesia.     If you are diabetic, the steroids used in today's injection may temporarily increase your blood sugar levels after the first few days after your injection. Please keep a close eye on your sugars and alert the doctor who manages your diabetes if your sugars are significantly high from your baseline or you are symptomatic.     If you have a  problem specifically related to your procedure, please call our office at [phone].  Problems not related to your procedure should be directed to your primary care physician.

## 2024-02-12 ENCOUNTER — TELEPHONE (OUTPATIENT)
Dept: OBGYN CLINIC | Facility: CLINIC | Age: 69
End: 2024-02-12

## 2024-02-12 ENCOUNTER — TELEPHONE (OUTPATIENT)
Age: 69
End: 2024-02-12

## 2024-02-12 NOTE — TELEPHONE ENCOUNTER
Left message to call back to reschedule appointment with Dr. Gleason due to office closing due to weather.

## 2024-02-13 ENCOUNTER — APPOINTMENT (OUTPATIENT)
Dept: PHYSICAL THERAPY | Facility: CLINIC | Age: 69
End: 2024-02-13
Payer: MEDICARE

## 2024-02-15 ENCOUNTER — OFFICE VISIT (OUTPATIENT)
Dept: PHYSICAL THERAPY | Facility: CLINIC | Age: 69
End: 2024-02-15
Payer: MEDICARE

## 2024-02-15 DIAGNOSIS — G89.4 CHRONIC PAIN SYNDROME: ICD-10-CM

## 2024-02-15 DIAGNOSIS — M51.37 DDD (DEGENERATIVE DISC DISEASE), LUMBOSACRAL: ICD-10-CM

## 2024-02-15 DIAGNOSIS — M51.35 DDD (DEGENERATIVE DISC DISEASE), THORACOLUMBAR: ICD-10-CM

## 2024-02-15 DIAGNOSIS — G89.29 CHRONIC BILATERAL LOW BACK PAIN WITHOUT SCIATICA: Primary | ICD-10-CM

## 2024-02-15 DIAGNOSIS — M48.07 SPINAL STENOSIS OF LUMBOSACRAL REGION: ICD-10-CM

## 2024-02-15 DIAGNOSIS — M54.50 CHRONIC BILATERAL LOW BACK PAIN WITHOUT SCIATICA: Primary | ICD-10-CM

## 2024-02-15 PROCEDURE — 97110 THERAPEUTIC EXERCISES: CPT

## 2024-02-15 PROCEDURE — 97112 NEUROMUSCULAR REEDUCATION: CPT

## 2024-02-15 NOTE — PROGRESS NOTES
"Daily Note     Today's date: 2/15/2024  Patient name: Surekha Tenorio  : 1955  MRN: [id number]  Referring provider: Felisa Benson MD  Dx:   Encounter Diagnosis     ICD-10-CM    1. Chronic bilateral low back pain without sciatica  M54.50     G89.29       2. Chronic pain syndrome  G89.4       3. DDD (degenerative disc disease), lumbosacral  M51.37       4. Spinal stenosis of lumbosacral region  M48.07       5. DDD (degenerative disc disease), thoracolumbar  M51.35           Start Time: 1430  Stop Time: 1510  Total time in clinic (min): 40 minutes    Subjective: \"I am really hurting today, I was going to cancel.\"      Objective: See treatment diary below      Assessment: Tolerated treatment fair. Able to complete program despite pain levels.  Cueing to maintain core stabilization.  No change in pain levels at end of session.  Will continue to monitor and progress as able.  Patient demonstrated fatigue post treatment and would benefit from continued PT      Plan: Continue per plan of care.  Progress treatment as tolerated.       Precautions: anxiety, ANAND, chronic pain, scoliosis        Re-eval Date:  3/8    Date 2024  2/15      Visit Count 1 2      FOTO 2024        Pain In See IE 8/10      Pain Out See IE 8/10          Manuals 2024  2/15                                      Neuro Re-Ed        TA         TA with BKFO  15x/5\"          20x/5\"      TA with ALT heel slides      Dead bugs         Pallof press                                         Ther Ex        Nustep - B LE ROM/conditioning   L1 10'      LTR                Hip flexor stretch Reviewed L/S AROM exercises she has been doing at home  5x10\"                4x30\"      HS stretch      SLRs  4x30\"      Flex/abd  2x10      Bridges      clamshells  2x10      Mini squats      Step ups         Sit to stands        Knee ext        HS curl                 Ther Activity 10' total  pt education regarding pathophysiology/pathoanatomy of present " pain/sx and condition, role of PT in improving pain/sx and function, pt education regarding activity modification to avoid exacerbation of sx and delayed recovery; brief education about chronic pain and pain neuroscience and role of exercise in helping to reduce pain and improve function                          Gait Training                        Modalities

## 2024-02-16 ENCOUNTER — TELEPHONE (OUTPATIENT)
Dept: PAIN MEDICINE | Facility: CLINIC | Age: 69
End: 2024-02-16

## 2024-02-16 ENCOUNTER — APPOINTMENT (OUTPATIENT)
Dept: PHYSICAL THERAPY | Facility: CLINIC | Age: 69
End: 2024-02-16
Payer: MEDICARE

## 2024-02-16 ENCOUNTER — TELEPHONE (OUTPATIENT)
Dept: NEUROSURGERY | Facility: CLINIC | Age: 69
End: 2024-02-16

## 2024-02-19 ENCOUNTER — TELEPHONE (OUTPATIENT)
Dept: PAIN MEDICINE | Facility: CLINIC | Age: 69
End: 2024-02-19

## 2024-02-21 ENCOUNTER — OFFICE VISIT (OUTPATIENT)
Dept: PHYSICAL THERAPY | Facility: CLINIC | Age: 69
End: 2024-02-21
Payer: MEDICARE

## 2024-02-21 DIAGNOSIS — M54.50 CHRONIC BILATERAL LOW BACK PAIN WITHOUT SCIATICA: Primary | ICD-10-CM

## 2024-02-21 DIAGNOSIS — M51.37 DDD (DEGENERATIVE DISC DISEASE), LUMBOSACRAL: ICD-10-CM

## 2024-02-21 DIAGNOSIS — G89.29 CHRONIC BILATERAL LOW BACK PAIN WITHOUT SCIATICA: Primary | ICD-10-CM

## 2024-02-21 DIAGNOSIS — M51.35 DDD (DEGENERATIVE DISC DISEASE), THORACOLUMBAR: ICD-10-CM

## 2024-02-21 DIAGNOSIS — M48.07 SPINAL STENOSIS OF LUMBOSACRAL REGION: ICD-10-CM

## 2024-02-21 DIAGNOSIS — G89.4 CHRONIC PAIN SYNDROME: ICD-10-CM

## 2024-02-21 PROCEDURE — 97110 THERAPEUTIC EXERCISES: CPT | Performed by: PHYSICAL MEDICINE & REHABILITATION

## 2024-02-21 PROCEDURE — 97112 NEUROMUSCULAR REEDUCATION: CPT | Performed by: PHYSICAL MEDICINE & REHABILITATION

## 2024-02-21 NOTE — PROGRESS NOTES
"Daily Note     Today's date: 2024  Patient name: Surekha Tenorio  : 1955  MRN: [id number]  Referring provider: Felisa Benson MD  Dx:   Encounter Diagnosis     ICD-10-CM    1. Chronic bilateral low back pain without sciatica  M54.50     G89.29       2. DDD (degenerative disc disease), lumbosacral  M51.37       3. Chronic pain syndrome  G89.4       4. Spinal stenosis of lumbosacral region  M48.07       5. DDD (degenerative disc disease), thoracolumbar  M51.35                      Subjective: Pt with no new sx/complaints. No complaints after last session. Notes her \"usual\" pain.       Objective: See treatment diary below      Assessment: Tolerated treatment well. No c/o increased pain with or following tx; reported only mm soreness/fatigue (mild) after session, however felt her back felt \"a little better'\" vs start of session. Pt continues with strength and mm endurance of her hips/gluts. Fatigues  quickly with PREs; requires cueing with PREs to avoid compensation/substitutions 2* mm weakness. No complaints after session. Patient demonstrated fatigue post treatment and would benefit from continued PT      Plan: Continue per plan of care.  Progress treatment as tolerated.       Precautions: anxiety, ANAND, chronic pain, scoliosis        Re-eval Date:  3/8    Date 2024  2/15 2/21     Visit Count 1 2 3     FOTO 2024        Pain In See IE 8/10 7/10     Pain Out See IE 8/10 6/10         Manuals 2024  2/15 2/21                                     Neuro Re-Ed        TA         TA with BKFO  15x/5\"          20x/5\" 2x10/5\"          20x/5\" cues/feedback     TA with ALT heel slides      Dead bugs         Pallof press                                         Ther Ex        Nustep - B LE ROM/conditioning   L1 10' L1 10'      LTR                Hip flexor stretch Reviewed L/S AROM exercises she has been doing at home  5x10\"                4x30\" 5x10\"ea              4x30\"ea B     HS stretch      SLRs  " "4x30\"      Flex/abd  2x10 4x30\" ea B      Flex/abd  2x10 ea      Bridges      clamshells  2x10 Resume NV     Mini squats      Step ups         Sit to stands        Knee ext        HS curl                 Ther Activity 10' total  pt education regarding pathophysiology/pathoanatomy of present pain/sx and condition, role of PT in improving pain/sx and function, pt education regarding activity modification to avoid exacerbation of sx and delayed recovery; brief education about chronic pain and pain neuroscience and role of exercise in helping to reduce pain and improve function                          Gait Training                        Modalities                                       "

## 2024-02-22 ENCOUNTER — OFFICE VISIT (OUTPATIENT)
Age: 69
End: 2024-02-22
Payer: MEDICARE

## 2024-02-22 VITALS
HEIGHT: 65 IN | SYSTOLIC BLOOD PRESSURE: 129 MMHG | HEART RATE: 87 BPM | DIASTOLIC BLOOD PRESSURE: 63 MMHG | BODY MASS INDEX: 21.49 KG/M2 | WEIGHT: 129 LBS

## 2024-02-22 DIAGNOSIS — M47.816 LUMBAR SPONDYLOSIS: ICD-10-CM

## 2024-02-22 DIAGNOSIS — G89.4 CHRONIC PAIN SYNDROME: ICD-10-CM

## 2024-02-22 DIAGNOSIS — M47.812 CERVICAL SPONDYLOSIS: Primary | ICD-10-CM

## 2024-02-22 DIAGNOSIS — M41.9 SCOLIOSIS DEFORMITY OF SPINE: ICD-10-CM

## 2024-02-22 DIAGNOSIS — M51.16 LUMBAR DISC DISEASE WITH RADICULOPATHY: ICD-10-CM

## 2024-02-22 DIAGNOSIS — M62.830 SPASM OF THORACOLUMBAR MUSCLE: ICD-10-CM

## 2024-02-22 PROCEDURE — 99214 OFFICE O/P EST MOD 30 MIN: CPT | Performed by: ANESTHESIOLOGY

## 2024-02-22 RX ORDER — DICLOFENAC SODIUM 25 MG/1
25 TABLET, DELAYED RELEASE ORAL 3 TIMES DAILY
Qty: 90 TABLET | Refills: 1 | Status: SHIPPED | OUTPATIENT
Start: 2024-02-22 | End: 2024-02-29

## 2024-02-22 RX ORDER — METHOCARBAMOL 500 MG/1
500 TABLET, FILM COATED ORAL 3 TIMES DAILY
Qty: 90 TABLET | Refills: 1 | Status: SHIPPED | OUTPATIENT
Start: 2024-02-22 | End: 2024-03-23

## 2024-02-22 RX ORDER — DICLOFENAC SODIUM 75 MG/1
75 TABLET, DELAYED RELEASE ORAL 2 TIMES DAILY
Qty: 60 TABLET | Refills: 1 | Status: SHIPPED | OUTPATIENT
Start: 2024-02-22 | End: 2024-02-22

## 2024-02-22 NOTE — PATIENT INSTRUCTIONS
"Medial Branch Blocks     What are the facet joints?     The facet joints are the articulations or connections between the vertebrae in the spine.  They are like any other joint in the body like the knee or elbow that enable the bending or twisting movements of the spine.  The facet joints help support the weight and control movement between individual vertebrae.    The facet joints can get inflamed secondary to injury or arthritis and cause pain and stiffness.  To help determine whether the facet joints are a source of pain, it is necessary to perform a nerve block along the nerves that supply sensation to your joints.  Facet joints are innervated or \"supplied\" by nerves called medial branches.  These nerves carried the pain signals to the spinal cord and the signals eventually reach the brain, where the pain is noticed.    If the nerves were \"blocked\" or \"numbed\", they will not be able to carry pain sensation to the spinal cord for the short term.  Therefore, if the pain is due to facet joint arthritis, you should have relief from pain and stiffness.  This is a diagnostic procedure. You will be given a pain diary to take home for the next 24 hours.  If this does provide short-term relief, this procedure may be repeated to confirm diagnosis.  Once it is determined that the pain is indeed due to the facet joint disease, we can use a procedure called radiofrequency ablation and prevent the conduction of pain information for an extended period of time as discussed by your physician.    What happens during the procedure?    Lying on your stomach or side, the skin over year neck, midback or low back will be well cleaned.  The physician will numb a small area of skin with numbing medicine which may sting for a few seconds.  The physician will use x-ray guidance to direct a special needle along side the targeted medial branch nerves.  Then a small amount of contrast dye may be injected confirming proper placement.  Then a " nerve small amount of local anesthetic will be injected over the nerve.      What happens after the procedure?    Your arm or chest wall or leg may temporarily feel numb or weak from the anesthetic.  A dressing may be applied to the injection site.  Your will remain for about 15 to 20 minutes and the nurse will monitor blood pressure and pulse.  The nurse will review your discharge instructions and will be able to go home.  A pain diary will be provided for you to keep a record over the next 24 hours.  Over that time, you should perform activities that normally cause you pain (within reason). This will help us determine the success of the injection.  After you have finished with the diary, please mail, fax or drop off the completed diary and we will be in contact with you.

## 2024-02-22 NOTE — PROGRESS NOTES
Assessment:  1. Cervical spondylosis    2. Scoliosis deformity of spine    3. Spasm of thoracolumbar muscle    4. Lumbar disc disease with radiculopathy    5. Lumbar spondylosis    6. Chronic pain syndrome        Plan:  Patient is a 68-year-old female complains of neck pain, low back pain, bilateral leg pain with chronic pain syndrome secondary to scoliosis, spondylosis with spinal stenosis presents to office for follow-up visit.  Patient is status post L5-S1 interlaminar epidural steroid injection performed on 2/9/2024 at which she reports 50% alleviation of symptoms which is still ongoing.  Does report some relief from diclofenac.  Patient reports significant neck pain with decreased range of motion right worse than left.  Patient reports hearing sounds and crackling when she turns her head in any direction.  MRI cervical spine was reviewed which does show facet arthropathy right worse than left at C3-C4 and C4-C5 in addition to C6-C7 patient has started physical therapy having her reevaluation on 2/7/2024 and started her second session yesterday  1.  We will schedule patient for a right C3-C4 and C4-C5 medial branch block #1 for cervical spondylosis  2.  We will titrate down due to upset stomach diclofenac 25 mg p.o. 2 times daily  3.  We will refill Robaxin 500 mg p.o. 3 times daily  4.  Follow-up in 5 weeks    History of Present Illness:  The patient is a 68 y.o. female who presents for a follow up office visit in regards to Neck Pain and Back Pain.   The patient’s current symptoms include 4 out of 10 intermittent doses aching, throbbing pain worse in the evening time.    Current pain medications includes: Diclofenac, Robaxin .  The patient reports that this regimen is providing 50% pain relief.  The patient is reporting no side effects from this pain medication regimen.    I have personally reviewed and/or updated the patient's past medical history, past surgical history, family history, social history,  current medications, allergies, and vital signs today.         Review of Systems  Review of Systems   Constitutional:  Negative for unexpected weight change.   HENT:  Negative for hearing loss.    Eyes:  Negative for visual disturbance.   Respiratory:  Negative for shortness of breath.    Cardiovascular:  Negative for leg swelling.   Gastrointestinal:  Positive for nausea. Negative for constipation.   Endocrine: Negative for polyuria.   Genitourinary:  Negative for difficulty urinating.   Musculoskeletal:  Negative for gait problem, joint swelling and myalgias.        Joint stiffness   Skin:  Negative for rash.   Neurological:  Negative for weakness and headaches.   Psychiatric/Behavioral:  Negative for decreased concentration.    All other systems reviewed and are negative.      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     Drug intolerance     Dysfunction of right eustachian tube     Dysfunctional uterine bleeding     Edema     Elevated d-dimer     Exposure to hepatitis C     Fracture of fibula     Generalized anxiety disorder     GERD (gastroesophageal reflux disease)     Hyperlipidemia     Mild cervical dysplasia     Myalgia     Myositis     Palpitations     PONV (postoperative nausea and vomiting)     Pre-syncope     Scoliosis deformity of spine 1/23/2024    Seasonal allergies     Stress incontinence     Thrombocytopenia (HCC)     Urinary retention     Urinary tract infection     Uterine leiomyoma        Past Surgical History:   Procedure Laterality Date    BLADDER SURGERY      BREAST BIOPSY Right     US guided    COLONOSCOPY       COLPOSCOPY  01/06/1998    DENTAL SURGERY      ENDOMETRIAL BIOPSY  05/21/1993    HYSTERECTOMY      HYSTEROSCOPY      uterus- 1/22/98, 7/1/02 and 4/10/07    AK ARTHRP KNE CONDYLE&PLATU MEDIAL&LAT COMPARTMENTS Right 2/8/2023    Procedure: ARTHROPLASTY KNEE TOTAL;  Surgeon: Miguel Gleason DO;  Location: [facility];  Service: Orthopedics    TUBAL LIGATION      US GUIDED BREAST BIOPSY RIGHT COMPLETE Right 08/08/2018       Family History   Problem Relation Age of Onset    Stroke Mother         cerebral artery occlusion with cerebral infarction    Coronary artery disease Mother     Arrhythmia Mother         sinus    Coronary artery disease Father     Diabetes Father     No Known Problems Daughter     No Known Problems Daughter     No Known Problems Maternal Grandmother     No Known Problems Paternal Grandmother     No Known Problems Maternal Aunt     Ovarian cancer Maternal Aunt     Aortic aneurysm Family     Arthritis Family     Diabetes Family     Heart disease Family     Breast cancer Neg Hx        Social History     Occupational History    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[employer]    Occupation: housewife   Tobacco Use    Smoking status: Every Day     Current packs/day: 1.50     Average packs/day: 1.5 packs/day for 52.1 years (78.2 ttl pk-yrs)     Types: Cigarettes     Start date: 1972     Passive exposure: Never    Smokeless tobacco: Never   Vaping Use    Vaping status: Never Used   Substance and Sexual Activity    Alcohol use: Never    Drug use: No    Sexual activity: Not Currently         Current Outpatient Medications:     acetaminophen (TYLENOL) 500 mg tablet, Take 500 mg by mouth every 6 (six) hours as needed for mild pain, Disp: , Rfl:     alendronate (FOSAMAX) 70 mg tablet, take 1 tablet by mouth every 7 days, Disp: 8 tablet, Rfl: 1    atorvastatin (LIPITOR) 20 mg tablet, Take 1 tablet (20 mg total) by mouth daily after dinner, Disp: 90 tablet, Rfl: 1    buPROPion (WELLBUTRIN XL) 150 mg 24 hr  "tablet, 150 mg every morning, Disp: , Rfl:     clonazePAM (KlonoPIN) 1 mg tablet, 2 (two) times a day as needed, Disp: , Rfl:     diclofenac (VOLTAREN) 75 mg EC tablet, Take 1 tablet (75 mg total) by mouth 2 (two) times a day, Disp: 60 tablet, Rfl: 1    methocarbamol (ROBAXIN) 500 mg tablet, Take 1 tablet (500 mg total) by mouth every 6 (six) hours as needed for muscle spasms (Thoracic muscle spasms post op), Disp: 120 tablet, Rfl: 1    omeprazole (PriLOSEC) 40 MG capsule, take 1 capsule by mouth once daily, Disp: 90 capsule, Rfl: 1    solifenacin (VESICARE) 10 MG tablet, Take 1 tablet (10 mg total) by mouth daily, Disp: 90 tablet, Rfl: 1    tretinoin (RETIN-A) 0.1 % cream, Apply topically daily at bedtime, Disp: 45 g, Rfl: 5    venlafaxine (EFFEXOR) 100 MG tablet, Take 225 mg by mouth daily at bedtime, Disp: , Rfl:     ascorbic acid (VITAMIN C) 500 MG tablet, Take 1 tablet (500 mg total) by mouth 2 (two) times a day (Patient taking differently: Take 1,000 mg by mouth daily), Disp: 60 tablet, Rfl: 1    Multiple Vitamins-Minerals (multivitamin with minerals) tablet, Take 1 tablet by mouth daily (Patient taking differently: Take 1 tablet by mouth as needed), Disp: 30 tablet, Rfl: 1    Allergies   Allergen Reactions    Gabapentin Drowsiness       Physical Exam:    /63   Pulse 87   Ht 5' 5\" (1.651 m)   Wt 58.5 kg (129 lb)   BMI 21.47 kg/m²     Constitutional:normal, well developed, well nourished, alert, in no distress and non-toxic and no overt pain behavior.  Eyes:anicteric  HEENT:grossly intact  Neck:supple, symmetric, trachea midline and no masses   Pulmonary:even and unlabored  Cardiovascular:No edema or pitting edema present  Skin:Normal without rashes or lesions and well hydrated  Psychiatric:Mood and affect appropriate  Neurologic:Cranial Nerves II-XII grossly intact  Musculoskeletal:antalgic    Cervical Spine examination demonstrates. Decreased ROM secondary to pain with lateral rotation to the " left/right and bending to the left/right, in addition to neck flexion. 5/5 upper extremity strength in all muscle groups bilaterally. Negative Spurling's maneuver to the b/l Ue, sensitivity to light touch intact b/l Ue.    Imaging  No orders to display       No orders of the defined types were placed in this encounter.

## 2024-02-27 ENCOUNTER — APPOINTMENT (OUTPATIENT)
Dept: PHYSICAL THERAPY | Facility: CLINIC | Age: 69
End: 2024-02-27
Payer: MEDICARE

## 2024-02-28 ENCOUNTER — TELEPHONE (OUTPATIENT)
Dept: ADMINISTRATIVE | Facility: HOSPITAL | Age: 69
End: 2024-02-28

## 2024-02-28 NOTE — TELEPHONE ENCOUNTER
"Per Referral Message:        ----- Message -----  From: Felisa Benson MD  Sent: 2/28/2024   8:40 AM EST  To: Elda Geller MA  Subject: RE: medicare denial - partial                    They Cannot read on the MRI Cervical spine it shows \"C3-C4: Moderate facet degenerative change on the right \"  it says without effusion which means nothing bc there is still facet joint change/arthritis  ----- Message -----  From: Elda Geller MA  Sent: 2/27/2024   8:29 AM EST  To: Felisa Benson MD  Subject: FW: medicare denial - partial                      ----- Message -----  From: Shelly Sepulveda  Sent: 2/26/2024   3:51 PM EST  To: Elda Geller MA  Subject: medicare denial - partial                        Patient is scheduled for cervical MBB #1 on 3/12.  I received a partial approval from [facility] for level C4-5 only.  There are denying level C3-4 stating:    - While there are imaging results provided, these findings do not appear to be consistent  with facet syndrome at the level of C3-4. Based on the imaging results we cannot  clearly determine the pain is coming from facet pathology at the levels requested as there is no stenosis or narrowing noted in the canal or foramina. Furthermore, Medicare  summary of evidence includes, imaging studies have been investigated as a marker of  painful lumbar facet joints. While degenerative changes can be found in imaging, they  have not been correlated as a reliable predictor of success with facet interventions.    +++    Let me know what to do with the C3-4 level     "

## 2024-02-29 ENCOUNTER — OFFICE VISIT (OUTPATIENT)
Dept: PHYSICAL THERAPY | Facility: CLINIC | Age: 69
End: 2024-02-29
Payer: MEDICARE

## 2024-02-29 DIAGNOSIS — G89.29 CHRONIC BILATERAL LOW BACK PAIN WITHOUT SCIATICA: Primary | ICD-10-CM

## 2024-02-29 DIAGNOSIS — M48.07 SPINAL STENOSIS OF LUMBOSACRAL REGION: ICD-10-CM

## 2024-02-29 DIAGNOSIS — M48.061 SPINAL STENOSIS OF LUMBAR REGION WITHOUT NEUROGENIC CLAUDICATION: ICD-10-CM

## 2024-02-29 DIAGNOSIS — M47.816 LUMBAR SPONDYLOSIS: ICD-10-CM

## 2024-02-29 DIAGNOSIS — G89.4 CHRONIC PAIN SYNDROME: ICD-10-CM

## 2024-02-29 DIAGNOSIS — M51.37 DDD (DEGENERATIVE DISC DISEASE), LUMBOSACRAL: ICD-10-CM

## 2024-02-29 DIAGNOSIS — M51.35 DDD (DEGENERATIVE DISC DISEASE), THORACOLUMBAR: ICD-10-CM

## 2024-02-29 DIAGNOSIS — M54.50 CHRONIC BILATERAL LOW BACK PAIN WITHOUT SCIATICA: Primary | ICD-10-CM

## 2024-02-29 PROCEDURE — 97112 NEUROMUSCULAR REEDUCATION: CPT

## 2024-02-29 RX ORDER — IBUPROFEN AND FAMOTIDINE 26.6; 8 MG/1; MG/1
1 TABLET ORAL 2 TIMES DAILY
Qty: 60 TABLET | Refills: 1 | Status: SHIPPED | OUTPATIENT
Start: 2024-02-29 | End: 2024-03-06

## 2024-02-29 NOTE — TELEPHONE ENCOUNTER
S/w pt and advised new Rx was sent to the pharmacy. Aware this is Ibuprofen and Pepcid combo to alleviate GI distress

## 2024-02-29 NOTE — TELEPHONE ENCOUNTER
S/w pt who states taking the Diclofenac 25mg and having GI se an hour after. Pt states she is eating prior to taking med. Pt asking for other med recs and is currently utilizing Tylenol and cymbalta.    LOV 2/22/24 RM    Pls advise. Thank you!

## 2024-02-29 NOTE — TELEPHONE ENCOUNTER
Caller: Jay Carlson     Doctor: Marcelino     Reason for call: pt calling about her medication of diclofenac (VOLTAREN) 25 MG EC tablet, Pt has been taking it a 1 week now and it is causing her to have side effects from it. Pt would like to know if something else be prescribed Please Advise    Call back#: [phone]

## 2024-02-29 NOTE — PROGRESS NOTES
"Daily Note     Today's date: 2024  Patient name: Surekha Tenorio  : 1955  MRN: [id number]  Referring provider: Felisa Benson MD  Dx:   Encounter Diagnosis     ICD-10-CM    1. Chronic bilateral low back pain without sciatica  M54.50     G89.29       2. DDD (degenerative disc disease), lumbosacral  M51.37       3. Chronic pain syndrome  G89.4       4. Spinal stenosis of lumbosacral region  M48.07       5. DDD (degenerative disc disease), thoracolumbar  M51.35           Start Time: 09  Stop Time: 1000  Total time in clinic (min): 35 minutes    Subjective: \"I have pain all over.\"      Objective: See treatment diary below      Assessment: Tolerated treatment well. Modified program 2* arriving 15 minutes late.  No change in pain levels throughout session however able to complete all exercises and transfer on mat table, sit-supine-SL- prone without s/s of distress or pain.  Will attempt to resume full program next session and progress as able.  Patient demonstrated fatigue post treatment and would benefit from continued PT      Plan: Continue per plan of care.  Progress treatment as tolerated.       Precautions: anxiety, ANAND, chronic pain, scoliosis        Re-eval Date:  3/8    Date 2024  2/15 2/21 2/29    Visit Count 1 2 3 4    FOTO 2024    Pain In See IE 8/10 7/10 7-8/10    Pain Out See IE 8/10 6/10 7-8/10        Manuals 2024  2/15 2/21 2/29                                    Neuro Re-Ed        TA         TA with BKFO  15x/5\"          20x/5\" 2x10/5\"          20x/5\" cues/feedback 2x10/5\"          20x/5\" cues/feedback    TA with ALT heel slides      Dead bugs         Pallof press                                         Ther Ex        Nustep - B LE ROM/conditioning   L1 10' L1 10'  NP time    LTR                Hip flexor stretch Reviewed L/S AROM exercises she has been doing at home  5x10\"                4x30\" 5x10\"ea              4x30\"ea B 10x10\"              4x30\"ea B    HS " "stretch      SLRs  4x30\"      Flex/abd  2x10 4x30\" ea B      Flex/abd  2x10 ea  4x30\" ea B      Flex/abd  2x10 ea     Bridges      clamshells  2x10 Resume NV 2x10    Mini squats      Step ups         Sit to stands        Knee ext        HS curl                 Ther Activity 10' total  pt education regarding pathophysiology/pathoanatomy of present pain/sx and condition, role of PT in improving pain/sx and function, pt education regarding activity modification to avoid exacerbation of sx and delayed recovery; brief education about chronic pain and pain neuroscience and role of exercise in helping to reduce pain and improve function                          Gait Training                        Modalities                                         "

## 2024-03-01 ENCOUNTER — TELEPHONE (OUTPATIENT)
Age: 69
End: 2024-03-01

## 2024-03-01 NOTE — TELEPHONE ENCOUNTER
Caller: pt    Doctor: jones    Reason for call: I let the pt know that her script was sent to [facility] yesterday at 3:56 pm.    Call back#: [phone]

## 2024-03-02 DIAGNOSIS — E78.2 MIXED HYPERLIPIDEMIA: ICD-10-CM

## 2024-03-04 RX ORDER — ATORVASTATIN CALCIUM 20 MG/1
20 TABLET, FILM COATED ORAL
Qty: 90 TABLET | Refills: 1 | Status: SHIPPED | OUTPATIENT
Start: 2024-03-04

## 2024-03-05 ENCOUNTER — OFFICE VISIT (OUTPATIENT)
Dept: PHYSICAL THERAPY | Facility: CLINIC | Age: 69
End: 2024-03-05
Payer: MEDICARE

## 2024-03-05 DIAGNOSIS — M54.50 CHRONIC BILATERAL LOW BACK PAIN WITHOUT SCIATICA: Primary | ICD-10-CM

## 2024-03-05 DIAGNOSIS — G89.29 CHRONIC BILATERAL LOW BACK PAIN WITHOUT SCIATICA: Primary | ICD-10-CM

## 2024-03-05 DIAGNOSIS — M51.37 DDD (DEGENERATIVE DISC DISEASE), LUMBOSACRAL: ICD-10-CM

## 2024-03-05 DIAGNOSIS — M51.35 DDD (DEGENERATIVE DISC DISEASE), THORACOLUMBAR: ICD-10-CM

## 2024-03-05 DIAGNOSIS — G89.4 CHRONIC PAIN SYNDROME: ICD-10-CM

## 2024-03-05 PROCEDURE — 97110 THERAPEUTIC EXERCISES: CPT

## 2024-03-05 PROCEDURE — 97112 NEUROMUSCULAR REEDUCATION: CPT

## 2024-03-05 NOTE — PROGRESS NOTES
"Daily Note     Today's date: 3/5/2024  Patient name: Surekha Tenorio  : 1955  MRN: [id number]  Referring provider: Felisa Benson MD  Dx:   Encounter Diagnosis     ICD-10-CM    1. Chronic bilateral low back pain without sciatica  M54.50     G89.29       2. DDD (degenerative disc disease), lumbosacral  M51.37       3. Chronic pain syndrome  G89.4       4. DDD (degenerative disc disease), thoracolumbar  M51.35           Start Time: 1000  Stop Time: 1045  Total time in clinic (min): 45 minutes    Subjective: \"I am having a lot pain, this weather doesn't help.\"      Objective: See treatment diary below      Assessment: Tolerated treatment well. Progressed with core stabilizing exercises.  Cueing from control needed; quick muscle fatigue noted.  No change in pain levels at end of session, however no s/s of distressed throughout session.  Will continue to monitor and progress as able.  Patient demonstrated fatigue post treatment and would benefit from continued PT      Plan: Continue per plan of care.  Progress treatment as tolerated.       Precautions: anxiety, ANAND, chronic pain, scoliosis        Re-eval Date:  3/8    Date 2024  2/15 2/21 2/29 3/5   Visit Count 1 2 3 4 5   FOTO 2024    Pain In See IE 8/10 7/10 7-8/10 7-8/10   Pain Out See IE 8/10 6/10 7-8/10 7-8/10       Manuals 2024  2/15 2/21 2/29 3/5                                   Neuro Re-Ed        TA         TA with BKFO  15x/5\"          20x/5\" 2x10/5\"          20x/5\" cues/feedback 2x10/5\"          20x/5\" cues/feedback           20x/5\" cues/feedback   TA with ALT heel slides      Dead bugs      20x/3-5\"   Pallof press                                         Ther Ex        Nustep - B LE ROM/conditioning   L1 10' L1 10'  NP time L3 10'    LTR                Hip flexor stretch Reviewed L/S AROM exercises she has been doing at home  5x10\"                4x30\" 5x10\"ea              4x30\"ea B 10x10\"              4x30\"ea B " "10x10\"              4x30\"ea B   HS stretch      SLRs  4x30\"      Flex/abd  2x10 4x30\" ea B      Flex/abd  2x10 ea  4x30\" ea B      Flex/abd  2x10 ea  4x30\" ea B      Flex/abd  3x10 ea    Bridges      clamshells  2x10 Resume NV 2x10 2x15      Grn 20x/3-5\"   Mini squats      Step ups           Sit to stands        Knee ext        HS curl                 Ther Activity 10' total  pt education regarding pathophysiology/pathoanatomy of present pain/sx and condition, role of PT in improving pain/sx and function, pt education regarding activity modification to avoid exacerbation of sx and delayed recovery; brief education about chronic pain and pain neuroscience and role of exercise in helping to reduce pain and improve function                          Gait Training                        Modalities                                           "

## 2024-03-06 ENCOUNTER — TELEPHONE (OUTPATIENT)
Dept: RHEUMATOLOGY | Facility: CLINIC | Age: 69
End: 2024-03-06

## 2024-03-06 ENCOUNTER — OFFICE VISIT (OUTPATIENT)
Age: 69
End: 2024-03-06
Payer: MEDICARE

## 2024-03-06 VITALS
BODY MASS INDEX: 22.33 KG/M2 | HEART RATE: 81 BPM | DIASTOLIC BLOOD PRESSURE: 69 MMHG | WEIGHT: 134 LBS | HEIGHT: 65 IN | SYSTOLIC BLOOD PRESSURE: 131 MMHG

## 2024-03-06 DIAGNOSIS — M62.830 SPASM OF THORACOLUMBAR MUSCLE: ICD-10-CM

## 2024-03-06 DIAGNOSIS — Z12.11 ENCOUNTER FOR SCREENING COLONOSCOPY: ICD-10-CM

## 2024-03-06 DIAGNOSIS — M41.9 SCOLIOSIS DEFORMITY OF SPINE: ICD-10-CM

## 2024-03-06 DIAGNOSIS — G89.4 CHRONIC PAIN SYNDROME: Primary | ICD-10-CM

## 2024-03-06 DIAGNOSIS — M25.50 GENERALIZED JOINT PAIN: ICD-10-CM

## 2024-03-06 DIAGNOSIS — M48.061 SPINAL STENOSIS OF LUMBAR REGION WITHOUT NEUROGENIC CLAUDICATION: ICD-10-CM

## 2024-03-06 DIAGNOSIS — M51.16 LUMBAR DISC DISEASE WITH RADICULOPATHY: ICD-10-CM

## 2024-03-06 DIAGNOSIS — M54.50 CHRONIC BILATERAL LOW BACK PAIN WITHOUT SCIATICA: ICD-10-CM

## 2024-03-06 DIAGNOSIS — M47.816 LUMBAR SPONDYLOSIS: ICD-10-CM

## 2024-03-06 DIAGNOSIS — G89.29 CHRONIC BILATERAL LOW BACK PAIN WITHOUT SCIATICA: ICD-10-CM

## 2024-03-06 PROCEDURE — 99214 OFFICE O/P EST MOD 30 MIN: CPT | Performed by: NURSE PRACTITIONER

## 2024-03-06 RX ORDER — METHOCARBAMOL 750 MG/1
750 TABLET, FILM COATED ORAL 3 TIMES DAILY
Qty: 90 TABLET | Refills: 1 | Status: SHIPPED | OUTPATIENT
Start: 2024-03-06 | End: 2024-05-05

## 2024-03-06 NOTE — PROGRESS NOTES
Assessment:  1. Chronic pain syndrome    2. Chronic bilateral low back pain without sciatica    3. Lumbar disc disease with radiculopathy    4. Lumbar spondylosis    5. Spinal stenosis of lumbar region without neurogenic claudication    6. Encounter for screening colonoscopy    7. Scoliosis deformity of spine    8. Spasm of thoracolumbar muscle    9. Generalized joint pain        Plan:  While the patient was in the office today, I did have a thorough conversation regarding their chronic pain syndrome, medication management, and treatment plan options.  Patient is being seen for a follow-up visit.  She underwent an L5-S1 interlaminar epidural steroid injection on 2/9/2024.  Patient reports that she obtained up to 50% improvement for 1 to 2 weeks.  Patient tells me that she previously underwent lumbar facet radiofrequency ablations at [facility] pain management and [facility] pain [place] which failed to provide her with any relief.  She is currently involved in physical therapy.  So far, she has only attended 3 sessions.  I encouraged her to continue with physical therapy.  If she fails to respond, will consider aqua therapy.    Per patient's request, increase Robaxin to 750 mg 3 times daily if needed for spasms.  A new prescription was sent to her pharmacy.    Patient is scheduled for cervical facet medial branch blocks in the very near future.    Per patient's request, referral made to rheumatology for complaints of diffuse joint pain.    She was evaluated by Dr. Mars on 1/23/2024.  Surgery was not recommended.    After visit was over, patient approached me and asked when we could schedule more injections for her lumbar area.  I tried to explain to her that I do not think more interventional therapy is indicated for her lumbar area at this time since she has failed to respond to injections up to this point.  Patient states that she would still like to proceed with injections because she is hopeful that they will  eventually help.  I explained to her that I do not think that is the best course of action.    Follow-up in 6 weeks.        History of Present Illness:  The patient is a 68 y.o. female who presents for a follow up office visit in regards to Back Pain, Neck Pain, and Hip Pain.   The patient’s current symptoms include Complaints of neck pain, mid back pain, low back pain.  Now, patient is complaining of diffuse joint pain.  Current pain level is an 8/10.  Quality of pain is described as sharp, pressure-like.    Current pain medications includes: 500 mg 3 times daily if needed for spasms .  The patient reports that this regimen is providing 20-30% pain relief.  The patient is reporting no side effects from this pain medication regimen.    I have personally reviewed and/or updated the patient's past medical history, past surgical history, family history, social history, current medications, allergies, and vital signs today.         Review of Systems  Review of Systems   Constitutional:  Negative for chills and fever.   HENT:  Negative for ear pain and sore throat.    Eyes:  Negative for pain and visual disturbance.   Respiratory:  Negative for cough and shortness of breath.    Cardiovascular:  Negative for chest pain and palpitations.   Gastrointestinal:  Negative for abdominal pain and vomiting.   Genitourinary:  Negative for dysuria and hematuria.   Musculoskeletal:  Positive for gait problem. Negative for arthralgias and back pain.        Joint stiffness, pain and swelling    Skin:  Negative for color change and rash.   Neurological:  Negative for seizures and syncope.   All other systems reviewed and are negative.        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     Drug intolerance     Dysfunction of right eustachian tube     Dysfunctional uterine bleeding     Edema     Elevated d-dimer     Exposure to hepatitis C     Fracture of fibula     Generalized anxiety disorder     GERD (gastroesophageal reflux disease)     Hyperlipidemia     Mild cervical dysplasia     Myalgia     Myositis     Palpitations     PONV (postoperative nausea and vomiting)     Pre-syncope     Scoliosis deformity of spine 1/23/2024    Seasonal allergies     Stress incontinence     Thrombocytopenia (HCC)     Urinary retention     Urinary tract infection     Uterine leiomyoma        Past Surgical History:   Procedure Laterality Date    BLADDER SURGERY      BREAST BIOPSY Right     US guided    COLONOSCOPY      COLPOSCOPY  01/06/1998    DENTAL SURGERY      ENDOMETRIAL BIOPSY  05/21/1993    HYSTERECTOMY      HYSTEROSCOPY      uterus- 1/22/98, 7/1/02 and 4/10/07    [facility] ARTHRP KNE CONDYLE&PLATU MEDIAL&LAT COMPARTMENTS Right 2/8/2023    Procedure: ARTHROPLASTY KNEE TOTAL;  Surgeon: Miguel Gleason DO;  Location: [facility];  Service: Orthopedics    TUBAL LIGATION      US GUIDED BREAST BIOPSY RIGHT COMPLETE Right 08/08/2018       Family History   Problem Relation Age of Onset    Stroke Mother         cerebral artery occlusion with cerebral infarction    Coronary artery disease Mother     Arrhythmia Mother         sinus    Coronary artery disease Father     Diabetes Father     No Known Problems Daughter     No Known Problems Daughter     No Known Problems Maternal Grandmother     No Known Problems Paternal Grandmother     No Known Problems Maternal Aunt     Ovarian cancer Maternal Aunt     Aortic aneurysm Family     Arthritis Family     Diabetes Family     Heart disease Family     Breast cancer Neg Hx        Social History     Occupational History    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    Occupation: housewife   Tobacco Use    Smoking status: Every Day     Current packs/day: 1.50     Average packs/day: 1.5 packs/day for 52.2 years (78.3 ttl pk-yrs)     Types: Cigarettes     Start date: 1972     Passive exposure: Never    Smokeless tobacco: Never   Vaping Use    Vaping status: Never Used   Substance and Sexual Activity    Alcohol use: Never    Drug use: No    Sexual activity: Not Currently         Current Outpatient Medications:     acetaminophen (TYLENOL) 500 mg tablet, Take 500 mg by mouth every 6 (six) hours as needed for mild pain, Disp: , Rfl:     alendronate (FOSAMAX) 70 mg tablet, take 1 tablet by mouth every 7 days, Disp: 8 tablet, Rfl: 1    ascorbic acid (VITAMIN C) 500 MG tablet, Take 1 tablet (500 mg total) by mouth 2 (two) times a day (Patient taking differently: Take 1,000 mg by mouth daily), Disp: 60 tablet, Rfl: 1    atorvastatin (LIPITOR) 20 mg tablet, Take 1 tablet (20 mg total) by mouth daily after dinner, Disp: 90 tablet, Rfl: 1    buPROPion (WELLBUTRIN XL) 150 mg 24 hr tablet, 150 mg every morning, Disp: , Rfl:     clonazePAM (KlonoPIN) 1 mg tablet, 2 (two) times a day as needed, Disp: , Rfl:     methocarbamol (ROBAXIN) 750 mg tablet, Take 1 tablet (750 mg total) by mouth 3 (three) times a day, Disp: 90 tablet, Rfl: 1    Multiple Vitamins-Minerals (multivitamin with minerals) tablet, Take 1 tablet by mouth daily (Patient taking differently: Take 1 tablet by mouth as needed), Disp: 30 tablet, Rfl: 1    omeprazole (PriLOSEC) 40 MG capsule, take 1 capsule by mouth once daily, Disp: 90 capsule, Rfl: 1    solifenacin (VESICARE) 10 MG tablet, Take 1 tablet (10 mg total) by mouth daily, Disp: 90 tablet, Rfl: 1    tretinoin (RETIN-A) 0.1 % cream, Apply topically daily at bedtime, Disp: 45 g, Rfl: 5    venlafaxine (EFFEXOR) 100 MG tablet, Take 225 mg by mouth daily at bedtime, Disp: , Rfl:     Allergies   Allergen Reactions    Gabapentin Drowsiness       Physical  "Exam:    /69 (BP Location: Left arm, Patient Position: Sitting, Cuff Size: Standard)   Pulse 81   Ht 5' 5\" (1.651 m)   Wt 60.8 kg (134 lb)   BMI 22.30 kg/m²     Constitutional:normal, well developed, well nourished, alert, in no distress and non-toxic and no overt pain behavior.  Eyes:anicteric  HEENT:grossly intact  Neck:supple, symmetric, trachea midline and no masses   Pulmonary:even and unlabored  Cardiovascular:No edema or pitting edema present  Skin:Normal without rashes or lesions and well hydrated  Psychiatric:Mood and affect appropriate  Neurologic:Cranial Nerves II-XII grossly intact  Musculoskeletal:normal    Imaging  No orders to display       Orders Placed This Encounter   Procedures    Ambulatory Referral to Rheumatology         "

## 2024-03-06 NOTE — TELEPHONE ENCOUNTER
Pt called regarding referral for joint pain.  Pt has hx of Osteoporosis, Osteoarthritis, Spondylitic Degenerative Changes of the Spine, Chronic Pain.      [facility] treating chronic pain syndrome.    Pt requested Charlotte Appt made for 3/22/24 Dr. Flood.    Pt had no further questions at this time.

## 2024-03-06 NOTE — H&P (VIEW-ONLY)
Assessment:  1. Chronic pain syndrome    2. Chronic bilateral low back pain without sciatica    3. Lumbar disc disease with radiculopathy    4. Lumbar spondylosis    5. Spinal stenosis of lumbar region without neurogenic claudication    6. Encounter for screening colonoscopy    7. Scoliosis deformity of spine    8. Spasm of thoracolumbar muscle    9. Generalized joint pain        Plan:  While the patient was in the office today, I did have a thorough conversation regarding their chronic pain syndrome, medication management, and treatment plan options.  Patient is being seen for a follow-up visit.  She underwent an L5-S1 interlaminar epidural steroid injection on 2/9/2024.  Patient reports that she obtained up to 50% improvement for 1 to 2 weeks.  Patient tells me that she previously underwent lumbar facet radiofrequency ablations at [facility] pain management and [facility] pain [place] which failed to provide her with any relief.  She is currently involved in physical therapy.  So far, she has only attended 3 sessions.  I encouraged her to continue with physical therapy.  If she fails to respond, will consider aqua therapy.    Per patient's request, increase Robaxin to 750 mg 3 times daily if needed for spasms.  A new prescription was sent to her pharmacy.    Patient is scheduled for cervical facet medial branch blocks in the very near future.    Per patient's request, referral made to rheumatology for complaints of diffuse joint pain.    She was evaluated by Dr. Mars on 1/23/2024.  Surgery was not recommended.    After visit was over, patient approached me and asked when we could schedule more injections for her lumbar area.  I tried to explain to her that I do not think more interventional therapy is indicated for her lumbar area at this time since she has failed to respond to injections up to this point.  Patient states that she would still like to proceed with injections because she is hopeful that they will  eventually help.  I explained to her that I do not think that is the best course of action.    Follow-up in 6 weeks.        History of Present Illness:  The patient is a 68 y.o. female who presents for a follow up office visit in regards to Back Pain, Neck Pain, and Hip Pain.   The patient’s current symptoms include Complaints of neck pain, mid back pain, low back pain.  Now, patient is complaining of diffuse joint pain.  Current pain level is an 8/10.  Quality of pain is described as sharp, pressure-like.    Current pain medications includes: 500 mg 3 times daily if needed for spasms .  The patient reports that this regimen is providing 20-30% pain relief.  The patient is reporting no side effects from this pain medication regimen.    I have personally reviewed and/or updated the patient's past medical history, past surgical history, family history, social history, current medications, allergies, and vital signs today.         Review of Systems  Review of Systems   Constitutional:  Negative for chills and fever.   HENT:  Negative for ear pain and sore throat.    Eyes:  Negative for pain and visual disturbance.   Respiratory:  Negative for cough and shortness of breath.    Cardiovascular:  Negative for chest pain and palpitations.   Gastrointestinal:  Negative for abdominal pain and vomiting.   Genitourinary:  Negative for dysuria and hematuria.   Musculoskeletal:  Positive for gait problem. Negative for arthralgias and back pain.        Joint stiffness, pain and swelling    Skin:  Negative for color change and rash.   Neurological:  Negative for seizures and syncope.   All other systems reviewed and are negative.        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     Drug intolerance     Dysfunction of right eustachian tube     Dysfunctional uterine bleeding     Edema     Elevated d-dimer     Exposure to hepatitis C     Fracture of fibula     Generalized anxiety disorder     GERD (gastroesophageal reflux disease)     Hyperlipidemia     Mild cervical dysplasia     Myalgia     Myositis     Palpitations     PONV (postoperative nausea and vomiting)     Pre-syncope     Scoliosis deformity of spine 1/23/2024    Seasonal allergies     Stress incontinence     Thrombocytopenia (HCC)     Urinary retention     Urinary tract infection     Uterine leiomyoma        Past Surgical History:   Procedure Laterality Date    BLADDER SURGERY      BREAST BIOPSY Right     US guided    COLONOSCOPY      COLPOSCOPY  01/06/1998    DENTAL SURGERY      ENDOMETRIAL BIOPSY  05/21/1993    HYSTERECTOMY      HYSTEROSCOPY      uterus- 1/22/98, 7/1/02 and 4/10/07    [facility] ARTHRP KNE CONDYLE&PLATU MEDIAL&LAT COMPARTMENTS Right 2/8/2023    Procedure: ARTHROPLASTY KNEE TOTAL;  Surgeon: Miguel Gleason DO;  Location: [facility];  Service: Orthopedics    TUBAL LIGATION      US GUIDED BREAST BIOPSY RIGHT COMPLETE Right 08/08/2018       Family History   Problem Relation Age of Onset    Stroke Mother         cerebral artery occlusion with cerebral infarction    Coronary artery disease Mother     Arrhythmia Mother         sinus    Coronary artery disease Father     Diabetes Father     No Known Problems Daughter     No Known Problems Daughter     No Known Problems Maternal Grandmother     No Known Problems Paternal Grandmother     No Known Problems Maternal Aunt     Ovarian cancer Maternal Aunt     Aortic aneurysm Family     Arthritis Family     Diabetes Family     Heart disease Family     Breast cancer Neg Hx        Social History     Occupational History    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    Occupation: housewife   Tobacco Use    Smoking status: Every Day     Current packs/day: 1.50     Average packs/day: 1.5 packs/day for 52.2 years (78.3 ttl pk-yrs)     Types: Cigarettes     Start date: 1972     Passive exposure: Never    Smokeless tobacco: Never   Vaping Use    Vaping status: Never Used   Substance and Sexual Activity    Alcohol use: Never    Drug use: No    Sexual activity: Not Currently         Current Outpatient Medications:     acetaminophen (TYLENOL) 500 mg tablet, Take 500 mg by mouth every 6 (six) hours as needed for mild pain, Disp: , Rfl:     alendronate (FOSAMAX) 70 mg tablet, take 1 tablet by mouth every 7 days, Disp: 8 tablet, Rfl: 1    ascorbic acid (VITAMIN C) 500 MG tablet, Take 1 tablet (500 mg total) by mouth 2 (two) times a day (Patient taking differently: Take 1,000 mg by mouth daily), Disp: 60 tablet, Rfl: 1    atorvastatin (LIPITOR) 20 mg tablet, Take 1 tablet (20 mg total) by mouth daily after dinner, Disp: 90 tablet, Rfl: 1    buPROPion (WELLBUTRIN XL) 150 mg 24 hr tablet, 150 mg every morning, Disp: , Rfl:     clonazePAM (KlonoPIN) 1 mg tablet, 2 (two) times a day as needed, Disp: , Rfl:     methocarbamol (ROBAXIN) 750 mg tablet, Take 1 tablet (750 mg total) by mouth 3 (three) times a day, Disp: 90 tablet, Rfl: 1    Multiple Vitamins-Minerals (multivitamin with minerals) tablet, Take 1 tablet by mouth daily (Patient taking differently: Take 1 tablet by mouth as needed), Disp: 30 tablet, Rfl: 1    omeprazole (PriLOSEC) 40 MG capsule, take 1 capsule by mouth once daily, Disp: 90 capsule, Rfl: 1    solifenacin (VESICARE) 10 MG tablet, Take 1 tablet (10 mg total) by mouth daily, Disp: 90 tablet, Rfl: 1    tretinoin (RETIN-A) 0.1 % cream, Apply topically daily at bedtime, Disp: 45 g, Rfl: 5    venlafaxine (EFFEXOR) 100 MG tablet, Take 225 mg by mouth daily at bedtime, Disp: , Rfl:     Allergies   Allergen Reactions    Gabapentin Drowsiness       Physical  "Exam:    /69 (BP Location: Left arm, Patient Position: Sitting, Cuff Size: Standard)   Pulse 81   Ht 5' 5\" (1.651 m)   Wt 60.8 kg (134 lb)   BMI 22.30 kg/m²     Constitutional:normal, well developed, well nourished, alert, in no distress and non-toxic and no overt pain behavior.  Eyes:anicteric  HEENT:grossly intact  Neck:supple, symmetric, trachea midline and no masses   Pulmonary:even and unlabored  Cardiovascular:No edema or pitting edema present  Skin:Normal without rashes or lesions and well hydrated  Psychiatric:Mood and affect appropriate  Neurologic:Cranial Nerves II-XII grossly intact  Musculoskeletal:normal    Imaging  No orders to display       Orders Placed This Encounter   Procedures    Ambulatory Referral to Rheumatology         "

## 2024-03-07 ENCOUNTER — EVALUATION (OUTPATIENT)
Dept: PHYSICAL THERAPY | Facility: CLINIC | Age: 69
End: 2024-03-07
Payer: MEDICARE

## 2024-03-07 ENCOUNTER — TELEPHONE (OUTPATIENT)
Age: 69
End: 2024-03-07

## 2024-03-07 DIAGNOSIS — G89.29 CHRONIC BILATERAL LOW BACK PAIN WITHOUT SCIATICA: Primary | ICD-10-CM

## 2024-03-07 DIAGNOSIS — M51.35 DDD (DEGENERATIVE DISC DISEASE), THORACOLUMBAR: ICD-10-CM

## 2024-03-07 DIAGNOSIS — M48.07 SPINAL STENOSIS OF LUMBOSACRAL REGION: ICD-10-CM

## 2024-03-07 DIAGNOSIS — M54.50 CHRONIC BILATERAL LOW BACK PAIN WITHOUT SCIATICA: Primary | ICD-10-CM

## 2024-03-07 DIAGNOSIS — M51.37 DDD (DEGENERATIVE DISC DISEASE), LUMBOSACRAL: ICD-10-CM

## 2024-03-07 DIAGNOSIS — G89.4 CHRONIC PAIN SYNDROME: ICD-10-CM

## 2024-03-07 DIAGNOSIS — M85.89 OSTEOPENIA OF MULTIPLE SITES: ICD-10-CM

## 2024-03-07 PROCEDURE — 97110 THERAPEUTIC EXERCISES: CPT | Performed by: PHYSICAL MEDICINE & REHABILITATION

## 2024-03-07 PROCEDURE — 97112 NEUROMUSCULAR REEDUCATION: CPT | Performed by: PHYSICAL MEDICINE & REHABILITATION

## 2024-03-07 RX ORDER — ALENDRONATE SODIUM 70 MG/1
70 TABLET ORAL
Qty: 12 TABLET | Refills: 1 | Status: SHIPPED | OUTPATIENT
Start: 2024-03-07

## 2024-03-07 NOTE — PROGRESS NOTES
PT Re-Evaluation     Today's date: 3/7/2024  Patient name: Surekah Tenorio  : 1955  MRN: [id number]  Referring provider: Felisa Benson MD  Dx:   Encounter Diagnosis     ICD-10-CM    1. Chronic bilateral low back pain without sciatica  M54.50     G89.29       2. DDD (degenerative disc disease), lumbosacral  M51.37       3. Chronic pain syndrome  G89.4       4. DDD (degenerative disc disease), thoracolumbar  M51.35       5. Spinal stenosis of lumbosacral region  M48.07                        Assessment  Assessment details: Surekha has been compliant with attending PT and completing home exercise program since initial eval and reports overall minimal change in LBP /sx since SOC with PT. However she does note increase in LE flexibility and strength since SOC with PREs and ADLs. Surekha reports and demonstrates increased strength, improved flexibility, improved joint mobility, and improved overall level of function since initial eval, but is still limited compared to prior level of function. She continues with B L/S pain, reduced hip and trunk strength/mm endurance, and poor postural awareness. Surekha continues with above listed impairments and would benefit from additional skilled PT to address these deficits to return to prior level of function.      Plan  Frequency:1-2x/week   Duration in weeks: 4-6   POC start date: 3/7/24  POC end date: 24  Therapeutic exercise/activity, neuromuscular reeducation, manual therapy, and modalities.   Patient understands and agrees to plan of care.    Goals  Short Term--4 weeks  1. Patient will demonstrate 2 point decrease in pain levels. - not met   2. Patient will demonstrate 1/2 point increase in all deficient MMT scores.- partially met   3. Pt will report ability to stand/walk at least 25% longer vs SOC . - progressing     Long Term--By Discharge  1. Patient will achieve expected FOTO score.  2. Pt will be I with HEP  3. Pt with report ability to stand/walk  "at least 1 hr to perform ADLS without increased back pain/sx.     Patient's Goal: Decrease pain         Understanding of Dx/Px/POC: good   Prognosis: fair    Plan  Patient would benefit from: skilled physical therapy  Treatment plan discussed with: patient        Subjective Evaluation    History of Present Illness  Mechanism of injury: UPDATE:  Pt notes overall no change in pain/sx since SOC. She notes her back pain is about the same. \"Not bad\" at rest. Notes her back pain is worse with any sort of prolonged standing/walking, bending, housework etc. Reports improvement in her stamina, strength, and flexibility since SOC however. No new sx/complaints. Notes she does some of her exercises at home, however \"forgets them\" most of the time.       IE:    Chronic LBP x at least 4 years. Has been f/u with pain management. Has had injections with benefit only one time per pt. Most recent injection was an epidural and it made things hurt more per pt. Has also had PT in the past; minimal overall benefit per pt. No relief with chiropractic tx. Continues to see pain management. Will be getting more injections this Friday in L/S. Referred to OPPT at this time.   Pt notes LBP is worse with any sort of movement /activity (standing, walking, bending, lifting etc). Pain is better sitting/laying. Some pain relief with CP. Pain relief with HP. Taking tylenol for pain; also medications as Rx by MD for \"arthritis\"; also takes mm relaxers which she feels helps. Notes her back spasms at times and really hurts. B LBP. No pain into Les. No n/t or sensory changes into Les. No night pain, unexplained weight loss, no recent trauma, no consitutional sx, no change in bowel/bladder function, and no saddle anesthesia. 2 episodes of sudden weakness B LEs; lasted a few minutes; no constant or progressive weakness. Notes stiffness t/o her L/S. Limited ROM; worse in the morning; better with movement sometimes. Doing stretches at home she found online; " "L/S rotation R/L, side bending, extension etc; feels good per pt. Notes the pain can cause her to \"hunch over\" at times. Notes she is active however reports less activity over the past 1-2 years. Doesn't really get out or do things anymore. Stays inside.       Quality of life: fair    Patient Goals  Patient goals for therapy: decreased pain and increased motion  Patient's goals regarding treatment: be back on the go, work outside.  Pain  Current pain ratin  At best pain ratin  At worst pain ratin  Location: B L/S locally  Quality: dull ache, sharp, knife-like and tight  Progression: no change    Social Support  Lives with: spouse    Employment status: not working  Hand dominance: right      Diagnostic Tests  X-ray: abnormal  Treatments  Previous treatment: medication, physical therapy, injection treatment and chiropractic  Current treatment: injection treatment, medication and physical therapy        Objective    GAIT:  Forward flexed, forward/rounded shoulders   Slight knee flexion   Lateral flex to L     Squat assess: NT  ¼ squat assess: NT  SLS: RLE: NT,   LLE: NT       Lumbar  % of normal   Flex. Palms to floor- no pain    Extn. 25% \"stiff\" only    SB Left 50% no sx   SB Right 50% no sx    ROT Left 50-75% no sx   ROT Right 50-75% no sx    Repetitive testing: NT        MMT         AROM          PROM    Hip       L       R        L           R      L     R   Flex. 4--4 4--4 WFL WFL     Extn.   Decreased Decreased     Abd. 4+ 4+       IR. 4- 4- Decreased  Decreased     ER. 4--4 4--4 Decreased Decreased                   MMT    Knee         L        R   Flex. 4 4   Extn. 4 4                MMT    Ankle       L        R   PF 4 4   DF. 4- 4-   EHL     Inv.     Ever.       Posture:  Seated:  Forward head/rounded shoulders  Increased thoracic kyphosis   B scapular depression and protraction with mild winging   PPT    Palpation:   Increased mm tension B L/S PVMS   No pain L/S with palpation at IE    Myotomes " "(L/R):intact   L2:      L3:     L4:       L5:        S1:         S2:  Dermatome: (light touch- L/R):  intact L2:        L3:        L4:        L5:        S1:         S2:     Reflexes:  (L/R) L4: WNL B       S1:   WNL B          Babinski=  NT    Clonus= Negative B     Slump test: L= Neg    R=   Neg    Straight leg raise:   L= NT     R=  NT              Transverse abdominis: Bent knee fall out= Fair supine march=Poor         Precautions: anxiety, ANAND, chronic pain, scoliosis        Re-eval Date:  3/8    Date 3/7       Visit Count 6       FOTO NV       Pain In 5-6/10       Pain Out 5-6/10           Manuals 3/7                                       Neuro Re-Ed        TA         TA with BKFO         20x/5\" cues/feedback       TA with ALT heel slides      Dead bugs  20x/3-5\"       Pallof press                                         Ther Ex        Nustep - B LE ROM/conditioning  L3 10'       LTR                Hip flexor stretch 10x10\"              4x30\"ea B        HS stretch      SLRs 4x30\" ea B      Flex/abd  3x10 ea        Bridges      clamshells 2x15      Grn 2-3x10/3-5\"ea       Mini squats      Step ups           Sit to stands        Knee ext        HS curl                 Ther Activity                          Gait Training                        Modalities                                   "

## 2024-03-07 NOTE — TELEPHONE ENCOUNTER
Duexis was discontinued by provider at pt's visit yesterday. Pt now calling to day to state it needs a prior auth.  Please advise if pt is to be taking this medication as Rx would need to be reentered

## 2024-03-07 NOTE — TELEPHONE ENCOUNTER
Caller: Robyn     Doctor: Marcelino     Reason for call: Patient calling stating per pharmacy prior auth required for ibuprofen please advise      Call back#: [phone]

## 2024-03-08 ENCOUNTER — HOSPITAL ENCOUNTER (OUTPATIENT)
Dept: RADIOLOGY | Facility: HOSPITAL | Age: 69
End: 2024-03-08
Payer: MEDICARE

## 2024-03-08 VITALS
TEMPERATURE: 96.1 F | SYSTOLIC BLOOD PRESSURE: 132 MMHG | DIASTOLIC BLOOD PRESSURE: 69 MMHG | OXYGEN SATURATION: 94 % | HEART RATE: 73 BPM | RESPIRATION RATE: 20 BRPM

## 2024-03-08 DIAGNOSIS — M54.12 CERVICAL RADICULOPATHY: ICD-10-CM

## 2024-03-08 PROBLEM — M47.812 CERVICAL SPONDYLOSIS: Chronic | Status: ACTIVE | Noted: 2024-03-08

## 2024-03-08 PROCEDURE — 64491 INJ PARAVERT F JNT C/T 2 LEV: CPT | Performed by: ANESTHESIOLOGY

## 2024-03-08 PROCEDURE — 64490 INJ PARAVERT F JNT C/T 1 LEV: CPT | Performed by: ANESTHESIOLOGY

## 2024-03-08 RX ORDER — LIDOCAINE HYDROCHLORIDE 10 MG/ML
5 INJECTION, SOLUTION EPIDURAL; INFILTRATION; INTRACAUDAL; PERINEURAL ONCE
Status: COMPLETED | OUTPATIENT
Start: 2024-03-08 | End: 2024-03-08

## 2024-03-08 RX ADMIN — LIDOCAINE HYDROCHLORIDE 5 ML: 10 INJECTION, SOLUTION EPIDURAL; INFILTRATION; INTRACAUDAL; PERINEURAL at 10:09

## 2024-03-08 RX ADMIN — Medication 4 ML: at 10:15

## 2024-03-08 NOTE — DISCHARGE INSTR - LAB

## 2024-03-08 NOTE — TELEPHONE ENCOUNTER
I advised patient that she probably should not be on any NSAIDs since she was complaining of continued abdominal issues.

## 2024-03-08 NOTE — INTERVAL H&P NOTE
Update: (This section must be completed if the H&P was completed greater than 24 hrs to procedure or admission)    H&P reviewed. After examining the patient, I find no changed to the H&P since it had been written.    Patient re-evaluated. Accept as history and physical.    Felisa Benson MD/March 8, 2024/9:49 AM

## 2024-03-12 ENCOUNTER — OFFICE VISIT (OUTPATIENT)
Dept: OBGYN CLINIC | Facility: CLINIC | Age: 69
End: 2024-03-12
Payer: MEDICARE

## 2024-03-12 ENCOUNTER — OFFICE VISIT (OUTPATIENT)
Dept: PHYSICAL THERAPY | Facility: CLINIC | Age: 69
End: 2024-03-12
Payer: MEDICARE

## 2024-03-12 ENCOUNTER — APPOINTMENT (OUTPATIENT)
Dept: RADIOLOGY | Facility: CLINIC | Age: 69
End: 2024-03-12
Payer: MEDICARE

## 2024-03-12 ENCOUNTER — TELEPHONE (OUTPATIENT)
Age: 69
End: 2024-03-12

## 2024-03-12 VITALS — BODY MASS INDEX: 22.33 KG/M2 | WEIGHT: 134 LBS | HEIGHT: 65 IN

## 2024-03-12 DIAGNOSIS — M54.50 CHRONIC BILATERAL LOW BACK PAIN WITHOUT SCIATICA: Primary | ICD-10-CM

## 2024-03-12 DIAGNOSIS — G89.4 CHRONIC PAIN SYNDROME: ICD-10-CM

## 2024-03-12 DIAGNOSIS — G89.29 CHRONIC BILATERAL LOW BACK PAIN WITHOUT SCIATICA: Primary | ICD-10-CM

## 2024-03-12 DIAGNOSIS — G89.4 CHRONIC PAIN SYNDROME: Primary | ICD-10-CM

## 2024-03-12 DIAGNOSIS — Z96.651 S/P TOTAL KNEE REPLACEMENT, RIGHT: Primary | ICD-10-CM

## 2024-03-12 DIAGNOSIS — M48.07 SPINAL STENOSIS OF LUMBOSACRAL REGION: ICD-10-CM

## 2024-03-12 DIAGNOSIS — M51.37 DDD (DEGENERATIVE DISC DISEASE), LUMBOSACRAL: ICD-10-CM

## 2024-03-12 DIAGNOSIS — Z96.651 S/P TOTAL KNEE REPLACEMENT, RIGHT: ICD-10-CM

## 2024-03-12 PROCEDURE — 97112 NEUROMUSCULAR REEDUCATION: CPT

## 2024-03-12 PROCEDURE — 73562 X-RAY EXAM OF KNEE 3: CPT

## 2024-03-12 PROCEDURE — 99213 OFFICE O/P EST LOW 20 MIN: CPT | Performed by: ORTHOPAEDIC SURGERY

## 2024-03-12 PROCEDURE — 97110 THERAPEUTIC EXERCISES: CPT

## 2024-03-12 NOTE — PROGRESS NOTES
ASSESSMENT/PLAN:    Diagnoses and all orders for this visit:    S/P total knee replacement, right  -     XR knee 3 vw right non injury; Future        X-rays of the patient's right knee are consistent with a well-seated right total knee replacement.  The prosthesis is in good alignment.  There are no signs of loosening.  There are no fractures or dislocations.  Overall, the patient is very pleased with the results of surgery.  She may continue home exercise program.  She will follow-up with our office in 1 year with new x-rays of her right knee 3 views.  She is acceptable to this plan.    Return in about 1 year (around 3/12/2025).    The patient is doing quite well in regards to her right total knee replacement.  Strength and motion intact.  No instability.  Incision clean and dry.  X-rays show anatomic placement of the prosthesis.  Continue home exercise program.  Follow-up 12 months evaluation with new x-rays of right knee-3 views  _____________________________________________________  CHIEF COMPLAINT:  Chief Complaint   Patient presents with    Right Knee - Follow-up         SUBJECTIVE:  Surekha Tenorio is a 68 y.o. female who presents to our office for a postop visit.  The patient is status post right total knee replacement from 2/8/2023.  She is very pleased with the results of surgery.  She is ambulating without an assist device or antalgic gait.  She denies any numbness or tingling.  She denies any fever or chills.    The following portions of the patient's history were reviewed and updated as appropriate: allergies, current medications, past family history, past medical history, past social history, past surgical history and problem list.    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     Drug intolerance     Dysfunction of right eustachian tube     Dysfunctional uterine bleeding     Edema     Elevated d-dimer     Exposure to hepatitis C     Fracture of fibula     Generalized anxiety disorder     GERD (gastroesophageal reflux disease)     Hyperlipidemia     Mild cervical dysplasia     Myalgia     Myositis     Palpitations     PONV (postoperative nausea and vomiting)     Pre-syncope     Scoliosis deformity of spine 1/23/2024    Seasonal allergies     Stress incontinence     Thrombocytopenia (HCC)     Urinary retention     Urinary tract infection     Uterine leiomyoma        PAST SURGICAL HISTORY:  Past Surgical History:   Procedure Laterality Date    BLADDER SURGERY      BREAST BIOPSY Right     US guided    COLONOSCOPY      COLPOSCOPY  01/06/1998    DENTAL SURGERY      ENDOMETRIAL BIOPSY  05/21/1993    HYSTERECTOMY      HYSTEROSCOPY      uterus- 1/22/98, 7/1/02 and 4/10/07    OK ARTHRP KNE CONDYLE&PLATU MEDIAL&LAT COMPARTMENTS Right 2/8/2023    Procedure: ARTHROPLASTY KNEE TOTAL;  Surgeon: Miguel Gleason DO;  Location: [facility];  Service: Orthopedics    TUBAL LIGATION      US GUIDED BREAST BIOPSY RIGHT COMPLETE Right 08/08/2018       FAMILY HISTORY:  Family History   Problem Relation Age of Onset    Stroke Mother         cerebral artery occlusion with cerebral infarction    Coronary artery disease Mother     Arrhythmia Mother         sinus    Coronary artery disease Father     Diabetes Father     No Known Problems Daughter     No Known Problems Daughter     No Known Problems Maternal Grandmother     No Known Problems Paternal Grandmother     No Known Problems Maternal Aunt     Ovarian cancer Maternal Aunt     Aortic aneurysm Family     Arthritis Family     Diabetes Family     Heart disease Family     Breast cancer Neg  Hx        SOCIAL HISTORY:  Social History     Tobacco Use    Smoking status: Every Day     Current packs/day: 1.50     Average packs/day: 1.5 packs/day for 52.2 years (78.3 ttl pk-yrs)     Types: Cigarettes     Start date: 1972     Passive exposure: Never    Smokeless tobacco: Never   Vaping Use    Vaping status: Never Used   Substance Use Topics    Alcohol use: Never    Drug use: No       MEDICATIONS:    Current Outpatient Medications:     acetaminophen (TYLENOL) 500 mg tablet, Take 500 mg by mouth every 6 (six) hours as needed for mild pain, Disp: , Rfl:     alendronate (FOSAMAX) 70 mg tablet, Take 1 tablet (70 mg total) by mouth every 7 days, Disp: 12 tablet, Rfl: 1    atorvastatin (LIPITOR) 20 mg tablet, Take 1 tablet (20 mg total) by mouth daily after dinner, Disp: 90 tablet, Rfl: 1    buPROPion (WELLBUTRIN XL) 150 mg 24 hr tablet, 150 mg every morning, Disp: , Rfl:     clonazePAM (KlonoPIN) 1 mg tablet, 2 (two) times a day as needed, Disp: , Rfl:     methocarbamol (ROBAXIN) 750 mg tablet, Take 1 tablet (750 mg total) by mouth 3 (three) times a day, Disp: 90 tablet, Rfl: 1    omeprazole (PriLOSEC) 40 MG capsule, take 1 capsule by mouth once daily, Disp: 90 capsule, Rfl: 1    solifenacin (VESICARE) 10 MG tablet, Take 1 tablet (10 mg total) by mouth daily, Disp: 90 tablet, Rfl: 1    tretinoin (RETIN-A) 0.1 % cream, Apply topically daily at bedtime, Disp: 45 g, Rfl: 5    venlafaxine (EFFEXOR) 100 MG tablet, Take 225 mg by mouth daily at bedtime, Disp: , Rfl:     ascorbic acid (VITAMIN C) 500 MG tablet, Take 1 tablet (500 mg total) by mouth 2 (two) times a day (Patient taking differently: Take 1,000 mg by mouth daily), Disp: 60 tablet, Rfl: 1    Multiple Vitamins-Minerals (multivitamin with minerals) tablet, Take 1 tablet by mouth daily (Patient taking differently: Take 1 tablet by mouth as needed), Disp: 30 tablet, Rfl: 1    ALLERGIES:  Allergies   Allergen Reactions    Gabapentin Drowsiness       ROS:  Review of  "Systems     Constitutional: Negative for fatigue, fever or loss of appetite.   HENT: Negative.    Respiratory: Negative for shortness of breath, dyspnea.    Cardiovascular: Negative for chest pain/tightness.   Gastrointestinal: Negative for abdominal pain, N/V.   Endocrine: Negative for cold/heat intolerance, unexplained weight loss/gain.   Genitourinary: Negative for flank pain, dysuria, hematuria.   Musculoskeletal: Negative for arthralgia   Skin: Negative for rash.    Neurological: Negative for numbness or tingling  Psychiatric/Behavioral: Negative for agitation.  _____________________________________________________  PHYSICAL EXAMINATION:    Height 5' 5\" (1.651 m), weight 60.8 kg (134 lb).    Constitutional: Oriented to person, place, and time. Appears well-developed and well-nourished. No distress.   HENT:   Head: Normocephalic.   Eyes: Conjunctivae are normal. Right eye exhibits no discharge. Left eye exhibits no discharge. No scleral icterus.   Cardiovascular: Normal rate.    Pulmonary/Chest: Effort normal.   Neurological: Alert and oriented to person, place, and time.   Skin: Skin is warm and dry. No rash noted. Not diaphoretic. No erythema. No pallor.   Psychiatric: Normal mood and affect. Behavior is normal. Judgment and thought content normal.      MUSCULOSKELETAL EXAMINATION:   Physical Exam  Ortho Exam    Right lower extremity is neurovascularly intact  Toes are pink and mobile  Compartments are soft  Incision is well-healed  Range of motion of the knee is from 0 to 120 degrees  No ligament laxity  Brisk cap refill  Sensation intact  Objective:  BP Readings from Last 1 Encounters:   03/08/24 132/69      Wt Readings from Last 1 Encounters:   03/12/24 60.8 kg (134 lb)        BMI:   Estimated body mass index is 22.3 kg/m² as calculated from the following:    Height as of this encounter: 5' 5\" (1.651 m).    Weight as of this encounter: 60.8 kg (134 lb).          Scribe Attestation      I,:  Femi Puri " SAMRA Rawls am acting as a scribe while in the presence of the attending physician.:       I,:  Miguel Gleason, DO personally performed the services described in this documentation    as scribed in my presence.:

## 2024-03-12 NOTE — PROGRESS NOTES
"Daily Note     Today's date: 3/12/2024  Patient name: Surekha Tenorio  : 1955  MRN: [id number]  Referring provider: Felisa Benson MD  Dx:   Encounter Diagnosis     ICD-10-CM    1. Chronic bilateral low back pain without sciatica  M54.50     G89.29       2. DDD (degenerative disc disease), lumbosacral  M51.37       3. Chronic pain syndrome  G89.4       4. Spinal stenosis of lumbosacral region  M48.07           Start Time: 1300  Stop Time: 1340  Total time in clinic (min): 40 minutes    Subjective: \"I am having more pain in my back today.  I woke up with it.  It wasn't this bad with the bad weather.  I got an injection in my neck last week, which helped.\"      Objective: See treatment diary below      Assessment: Tolerated treatment fair. Able to complete program without incident.  Difficulty maintaining TKE performing SLRs.  Slow gait and transfers noted.  No change in pain levels at end of session.  Will continue to monitor and progress as able.  Patient demonstrated fatigue post treatment and would benefit from continued PT      Plan: Continue per plan of care.  Progress treatment as tolerated.       Precautions: anxiety, ANAND, chronic pain, scoliosis        Re-eval Date:  3/8    Visit Count 6 7      FOTO NV NV      Pain In 5-6/10 7-8/10      Pain Out 5-6/10 7-8          Manuals 3/7 3/12                                      Neuro Re-Ed        TA         TA with BKFO         20x/5\" cues/feedback         20x/5\" cues/feedback      TA with ALT heel slides      Dead bugs  20x/3-5\"       Pallof press                                         Ther Ex        Nustep - B LE ROM/conditioning  L3 10' L3 10'      LTR                Hip flexor stretch 10x10\"              4x30\"ea B  10x10\"      HS stretch      SLRs 4x30\" ea B      Flex/abd  3x10 ea        Flex/abd  3x10 ea       Bridges      clamshells 2x15      Grn 2-3x10/3-5\"ea 2x15      Grn 2-3x10/3-5\"ea      Mini squats      Step ups           Sit to " stands        Knee ext        HS curl                 Ther Activity                          Gait Training                        Modalities

## 2024-03-13 DIAGNOSIS — N32.81 OAB (OVERACTIVE BLADDER): ICD-10-CM

## 2024-03-13 RX ORDER — SOLIFENACIN SUCCINATE 10 MG/1
10 TABLET, FILM COATED ORAL DAILY
Qty: 90 TABLET | Refills: 1 | Status: SHIPPED | OUTPATIENT
Start: 2024-03-13

## 2024-03-19 ENCOUNTER — APPOINTMENT (OUTPATIENT)
Dept: PHYSICAL THERAPY | Facility: CLINIC | Age: 69
End: 2024-03-19
Payer: MEDICARE

## 2024-03-19 RX ORDER — METHYLPREDNISOLONE 4 MG/1
TABLET ORAL
Qty: 1 EACH | Refills: 0 | Status: SHIPPED | OUTPATIENT
Start: 2024-03-19

## 2024-03-19 NOTE — TELEPHONE ENCOUNTER
Prescription for Medrol Dosepak sent to patient's pharmacy.  Please advise her no NSAIDs while taking the Dosepak.

## 2024-03-19 NOTE — TELEPHONE ENCOUNTER
S/w pt and advised of same. RN advised pt to refrain from NSAID use during systemic steroid therapy due to risk including but limited to bleeding, GI upset and risk of gastric ulcers ect.. Pt verbalized understanding and apprec of call.

## 2024-03-19 NOTE — TELEPHONE ENCOUNTER
Caller: zeynep Carlson    Doctor: Marcelino    Reason for call: pt called stating she is having horrible back pain 10/10.  She has tried OTC and muscle relaxer's and she is not finding any relief    Call back#: [phone]

## 2024-03-19 NOTE — TELEPHONE ENCOUNTER
S/w pt who states starting w/ mid bp rad to LB yesterday while spring cleaning. Pt denies any trauma/fall/or notable strain. Pt states mid bp rad slightly to b/l rib cage. Pt states pain in 9-10/10 sharp/stabbing that decr when laying flat. Pt denies SOB or BB issues not assoc w/ her dx of overactive bladder. Pt states taking approx 3000mg Tylenol, Ibuprofen x 2 doses, heat and muscle relaxer w/o relief. Pt asking if there is anything else she could try to relieve her current pain sx.     Pls advise. Thank you!

## 2024-03-20 ENCOUNTER — TELEPHONE (OUTPATIENT)
Dept: INTERNAL MEDICINE CLINIC | Facility: CLINIC | Age: 69
End: 2024-03-20

## 2024-03-21 ENCOUNTER — APPOINTMENT (OUTPATIENT)
Dept: PHYSICAL THERAPY | Facility: CLINIC | Age: 69
End: 2024-03-21
Payer: MEDICARE

## 2024-03-21 DIAGNOSIS — F41.9 ANXIETY: Primary | ICD-10-CM

## 2024-03-21 RX ORDER — VENLAFAXINE 100 MG/1
225 TABLET ORAL
OUTPATIENT
Start: 2024-03-21

## 2024-03-21 RX ORDER — VENLAFAXINE HYDROCHLORIDE 75 MG/1
CAPSULE, EXTENDED RELEASE ORAL
Qty: 90 CAPSULE | Refills: 1 | Status: SHIPPED | OUTPATIENT
Start: 2024-03-21

## 2024-03-21 RX ORDER — VENLAFAXINE HYDROCHLORIDE 150 MG/1
CAPSULE, EXTENDED RELEASE ORAL
Qty: 90 CAPSULE | Refills: 1 | Status: SHIPPED | OUTPATIENT
Start: 2024-03-21

## 2024-03-22 ENCOUNTER — CONSULT (OUTPATIENT)
Dept: RHEUMATOLOGY | Facility: CLINIC | Age: 69
End: 2024-03-22
Payer: MEDICARE

## 2024-03-22 VITALS
OXYGEN SATURATION: 95 % | HEART RATE: 71 BPM | WEIGHT: 132 LBS | HEIGHT: 65 IN | DIASTOLIC BLOOD PRESSURE: 78 MMHG | BODY MASS INDEX: 21.99 KG/M2 | SYSTOLIC BLOOD PRESSURE: 132 MMHG

## 2024-03-22 DIAGNOSIS — G89.4 CHRONIC PAIN SYNDROME: ICD-10-CM

## 2024-03-22 DIAGNOSIS — M17.0 PRIMARY OSTEOARTHRITIS OF BOTH KNEES: ICD-10-CM

## 2024-03-22 DIAGNOSIS — M50.30 DDD (DEGENERATIVE DISC DISEASE), CERVICAL: ICD-10-CM

## 2024-03-22 DIAGNOSIS — M70.61 TROCHANTERIC BURSITIS OF BOTH HIPS: ICD-10-CM

## 2024-03-22 DIAGNOSIS — M70.62 TROCHANTERIC BURSITIS OF BOTH HIPS: ICD-10-CM

## 2024-03-22 DIAGNOSIS — M25.50 GENERALIZED JOINT PAIN: ICD-10-CM

## 2024-03-22 DIAGNOSIS — M75.51 SUBACROMIAL BURSITIS OF RIGHT SHOULDER JOINT: ICD-10-CM

## 2024-03-22 DIAGNOSIS — M15.0 PRIMARY GENERALIZED (OSTEO)ARTHRITIS: Primary | ICD-10-CM

## 2024-03-22 DIAGNOSIS — M51.37 DDD (DEGENERATIVE DISC DISEASE), LUMBOSACRAL: ICD-10-CM

## 2024-03-22 PROCEDURE — 99205 OFFICE O/P NEW HI 60 MIN: CPT | Performed by: INTERNAL MEDICINE

## 2024-03-22 PROCEDURE — 20610 DRAIN/INJ JOINT/BURSA W/O US: CPT | Performed by: INTERNAL MEDICINE

## 2024-03-22 RX ORDER — METHYLPREDNISOLONE ACETATE 80 MG/ML
80 INJECTION, SUSPENSION INTRA-ARTICULAR; INTRALESIONAL; INTRAMUSCULAR; SOFT TISSUE ONCE
Status: COMPLETED | OUTPATIENT
Start: 2024-03-22 | End: 2024-03-22

## 2024-03-22 RX ORDER — LIDOCAINE HYDROCHLORIDE 10 MG/ML
1 INJECTION, SOLUTION INFILTRATION; PERINEURAL ONCE
Status: COMPLETED | OUTPATIENT
Start: 2024-03-22 | End: 2024-03-22

## 2024-03-22 RX ADMIN — LIDOCAINE HYDROCHLORIDE 1 ML: 10 INJECTION, SOLUTION INFILTRATION; PERINEURAL at 09:51

## 2024-03-22 RX ADMIN — METHYLPREDNISOLONE ACETATE 80 MG: 80 INJECTION, SUSPENSION INTRA-ARTICULAR; INTRALESIONAL; INTRAMUSCULAR; SOFT TISSUE at 09:52

## 2024-03-22 RX ADMIN — METHYLPREDNISOLONE ACETATE 80 MG: 80 INJECTION, SUSPENSION INTRA-ARTICULAR; INTRALESIONAL; INTRAMUSCULAR; SOFT TISSUE at 09:53

## 2024-03-22 RX ADMIN — LIDOCAINE HYDROCHLORIDE 1 ML: 10 INJECTION, SOLUTION INFILTRATION; PERINEURAL at 09:50

## 2024-03-22 NOTE — PROGRESS NOTES
This is a Rheumatology Consult seen at the request of Dr. Ruby      HPI: Robyn Tenorio is a 67 y/o female who presents for further evaluation chronic pain. She has past medical history anxiety, rotator cuff tendinitis, trochanteric bursitis, GERD,     Chronic neck and back pain, DDD, radiculopathy    She is being closely monitored per pain management here at [facility]. She has had injections and Rxd Robaxin    Also on Ibuprofen and tylenol    S/p right knee replacement    Reports b/l lateral hip pain and right shoulder pain worse with elevation    She has had injections in the past and would like b/l trochanteric bursitis and right shoulder injection done today    Prior w/u HLA B27, JESSICA, RF all negative    No prior h/o psoriasis, inflammatory bowel disease            --------------------------------------------------------------------------------------------------------        ROS:      - for: Fevers, Chills or sweats.  No HAs or scalp tenderness.  No jaw claudication.  No acute visual or eye changes.  No dry eyes.  No auditory complaints.  No oral lesions or ulcers.  No dry mouth.  No sore throat or cough.  No chest pains or palpitations.  No shortness of breath, dyspnea on exertion or wheezing.  No hemotpysis.  No abdominal pain, GERD symptoms, diarrhea or constipation.  No urinary complaints.  No numbness, tingling or weakness.  No rashes.    All other ROS was reviewed and negative except as above         --------------------------------------------------------------------------------------------------------    Past Medical History    Past Medical History:   Diagnosis Date    Abnormal finding in urine     Anxiety     Arthralgia of multiple joints     Balance disorder     Biceps tendinitis, right     Bursitis of right knee     Bursitis of right shoulder     Chondrocalcinosis     Chronic bilateral low back pain without sciatica 11/29/2022    Chronic pain disorder     COPD exacerbation (HCC)     Degeneration of  internal semilunar cartilage of right knee     Drug intolerance     Dysfunction of right eustachian tube     Dysfunctional uterine bleeding     Edema     Elevated d-dimer     Exposure to hepatitis C     Fracture of fibula     Generalized anxiety disorder     GERD (gastroesophageal reflux disease)     Hyperlipidemia     Mild cervical dysplasia     Myalgia     Myositis     Palpitations     PONV (postoperative nausea and vomiting)     Pre-syncope     Scoliosis deformity of spine 1/23/2024    Seasonal allergies     Stress incontinence     Thrombocytopenia (HCC)     Urinary retention     Urinary tract infection     Uterine leiomyoma            Past Surgical History    Past Surgical History:   Procedure Laterality Date    BLADDER SURGERY      BREAST BIOPSY Right     US guided    COLONOSCOPY      COLPOSCOPY  01/06/1998    DENTAL SURGERY      ENDOMETRIAL BIOPSY  05/21/1993    HYSTERECTOMY      HYSTEROSCOPY      uterus- 1/22/98, 7/1/02 and 4/10/07    ND ARTHRP KNE CONDYLE&PLATU MEDIAL&LAT COMPARTMENTS Right 2/8/2023    Procedure: ARTHROPLASTY KNEE TOTAL;  Surgeon: Miguel Gleason DO;  Location: [facility];  Service: Orthopedics    TUBAL LIGATION      US GUIDED BREAST BIOPSY RIGHT COMPLETE Right 08/08/2018           Family History    Family History   Problem Relation Age of Onset    Stroke Mother         cerebral artery occlusion with cerebral infarction    Coronary artery disease Mother     Arrhythmia Mother         sinus    Coronary artery disease Father     Diabetes Father     No Known Problems Daughter     No Known Problems Daughter     No Known Problems Maternal Grandmother     No Known Problems Paternal Grandmother     No Known Problems Maternal Aunt     Ovarian cancer Maternal Aunt     Aortic aneurysm Family     Arthritis Family     Diabetes Family     Heart disease Family     Breast cancer Neg Hx             Social History    Social History     Tobacco Use    Smoking status: Every Day     Current packs/day: 1.50     " Average packs/day: 1.5 packs/day for 52.2 years (78.3 ttl pk-yrs)     Types: Cigarettes     Start date: 1972     Passive exposure: Never    Smokeless tobacco: Never   Vaping Use    Vaping status: Never Used   Substance Use Topics    Alcohol use: Yes     Comment: occasional    Drug use: No     Retired     Allergies    Allergies   Allergen Reactions    Gabapentin Drowsiness         Medications    Current Outpatient Medications   Medication Instructions    acetaminophen (TYLENOL) 500 mg, Oral, Every 6 hours PRN    alendronate (FOSAMAX) 70 mg, Oral, Every 7 days    ascorbic acid (VITAMIN C) 1,000 mg, Oral    atorvastatin (LIPITOR) 20 mg, Oral, Daily after dinner    buPROPion (WELLBUTRIN XL) 150 mg, Every morning    clonazePAM (KlonoPIN) 1 mg tablet 2 times daily PRN    methocarbamol (ROBAXIN) 750 mg, Oral, 3 times daily    methylPREDNISolone 4 MG tablet therapy pack Use as directed on package    Multiple Vitamins-Minerals (multivitamin with minerals) tablet 1 tablet, Oral, Daily    omeprazole (PriLOSEC) 40 MG capsule take 1 capsule by mouth once daily    solifenacin (VESICARE) 10 mg, Oral, Daily    tretinoin (RETIN-A) 0.1 % cream Topical, Daily at bedtime    venlafaxine (EFFEXOR-XR) 150 mg 24 hr capsule One 150mg and one 75mg q day for total of 225mg q day.    venlafaxine (EFFEXOR-XR) 75 mg 24 hr capsule One 150mg and one 75mg q day for total of 225mg q day.          Physical Exam    /78   Pulse 71   Ht 5' 5\" (1.651 m)   Wt 59.9 kg (132 lb)   SpO2 95%   BMI 21.97 kg/m²     GEN: AAO, No apparent distress.  Patient is well developed.  HEENT:  Pupils are equal, round and reactive.  Sclera are clear.  Fundoscopic exam is normal.  External ears are without lesions.  Oral pharynx is clear of ulcers or other lesions.  MMM.   NECK:  Supple.  There is no adenopathy appreciable in anterior or posterior cervical chains or supraclavicularly.  JVP is normal.    HEART: Regular rate and rhythm.  There is no appreciable " murmur, gallop or rub.  LUNGS: Clear to auscultation.  ABD:  Soft, without tenderness, rebound or guarding.  No appreciable organomegally.  NEURO: Speech and cognition are normal.  Strength is 5/5 throughout.  Tone is normal.  DTRs are 2/4 at the knees, ankles and elbows.  Gait is normal.  SKIN: There are no rashes or lesions    MUSCULOSKELETAL:   Right shoulder pain with elevation  + b/l trochanteric bursitis      ________________________________________________________________________          Results Review    Component      Latest Ref Rng 10/21/2022   C-REACTIVE PROTEIN      <3.0 mg/L <3.0    RHEUMATOID FACTOR      Negative  Negative    HLA B27 Negative    ANTI-NUCLEAR ANTIBODY (JESSICA)      Negative  Negative      tudy Result    Narrative & Impression   MRI LUMBAR SPINE WITHOUT CONTRAST     INDICATION: M47.896: Other spondylosis, lumbar region.     COMPARISON:  None.     TECHNIQUE:  Multiplanar, multisequence imaging of the lumbar spine was performed. .        IMAGE QUALITY:  Diagnostic     FINDINGS:     VERTEBRAL BODIES:  There are 5 lumbar type vertebral bodies. Rightward convex scoliosis apex L1. Slight anterolisthesis L5-S1. Modic type I degenerative marrow signal L4-5 eccentric to the right.     SACRUM:  Normal signal within the sacrum. No evidence of insufficiency or stress fracture.     DISTAL CORD AND CONUS:  Normal size and signal within the distal cord and conus.     PARASPINAL SOFT TISSUES:  Paraspinal soft tissues are unremarkable.     LOWER THORACIC DISC SPACES: T12-L1: Mild annular bulge with small central protrusion type disc herniation results in mild central stenosis.     LUMBAR DISC SPACES:     L1-L2: Degenerative disc osteophyte complex with marginal osteophytes eccentric to the left results in moderate left foraminal narrowing. Mild central stenosis.     L2-L3: Mild annular bulge and marginal osteophytes results in mild central stenosis. Foramina are patent.     L3-L4: Moderate to severe facet  hypertrophy with mild annular bulge results in moderate central stenosis and moderate left foraminal narrowing.     L4-L5: Severe right and moderate left facet hypertrophy identified with diffuse annular bulge. Severe right foraminal narrowing is identified with severe central and right lateral recess stenosis. This is the most severely affected level.     L5-S1: Severe facet degenerative change bilaterally accounts for slight anterolisthesis and uncovering the posterior disc margin. Mild right foraminal narrowing. Minimal central stenosis.     OTHER FINDINGS:  None.     IMPRESSION:     Spondylotic degenerative changes are seen particularly L4-5 where there is severe central and right lateral recess stenosis as well as severe right foraminal narrowing. Correlate for right L4 or right L5 radiculopathy.     Degenerative changes at remaining levels result in moderate central stenosis at L3-4 and mild central stenosis at T12-L1.         Impressions       1. Primary generalized (osteo)arthritis        2. Generalized joint pain  Ambulatory Referral to Rheumatology      3. DDD (degenerative disc disease), cervical        4. DDD (degenerative disc disease), lumbosacral        5. Primary osteoarthritis of both knees        6. Trochanteric bursitis of both hips        7. Subacromial bursitis of right shoulder joint        8. Chronic pain syndrome              Plans:    Robyn Tenorio is a 69 y/o female with clinical s/s osteoarthritis, DDD    Classical latoya osteoarthritis hands    On exam no synovitis, psoriasis, Raynaud's    No other s/s inflammatory arthritis/CTD    Reviewed prior MRI scans c/w osteoarthritis    W/u including JESSICA, HLA B27, RF negative    Osteoarthritis: OTC NSAIDs/Tylenol as needed    F/u with pain management    B/l trochanteric bursitis and right subacromial bursitis: Injections done today      RTC as needed          Thank you for involving me in this patient's care.        Branden Flood MD  [facility]  Rheumatology    I have spent a total time of 60 minutes on 03/22/24 in caring for this patient including Diagnostic results, Impressions, Counseling / Coordination of care, Documenting in the medical record, Reviewing / ordering tests, medicine, procedures  , and Obtaining or reviewing history  .      Large joint arthrocentesis: R subacromial bursa  Universal Protocol:  Consent: Verbal consent obtained. Written consent not obtained.  Risks and benefits: risks, benefits and alternatives were discussed  Consent given by: patient  Timeout called at: 3/22/2024 9:40 AM.  Patient understanding: patient states understanding of the procedure being performed  Patient consent: the patient's understanding of the procedure matches consent given  Procedure consent: procedure consent matches procedure scheduled  Relevant documents: relevant documents present and verified  Test results: test results available and properly labeled  Site marked: the operative site was marked  Radiology Images displayed and confirmed. If images not available, report reviewed: imaging studies available  Patient identity confirmed: verbally with patient  Supporting Documentation  Indications: pain   Procedure Details  Location: shoulder - R subacromial bursa  Needle size: 25 G  Ultrasound guidance: no    Patient tolerance: patient tolerated the procedure well with no immediate complications      Large joint arthrocentesis: bilateral greater trochanteric bursa  Universal Protocol:  Consent: Verbal consent obtained. Written consent not obtained.  Risks and benefits: risks, benefits and alternatives were discussed  Consent given by: patient  Timeout called at: 3/22/2024 9:42 AM.  Patient understanding: patient states understanding of the procedure being performed  Patient consent: the patient's understanding of the procedure matches consent given  Procedure consent: procedure consent matches procedure scheduled  Relevant documents: relevant documents  present and verified  Test results: test results available and properly labeled  Site marked: the operative site was marked  Radiology Images displayed and confirmed. If images not available, report reviewed: imaging studies available  Patient identity confirmed: verbally with patient  Supporting Documentation  Indications: pain   Procedure Details  Location: hip - bilateral greater trochanteric bursa  Needle size: 25 G  Ultrasound guidance: no  Approach: lateral    Patient tolerance: patient tolerated the procedure well with no immediate complications

## 2024-03-26 ENCOUNTER — APPOINTMENT (OUTPATIENT)
Dept: PHYSICAL THERAPY | Facility: CLINIC | Age: 69
End: 2024-03-26
Payer: MEDICARE

## 2024-04-02 ENCOUNTER — HOSPITAL ENCOUNTER (OUTPATIENT)
Dept: RADIOLOGY | Facility: HOSPITAL | Age: 69
Discharge: HOME | End: 2024-04-02
Payer: MEDICARE

## 2024-04-02 VITALS
OXYGEN SATURATION: 98 % | HEART RATE: 60 BPM | DIASTOLIC BLOOD PRESSURE: 73 MMHG | RESPIRATION RATE: 20 BRPM | TEMPERATURE: 96.7 F | SYSTOLIC BLOOD PRESSURE: 110 MMHG

## 2024-04-02 DIAGNOSIS — M47.812 CERVICAL SPONDYLOSIS: ICD-10-CM

## 2024-04-02 RX ORDER — LIDOCAINE HYDROCHLORIDE 10 MG/ML
5 INJECTION, SOLUTION EPIDURAL; INFILTRATION; INTRACAUDAL; PERINEURAL ONCE
Status: DISCONTINUED | OUTPATIENT
Start: 2024-04-02 | End: 2024-04-06 | Stop reason: HOSPADM

## 2024-04-02 RX ORDER — BUPIVACAINE HYDROCHLORIDE 5 MG/ML
3 INJECTION, SOLUTION EPIDURAL; INTRACAUDAL ONCE
Status: DISCONTINUED | OUTPATIENT
Start: 2024-04-02 | End: 2024-04-06 | Stop reason: HOSPADM

## 2024-04-09 ENCOUNTER — APPOINTMENT (OUTPATIENT)
Dept: RADIOLOGY | Facility: CLINIC | Age: 69
End: 2024-04-09
Payer: MEDICARE

## 2024-04-09 ENCOUNTER — OFFICE VISIT (OUTPATIENT)
Dept: OBGYN CLINIC | Facility: CLINIC | Age: 69
End: 2024-04-09
Payer: MEDICARE

## 2024-04-09 VITALS
WEIGHT: 132 LBS | HEART RATE: 76 BPM | HEIGHT: 65 IN | BODY MASS INDEX: 21.99 KG/M2 | SYSTOLIC BLOOD PRESSURE: 115 MMHG | DIASTOLIC BLOOD PRESSURE: 76 MMHG

## 2024-04-09 DIAGNOSIS — M25.551 PAIN IN RIGHT HIP: ICD-10-CM

## 2024-04-09 DIAGNOSIS — M75.41 IMPINGEMENT SYNDROME OF RIGHT SHOULDER: ICD-10-CM

## 2024-04-09 DIAGNOSIS — M70.61 GREATER TROCHANTERIC BURSITIS OF RIGHT HIP: Primary | ICD-10-CM

## 2024-04-09 PROCEDURE — 73502 X-RAY EXAM HIP UNI 2-3 VIEWS: CPT

## 2024-04-09 PROCEDURE — 20610 DRAIN/INJ JOINT/BURSA W/O US: CPT | Performed by: ORTHOPAEDIC SURGERY

## 2024-04-09 PROCEDURE — 99213 OFFICE O/P EST LOW 20 MIN: CPT | Performed by: ORTHOPAEDIC SURGERY

## 2024-04-09 RX ORDER — TRIAMCINOLONE ACETONIDE 40 MG/ML
80 INJECTION, SUSPENSION INTRA-ARTICULAR; INTRAMUSCULAR
Status: COMPLETED | OUTPATIENT
Start: 2024-04-09 | End: 2024-04-09

## 2024-04-09 RX ORDER — BUPIVACAINE HYDROCHLORIDE 2.5 MG/ML
4 INJECTION, SOLUTION INFILTRATION; PERINEURAL
Status: COMPLETED | OUTPATIENT
Start: 2024-04-09 | End: 2024-04-09

## 2024-04-09 RX ADMIN — BUPIVACAINE HYDROCHLORIDE 4 ML: 2.5 INJECTION, SOLUTION INFILTRATION; PERINEURAL at 11:00

## 2024-04-09 RX ADMIN — TRIAMCINOLONE ACETONIDE 80 MG: 40 INJECTION, SUSPENSION INTRA-ARTICULAR; INTRAMUSCULAR at 11:00

## 2024-04-09 NOTE — PROGRESS NOTES
Assessment/Plan:   Diagnoses and all orders for this visit:    Greater trochanteric bursitis of right hip  -     Large joint arthrocentesis: R greater trochanteric bursa    Pain in right hip  -     XR hip/pelv 2-3 vws right if performed; Future    Impingement syndrome of right shoulder  -     Large joint arthrocentesis: R subacromial bursa         Discussed with patient that her exam is consistent with greater trochanteric bursitis of her right hip. The patient is also experiencing a flare-up of impingement in her right shoulder. She was offered and accepted an injection(s) of Kenalog and Marcaine to her Right hip and shoulder(s) for symptomatic relief of pain and inflammation. Patient tolerated the treatment(s) well. Ice and post injection protocol advised. Weightbearing activities as tolerated. She will be seen for follow-up in 3 months for re-evaluation and consideration for repeat injections as necessary. Patient expresses understanding and is in agreement with this treatment plan. The patient was given the opportunity to ask questions or present concerns.    The patient has impingement of her right shoulder and greater trochanteric bursitis of her right hip.  Both structures were injected with Kenalog and Marcaine.  She tolerated procedures quite well.  Return back in 3 months for evaluation    Subjective:   Patient ID: Surekha Tenorio  1955     HPI  Patient is a 68 y.o. female who presents for evaluation of her right hip. The patient reports a sudden onset of right hip pain. She cannot recall any recent injury or fall. She states that her pain is present along the lateral aspect of her hip. She states that she experiences pain with weightbearing activities and when side lying. The patient also reports a flare-up of right shoulder pain, attributed to spring cleaning. She states that the repetitive and overhead activity caused an achy pain in her right shoulder. She denies numbness or tingling.     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     Drug intolerance     Dysfunction of right eustachian tube     Dysfunctional uterine bleeding     Edema     Elevated d-dimer     Exposure to hepatitis C     Fracture of fibula     Generalized anxiety disorder     GERD (gastroesophageal reflux disease)     Hyperlipidemia     Mild cervical dysplasia     Myalgia     Myositis     Palpitations     PONV (postoperative nausea and vomiting)     Pre-syncope     Scoliosis deformity of spine 1/23/2024    Seasonal allergies     Stress incontinence     Thrombocytopenia (HCC)     Urinary retention     Urinary tract infection     Uterine leiomyoma        Past Surgical History:   Procedure Laterality Date    BLADDER SURGERY      BREAST BIOPSY Right     US guided    COLONOSCOPY      COLPOSCOPY  01/06/1998    DENTAL SURGERY      ENDOMETRIAL BIOPSY  05/21/1993    HYSTERECTOMY      HYSTEROSCOPY      uterus- 1/22/98, 7/1/02 and 4/10/07    CO ARTHRP KNE CONDYLE&PLATU MEDIAL&LAT COMPARTMENTS Right 2/8/2023    Procedure: ARTHROPLASTY KNEE TOTAL;  Surgeon: Miguel Gleason DO;  Location: [facility];  Service: Orthopedics    TUBAL LIGATION      US GUIDED BREAST BIOPSY RIGHT COMPLETE Right 08/08/2018       Family History   Problem Relation Age of Onset    Stroke Mother         cerebral artery occlusion with cerebral infarction    Coronary artery disease Mother     Arrhythmia  Mother         sinus    Coronary artery disease Father     Diabetes Father     No Known Problems Daughter     No Known Problems Daughter     No Known Problems Maternal Grandmother     No Known Problems Paternal Grandmother     No Known Problems Maternal Aunt     Ovarian cancer Maternal Aunt     Aortic aneurysm Family     Arthritis Family     Diabetes Family     Heart disease Family     Breast cancer Neg Hx        Social History     Socioeconomic History    Marital status: /Civil Union     Spouse name: None    Number of children: None    Years of education: None    Highest education level: None   Occupational History    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[employer]    Occupation: housewife   Tobacco Use    Smoking status: Every Day     Current packs/day: 1.50     Average packs/day: 1.5 packs/day for 52.3 years (78.4 ttl pk-yrs)     Types: Cigarettes     Start date: 1972     Passive exposure: Never    Smokeless tobacco: Never   Vaping Use    Vaping status: Never Used   Substance and Sexual Activity    Alcohol use: Yes     Comment: occasional    Drug use: No    Sexual activity: Not Currently   Other Topics Concern    None   Social History Narrative    Caffeine use     Social Determinants of Health     Financial Resource Strain: Low Risk  (3/6/2023)    Overall Financial Resource Strain (CARDIA)     Difficulty of Paying Living Expenses: Not hard at all   Food Insecurity: No Food Insecurity (2/9/2023)    Hunger Vital Sign     Worried About Running Out of Food in the Last Year: Never true     Ran Out of Food in the Last Year: Never true   Transportation Needs: No Transportation Needs (3/6/2023)    PRAPARE - Transportation     Lack of Transportation (Medical): No     Lack of Transportation (Non-Medical): No   Physical Activity: Not on file   Stress: Not on file   Social Connections: Not on file   Intimate Partner Violence: Not on file   Housing Stability: Low Risk  (2/9/2023)    Housing Stability Vital Sign      Unable to Pay for Housing in the Last Year: No     Number of Places Lived in the Last Year: 1     Unstable Housing in the Last Year: No         Current Outpatient Medications:     acetaminophen (TYLENOL) 500 mg tablet, Take 500 mg by mouth every 6 (six) hours as needed for mild pain, Disp: , Rfl:     alendronate (FOSAMAX) 70 mg tablet, Take 1 tablet (70 mg total) by mouth every 7 days, Disp: 12 tablet, Rfl: 1    ascorbic acid (VITAMIN C) 1000 MG tablet, Take 1,000 mg by mouth, Disp: , Rfl:     atorvastatin (LIPITOR) 20 mg tablet, Take 1 tablet (20 mg total) by mouth daily after dinner, Disp: 90 tablet, Rfl: 1    buPROPion (WELLBUTRIN XL) 150 mg 24 hr tablet, 150 mg every morning, Disp: , Rfl:     clonazePAM (KlonoPIN) 1 mg tablet, 2 (two) times a day as needed, Disp: , Rfl:     methocarbamol (ROBAXIN) 750 mg tablet, Take 1 tablet (750 mg total) by mouth 3 (three) times a day, Disp: 90 tablet, Rfl: 1    methylPREDNISolone 4 MG tablet therapy pack, Use as directed on package, Disp: 1 each, Rfl: 0    omeprazole (PriLOSEC) 40 MG capsule, take 1 capsule by mouth once daily, Disp: 90 capsule, Rfl: 1    solifenacin (VESICARE) 10 MG tablet, Take 1 tablet (10 mg total) by mouth daily, Disp: 90 tablet, Rfl: 1    tretinoin (RETIN-A) 0.1 % cream, Apply topically daily at bedtime, Disp: 45 g, Rfl: 5    venlafaxine (EFFEXOR-XR) 150 mg 24 hr capsule, One 150mg and one 75mg q day for total of 225mg q day., Disp: 90 capsule, Rfl: 1    venlafaxine (EFFEXOR-XR) 75 mg 24 hr capsule, One 150mg and one 75mg q day for total of 225mg q day., Disp: 90 capsule, Rfl: 1    Multiple Vitamins-Minerals (multivitamin with minerals) tablet, Take 1 tablet by mouth daily (Patient taking differently: Take 1 tablet by mouth as needed), Disp: 30 tablet, Rfl: 1    Allergies   Allergen Reactions    Gabapentin Drowsiness       Review of Systems   Constitutional:  Negative for chills, fever and unexpected weight change.   HENT:  Negative for hearing loss,  "nosebleeds and sore throat.    Eyes:  Negative for pain, redness and visual disturbance.   Respiratory:  Negative for cough, shortness of breath and wheezing.    Cardiovascular:  Negative for chest pain, palpitations and leg swelling.   Gastrointestinal:  Negative for abdominal pain, nausea and vomiting.   Endocrine: Negative for polydipsia and polyuria.   Genitourinary:  Negative for dysuria and hematuria.   Skin:  Negative for rash and wound.   Neurological:  Negative for dizziness, numbness and headaches.   Psychiatric/Behavioral:  Negative for decreased concentration and suicidal ideas. The patient is not nervous/anxious.    All other systems reviewed and are negative.       Objective:  /76 (BP Location: Left arm, Patient Position: Sitting, Cuff Size: Standard)   Pulse 76   Ht 5' 5\" (1.651 m)   Wt 59.9 kg (132 lb) Comment: reported  BMI 21.97 kg/m²     Ortho Exam  right Hip -  Patient presents with no anatomical deformity  Ambulates with steady gait pattern  Uses No assistive devices  TTP over greater trochanter / trochanteric bursa  Smooth passive circumduction   Strength: 5/5 throughout  Knee flexor and extensor mechanism intact  Ankle DF and PF mechanism intact  - Leg Length Discrepancy   - DIOR, - FADIR  - Log roll  2+ TP and DP pulses with brisk capillary refill to the toes  Sural, saphenous, tibial, superficial and deep peroneal motor and sensory distribution intact  Sensation to light touch intact distally    right Shoulder -   No anatomical deformity  Skin is warm and dry to touch with no signs of erythema, ecchymosis, or infection  No soft tissue swelling or effusion noted  NoPalpable crepitus with passive motion  TTP over posterior capsule, biceps tendon and bicipital groove, subacromial bursa  ROM °, °, ER 90°, IR 90°  Strength: 5/5 throughout  No glenohumeral instability appreciated on exam  + Neer's, + Camara  - empty can, - drop-arm, - resisted external rotation, - belly " press, - lift-off   Demonstrates normal elbow, wrist, and finger motion  2+  distal radial pulse with brisk capillary refill to the fingers  Radial, median, ulnar and motor  and sensory distribution intact  Sensation to light touch intact distally      Physical Exam  HENT:      Head: Normocephalic and atraumatic.      Nose: Nose normal.   Eyes:      Conjunctiva/sclera: Conjunctivae normal.   Cardiovascular:      Rate and Rhythm: Normal rate.   Pulmonary:      Effort: Pulmonary effort is normal.   Musculoskeletal:      Cervical back: Neck supple.   Skin:     General: Skin is warm and dry.      Capillary Refill: Capillary refill takes less than 2 seconds.   Neurological:      Mental Status: She is alert and oriented to person, place, and time.   Psychiatric:         Mood and Affect: Mood normal.         Behavior: Behavior normal.          Diagnostic Test Review:    X-Ray of right hip obtained on 4/9/2024 were reviewed and demonstrate no acute osseous abnormalities.      Large joint arthrocentesis: R subacromial bursa  Universal Protocol:  Consent: Verbal consent obtained.  Risks and benefits: risks, benefits and alternatives were discussed  Consent given by: patient  Timeout called at: 4/9/2024 11:30 AM.  Patient understanding: patient states understanding of the procedure being performed  Site marked: the operative site was marked  Patient identity confirmed: verbally with patient  Supporting Documentation  Indications: pain and joint swelling   Procedure Details  Location: shoulder - R subacromial bursa  Needle gauge: 21 G.  Ultrasound guidance: no  Approach: posterolateral  Medications administered: 4 mL bupivacaine 0.25 %; 80 mg triamcinolone acetonide 40 mg/mL    Patient tolerance: patient tolerated the procedure well with no immediate complications  Dressing:  Sterile dressing applied      Large joint arthrocentesis: R greater trochanteric bursa  Universal Protocol:  Consent: Verbal consent obtained.  Risks and  benefits: risks, benefits and alternatives were discussed  Consent given by: patient  Timeout called at: 4/9/2024 11:30 AM.  Patient understanding: patient states understanding of the procedure being performed  Site marked: the operative site was marked  Patient identity confirmed: verbally with patient  Supporting Documentation  Indications: pain and joint swelling   Procedure Details  Location: hip - R greater trochanteric bursa  Needle gauge: spinal needle.  Ultrasound guidance: no  Approach: lateral  Medications administered: 4 mL bupivacaine 0.25 %; 80 mg triamcinolone acetonide 40 mg/mL    Patient tolerance: patient tolerated the procedure well with no immediate complications  Dressing:  Sterile dressing applied             Scribe Attestation      I,:  Bouchra Willis am acting as a scribe while in the presence of the attending physician.:       I,:  Miguel Gleason DO personally performed the services described in this documentation    as scribed in my presence.:

## 2024-04-12 ENCOUNTER — HOSPITAL ENCOUNTER (OUTPATIENT)
Dept: RADIOLOGY | Facility: HOSPITAL | Age: 69
End: 2024-04-12
Payer: MEDICARE

## 2024-04-12 VITALS
DIASTOLIC BLOOD PRESSURE: 65 MMHG | SYSTOLIC BLOOD PRESSURE: 138 MMHG | OXYGEN SATURATION: 95 % | RESPIRATION RATE: 18 BRPM | HEART RATE: 67 BPM | TEMPERATURE: 97.7 F

## 2024-04-12 DIAGNOSIS — M47.812 CERVICAL SPONDYLOSIS: ICD-10-CM

## 2024-04-12 PROCEDURE — 64491 INJ PARAVERT F JNT C/T 2 LEV: CPT | Performed by: ANESTHESIOLOGY

## 2024-04-12 PROCEDURE — 64490 INJ PARAVERT F JNT C/T 1 LEV: CPT | Performed by: ANESTHESIOLOGY

## 2024-04-12 RX ORDER — BUPIVACAINE HYDROCHLORIDE 5 MG/ML
3 INJECTION, SOLUTION EPIDURAL; INTRACAUDAL ONCE
Status: COMPLETED | OUTPATIENT
Start: 2024-04-12 | End: 2024-04-12

## 2024-04-12 RX ORDER — LIDOCAINE HYDROCHLORIDE 10 MG/ML
5 INJECTION, SOLUTION EPIDURAL; INFILTRATION; INTRACAUDAL; PERINEURAL ONCE
Status: COMPLETED | OUTPATIENT
Start: 2024-04-12 | End: 2024-04-12

## 2024-04-12 RX ADMIN — BUPIVACAINE HYDROCHLORIDE 3 ML: 5 INJECTION, SOLUTION EPIDURAL; INTRACAUDAL at 10:02

## 2024-04-12 RX ADMIN — LIDOCAINE HYDROCHLORIDE 5 ML: 10 INJECTION, SOLUTION EPIDURAL; INFILTRATION; INTRACAUDAL; PERINEURAL at 09:57

## 2024-04-12 NOTE — DISCHARGE INSTR - LAB

## 2024-04-12 NOTE — H&P
Assessment:  1. Cervical spondylosis  FL spine and pain procedure    FL spine and pain procedure          Plan:  Surekha Tenorio is a 68 y.o. female with complaints of neck pain presents to surgical center for procedure.  We will perform a right C3-C4 and C4-C5 medial branch block #2  2. Follow-up 1 month after injection    Complete risks and benefits including bleeding, infection, tissue reaction, nerve injury and allergic reaction were discussed. The approach was demonstrated using models and literature was provided. Verbal and written consent was obtained.    My impressions and treatment recommendations were discussed in detail with the patient who verbalized understanding and had no further questions.  Discharge instructions were provided. I personally saw and examined the patient and I agree with the above discussed plan of care.    Orders Placed This Encounter   Procedures    FL spine and pain procedure     Standing Status:   Standing     Number of Occurrences:   1     Order Specific Question:   Reason for Exam:     Answer:   Right C3-4 and C4-5 MBB#2     Order Specific Question:   Anticoagulant hold needed?     Answer:   no    Discharge Diet     Order Specific Question:   Diet Type:     Answer:   Regular    Activity as tolerated    Remove dressing in 24 hours    Call provider for:  persistent nausea or vomiting    Call provider for:  severe uncontrolled pain    Call provider for:  redness, tenderness, or signs of infection (pain, swelling, redness, odor or green/yellow discharge around incision site)    Call provider for: active or persistent bleeding    Call provider for:  difficulty breathing, headache or visual disturbances    Call provider for:  hives    Call provider for:  persistent dizziness or light-headedness    Call provider for:  extreme fatigue    Discharge Nursing Instructions: Provide Tobacco Cessation Materials to Patient     If patient willing to receive, nurse to provide tobacco cessation  ESTABLISH CARE VISIT    SUBJECTIVE:     Chief Complaint   Patient presents with    Establish Care       HPI: 82 y.o.  female  has a past medical history of Hypertension and Rheumatoid arthritis ([address] is here for the above chief complaint(s). Previous patient of provider that left the practice, Dr. Sylvia Jack. Hypertension   Patient is currently taking   Key CAD CHF Meds             amLODIPine (NORVASC) 10 mg tablet (Taking) TAKE 1 TABLET DAILY    furosemide (LASIX) 20 mg tablet Take 20 mg by mouth. . BP today is   BP Readings from Last 1 Encounters:   11/13/19 196/88     Patient has NOT been taking medications as prescribed. Losartan 25mg was discontinued at last visit and hydralazine was started, but patient/son did not . Patient does follow low salt diet. Patient is not currently exercising. Patient denies chest pain, shortness of breath, palpitations, lower extremity edema, dizziness/lightheadedness or syncopal episodes. Repeat 150/90    BP Readings from Last 3 Encounters:   11/13/19 150/90   06/27/19 160/80   06/27/18 142/68       Rheumatoid Arthritis  Patient has chronic rheumatoid arthritis. She has never seen a specialist for this. Her hands have more recently become more       Health Maintenance:    Eye -  no complaints, last eye exam:   Oral Health -  no complaints, last exam:   Hearing -  no complaints. U/A -  no UTI sxs. Cardiac- denies history, denies chest pain. Review of Systems   Constitutional: Negative for chills, fever, malaise/fatigue and weight loss. Eyes: Negative for blurred vision, double vision and pain. Respiratory: Negative for cough, sputum production, shortness of breath and wheezing. Cardiovascular: Negative for chest pain, palpitations, orthopnea, claudication and leg swelling. Gastrointestinal: Negative for abdominal pain, constipation, diarrhea, nausea and vomiting. Genitourinary: Negative for dysuria, frequency and urgency. Musculoskeletal: Positive for joint pain (bilateral hands and feet 2 to RA). Negative for back pain, falls, myalgias and neck pain. Neurological: Negative for dizziness, tingling and headaches. @[facility]@  Current Outpatient Medications   Medication Sig    amLODIPine (NORVASC) 10 mg tablet TAKE 1 TABLET DAILY    furosemide (LASIX) 20 mg tablet Take 20 mg by mouth.  THIAMINE HCL PO Take  by mouth.  FOLIC ACID PO Take  by mouth.  ketoconazole (NIZORAL) 2 % topical cream Apply  to affected area daily.  ergocalciferol (ERGOCALCIFEROL) 50,000 unit capsule Take 1 Cap by mouth every seven (7) days. (Patient taking differently: Take 50,000 Units by mouth two (2) times a week.)    mirtazapine (REMERON) 7.5 mg tablet Take 1 Tab by mouth nightly.  ferrous sulfate (IRON, FERROUS SULFATE,) 325 mg (65 mg iron) tablet Take 1 Tab by mouth two (2) times a day.  ascorbic acid, vitamin C, (VITAMIN C) 250 mg tablet Take 1 Tab by mouth two (2) times a day. No current facility-administered medications for this visit.       Health Maintenance   Topic Date Due    Shingrix Vaccine Age 49> (1 of 2) 06/28/1984    GLAUCOMA SCREENING Q2Y  06/28/1999    Bone Densitometry (Dexa) Screening  06/28/1999    DTaP/Tdap/Td series (1 - Tdap) 02/21/2013    MEDICARE YEARLY EXAM  05/31/2019    Influenza Age 5 to Adult  08/01/2019    Pneumococcal 65+ years  Completed       Medications and Allergies: Reviewed and confirmed in the chart    Past Medical Hx: Reviewed and confirmed in the chart  Past Medical History:   Diagnosis Date    Hypertension     Rheumatoid arthritis ([facility] [address]        Patient Active Problem List   Diagnosis Code    Vasovagal syncope R55    CKD (chronic kidney disease) N18.9    Bilateral edema of lower extremity R60.0    Iron deficiency anemia D50.9    GERD (gastroesophageal reflux disease) K21.9    Congestive heart failure (CHF) ([facility]) I50.9    History of ETOH abuse F10.11    Hypertension I10 materials from Admission Packet.     Standing Status:   Standing     Number of Occurrences:   1    Discharge patient     Standing Status:   Standing     Number of Occurrences:   1     Order Specific Question:   Is this a planned readmission (within 30 days)?     Answer:   No     New Medications Ordered This Visit   Medications    bupivacaine (PF) (MARCAINE) 0.5 % injection 3 mL    lidocaine (PF) (XYLOCAINE-MPF) 1 % injection 5 mL       History of Present Illness:  Surekha Tenorio is a 68 y.o. female who presents for a follow up office visit in regards to neck pain.   The patient’s current symptoms include 8 or 10 sharp and stabbing and throbbing pain with any particular send pain.      I have personally reviewed and/or updated the patient's past medical history, past surgical history, family history, social history, current medications, allergies, and vital signs today.     Review of Systems   Musculoskeletal:  Positive for arthralgias and neck pain.   All other systems reviewed and are negative.      Patient Active Problem List   Diagnosis    Allergic rhinitis    Chronic cough    Constipation    Esophageal reflux    Generalized anxiety disorder    Hyperlipidemia    Symptomatic menopausal or female climacteric states    Varicosities of leg    Acquired trigger finger    Carpal tunnel syndrome    Spider veins of both lower extremities    Tobacco abuse    Chronic pain syndrome    Uterine prolapse    Chronic bilateral low back pain without sciatica    History of pulmonary embolism    Pulmonary emphysema (HCC)    Closed nondisplaced fracture of head of right radius, initial encounter    Acute pulmonary embolism without acute cor pulmonale, unspecified pulmonary embolism type (HCC)    Acute respiratory failure with hypoxia (HCC)    Acute postprocedural respiratory failure (HCC)    Scoliosis deformity of spine    Lumbar disc disease with radiculopathy    Lumbar spondylosis    Spinal stenosis of lumbar region without  neurogenic claudication    Cervical spondylosis       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     Drug intolerance     Dysfunction of right eustachian tube     Dysfunctional uterine bleeding     Edema     Elevated d-dimer     Exposure to hepatitis C     Fracture of fibula     Generalized anxiety disorder     GERD (gastroesophageal reflux disease)     Hyperlipidemia     Mild cervical dysplasia     Myalgia     Myositis     Palpitations     PONV (postoperative nausea and vomiting)     Pre-syncope     Scoliosis deformity of spine 1/23/2024    Seasonal allergies     Stress incontinence     Thrombocytopenia (HCC)     Urinary retention     Urinary tract infection     Uterine leiomyoma        Past Surgical History:   Procedure Laterality Date    BLADDER SURGERY      BREAST BIOPSY Right     US guided    COLONOSCOPY      COLPOSCOPY  01/06/1998    DENTAL SURGERY      ENDOMETRIAL BIOPSY  05/21/1993    HYSTERECTOMY      HYSTEROSCOPY      uterus- 1/22/98, 7/1/02 and 4/10/07    VT ARTHRP KNE CONDYLE&PLATU MEDIAL&LAT COMPARTMENTS Right 2/8/2023    Procedure: ARTHROPLASTY KNEE TOTAL;  Surgeon: Miguel Gleason DO;  Location: [facility];  Service: Orthopedics    TUBAL LIGATION      US GUIDED BREAST BIOPSY RIGHT COMPLETE Right 08/08/2018       Family History   Problem Relation Age of Onset    Stroke Mother         cerebral artery occlusion with cerebral infarction    Coronary artery disease Mother     Arrhythmia Mother         sinus    Coronary artery disease Father     Diabetes Father     No Known Problems Daughter     No Known Problems Daughter     No   Nephrolithiasis N20.0    Supraventricular bigeminy R00.8    Major neurocognitive disorder (HCC) F01.50    Ulcer of toe of left foot (HCC) L97.529    Puncture wound of third toe of left foot S91.135A    Tinea pedis of both feet B35.3       Family Hx, Surgical Hx, Social Hx: Reviewed and updated in EMR    OBJECTIVE:  Vitals:    11/13/19 1542   BP: 196/88   Pulse: 73   Resp: 17   Weight: 112 lb 9.6 oz (51.1 kg)   Height: 5' 2\" (1.575 m)       BP Readings from Last 3 Encounters:   11/13/19 196/88   06/27/19 160/80   06/27/18 142/68     Wt Readings from Last 3 Encounters:   11/13/19 112 lb 9.6 oz (51.1 kg)   06/27/19 120 lb (54.4 kg)   06/27/18 116 lb 12.8 oz (53 kg)       Physical Exam   Constitutional: She is oriented to person, place, and time and well-developed, well-nourished, and in no distress. No distress. Neck: Normal range of motion. Neck supple. No thyromegaly present. Cardiovascular: Normal rate, regular rhythm, normal heart sounds and intact distal pulses. Exam reveals no gallop and no friction rub. No murmur heard. Pulmonary/Chest: Effort normal and breath sounds normal. No respiratory distress. She has no wheezes. She has no rales. She exhibits no tenderness. Lymphadenopathy:     She has no cervical adenopathy. Neurological: She is alert and oriented to person, place, and time. Skin: Skin is warm and dry. She is not diaphoretic. Psychiatric: Mood, memory, affect and judgment normal.   Nursing note and vitals reviewed.       Lab Results   Component Value Date/Time    WBC 9.3 06/27/2018 03:57 PM    HGB 10.4 (L) 06/27/2018 03:57 PM    HCT 31.9 (L) 06/27/2018 03:57 PM    PLATELET 989 89/14/0365 03:57 PM     Lab Results   Component Value Date/Time    Sodium 142 10/16/2018 02:29 PM    Potassium 4.3 10/16/2018 02:29 PM    Chloride 106 10/16/2018 02:29 PM    CO2 28 10/16/2018 02:29 PM    Glucose 94 10/16/2018 02:29 PM    BUN 29 (H) 10/16/2018 02:29 PM    Creatinine 1.65 (H) 10/16/2018 02:29 Known Problems Maternal Grandmother     No Known Problems Paternal Grandmother     No Known Problems Maternal Aunt     Ovarian cancer Maternal Aunt     Aortic aneurysm Family     Arthritis Family     Diabetes Family     Heart disease Family     Breast cancer Neg Hx        Social History     Occupational History    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[employer]    Occupation: housewife   Tobacco Use    Smoking status: Every Day     Current packs/day: 1.50     Average packs/day: 1.5 packs/day for 52.3 years (78.4 ttl pk-yrs)     Types: Cigarettes     Start date: 1972     Passive exposure: Never    Smokeless tobacco: Never   Vaping Use    Vaping status: Never Used   Substance and Sexual Activity    Alcohol use: Yes     Comment: occasional    Drug use: No    Sexual activity: Not Currently       Current Outpatient Medications on File Prior to Encounter   Medication Sig    acetaminophen (TYLENOL) 500 mg tablet Take 500 mg by mouth every 6 (six) hours as needed for mild pain    alendronate (FOSAMAX) 70 mg tablet Take 1 tablet (70 mg total) by mouth every 7 days    ascorbic acid (VITAMIN C) 1000 MG tablet Take 1,000 mg by mouth    atorvastatin (LIPITOR) 20 mg tablet Take 1 tablet (20 mg total) by mouth daily after dinner    buPROPion (WELLBUTRIN XL) 150 mg 24 hr tablet 150 mg every morning    clonazePAM (KlonoPIN) 1 mg tablet 2 (two) times a day as needed    methocarbamol (ROBAXIN) 750 mg tablet Take 1 tablet (750 mg total) by mouth 3 (three) times a day    methylPREDNISolone 4 MG tablet therapy pack Use as directed on package    Multiple Vitamins-Minerals (multivitamin with minerals) tablet Take 1 tablet by mouth daily (Patient taking differently: Take 1 tablet by mouth as needed)    omeprazole (PriLOSEC) 40 MG capsule take 1 capsule by mouth once daily    solifenacin (VESICARE) 10 MG tablet Take 1 tablet (10 mg total) by mouth daily    tretinoin (RETIN-A) 0.1 % cream Apply topically daily at bedtime    venlafaxine  PM     Lab Results   Component Value Date/Time    Cholesterol, total 171 11/16/2017 04:44 AM    HDL Cholesterol 69 11/16/2017 04:44 AM    LDL, calculated 80 11/16/2017 04:44 AM    Triglyceride 108 11/16/2017 04:44 AM     No results found for: GPT    Nursing Notes Reviewed    ASSESSMENT AND PLAN  Diagnoses and all orders for this visit:    1. Encounter to establish care    2. Encounter for immunization  -     INFLUENZA VACCINE INACTIVATED (IIV), SUBUNIT, ADJUVANTED, IM    3. Rheumatoid arthritis involving multiple sites with positive rheumatoid factor ([facility] [address]  -     REFERRAL TO RHEUMATOLOGY    4. Screening for thyroid disorder  -     TSH 3RD GENERATION; Future  -     T4, FREE; Future    5. Chronic diastolic congestive heart failure (HCC)  -     METABOLIC PANEL, COMPREHENSIVE; Future  -     FOLATE; Future    6. Essential hypertension  BP very elevated today. Will add on a beta blocker and have patient check BP at home. START bisoprolol (ZEBETA) 5 mg tablet (Taking) Take 1 Tab by mouth daily. CONTINUE amLODIPine (NORVASC) 10 mg tablet (Taking) TAKE 1 TABLET DAILY               7. Chronic kidney disease, unspecified CKD stage  -     METABOLIC PANEL, COMPREHENSIVE; Future  -     MICROALBUMIN, UR, RAND W/ MICROALB/CREAT RATIO; Future    8. Anemia, unspecified type  -     VITAMIN B12; Future  -     CBC WITH AUTOMATED DIFF; Future  -     IRON PROFILE; Future  -     FERRITIN; Future    9. Vitamin D deficiency  -     VITAMIN D, 25 HYDROXY; Future    10. History of ETOH abuse  No longer drinking ETOH.   -     FOLATE; Future  -     VITAMIN B12; Future      Orders Placed This Encounter    furosemide (LASIX) 20 mg tablet    THIAMINE HCL PO    FOLIC ACID PO         I have discussed the diagnosis with the patient and the intended plan as seen in the above orders. The patient has received an after-visit summary and questions were answered concerning future plans.   I have discussed medication side effects and warnings (EFFEXOR-XR) 150 mg 24 hr capsule One 150mg and one 75mg q day for total of 225mg q day.    venlafaxine (EFFEXOR-XR) 75 mg 24 hr capsule One 150mg and one 75mg q day for total of 225mg q day.     No current facility-administered medications on file prior to encounter.       Allergies   Allergen Reactions    Gabapentin Drowsiness       Physical Exam:    /64   Pulse 58   Temp 97.7 °F (36.5 °C) (Temporal)   Resp 18   SpO2 94%     Constitutional:normal, well developed, well nourished, alert, in no distress and non-toxic and no overt pain behavior.  Eyes:anicteric  HEENT:grossly intact  Neck:supple, symmetric, trachea midline and no masses   Pulmonary:even and unlabored  Cardiovascular:No edema or pitting edema present  Skin:Normal without rashes or lesions and well hydrated  Psychiatric:Mood and affect appropriate  Neurologic:Cranial Nerves II-XII grossly intact  Musculoskeletal:normal         with the patient as well. I have reviewed the plan of care with the patient, accepted their input and they are in agreement with the treatment goals. More than 50% of this 40 min visit was spent counseling the patient face to face about etiology and treatment of health conditions outlined in assessment and plan        Kerrie Rios [address]   [place] [id number] [facility] [phone]

## 2024-04-17 ENCOUNTER — TELEPHONE (OUTPATIENT)
Age: 69
End: 2024-04-17

## 2024-04-22 DIAGNOSIS — K21.9 GASTROESOPHAGEAL REFLUX DISEASE WITHOUT ESOPHAGITIS: ICD-10-CM

## 2024-04-22 RX ORDER — OMEPRAZOLE 40 MG/1
CAPSULE, DELAYED RELEASE ORAL
Qty: 90 CAPSULE | Refills: 1 | Status: SHIPPED | OUTPATIENT
Start: 2024-04-22

## 2024-04-26 ENCOUNTER — TELEPHONE (OUTPATIENT)
Age: 69
End: 2024-04-26

## 2024-04-26 NOTE — TELEPHONE ENCOUNTER
S/w pt. Advised as per BK response regarding ED.  PT notes the advil helped very ilttle but will continue to alternate and if not relief will go to the ED. Encourage to rest.

## 2024-04-26 NOTE — TELEPHONE ENCOUNTER
Caller: [email or web address]    Doctor: jones    Reason for call: pt is in pain and isn't sure what to do?    Call back#: [phone]

## 2024-04-26 NOTE — TELEPHONE ENCOUNTER
Agree with recommendations.  If her pain continues, we will need to see her next week.  If pain becomes unbearable before that, she would need to be evaluated in the ED.  Unfortunately, we previously performed epidural steroid injection which was not very helpful.  She told me that she underwent radiofrequency ablations at [facility] pain management and [facility] pain center which were not helpful.  At this point, there are not a lot of of interventional options left.  Also, she was evaluated neurosurgically and surgery was not recommended.  We previously discussed aqua therapy.  I would be willing to place a referral for aqua therapy if she is willing to try it.

## 2024-04-26 NOTE — TELEPHONE ENCOUNTER
S/w pt who states she is having sharp back spasms in her LB. Pt denies rad to LE's. Pt states pain 7-8/10 currently and is due to take Robaxin 750mg. RN asked about Tylenol, NSAID's, ice/heat? Pt states taking Tylenol. RN advised pt she may take up to 3000mg Tylenol/day and if able may add in NSAID and heat/ice. Pt states she is able to take NSAID and will take that along w/ a heating pad for 20min on/off. Pt asking what she should do if pain does not decr. RN asked pt to c/b in the next 1.5hrs for update but that she may need to cont this reg over the weekend for relief. Pt verbalized understanding and will c/b w/ update.     LOV 3/6/24 BK  Pls advise further recs if any. Thank you!

## 2024-04-29 ENCOUNTER — APPOINTMENT (EMERGENCY)
Dept: CT IMAGING | Facility: HOSPITAL | Age: 69
End: 2024-04-29
Payer: MEDICARE

## 2024-04-29 ENCOUNTER — HOSPITAL ENCOUNTER (EMERGENCY)
Facility: HOSPITAL | Age: 69
Discharge: HOME | End: 2024-04-29
Attending: EMERGENCY MEDICINE
Payer: MEDICARE

## 2024-04-29 VITALS
OXYGEN SATURATION: 95 % | SYSTOLIC BLOOD PRESSURE: 116 MMHG | RESPIRATION RATE: 18 BRPM | DIASTOLIC BLOOD PRESSURE: 68 MMHG | HEART RATE: 59 BPM | TEMPERATURE: 98.1 F

## 2024-04-29 DIAGNOSIS — K52.9 COLITIS: ICD-10-CM

## 2024-04-29 DIAGNOSIS — M54.50 CHRONIC BILATERAL LOW BACK PAIN WITHOUT SCIATICA: Primary | ICD-10-CM

## 2024-04-29 DIAGNOSIS — G89.29 CHRONIC BILATERAL LOW BACK PAIN WITHOUT SCIATICA: Primary | ICD-10-CM

## 2024-04-29 DIAGNOSIS — R10.9 ABDOMINAL PAIN: ICD-10-CM

## 2024-04-29 LAB
ALBUMIN SERPL BCP-MCNC: 4.4 G/DL (ref 3.5–5)
ALP SERPL-CCNC: 56 U/L (ref 34–104)
ALT SERPL W P-5'-P-CCNC: 17 U/L (ref 7–52)
ANION GAP SERPL CALCULATED.3IONS-SCNC: 7 MMOL/L (ref 4–13)
AST SERPL W P-5'-P-CCNC: 16 U/L (ref 13–39)
ATRIAL RATE: 63 BPM
BASOPHILS # BLD AUTO: 0.03 THOUSANDS/ÂΜL (ref 0–0.1)
BASOPHILS NFR BLD AUTO: 1 % (ref 0–1)
BILIRUB SERPL-MCNC: 0.41 MG/DL (ref 0.2–1)
BUN SERPL-MCNC: 11 MG/DL (ref 5–25)
CALCIUM SERPL-MCNC: 9.1 MG/DL (ref 8.4–10.2)
CHLORIDE SERPL-SCNC: 108 MMOL/L (ref 96–108)
CO2 SERPL-SCNC: 27 MMOL/L (ref 21–32)
CREAT SERPL-MCNC: 0.72 MG/DL (ref 0.6–1.3)
EOSINOPHIL # BLD AUTO: 0.13 THOUSAND/ÂΜL (ref 0–0.61)
EOSINOPHIL NFR BLD AUTO: 2 % (ref 0–6)
ERYTHROCYTE [DISTWIDTH] IN BLOOD BY AUTOMATED COUNT: 19.6 % (ref 11.6–15.1)
GFR SERPL CREATININE-BSD FRML MDRD: 87 ML/MIN/1.73SQ M
GLUCOSE SERPL-MCNC: 93 MG/DL (ref 65–140)
HCT VFR BLD AUTO: 40.4 % (ref 34.8–46.1)
HGB BLD-MCNC: 12.5 G/DL (ref 11.5–15.4)
IMM GRANULOCYTES # BLD AUTO: 0.01 THOUSAND/UL (ref 0–0.2)
IMM GRANULOCYTES NFR BLD AUTO: 0 % (ref 0–2)
LIPASE SERPL-CCNC: 31 U/L (ref 11–82)
LYMPHOCYTES # BLD AUTO: 1.57 THOUSANDS/ÂΜL (ref 0.6–4.47)
LYMPHOCYTES NFR BLD AUTO: 25 % (ref 14–44)
MAGNESIUM SERPL-MCNC: 2.3 MG/DL (ref 1.9–2.7)
MCH RBC QN AUTO: 29.3 PG (ref 26.8–34.3)
MCHC RBC AUTO-ENTMCNC: 30.9 G/DL (ref 31.4–37.4)
MCV RBC AUTO: 95 FL (ref 82–98)
MONOCYTES # BLD AUTO: 0.46 THOUSAND/ÂΜL (ref 0.17–1.22)
MONOCYTES NFR BLD AUTO: 7 % (ref 4–12)
NEUTROPHILS # BLD AUTO: 4.09 THOUSANDS/ÂΜL (ref 1.85–7.62)
NEUTS SEG NFR BLD AUTO: 65 % (ref 43–75)
NRBC BLD AUTO-RTO: 0 /100 WBCS
P AXIS: 73 DEGREES
PLATELET # BLD AUTO: 385 THOUSANDS/UL (ref 149–390)
PMV BLD AUTO: 9 FL (ref 8.9–12.7)
POTASSIUM SERPL-SCNC: 4.5 MMOL/L (ref 3.5–5.3)
PR INTERVAL: 136 MS
PROT SERPL-MCNC: 7 G/DL (ref 6.4–8.4)
QRS AXIS: 67 DEGREES
QRSD INTERVAL: 76 MS
QT INTERVAL: 426 MS
QTC INTERVAL: 435 MS
RBC # BLD AUTO: 4.27 MILLION/UL (ref 3.81–5.12)
SODIUM SERPL-SCNC: 142 MMOL/L (ref 135–147)
T WAVE AXIS: 63 DEGREES
VENTRICULAR RATE: 63 BPM
WBC # BLD AUTO: 6.29 THOUSAND/UL (ref 4.31–10.16)

## 2024-04-29 PROCEDURE — 96375 TX/PRO/DX INJ NEW DRUG ADDON: CPT

## 2024-04-29 PROCEDURE — 99284 EMERGENCY DEPT VISIT MOD MDM: CPT

## 2024-04-29 PROCEDURE — 93010 ELECTROCARDIOGRAM REPORT: CPT | Performed by: INTERNAL MEDICINE

## 2024-04-29 PROCEDURE — 85025 COMPLETE CBC W/AUTO DIFF WBC: CPT

## 2024-04-29 PROCEDURE — 83690 ASSAY OF LIPASE: CPT

## 2024-04-29 PROCEDURE — 80053 COMPREHEN METABOLIC PANEL: CPT

## 2024-04-29 PROCEDURE — 36415 COLL VENOUS BLD VENIPUNCTURE: CPT

## 2024-04-29 PROCEDURE — 93005 ELECTROCARDIOGRAM TRACING: CPT

## 2024-04-29 PROCEDURE — 96374 THER/PROPH/DIAG INJ IV PUSH: CPT

## 2024-04-29 PROCEDURE — 83735 ASSAY OF MAGNESIUM: CPT

## 2024-04-29 PROCEDURE — 74177 CT ABD & PELVIS W/CONTRAST: CPT

## 2024-04-29 RX ORDER — DEXAMETHASONE SODIUM PHOSPHATE 10 MG/ML
10 INJECTION, SOLUTION INTRAMUSCULAR; INTRAVENOUS ONCE
Status: COMPLETED | OUTPATIENT
Start: 2024-04-29 | End: 2024-04-29

## 2024-04-29 RX ORDER — PREDNISONE 20 MG/1
40 TABLET ORAL DAILY
Qty: 10 TABLET | Refills: 0 | Status: SHIPPED | OUTPATIENT
Start: 2024-04-29 | End: 2024-05-04

## 2024-04-29 RX ORDER — KETOROLAC TROMETHAMINE 30 MG/ML
15 INJECTION, SOLUTION INTRAMUSCULAR; INTRAVENOUS ONCE
Status: COMPLETED | OUTPATIENT
Start: 2024-04-29 | End: 2024-04-29

## 2024-04-29 RX ADMIN — DEXAMETHASONE SODIUM PHOSPHATE 10 MG: 10 INJECTION, SOLUTION INTRAMUSCULAR; INTRAVENOUS at 11:53

## 2024-04-29 RX ADMIN — KETOROLAC TROMETHAMINE 15 MG: 30 INJECTION, SOLUTION INTRAMUSCULAR; INTRAVENOUS at 11:53

## 2024-04-29 RX ADMIN — IOHEXOL 100 ML: 350 INJECTION, SOLUTION INTRAVENOUS at 12:32

## 2024-04-29 NOTE — ED PROVIDER NOTES
History  Chief Complaint   Patient presents with    Back Pain     Chronic, sees pain management. Called on call over the weekend and was told to take tylenol and motrin alternating. Still no better. Now feels nauseated     Patient is a 68-year-old female with relevant past medical history of anxiety, arthralgia, chronic pain disorder, COPD, drug intolerance, hyperlipidemia, scoliosis, and UTI presenting with chronic back pain and new abdominal pain x1 day. She has a history of chronic back pain for the last 3 years which has been worse the last few days. She recently had a lumbar MRI for this and nerve blocks. She does not think she needs imaging today. No saddle anesthesia or incontinence. She called her pain management doctor who advised her to take Motrin and Tylenol around-the-clock. Her pain management doctor is currently away on vacation. Pain management advised her to go to the emergency department if her symptoms do not improve. She reports starting with stomach pain last evening. She vomited once last night. It was non-bloody, non bilious. No emesis today. She presents to the emergency department with upper abdominal pain and rates it a 6/10 constant heaviness. It does not radiate. No chest pain or shortness of breath. She thinks it may be from the pain meds. No changes in bowel movements or urination. Patient denies fever, chills, headache, dizziness, numbness, tingling, weakness, visual changes, chest pain, shortness of breath, nausea, constipation, diarrhea, dysuria, or other urinary symptoms. She presents with her .      History provided by:  Patient and spouse   used: No    Back Pain  Associated symptoms: abdominal pain    Associated symptoms: no chest pain, no dysuria, no fever, no headaches and no weakness        Prior to Admission Medications   Prescriptions Last Dose Informant Patient Reported? Taking?   Multiple Vitamins-Minerals (multivitamin with minerals) tablet  Self  No No   Sig: Take 1 tablet by mouth daily   Patient taking differently: Take 1 tablet by mouth as needed   acetaminophen (TYLENOL) 500 mg tablet  Self Yes No   Sig: Take 500 mg by mouth every 6 (six) hours as needed for mild pain   alendronate (FOSAMAX) 70 mg tablet   No No   Sig: Take 1 tablet (70 mg total) by mouth every 7 days   ascorbic acid (VITAMIN C) 1000 MG tablet   Yes No   Sig: Take 1,000 mg by mouth   atorvastatin (LIPITOR) 20 mg tablet   No No   Sig: Take 1 tablet (20 mg total) by mouth daily after dinner   buPROPion (WELLBUTRIN XL) 150 mg 24 hr tablet  Self Yes No   Si mg every morning   clonazePAM (KlonoPIN) 1 mg tablet  Self Yes No   Si (two) times a day as needed   methocarbamol (ROBAXIN) 750 mg tablet   No No   Sig: Take 1 tablet (750 mg total) by mouth 3 (three) times a day   methylPREDNISolone 4 MG tablet therapy pack   No No   Sig: Use as directed on package   omeprazole (PriLOSEC) 40 MG capsule   No No   Sig: take 1 capsule by mouth once daily   solifenacin (VESICARE) 10 MG tablet   No No   Sig: Take 1 tablet (10 mg total) by mouth daily   tretinoin (RETIN-A) 0.1 % cream   No No   Sig: Apply topically daily at bedtime   venlafaxine (EFFEXOR-XR) 150 mg 24 hr capsule   No No   Sig: One 150mg and one 75mg q day for total of 225mg q day.   venlafaxine (EFFEXOR-XR) 75 mg 24 hr capsule   No No   Sig: One 150mg and one 75mg q day for total of 225mg q day.      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     Drug intolerance     Dysfunction of right eustachian tube      Dysfunctional uterine bleeding     Edema     Elevated d-dimer     Exposure to hepatitis C     Fracture of fibula     Generalized anxiety disorder     GERD (gastroesophageal reflux disease)     Hyperlipidemia     Mild cervical dysplasia     Myalgia     Myositis     Palpitations     PONV (postoperative nausea and vomiting)     Pre-syncope     Scoliosis deformity of spine 1/23/2024    Seasonal allergies     Stress incontinence     Thrombocytopenia (HCC)     Urinary retention     Urinary tract infection     Uterine leiomyoma        Past Surgical History:   Procedure Laterality Date    BLADDER SURGERY      BREAST BIOPSY Right     US guided    COLONOSCOPY      COLPOSCOPY  01/06/1998    DENTAL SURGERY      ENDOMETRIAL BIOPSY  05/21/1993    HYSTERECTOMY      HYSTEROSCOPY      uterus- 1/22/98, 7/1/02 and 4/10/07    PA ARTHRP KNE CONDYLE&PLATU MEDIAL&LAT COMPARTMENTS Right 2/8/2023    Procedure: ARTHROPLASTY KNEE TOTAL;  Surgeon: Miguel Gleason DO;  Location: [facility];  Service: Orthopedics    TUBAL LIGATION      US GUIDED BREAST BIOPSY RIGHT COMPLETE Right 08/08/2018       Family History   Problem Relation Age of Onset    Stroke Mother         cerebral artery occlusion with cerebral infarction    Coronary artery disease Mother     Arrhythmia Mother         sinus    Coronary artery disease Father     Diabetes Father     No Known Problems Daughter     No Known Problems Daughter     No Known Problems Maternal Grandmother     No Known Problems Paternal Grandmother     No Known Problems Maternal Aunt     Ovarian cancer Maternal Aunt     Aortic aneurysm Family     Arthritis Family     Diabetes Family     Heart disease Family     Breast cancer Neg Hx      I have reviewed and agree with the history as documented.    E-Cigarette/Vaping    E-Cigarette Use Never User      E-Cigarette/Vaping Substances    Nicotine No     THC No     CBD No     Flavoring No     Other No     Unknown No      Social History     Tobacco Use    Smoking  status: Every Day     Current packs/day: 1.50     Average packs/day: 1.5 packs/day for 52.3 years (78.5 ttl pk-yrs)     Types: Cigarettes     Start date: 1972     Passive exposure: Never    Smokeless tobacco: Never   Vaping Use    Vaping status: Never Used   Substance Use Topics    Alcohol use: Yes     Comment: occasional    Drug use: No       Review of Systems   Constitutional:  Positive for fatigue. Negative for chills and fever.   HENT:  Negative for congestion, ear pain, rhinorrhea and sore throat.    Eyes:  Negative for pain and visual disturbance.   Respiratory:  Negative for cough and shortness of breath.    Cardiovascular:  Negative for chest pain and palpitations.   Gastrointestinal:  Positive for abdominal pain. Negative for constipation, diarrhea, nausea and vomiting.   Genitourinary:  Negative for dysuria, frequency, hematuria and urgency.   Musculoskeletal:  Positive for back pain. Negative for arthralgias.   Skin:  Negative for color change and rash.   Neurological:  Positive for light-headedness. Negative for dizziness, seizures, syncope, weakness and headaches.   Psychiatric/Behavioral:  Negative for agitation and confusion.        Physical Exam  Physical Exam  Vitals and nursing note reviewed.   Constitutional:       General: She is not in acute distress.     Appearance: She is well-developed. She is not ill-appearing or diaphoretic.   HENT:      Head: Normocephalic and atraumatic.      Mouth/Throat:      Mouth: Mucous membranes are moist.   Eyes:      Conjunctiva/sclera: Conjunctivae normal.   Cardiovascular:      Rate and Rhythm: Normal rate and regular rhythm.      Heart sounds: No murmur heard.  Pulmonary:      Effort: Pulmonary effort is normal. No respiratory distress.      Breath sounds: Normal breath sounds. No wheezing, rhonchi or rales.   Abdominal:      Palpations: Abdomen is soft.      Tenderness: There is abdominal tenderness in the epigastric area. There is no guarding or rebound.    Musculoskeletal:         General: No swelling.      Cervical back: Normal and neck supple.      Thoracic back: Normal.      Lumbar back: Normal.   Skin:     General: Skin is warm and dry.      Capillary Refill: Capillary refill takes less than 2 seconds.   Neurological:      General: No focal deficit present.      Mental Status: She is alert and oriented to person, place, and time.      GCS: GCS eye subscore is 4. GCS verbal subscore is 5. GCS motor subscore is 6.      Cranial Nerves: Cranial nerves 2-12 are intact.      Sensory: Sensation is intact.      Motor: Motor function is intact.      Gait: Gait is intact.   Psychiatric:         Mood and Affect: Mood normal.         Vital Signs  ED Triage Vitals   Temperature Pulse Respirations Blood Pressure SpO2   04/29/24 1119 04/29/24 1119 04/29/24 1119 04/29/24 1119 04/29/24 1119   98.1 °F (36.7 °C) 59 18 116/68 95 %      Temp Source Heart Rate Source Patient Position - Orthostatic VS BP Location FiO2 (%)   04/29/24 1119 04/29/24 1119 04/29/24 1119 04/29/24 1119 --   Tympanic Monitor Sitting Left arm       Pain Score       04/29/24 1120       7           Vitals:    04/29/24 1119   BP: 116/68   Pulse: 59   Patient Position - Orthostatic VS: Sitting         Visual Acuity      ED Medications  Medications   ketorolac (TORADOL) injection 15 mg (15 mg Intravenous Given 4/29/24 1153)   dexamethasone (PF) (DECADRON) injection 10 mg (10 mg Intravenous Given 4/29/24 1153)   iohexol (OMNIPAQUE) 350 MG/ML injection (MULTI-DOSE) 100 mL (100 mL Intravenous Given 4/29/24 1232)       Diagnostic Studies  Results Reviewed       Procedure Component Value Units Date/Time    CMP [[id number]] Collected: 04/29/24 1153    Lab Status: Final result Specimen: Blood from Arm, Left Updated: 04/29/24 1219     Sodium 142 mmol/L      Potassium 4.5 mmol/L      Chloride 108 mmol/L      CO2 27 mmol/L      ANION GAP 7 mmol/L      BUN 11 mg/dL      Creatinine 0.72 mg/dL      Glucose 93 mg/dL      Calcium  9.1 mg/dL      AST 16 U/L      ALT 17 U/L      Alkaline Phosphatase 56 U/L      Total Protein 7.0 g/dL      Albumin 4.4 g/dL      Total Bilirubin 0.41 mg/dL      eGFR 87 ml/min/1.73sq m     Narrative:      National Kidney Disease Foundation guidelines for Chronic Kidney Disease (CKD):     Stage 1 with normal or high GFR (GFR > 90 mL/min/1.73 square meters)    Stage 2 Mild CKD (GFR = 60-89 mL/min/1.73 square meters)    Stage 3A Moderate CKD (GFR = 45-59 mL/min/1.73 square meters)    Stage 3B Moderate CKD (GFR = 30-44 mL/min/1.73 square meters)    Stage 4 Severe CKD (GFR = 15-29 mL/min/1.73 square meters)    Stage 5 End Stage CKD (GFR <15 mL/min/1.73 square meters)  Note: GFR calculation is accurate only with a steady state creatinine    Lipase [[id number]]  (Normal) Collected: 04/29/24 1153    Lab Status: Final result Specimen: Blood from Arm, Left Updated: 04/29/24 1219     Lipase 31 u/L     Magnesium [[id number]]  (Normal) Collected: 04/29/24 1153    Lab Status: Final result Specimen: Blood from Arm, Left Updated: 04/29/24 1219     Magnesium 2.3 mg/dL     CBC and differential [[id number]]  (Abnormal) Collected: 04/29/24 1153    Lab Status: Final result Specimen: Blood from Arm, Left Updated: 04/29/24 1159     WBC 6.29 Thousand/uL      RBC 4.27 Million/uL      Hemoglobin 12.5 g/dL      Hematocrit 40.4 %      MCV 95 fL      MCH 29.3 pg      MCHC 30.9 g/dL      RDW 19.6 %      MPV 9.0 fL      Platelets 385 Thousands/uL      nRBC 0 /100 WBCs      Segmented % 65 %      Immature Grans % 0 %      Lymphocytes % 25 %      Monocytes % 7 %      Eosinophils Relative 2 %      Basophils Relative 1 %      Absolute Neutrophils 4.09 Thousands/µL      Absolute Immature Grans 0.01 Thousand/uL      Absolute Lymphocytes 1.57 Thousands/µL      Absolute Monocytes 0.46 Thousand/µL      Eosinophils Absolute 0.13 Thousand/µL      Basophils Absolute 0.03 Thousands/µL                    CT Abdomen pelvis with contrast   Final Result by Derrell  MD Wiliam (04/29 1332)   Left colon wall thickening and mild inflammatory change suggestive of nonspecific infectious or inflammatory colitis, uncomplicated.            Workstation performed: [id number]                    Procedures  ECG 12 Lead Documentation Only    Date/Time: 4/29/2024 11:56 AM    Performed by: Liu Viveros PA-C  Authorized by: Liu Viveros PA-C    Indications / Diagnosis:  Epigastric pain  ECG reviewed by me, the ED Provider: yes    Patient location:  ED  Previous ECG:     Comparison to cardiac monitor: Yes    Interpretation:     Interpretation: normal    Rate:     ECG rate:  63    ECG rate assessment: normal    Rhythm:     Rhythm: sinus rhythm    Ectopy:     Ectopy: none    QRS:     QRS axis:  Normal  Conduction:     Conduction: normal    ST segments:     ST segments:  Normal  T waves:     T waves: normal             ED Course  ED Course as of 04/29/24 1411   Mon Apr 29, 2024   1130 Vital signs reviewed and within normal limits.   1226 CBC and differential(!)  No leukocytosis, anemia, or platelet abnormality.   1226 CMP  Electrolytes within normal limits. No SCARLETT or glucose abnormality. No transaminitis.   1226 LIPASE: 31  Lipase WNL.   1226 MAGNESIUM: 2.3  Mag WNL.   1230 10/18/23 MRI lumbar spine IMPRESSION:  Spondylotic degenerative changes are seen particularly L4-5 where there is severe central and right lateral recess stenosis as well as severe right foraminal narrowing. Correlate for right L4 or right L5 radiculopathy.  Degenerative changes at remaining levels result in moderate central stenosis at L3-4 and mild central stenosis at T12-L1.   1236 Patient back at CT scan.   1319 Patient is feeling much better. Her back pain is much better for the first time in awhile and her abdominal pain is improved. She is no longer lightheaded. Awaiting CT final read.   1336 CT Abdomen pelvis with contrast  IMPRESSION:  Left colon wall thickening and mild inflammatory change suggestive of  nonspecific infectious or inflammatory colitis, uncomplicated.   1347 Went over results with patient. She is feeling markedly better and eager to go home. Will send in a 5-day prednisone burst and provide an ambulatory referral for gastroenterology and discharge home.                               SBIRT 20yo+      Flowsheet Row Most Recent Value   Initial Alcohol Screen: US AUDIT-C     1. How often do you have a drink containing alcohol? 0 Filed at: 04/29/2024 1142   2. How many drinks containing alcohol do you have on a typical day you are drinking?  0 Filed at: 04/29/2024 1142   3b. FEMALE Any Age, or MALE 65+: How often do you have 4 or more drinks on one occassion? 0 Filed at: 04/29/2024 1142   Audit-C Score 0 Filed at: 04/29/2024 1142   ANI: How many times in the past year have you...    Used an illegal drug or used a prescription medication for non-medical reasons? Never Filed at: 04/29/2024 1142                      Medical Decision Making  Patient is a nontoxic-appearing 68-year-old female with relevant past medical history of chronic back pain who presents to the emergency room with chronic lower back pain and upper abdominal pain. Reviewed prior imaging with the patient and she agrees that she has chronic degenerative changes and does not need imaging of her back today as she has no recent injuries or trauma. She follows with pain management for this. No back pain red flag symptoms. Will focus on the abdominal pain which is her main concern that brought her to the emergency department today. No changes in bowel movements.  Brief focused differential diagnosis including but not limited to: chronic back pain, PUD, pancreatitis, liver disease, colitis   Abdominal labs and CT abdomen/pelvis with contrast due to upper abdominal pain and epigastric tenderness. EKG due to epigastric pain and lightheadedness.  See ED course for interpretation of labs, imaging, and further medical decision making.   Toradol 15 mg  IV for her abdominal and back pain. Decadron 10 mg IV as she said steroids usually help with her back pain. The toradol and Decadron significantly improved her back and abdominal pain.  Patient asked for a prescription for prednisone for home, so I sent a 5-day burst of prednisone into her pharmacy. This will dually treat her lower back pain and colitis. Provided ambulatory referral for gastroenterology.  Dispo: Patient discharged home with strict return precautions. Advised patient to return to the nearest emergency room if she has new or worsening symptoms. Advised patient to follow-up with her family doctor and gastroenterology. Patient and  are satisfied with care and agree with management and plan.     Amount and/or Complexity of Data Reviewed  External Data Reviewed: labs, radiology and notes.  Labs: ordered. Decision-making details documented in ED Course.  Radiology: ordered. Decision-making details documented in ED Course.  ECG/medicine tests: ordered and independent interpretation performed.    Risk  Prescription drug management.             Disposition  Final diagnoses:   Chronic bilateral low back pain without sciatica   Abdominal pain   Colitis     Time reflects when diagnosis was documented in both MDM as applicable and the Disposition within this note       Time User Action Codes Description Comment    4/29/2024  1:13 PM Liu Viveros Add [M54.50,  G89.29] Chronic bilateral low back pain without sciatica     4/29/2024  1:37 PM Liu Viveros Add [R10.9] Abdominal pain     4/29/2024  1:37 PM Liu Viveros Add [K52.9] Colitis           ED Disposition       ED Disposition   Discharge    Condition   Stable    Date/Time   Mon Apr 29, 2024 [id number]    Comment   Surekha Tenorio discharge to home/self care.                   Follow-up Information       Follow up With Specialties Details Why Contact Info Additional Information    [facility] Emergency Department Emergency Medicine Go  to  If symptoms worsen [address] [place] [address] [phone] [facility], [address], [place], [place] [address]    Miguel Ruby DO Internal Medicine Schedule an appointment as soon as possible for a visit  As needed [address]  [address]  [facility] [address]  [phone]       [facility] Schedule an appointment as soon as possible for a visit   [address] [address]  [phone] [facility] [place], [address]  [place], [address], [phone]            Discharge Medication List as of 4/29/2024  1:50 PM        START taking these medications    Details   predniSONE 20 mg tablet Take 2 tablets (40 mg total) by mouth daily for 5 days, Starting Mon 4/29/2024, Until Sat 5/4/2024, Normal           CONTINUE these medications which have NOT CHANGED    Details   acetaminophen (TYLENOL) 500 mg tablet Take 500 mg by mouth every 6 (six) hours as needed for mild pain, Historical Med      alendronate (FOSAMAX) 70 mg tablet Take 1 tablet (70 mg total) by mouth every 7 days, Starting Thu 3/7/2024, Normal      ascorbic acid (VITAMIN C) 1000 MG tablet Take 1,000 mg by mouth, Historical Med      atorvastatin (LIPITOR) 20 mg tablet Take 1 tablet (20 mg total) by mouth daily after dinner, Starting Mon 3/4/2024, Normal      buPROPion (WELLBUTRIN XL) 150 mg 24 hr tablet 150 mg every morning, Starting Mon 9/20/2021, Historical Med      clonazePAM (KlonoPIN) 1 mg tablet 2 (two) times a day as needed, Historical Med      methocarbamol (ROBAXIN) 750 mg tablet Take 1 tablet (750 mg total) by mouth 3 (three) times a day, Starting Wed 3/6/2024, Until Sun 5/5/2024, Normal      methylPREDNISolone 4 MG tablet therapy pack Use as directed on package, Normal      Multiple Vitamins-Minerals (multivitamin with  minerals) tablet Take 1 tablet by mouth daily, Starting Fri 9/16/2022, Until Fri 3/22/2024, Normal      omeprazole (PriLOSEC) 40 MG capsule take 1 capsule by mouth once daily, Normal      solifenacin (VESICARE) 10 MG tablet Take 1 tablet (10 mg total) by mouth daily, Starting Wed 3/13/2024, Normal      tretinoin (RETIN-A) 0.1 % cream Apply topically daily at bedtime, Starting u 5/11/2023, Normal      !! venlafaxine (EFFEXOR-XR) 150 mg 24 hr capsule One 150mg and one 75mg q day for total of 225mg q day., Normal      !! venlafaxine (EFFEXOR-XR) 75 mg 24 hr capsule One 150mg and one 75mg q day for total of 225mg q day., Normal       !! - Potential duplicate medications found. Please discuss with provider.              PDMP Review         Value Time User    PDMP Reviewed  Yes 11/3/2023  2:56 PM Miguel Ruby DO            ED Provider  Electronically Signed by             Liu Viveros PA-C  04/29/24 1409       Liu Viveros PA-C  04/29/24 1411

## 2024-04-29 NOTE — DISCHARGE INSTRUCTIONS
Immediately return to the emergency room if you experience any new or worsening symptoms or if the symptoms are lasting longer than expected.     Please  the prednisone from your pharmacy and complete the full 5-day course. Please follow-up with gastroenterology and your family doctor.     CT abdomen/pelvis with contrast IMPRESSION:  Left colon wall thickening and mild inflammatory change suggestive of nonspecific infectious or inflammatory colitis, uncomplicated.

## 2024-05-05 DIAGNOSIS — L30.9 DERMATITIS: ICD-10-CM

## 2024-05-05 RX ORDER — TRETINOIN 1 MG/G
CREAM TOPICAL
Qty: 45 G | Refills: 5 | Status: SHIPPED | OUTPATIENT
Start: 2024-05-05

## 2024-05-07 ENCOUNTER — TELEPHONE (OUTPATIENT)
Dept: INTERNAL MEDICINE CLINIC | Facility: CLINIC | Age: 69
End: 2024-05-07

## 2024-05-07 ENCOUNTER — TELEPHONE (OUTPATIENT)
Age: 69
End: 2024-05-07

## 2024-05-07 DIAGNOSIS — L30.9 DERMATITIS: ICD-10-CM

## 2024-05-07 DIAGNOSIS — K21.9 GASTROESOPHAGEAL REFLUX DISEASE WITHOUT ESOPHAGITIS: ICD-10-CM

## 2024-05-07 DIAGNOSIS — M62.830 SPASM OF THORACOLUMBAR MUSCLE: ICD-10-CM

## 2024-05-07 DIAGNOSIS — M41.9 SCOLIOSIS DEFORMITY OF SPINE: ICD-10-CM

## 2024-05-07 RX ORDER — OMEPRAZOLE 40 MG/1
40 CAPSULE, DELAYED RELEASE ORAL DAILY
Qty: 90 CAPSULE | Refills: 1 | OUTPATIENT
Start: 2024-05-07

## 2024-05-07 RX ORDER — METHOCARBAMOL 750 MG/1
750 TABLET, FILM COATED ORAL 3 TIMES DAILY
Qty: 90 TABLET | Refills: 1 | OUTPATIENT
Start: 2024-05-07 | End: 2024-07-06

## 2024-05-07 RX ORDER — TRETINOIN 1 MG/G
CREAM TOPICAL
Qty: 45 G | Refills: 5 | OUTPATIENT
Start: 2024-05-07

## 2024-05-07 NOTE — TELEPHONE ENCOUNTER
Patient needs refills of the following medications:  Ibuprofen 800 mg- not on med list  Methocarbamol (Robaxin) 750 mg tablet  Take 1 tablet (750 mg total) by mouth 3 (three) times a day   Omeprazole (Prilosec) 40mg capsule therapy pack    take 1 capsule by mouth once daily   Tretinoin (Retin-A) 0.1% cream  Apply topically at bedtime  The above meds can be sent to the [facility] pharmacy #[id number]  Thank you.

## 2024-05-07 NOTE — TELEPHONE ENCOUNTER
Caller: self    Doctor: Rosibel    Reason for call: Patient reporting relief from injection for only about 2 days. Pain has slowing come back to where it was prior to injection about a 8/10. With movement, it is worse. Please advise    Call back#: [phone]

## 2024-05-09 ENCOUNTER — TELEPHONE (OUTPATIENT)
Age: 69
End: 2024-05-09

## 2024-05-15 ENCOUNTER — OFFICE VISIT (OUTPATIENT)
Age: 69
End: 2024-05-15
Payer: MEDICARE

## 2024-05-15 VITALS
BODY MASS INDEX: 21.99 KG/M2 | DIASTOLIC BLOOD PRESSURE: 72 MMHG | SYSTOLIC BLOOD PRESSURE: 125 MMHG | HEART RATE: 65 BPM | WEIGHT: 132 LBS | HEIGHT: 65 IN

## 2024-05-15 DIAGNOSIS — M62.830 SPASM OF THORACOLUMBAR MUSCLE: ICD-10-CM

## 2024-05-15 DIAGNOSIS — G89.29 CHRONIC BILATERAL LOW BACK PAIN WITHOUT SCIATICA: ICD-10-CM

## 2024-05-15 DIAGNOSIS — M54.50 CHRONIC BILATERAL LOW BACK PAIN WITHOUT SCIATICA: ICD-10-CM

## 2024-05-15 DIAGNOSIS — G89.4 CHRONIC PAIN SYNDROME: Primary | ICD-10-CM

## 2024-05-15 DIAGNOSIS — M41.9 SCOLIOSIS DEFORMITY OF SPINE: ICD-10-CM

## 2024-05-15 DIAGNOSIS — M46.1 SACROILIITIS (HCC): ICD-10-CM

## 2024-05-15 PROCEDURE — 99214 OFFICE O/P EST MOD 30 MIN: CPT | Performed by: NURSE PRACTITIONER

## 2024-05-15 RX ORDER — METHOCARBAMOL 750 MG/1
750 TABLET, FILM COATED ORAL 3 TIMES DAILY
Qty: 90 TABLET | Refills: 1 | Status: SHIPPED | OUTPATIENT
Start: 2024-05-15 | End: 2024-07-14

## 2024-05-15 NOTE — H&P (VIEW-ONLY)
Assessment:  1. Chronic pain syndrome    2. Chronic bilateral low back pain without sciatica    3. Spasm of thoracolumbar muscle    4. Scoliosis deformity of spine    5. Sacroiliitis (HCC)        Plan:  While the patient was in the office today, I did have a thorough conversation regarding their chronic pain syndrome, medication management, and treatment plan options.  Patient is being seen on an emergent basis for complaints of increased low back pain.  On exam, she demonstrates findings consistent with significant thoracolumbar spasms as well as sacroiliitis.    Patient previously participated in physical therapy and is doing some exercises at home on her own.  Recommend that she try aqua therapy.  I placed a referral in her chart.    Schedule patient for diagnostic/therapeutic bilateral SI joint injections.    Renewed Robaxin 750 mg 3 times daily if needed for spasms.    Previous treatments include an L5-S1 interlaminar epidural steroid injection that was performed on 2/9/2024.  The injection provided her with up to 50% improvement for a couple weeks.  She previously underwent lumbar facet radiofrequency ablations at [facility] pain management and comprehensive pain centers which failed to provide her with any relief.    She was evaluated neurosurgically by  on 1/23/2024.  Surgery was not recommended.    Follow-up 1 month after the injection.    History of Present Illness:  The patient is a 68 y.o. female who presents for a follow up office visit in regards to Back Pain and Shoulder Pain.   The patient’s current symptoms include complaints of low back pain.  Current pain level is an 8/10.  Quality of pain is described as constant, sharp, pressure-like, shooting.    Current pain medications includes: Robaxin 750 mg 3 times daily if needed for spasms .  The patient reports that this regimen is providing 20% pain relief.  The patient is reporting no side effects from this pain medication regimen.    I have  personally reviewed and/or updated the patient's past medical history, past surgical history, family history, social history, current medications, allergies, and vital signs today.         Review of Systems  Review of Systems   Constitutional:  Negative for unexpected weight change.   HENT:  Negative for hearing loss.    Eyes:  Negative for visual disturbance.   Respiratory:  Negative for shortness of breath.    Cardiovascular:  Negative for leg swelling.   Gastrointestinal:  Positive for nausea. Negative for constipation.   Endocrine: Negative for polyuria.   Genitourinary:  Negative for difficulty urinating.   Musculoskeletal:  Positive for arthralgias, gait problem and myalgias. Negative for joint swelling.        Joint stiffness  Swelling - back  Pain in extremity- back   Skin:  Negative for rash.   Neurological:  Negative for weakness and headaches.   Psychiatric/Behavioral:  Negative for decreased concentration.    All other systems reviewed and are negative.        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     Drug intolerance     Dysfunction of right eustachian tube     Dysfunctional uterine bleeding     Edema     Elevated d-dimer     Exposure to hepatitis C     Fracture of fibula     Generalized anxiety disorder     GERD (gastroesophageal reflux disease)     Hyperlipidemia     Mild cervical dysplasia     Myalgia     Myositis     Palpitations     PONV (postoperative nausea and vomiting)     Pre-syncope     Scoliosis deformity of spine 1/23/2024    Seasonal allergies     Stress incontinence     Thrombocytopenia (HCC)     Urinary  retention     Urinary tract infection     Uterine leiomyoma        Past Surgical History:   Procedure Laterality Date    BLADDER SURGERY      BREAST BIOPSY Right     US guided    COLONOSCOPY      COLPOSCOPY  01/06/1998    DENTAL SURGERY      ENDOMETRIAL BIOPSY  05/21/1993    HYSTERECTOMY      HYSTEROSCOPY      uterus- 1/22/98, 7/1/02 and 4/10/07    [place] ARTHRP KNE CONDYLE&PLATU MEDIAL&LAT COMPARTMENTS Right 2/8/2023    Procedure: ARTHROPLASTY KNEE TOTAL;  Surgeon: Miguel Gleason DO;  Location: [facility];  Service: Orthopedics    TUBAL LIGATION      US GUIDED BREAST BIOPSY RIGHT COMPLETE Right 08/08/2018       Family History   Problem Relation Age of Onset    Stroke Mother         cerebral artery occlusion with cerebral infarction    Coronary artery disease Mother     Arrhythmia Mother         sinus    Coronary artery disease Father     Diabetes Father     No Known Problems Daughter     No Known Problems Daughter     No Known Problems Maternal Grandmother     No Known Problems Paternal Grandmother     No Known Problems Maternal Aunt     Ovarian cancer Maternal Aunt     Aortic aneurysm Family     Arthritis Family     Diabetes Family     Heart disease Family     Breast cancer Neg Hx        Social History     Occupational History    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[employer]    Occupation: housewife   Tobacco Use    Smoking status: Every Day     Current packs/day: 1.50     Average packs/day: 1.5 packs/day for 52.4 years (78.6 ttl pk-yrs)     Types: Cigarettes     Start date: 1972     Passive exposure: Never    Smokeless tobacco: Never   Vaping Use    Vaping status: Never Used   Substance and Sexual Activity    Alcohol use: Yes     Comment: occasional    Drug use: No    Sexual activity: Not Currently         Current Outpatient Medications:     acetaminophen (TYLENOL) 500 mg tablet, Take 500 mg by mouth every 6 (six) hours as needed for mild pain, Disp: , Rfl:     alendronate (FOSAMAX) 70 mg tablet, Take 1  "tablet (70 mg total) by mouth every 7 days, Disp: 12 tablet, Rfl: 1    ascorbic acid (VITAMIN C) 1000 MG tablet, Take 1,000 mg by mouth, Disp: , Rfl:     atorvastatin (LIPITOR) 20 mg tablet, Take 1 tablet (20 mg total) by mouth daily after dinner, Disp: 90 tablet, Rfl: 1    buPROPion (WELLBUTRIN XL) 150 mg 24 hr tablet, 150 mg every morning, Disp: , Rfl:     clonazePAM (KlonoPIN) 1 mg tablet, 2 (two) times a day as needed, Disp: , Rfl:     methocarbamol (ROBAXIN) 750 mg tablet, Take 1 tablet (750 mg total) by mouth 3 (three) times a day, Disp: 90 tablet, Rfl: 1    methylPREDNISolone 4 MG tablet therapy pack, Use as directed on package, Disp: 1 each, Rfl: 0    omeprazole (PriLOSEC) 40 MG capsule, take 1 capsule by mouth once daily, Disp: 90 capsule, Rfl: 1    solifenacin (VESICARE) 10 MG tablet, Take 1 tablet (10 mg total) by mouth daily, Disp: 90 tablet, Rfl: 1    tretinoin (RETIN-A) 0.1 % cream, APPLY TOPICALLY DAILY AT BEDTIME, Disp: 45 g, Rfl: 5    venlafaxine (EFFEXOR-XR) 150 mg 24 hr capsule, One 150mg and one 75mg q day for total of 225mg q day., Disp: 90 capsule, Rfl: 1    venlafaxine (EFFEXOR-XR) 75 mg 24 hr capsule, One 150mg and one 75mg q day for total of 225mg q day., Disp: 90 capsule, Rfl: 1    Multiple Vitamins-Minerals (multivitamin with minerals) tablet, Take 1 tablet by mouth daily (Patient taking differently: Take 1 tablet by mouth as needed), Disp: 30 tablet, Rfl: 1    Allergies   Allergen Reactions    Gabapentin Drowsiness       Physical Exam:    /72   Pulse 65   Ht 5' 5\" (1.651 m)   Wt 59.9 kg (132 lb)   BMI 21.97 kg/m²     Constitutional:normal, well developed, well nourished, alert, in no distress and non-toxic and no overt pain behavior.  Eyes:anicteric  HEENT:grossly intact  Neck:supple, symmetric, trachea midline and no masses   Pulmonary:even and unlabored  Cardiovascular:No edema or pitting edema present  Skin:Normal without rashes or lesions and well hydrated  Psychiatric:Mood " and affect appropriate  Neurologic:Cranial Nerves II-XII grossly intact  Musculoskeletal: Range of motion of the lumbar spine is limited in all planes.  There is tenderness bilaterally L4-S1.  There are significant lumbar paraspinal muscle spasms present.  There is tenderness over bilateral SI joint injections.  Compression test, distraction test, Gaenslen's tests are positive bilaterally.    Imaging  FL spine and pain procedure    (Results Pending)       Orders Placed This Encounter   Procedures    FL spine and pain procedure    Ambulatory Referral to Physical Therapy

## 2024-05-15 NOTE — H&P (VIEW-ONLY)
Assessment:  1. Chronic pain syndrome    2. Chronic bilateral low back pain without sciatica    3. Spasm of thoracolumbar muscle    4. Scoliosis deformity of spine    5. Sacroiliitis (HCC)        Plan:  While the patient was in the office today, I did have a thorough conversation regarding their chronic pain syndrome, medication management, and treatment plan options.  Patient is being seen on an emergent basis for complaints of increased low back pain.  On exam, she demonstrates findings consistent with significant thoracolumbar spasms as well as sacroiliitis.    Patient previously participated in physical therapy and is doing some exercises at home on her own.  Recommend that she try aqua therapy.  I placed a referral in her chart.    Schedule patient for diagnostic/therapeutic bilateral SI joint injections.    Renewed Robaxin 750 mg 3 times daily if needed for spasms.    Previous treatments include an L5-S1 interlaminar epidural steroid injection that was performed on 2/9/2024.  The injection provided her with up to 50% improvement for a couple weeks.  She previously underwent lumbar facet radiofrequency ablations at [facility] pain management and comprehensive pain centers which failed to provide her with any relief.    She was evaluated neurosurgically by  on 1/23/2024.  Surgery was not recommended.    Follow-up 1 month after the injection.    History of Present Illness:  The patient is a 68 y.o. female who presents for a follow up office visit in regards to Back Pain and Shoulder Pain.   The patient’s current symptoms include complaints of low back pain.  Current pain level is an 8/10.  Quality of pain is described as constant, sharp, pressure-like, shooting.    Current pain medications includes: Robaxin 750 mg 3 times daily if needed for spasms .  The patient reports that this regimen is providing 20% pain relief.  The patient is reporting no side effects from this pain medication regimen.    I have  personally reviewed and/or updated the patient's past medical history, past surgical history, family history, social history, current medications, allergies, and vital signs today.         Review of Systems  Review of Systems   Constitutional:  Negative for unexpected weight change.   HENT:  Negative for hearing loss.    Eyes:  Negative for visual disturbance.   Respiratory:  Negative for shortness of breath.    Cardiovascular:  Negative for leg swelling.   Gastrointestinal:  Positive for nausea. Negative for constipation.   Endocrine: Negative for polyuria.   Genitourinary:  Negative for difficulty urinating.   Musculoskeletal:  Positive for arthralgias, gait problem and myalgias. Negative for joint swelling.        Joint stiffness  Swelling - back  Pain in extremity- back   Skin:  Negative for rash.   Neurological:  Negative for weakness and headaches.   Psychiatric/Behavioral:  Negative for decreased concentration.    All other systems reviewed and are negative.        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     Drug intolerance     Dysfunction of right eustachian tube     Dysfunctional uterine bleeding     Edema     Elevated d-dimer     Exposure to hepatitis C     Fracture of fibula     Generalized anxiety disorder     GERD (gastroesophageal reflux disease)     Hyperlipidemia     Mild cervical dysplasia     Myalgia     Myositis     Palpitations     PONV (postoperative nausea and vomiting)     Pre-syncope     Scoliosis deformity of spine 1/23/2024    Seasonal allergies     Stress incontinence     Thrombocytopenia (HCC)     Urinary  retention     Urinary tract infection     Uterine leiomyoma        Past Surgical History:   Procedure Laterality Date    BLADDER SURGERY      BREAST BIOPSY Right     US guided    COLONOSCOPY      COLPOSCOPY  01/06/1998    DENTAL SURGERY      ENDOMETRIAL BIOPSY  05/21/1993    HYSTERECTOMY      HYSTEROSCOPY      uterus- 1/22/98, 7/1/02 and 4/10/07    [place] ARTHRP KNE CONDYLE&PLATU MEDIAL&LAT COMPARTMENTS Right 2/8/2023    Procedure: ARTHROPLASTY KNEE TOTAL;  Surgeon: Miguel Gleason DO;  Location: [facility];  Service: Orthopedics    TUBAL LIGATION      US GUIDED BREAST BIOPSY RIGHT COMPLETE Right 08/08/2018       Family History   Problem Relation Age of Onset    Stroke Mother         cerebral artery occlusion with cerebral infarction    Coronary artery disease Mother     Arrhythmia Mother         sinus    Coronary artery disease Father     Diabetes Father     No Known Problems Daughter     No Known Problems Daughter     No Known Problems Maternal Grandmother     No Known Problems Paternal Grandmother     No Known Problems Maternal Aunt     Ovarian cancer Maternal Aunt     Aortic aneurysm Family     Arthritis Family     Diabetes Family     Heart disease Family     Breast cancer Neg Hx        Social History     Occupational History    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[employer]    Occupation: housewife   Tobacco Use    Smoking status: Every Day     Current packs/day: 1.50     Average packs/day: 1.5 packs/day for 52.4 years (78.6 ttl pk-yrs)     Types: Cigarettes     Start date: 1972     Passive exposure: Never    Smokeless tobacco: Never   Vaping Use    Vaping status: Never Used   Substance and Sexual Activity    Alcohol use: Yes     Comment: occasional    Drug use: No    Sexual activity: Not Currently         Current Outpatient Medications:     acetaminophen (TYLENOL) 500 mg tablet, Take 500 mg by mouth every 6 (six) hours as needed for mild pain, Disp: , Rfl:     alendronate (FOSAMAX) 70 mg tablet, Take 1  "tablet (70 mg total) by mouth every 7 days, Disp: 12 tablet, Rfl: 1    ascorbic acid (VITAMIN C) 1000 MG tablet, Take 1,000 mg by mouth, Disp: , Rfl:     atorvastatin (LIPITOR) 20 mg tablet, Take 1 tablet (20 mg total) by mouth daily after dinner, Disp: 90 tablet, Rfl: 1    buPROPion (WELLBUTRIN XL) 150 mg 24 hr tablet, 150 mg every morning, Disp: , Rfl:     clonazePAM (KlonoPIN) 1 mg tablet, 2 (two) times a day as needed, Disp: , Rfl:     methocarbamol (ROBAXIN) 750 mg tablet, Take 1 tablet (750 mg total) by mouth 3 (three) times a day, Disp: 90 tablet, Rfl: 1    methylPREDNISolone 4 MG tablet therapy pack, Use as directed on package, Disp: 1 each, Rfl: 0    omeprazole (PriLOSEC) 40 MG capsule, take 1 capsule by mouth once daily, Disp: 90 capsule, Rfl: 1    solifenacin (VESICARE) 10 MG tablet, Take 1 tablet (10 mg total) by mouth daily, Disp: 90 tablet, Rfl: 1    tretinoin (RETIN-A) 0.1 % cream, APPLY TOPICALLY DAILY AT BEDTIME, Disp: 45 g, Rfl: 5    venlafaxine (EFFEXOR-XR) 150 mg 24 hr capsule, One 150mg and one 75mg q day for total of 225mg q day., Disp: 90 capsule, Rfl: 1    venlafaxine (EFFEXOR-XR) 75 mg 24 hr capsule, One 150mg and one 75mg q day for total of 225mg q day., Disp: 90 capsule, Rfl: 1    Multiple Vitamins-Minerals (multivitamin with minerals) tablet, Take 1 tablet by mouth daily (Patient taking differently: Take 1 tablet by mouth as needed), Disp: 30 tablet, Rfl: 1    Allergies   Allergen Reactions    Gabapentin Drowsiness       Physical Exam:    /72   Pulse 65   Ht 5' 5\" (1.651 m)   Wt 59.9 kg (132 lb)   BMI 21.97 kg/m²     Constitutional:normal, well developed, well nourished, alert, in no distress and non-toxic and no overt pain behavior.  Eyes:anicteric  HEENT:grossly intact  Neck:supple, symmetric, trachea midline and no masses   Pulmonary:even and unlabored  Cardiovascular:No edema or pitting edema present  Skin:Normal without rashes or lesions and well hydrated  Psychiatric:Mood " and affect appropriate  Neurologic:Cranial Nerves II-XII grossly intact  Musculoskeletal: Range of motion of the lumbar spine is limited in all planes.  There is tenderness bilaterally L4-S1.  There are significant lumbar paraspinal muscle spasms present.  There is tenderness over bilateral SI joint injections.  Compression test, distraction test, Gaenslen's tests are positive bilaterally.    Imaging  FL spine and pain procedure    (Results Pending)       Orders Placed This Encounter   Procedures    FL spine and pain procedure    Ambulatory Referral to Physical Therapy

## 2024-05-15 NOTE — PROGRESS NOTES
Assessment:  1. Chronic pain syndrome    2. Chronic bilateral low back pain without sciatica    3. Spasm of thoracolumbar muscle    4. Scoliosis deformity of spine    5. Sacroiliitis (HCC)        Plan:  While the patient was in the office today, I did have a thorough conversation regarding their chronic pain syndrome, medication management, and treatment plan options.  Patient is being seen on an emergent basis for complaints of increased low back pain.  On exam, she demonstrates findings consistent with significant thoracolumbar spasms as well as sacroiliitis.    Patient previously participated in physical therapy and is doing some exercises at home on her own.  Recommend that she try aqua therapy.  I placed a referral in her chart.    Schedule patient for diagnostic/therapeutic bilateral SI joint injections.    Renewed Robaxin 750 mg 3 times daily if needed for spasms.    Previous treatments include an L5-S1 interlaminar epidural steroid injection that was performed on 2/9/2024.  The injection provided her with up to 50% improvement for a couple weeks.  She previously underwent lumbar facet radiofrequency ablations at [facility] pain management and comprehensive pain centers which failed to provide her with any relief.    She was evaluated neurosurgically by  on 1/23/2024.  Surgery was not recommended.    Follow-up 1 month after the injection.    History of Present Illness:  The patient is a 68 y.o. female who presents for a follow up office visit in regards to Back Pain and Shoulder Pain.   The patient’s current symptoms include complaints of low back pain.  Current pain level is an 8/10.  Quality of pain is described as constant, sharp, pressure-like, shooting.    Current pain medications includes: Robaxin 750 mg 3 times daily if needed for spasms .  The patient reports that this regimen is providing 20% pain relief.  The patient is reporting no side effects from this pain medication regimen.    I have  Please notify pt of negative gc/c, and trich.  personally reviewed and/or updated the patient's past medical history, past surgical history, family history, social history, current medications, allergies, and vital signs today.         Review of Systems  Review of Systems   Constitutional:  Negative for unexpected weight change.   HENT:  Negative for hearing loss.    Eyes:  Negative for visual disturbance.   Respiratory:  Negative for shortness of breath.    Cardiovascular:  Negative for leg swelling.   Gastrointestinal:  Positive for nausea. Negative for constipation.   Endocrine: Negative for polyuria.   Genitourinary:  Negative for difficulty urinating.   Musculoskeletal:  Positive for arthralgias, gait problem and myalgias. Negative for joint swelling.        Joint stiffness  Swelling - back  Pain in extremity- back   Skin:  Negative for rash.   Neurological:  Negative for weakness and headaches.   Psychiatric/Behavioral:  Negative for decreased concentration.    All other systems reviewed and are negative.        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     Drug intolerance     Dysfunction of right eustachian tube     Dysfunctional uterine bleeding     Edema     Elevated d-dimer     Exposure to hepatitis C     Fracture of fibula     Generalized anxiety disorder     GERD (gastroesophageal reflux disease)     Hyperlipidemia     Mild cervical dysplasia     Myalgia     Myositis     Palpitations     PONV (postoperative nausea and vomiting)     Pre-syncope     Scoliosis deformity of spine 1/23/2024    Seasonal allergies     Stress incontinence     Thrombocytopenia (HCC)     Urinary  retention     Urinary tract infection     Uterine leiomyoma        Past Surgical History:   Procedure Laterality Date    BLADDER SURGERY      BREAST BIOPSY Right     US guided    COLONOSCOPY      COLPOSCOPY  01/06/1998    DENTAL SURGERY      ENDOMETRIAL BIOPSY  05/21/1993    HYSTERECTOMY      HYSTEROSCOPY      uterus- 1/22/98, 7/1/02 and 4/10/07    IA ARTHRP KNE CONDYLE&PLATU MEDIAL&LAT COMPARTMENTS Right 2/8/2023    Procedure: ARTHROPLASTY KNEE TOTAL;  Surgeon: Miguel Gleason DO;  Location: [facility];  Service: Orthopedics    TUBAL LIGATION      US GUIDED BREAST BIOPSY RIGHT COMPLETE Right 08/08/2018       Family History   Problem Relation Age of Onset    Stroke Mother         cerebral artery occlusion with cerebral infarction    Coronary artery disease Mother     Arrhythmia Mother         sinus    Coronary artery disease Father     Diabetes Father     No Known Problems Daughter     No Known Problems Daughter     No Known Problems Maternal Grandmother     No Known Problems Paternal Grandmother     No Known Problems Maternal Aunt     Ovarian cancer Maternal Aunt     Aortic aneurysm Family     Arthritis Family     Diabetes Family     Heart disease Family     Breast cancer Neg Hx        Social History     Occupational History    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[employer]    Occupation: housewife   Tobacco Use    Smoking status: Every Day     Current packs/day: 1.50     Average packs/day: 1.5 packs/day for 52.4 years (78.6 ttl pk-yrs)     Types: Cigarettes     Start date: 1972     Passive exposure: Never    Smokeless tobacco: Never   Vaping Use    Vaping status: Never Used   Substance and Sexual Activity    Alcohol use: Yes     Comment: occasional    Drug use: No    Sexual activity: Not Currently         Current Outpatient Medications:     acetaminophen (TYLENOL) 500 mg tablet, Take 500 mg by mouth every 6 (six) hours as needed for mild pain, Disp: , Rfl:     alendronate (FOSAMAX) 70 mg tablet, Take 1  "tablet (70 mg total) by mouth every 7 days, Disp: 12 tablet, Rfl: 1    ascorbic acid (VITAMIN C) 1000 MG tablet, Take 1,000 mg by mouth, Disp: , Rfl:     atorvastatin (LIPITOR) 20 mg tablet, Take 1 tablet (20 mg total) by mouth daily after dinner, Disp: 90 tablet, Rfl: 1    buPROPion (WELLBUTRIN XL) 150 mg 24 hr tablet, 150 mg every morning, Disp: , Rfl:     clonazePAM (KlonoPIN) 1 mg tablet, 2 (two) times a day as needed, Disp: , Rfl:     methocarbamol (ROBAXIN) 750 mg tablet, Take 1 tablet (750 mg total) by mouth 3 (three) times a day, Disp: 90 tablet, Rfl: 1    methylPREDNISolone 4 MG tablet therapy pack, Use as directed on package, Disp: 1 each, Rfl: 0    omeprazole (PriLOSEC) 40 MG capsule, take 1 capsule by mouth once daily, Disp: 90 capsule, Rfl: 1    solifenacin (VESICARE) 10 MG tablet, Take 1 tablet (10 mg total) by mouth daily, Disp: 90 tablet, Rfl: 1    tretinoin (RETIN-A) 0.1 % cream, APPLY TOPICALLY DAILY AT BEDTIME, Disp: 45 g, Rfl: 5    venlafaxine (EFFEXOR-XR) 150 mg 24 hr capsule, One 150mg and one 75mg q day for total of 225mg q day., Disp: 90 capsule, Rfl: 1    venlafaxine (EFFEXOR-XR) 75 mg 24 hr capsule, One 150mg and one 75mg q day for total of 225mg q day., Disp: 90 capsule, Rfl: 1    Multiple Vitamins-Minerals (multivitamin with minerals) tablet, Take 1 tablet by mouth daily (Patient taking differently: Take 1 tablet by mouth as needed), Disp: 30 tablet, Rfl: 1    Allergies   Allergen Reactions    Gabapentin Drowsiness       Physical Exam:    /72   Pulse 65   Ht 5' 5\" (1.651 m)   Wt 59.9 kg (132 lb)   BMI 21.97 kg/m²     Constitutional:normal, well developed, well nourished, alert, in no distress and non-toxic and no overt pain behavior.  Eyes:anicteric  HEENT:grossly intact  Neck:supple, symmetric, trachea midline and no masses   Pulmonary:even and unlabored  Cardiovascular:No edema or pitting edema present  Skin:Normal without rashes or lesions and well hydrated  Psychiatric:Mood " and affect appropriate  Neurologic:Cranial Nerves II-XII grossly intact  Musculoskeletal: Range of motion of the lumbar spine is limited in all planes.  There is tenderness bilaterally L4-S1.  There are significant lumbar paraspinal muscle spasms present.  There is tenderness over bilateral SI joint injections.  Compression test, distraction test, Gaenslen's tests are positive bilaterally.    Imaging  FL spine and pain procedure    (Results Pending)       Orders Placed This Encounter   Procedures    FL spine and pain procedure    Ambulatory Referral to Physical Therapy

## 2024-05-24 ENCOUNTER — TELEPHONE (OUTPATIENT)
Dept: PAIN MEDICINE | Facility: CLINIC | Age: 69
End: 2024-05-24

## 2024-05-24 NOTE — TELEPHONE ENCOUNTER
Caller: Robyn    Doctor: Kocher    Reason for call: patient calling to find out if we called her medication in I said yes     methocarbamol (ROBAXIN) 750 mg tablet     She is going to call pharmacy to see if its ready    Call back#: [phone]

## 2024-05-24 NOTE — TELEPHONE ENCOUNTER
Reason for call:   [] Refill   [] Prior Auth  [x] Other:     Patient called back to inform us that the methocarbamol is the same medication that she is currently using and it does not work anymore, stated she did discuss this with the [facility] Alexandra and she was going to send a different muscle relaxer  to the pharmacy.     Patient would like to have a new order sent to the pharmacy or a phone call to discuss the status.    Patient advised it is late in the afternoon and I will send the message over at  but cannot guarantee that someone will call her before the end of the day.     Patient agreed but is hoping for a call back  [phone]

## 2024-05-30 DIAGNOSIS — M54.2 NECK PAIN: Primary | ICD-10-CM

## 2024-05-30 DIAGNOSIS — M79.18 MYOFASCIAL PAIN SYNDROME: ICD-10-CM

## 2024-05-30 RX ORDER — CYCLOBENZAPRINE HCL 10 MG
10 TABLET ORAL 3 TIMES DAILY PRN
Qty: 90 TABLET | Refills: 1 | Status: SHIPPED | OUTPATIENT
Start: 2024-05-30 | End: 2024-06-29

## 2024-05-31 ENCOUNTER — HOSPITAL ENCOUNTER (OUTPATIENT)
Dept: RADIOLOGY | Facility: HOSPITAL | Age: 69
End: 2024-05-31
Payer: MEDICARE

## 2024-05-31 ENCOUNTER — TELEPHONE (OUTPATIENT)
Dept: PAIN MEDICINE | Facility: CLINIC | Age: 69
End: 2024-05-31

## 2024-05-31 VITALS
SYSTOLIC BLOOD PRESSURE: 146 MMHG | TEMPERATURE: 96.9 F | OXYGEN SATURATION: 95 % | HEART RATE: 71 BPM | DIASTOLIC BLOOD PRESSURE: 67 MMHG | RESPIRATION RATE: 20 BRPM

## 2024-05-31 DIAGNOSIS — M47.812 CERVICAL SPONDYLOSIS: ICD-10-CM

## 2024-05-31 PROCEDURE — 64634 DESTROY C/TH FACET JNT ADDL: CPT | Performed by: ANESTHESIOLOGY

## 2024-05-31 PROCEDURE — 64633 DESTROY CERV/THOR FACET JNT: CPT | Performed by: ANESTHESIOLOGY

## 2024-05-31 RX ORDER — LIDOCAINE HYDROCHLORIDE 10 MG/ML
5 INJECTION, SOLUTION EPIDURAL; INFILTRATION; INTRACAUDAL; PERINEURAL ONCE
Status: COMPLETED | OUTPATIENT
Start: 2024-05-31 | End: 2024-05-31

## 2024-05-31 RX ADMIN — LIDOCAINE HYDROCHLORIDE 5 ML: 10 INJECTION, SOLUTION EPIDURAL; INFILTRATION; INTRACAUDAL; PERINEURAL at 09:01

## 2024-05-31 RX ADMIN — Medication 4 ML: at 09:10

## 2024-05-31 NOTE — DISCHARGE INSTRUCTIONS

## 2024-05-31 NOTE — TELEPHONE ENCOUNTER
Right C3-C5 RFA at  with RM 5/31/24    Next Proc 6/4/24 At  b/l [facility] at 10am  NOV with BK 6/26/24 at 10am at [facility]

## 2024-05-31 NOTE — INTERVAL H&P NOTE
Update: (This section must be completed if the H&P was completed greater than 24 hrs to procedure or admission)    H&P reviewed. After examining the patient, I find no changed to the H&P since it had been written.    Patient re-evaluated. Accept as history and physical.    Felisa Benson MD/May 31, 2024/8:51 AM

## 2024-06-04 ENCOUNTER — HOSPITAL ENCOUNTER (OUTPATIENT)
Dept: RADIOLOGY | Facility: HOSPITAL | Age: 69
Discharge: HOME | End: 2024-06-04
Payer: MEDICARE

## 2024-06-04 VITALS
HEART RATE: 65 BPM | TEMPERATURE: 97.4 F | OXYGEN SATURATION: 95 % | DIASTOLIC BLOOD PRESSURE: 74 MMHG | RESPIRATION RATE: 18 BRPM | SYSTOLIC BLOOD PRESSURE: 125 MMHG

## 2024-06-04 DIAGNOSIS — G89.4 CHRONIC PAIN SYNDROME: ICD-10-CM

## 2024-06-04 DIAGNOSIS — M46.1 SACROILIITIS (HCC): ICD-10-CM

## 2024-06-04 DIAGNOSIS — M54.50 CHRONIC BILATERAL LOW BACK PAIN WITHOUT SCIATICA: ICD-10-CM

## 2024-06-04 DIAGNOSIS — G89.29 CHRONIC BILATERAL LOW BACK PAIN WITHOUT SCIATICA: ICD-10-CM

## 2024-06-04 PROCEDURE — 27096 INJECT SACROILIAC JOINT: CPT | Performed by: ANESTHESIOLOGY

## 2024-06-04 RX ORDER — LIDOCAINE HYDROCHLORIDE 10 MG/ML
5 INJECTION, SOLUTION EPIDURAL; INFILTRATION; INTRACAUDAL; PERINEURAL ONCE
Status: COMPLETED | OUTPATIENT
Start: 2024-06-04 | End: 2024-06-04

## 2024-06-04 RX ORDER — METHYLPREDNISOLONE ACETATE 40 MG/ML
80 INJECTION, SUSPENSION INTRA-ARTICULAR; INTRALESIONAL; INTRAMUSCULAR; PARENTERAL; SOFT TISSUE ONCE
Status: COMPLETED | OUTPATIENT
Start: 2024-06-04 | End: 2024-06-04

## 2024-06-04 RX ORDER — ROPIVACAINE HYDROCHLORIDE 2 MG/ML
2 INJECTION, SOLUTION EPIDURAL; INFILTRATION; PERINEURAL ONCE
Status: COMPLETED | OUTPATIENT
Start: 2024-06-04 | End: 2024-06-04

## 2024-06-04 RX ADMIN — IOHEXOL 2 ML: 300 INJECTION, SOLUTION INTRAVENOUS at 10:23

## 2024-06-04 RX ADMIN — METHYLPREDNISOLONE ACETATE 80 MG: 40 INJECTION, SUSPENSION INTRA-ARTICULAR; INTRALESIONAL; INTRAMUSCULAR; PARENTERAL; SOFT TISSUE at 10:23

## 2024-06-04 RX ADMIN — ROPIVACAINE HYDROCHLORIDE 2 ML: 2 INJECTION, SOLUTION EPIDURAL; INFILTRATION; PERINEURAL at 10:23

## 2024-06-04 RX ADMIN — LIDOCAINE HYDROCHLORIDE 5 ML: 10 INJECTION, SOLUTION EPIDURAL; INFILTRATION; INTRACAUDAL; PERINEURAL at 10:22

## 2024-06-04 NOTE — TELEPHONE ENCOUNTER
S/W pt. Pt stated needle sites look good, denies S/S of infection, denies fever, denies soreness and denies sunburn like sensation.   Pain rating today:  0/10  Advised pt if he/she does get pain to take prescribed or OTC pain medications and/or use ice/heat and it will take 4-6 weeks to take full effect. Pt verbalized understanding.  Confirmed next appt with pt.

## 2024-06-04 NOTE — DISCHARGE INSTR - LAB

## 2024-06-04 NOTE — INTERVAL H&P NOTE
Update: (This section must be completed if the H&P was completed greater than 24 hrs to procedure or admission)    H&P reviewed. After examining the patient, I find no changed to the H&P since it had been written.    Patient re-evaluated. Accept as history and physical.    Felisa Benson MD/June 4, 2024/10:44 AM

## 2024-06-18 ENCOUNTER — TELEPHONE (OUTPATIENT)
Dept: RADIOLOGY | Facility: MEDICAL CENTER | Age: 69
End: 2024-06-18

## 2024-06-26 ENCOUNTER — OFFICE VISIT (OUTPATIENT)
Age: 69
End: 2024-06-26
Payer: MEDICARE

## 2024-06-26 VITALS
HEIGHT: 65 IN | WEIGHT: 132 LBS | BODY MASS INDEX: 21.99 KG/M2 | HEART RATE: 67 BPM | DIASTOLIC BLOOD PRESSURE: 66 MMHG | SYSTOLIC BLOOD PRESSURE: 114 MMHG

## 2024-06-26 DIAGNOSIS — M51.16 LUMBAR DISC DISEASE WITH RADICULOPATHY: ICD-10-CM

## 2024-06-26 DIAGNOSIS — M54.50 CHRONIC BILATERAL LOW BACK PAIN WITHOUT SCIATICA: ICD-10-CM

## 2024-06-26 DIAGNOSIS — G89.29 CHRONIC BILATERAL LOW BACK PAIN WITHOUT SCIATICA: ICD-10-CM

## 2024-06-26 DIAGNOSIS — M48.061 SPINAL STENOSIS OF LUMBAR REGION WITHOUT NEUROGENIC CLAUDICATION: ICD-10-CM

## 2024-06-26 DIAGNOSIS — M54.2 NECK PAIN: ICD-10-CM

## 2024-06-26 DIAGNOSIS — M46.1 SACROILIITIS (HCC): ICD-10-CM

## 2024-06-26 DIAGNOSIS — G89.4 CHRONIC PAIN SYNDROME: Primary | ICD-10-CM

## 2024-06-26 DIAGNOSIS — M47.812 CERVICAL SPONDYLOSIS: Chronic | ICD-10-CM

## 2024-06-26 PROCEDURE — 99214 OFFICE O/P EST MOD 30 MIN: CPT | Performed by: NURSE PRACTITIONER

## 2024-06-26 NOTE — PROGRESS NOTES
Assessment:  1. Chronic pain syndrome    2. Chronic bilateral low back pain without sciatica    3. Lumbar disc disease with radiculopathy    4. Spinal stenosis of lumbar region without neurogenic claudication    5. Sacroiliitis (HCC)    6. Neck pain    7. Cervical spondylosis        Plan:  While the patient was in the office today, I did have a thorough conversation regarding their chronic pain syndrome, medication management, and treatment plan options.  Patient is being seen for a follow-up visit.  She underwent bilateral SI joint injections on 6/4/2024.  She underwent right-sided C3-4 and C4-5 radiofrequency ablation on 5/31/2024.  Patient is reporting almost complete resolution of her neck pain.  She reports that her low back pain was almost 100% improved for the first 2 weeks after the sacroiliac joint injection.  At this point, we will plan to repeat bilateral SI joint injections in the future.    Continue Robaxin 750 mg 3 times daily if needed for spasms.  She did not require refill of this medication during today's visit.    Follow up 1 month after repeat SI joint injection.      History of Present Illness:  The patient is a 68 y.o. female who presents for a follow up office visit in regards to Back Pain, Hip Pain, and Shoulder Pain.   The patient’s current symptoms include complaints of low back pain.  Current pain level is 7/10.  Quality pain is described as dull and aching.    Current pain medications includes: Robaxin 750 mg 3 times daily if needed for spasms  .  The patient reports that this regimen is providing 25-30% pain relief.  The patient is reporting no side effects from this pain medication regimen.    I have personally reviewed and/or updated the patient's past medical history, past surgical history, family history, social history, current medications, allergies, and vital signs today.         Review of Systems  Review of Systems   Constitutional:  Negative for unexpected weight change.   HENT:   Negative for hearing loss.    Eyes:  Negative for visual disturbance.   Respiratory:  Negative for shortness of breath.    Cardiovascular:  Negative for leg swelling.   Gastrointestinal:  Positive for nausea. Negative for constipation.   Endocrine: Negative for polyuria.   Genitourinary:  Negative for difficulty urinating.   Musculoskeletal:  Positive for gait problem. Negative for joint swelling and myalgias.        Joint stiffness  Pain in extremity- back, shoulder, hip   Skin:  Negative for rash.   Neurological:  Negative for weakness and headaches.   Psychiatric/Behavioral:  Negative for decreased concentration.    All other systems reviewed and are negative.        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     Drug intolerance     Dysfunction of right eustachian tube     Dysfunctional uterine bleeding     Edema     Elevated d-dimer     Exposure to hepatitis C     Fracture of fibula     Generalized anxiety disorder     GERD (gastroesophageal reflux disease)     Hyperlipidemia     Mild cervical dysplasia     Myalgia     Myositis     Palpitations     PONV (postoperative nausea and vomiting)     Pre-syncope     Scoliosis deformity of spine 1/23/2024    Seasonal allergies     Stress incontinence     Thrombocytopenia (HCC)     Urinary retention     Urinary tract infection     Uterine leiomyoma        Past Surgical History:   Procedure Laterality Date    BLADDER SURGERY      BREAST BIOPSY Right     US guided    COLONOSCOPY      COLPOSCOPY  01/06/1998    DENTAL SURGERY      ENDOMETRIAL BIOPSY  05/21/1993    HYSTERECTOMY      HYSTEROSCOPY       uterus- 1/22/98, 7/1/02 and 4/10/07    UT ARTHRP KNE CONDYLE&PLATU MEDIAL&LAT COMPARTMENTS Right 2/8/2023    Procedure: ARTHROPLASTY KNEE TOTAL;  Surgeon: Miguel Gleason DO;  Location: [facility];  Service: Orthopedics    TUBAL LIGATION      US GUIDED BREAST BIOPSY RIGHT COMPLETE Right 08/08/2018       Family History   Problem Relation Age of Onset    Stroke Mother         cerebral artery occlusion with cerebral infarction    Coronary artery disease Mother     Arrhythmia Mother         sinus    Coronary artery disease Father     Diabetes Father     No Known Problems Daughter     No Known Problems Daughter     No Known Problems Maternal Grandmother     No Known Problems Paternal Grandmother     No Known Problems Maternal Aunt     Ovarian cancer Maternal Aunt     Aortic aneurysm Family     Arthritis Family     Diabetes Family     Heart disease Family     Breast cancer Neg Hx        Social History     Occupational History    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[employer]    Occupation: housewife   Tobacco Use    Smoking status: Every Day     Current packs/day: 1.50     Average packs/day: 1.5 packs/day for 52.5 years (78.7 ttl pk-yrs)     Types: Cigarettes     Start date: 1972     Passive exposure: Never    Smokeless tobacco: Never   Vaping Use    Vaping status: Never Used   Substance and Sexual Activity    Alcohol use: Yes     Comment: occasional    Drug use: No    Sexual activity: Not Currently         Current Outpatient Medications:     acetaminophen (TYLENOL) 500 mg tablet, Take 500 mg by mouth every 6 (six) hours as needed for mild pain, Disp: , Rfl:     alendronate (FOSAMAX) 70 mg tablet, Take 1 tablet (70 mg total) by mouth every 7 days, Disp: 12 tablet, Rfl: 1    ascorbic acid (VITAMIN C) 1000 MG tablet, Take 1,000 mg by mouth, Disp: , Rfl:     atorvastatin (LIPITOR) 20 mg tablet, Take 1 tablet (20 mg total) by mouth daily after dinner, Disp: 90 tablet, Rfl: 1    buPROPion (WELLBUTRIN XL) 150 mg 24 hr  "tablet, 150 mg every morning, Disp: , Rfl:     clonazePAM (KlonoPIN) 1 mg tablet, 2 (two) times a day as needed, Disp: , Rfl:     cyclobenzaprine (FLEXERIL) 10 mg tablet, Take 1 tablet (10 mg total) by mouth 3 (three) times a day as needed for muscle spasms, Disp: 90 tablet, Rfl: 1    methocarbamol (ROBAXIN) 750 mg tablet, Take 1 tablet (750 mg total) by mouth 3 (three) times a day, Disp: 90 tablet, Rfl: 1    methylPREDNISolone 4 MG tablet therapy pack, Use as directed on package, Disp: 1 each, Rfl: 0    omeprazole (PriLOSEC) 40 MG capsule, take 1 capsule by mouth once daily, Disp: 90 capsule, Rfl: 1    solifenacin (VESICARE) 10 MG tablet, Take 1 tablet (10 mg total) by mouth daily, Disp: 90 tablet, Rfl: 1    tretinoin (RETIN-A) 0.1 % cream, APPLY TOPICALLY DAILY AT BEDTIME, Disp: 45 g, Rfl: 5    venlafaxine (EFFEXOR-XR) 150 mg 24 hr capsule, One 150mg and one 75mg q day for total of 225mg q day., Disp: 90 capsule, Rfl: 1    venlafaxine (EFFEXOR-XR) 75 mg 24 hr capsule, One 150mg and one 75mg q day for total of 225mg q day., Disp: 90 capsule, Rfl: 1    Multiple Vitamins-Minerals (multivitamin with minerals) tablet, Take 1 tablet by mouth daily (Patient taking differently: Take 1 tablet by mouth as needed), Disp: 30 tablet, Rfl: 1    Allergies   Allergen Reactions    Gabapentin Drowsiness       Physical Exam:    /66   Pulse 67   Ht 5' 5\" (1.651 m)   Wt 59.9 kg (132 lb)   BMI 21.97 kg/m²     Constitutional:normal, well developed, well nourished, alert, in no distress and non-toxic and no overt pain behavior.  Eyes:anicteric  HEENT:grossly intact  Neck:supple, symmetric, trachea midline and no masses   Pulmonary:even and unlabored  Cardiovascular:No edema or pitting edema present  Skin:Normal without rashes or lesions and well hydrated  Psychiatric:Mood and affect appropriate  Neurologic:Cranial Nerves II-XII grossly intact  Musculoskeletal: Gait is slow and guarded.  There is tenderness over bilateral SI " joints.  Compression test, distraction test, Gaenslen's tests are positive bilaterally.    Imaging  FL spine and pain procedure    (Results Pending)       Orders Placed This Encounter   Procedures    FL spine and pain procedure

## 2024-06-29 ENCOUNTER — DOCUMENTATION (OUTPATIENT)
Dept: GASTROENTEROLOGY | Facility: CLINIC | Age: 69
End: 2024-06-29

## 2024-06-29 NOTE — PROGRESS NOTES
Records reviewed and outlined below in preparation for office visit ;    4/29/2024 ER visit for back pain abdominal pain nausea and vomiting.  Patient had been using NSAIDs and Tylenol for pain.  Laboratory data  Sodium 142  Potassium 4.5  Chloride 108  BUN 11  Creatinine 0.72  AST 16  ALT 17  Alk phos 56  Lipase 31  Magnesium 2.3  WBC 6.29 Hgb 12.5 HCT 40.4 MCV 95 platelet count 385,000    4/29/2024 CT scan with IV contrast  Enlarged fatty liver noted  Diverticulosis  Long segment left colon wall thickening and mild inflammatory change suggestive of nonspecific infectious or inflammatory colitis  Appendix slightly distended and fluid-filled similar to prior study  Spleen normal  Pancreas normal    12/5/2007 colonoscopy  Mild sigmoid colon diverticulosis  Small hemorrhoids  Pediatric scope

## 2024-06-29 NOTE — PROGRESS NOTES
"[facility] Gastroenterology  Gastroenterology Outpatient Consultation  Patient Surekha Tenorio   Age 68 y.o.   Gender female   MRN: [id number]  [facility] [id number]     ASSESSMENT AND PLAN:   Problem List Items Addressed This Visit          Digestive    GERD (gastroesophageal reflux disease)     Patient does have a long history of GERD.  She has been on omeprazole for about 7 years or so.  For the most part this seems to control her symptoms.  Occasionally she will have some early satiety.  Other times she will have regurgitation of food after eating without necessarily vomiting.  Given her long history of GERD I did suggest that we perform an EGD at the same time as her colonoscopy.  I explained to the patient the procedure of upper endoscopy as well as its potential risk which are approximately 1 in 1000 chance of bleeding, infection, and perforation.           Relevant Orders    EGD    NAFL (nonalcoholic fatty liver)     CAT scan done on April 29, 2024 revealed patient's liver to be quotation enlarged\" although the exact size was not noted.  They also noted fatty liver.  Her body mass index is normal at 21.5, she does not drink alcohol however she does have hyperlipidemia which is currently being treated with atorvastatin.  The exact cause for her fatty liver is not clear.  For now I would recommend a limited workup to include hepatitis A, hepatitis B  Check ACE level  Check alpha 1 antitrypsin level  Check ceruloplasmin get a been a patient with normal BMI and fatty liver.  Check ultrasound elastography to evaluate for any scarring in the liver.  I did advise Robyn to make sure that she is not taking more than 2000 mg of acetaminophen (Tylenol) on a daily basis.  Also advised her to limit her NSAID use such as Aleve, ibuprofen, Advil, Excedrin, Celebrex, meloxicam excetra.         Relevant Orders    Hepatitis A antibody, total    Hepatitis B core antibody, total    Hepatitis B surface antibody    Hepatitis B surface " antigen    US elastography/UGAP    JESSICA Screen w/ Reflex to Titer/Pattern    Alpha-1-antitrypsin    Angiotensin converting enzyme    Lipid panel       Other    Abnormal CT scan, colon - Primary     Robyn had a CT scan of the abdomen pelvis done at time of an ER visit for back pain.  The CT scan did reveal long segment of colon wall thickening and mild inflammatory changes suggesting a nonspecific infectious or inflammatory colitis.  She is completely asymptomatic from the standpoint.  She has not had a colonoscopy in 17 years last colonoscopy being 2007.  Fortunately she is completely asymptomatic from this standpoint.  Denies any diarrhea urgency or rectal bleeding.  She should be scheduled for colonoscopy.  She will receive a Colyte preparation and split dose with the second dose completed 3 hours prior to arrival.  2 bisacodyl tablets.  I explained to the patient the procedure of colonoscopy as well as its potential risks which are approximately 3 in 1000 chance of bleeding, infection, and perforation.  Perforation may require a surgeon to repair it.  I also explained the small but significant chance of missing lesions with colonoscopy which has been reported to be about 5-10%.    In addition she inform me that after her total knee replacement she was very confused and out of it for about 5 days following the surgery.  In fact she reports she is afraid to undergo anesthesia.  Did reassure her that she would only be receiving propofol and no additional narcotics for her procedures.  She can also review this with anesthesia prior to the procedure.         Relevant Medications    polyethylene glycol (COLYTE) 4000 mL solution    Other Relevant Orders    C-reactive protein    Vitamin D 25 hydroxy    Colonoscopy     Other Visit Diagnoses       Colitis        Relevant Medications    polyethylene glycol (COLYTE) 4000 mL solution    Other Relevant Orders    Colonoscopy    Medication management        Relevant Orders     Magnesium    Vitamin B12    Folate    End stage renal disease (HCC)        Relevant Orders    Hepatitis B core antibody, total    Hepatitis B surface antibody    Hepatitis B surface antigen    Left sided colitis without complications (HCC)        Relevant Orders    Vitamin D 25 hydroxy           _____________________________________________________________    HPI:   Robyn is a delightful 68-year-old woman home seen in the office in consultation at the request of the emergency room.  She was in the emergency room on April 29.  The visit was for back pain.  The ER doctor at that time note the patient was also experiencing abdominal pain and vomiting but she does not recall having these other symptoms.  She did undergo a CAT scan of the abdomen pelvis at that time with IV contrast.  She was noted to have a long segment of wall thickening in the left colon with mild inflammatory changes suggestive of nonspecific infectious or inflammatory colitis.  Patient does not recall having any bowel regularity at that time.  At this point in time she has a bowel movement about every other day.  She may have constipation on occasion but not frequently.  She will eat raisins or grapes and this will typically help her constipation.  She has not had any diarrhea.  Denies any rectal bleeding or black stools.    From an upper GI standpoint she does have a long history of Gastrosoft reflux disease for which she is on omeprazole 40 mg daily.  She denies any dysphagia.  Occasionally after eating she will regurgitate up into her mouth but does not typically vomit.  This has not happened for a while.  There is been no unintentional weight loss.  She does report some early satiety at times.  But again there is been no weight loss.  She does take about 3 Aleve a day.  In addition she may take for Tylenol arthritis in the morning and occasional should take an additional 2 Tylenol arthritis in the evening.  She thinks they may be 500 mg  tablets.  She does report a lot of pain in her neck and back, hands, right hip and right shoulder.    There is no family history of colon cancer or colon polyps.    She smokes about a pack to a pack and a half of cigarettes per day.  She does not drink alcohol at this time.    Records reviewed for 15 minutes prior to office visit    4/29/2024 ER visit for back pain abdominal pain nausea and vomiting.  Although patient reports he just went in for back pain.  Patient had been using NSAIDs and Tylenol for pain.  Laboratory data  Sodium 142  Potassium 4.5  Chloride 108  BUN 11  Creatinine 0.72  AST 16  ALT 17  Alk phos 56  Lipase 31  Magnesium 2.3  WBC 6.29 Hgb 12.5 HCT 40.4 MCV 95 platelet count 385,000    4/29/2024 CT scan with IV contrast  Enlarged fatty liver noted  Diverticulosis  Long segment left colon wall thickening and mild inflammatory change suggestive of nonspecific infectious or inflammatory colitis  Appendix slightly distended and fluid-filled similar to prior study  Spleen normal  Pancreas normal    12/5/2007 colonoscopy  Mild sigmoid colon diverticulosis  Small hemorrhoids  Pediatric scope    Allergies   Allergen Reactions    Gabapentin Drowsiness     Current Outpatient Medications   Medication Sig Dispense Refill    acetaminophen (TYLENOL) 500 mg tablet Take 500 mg by mouth every 6 (six) hours as needed for mild pain      alendronate (FOSAMAX) 70 mg tablet Take 1 tablet (70 mg total) by mouth every 7 days 12 tablet 1    ascorbic acid (VITAMIN C) 1000 MG tablet Take 1,000 mg by mouth      atorvastatin (LIPITOR) 20 mg tablet Take 1 tablet (20 mg total) by mouth daily after dinner 90 tablet 1    buPROPion (WELLBUTRIN XL) 150 mg 24 hr tablet 150 mg every morning      clonazePAM (KlonoPIN) 1 mg tablet 2 (two) times a day as needed      methocarbamol (ROBAXIN) 750 mg tablet Take 1 tablet (750 mg total) by mouth 3 (three) times a day 90 tablet 1    omeprazole (PriLOSEC) 40 MG capsule take 1 capsule by mouth  once daily 90 capsule 1    polyethylene glycol (COLYTE) 4000 mL solution Take 4,000 mL by mouth once for 1 dose Take 4000 mL by mouth once for 1 dose. Use as directed 4000 mL 0    solifenacin (VESICARE) 10 MG tablet Take 1 tablet (10 mg total) by mouth daily 90 tablet 1    tretinoin (RETIN-A) 0.1 % cream APPLY TOPICALLY DAILY AT BEDTIME 45 g 5    venlafaxine (EFFEXOR-XR) 150 mg 24 hr capsule One 150mg and one 75mg q day for total of 225mg q day. 90 capsule 1    venlafaxine (EFFEXOR-XR) 75 mg 24 hr capsule One 150mg and one 75mg q day for total of 225mg q day. 90 capsule 1    cyclobenzaprine (FLEXERIL) 10 mg tablet Take 1 tablet (10 mg total) by mouth 3 (three) times a day as needed for muscle spasms 90 tablet 1    methylPREDNISolone 4 MG tablet therapy pack Use as directed on package (Patient not taking: Reported on 7/1/2024) 1 each 0    Multiple Vitamins-Minerals (multivitamin with minerals) tablet Take 1 tablet by mouth daily (Patient taking differently: Take 1 tablet by mouth as needed) 30 tablet 1     No current facility-administered medications for this visit.     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     Drug intolerance     Dysfunction of right eustachian tube     Dysfunctional uterine bleeding     Edema     Elevated d-dimer     Exposure to hepatitis C     Fracture of fibula     Generalized anxiety disorder     GERD (gastroesophageal reflux disease)     Hyperlipidemia     Mild cervical dysplasia     Myalgia     Myositis     Palpitations     PONV (postoperative nausea and vomiting)     Pre-syncope     Scoliosis  deformity of spine 1/23/2024    Seasonal allergies     Stress incontinence     Thrombocytopenia (HCC)     Urinary retention     Urinary tract infection     Uterine leiomyoma      Past Surgical History:   Procedure Laterality Date    BLADDER SURGERY      BREAST BIOPSY Right     US guided    COLONOSCOPY      COLPOSCOPY  01/06/1998    DENTAL SURGERY      ENDOMETRIAL BIOPSY  05/21/1993    HYSTERECTOMY      HYSTEROSCOPY      uterus- 1/22/98, 7/1/02 and 4/10/07    SC ARTHRP KNE CONDYLE&PLATU MEDIAL&LAT COMPARTMENTS Right 2/8/2023    Procedure: ARTHROPLASTY KNEE TOTAL;  Surgeon: Miguel Gleason DO;  Location: [facility];  Service: Orthopedics    TUBAL LIGATION      US GUIDED BREAST BIOPSY RIGHT COMPLETE Right 08/08/2018     Social History     Substance and Sexual Activity   Alcohol Use Yes    Comment: occasional     Social History     Substance and Sexual Activity   Drug Use No     Social History     Tobacco Use   Smoking Status Every Day    Current packs/day: 1.50    Average packs/day: 1.5 packs/day for 52.5 years (78.7 ttl pk-yrs)    Types: Cigarettes    Start date: 1972    Passive exposure: Never   Smokeless Tobacco Never     Family History   Problem Relation Age of Onset    Stroke Mother         cerebral artery occlusion with cerebral infarction    Coronary artery disease Mother     Arrhythmia Mother         sinus    Coronary artery disease Father     Diabetes Father     No Known Problems Daughter     No Known Problems Daughter     No Known Problems Maternal Grandmother     No Known Problems Paternal Grandmother     No Known Problems Maternal Aunt     Ovarian cancer Maternal Aunt     Aortic aneurysm Family     Arthritis Family     Diabetes Family     Heart disease Family     Breast cancer Neg Hx        REVIEW OF SYSTEMS:  CONSTITUTIONAL: Denies any fever, chills, rigors, and weight loss.  HEENT: No earache or tinnitus. Denies hearing loss or visual disturbances.  CARDIOVASCULAR: No chest pain or palpitations.  "  RESPIRATORY: Patient does report occasional cough but denies any hemoptysis, shortness of breath or dyspnea on exertion.  GASTROINTESTINAL: As noted in the History of Present Illness.   GENITOURINARY: No problems with urination. Denies any hematuria or dysuria.  NEUROLOGIC: No dizziness or vertigo, she does report headaches.  MUSCULOSKELETAL: She does report a lot of joint pain in her hands, right shoulder right hip and back.  SKIN: Denies skin rashes or itching.   ENDOCRINE: Denies excessive thirst. Denies intolerance to heat or cold.  PSYCHOSOCIAL: She does report some anxiety and depression for which she is being treated.      Objective   Blood pressure 132/72, pulse 63, temperature 98.2 °F (36.8 °C), temperature source Temporal, resp. rate 16, height 5' 5\" (1.651 m), weight 58.6 kg (129 lb 3.2 oz), SpO2 96%. Body mass index is 21.5 kg/m².    PHYSICAL EXAM:   General Appearance: Alert, cooperative, no distress  HEENT: Normocephalic, atraumatic, anicteric.   Neck: Supple, symmetrical, trachea midline  Lungs: Clear to auscultation bilaterally; no rales, rhonchi or wheezing; respirations unlabored   Heart: Regular rate and rhythm; no murmur, rub, or gallop.  Abdomen: Soft, bowel sounds normal, non-tender, non-distended; no masses, there is no hepatosplenomegaly (enlarged liver not noted on examination). No spider angiomas   Genitalia: Deferred   Rectal: Deferred   Extremities: No cyanosis, clubbing or edema   Skin: No jaundice, rashes, or lesions   Lymph nodes: No palpable cervical lymphadenopathy   Lab Results:   No visits with results within 2 Month(s) from this visit.   Latest known visit with results is:   Admission on 04/29/2024, Discharged on 04/29/2024   Component Date Value    WBC 04/29/2024 6.29     RBC 04/29/2024 4.27     Hemoglobin 04/29/2024 12.5     Hematocrit 04/29/2024 40.4     MCV 04/29/2024 95     MCH 04/29/2024 29.3     MCHC 04/29/2024 30.9 (L)     RDW 04/29/2024 19.6 (H)     MPV 04/29/2024 9.0  "    Platelets 2024 385     nRBC 2024 0     Segmented % 2024 65     Immature Grans % 2024 0     Lymphocytes % 2024 25     Monocytes % 2024 7     Eosinophils Relative 2024 2     Basophils Relative 2024 1     Absolute Neutrophils 2024 4.09     Absolute Immature Grans 2024 0.01     Absolute Lymphocytes 2024 1.57     Absolute Monocytes 2024 0.46     Eosinophils Absolute 2024 0.13     Basophils Absolute 2024 0.03     Sodium 2024 142     Potassium 2024 4.5     Chloride 2024 108     CO2 2024 27     ANION GAP 2024 7     BUN 2024 11     Creatinine 2024 0.72     Glucose 2024 93     Calcium 2024 9.1     AST 2024 16     ALT 2024 17     Alkaline Phosphatase 2024 56     Total Protein 2024 7.0     Albumin 2024 4.4     Total Bilirubin 2024 0.41     eGFR 2024 87     Lipase 2024 31     Magnesium 2024 2.3     Ventricular Rate 2024 63     Atrial Rate 2024 63     IA Interval 2024 136     QRSD Interval 2024 76     QT Interval 2024 426     QTC Interval 2024 435     P Axis 2024 73     QRS Axis 2024 67     T Wave [place] 2024 63      Radiology Results:   [place] spine and pain procedure    Result Date: 2024  Narrative: Table formatting from the original result was not included. [place] [facility] PROCED[facility] Procedure Note PATIENT NAME: Surekha Tenorio : 1955 MRN: [id number] Pre-op Diagnosis: 1. Chronic pain syndrome  2. Chronic bilateral low back pain without sciatica  3. Sacroiliitis (HCC)  Post-op Diagnosis: 1. Chronic pain syndrome  2. Chronic bilateral low back pain without sciatica  3. Sacroiliitis (HCC)  Surgeon: Felisa Benson MD PROCEDURE DESCRIPTION: Fluoroscopically-guided injection of the Bilateral sacroiliac joint(s) After discussing the risks, benefits, and alternatives to the  procedure, the patient expressed understanding and wished to proceed.  The patient was brought to the fluoroscopy suite and placed in the prone position.  Procedural pause conducted to verify:  correct patient identity, procedure to be performed and as applicable, correct side and site, correct patient position, and availability of implants, special equipment and special requirements.  Using fluoroscopy, the inferior portion of the left sacroiliac joint was identified. The skin was sterilely prepped and draped in the usual fashion using Chloraprep skin prep.  The skin and subcutaneous tissue were anesthetized with 0.5% lidocaine.  Using fluoroscopic guidance, a 3.5 inch 22 gauge spinal needle was advanced into joint.  Proper needle positioning was confirmed using multiple fluoroscopic views.  After negative aspiration, 0.5 mL Omnipaque 300 contrast was injected, showing intraarticular spread of contrast without any evidence of intravascular uptake.  A 2.5 mL solution consisting of 40 mg of Depo-Medrol in 0.25% bupivacaine was injected slowly and incrementally into the joint.  Following the injection, the needle was withdrawn slightly and flushed with lidocaine as it was fully extracted. The procedure was repeated in the exact same way on the opposite side.The patient tolerated the procedure well and there were no apparent complications.  After appropriate observation, the patient was dismissed from the clinic in good condition under their own power. COMMENTS The patient received a total steroid dose of 80 mg. Estimated Blood Loss: none Findings: NONE Specimens: NONE Complications:  NONE Anesthesia: NONE  I was present throughout the entire procedure DISPOSITION: Patient tolerated the procedure well and sent to recovery room awake, alert, and hemodynamically stable     [place] spine and pain procedure    Result Date: 5/31/2024  Narrative: Table formatting from the original result was not included. [facility]  PROCEDURE Procedure Note PATIENT NAME: Surekha Tenorio : 1955 MRN: [id number] Pre-op Diagnosis: 1. Cervical spondylosis  Post-op Diagnosis: 1. Cervical spondylosis  Surgeon: Felisa Benson MD PROCEDURE DESCRIPTION: Fluoroscopically-guided Radiofrequency denervation of the right C3-C4 C4-C5 facet joint(s) After discussing the risks, benefits, and alternatives to the procedure, the patient expressed understanding and wished to proceed.  The patient was brought to the fluoroscopy suite and placed in the prone position.  Procedural pause conducted to verify:  correct patient identity, procedure to be performed and as applicable, correct side and site, correct patient position, and availability of implants, special equipment and special requirements.  Using fluoroscopy, the mid-body of the articular pillar at the right C3, C4, C5 levels were identified and marked.  The skin was sterilely prepped and draped in the usual fashion using Chloraprep skin prep.  The skin and subcutaneous tissue were anesthetized with 0.5% lidocaine.  Using fluoroscopic guidance, a 100 mm 22 gauge RFK RF cannula with a 10 mm active tip was advanced to each target.  This was confirmed using PA, lateral and oblique fluoroscopic views.  Motor testing was performed at 2Hz up to 2.5 volts, and measured tissue impedances were satisfactory.  With final needle positioning, there was no evidence of radicular stimulation.  Prior to lesioning, 1cc of 2% lidocaine was injected at each site.  After waiting 2 minutes for local anesthesia to take effect, lesioning was performed at 90 degrees Celsius for 90 seconds. A slight repositioning of the needles was performed an lesioning was again performed at 90 degreees Celsius for 90 seconds. After RF treatment, each site was injected using 1cc of 0.25% bupivacaine. The patient tolerated the procedure well and there were no apparent complications.  After appropriate observation, the patient was  dismissed from the clinic in good condition under their own power.      Estimated Blood Loss: none Findings: NONE Specimens: NONE Complications:  NONE Anesthesia: NONE  I was present throughout the entire procedure DISPOSITION: Patient tolerated the procedure well and sent to recovery room awake, alert, and hemodynamically stable     Kevon Guajardo DO   07/01/24   Cc:

## 2024-07-01 ENCOUNTER — CONSULT (OUTPATIENT)
Dept: GASTROENTEROLOGY | Facility: CLINIC | Age: 69
End: 2024-07-01
Payer: MEDICARE

## 2024-07-01 VITALS
RESPIRATION RATE: 16 BRPM | HEIGHT: 65 IN | SYSTOLIC BLOOD PRESSURE: 132 MMHG | HEART RATE: 63 BPM | TEMPERATURE: 98.2 F | DIASTOLIC BLOOD PRESSURE: 72 MMHG | OXYGEN SATURATION: 96 % | WEIGHT: 129.2 LBS | BODY MASS INDEX: 21.52 KG/M2

## 2024-07-01 DIAGNOSIS — R93.3 ABNORMAL CT SCAN, COLON: Primary | ICD-10-CM

## 2024-07-01 DIAGNOSIS — Z79.899 MEDICATION MANAGEMENT: ICD-10-CM

## 2024-07-01 DIAGNOSIS — K52.9 COLITIS: ICD-10-CM

## 2024-07-01 DIAGNOSIS — N18.6 END STAGE RENAL DISEASE (HCC): ICD-10-CM

## 2024-07-01 DIAGNOSIS — K21.9 GASTROESOPHAGEAL REFLUX DISEASE, UNSPECIFIED WHETHER ESOPHAGITIS PRESENT: ICD-10-CM

## 2024-07-01 DIAGNOSIS — K51.50 LEFT SIDED COLITIS WITHOUT COMPLICATIONS (HCC): ICD-10-CM

## 2024-07-01 DIAGNOSIS — K76.0 NAFL (NONALCOHOLIC FATTY LIVER): ICD-10-CM

## 2024-07-01 PROCEDURE — 99204 OFFICE O/P NEW MOD 45 MIN: CPT | Performed by: INTERNAL MEDICINE

## 2024-07-01 NOTE — ASSESSMENT & PLAN NOTE
Robyn had a CT scan of the abdomen pelvis done at time of an ER visit for back pain.  The CT scan did reveal long segment of colon wall thickening and mild inflammatory changes suggesting a nonspecific infectious or inflammatory colitis.  She is completely asymptomatic from the standpoint.  She has not had a colonoscopy in 17 years last colonoscopy being 2007.  Fortunately she is completely asymptomatic from this standpoint.  Denies any diarrhea urgency or rectal bleeding.  She should be scheduled for colonoscopy.  She will receive a Colyte preparation and split dose with the second dose completed 3 hours prior to arrival.  2 bisacodyl tablets.  I explained to the patient the procedure of colonoscopy as well as its potential risks which are approximately 3 in 1000 chance of bleeding, infection, and perforation.  Perforation may require a surgeon to repair it.  I also explained the small but significant chance of missing lesions with colonoscopy which has been reported to be about 5-10%.    In addition she inform me that after her total knee replacement she was very confused and out of it for about 5 days following the surgery.  In fact she reports she is afraid to undergo anesthesia.  Did reassure her that she would only be receiving propofol and no additional narcotics for her procedures.  She can also review this with anesthesia prior to the procedure.

## 2024-07-01 NOTE — PATIENT INSTRUCTIONS
Abnormal CT scan, colon  Robyn had a CT scan of the abdomen pelvis done at time of an ER visit for back pain.  The CT scan did reveal long segment of colon wall thickening and mild inflammatory changes suggesting a nonspecific infectious or inflammatory colitis.  She is completely asymptomatic from the standpoint.  She has not had a colonoscopy in 17 years last colonoscopy being 2007.  Fortunately she is completely asymptomatic from this standpoint.  Denies any diarrhea urgency or rectal bleeding.  She should be scheduled for colonoscopy.  She will receive a Colyte preparation and split dose with the second dose completed 3 hours prior to arrival.  2 bisacodyl tablets.  I explained to the patient the procedure of colonoscopy as well as its potential risks which are approximately 3 in 1000 chance of bleeding, infection, and perforation.  Perforation may require a surgeon to repair it.  I also explained the small but significant chance of missing lesions with colonoscopy which has been reported to be about 5-10%.    In addition she inform me that after her total knee replacement she was very confused and out of it for about 5 days following the surgery.  In fact she reports she is afraid to undergo anesthesia.  Did reassure her that she would only be receiving propofol and no additional narcotics for her procedures.  She can also review this with anesthesia prior to the procedure.    GERD (gastroesophageal reflux disease)  Patient does have a long history of GERD.  She has been on omeprazole for about 7 years or so.  For the most part this seems to control her symptoms.  Occasionally she will have some early satiety.  Other times she will have regurgitation of food after eating without necessarily vomiting.  Given her long history of GERD I did suggest that we perform an EGD at the same time as her colonoscopy.  I explained to the patient the procedure of upper endoscopy as well as its potential risk which are  "approximately 1 in 1000 chance of bleeding, infection, and perforation.      NAFL (nonalcoholic fatty liver)  CAT scan done on April 29, 2024 revealed patient's liver to be quotation enlarged\" although the exact size was not noted.  They also noted fatty liver.  Her body mass index is normal at 21.5, she does not drink alcohol however she does have hyperlipidemia which is currently being treated with atorvastatin.  The exact cause for her fatty liver is not clear.  For now I would recommend a limited workup to include hepatitis A, hepatitis B  Check ACE level  Check alpha 1 antitrypsin level  Check ceruloplasmin get a been a patient with normal BMI and fatty liver.  Check ultrasound elastography to evaluate for any scarring in the liver.  I did advise Robyn to make sure that she is not taking more than 2000 mg of acetaminophen (Tylenol) on a daily basis.  Also advised her to limit her NSAID use such as Aleve, ibuprofen, Advil, Excedrin, Celebrex, meloxicam excetra.    "

## 2024-07-01 NOTE — ASSESSMENT & PLAN NOTE
"CAT scan done on April 29, 2024 revealed patient's liver to be quotation enlarged\" although the exact size was not noted.  They also noted fatty liver.  Her body mass index is normal at 21.5, she does not drink alcohol however she does have hyperlipidemia which is currently being treated with atorvastatin.  The exact cause for her fatty liver is not clear.  For now I would recommend a limited workup to include hepatitis A, hepatitis B  Check ACE level  Check alpha 1 antitrypsin level  Check ceruloplasmin get a been a patient with normal BMI and fatty liver.  Check ultrasound elastography to evaluate for any scarring in the liver.  I did advise Robyn to make sure that she is not taking more than 2000 mg of acetaminophen (Tylenol) on a daily basis.  Also advised her to limit her NSAID use such as Aleve, ibuprofen, Advil, Excedrin, Celebrex, meloxicam excetra.  "

## 2024-07-01 NOTE — ASSESSMENT & PLAN NOTE
Patient does have a long history of GERD.  She has been on omeprazole for about 7 years or so.  For the most part this seems to control her symptoms.  Occasionally she will have some early satiety.  Other times she will have regurgitation of food after eating without necessarily vomiting.  Given her long history of GERD I did suggest that we perform an EGD at the same time as her colonoscopy.  I explained to the patient the procedure of upper endoscopy as well as its potential risk which are approximately 1 in 1000 chance of bleeding, infection, and perforation.

## 2024-07-09 ENCOUNTER — OFFICE VISIT (OUTPATIENT)
Dept: OBGYN CLINIC | Facility: CLINIC | Age: 69
End: 2024-07-09
Payer: MEDICARE

## 2024-07-09 VITALS
SYSTOLIC BLOOD PRESSURE: 133 MMHG | HEIGHT: 65 IN | BODY MASS INDEX: 21.49 KG/M2 | HEART RATE: 74 BPM | WEIGHT: 129 LBS | DIASTOLIC BLOOD PRESSURE: 73 MMHG

## 2024-07-09 DIAGNOSIS — M70.61 GREATER TROCHANTERIC BURSITIS OF RIGHT HIP: Primary | ICD-10-CM

## 2024-07-09 DIAGNOSIS — M75.41 IMPINGEMENT SYNDROME OF RIGHT SHOULDER: ICD-10-CM

## 2024-07-09 PROCEDURE — 99213 OFFICE O/P EST LOW 20 MIN: CPT | Performed by: ORTHOPAEDIC SURGERY

## 2024-07-09 PROCEDURE — 20610 DRAIN/INJ JOINT/BURSA W/O US: CPT | Performed by: ORTHOPAEDIC SURGERY

## 2024-07-09 RX ORDER — TRIAMCINOLONE ACETONIDE 40 MG/ML
80 INJECTION, SUSPENSION INTRA-ARTICULAR; INTRAMUSCULAR
Status: COMPLETED | OUTPATIENT
Start: 2024-07-09 | End: 2024-07-09

## 2024-07-09 RX ORDER — BUPIVACAINE HYDROCHLORIDE 2.5 MG/ML
4 INJECTION, SOLUTION INFILTRATION; PERINEURAL
Status: COMPLETED | OUTPATIENT
Start: 2024-07-09 | End: 2024-07-09

## 2024-07-09 RX ADMIN — TRIAMCINOLONE ACETONIDE 80 MG: 40 INJECTION, SUSPENSION INTRA-ARTICULAR; INTRAMUSCULAR at 11:30

## 2024-07-09 RX ADMIN — BUPIVACAINE HYDROCHLORIDE 4 ML: 2.5 INJECTION, SOLUTION INFILTRATION; PERINEURAL at 11:30

## 2024-07-09 NOTE — PROGRESS NOTES
Assessment/Plan:   Diagnoses and all orders for this visit:    Greater trochanteric bursitis of right hip  -     Large joint arthrocentesis: R greater trochanteric bursa    Impingement syndrome of right shoulder  -     Large joint arthrocentesis: R subacromial bursa         Discussed with patient that her exam is consistent with a flare-up of greater trochanteric bursitis of her right hip and impingement syndrome of her right shoulder. She was offered and accepted an injection(s) of Kenalog and Marcaine to her Right hip and shoulder(s) for symptomatic relief of pain and inflammation. Patient tolerated the treatment(s) well. Ice and post injection protocol advised. Weightbearing activities as tolerated. She will be seen for follow-up in 3 months for re-evaluation and consideration for repeat injections as necessary. Patient expresses understanding and is in agreement with this treatment plan. The patient was given the opportunity to ask questions or present concerns.    The patient has impingement of her right shoulder as well as trochanteric bursitis of her right hip.  Both structures were injected with Kenalog and Marcaine.  She tolerated procedures quite well.  Return back in 3 months for reevaluation      Subjective:   Patient ID: Surekha Tenorio  1955     HPI  Patient is a 68 y.o. female who presents for follow-up evaluation of her right hip and shoulder. The patient was last seen on 4/9/2024 where she received cortisone injections to both structures. On today's presentation, she reports continued pain along the lateral aspect of her right hip. She denies groin pain. She states that she has burning pain that travels down her leg. She reports point tenderness. She states that the pain impacts her ability to walk. She also reports recurrent right shoulder pain. She reports pain in her bicep and elbow as well, which is likely attributed to her radial head fracture.     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     Drug intolerance     Dysfunction of right eustachian tube     Dysfunctional uterine bleeding     Edema     Elevated d-dimer     Exposure to hepatitis C     Fracture of fibula     Generalized anxiety disorder     GERD (gastroesophageal reflux disease)     Hyperlipidemia     Mild cervical dysplasia     Myalgia     Myositis     Palpitations     PONV (postoperative nausea and vomiting)     Pre-syncope     Scoliosis deformity of spine 1/23/2024    Seasonal allergies     Stress incontinence     Thrombocytopenia (HCC)     Urinary retention     Urinary tract infection     Uterine leiomyoma        Past Surgical History:   Procedure Laterality Date    BLADDER SURGERY      BREAST BIOPSY Right     US guided    COLONOSCOPY      COLPOSCOPY  01/06/1998    DENTAL SURGERY      ENDOMETRIAL BIOPSY  05/21/1993    HYSTERECTOMY      HYSTEROSCOPY      uterus- 1/22/98, 7/1/02 and 4/10/07    GA ARTHRP KNE CONDYLE&PLATU MEDIAL&LAT COMPARTMENTS Right 2/8/2023    Procedure: ARTHROPLASTY KNEE TOTAL;  Surgeon: Miguel Gleason DO;  Location: [facility];  Service: Orthopedics    TUBAL LIGATION      US GUIDED BREAST BIOPSY RIGHT COMPLETE Right 08/08/2018       Family History   Problem Relation Age of Onset    Stroke Mother         cerebral artery occlusion with cerebral infarction    Coronary artery disease Mother     Arrhythmia Mother         sinus    Coronary  artery disease Father     Diabetes Father     No Known Problems Daughter     No Known Problems Daughter     No Known Problems Maternal Grandmother     No Known Problems Paternal Grandmother     No Known Problems Maternal Aunt     Ovarian cancer Maternal Aunt     Aortic aneurysm Family     Arthritis Family     Diabetes Family     Heart disease Family     Breast cancer Neg Hx        Social History     Socioeconomic History    Marital status: /Civil Union     Spouse name: None    Number of children: None    Years of education: None    Highest education level: None   Occupational History    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[employer]    Occupation: housewife   Tobacco Use    Smoking status: Every Day     Current packs/day: 1.50     Average packs/day: 1.5 packs/day for 52.5 years (78.8 ttl pk-yrs)     Types: Cigarettes     Start date: 1972     Passive exposure: Never    Smokeless tobacco: Never   Vaping Use    Vaping status: Never Used   Substance and Sexual Activity    Alcohol use: Yes     Comment: occasional    Drug use: No    Sexual activity: Not Currently   Other Topics Concern    None   Social History Narrative    Caffeine use     Social Determinants of Health     Financial Resource Strain: Low Risk  (3/6/2023)    Overall Financial Resource Strain (CARDIA)     Difficulty of Paying Living Expenses: Not hard at all   Food Insecurity: No Food Insecurity (2/9/2023)    Hunger Vital Sign     Worried About Running Out of Food in the Last Year: Never true     Ran Out of Food in the Last Year: Never true   Transportation Needs: No Transportation Needs (3/6/2023)    PRAPARE - Transportation     Lack of Transportation (Medical): No     Lack of Transportation (Non-Medical): No   Physical Activity: Not on file   Stress: Not on file   Social Connections: Not on file   Intimate Partner Violence: Not on file   Housing Stability: Low Risk  (2/9/2023)    Housing Stability Vital Sign     Unable to Pay for Housing in  the Last Year: No     Number of Places Lived in the Last Year: 1     Unstable Housing in the Last Year: No         Current Outpatient Medications:     acetaminophen (TYLENOL) 500 mg tablet, Take 500 mg by mouth every 6 (six) hours as needed for mild pain, Disp: , Rfl:     alendronate (FOSAMAX) 70 mg tablet, Take 1 tablet (70 mg total) by mouth every 7 days, Disp: 12 tablet, Rfl: 1    ascorbic acid (VITAMIN C) 1000 MG tablet, Take 1,000 mg by mouth, Disp: , Rfl:     atorvastatin (LIPITOR) 20 mg tablet, Take 1 tablet (20 mg total) by mouth daily after dinner, Disp: 90 tablet, Rfl: 1    buPROPion (WELLBUTRIN XL) 150 mg 24 hr tablet, 150 mg every morning, Disp: , Rfl:     clonazePAM (KlonoPIN) 1 mg tablet, 2 (two) times a day as needed, Disp: , Rfl:     methocarbamol (ROBAXIN) 750 mg tablet, Take 1 tablet (750 mg total) by mouth 3 (three) times a day, Disp: 90 tablet, Rfl: 1    omeprazole (PriLOSEC) 40 MG capsule, take 1 capsule by mouth once daily, Disp: 90 capsule, Rfl: 1    solifenacin (VESICARE) 10 MG tablet, Take 1 tablet (10 mg total) by mouth daily, Disp: 90 tablet, Rfl: 1    tretinoin (RETIN-A) 0.1 % cream, APPLY TOPICALLY DAILY AT BEDTIME, Disp: 45 g, Rfl: 5    venlafaxine (EFFEXOR-XR) 150 mg 24 hr capsule, One 150mg and one 75mg q day for total of 225mg q day., Disp: 90 capsule, Rfl: 1    venlafaxine (EFFEXOR-XR) 75 mg 24 hr capsule, One 150mg and one 75mg q day for total of 225mg q day., Disp: 90 capsule, Rfl: 1    cyclobenzaprine (FLEXERIL) 10 mg tablet, Take 1 tablet (10 mg total) by mouth 3 (three) times a day as needed for muscle spasms, Disp: 90 tablet, Rfl: 1    methylPREDNISolone 4 MG tablet therapy pack, Use as directed on package (Patient not taking: Reported on 7/1/2024), Disp: 1 each, Rfl: 0    Multiple Vitamins-Minerals (multivitamin with minerals) tablet, Take 1 tablet by mouth daily (Patient taking differently: Take 1 tablet by mouth as needed), Disp: 30 tablet, Rfl: 1    polyethylene glycol  "(COLYTE) 4000 mL solution, Take 4,000 mL by mouth once for 1 dose Take 4000 mL by mouth once for 1 dose. Use as directed, Disp: 4000 mL, Rfl: 0    Allergies   Allergen Reactions    Gabapentin Drowsiness       Review of Systems     Objective:  /73 (BP Location: Left arm, Patient Position: Sitting, Cuff Size: Standard)   Pulse 74   Ht 5' 5\" (1.651 m)   Wt 58.5 kg (129 lb)   BMI 21.47 kg/m²     Ortho Exam  right Hip -  Patient presents with no anatomical deformity  Ambulates with steady gait pattern  Uses No assistive devices  TTP over greater trochanteric bursa  Smooth passive circumduction   Strength: 5/5 throughout  Knee flexor and extensor mechanism intact  Ankle DF and PF mechanism intact  - DIOR, - FADIR  - Log roll  2+ TP and DP pulses with brisk capillary refill to the toes  Sural, saphenous, tibial, superficial and deep peroneal motor and sensory distribution intact  Sensation to light touch intact distally    right Shoulder -   No anatomical deformity  Skin is warm and dry to touch with no signs of erythema, ecchymosis, or infection  No soft tissue swelling or effusion noted  No Palpable crepitus with passive motion  Strength: 5/5 throughout  No glenohumeral instability appreciated on exam  + Neer's, - Camara  - empty can, - drop-arm, - resisted external rotation, - belly press, - lift-off   Demonstrates normal elbow, wrist, and finger motion  2+  distal radial pulse with brisk capillary refill to the fingers  Radial, median, ulnar and motor  and sensory distribution intact  Sensation to light touch intact distally          Physical Exam     Diagnostic Test Review:  No new imaging at time of visit.     Large joint arthrocentesis: R greater trochanteric bursa  Universal Protocol:  Consent: Verbal consent obtained.  Risks and benefits: risks, benefits and alternatives were discussed  Consent given by: patient  Timeout called at: 7/9/2024 11:41 AM.  Patient understanding: patient states understanding " of the procedure being performed  Site marked: the operative site was marked  Radiology Images displayed and confirmed. If images not available, report reviewed: imaging studies available  Patient identity confirmed: verbally with patient  Supporting Documentation  Indications: pain and joint swelling   Procedure Details  Location: hip - R greater trochanteric bursa  Needle gauge: spinal needle.  Ultrasound guidance: no  Approach: lateral  Medications administered: 4 mL bupivacaine 0.25 %; 80 mg triamcinolone acetonide 40 mg/mL    Patient tolerance: patient tolerated the procedure well with no immediate complications  Dressing:  Sterile dressing applied      Large joint arthrocentesis: R subacromial bursa  Universal Protocol:  Consent: Verbal consent obtained.  Risks and benefits: risks, benefits and alternatives were discussed  Consent given by: patient  Timeout called at: 7/9/2024 11:42 AM.  Patient understanding: patient states understanding of the procedure being performed  Site marked: the operative site was marked  Radiology Images displayed and confirmed. If images not available, report reviewed: imaging studies available  Patient identity confirmed: verbally with patient  Supporting Documentation  Indications: pain and joint swelling   Procedure Details  Location: shoulder - R subacromial bursa  Needle gauge: 21 G.  Ultrasound guidance: no  Approach: posterolateral  Medications administered: 4 mL bupivacaine 0.25 %; 80 mg triamcinolone acetonide 40 mg/mL    Patient tolerance: patient tolerated the procedure well with no immediate complications  Dressing:  Sterile dressing applied           Scribe Attestation      I,:  Bouchra Willis am acting as a scribe while in the presence of the attending physician.:       I,:  Miguel Gleason DO personally performed the services described in this documentation    as scribed in my presence.:

## 2024-07-22 ENCOUNTER — HOSPITAL ENCOUNTER (OUTPATIENT)
Dept: ULTRASOUND IMAGING | Facility: HOSPITAL | Age: 69
Discharge: HOME | End: 2024-07-22
Attending: INTERNAL MEDICINE
Payer: MEDICARE

## 2024-07-22 DIAGNOSIS — K76.0 NAFL (NONALCOHOLIC FATTY LIVER): ICD-10-CM

## 2024-07-22 PROCEDURE — 76981 USE PARENCHYMA: CPT

## 2024-07-24 DIAGNOSIS — K21.9 GASTROESOPHAGEAL REFLUX DISEASE WITHOUT ESOPHAGITIS: ICD-10-CM

## 2024-07-24 DIAGNOSIS — F41.9 ANXIETY: ICD-10-CM

## 2024-07-24 RX ORDER — OMEPRAZOLE 40 MG/1
40 CAPSULE, DELAYED RELEASE ORAL DAILY
Qty: 90 CAPSULE | Refills: 1 | Status: SHIPPED | OUTPATIENT
Start: 2024-07-24

## 2024-07-24 RX ORDER — VENLAFAXINE HYDROCHLORIDE 150 MG/1
CAPSULE, EXTENDED RELEASE ORAL
Qty: 90 CAPSULE | Refills: 1 | Status: SHIPPED | OUTPATIENT
Start: 2024-07-24

## 2024-07-24 RX ORDER — VENLAFAXINE HYDROCHLORIDE 75 MG/1
CAPSULE, EXTENDED RELEASE ORAL
Qty: 90 CAPSULE | Refills: 1 | Status: SHIPPED | OUTPATIENT
Start: 2024-07-24

## 2024-07-24 NOTE — RESULT ENCOUNTER NOTE
Please inform ultrasound elastography revealed absent or mild scarring in the liver, this is essentially normal.  No sign of liver steatosis/fat.  This is good news.  Thank you

## 2024-07-24 NOTE — TELEPHONE ENCOUNTER
Medication: as listed     Dose/Frequency: as listed     Quantity: previous amounts     Pharmacy: [id number] - [place] - [id number]      Office:   [x] PCP/Provider -   [] Speciality/Provider -     Does the patient have enough for 3 days?   [] Yes   [x] No - Send as HP to POD

## 2024-08-01 ENCOUNTER — TELEPHONE (OUTPATIENT)
Age: 69
End: 2024-08-01

## 2024-08-01 NOTE — TELEPHONE ENCOUNTER
Caller: Patient    Doctor: Marcelino    Reason for call: Pt asking if her upcoming procedure could be moved to a sooner date. Surgery scheduler unavailable at the time of call.     Call back#: [phone]

## 2024-08-05 ENCOUNTER — TELEPHONE (OUTPATIENT)
Dept: PAIN MEDICINE | Facility: CLINIC | Age: 69
End: 2024-08-05

## 2024-08-05 DIAGNOSIS — G89.4 CHRONIC PAIN SYNDROME: ICD-10-CM

## 2024-08-05 RX ORDER — METHYLPREDNISOLONE 4 MG/1
TABLET ORAL
Qty: 1 EACH | Refills: 0 | Status: SHIPPED | OUTPATIENT
Start: 2024-08-05

## 2024-08-05 NOTE — TELEPHONE ENCOUNTER
Patient called stated she has terrible pain. The pain radiates from the neck ,down to lower back. She is schedule for B/L SI injection on 9/3 with Dr Harper . I will make an appointment for her neck today.  Is there something to help her now.

## 2024-08-05 NOTE — TELEPHONE ENCOUNTER
Caller: Robyn     Doctor/Office: Marcelino     Call regarding :        Call was transferred to: Warm transfer to

## 2024-08-05 NOTE — TELEPHONE ENCOUNTER
S/W pt, notes past 2 days she is having neck pain throughout her neck radiating to her back.  ( Had Right RFA in spring) states he Rx's were helping her yesterday but nothing is relieving the pain today. Rates 8/10.  Pt states previously she was given dose pack and that helped. Has f/u scheduled 8/14.  Please advise

## 2024-08-14 ENCOUNTER — OFFICE VISIT (OUTPATIENT)
Age: 69
End: 2024-08-14
Payer: MEDICARE

## 2024-08-14 VITALS
SYSTOLIC BLOOD PRESSURE: 120 MMHG | HEIGHT: 65 IN | BODY MASS INDEX: 20.66 KG/M2 | HEART RATE: 66 BPM | DIASTOLIC BLOOD PRESSURE: 69 MMHG | WEIGHT: 124 LBS

## 2024-08-14 DIAGNOSIS — M54.2 NECK PAIN: ICD-10-CM

## 2024-08-14 DIAGNOSIS — G89.4 CHRONIC PAIN SYNDROME: Primary | ICD-10-CM

## 2024-08-14 DIAGNOSIS — M62.830 SPASM OF THORACOLUMBAR MUSCLE: ICD-10-CM

## 2024-08-14 DIAGNOSIS — M79.18 MYOFASCIAL PAIN SYNDROME: ICD-10-CM

## 2024-08-14 PROCEDURE — 99214 OFFICE O/P EST MOD 30 MIN: CPT | Performed by: NURSE PRACTITIONER

## 2024-08-14 RX ORDER — METHOCARBAMOL 750 MG/1
750 TABLET, FILM COATED ORAL 3 TIMES DAILY
Qty: 90 TABLET | Refills: 1 | Status: SHIPPED | OUTPATIENT
Start: 2024-08-14 | End: 2024-10-13

## 2024-08-14 NOTE — PROGRESS NOTES
Assessment:  1. Chronic pain syndrome    2. Neck pain    3. Myofascial pain syndrome    4. Spasm of thoracolumbar muscle        Plan:  While the patient was in the office today, I did have a thorough conversation regarding their chronic pain syndrome, medication management, and treatment plan options.  She is being seen for a follow-up visit today with regards to increased neck pain.  She previously underwent right sided C3-4 and C4-5 radiofrequency ablation on 5/31/2024.  She was last seen on 6/26/2024 at which time she was reporting almost complete resolution of her neck pain.  She returns today complaining of increased neck pain again.  On exam, she demonstrates findings consistent with cervical paraspinal muscle spasms.  Patient will be scheduled for ultrasound-guided cervical paraspinal muscle trigger point injections in the near future.    Renewed Robaxin 750 mg 3 times daily.    Follow-up after injection.    History of Present Illness:  The patient is a 68 y.o. female who presents for a follow up office visit in regards to Neck Pain and Back Pain.   The patient’s current symptoms include complaints of neck pain.  Current pain level is a 7/10.  Quality of pain is described as sharp and throbbing.    Current pain medications includes: Robaxin 750 mg 3 times daily if needed for spasms.  The patient reports that this regimen is providing 25-30% pain relief.  The patient is reporting no side effects from this pain medication regimen.    I have personally reviewed and/or updated the patient's past medical history, past surgical history, family history, social history, current medications, allergies, and vital signs today.         Review of Systems  Review of Systems   Constitutional:  Negative for fever.   HENT:  Negative for sinus pain.    Eyes:  Negative for redness.   Respiratory:  Negative for shortness of breath.    Cardiovascular:  Negative for palpitations.   Gastrointestinal:  Negative for abdominal pain  and nausea.   Endocrine: Negative for polydipsia.   Genitourinary:  Negative for difficulty urinating.   Musculoskeletal:  Positive for arthralgias and gait problem. Negative for myalgias.   Skin:  Negative for rash.   Neurological:  Positive for dizziness. Negative for seizures and headaches.        Memory loss   Psychiatric/Behavioral:  Negative for decreased concentration. The patient is not nervous/anxious.            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     Drug intolerance     Dysfunction of right eustachian tube     Dysfunctional uterine bleeding     Edema     Elevated d-dimer     Exposure to hepatitis C     Fracture of fibula     Generalized anxiety disorder     GERD (gastroesophageal reflux disease)     Hyperlipidemia     Mild cervical dysplasia     Myalgia     Myositis     Palpitations     PONV (postoperative nausea and vomiting)     Pre-syncope     Scoliosis deformity of spine 1/23/2024    Seasonal allergies     Stress incontinence     Thrombocytopenia (HCC)     Urinary retention     Urinary tract infection     Uterine leiomyoma        Past Surgical History:   Procedure Laterality Date    BLADDER SURGERY      BREAST BIOPSY Right     US guided    COLONOSCOPY      COLPOSCOPY  01/06/1998    DENTAL SURGERY      ENDOMETRIAL BIOPSY  05/21/1993    HYSTERECTOMY      HYSTEROSCOPY      uterus- 1/22/98, 7/1/02 and 4/10/07    [facility] ARTHRP KNE CONDYLE&PLATU MEDIAL&LAT COMPARTMENTS Right 2/8/2023    Procedure: ARTHROPLASTY KNEE TOTAL;  Surgeon: Miguel Gleason DO;  Location: [facility];  Service: Orthopedics    TUBAL LIGATION      US  GUIDED BREAST BIOPSY RIGHT COMPLETE Right 08/08/2018       Family History   Problem Relation Age of Onset    Stroke Mother         cerebral artery occlusion with cerebral infarction    Coronary artery disease Mother     Arrhythmia Mother         sinus    Coronary artery disease Father     Diabetes Father     No Known Problems Daughter     No Known Problems Daughter     No Known Problems Maternal Grandmother     No Known Problems Paternal Grandmother     No Known Problems Maternal Aunt     Ovarian cancer Maternal Aunt     Aortic aneurysm Family     Arthritis Family     Diabetes Family     Heart disease Family     Breast cancer Neg Hx        Social History     Occupational History    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    Occupation: housewife   Tobacco Use    Smoking status: Every Day     Current packs/day: 1.50     Average packs/day: 1.5 packs/day for 52.6 years (78.9 ttl pk-yrs)     Types: Cigarettes     Start date: 1972     Passive exposure: Never    Smokeless tobacco: Never   Vaping Use    Vaping status: Never Used   Substance and Sexual Activity    Alcohol use: Not Currently     Comment: occasional    Drug use: No    Sexual activity: Not Currently         Current Outpatient Medications:     acetaminophen (TYLENOL) 500 mg tablet, Take 500 mg by mouth every 6 (six) hours as needed for mild pain, Disp: , Rfl:     alendronate (FOSAMAX) 70 mg tablet, Take 1 tablet (70 mg total) by mouth every 7 days, Disp: 12 tablet, Rfl: 1    ascorbic acid (VITAMIN C) 1000 MG tablet, Take 1,000 mg by mouth, Disp: , Rfl:     atorvastatin (LIPITOR) 20 mg tablet, Take 1 tablet (20 mg total) by mouth daily after dinner, Disp: 90 tablet, Rfl: 1    buPROPion (WELLBUTRIN XL) 150 mg 24 hr tablet, 150 mg every morning, Disp: , Rfl:     clonazePAM (KlonoPIN) 1 mg tablet, 2 (two) times a day as needed, Disp: , Rfl:     methocarbamol (ROBAXIN) 750 mg tablet, Take 1 tablet (750 mg total) by mouth 3 (three) times a day, Disp: 90  "tablet, Rfl: 1    omeprazole (PriLOSEC) 40 MG capsule, Take 1 capsule (40 mg total) by mouth daily, Disp: 90 capsule, Rfl: 1    solifenacin (VESICARE) 10 MG tablet, Take 1 tablet (10 mg total) by mouth daily, Disp: 90 tablet, Rfl: 1    tretinoin (RETIN-A) 0.1 % cream, APPLY TOPICALLY DAILY AT BEDTIME, Disp: 45 g, Rfl: 5    venlafaxine (EFFEXOR-XR) 150 mg 24 hr capsule, One 150mg and one 75mg q day for total of 225mg q day., Disp: 90 capsule, Rfl: 1    venlafaxine (EFFEXOR-XR) 75 mg 24 hr capsule, One 150mg and one 75mg q day for total of 225mg q day., Disp: 90 capsule, Rfl: 1    methylPREDNISolone 4 MG tablet therapy pack, Use as directed on package (Patient not taking: Reported on 8/14/2024), Disp: 1 each, Rfl: 0    Multiple Vitamins-Minerals (multivitamin with minerals) tablet, Take 1 tablet by mouth daily (Patient taking differently: Take 1 tablet by mouth as needed), Disp: 30 tablet, Rfl: 1    polyethylene glycol (COLYTE) 4000 mL solution, Take 4,000 mL by mouth once for 1 dose Take 4000 mL by mouth once for 1 dose. Use as directed (Patient not taking: Reported on 8/14/2024), Disp: 4000 mL, Rfl: 0    Allergies   Allergen Reactions    Gabapentin Drowsiness       Physical Exam:    /69   Pulse 66   Ht 5' 5\" (1.651 m)   Wt 56.2 kg (124 lb)   BMI 20.63 kg/m²     Constitutional:normal, well developed, well nourished, alert, in no distress and non-toxic and no overt pain behavior.  Eyes:anicteric  HEENT:grossly intact  Neck:supple, symmetric, trachea midline and no masses   Pulmonary:even and unlabored  Cardiovascular:No edema or pitting edema present  Skin:Normal without rashes or lesions and well hydrated  Psychiatric:Mood and affect appropriate  Neurologic:Cranial Nerves II-XII grossly intact  Musculoskeletal: Range of motion of the cervical spine is limited.  There are bilateral cervical paraspinal muscle spasms present.    Imaging  No orders to display       Narrative & Impression   MRI CERVICAL SPINE " WITHOUT CONTRAST     INDICATION: M47.892: Other spondylosis, cervical region.     COMPARISON:  None.     TECHNIQUE:  Multiplanar, multisequence imaging of the cervical spine was performed. .        IMAGE QUALITY: Severely degraded by patient motion artifact.     FINDINGS:     ALIGNMENT: 4 mm anterolisthesis C6-7.     MARROW SIGNAL: Endplate degenerative marrow edema identified C5-6 and C6-7.     CERVICAL AND VISUALIZED THORACIC CORD:  Normal signal within the visualized cord.     PREVERTEBRAL AND PARASPINAL SOFT TISSUES:  Normal.     VISUALIZED POSTERIOR FOSSA:  The visualized posterior fossa demonstrates no abnormal signal.     CERVICAL DISC SPACES:     C2-C3:  Normal.     C3-C4: Moderate facet degenerative change on the right without effusion. No central or foraminal narrowing.     C4-C5: Moderate to severe right facet degenerative change. Minimal right foraminal narrowing. Central patent.     C5-C6: Small marginal osteophytes bilaterally. Mild left foraminal narrowing. Central patent.     C6-C7: Severe facet degenerative change results in anterolisthesis with uncovering the posterior disc margin. Mild right greater than left foraminal narrowing noted. Central canal patent.     C7-T1: No central or foraminal narrowing. Minimal anterolisthesis noted.     UPPER THORACIC DISC SPACES:  Normal.     OTHER FINDINGS:  None.     IMPRESSION:     Image: Severely degraded by patient motion artifact. Anterolisthesis C6-7 measures 4 mm secondary to facet degenerative changes.     No greater than mild central or foraminal narrowing. No cord signal abnormality within the limits of this motion degraded study.         Orders Placed This Encounter   Procedures    Ambulatory Referral to Physical Therapy

## 2024-08-14 NOTE — H&P (VIEW-ONLY)
Assessment:  1. Chronic pain syndrome    2. Neck pain    3. Myofascial pain syndrome    4. Spasm of thoracolumbar muscle        Plan:  While the patient was in the office today, I did have a thorough conversation regarding their chronic pain syndrome, medication management, and treatment plan options.  She is being seen for a follow-up visit today with regards to increased neck pain.  She previously underwent right sided C3-4 and C4-5 radiofrequency ablation on 5/31/2024.  She was last seen on 6/26/2024 at which time she was reporting almost complete resolution of her neck pain.  She returns today complaining of increased neck pain again.  On exam, she demonstrates findings consistent with cervical paraspinal muscle spasms.  Patient will be scheduled for ultrasound-guided cervical paraspinal muscle trigger point injections in the near future.    Renewed Robaxin 750 mg 3 times daily.    Follow-up after injection.    History of Present Illness:  The patient is a 68 y.o. female who presents for a follow up office visit in regards to Neck Pain and Back Pain.   The patient’s current symptoms include complaints of neck pain.  Current pain level is a 7/10.  Quality of pain is described as sharp and throbbing.    Current pain medications includes: Robaxin 750 mg 3 times daily if needed for spasms.  The patient reports that this regimen is providing 25-30% pain relief.  The patient is reporting no side effects from this pain medication regimen.    I have personally reviewed and/or updated the patient's past medical history, past surgical history, family history, social history, current medications, allergies, and vital signs today.         Review of Systems  Review of Systems   Constitutional:  Negative for fever.   HENT:  Negative for sinus pain.    Eyes:  Negative for redness.   Respiratory:  Negative for shortness of breath.    Cardiovascular:  Negative for palpitations.   Gastrointestinal:  Negative for abdominal pain  and nausea.   Endocrine: Negative for polydipsia.   Genitourinary:  Negative for difficulty urinating.   Musculoskeletal:  Positive for arthralgias and gait problem. Negative for myalgias.   Skin:  Negative for rash.   Neurological:  Positive for dizziness. Negative for seizures and headaches.        Memory loss   Psychiatric/Behavioral:  Negative for decreased concentration. The patient is not nervous/anxious.            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     Drug intolerance     Dysfunction of right eustachian tube     Dysfunctional uterine bleeding     Edema     Elevated d-dimer     Exposure to hepatitis C     Fracture of fibula     Generalized anxiety disorder     GERD (gastroesophageal reflux disease)     Hyperlipidemia     Mild cervical dysplasia     Myalgia     Myositis     Palpitations     PONV (postoperative nausea and vomiting)     Pre-syncope     Scoliosis deformity of spine 1/23/2024    Seasonal allergies     Stress incontinence     Thrombocytopenia (HCC)     Urinary retention     Urinary tract infection     Uterine leiomyoma        Past Surgical History:   Procedure Laterality Date    BLADDER SURGERY      BREAST BIOPSY Right     US guided    COLONOSCOPY      COLPOSCOPY  01/06/1998    DENTAL SURGERY      ENDOMETRIAL BIOPSY  05/21/1993    HYSTERECTOMY      HYSTEROSCOPY      uterus- 1/22/98, 7/1/02 and 4/10/07    [facility] ARTHRP KNE CONDYLE&PLATU MEDIAL&LAT COMPARTMENTS Right 2/8/2023    Procedure: ARTHROPLASTY KNEE TOTAL;  Surgeon: Miguel Gleason DO;  Location: [facility];  Service: Orthopedics    TUBAL LIGATION      US  GUIDED BREAST BIOPSY RIGHT COMPLETE Right 08/08/2018       Family History   Problem Relation Age of Onset    Stroke Mother         cerebral artery occlusion with cerebral infarction    Coronary artery disease Mother     Arrhythmia Mother         sinus    Coronary artery disease Father     Diabetes Father     No Known Problems Daughter     No Known Problems Daughter     No Known Problems Maternal Grandmother     No Known Problems Paternal Grandmother     No Known Problems Maternal Aunt     Ovarian cancer Maternal Aunt     Aortic aneurysm Family     Arthritis Family     Diabetes Family     Heart disease Family     Breast cancer Neg Hx        Social History     Occupational History    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[employer]    Occupation: housewife   Tobacco Use    Smoking status: Every Day     Current packs/day: 1.50     Average packs/day: 1.5 packs/day for 52.6 years (78.9 ttl pk-yrs)     Types: Cigarettes     Start date: 1972     Passive exposure: Never    Smokeless tobacco: Never   Vaping Use    Vaping status: Never Used   Substance and Sexual Activity    Alcohol use: Not Currently     Comment: occasional    Drug use: No    Sexual activity: Not Currently         Current Outpatient Medications:     acetaminophen (TYLENOL) 500 mg tablet, Take 500 mg by mouth every 6 (six) hours as needed for mild pain, Disp: , Rfl:     alendronate (FOSAMAX) 70 mg tablet, Take 1 tablet (70 mg total) by mouth every 7 days, Disp: 12 tablet, Rfl: 1    ascorbic acid (VITAMIN C) 1000 MG tablet, Take 1,000 mg by mouth, Disp: , Rfl:     atorvastatin (LIPITOR) 20 mg tablet, Take 1 tablet (20 mg total) by mouth daily after dinner, Disp: 90 tablet, Rfl: 1    buPROPion (WELLBUTRIN XL) 150 mg 24 hr tablet, 150 mg every morning, Disp: , Rfl:     clonazePAM (KlonoPIN) 1 mg tablet, 2 (two) times a day as needed, Disp: , Rfl:     methocarbamol (ROBAXIN) 750 mg tablet, Take 1 tablet (750 mg total) by mouth 3 (three) times a day, Disp: 90  "tablet, Rfl: 1    omeprazole (PriLOSEC) 40 MG capsule, Take 1 capsule (40 mg total) by mouth daily, Disp: 90 capsule, Rfl: 1    solifenacin (VESICARE) 10 MG tablet, Take 1 tablet (10 mg total) by mouth daily, Disp: 90 tablet, Rfl: 1    tretinoin (RETIN-A) 0.1 % cream, APPLY TOPICALLY DAILY AT BEDTIME, Disp: 45 g, Rfl: 5    venlafaxine (EFFEXOR-XR) 150 mg 24 hr capsule, One 150mg and one 75mg q day for total of 225mg q day., Disp: 90 capsule, Rfl: 1    venlafaxine (EFFEXOR-XR) 75 mg 24 hr capsule, One 150mg and one 75mg q day for total of 225mg q day., Disp: 90 capsule, Rfl: 1    methylPREDNISolone 4 MG tablet therapy pack, Use as directed on package (Patient not taking: Reported on 8/14/2024), Disp: 1 each, Rfl: 0    Multiple Vitamins-Minerals (multivitamin with minerals) tablet, Take 1 tablet by mouth daily (Patient taking differently: Take 1 tablet by mouth as needed), Disp: 30 tablet, Rfl: 1    polyethylene glycol (COLYTE) 4000 mL solution, Take 4,000 mL by mouth once for 1 dose Take 4000 mL by mouth once for 1 dose. Use as directed (Patient not taking: Reported on 8/14/2024), Disp: 4000 mL, Rfl: 0    Allergies   Allergen Reactions    Gabapentin Drowsiness       Physical Exam:    /69   Pulse 66   Ht 5' 5\" (1.651 m)   Wt 56.2 kg (124 lb)   BMI 20.63 kg/m²     Constitutional:normal, well developed, well nourished, alert, in no distress and non-toxic and no overt pain behavior.  Eyes:anicteric  HEENT:grossly intact  Neck:supple, symmetric, trachea midline and no masses   Pulmonary:even and unlabored  Cardiovascular:No edema or pitting edema present  Skin:Normal without rashes or lesions and well hydrated  Psychiatric:Mood and affect appropriate  Neurologic:Cranial Nerves II-XII grossly intact  Musculoskeletal: Range of motion of the cervical spine is limited.  There are bilateral cervical paraspinal muscle spasms present.    Imaging  No orders to display       Narrative & Impression   MRI CERVICAL SPINE " WITHOUT CONTRAST     INDICATION: M47.892: Other spondylosis, cervical region.     COMPARISON:  None.     TECHNIQUE:  Multiplanar, multisequence imaging of the cervical spine was performed. .        IMAGE QUALITY: Severely degraded by patient motion artifact.     FINDINGS:     ALIGNMENT: 4 mm anterolisthesis C6-7.     MARROW SIGNAL: Endplate degenerative marrow edema identified C5-6 and C6-7.     CERVICAL AND VISUALIZED THORACIC CORD:  Normal signal within the visualized cord.     PREVERTEBRAL AND PARASPINAL SOFT TISSUES:  Normal.     VISUALIZED POSTERIOR FOSSA:  The visualized posterior fossa demonstrates no abnormal signal.     CERVICAL DISC SPACES:     C2-C3:  Normal.     C3-C4: Moderate facet degenerative change on the right without effusion. No central or foraminal narrowing.     C4-C5: Moderate to severe right facet degenerative change. Minimal right foraminal narrowing. Central patent.     C5-C6: Small marginal osteophytes bilaterally. Mild left foraminal narrowing. Central patent.     C6-C7: Severe facet degenerative change results in anterolisthesis with uncovering the posterior disc margin. Mild right greater than left foraminal narrowing noted. Central canal patent.     C7-T1: No central or foraminal narrowing. Minimal anterolisthesis noted.     UPPER THORACIC DISC SPACES:  Normal.     OTHER FINDINGS:  None.     IMPRESSION:     Image: Severely degraded by patient motion artifact. Anterolisthesis C6-7 measures 4 mm secondary to facet degenerative changes.     No greater than mild central or foraminal narrowing. No cord signal abnormality within the limits of this motion degraded study.         Orders Placed This Encounter   Procedures    Ambulatory Referral to Physical Therapy

## 2024-08-17 DIAGNOSIS — M85.89 OSTEOPENIA OF MULTIPLE SITES: ICD-10-CM

## 2024-08-19 RX ORDER — ALENDRONATE SODIUM 70 MG/1
TABLET ORAL
Qty: 12 TABLET | Refills: 0 | Status: SHIPPED | OUTPATIENT
Start: 2024-08-19

## 2024-08-27 ENCOUNTER — OFFICE VISIT (OUTPATIENT)
Dept: OBGYN CLINIC | Facility: CLINIC | Age: 69
End: 2024-08-27
Payer: MEDICARE

## 2024-08-27 VITALS
BODY MASS INDEX: 20.66 KG/M2 | SYSTOLIC BLOOD PRESSURE: 118 MMHG | DIASTOLIC BLOOD PRESSURE: 76 MMHG | WEIGHT: 124 LBS | HEIGHT: 65 IN | HEART RATE: 76 BPM

## 2024-08-27 DIAGNOSIS — M19.049 HAND ARTHRITIS: ICD-10-CM

## 2024-08-27 DIAGNOSIS — S46.211A TEAR OF RIGHT BICEPS MUSCLE, INITIAL ENCOUNTER: Primary | ICD-10-CM

## 2024-08-27 PROCEDURE — 99213 OFFICE O/P EST LOW 20 MIN: CPT | Performed by: ORTHOPAEDIC SURGERY

## 2024-08-27 PROCEDURE — 20600 DRAIN/INJ JOINT/BURSA W/O US: CPT | Performed by: ORTHOPAEDIC SURGERY

## 2024-08-27 RX ORDER — BUPIVACAINE HYDROCHLORIDE 2.5 MG/ML
0.5 INJECTION, SOLUTION INFILTRATION; PERINEURAL
Status: COMPLETED | OUTPATIENT
Start: 2024-08-27 | End: 2024-08-27

## 2024-08-27 RX ORDER — BETAMETHASONE SODIUM PHOSPHATE AND BETAMETHASONE ACETATE 3; 3 MG/ML; MG/ML
3 INJECTION, SUSPENSION INTRA-ARTICULAR; INTRALESIONAL; INTRAMUSCULAR; SOFT TISSUE
Status: COMPLETED | OUTPATIENT
Start: 2024-08-27 | End: 2024-08-27

## 2024-08-27 RX ADMIN — BUPIVACAINE HYDROCHLORIDE 0.5 ML: 2.5 INJECTION, SOLUTION INFILTRATION; PERINEURAL at 10:30

## 2024-08-27 RX ADMIN — BETAMETHASONE SODIUM PHOSPHATE AND BETAMETHASONE ACETATE 3 MG: 3; 3 INJECTION, SUSPENSION INTRA-ARTICULAR; INTRALESIONAL; INTRAMUSCULAR; SOFT TISSUE at 10:30

## 2024-08-27 NOTE — PROGRESS NOTES
ASSESSMENT/PLAN:    Diagnoses and all orders for this visit:    Tear of right biceps muscle, initial encounter  -     Ambulatory referral to Physical Therapy; Future    Hand arthritis  -     Hand/upper extremity injection      The patient was seen and examined.  She has a tear of her proximal right biceps.  Patient was given referral for formal physical therapy to work on strengthening, stretching and range of motion.The patient's right radiocarpal joint was injected with Celestone and Marcaine.  She tolerated the injection quite well.  She will follow-up with our office in 3 months.  She is acceptable to this plan.    Return in about 3 months (around 11/27/2024).      The patient apparently tore her proximal biceps tendon of her right shoulder.  Rotator cuff continuity is intact.  Would recommend rehabilitation.  Return back 3 months for evaluation.  In the meantime, her arthritic right wrist was injected with Celestone and Marcaine      _____________________________________________________  CHIEF COMPLAINT:  Chief Complaint   Patient presents with    Right Shoulder - Follow-up         SUBJECTIVE:  Surekha Tenorio is a 68 y.o. female who presents to our office complaining of right shoulder pain and right wrist pain.  The patient states that most of her pain is along her biceps.  There is ecchymosis present.  She also complains of pain along her right wrist which is worsened with prolonged activity.  She denies any fever or chills.    The following portions of the patient's history were reviewed and updated as appropriate: allergies, current medications, past family history, past medical history, past social history, past surgical history and problem list.    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     Drug intolerance     Dysfunction of right eustachian tube     Dysfunctional uterine bleeding     Edema     Elevated d-dimer     Exposure to hepatitis C     Fracture of fibula     Generalized anxiety disorder     GERD (gastroesophageal reflux disease)     Hyperlipidemia     Mild cervical dysplasia     Myalgia     Myositis     Palpitations     PONV (postoperative nausea and vomiting)     Pre-syncope     Scoliosis deformity of spine 1/23/2024    Seasonal allergies     Stress incontinence     Thrombocytopenia (HCC)     Urinary retention     Urinary tract infection     Uterine leiomyoma        PAST SURGICAL HISTORY:  Past Surgical History:   Procedure Laterality Date    BLADDER SURGERY      BREAST BIOPSY Right     US guided    COLONOSCOPY      COLPOSCOPY  01/06/1998    DENTAL SURGERY      ENDOMETRIAL BIOPSY  05/21/1993    HYSTERECTOMY      HYSTEROSCOPY      uterus- 1/22/98, 7/1/02 and 4/10/07    CO ARTHRP KNE CONDYLE&PLATU MEDIAL&LAT COMPARTMENTS Right 2/8/2023    Procedure: ARTHROPLASTY KNEE TOTAL;  Surgeon: Miguel Gleason DO;  Location: [facility];  Service: Orthopedics    TUBAL LIGATION      US GUIDED BREAST BIOPSY RIGHT COMPLETE Right 08/08/2018       FAMILY HISTORY:  Family History   Problem Relation Age of Onset    Stroke Mother         cerebral artery occlusion with cerebral infarction    Coronary artery disease Mother     Arrhythmia Mother         sinus    Coronary artery disease Father     Diabetes Father     No Known Problems Daughter     No Known Problems Daughter     No Known Problems Maternal Grandmother     No Known Problems Paternal Grandmother     No Known Problems Maternal Aunt     Ovarian cancer Maternal Aunt     Aortic aneurysm Family     Arthritis Family     Diabetes Family     Heart disease Family      Breast cancer Neg Hx        SOCIAL HISTORY:  Social History     Tobacco Use    Smoking status: Every Day     Current packs/day: 1.50     Average packs/day: 1.5 packs/day for 52.7 years (79.0 ttl pk-yrs)     Types: Cigarettes     Start date: 1972     Passive exposure: Never    Smokeless tobacco: Never   Vaping Use    Vaping status: Never Used   Substance Use Topics    Alcohol use: Not Currently     Comment: occasional    Drug use: No       MEDICATIONS:    Current Outpatient Medications:     acetaminophen (TYLENOL) 500 mg tablet, Take 500 mg by mouth every 6 (six) hours as needed for mild pain, Disp: , Rfl:     alendronate (FOSAMAX) 70 mg tablet, take 1 tablet by mouth every 7 days, Disp: 12 tablet, Rfl: 0    ascorbic acid (VITAMIN C) 1000 MG tablet, Take 1,000 mg by mouth, Disp: , Rfl:     atorvastatin (LIPITOR) 20 mg tablet, Take 1 tablet (20 mg total) by mouth daily after dinner, Disp: 90 tablet, Rfl: 1    buPROPion (WELLBUTRIN XL) 150 mg 24 hr tablet, 150 mg every morning, Disp: , Rfl:     clonazePAM (KlonoPIN) 1 mg tablet, 2 (two) times a day as needed, Disp: , Rfl:     methocarbamol (ROBAXIN) 750 mg tablet, Take 1 tablet (750 mg total) by mouth 3 (three) times a day, Disp: 90 tablet, Rfl: 1    omeprazole (PriLOSEC) 40 MG capsule, Take 1 capsule (40 mg total) by mouth daily, Disp: 90 capsule, Rfl: 1    solifenacin (VESICARE) 10 MG tablet, Take 1 tablet (10 mg total) by mouth daily, Disp: 90 tablet, Rfl: 1    tretinoin (RETIN-A) 0.1 % cream, APPLY TOPICALLY DAILY AT BEDTIME, Disp: 45 g, Rfl: 5    venlafaxine (EFFEXOR-XR) 150 mg 24 hr capsule, One 150mg and one 75mg q day for total of 225mg q day., Disp: 90 capsule, Rfl: 1    venlafaxine (EFFEXOR-XR) 75 mg 24 hr capsule, One 150mg and one 75mg q day for total of 225mg q day., Disp: 90 capsule, Rfl: 1    methylPREDNISolone 4 MG tablet therapy pack, Use as directed on package (Patient not taking: Reported on 8/14/2024), Disp: 1 each, Rfl: 0    Multiple  "Vitamins-Minerals (multivitamin with minerals) tablet, Take 1 tablet by mouth daily (Patient taking differently: Take 1 tablet by mouth as needed), Disp: 30 tablet, Rfl: 1    polyethylene glycol (COLYTE) 4000 mL solution, Take 4,000 mL by mouth once for 1 dose Take 4000 mL by mouth once for 1 dose. Use as directed (Patient not taking: Reported on 8/14/2024), Disp: 4000 mL, Rfl: 0    ALLERGIES:  Allergies   Allergen Reactions    Gabapentin Drowsiness       ROS:  Review of Systems     Constitutional: Negative for fatigue, fever or loss of appetite.   HENT: Negative.    Respiratory: Negative for shortness of breath, dyspnea.    Cardiovascular: Negative for chest pain/tightness.   Gastrointestinal: Negative for abdominal pain, N/V.   Endocrine: Negative for cold/heat intolerance, unexplained weight loss/gain.   Genitourinary: Negative for flank pain, dysuria, hematuria.   Musculoskeletal: Positive for arthralgia   Skin: Negative for rash.    Neurological: Negative for numbness or tingling  Psychiatric/Behavioral: Negative for agitation.  _____________________________________________________  PHYSICAL EXAMINATION:    Blood pressure 118/76, pulse 76, height 5' 5\" (1.651 m), weight 56.2 kg (124 lb).    Constitutional: Oriented to person, place, and time. Appears well-developed and well-nourished. No distress.   HENT:   Head: Normocephalic.   Eyes: Conjunctivae are normal. Right eye exhibits no discharge. Left eye exhibits no discharge. No scleral icterus.   Cardiovascular: Normal rate.    Pulmonary/Chest: Effort normal.   Neurological: Alert and oriented to person, place, and time.   Skin: Skin is warm and dry. No rash noted. Not diaphoretic. No erythema. No pallor.   Psychiatric: Normal mood and affect. Behavior is normal. Judgment and thought content normal.      MUSCULOSKELETAL EXAMINATION:   Physical Exam  Ortho Exam    Right upper extremity is neurovascularly intact  Fingers are pink Mould  Compartments are " "soft  Positive Víctor's deformity  Tenderness palpation along bicep muscle belly  Ecchymosis along biceps  Distal biceps intact  Brisk cap refill  Sensation intact    Objective:  BP Readings from Last 1 Encounters:   08/27/24 118/76      Wt Readings from Last 1 Encounters:   08/27/24 56.2 kg (124 lb)        BMI:   Estimated body mass index is 20.63 kg/m² as calculated from the following:    Height as of this encounter: 5' 5\" (1.651 m).    Weight as of this encounter: 56.2 kg (124 lb).      PROCEDURES PERFORMED:  Hand/upper extremity injection  Universal Protocol:  Risks and benefits: risks, benefits and alternatives were discussed  Consent given by: patient  Patient understanding: patient states understanding of the procedure being performed  Site marked: the operative site was marked  Supporting Documentation  Indications: pain   Procedure Details  Condition comment: right radiocarpal joint   Preparation: Patient was prepped and draped in the usual sterile fashion  Needle size: 25 G  Ultrasound guidance: no  Approach: dorsal  Medications administered: 0.5 mL bupivacaine 0.25 %; 3 mg betamethasone acetate-betamethasone sodium phosphate 6 (3-3) mg/mL  Patient tolerance: patient tolerated the procedure well with no immediate complications  Dressing:  Sterile dressing applied             Scribe Attestation      I,:  Femi Rawls PA-C am acting as a scribe while in the presence of the attending physician.:       I,:  Miguel Gleason DO personally performed the services described in this documentation    as scribed in my presence.:            "

## 2024-08-29 ENCOUNTER — PROCEDURE VISIT (OUTPATIENT)
Age: 69
End: 2024-08-29
Payer: MEDICARE

## 2024-08-29 VITALS
DIASTOLIC BLOOD PRESSURE: 74 MMHG | HEIGHT: 65 IN | SYSTOLIC BLOOD PRESSURE: 129 MMHG | BODY MASS INDEX: 20.66 KG/M2 | WEIGHT: 124 LBS | HEART RATE: 67 BPM

## 2024-08-29 DIAGNOSIS — M79.18 MYOFASCIAL PAIN SYNDROME: ICD-10-CM

## 2024-08-29 DIAGNOSIS — M54.2 NECK PAIN: Primary | ICD-10-CM

## 2024-08-29 DIAGNOSIS — G89.4 CHRONIC PAIN SYNDROME: ICD-10-CM

## 2024-08-29 PROCEDURE — 20553 NJX 1/MLT TRIGGER POINTS 3/>: CPT | Performed by: ANESTHESIOLOGY

## 2024-08-29 PROCEDURE — 76942 ECHO GUIDE FOR BIOPSY: CPT | Performed by: ANESTHESIOLOGY

## 2024-08-29 RX ORDER — BUPIVACAINE HYDROCHLORIDE 2.5 MG/ML
10 INJECTION, SOLUTION EPIDURAL; INFILTRATION; INTRACAUDAL
Status: COMPLETED | OUTPATIENT
Start: 2024-08-29 | End: 2024-08-29

## 2024-08-29 RX ORDER — TRIAMCINOLONE ACETONIDE 40 MG/ML
40 INJECTION, SUSPENSION INTRA-ARTICULAR; INTRAMUSCULAR
Status: COMPLETED | OUTPATIENT
Start: 2024-08-29 | End: 2024-08-29

## 2024-08-29 RX ADMIN — BUPIVACAINE HYDROCHLORIDE 10 ML: 2.5 INJECTION, SOLUTION EPIDURAL; INFILTRATION; INTRACAUDAL at 16:20

## 2024-08-29 RX ADMIN — TRIAMCINOLONE ACETONIDE 40 MG: 40 INJECTION, SUSPENSION INTRA-ARTICULAR; INTRAMUSCULAR at 16:20

## 2024-08-29 NOTE — PATIENT INSTRUCTIONS
Do not apply heat to any area that is numb. If you have discomfort or soreness at the injection site, you may apply ice today, 20 minutes on and 20 minutes off. Tomorrow you may use ice or warm, moist heat. Do not apply ice or heat directly to the skin.  If you experience severe shortness of breath, go to the Emergency Room.  You may have numbness for several hours from the local anesthetic. Please use caution and common sense, especially with weight-bearing activities.  You may have an increase or change in the discomfort for 36-48 hours after your treatment. Apply ice and continue with any pain medicine you have been prescribed.  Do not do anything strenuous today. You may shower, but no tub baths or hot tubs today. You may resume your normal activities tomorrow, but do not “overdo it”. Resume normal activities slowly when you are feeling better.  If you experience redness, drainage or swelling at the injection site, or if you develop a fever above 100 degrees, please call [facility] at [phone] or go to the Emergency Room.  Continue to take all routine medicines prescribed by your primary care physician unless otherwise instructed by our staff. Most blood thinners should be started again according to your regularly scheduled dosing. If you have any questions, please give our office a call.    As no general anesthesia was used in today's procedure, you should not experience any side effects related to anesthesia.       If you have a problem specifically related to your procedure, please call our office at [phone].  Problems not related to your procedure should be directed to your primary care physician.

## 2024-08-29 NOTE — PROGRESS NOTES
" Universal Protocol:  procedure performed by consultantConsent: Verbal consent obtained. Written consent obtained.  Risks and benefits: risks, benefits and alternatives were discussed  Consent given by: patient  Time out: Immediately prior to procedure a \"time out\" was called to verify the correct patient, procedure, equipment, support staff and site/side marked as required.  Timeout called at: 8/29/2024 4:34 PM.  Patient understanding: patient states understanding of the procedure being performed  Patient consent: the patient's understanding of the procedure matches consent given  Procedure consent: procedure consent matches procedure scheduled  Relevant documents: relevant documents present and verified  Test results: test results available and properly labeled  Site marked: the operative site was marked  Radiology Images displayed and confirmed. If images not available, report reviewed: imaging studies not available  Required items: required blood products, implants, devices, and special equipment available  Patient identity confirmed: verbally with patient  Supporting Documentation  Indications: pain   Trigger Point Injections: multiple trigger points: 3 or more muscle groups    Injection site identified by: ultrasound  Procedure Details  Location(s):    Neck/Upper Back: L upper trapezius, R upper trapezius, R levator scapulae, L levator scapulae, L cervical paraspinals and R cervical paraspinals     Prep: patient was prepped and draped in usual sterile fashion  Needle size: 22 G  Medications: 10 mL bupivacaine (PF) 0.25 %; 40 mg triamcinolone acetonide 40 mg/mL  Patient tolerance: patient tolerated the procedure well with no immediate complications          "

## 2024-09-03 ENCOUNTER — HOSPITAL ENCOUNTER (OUTPATIENT)
Dept: RADIOLOGY | Facility: HOSPITAL | Age: 69
Discharge: HOME | End: 2024-09-03
Admitting: ANESTHESIOLOGY
Payer: MEDICARE

## 2024-09-03 VITALS
RESPIRATION RATE: 20 BRPM | OXYGEN SATURATION: 95 % | DIASTOLIC BLOOD PRESSURE: 61 MMHG | SYSTOLIC BLOOD PRESSURE: 135 MMHG | TEMPERATURE: 97.1 F | HEART RATE: 64 BPM

## 2024-09-03 DIAGNOSIS — M46.1 SACROILIITIS (HCC): ICD-10-CM

## 2024-09-03 DIAGNOSIS — G89.4 CHRONIC PAIN SYNDROME: ICD-10-CM

## 2024-09-03 PROCEDURE — 27096 INJECT SACROILIAC JOINT: CPT | Performed by: ANESTHESIOLOGY

## 2024-09-03 RX ORDER — METHYLPREDNISOLONE ACETATE 40 MG/ML
80 INJECTION, SUSPENSION INTRA-ARTICULAR; INTRALESIONAL; INTRAMUSCULAR; PARENTERAL; SOFT TISSUE ONCE
Status: COMPLETED | OUTPATIENT
Start: 2024-09-03 | End: 2024-09-03

## 2024-09-03 RX ORDER — BUPIVACAINE HCL/PF 2.5 MG/ML
2 VIAL (ML) INJECTION ONCE
Status: COMPLETED | OUTPATIENT
Start: 2024-09-03 | End: 2024-09-03

## 2024-09-03 RX ORDER — LIDOCAINE HYDROCHLORIDE 10 MG/ML
5 INJECTION, SOLUTION EPIDURAL; INFILTRATION; INTRACAUDAL; PERINEURAL ONCE
Status: COMPLETED | OUTPATIENT
Start: 2024-09-03 | End: 2024-09-03

## 2024-09-03 RX ADMIN — Medication 2 ML: at 09:20

## 2024-09-03 RX ADMIN — IOHEXOL 2 ML: 300 INJECTION, SOLUTION INTRAVENOUS at 09:19

## 2024-09-03 RX ADMIN — METHYLPREDNISOLONE ACETATE 80 MG: 40 INJECTION, SUSPENSION INTRA-ARTICULAR; INTRALESIONAL; INTRAMUSCULAR; PARENTERAL; SOFT TISSUE at 09:20

## 2024-09-03 RX ADMIN — LIDOCAINE HYDROCHLORIDE 5 ML: 10 INJECTION, SOLUTION EPIDURAL; INFILTRATION; INTRACAUDAL; PERINEURAL at 09:18

## 2024-09-03 NOTE — DISCHARGE INSTRUCTIONS

## 2024-09-03 NOTE — INTERVAL H&P NOTE
Update: (This section must be completed if the H&P was completed greater than 24 hrs to procedure or admission)    H&P reviewed. After examining the patient, I find no changed to the H&P since it had been written.    Patient re-evaluated. Accept as history and physical.    Felisa Benson MD/September 3, 2024/9:13 AM

## 2024-09-12 ENCOUNTER — TELEPHONE (OUTPATIENT)
Dept: PAIN MEDICINE | Facility: CLINIC | Age: 69
End: 2024-09-12

## 2024-09-16 NOTE — TELEPHONE ENCOUNTER
Caller: Robyn  Doctor/office: [facility]  #: [phone]    % of improvement: 80%  Pain Scale (1-10): 5/10

## 2024-09-16 NOTE — PROGRESS NOTES
"PT Evaluation     Today's date: 24  Patient name: Surekha Tenorio  : 1955  MRN: [id number]  Referring provider: Femi Rawls,*  Dx:   Encounter Diagnosis     ICD-10-CM    1. Tear of right biceps muscle, subsequent encounter  S46.211D                      Assessment  Impairments: abnormal or restricted ROM, abnormal movement, activity intolerance, impaired physical strength, lacks appropriate home exercise program and pain with function  Symptom irritability: moderate    Assessment details: Surekha Tenorio is a 68 y.o. female presenting to outpatient physical therapy with noted impairments including pain, impaired soft tissue mobility, reduced range of motion, reduced strength, reduced postural awareness, and reduced activity tolerance. Signs and symptoms at present are consistent with referring diagnosis of R bicep tear. Due to noted impairments, the patient's present functional limitations include difficulty with ADLs with increased need for assistance, reliance on medication and/or modalities for pain relief, reduced tolerance for functional mobility and activity, and difficulty completing self care and HH responsibilities. Patient to benefit from skilled outpatient physical therapy 2x/week for 8 weeks in order to reduce pain, maximize pain free range of motion, increase strength and stability, and improve functional mobility/functional activity in order to maximize return to prior level of function with reduced limitations. Home exercise program was provided and all questions answered to patient's level of satisfaction. Thank you for your referral.        Understanding of Dx/Px/POC: fair     Prognosis: good    Goals  STGs to be achieved in 4 weeks:  1. Pt to demonstrate reduced subjective pain rating \"at worst\" by at least 2-3 points from Initial Eval in order to allow for reduced pain with ADLs and improved functional activity tolerance.   2. Pt to demonstrate increased AROM of R shld " flex and abd by at least 5-10 degrees in order to allow for greater ease and independence with ADLs and functional mobility.   3. Pt to demonstrate  indep HEP to improve quality of life and carryover of PT TE  4. Pt to demonstrate increased MMT of R shld by at least 1/2-1 grade in order to improve safety and stability with ADLs and functional mobility.     LTGs to be achieved in 6-8 weeks:  1. Pt will be I with HEP in order to continue to improve quality of life and independence and reduce risk for re-injury.   2. Pt to demonstrate return to usual self care and HH chores without limitations or restrictions.   3. Pt to demonstrate improved function as noted by achieving or exceeding predicted score on FOTO outcomes assessment tool.       Plan  Patient would benefit from: skilled physical therapy  Other planned modality interventions: Modalities prn for symptom management    Planned therapy interventions: manual therapy, neuromuscular re-education, therapeutic activities, therapeutic exercise, strengthening, stretching and home exercise program    Frequency: 2x week  Duration in weeks: 8  Plan of Care beginning date: 9/17/2024  Plan of Care expiration date: 11/12/2024  Treatment plan discussed with: PTA and patient      Subjective Evaluation    History of Present Illness  Mechanism of injury: Per Dr Gleason's note  The patient was seen and examined.  She has a tear of her proximal right biceps.  Patient was given referral for formal physical therapy to work on strengthening, stretching and range of motion.The patient's right radiocarpal joint was injected with Celestone and Marcaine.  She tolerated the injection quite well.  She will follow-up with our office in 3 months.  9/17/24 Pt unsure as to how she tore R bicep but believes it occurred when trying to administer medication to dog and dog threw it's head back and hit her arm which became black and blue the next day. Pt notes she gets pain when her ms relaxants wear  off. Pt notes diff putting R hand behind her back or on her hip.Washes hair with left hand but pt is R hand dominant. Notes she is able to perform usual HH chores as long as she is taking ms relaxants which she has for her back spasms.          Not a recurrent problem   Quality of life: fair    Patient Goals  Patient goals for therapy: decreased pain, increased motion, increased strength and independence with ADLs/IADLs  Patient goal: gardening  Pain  Current pain ratin  At best pain ratin  At worst pain ratin  Quality: sharp, dull ache and pressure  Relieving factors: medications and change in position  Aggravating factors: lifting and overhead activity (scrubbing floors)  Progression: worsening    Social Support  Lives in: multiple-level home  Lives with: spouse    Employment status: not working  Hand dominance: right      Diagnostic Tests  No diagnostic tests performed  Treatments  Current treatment: medication and physical therapy      Objective     Postural Observations  Seated posture: poor  Standing posture: poor    Additional Postural Observation Details  See above    Observations     Right Shoulder  Positive for deformity.     Additional Observation Details  Scoliosis, R shld elevated, feet rotated R , L hip higher than R, C curve convex R    Palpation     Right   Tenderness of the biceps.     Tenderness     Right Shoulder  Tenderness in the biceps tendon (proximal) and bicipital groove.     Cervical/Thoracic Screen   Cervical range of motion within normal limits with the following exceptions: WFLs, pt notes recent cerv injections    Active Range of Motion   Left Shoulder   Normal active range of motion    Right Shoulder   Flexion: 165 degrees   Abduction: 150 degrees   External rotation BTH: C7   Internal rotation BTB: sacrum     Strength/Myotome Testing     Left Shoulder   Normal muscle strength    Right Shoulder     Planes of Motion   Flexion: 3   Abduction: 3-   External rotation at 0°: 4-    Internal rotation at 0°: 4     Isolated Muscles   Biceps: 4+   Triceps: 5     Tests     Right Shoulder   Positive Víctor's sign.              Precautions: scoliosis w/postural deficits, lumbar DDD, cerv pain, balance disorder, chronic pain syndrome, myalgia, myositis, COPD    Re-eval Date: visit 10 or 10/15    Date 9/17/24       Visit Count 1       FOTO Comp 9/17       Pain In 0/10 at rest         Pain Out              Manuals 9/17                                       Neuro Re-Ed                                                                Ther Ex         rpm 10'  Alt q 2 min       Wall slides  Flex, HA  Cw/ccw        T/band  MTPs/LTP's  Bicep curls  IR/ER RUE        Doorway stretch 90*        Wand flex  Abd, IR,ext        Prone flex, HA, ext, rows        Standing scaption                Ther Activity                        Gait Training                        Modalities

## 2024-09-17 ENCOUNTER — EVALUATION (OUTPATIENT)
Dept: PHYSICAL THERAPY | Facility: CLINIC | Age: 69
End: 2024-09-17
Payer: MEDICARE

## 2024-09-17 ENCOUNTER — TELEPHONE (OUTPATIENT)
Dept: PAIN MEDICINE | Facility: CLINIC | Age: 69
End: 2024-09-17

## 2024-09-17 DIAGNOSIS — S46.211D TEAR OF RIGHT BICEPS MUSCLE, SUBSEQUENT ENCOUNTER: Primary | ICD-10-CM

## 2024-09-17 DIAGNOSIS — N32.81 OAB (OVERACTIVE BLADDER): ICD-10-CM

## 2024-09-17 DIAGNOSIS — S46.211A TEAR OF RIGHT BICEPS MUSCLE, INITIAL ENCOUNTER: ICD-10-CM

## 2024-09-17 PROCEDURE — 97162 PT EVAL MOD COMPLEX 30 MIN: CPT | Performed by: PHYSICAL MEDICINE & REHABILITATION

## 2024-09-17 PROCEDURE — 97110 THERAPEUTIC EXERCISES: CPT | Performed by: PHYSICAL MEDICINE & REHABILITATION

## 2024-09-18 RX ORDER — SOLIFENACIN SUCCINATE 10 MG/1
10 TABLET, FILM COATED ORAL DAILY
Qty: 90 TABLET | Refills: 1 | Status: SHIPPED | OUTPATIENT
Start: 2024-09-18

## 2024-09-20 NOTE — TELEPHONE ENCOUNTER
Caller: Robyn   Doctor/office: [facility]   #: [phone]    % of improvement: 80%    Pain Scale (1-10): 7/10

## 2024-09-25 DIAGNOSIS — F41.9 ANXIETY: ICD-10-CM

## 2024-09-25 RX ORDER — VENLAFAXINE HYDROCHLORIDE 150 MG/1
CAPSULE, EXTENDED RELEASE ORAL
Qty: 90 CAPSULE | Refills: 1 | Status: SHIPPED | OUTPATIENT
Start: 2024-09-25

## 2024-09-25 RX ORDER — VENLAFAXINE HYDROCHLORIDE 75 MG/1
CAPSULE, EXTENDED RELEASE ORAL
Qty: 90 CAPSULE | Refills: 1 | Status: SHIPPED | OUTPATIENT
Start: 2024-09-25

## 2024-09-25 NOTE — TELEPHONE ENCOUNTER
Medication: venlafaxine(effexor-xr)    Dose/Frequency: one 150 mg and one 75 mg q day for a total of 225 mg q day    Quantity: 90 and 90 for both scripts of 150 mg and for the 75 mg     Pharmacy: [facility]    Office:   [x] PCP/Provider - Dr. Ruby  [] Speciality/Provider -     Does the patient have enough for 3 days?   [] Yes   [x] No - Send as HP to POD     Patient stated she called several times in the past 2 weeks concerning refill

## 2024-09-26 ENCOUNTER — OFFICE VISIT (OUTPATIENT)
Age: 69
End: 2024-09-26
Payer: MEDICARE

## 2024-09-26 VITALS
WEIGHT: 124 LBS | BODY MASS INDEX: 20.66 KG/M2 | SYSTOLIC BLOOD PRESSURE: 119 MMHG | HEIGHT: 65 IN | DIASTOLIC BLOOD PRESSURE: 72 MMHG | HEART RATE: 70 BPM

## 2024-09-26 DIAGNOSIS — M47.816 LUMBAR SPONDYLOSIS: ICD-10-CM

## 2024-09-26 DIAGNOSIS — M54.2 NECK PAIN: ICD-10-CM

## 2024-09-26 DIAGNOSIS — M48.061 SPINAL STENOSIS OF LUMBAR REGION WITHOUT NEUROGENIC CLAUDICATION: ICD-10-CM

## 2024-09-26 DIAGNOSIS — M51.16 LUMBAR DISC DISEASE WITH RADICULOPATHY: ICD-10-CM

## 2024-09-26 DIAGNOSIS — G89.4 CHRONIC PAIN SYNDROME: Primary | ICD-10-CM

## 2024-09-26 DIAGNOSIS — G89.29 CHRONIC BILATERAL LOW BACK PAIN WITHOUT SCIATICA: ICD-10-CM

## 2024-09-26 DIAGNOSIS — M54.50 CHRONIC BILATERAL LOW BACK PAIN WITHOUT SCIATICA: ICD-10-CM

## 2024-09-26 DIAGNOSIS — M46.1 SACROILIITIS (HCC): ICD-10-CM

## 2024-09-26 DIAGNOSIS — M47.812 CERVICAL SPONDYLOSIS: Chronic | ICD-10-CM

## 2024-09-26 PROCEDURE — 99214 OFFICE O/P EST MOD 30 MIN: CPT | Performed by: NURSE PRACTITIONER

## 2024-09-26 NOTE — PROGRESS NOTES
Assessment:  1. Chronic pain syndrome    2. Neck pain    3. Cervical spondylosis    4. Chronic bilateral low back pain without sciatica    5. Spinal stenosis of lumbar region without neurogenic claudication    6. Sacroiliitis (HCC)    7. Lumbar spondylosis    8. Lumbar disc disease with radiculopathy        Plan:  While the patient was in the office today, I did have a thorough conversation regarding their chronic pain syndrome, medication management, and treatment plan options.  Patient is being seen for a follow-up visit.  She underwent bilateral SI joint injections on 9/3/2024.  She underwent ultrasound-guided cervical and trapezius trigger point injections on 8/29/2024.  Overall, both areas improved by about 80%.  Pain has become pretty tolerable at this time.    Continue Robaxin 750 mg 3 times daily if needed for spasms.  She did not require refill of this medication during today's visit.    Follow-up in 2 months.  Call sooner if pain increases.    History of Present Illness:  The patient is a 68 y.o. female who presents for a follow up office visit in regards to Shoulder Pain, Back Pain, Hand Pain, Leg Pain, and Foot Pain.   The patient’s current symptoms include complaints of neck pain, low back pain.  Current pain level is a 3/10.  Quality pain is described as sharp, throbbing.    Current pain medications includes: Robaxin 750 mg every 8 hours as needed for spasms .  The patient reports that this regimen is providing 40-50% pain relief.  The patient is reporting no side effects from this pain medication regimen.    I have personally reviewed and/or updated the patient's past medical history, past surgical history, family history, social history, current medications, allergies, and vital signs today.         Review of Systems  Review of Systems   Constitutional:  Negative for unexpected weight change.   HENT:  Negative for hearing loss.    Eyes:  Negative for visual disturbance.   Respiratory:  Negative for  shortness of breath.    Cardiovascular:  Negative for leg swelling.   Gastrointestinal:  Positive for nausea. Negative for constipation.   Endocrine: Negative for polyuria.   Genitourinary:  Negative for difficulty urinating.   Musculoskeletal:  Positive for gait problem. Negative for joint swelling and myalgias.        Joint stiffness   Skin:  Negative for rash.   Neurological:  Negative for weakness and headaches.   Psychiatric/Behavioral:  Negative for decreased concentration.    All other systems reviewed and are negative.        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     Drug intolerance     Dysfunction of right eustachian tube     Dysfunctional uterine bleeding     Edema     Elevated d-dimer     Exposure to hepatitis C     Fracture of fibula     Generalized anxiety disorder     GERD (gastroesophageal reflux disease)     Hyperlipidemia     Mild cervical dysplasia     Myalgia     Myositis     Palpitations     PONV (postoperative nausea and vomiting)     Pre-syncope     Scoliosis deformity of spine 1/23/2024    Seasonal allergies     Stress incontinence     Thrombocytopenia (HCC)     Urinary retention     Urinary tract infection     Uterine leiomyoma        Past Surgical History:   Procedure Laterality Date    BLADDER SURGERY      BREAST BIOPSY Right     US guided    COLONOSCOPY      COLPOSCOPY  01/06/1998    DENTAL SURGERY      ENDOMETRIAL BIOPSY  05/21/1993    HYSTERECTOMY      HYSTEROSCOPY      uterus- 1/22/98, 7/1/02 and 4/10/07    NM ARTHRP KNE CONDYLE&PLATU MEDIAL&LAT COMPARTMENTS Right 2/8/2023    Procedure: ARTHROPLASTY KNEE  TOTAL;  Surgeon: Miguel Gleason DO;  Location: [facility];  Service: Orthopedics    TUBAL LIGATION      US GUIDED BREAST BIOPSY RIGHT COMPLETE Right 08/08/2018       Family History   Problem Relation Age of Onset    Stroke Mother         cerebral artery occlusion with cerebral infarction    Coronary artery disease Mother     Arrhythmia Mother         sinus    Coronary artery disease Father     Diabetes Father     No Known Problems Daughter     No Known Problems Daughter     No Known Problems Maternal Grandmother     No Known Problems Paternal Grandmother     No Known Problems Maternal Aunt     Ovarian cancer Maternal Aunt     Aortic aneurysm Family     Arthritis Family     Diabetes Family     Heart disease Family     Breast cancer Neg Hx        Social History     Occupational History    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    Occupation: housewife   Tobacco Use    Smoking status: Every Day     Current packs/day: 1.50     Average packs/day: 1.5 packs/day for 52.7 years (79.1 ttl pk-yrs)     Types: Cigarettes     Start date: 1972     Passive exposure: Never    Smokeless tobacco: Never   Vaping Use    Vaping status: Never Used   Substance and Sexual Activity    Alcohol use: Not Currently     Comment: occasional    Drug use: No    Sexual activity: Not Currently         Current Outpatient Medications:     acetaminophen (TYLENOL) 500 mg tablet, Take 500 mg by mouth every 6 (six) hours as needed for mild pain, Disp: , Rfl:     alendronate (FOSAMAX) 70 mg tablet, take 1 tablet by mouth every 7 days, Disp: 12 tablet, Rfl: 0    ascorbic acid (VITAMIN C) 1000 MG tablet, Take 1,000 mg by mouth, Disp: , Rfl:     atorvastatin (LIPITOR) 20 mg tablet, Take 1 tablet (20 mg total) by mouth daily after dinner, Disp: 90 tablet, Rfl: 1    buPROPion (WELLBUTRIN XL) 150 mg 24 hr tablet, 150 mg every morning, Disp: , Rfl:     clonazePAM (KlonoPIN) 1 mg tablet, 2 (two) times a day as needed, Disp: , Rfl:      "methocarbamol (ROBAXIN) 750 mg tablet, Take 1 tablet (750 mg total) by mouth 3 (three) times a day, Disp: 90 tablet, Rfl: 1    omeprazole (PriLOSEC) 40 MG capsule, Take 1 capsule (40 mg total) by mouth daily, Disp: 90 capsule, Rfl: 1    solifenacin (VESICARE) 10 MG tablet, take 1 tablet by mouth once daily, Disp: 90 tablet, Rfl: 1    tretinoin (RETIN-A) 0.1 % cream, APPLY TOPICALLY DAILY AT BEDTIME, Disp: 45 g, Rfl: 5    venlafaxine (EFFEXOR-XR) 150 mg 24 hr capsule, One 150mg and one 75mg q day for total of 225mg q day., Disp: 90 capsule, Rfl: 1    venlafaxine (EFFEXOR-XR) 75 mg 24 hr capsule, One 150mg and one 75mg q day for total of 225mg q day., Disp: 90 capsule, Rfl: 1    Multiple Vitamins-Minerals (multivitamin with minerals) tablet, Take 1 tablet by mouth daily (Patient taking differently: Take 1 tablet by mouth as needed), Disp: 30 tablet, Rfl: 1    Allergies   Allergen Reactions    Gabapentin Drowsiness       Physical Exam:    /72   Pulse 70   Ht 5' 5\" (1.651 m)   Wt 56.2 kg (124 lb)   BMI 20.63 kg/m²     Constitutional:normal, well developed, well nourished, alert, in no distress and non-toxic and no overt pain behavior.  Eyes:anicteric  HEENT:grossly intact  Neck:supple, symmetric, trachea midline and no masses   Pulmonary:even and unlabored  Cardiovascular:No edema or pitting edema present  Skin:Normal without rashes or lesions and well hydrated  Psychiatric:Mood and affect appropriate  Neurologic:Cranial Nerves II-XII grossly intact  Musculoskeletal:normal    Imaging  No orders to display       No orders of the defined types were placed in this encounter.        "

## 2024-09-27 ENCOUNTER — OFFICE VISIT (OUTPATIENT)
Dept: PHYSICAL THERAPY | Facility: CLINIC | Age: 69
End: 2024-09-27
Payer: MEDICARE

## 2024-09-27 DIAGNOSIS — S46.211D TEAR OF RIGHT BICEPS MUSCLE, SUBSEQUENT ENCOUNTER: Primary | ICD-10-CM

## 2024-09-27 DIAGNOSIS — S46.211A TEAR OF RIGHT BICEPS MUSCLE, INITIAL ENCOUNTER: ICD-10-CM

## 2024-09-27 PROCEDURE — 97110 THERAPEUTIC EXERCISES: CPT

## 2024-09-27 NOTE — PROGRESS NOTES
Daily Note     Today's date: 2024  Patient name: Surekha Tenorio  : 1955  MRN: [id number]  Referring provider: Femi Rawls,*  Dx:   Encounter Diagnosis     ICD-10-CM    1. Tear of right biceps muscle, subsequent encounter  S46.211D       2. Tear of right biceps muscle, initial encounter  S46.211A           Start Time: 08  Stop Time: 913  Total time in clinic (min): 38 minutes    Subjective: Patient reports her shoulder being in a lot pain this morning. She states having a lot of difficulty reaching behind the back or across her body is very painful.       Objective: See treatment diary below    Access Code: [email or web address]  URL: [email or web address]  Date: 2024  Prepared by: Trinh Belcher    Exercises  - Scaption Wall Slide with Towel  - 1 x daily - 7 x weekly - 3 sets - 10 reps  - Shoulder Flexion Wall Slide with Towel  - 1 x daily - 7 x weekly - 3 sets - 10 reps  - Doorway Pec Stretch at 60 Degrees Abduction with Arm Straight  - 1 x daily - 7 x weekly - 3 sets - 10 reps  - Seated Scapular Retraction  - 1 x daily - 7 x weekly - 3 sets - 10 reps  - Standing Bicep Curls Supinated with Dumbbells  - 1 x daily - 7 x weekly - 3 sets - 10 reps      Assessment: Patient being seen for first PT treatment following IE. Treatment session consisted of initaiting POC as outlined below. Patient deferred UBE this session as she was experiencing increased hip/LBP therefore, pulleys were performed instead. Demonstrated a good performance with strengthening when arm is positioned close to body. Unable to achieve greater than 10 reps with wall slides as this increased pain. Provided patient with updated HEP and reviewed exercises prior to departure. Patient would benefit from continued PT to improve level of function.       Plan: Continue per POC. Increase reps/resistance as tolerated.      Precautions: scoliosis w/postural deficits, lumbar DDD, cerv pain, balance disorder, chronic pain  "syndrome, myalgia, myositis, COPD    Re-eval Date: visit 10 or 10/15      Date 9/17/24 9/27      Visit Count 1 2      FOTO Comp 9/17       Pain In 0/10 at rest   6/10      Pain Out              Manuals 9/17 9/27                                      Neuro Re-Ed                                                                Ther Ex         rpm 10'  Alt q 2 min       Pulleys  2' flex/scap      Wall slides  Flex, HA  Cw/ccw  5\" x10  flex/HA       T/band  MTPs/LTP's  Bicep curls  IR/ER RUE  Red 2x10 ea   3# 2x10       Doorway stretch 90*  Low hands 20\"x3      Wand flex  Abd, IR,ext  AAROM 5\" x10 ea      Prone flex, HA, ext, rows        Standing scaption                Ther Activity                        Gait Training                        Modalities                             "

## 2024-09-30 ENCOUNTER — APPOINTMENT (OUTPATIENT)
Dept: PHYSICAL THERAPY | Facility: CLINIC | Age: 69
End: 2024-09-30
Payer: MEDICARE

## 2024-10-02 ENCOUNTER — TELEPHONE (OUTPATIENT)
Age: 69
End: 2024-10-02

## 2024-10-02 NOTE — TELEPHONE ENCOUNTER
I think at this point, the best course is to use the NSAID instead of steroids.  I reviewed her chart and she did a steroid taper at the beginning of August, had trigger point injections on 8/29/2024, then bilateral sacroiliac joint injections on 9/3/2024.  It is best if we hold off on additional steroids at this time.

## 2024-10-02 NOTE — TELEPHONE ENCOUNTER
Caller: patient    Doctor: JHOAN    Reason for call: patient is having whole body pain and would like to know what is recommended  Specially back and hip        Call back#:

## 2024-10-02 NOTE — TELEPHONE ENCOUNTER
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 Pt states Rt arm has somewhat subsided. Pt states having sharp lbp which was 12/10 this morning but has subsided to 6/10 currently after 800mg ibuprofen, Tylenol and Robaxin 750mg.    Last proc 9/3/24 b/l sij  LOV 9/26/24 BK    Pt asking if a steroid pack could be provided?    Pls advise. Thank you!

## 2024-10-02 NOTE — TELEPHONE ENCOUNTER
S/w pt and advised of same. Pt verbalized understanding. RN advised pt to c/b if pain is incr or does not subside over the course of a week w/ cont use of NSAID's, tylenol, robaxin and ice/heat therapy. Pt verbalized understanding.

## 2024-10-03 ENCOUNTER — OFFICE VISIT (OUTPATIENT)
Dept: PHYSICAL THERAPY | Facility: CLINIC | Age: 69
End: 2024-10-03
Payer: MEDICARE

## 2024-10-03 DIAGNOSIS — S46.211D TEAR OF RIGHT BICEPS MUSCLE, SUBSEQUENT ENCOUNTER: Primary | ICD-10-CM

## 2024-10-03 DIAGNOSIS — S46.211A TEAR OF RIGHT BICEPS MUSCLE, INITIAL ENCOUNTER: ICD-10-CM

## 2024-10-03 PROCEDURE — 97110 THERAPEUTIC EXERCISES: CPT | Performed by: PHYSICAL MEDICINE & REHABILITATION

## 2024-10-03 NOTE — PROGRESS NOTES
"Daily Note     Today's date: 10/3/2024  Patient name: Surekha Tenorio  : 1955  MRN: [id number]  Referring provider: Femi Rawls,*  Dx:   Encounter Diagnosis     ICD-10-CM    1. Tear of right biceps muscle, subsequent encounter  S46.211D       2. Tear of right biceps muscle, initial encounter  S46.211A                      Subjective: Pt states she is having a bad day.  Notes bicep pain is 7/10. Also c/o knee  and back pain.      Objective: See treatment diary below      Assessment: Tolerated treatment fair. Patient demonstrated fatigue post treatment, exhibited good technique with therapeutic exercises, and would benefit from continued PT  Pt noting increased pain today and notes the only thing she did differently was plant some mums.      Plan: Continue per plan of care. 2x/week     Precautions: scoliosis w/postural deficits, lumbar DDD, cerv pain, balance disorder, chronic pain syndrome, myalgia, myositis, COPD    Re-eval Date: visit 10 or 10/15      Date 9/17/24 9/27 10/3     Visit Count 1 2 3     FOTO Comp        Pain In 0/10 at rest   6/10 7/10     Pain Out              Manuals 9/17 9/27 10/3                                     Neuro Re-Ed                                                                Ther Ex         rpm 10'  Alt q 2 min  100 rpm  10 min alt every 2 min     Pulleys  2' flex/scap 2 min flex     Wall slides  Flex, HA  Cw/ccw  5\" x10  flex/HA  10x flex  10x HA  10x cw/ccw     T/band  MTPs/LTP's  IR/ER  Bicep curls    Red 2x10 ea   3# 2x10  Red 20x ea    Bicep curls  3#  20x     Doorway stretch 90*  Low hands 20\"x3 Low hands  3 x 20\"     Wand flex  Abd, IR,ext  AAROM 5\" x10 ea 2 x 10     Prone flex, HA, ext, rows        Standing scaption   20x             Ther Activity                        Gait Training                        Modalities                               "

## 2024-10-07 ENCOUNTER — APPOINTMENT (OUTPATIENT)
Dept: PHYSICAL THERAPY | Facility: CLINIC | Age: 69
End: 2024-10-07
Payer: MEDICARE

## 2024-10-08 ENCOUNTER — OFFICE VISIT (OUTPATIENT)
Dept: OBGYN CLINIC | Facility: CLINIC | Age: 69
End: 2024-10-08
Payer: MEDICARE

## 2024-10-08 VITALS
SYSTOLIC BLOOD PRESSURE: 152 MMHG | BODY MASS INDEX: 20.66 KG/M2 | HEIGHT: 65 IN | WEIGHT: 124 LBS | HEART RATE: 78 BPM | DIASTOLIC BLOOD PRESSURE: 81 MMHG

## 2024-10-08 DIAGNOSIS — M70.61 GREATER TROCHANTERIC BURSITIS OF RIGHT HIP: ICD-10-CM

## 2024-10-08 DIAGNOSIS — M75.41 IMPINGEMENT SYNDROME OF RIGHT SHOULDER: Primary | ICD-10-CM

## 2024-10-08 PROCEDURE — 99213 OFFICE O/P EST LOW 20 MIN: CPT | Performed by: ORTHOPAEDIC SURGERY

## 2024-10-08 PROCEDURE — 20610 DRAIN/INJ JOINT/BURSA W/O US: CPT | Performed by: ORTHOPAEDIC SURGERY

## 2024-10-08 RX ORDER — BUPIVACAINE HYDROCHLORIDE 2.5 MG/ML
4 INJECTION, SOLUTION INFILTRATION; PERINEURAL
Status: COMPLETED | OUTPATIENT
Start: 2024-10-08 | End: 2024-10-08

## 2024-10-08 RX ORDER — TRIAMCINOLONE ACETONIDE 40 MG/ML
80 INJECTION, SUSPENSION INTRA-ARTICULAR; INTRAMUSCULAR
Status: COMPLETED | OUTPATIENT
Start: 2024-10-08 | End: 2024-10-08

## 2024-10-08 RX ADMIN — TRIAMCINOLONE ACETONIDE 80 MG: 40 INJECTION, SUSPENSION INTRA-ARTICULAR; INTRAMUSCULAR at 09:00

## 2024-10-08 RX ADMIN — BUPIVACAINE HYDROCHLORIDE 4 ML: 2.5 INJECTION, SOLUTION INFILTRATION; PERINEURAL at 09:00

## 2024-10-08 NOTE — PROGRESS NOTES
Assessment/Plan:   Diagnoses and all orders for this visit:    Impingement syndrome of right shoulder  -     Large joint arthrocentesis    Greater trochanteric bursitis of right hip  -     Large joint arthrocentesis         Discussed with patient that her exam is consistent with a flare-up of greater trochanteric bursitis of her right hip and impingement syndrome of her right shoulder, secondary to a proximal biceps tendon tear. She was offered and accepted an injection(s) of Kenalog and Marcaine to her Right hip and shoulder(s) for symptomatic relief of pain and inflammation. Patient tolerated the treatment(s) well. Ice and post injection protocol advised. Weightbearing activities as tolerated. She will be seen for follow-up in 3 months for re-evaluation and consideration for repeat injections as necessary. Patient expresses understanding and is in agreement with this treatment plan. The patient was given the opportunity to ask questions or present concerns.    The patient has bursitis of her right shoulder as well as bursitis of her right hip.  Both structures were injected with Kenalog and Marcaine.  She tolerated the procedures quite well.  Return back 3 months for reevaluation.  If her condition changes, she would not hesitate to let us know        Subjective:   Patient ID: Surekha Tenorio  1955     HPI  Patient is a 68 y.o. female who presents for follow-up evaluation of her right hip and shoulder. The patient was last seen on 7/9/2024 where she received cortisone injections to both structures. The patient did sustain an injury to the right shoulder in August which resulted in a proximal biceps tendon tear. She has been completing physical therapy without much improvement. On today's presentation, she reports continued pain along the lateral aspect of her right hip. She states that she has burning pain that travels down her leg. She reports point tenderness over the lateral hip. She states that the pain  in her hip and back impacts her ability to walk. She also reports continued right shoulder pain. She states that the pain has not improved since the injury.     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     Drug intolerance     Dysfunction of right eustachian tube     Dysfunctional uterine bleeding     Edema     Elevated d-dimer     Exposure to hepatitis C     Fracture of fibula     Generalized anxiety disorder     GERD (gastroesophageal reflux disease)     Hyperlipidemia     Mild cervical dysplasia     Myalgia     Myositis     Palpitations     PONV (postoperative nausea and vomiting)     Pre-syncope     Scoliosis deformity of spine 1/23/2024    Seasonal allergies     Stress incontinence     Thrombocytopenia (HCC)     Urinary retention     Urinary tract infection     Uterine leiomyoma        Past Surgical History:   Procedure Laterality Date    BLADDER SURGERY      BREAST BIOPSY Right     US guided    COLONOSCOPY      COLPOSCOPY  01/06/1998    DENTAL SURGERY      ENDOMETRIAL BIOPSY  05/21/1993    HYSTERECTOMY      HYSTEROSCOPY      uterus- 1/22/98, 7/1/02 and 4/10/07    [place] ARTHRP KNE CONDYLE&PLATU MEDIAL&LAT COMPARTMENTS Right 2/8/2023    Procedure: ARTHROPLASTY KNEE TOTAL;  Surgeon: Miguel Gleason DO;  Location: [facility];  Service: Orthopedics    TUBAL LIGATION      US GUIDED BREAST BIOPSY RIGHT COMPLETE Right 08/08/2018       Family  History   Problem Relation Age of Onset    Stroke Mother         cerebral artery occlusion with cerebral infarction    Coronary artery disease Mother     Arrhythmia Mother         sinus    Coronary artery disease Father     Diabetes Father     No Known Problems Daughter     No Known Problems Daughter     No Known Problems Maternal Grandmother     No Known Problems Paternal Grandmother     No Known Problems Maternal Aunt     Ovarian cancer Maternal Aunt     Aortic aneurysm Family     Arthritis Family     Diabetes Family     Heart disease Family     Breast cancer Neg Hx        Social History     Socioeconomic History    Marital status: /Civil Union     Spouse name: None    Number of children: None    Years of education: None    Highest education level: None   Occupational History    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    Occupation: housewife   Tobacco Use    Smoking status: Every Day     Current packs/day: 1.50     Average packs/day: 1.5 packs/day for 52.8 years (79.2 ttl pk-yrs)     Types: Cigarettes     Start date: 1972     Passive exposure: Never    Smokeless tobacco: Never   Vaping Use    Vaping status: Never Used   Substance and Sexual Activity    Alcohol use: Not Currently     Comment: occasional    Drug use: No    Sexual activity: Not Currently   Other Topics Concern    None   Social History Narrative    Caffeine use     Social Determinants of Health     Financial Resource Strain: Low Risk  (3/6/2023)    Overall Financial Resource Strain (CARDIA)     Difficulty of Paying Living Expenses: Not hard at all   Food Insecurity: No Food Insecurity (2/9/2023)    Hunger Vital Sign     Worried About Running Out of Food in the Last Year: Never true     Ran Out of Food in the Last Year: Never true   Transportation Needs: No Transportation Needs (3/6/2023)    PRAPARE - Transportation     Lack of Transportation (Medical): No     Lack of Transportation (Non-Medical): No   Physical Activity: Not on  file   Stress: Not on file   Social Connections: Not on file   Intimate Partner Violence: Not on file   Housing Stability: Low Risk  (2/9/2023)    Housing Stability Vital Sign     Unable to Pay for Housing in the Last Year: No     Number of Places Lived in the Last Year: 1     Unstable Housing in the Last Year: No         Current Outpatient Medications:     acetaminophen (TYLENOL) 500 mg tablet, Take 500 mg by mouth every 6 (six) hours as needed for mild pain, Disp: , Rfl:     alendronate (FOSAMAX) 70 mg tablet, take 1 tablet by mouth every 7 days, Disp: 12 tablet, Rfl: 0    ascorbic acid (VITAMIN C) 1000 MG tablet, Take 1,000 mg by mouth, Disp: , Rfl:     atorvastatin (LIPITOR) 20 mg tablet, Take 1 tablet (20 mg total) by mouth daily after dinner, Disp: 90 tablet, Rfl: 1    buPROPion (WELLBUTRIN XL) 150 mg 24 hr tablet, 150 mg every morning, Disp: , Rfl:     clonazePAM (KlonoPIN) 1 mg tablet, 2 (two) times a day as needed, Disp: , Rfl:     methocarbamol (ROBAXIN) 750 mg tablet, Take 1 tablet (750 mg total) by mouth 3 (three) times a day, Disp: 90 tablet, Rfl: 1    omeprazole (PriLOSEC) 40 MG capsule, Take 1 capsule (40 mg total) by mouth daily, Disp: 90 capsule, Rfl: 1    solifenacin (VESICARE) 10 MG tablet, take 1 tablet by mouth once daily, Disp: 90 tablet, Rfl: 1    tretinoin (RETIN-A) 0.1 % cream, APPLY TOPICALLY DAILY AT BEDTIME, Disp: 45 g, Rfl: 5    venlafaxine (EFFEXOR-XR) 150 mg 24 hr capsule, One 150mg and one 75mg q day for total of 225mg q day., Disp: 90 capsule, Rfl: 1    venlafaxine (EFFEXOR-XR) 75 mg 24 hr capsule, One 150mg and one 75mg q day for total of 225mg q day., Disp: 90 capsule, Rfl: 1    Multiple Vitamins-Minerals (multivitamin with minerals) tablet, Take 1 tablet by mouth daily (Patient taking differently: Take 1 tablet by mouth as needed), Disp: 30 tablet, Rfl: 1    Allergies   Allergen Reactions    Gabapentin Drowsiness       Review of Systems     Objective:  /81 Comment: having  "anxiety  Pulse 78   Ht 5' 5\" (1.651 m)   Wt 56.2 kg (124 lb)   BMI 20.63 kg/m²     Ortho Exam  right Hip -  Patient presents with no anatomical deformity  Ambulates with steady gait pattern  Uses No assistive devices  TTP over greater trochanteric bursa  Smooth passive circumduction   Strength: 5/5 throughout  Knee flexor and extensor mechanism intact  Ankle DF and PF mechanism intact  - DIOR, - FADIR  - Log roll  2+ TP and DP pulses with brisk capillary refill to the toes  Sural, saphenous, tibial, superficial and deep peroneal motor and sensory distribution intact  Sensation to light touch intact distally    right Shoulder -   Víctor deformity   Skin is warm and dry to touch with no signs of erythema, ecchymosis, or infection  No soft tissue swelling or effusion noted  No Palpable crepitus with passive motion  Strength: 5/5 throughout  No glenohumeral instability appreciated on exam  + Neer's, - Camara  - empty can, - drop-arm, - resisted external rotation, - belly press, - lift-off   Demonstrates normal elbow, wrist, and finger motion  2+  distal radial pulse with brisk capillary refill to the fingers  Radial, median, ulnar and motor  and sensory distribution intact  Sensation to light touch intact distally      Physical Exam  HENT:      Head: Normocephalic and atraumatic.      Nose: Nose normal.   Eyes:      Conjunctiva/sclera: Conjunctivae normal.   Cardiovascular:      Rate and Rhythm: Normal rate.   Pulmonary:      Effort: Pulmonary effort is normal.   Musculoskeletal:      Cervical back: Neck supple.   Skin:     General: Skin is warm and dry.      Capillary Refill: Capillary refill takes less than 2 seconds.   Neurological:      Mental Status: She is alert and oriented to person, place, and time.   Psychiatric:         Mood and Affect: Mood normal.         Behavior: Behavior normal.          Diagnostic Test Review:  No new imaging at time of visit.     Large joint arthrocentesis: R subacromial " bursa  Universal Protocol:  procedure performed by consultantConsent: Verbal consent obtained.  Risks and benefits: risks, benefits and alternatives were discussed  Consent given by: patient  Timeout called at: 10/8/2024 9:03 AM.  Patient understanding: patient states understanding of the procedure being performed  Patient consent: the patient's understanding of the procedure matches consent given  Site marked: the operative site was marked  Radiology Images displayed and confirmed. If images not available, report reviewed: imaging studies available  Patient identity confirmed: verbally with patient  Supporting Documentation  Indications: pain and joint swelling   Procedure Details  Location: shoulder - R subacromial bursa  Needle gauge: 21 G.  Ultrasound guidance: no  Approach: lateral  Medications administered: 4 mL bupivacaine 0.25 %; 80 mg triamcinolone acetonide 40 mg/mL    Patient tolerance: patient tolerated the procedure well with no immediate complications  Dressing:  Sterile dressing applied      Large joint arthrocentesis: R hip joint  Universal Protocol:  procedure performed by consultantConsent: Verbal consent obtained.  Risks and benefits: risks, benefits and alternatives were discussed  Consent given by: patient  Timeout called at: 10/8/2024 9:04 AM.  Patient understanding: patient states understanding of the procedure being performed  Patient consent: the patient's understanding of the procedure matches consent given  Site marked: the operative site was marked  Radiology Images displayed and confirmed. If images not available, report reviewed: imaging studies available  Patient identity confirmed: verbally with patient  Supporting Documentation  Indications: pain and joint swelling   Procedure Details  Location: hip - R hip joint  Needle gauge: 21 G.  Ultrasound guidance: no  Approach: lateral  Medications administered: 4 mL bupivacaine 0.25 %; 80 mg triamcinolone acetonide 40 mg/mL    Patient  tolerance: patient tolerated the procedure well with no immediate complications  Dressing:  Sterile dressing applied           Scribe Attestation      I,:  Bouchra Willis am acting as a scribe while in the presence of the attending physician.:       I,:  Miguel Gleason DO personally performed the services described in this documentation    as scribed in my presence.:

## 2024-10-14 ENCOUNTER — APPOINTMENT (OUTPATIENT)
Dept: PHYSICAL THERAPY | Facility: CLINIC | Age: 69
End: 2024-10-14
Payer: MEDICARE

## 2024-10-16 DIAGNOSIS — M62.830 SPASM OF THORACOLUMBAR MUSCLE: ICD-10-CM

## 2024-10-16 RX ORDER — METHOCARBAMOL 750 MG/1
750 TABLET, FILM COATED ORAL 3 TIMES DAILY
Qty: 90 TABLET | Refills: 1 | Status: SHIPPED | OUTPATIENT
Start: 2024-10-16

## 2024-10-17 ENCOUNTER — APPOINTMENT (OUTPATIENT)
Dept: PHYSICAL THERAPY | Facility: CLINIC | Age: 69
End: 2024-10-17
Payer: MEDICARE

## 2024-10-21 ENCOUNTER — APPOINTMENT (OUTPATIENT)
Dept: PHYSICAL THERAPY | Facility: CLINIC | Age: 69
End: 2024-10-21
Payer: MEDICARE

## 2024-10-24 ENCOUNTER — OFFICE VISIT (OUTPATIENT)
Dept: INTERNAL MEDICINE CLINIC | Facility: CLINIC | Age: 69
End: 2024-10-24
Payer: MEDICARE

## 2024-10-24 ENCOUNTER — LAB (OUTPATIENT)
Dept: LAB | Facility: CLINIC | Age: 69
End: 2024-10-24
Payer: MEDICARE

## 2024-10-24 ENCOUNTER — APPOINTMENT (OUTPATIENT)
Dept: PHYSICAL THERAPY | Facility: CLINIC | Age: 69
End: 2024-10-24
Payer: MEDICARE

## 2024-10-24 VITALS
WEIGHT: 127.13 LBS | BODY MASS INDEX: 21.18 KG/M2 | DIASTOLIC BLOOD PRESSURE: 76 MMHG | HEIGHT: 65 IN | TEMPERATURE: 97.3 F | HEART RATE: 64 BPM | SYSTOLIC BLOOD PRESSURE: 136 MMHG | OXYGEN SATURATION: 99 %

## 2024-10-24 DIAGNOSIS — M54.2 NECK PAIN: ICD-10-CM

## 2024-10-24 DIAGNOSIS — K76.0 NAFL (NONALCOHOLIC FATTY LIVER): ICD-10-CM

## 2024-10-24 DIAGNOSIS — K59.00 CONSTIPATION, UNSPECIFIED CONSTIPATION TYPE: ICD-10-CM

## 2024-10-24 DIAGNOSIS — K51.50 LEFT SIDED COLITIS WITHOUT COMPLICATIONS (HCC): ICD-10-CM

## 2024-10-24 DIAGNOSIS — M79.18 MYOFASCIAL PAIN SYNDROME: ICD-10-CM

## 2024-10-24 DIAGNOSIS — R93.3 ABNORMAL CT SCAN, COLON: ICD-10-CM

## 2024-10-24 DIAGNOSIS — G89.4 CHRONIC PAIN SYNDROME: ICD-10-CM

## 2024-10-24 DIAGNOSIS — E78.2 MIXED HYPERLIPIDEMIA: Primary | ICD-10-CM

## 2024-10-24 DIAGNOSIS — J43.9 PULMONARY EMPHYSEMA, UNSPECIFIED EMPHYSEMA TYPE (HCC): ICD-10-CM

## 2024-10-24 DIAGNOSIS — Z23 ENCOUNTER FOR IMMUNIZATION: ICD-10-CM

## 2024-10-24 DIAGNOSIS — F17.210 SMOKING GREATER THAN 20 PACK YEARS: ICD-10-CM

## 2024-10-24 DIAGNOSIS — Z79.899 MEDICATION MANAGEMENT: ICD-10-CM

## 2024-10-24 DIAGNOSIS — M25.50 ARTHRALGIA, UNSPECIFIED JOINT: ICD-10-CM

## 2024-10-24 DIAGNOSIS — M79.10 MYALGIA: ICD-10-CM

## 2024-10-24 DIAGNOSIS — K21.9 GASTROESOPHAGEAL REFLUX DISEASE, UNSPECIFIED WHETHER ESOPHAGITIS PRESENT: ICD-10-CM

## 2024-10-24 DIAGNOSIS — Z13.1 SCREENING FOR DIABETES MELLITUS (DM): ICD-10-CM

## 2024-10-24 DIAGNOSIS — E78.2 MIXED HYPERLIPIDEMIA: ICD-10-CM

## 2024-10-24 DIAGNOSIS — N18.6 END STAGE RENAL DISEASE (HCC): ICD-10-CM

## 2024-10-24 PROBLEM — I26.99 ACUTE PULMONARY EMBOLISM WITHOUT ACUTE COR PULMONALE, UNSPECIFIED PULMONARY EMBOLISM TYPE (HCC): Status: RESOLVED | Noted: 2024-01-15 | Resolved: 2024-10-24

## 2024-10-24 PROBLEM — J96.01 ACUTE RESPIRATORY FAILURE WITH HYPOXIA (HCC): Status: RESOLVED | Noted: 2024-01-15 | Resolved: 2024-10-24

## 2024-10-24 PROBLEM — S52.124A CLOSED NONDISPLACED FRACTURE OF HEAD OF RIGHT RADIUS, INITIAL ENCOUNTER: Status: RESOLVED | Noted: 2023-12-19 | Resolved: 2024-10-24

## 2024-10-24 PROBLEM — J95.821 ACUTE POSTPROCEDURAL RESPIRATORY FAILURE (HCC): Status: RESOLVED | Noted: 2024-01-15 | Resolved: 2024-10-24

## 2024-10-24 LAB
25(OH)D3 SERPL-MCNC: 50.8 NG/ML (ref 30–100)
ALBUMIN SERPL BCG-MCNC: 4.2 G/DL (ref 3.5–5)
ALP SERPL-CCNC: 63 U/L (ref 34–104)
ALT SERPL W P-5'-P-CCNC: 17 U/L (ref 7–52)
ANA SER QL IA: NEGATIVE
ANION GAP SERPL CALCULATED.3IONS-SCNC: 8 MMOL/L (ref 4–13)
AST SERPL W P-5'-P-CCNC: 15 U/L (ref 13–39)
B BURGDOR IGG+IGM SER QL IA: NEGATIVE
BASOPHILS # BLD AUTO: 0.05 THOUSANDS/ΜL (ref 0–0.1)
BASOPHILS NFR BLD AUTO: 1 % (ref 0–1)
BILIRUB SERPL-MCNC: 0.69 MG/DL (ref 0.2–1)
BUN SERPL-MCNC: 19 MG/DL (ref 5–25)
CALCIUM SERPL-MCNC: 9.1 MG/DL (ref 8.4–10.2)
CHLORIDE SERPL-SCNC: 107 MMOL/L (ref 96–108)
CO2 SERPL-SCNC: 27 MMOL/L (ref 21–32)
CREAT SERPL-MCNC: 0.6 MG/DL (ref 0.6–1.3)
CRP SERPL QL: 5.3 MG/L
EOSINOPHIL # BLD AUTO: 0.24 THOUSAND/ΜL (ref 0–0.61)
EOSINOPHIL NFR BLD AUTO: 3 % (ref 0–6)
ERYTHROCYTE [DISTWIDTH] IN BLOOD BY AUTOMATED COUNT: 12.5 % (ref 11.6–15.1)
EST. AVERAGE GLUCOSE BLD GHB EST-MCNC: 123 MG/DL
FOLATE SERPL-MCNC: 16.4 NG/ML
GFR SERPL CREATININE-BSD FRML MDRD: 95 ML/MIN/1.73SQ M
GLUCOSE SERPL-MCNC: 89 MG/DL (ref 65–140)
HAV AB SER QL IA: NORMAL
HBA1C MFR BLD: 5.9 %
HBV CORE AB SER QL: NORMAL
HBV SURFACE AB SER-ACNC: 34.7 MIU/ML
HBV SURFACE AG SER QL: NORMAL
HCT VFR BLD AUTO: 44.7 % (ref 34.8–46.1)
HGB BLD-MCNC: 14.3 G/DL (ref 11.5–15.4)
IMM GRANULOCYTES # BLD AUTO: 0.05 THOUSAND/UL (ref 0–0.2)
IMM GRANULOCYTES NFR BLD AUTO: 1 % (ref 0–2)
LYMPHOCYTES # BLD AUTO: 1.4 THOUSANDS/ΜL (ref 0.6–4.47)
LYMPHOCYTES NFR BLD AUTO: 15 % (ref 14–44)
MAGNESIUM SERPL-MCNC: 2.1 MG/DL (ref 1.9–2.7)
MCH RBC QN AUTO: 33.7 PG (ref 26.8–34.3)
MCHC RBC AUTO-ENTMCNC: 32 G/DL (ref 31.4–37.4)
MCV RBC AUTO: 105 FL (ref 82–98)
MONOCYTES # BLD AUTO: 0.61 THOUSAND/ΜL (ref 0.17–1.22)
MONOCYTES NFR BLD AUTO: 7 % (ref 4–12)
NEUTROPHILS # BLD AUTO: 6.86 THOUSANDS/ΜL (ref 1.85–7.62)
NEUTS SEG NFR BLD AUTO: 73 % (ref 43–75)
NRBC BLD AUTO-RTO: 0 /100 WBCS
PLATELET # BLD AUTO: 316 THOUSANDS/UL (ref 149–390)
PMV BLD AUTO: 10.5 FL (ref 8.9–12.7)
POTASSIUM SERPL-SCNC: 4 MMOL/L (ref 3.5–5.3)
PROT SERPL-MCNC: 6.6 G/DL (ref 6.4–8.4)
RBC # BLD AUTO: 4.24 MILLION/UL (ref 3.81–5.12)
SODIUM SERPL-SCNC: 142 MMOL/L (ref 135–147)
TSH SERPL DL<=0.05 MIU/L-ACNC: 2.04 UIU/ML (ref 0.45–4.5)
URATE SERPL-MCNC: 3.8 MG/DL (ref 2–7.5)
VIT B12 SERPL-MCNC: 206 PG/ML (ref 180–914)
WBC # BLD AUTO: 9.21 THOUSAND/UL (ref 4.31–10.16)

## 2024-10-24 PROCEDURE — 86708 HEPATITIS A ANTIBODY: CPT

## 2024-10-24 PROCEDURE — 86430 RHEUMATOID FACTOR TEST QUAL: CPT

## 2024-10-24 PROCEDURE — 82306 VITAMIN D 25 HYDROXY: CPT

## 2024-10-24 PROCEDURE — 84443 ASSAY THYROID STIM HORMONE: CPT

## 2024-10-24 PROCEDURE — 99214 OFFICE O/P EST MOD 30 MIN: CPT | Performed by: INTERNAL MEDICINE

## 2024-10-24 PROCEDURE — G0008 ADMIN INFLUENZA VIRUS VAC: HCPCS

## 2024-10-24 PROCEDURE — 36415 COLL VENOUS BLD VENIPUNCTURE: CPT

## 2024-10-24 PROCEDURE — 86704 HEP B CORE ANTIBODY TOTAL: CPT

## 2024-10-24 PROCEDURE — 82607 VITAMIN B-12: CPT

## 2024-10-24 PROCEDURE — 86140 C-REACTIVE PROTEIN: CPT

## 2024-10-24 PROCEDURE — 90662 IIV NO PRSV INCREASED AG IM: CPT

## 2024-10-24 PROCEDURE — 80053 COMPREHEN METABOLIC PANEL: CPT

## 2024-10-24 PROCEDURE — 84550 ASSAY OF BLOOD/URIC ACID: CPT

## 2024-10-24 PROCEDURE — 85025 COMPLETE CBC W/AUTO DIFF WBC: CPT

## 2024-10-24 PROCEDURE — 86618 LYME DISEASE ANTIBODY: CPT

## 2024-10-24 PROCEDURE — 82164 ANGIOTENSIN I ENZYME TEST: CPT

## 2024-10-24 PROCEDURE — 87340 HEPATITIS B SURFACE AG IA: CPT

## 2024-10-24 PROCEDURE — 83036 HEMOGLOBIN GLYCOSYLATED A1C: CPT

## 2024-10-24 PROCEDURE — 82746 ASSAY OF FOLIC ACID SERUM: CPT

## 2024-10-24 PROCEDURE — 83735 ASSAY OF MAGNESIUM: CPT

## 2024-10-24 PROCEDURE — 86706 HEP B SURFACE ANTIBODY: CPT

## 2024-10-24 PROCEDURE — 86780 TREPONEMA PALLIDUM: CPT

## 2024-10-24 PROCEDURE — 82103 ALPHA-1-ANTITRYPSIN TOTAL: CPT

## 2024-10-24 PROCEDURE — 86038 ANTINUCLEAR ANTIBODIES: CPT

## 2024-10-24 NOTE — PROGRESS NOTES
Ambulatory Visit  Name: Surekha Tenorio      : 1955      MRN: [id number]  Encounter Provider: Miguel Ruby DO  Encounter Date: 10/24/2024   Encounter department: [facility]    Assessment & Plan  Smoking greater than 20 pack years    Orders:    CBC and differential; Future    CT lung screening program; Future    Encounter for immunization    Orders:    influenza vaccine, high-dose, PF 0.5 mL (Fluzone High Dose)    NAFL (nonalcoholic fatty liver)    Orders:    CBC and differential; Future    Comprehensive metabolic panel; Future    Gastroesophageal reflux disease, unspecified whether esophagitis present         Myofascial pain syndrome    Orders:    CBC and differential; Future    Comprehensive metabolic panel; Future    Neck pain         Chronic pain syndrome         Mixed hyperlipidemia    Orders:    Comprehensive metabolic panel; Future    Lipid Panel with Direct LDL reflex; Future    TSH, 3rd generation; Future    Constipation, unspecified constipation type    Orders:    Comprehensive metabolic panel; Future    Myalgia    Orders:    CBC and differential; Future    Comprehensive metabolic panel; Future    TSH, 3rd generation; Future    C-reactive protein; Future    JESSICA w/Reflex; Future    RF Screen w/ Reflex to Titer; Future    Lyme Total AB W Reflex to IGM/IGG; Future    RPR-Syphilis Screening (Total Syphilis IGG/IGM); Future    Uric acid; Future    Arthralgia, unspecified joint    Orders:    CBC and differential; Future    Comprehensive metabolic panel; Future    TSH, 3rd generation; Future    C-reactive protein; Future    JESSICA w/Reflex; Future    RF Screen w/ Reflex to Titer; Future    Lyme Total AB W Reflex to IGM/IGG; Future    RPR-Syphilis Screening (Total Syphilis IGG/IGM); Future    Uric acid; Future  A/P: Doing ok, but still with chronic pain. Will check routine labs along with CTD, Lyme's, etc to see if pt has fibromyalgia(dx of exclusion). Already on SNRI. Problems with  "dusty,but not really an allergy. Consider retry, but may lyrica. Continue Tylenol and OTC NSAID's for now. Will order a lung ca screen. Wean tobacco. . Will up date her vaccines. Continue current treatment and RTC  three weeks for f/u labs, ?fibromyalgia and AWV.  Screening for diabetes mellitus (DM)    Orders:    Hemoglobin A1C; Future    Pulmonary emphysema, unspecified emphysema type (HCC)           Tobacco Cessation Counseling: Tobacco cessation counseling was provided.       History of Present Illness     WF RTC for f/u HLD, GERD, etc. Doing ok, but ongoing MS issues and seeing both ortho and pain management. Getting injections, but s/s continue. Pain management feels she may have fibromyalgia. Remains as active as her pain will allow. No falls. No reflux. COPD is controlled. No recent PE. Still smoking. ANAND is controlled. DUe for labs, CT lung ca screening, and vaccines.           Review of Systems   Constitutional:  Positive for activity change. Negative for chills, diaphoresis, fatigue and fever.   HENT: Negative.     Eyes:  Negative for visual disturbance.   Respiratory:  Negative for cough, chest tightness, shortness of breath and wheezing.    Cardiovascular:  Negative for chest pain, palpitations and leg swelling.   Gastrointestinal:  Negative for abdominal pain, constipation, diarrhea, nausea and vomiting.   Endocrine: Negative for cold intolerance and heat intolerance.   Genitourinary:  Negative for difficulty urinating, dysuria and frequency.   Musculoskeletal:  Positive for arthralgias and myalgias. Negative for gait problem.   Neurological:  Negative for dizziness, seizures, syncope, weakness, light-headedness and headaches.   Psychiatric/Behavioral:  Negative for confusion, dysphoric mood and sleep disturbance. The patient is not nervous/anxious.            Objective     /76   Pulse 64   Temp (!) 97.3 °F (36.3 °C)   Ht 5' 5\" (1.651 m)   Wt 57.7 kg (127 lb 2 oz)   SpO2 99%   BMI 21.15 " kg/m²     Physical Exam  Vitals and nursing note reviewed.   Constitutional:       General: She is not in acute distress.     Appearance: Normal appearance. She is not ill-appearing.   HENT:      Head: Normocephalic and atraumatic.      Mouth/Throat:      Mouth: Mucous membranes are moist.   Eyes:      Extraocular Movements: Extraocular movements intact.      Conjunctiva/sclera: Conjunctivae normal.      Pupils: Pupils are equal, round, and reactive to light.   Neck:      Vascular: No carotid bruit.   Cardiovascular:      Rate and Rhythm: Normal rate and regular rhythm.      Heart sounds: Normal heart sounds. No murmur heard.  Pulmonary:      Effort: Pulmonary effort is normal. No respiratory distress.      Breath sounds: Normal breath sounds. No wheezing, rhonchi or rales.   Abdominal:      General: Bowel sounds are normal. There is no distension.      Palpations: Abdomen is soft.      Tenderness: There is no abdominal tenderness.   Musculoskeletal:         General: Tenderness present. No swelling or deformity. Normal range of motion.      Cervical back: Neck supple.      Right lower leg: No edema.      Left lower leg: No edema.   Neurological:      General: No focal deficit present.      Mental Status: She is alert and oriented to person, place, and time. Mental status is at baseline.   Psychiatric:         Mood and Affect: Mood normal.         Behavior: Behavior normal.         Thought Content: Thought content normal.         Judgment: Judgment normal.     Tobacco Cessation Counseling: Tobacco cessation counseling and education was provided. The patient is sincerely urged to quit consumption of tobacco. She is not ready to quit tobacco. The numerous health risks of tobacco consumption were discussed. If she decides to quit, there are a number of helpful adjunctive aids, and she can see me to discuss nicotine replacement therapy, chantix, or bupropion anytime in the future.. I spent 5 minutes on Tobacco Cessation  counseling during today's visit.

## 2024-10-24 NOTE — RESULT ENCOUNTER NOTE
I sent patient [place] message stating that;  Abe Carlson, writing to review results of your recent laboratory test.  You are immune to hepatitis B and a booster shot is not required.  You are not immune to hepatitis A and you can consider vaccination with hepatitis A.  Would recommend getting this through your primary care provider.  Magnesium was normal.  Chemistry panel was normal.  C-reactive protein, a nonspecific test of inflammation is just mildly elevated at 5.3.  Vitamin B-12 was normal but low normal at 206.  I would recommend that you start using a vitamin B12 supplement such as 500 mcg up to 1000 mcg daily.  It would be best to obtain the under the tongue (sublingual formula) as it is better absorbed.  Many other laboratory tests are still pending.  CBC was normal except for an elevated MCV.  I would discuss this further with your primary care provider.  Sometimes B12 deficiency can lead to this but again please discuss this further with your primary care provider.    Please feel free to reach out if you have any questions or concerns.

## 2024-10-24 NOTE — PATIENT INSTRUCTIONS
"Patient Education     High cholesterol   The Basics   Written by the doctors and editors at [facility]   What is cholesterol? -- Cholesterol is a substance found in blood. Everyone has some. It is needed for good health. But people sometimes have too much cholesterol.  Compared with people with normal cholesterol, people with high cholesterol have a higher risk of heart attack, stroke, and other health problems. The higher your cholesterol, the higher your risk of these problems.  Are there different types of cholesterol? -- Yes, there are a few different types. If you get a cholesterol test, you might hear your doctor or nurse talk about:   Total cholesterol   LDL cholesterol - Some people call this the \"bad\" cholesterol. That's because having high LDL levels raises your risk of heart attack, stroke, and other health problems.   HDL cholesterol - Some people call this the \"good\" cholesterol. That's because people with high HDL levels tend to have a lower risk of heart attack, stroke, and other health problems.   Non-HDL cholesterol - Non-HDL cholesterol is your total cholesterol minus your HDL cholesterol.   Triglycerides - Triglycerides are not cholesterol. They are another type of fat. But they often get measured when cholesterol is measured. (Having high triglycerides also seems to increase the risk of heart attack and stroke.)  What should my numbers be? -- Ask your doctor or nurse what your numbers should be. Different people need different goals. If you live outside of the [place], see the table (table 1).  In general, people who do not already have heart disease should aim for:   Total cholesterol below 200   LDL cholesterol below 130, or much lower if they are at risk of heart attack or stroke   HDL cholesterol above 60   Non-HDL cholesterol below 160, or lower if they are at risk of heart attack or stroke   Triglycerides below 150  Remember, though, that many people who cannot meet these goals still have a low " "risk of heart attack and stroke.  What should I do if I have high cholesterol? -- Ask your doctor what your overall risk of heart attack and stroke is. Just having high cholesterol is not always a reason to worry. Having high cholesterol is just one of many things that can increase your risk of heart attack and stroke.  Other things that increase your risk include:   Smoking   High blood pressure   Having a parent or sibling who got heart disease at a young age - Young, in this case, means younger than 55 for males and younger than 65 for females.   A diet that is not heart healthy - A \"heart-healthy\" diet includes lots of fruits and vegetables, fiber, and healthy fats (like those found in fish, nuts, and certain oils). It also means limiting sugar and unhealthy fats.   Older age  If you are at high risk of heart attack and stroke, having high cholesterol is a problem. But if you are at low risk, high cholesterol might not need treatment.  Should I take medicine to lower cholesterol? -- Not everyone who has high cholesterol needs medicines. Your doctor or nurse will decide if you need them based on your age, family history, and other health concerns.  There are many different medicines used to lower cholesterol (table 2). Some help your body make less cholesterol. Some keep your body from absorbing cholesterol from foods. Some help your body get rid of cholesterol faster. The medicines most often used to treat high cholesterol are called \"statins.\"  You should probably take a statin if you:   Already had a heart attack or stroke   Have known heart disease   Have diabetes   Have a condition called \"peripheral artery disease,\" which makes it painful to walk, and happens when the arteries in your legs get clogged with fatty deposits   Have an \"abdominal aortic aneurysm,\" which is a widening of the main artery in the belly  Most people with any of the conditions listed above should take a statin no matter what their " "cholesterol level is. If your doctor or nurse prescribes a statin, it's important to keep taking it. The medicine might not make you feel any different. But it can help prevent heart attack, stroke, and death.  If your doctor or nurse recommends taking medicine to help lower your cholesterol, make sure that you know what it is called. Follow all the instructions for how to take it. For example, some medicines work better when you take them in the evening. Some need to be taken with food.  Tell your doctor or nurse if your medicine causes any side effects that bother you. They might be able to switch you to a different medicine.  Can I lower my cholesterol without medicines? -- Yes. You can help lower your cholesterol by doing these things:   You can lower your LDL, or \"bad,\" cholesterol by avoiding red meat, butter, fried foods, cheese, and other foods that have a lot of saturated fat.   You can lower triglycerides by avoiding sugary foods, fried foods, and excess alcohol.   If you have excess weight, it can help to lose weight. Your doctor or nurse can help you do this in a healthy way.   Try to get regular physical activity. Even gentle forms of exercise, like walking, are good for your health.  Even if these steps don't change your cholesterol very much, they can improve your health in many other ways.  All topics are updated as new evidence becomes available and our peer review process is complete.  This topic retrieved from [employer] on: Mar 01, 2024.  Topic 20371 Version 25.0  Release: 32.2.4 - C32.59  © 2024 UpToDate, Inc. and/or its affiliates. All rights reserved.  table 1: Cholesterol and triglyceride measurements in the [place] and elsewhere     Measurement used within the [place] Milligrams/deciliter (mg/dL)  Measurement used most places outside of the [place] Millimoles/liter (mmol/Liter)     Level to aim for  Level to aim for    Total cholesterol  Below 200 Below 5.17   LDL cholesterol  Below 130, or much lower if at " risk of heart attack and stroke Below 3.36, or much lower if at risk of heart attack and stroke   HDL cholesterol  Above 60 Above 1.55   Triglycerides  Below 150 Below 1.7   Cholesterol is measured differently in the [place] than it is in most other countries. This table shows values used within and outside of the [place]. It includes the cholesterol and triglyceride levels that most people who do not have heart disease should aim for.  Graphic 94323 Version 5.0  table 2: Lipid-lowering medicines  Generic name  Brand name    Statins    Atorvastatin Lipitor   Fluvastatin Lescol, Lescol XL   Lovastatin Mevacor, Altoprev   Pitavastatin Livalo   Pravastatin Pravachol   Rosuvastatin Crestor   Simvastatin Zocor   PCSK9 inhibitors    Alirocumab Praluent   Evolocumab Repatha, Repatha SureClick   Cholesterol absorption inhibitors    Ezetimibe Zetia   Bile acid sequestrants    Cholestyramine Prevalite, Questran, Questran Light   Colesevelam Welchol   Colestipol Colestid   Niacin (nicotinic acid)    Niacin immediate release     Niacin extended release Niaspan   Fibrates    Fenofibrate Fenoglide, Tricor, Triglide, others   Gemfibrozil Lopid   Brand names listed are for medicines available in the [place] and some other countries.  Graphic 02672 Version 7.0  Consumer Information Use and Disclaimer   Disclaimer: This generalized information is a limited summary of diagnosis, treatment, and/or medication information. It is not meant to be comprehensive and should be used as a tool to help the user understand and/or assess potential diagnostic and treatment options. It does NOT include all information about conditions, treatments, medications, side effects, or risks that may apply to a specific patient. It is not intended to be medical advice or a substitute for the medical advice, diagnosis, or treatment of a health care provider based on the health care provider's examination and assessment of a patient's specific and unique circumstances.  Patients must speak with a health care provider for complete information about their health, medical questions, and treatment options, including any risks or benefits regarding use of medications. This information does not endorse any treatments or medications as safe, effective, or approved for treating a specific patient. UpToDate, Inc. and its affiliates disclaim any warranty or liability relating to this information or the use thereof.The use of this information is governed by the Terms of Use, available at https://www.woltersInstrumentLifeuwer.com/en/know/clinical-effectiveness-terms. 2024© UpToDate, Inc. and its affiliates and/or licensors. All rights reserved.  Copyright   © 2024 UpToDate, Inc. and/or its affiliates. All rights reserved.

## 2024-10-25 ENCOUNTER — TELEPHONE (OUTPATIENT)
Age: 69
End: 2024-10-25

## 2024-10-25 LAB
RHEUMATOID FACT SER QL LA: NEGATIVE
TREPONEMA PALLIDUM IGG+IGM AB [PRESENCE] IN SERUM OR PLASMA BY IMMUNOASSAY: NORMAL

## 2024-10-25 NOTE — TELEPHONE ENCOUNTER
1430: advised per surgeon's note  No additional RXs will be prescribed r/t pt's cognition issue in the hospital  She was advised surgeon gave her OK to r/o to PCP about this and additional pain control recommendations  She was encouraged on continuing using tylenol, icing and elevating     1329:  Returned pt's call, conducted full post-op assessment  She notes she has been struggling with postsurgical pain, 7/10 in surgical area  She denies any fevers, bleeding, drainage, calf pain or other concerning symptoms  AVS medication list shows gabapentin and methocarbamol, pt reports she does have gabapentin at home but not methocarbamol, she it not taking gabapentin  She has tried one dose of ibuprofen yesterday without relief  She is taking tylenol only, 1000mg every 8 hours- NN encouraged pt to continue taking tylenol 1000mg every 8 hours, ice and elevate  Gera Correa is looking for additional pain medication recommendations, potentially something prescribed for pain control- sending to surgeon to please review and advise     She notes she has PT Friday, she would like pain to be better controlled prior to this appt as she has been struggling with pain currently with ambulation  She is ambulating using Rw as tolerated, denies any falls  Denies swelling or other issues  She confirms she is taking the eliquis as prescribed, denies bleeding or drainage  She understands the eliquis is for blood thinning  She confirms she is painting the incision with betadine as AVS indicates 2x per day  She denies having additional questions or concerns at this time  Pt encouraged to call with any questions, concerns or issues  Pt is looking for a call back with additional pain control recommendations 
Caller: Misti Sen     Doctor: Eulogio Vicente     Reason for call: Patient had Total Knee replacement on 02/08  Patient is having pain   Pain scale is 7 out of 10 now   Last evening was horrible  Is she able to have anything called in       [facility] // [facility]FFILIATED WITH [facility]       Call back#: [phone]
I have reviewed and confirmed nurses' notes for patient's medications, allergies, medical history, and surgical history.

## 2024-10-25 NOTE — TELEPHONE ENCOUNTER
Patient called requesting refill for   venlafaxine (EFFEXOR-XR) 75 mg 24 hr capsule  . Patient made aware medication was refilled on 09/25 for 90 with 1 refills to [facility] pharmacy. Patient instructed to contact the pharmacy to obtain refills of medication. Patient verbalized understanding.

## 2024-10-26 LAB — A1AT SERPL-MCNC: 140 MG/DL (ref 101–187)

## 2024-10-27 NOTE — RESULT ENCOUNTER NOTE
Please inform patient that laboratory test for unusual causes of liver problems are negative.  Vitamin D is normal.  Couple other tests are still pending.  Thank you

## 2024-10-28 ENCOUNTER — APPOINTMENT (OUTPATIENT)
Dept: PHYSICAL THERAPY | Facility: CLINIC | Age: 69
End: 2024-10-28
Payer: MEDICARE

## 2024-10-29 LAB — ACE SERPL-CCNC: 28 U/L (ref 14–82)

## 2024-10-29 NOTE — RESULT ENCOUNTER NOTE
Please inform patient that the ACE level was normal.  Looks like the JESSICA titer is still pending.  Thank you

## 2024-10-31 ENCOUNTER — TELEPHONE (OUTPATIENT)
Age: 69
End: 2024-10-31

## 2024-10-31 ENCOUNTER — APPOINTMENT (OUTPATIENT)
Dept: PHYSICAL THERAPY | Facility: CLINIC | Age: 69
End: 2024-10-31
Payer: MEDICARE

## 2024-10-31 NOTE — TELEPHONE ENCOUNTER
RN s/w pt regarding previous.  Pt last saw BK in September and was supposed to follow in December unless pain got worse.  Per pt pain has been worse for 2 weeks now bilat low back, and knee pain. Pain does not radiate to bilat knees it is just also in her knees.    Per pt she takes tylenol 650 mg 3 at a times, RN advise only taking two at a time twice a day  plus one more at HS if needed.    Pt also takes ibuprofen 800 mg may take 4 times a day per script, per pt she has been trying not to take this due to having increased bruising.    Pt has recent blood work from 10/24 is seeing her PCP to r/o other issues.    Pt was wondering if anything else can be ordered to help with her bilat low back pain at a 9/10 by the afternoon and the bilat knee pain. Pt does have relief when she lays down to a 3/10.    Pt also is c/o balance issues not dizziness just is walking and bangs into the doorway.    --please advise thank you--  PT for balance issues?  Anything for pain until seen on 11/7?

## 2024-10-31 NOTE — TELEPHONE ENCOUNTER
Caller: pt    Doctor: kocher    Reason for call: pt is having a lot of pain in her back and it is going down to her knees. Pain level 9.    Call back#: [phone]

## 2024-11-01 NOTE — TELEPHONE ENCOUNTER
Agree with recommendation of increasing Tylenol until she is seen next week.  Otherwise no new recommendations.

## 2024-11-06 DIAGNOSIS — M85.89 OSTEOPENIA OF MULTIPLE SITES: ICD-10-CM

## 2024-11-07 ENCOUNTER — OFFICE VISIT (OUTPATIENT)
Age: 69
End: 2024-11-07
Payer: MEDICARE

## 2024-11-07 VITALS
WEIGHT: 127 LBS | BODY MASS INDEX: 21.16 KG/M2 | SYSTOLIC BLOOD PRESSURE: 105 MMHG | DIASTOLIC BLOOD PRESSURE: 66 MMHG | HEIGHT: 65 IN | HEART RATE: 73 BPM

## 2024-11-07 DIAGNOSIS — M46.1 SACROILIITIS (HCC): ICD-10-CM

## 2024-11-07 DIAGNOSIS — M51.16 LUMBAR DISC DISEASE WITH RADICULOPATHY: Primary | ICD-10-CM

## 2024-11-07 DIAGNOSIS — G89.4 CHRONIC PAIN SYNDROME: ICD-10-CM

## 2024-11-07 DIAGNOSIS — M25.50 GENERALIZED JOINT PAIN: ICD-10-CM

## 2024-11-07 DIAGNOSIS — M48.061 SPINAL STENOSIS OF LUMBAR REGION WITHOUT NEUROGENIC CLAUDICATION: ICD-10-CM

## 2024-11-07 PROCEDURE — 99214 OFFICE O/P EST MOD 30 MIN: CPT | Performed by: NURSE PRACTITIONER

## 2024-11-07 RX ORDER — ALENDRONATE SODIUM 70 MG/1
TABLET ORAL
Qty: 12 TABLET | Refills: 1 | Status: SHIPPED | OUTPATIENT
Start: 2024-11-07

## 2024-11-07 RX ORDER — METHYLPREDNISOLONE 4 MG/1
TABLET ORAL
Qty: 1 EACH | Refills: 0 | Status: SHIPPED | OUTPATIENT
Start: 2024-11-07

## 2024-11-07 NOTE — PROGRESS NOTES
Assessment:  1. Lumbar disc disease with radiculopathy    2. Chronic pain syndrome    3. Sacroiliitis (HCC)    4. Spinal stenosis of lumbar region without neurogenic claudication    5. Generalized joint pain        Plan:  While the patient was in the office today, I did have a thorough conversation regarding their chronic pain syndrome, medication management, and treatment plan options.  Patient is being seen on an emergent basis for complaints of increased generalized joint pain as well as increased low back and lower extremity pain.    I discussed with the patient that at this point time since I feel that there is a significant inflammatory component to their pain symptoms, that they would benefit from a titrating dose of oral steroids over the next 7 days. I advised the patient that while on the steroids, they should not take any other oral NSAIDs except for acetaminophen or Tylenol until they have completed the steroid taper. I also advised them that once they have completed the steroid taper, they are to give our office a follow up phone call to let us know how they are doing and if there is any improvement.The patient was agreeable and verbalized an understanding.    Commended that patient's start aqua therapy.  I provided her with a referral.    She underwent bilateral SI joint injections on 9/3/2024.  She underwent ultrasound-guided cervical and trapezius trigger point injections on 8/29/2024.  Pain was up to 80% improved in both areas.    Continue Robaxin 150 mg 3 times daily if needed for spasms.    Follow-up in 2 months.  Call sooner if pain increases.      History of Present Illness:  The patient is a 68 y.o. female who presents for a follow up office visit in regards to Shoulder Pain, Hand Pain, Elbow Pain, Wrist Pain, Back Pain, Abdominal Pain, and Knee Pain.   The patient’s current symptoms include complaints of diffuse joint pain, low back and lower extremity pain.  Current pain level is an 8/10.   Quality pain is described as sharp, throbbing.    Current pain medications includes: Robaxin 750 mg every 8 hours as needed for spasms .      I have personally reviewed and/or updated the patient's past medical history, past surgical history, family history, social history, current medications, allergies, and vital signs today.         Review of Systems  Review of Systems   Constitutional:  Negative for unexpected weight change.   HENT:  Negative for hearing loss.    Eyes:  Negative for visual disturbance.   Respiratory:  Negative for shortness of breath.    Cardiovascular:  Negative for leg swelling.   Gastrointestinal:  Positive for nausea. Negative for constipation.   Endocrine: Negative for polyuria.   Genitourinary:  Negative for difficulty urinating.   Musculoskeletal:  Positive for gait problem. Negative for joint swelling and myalgias.        Decreased range of motion  Joint stiffness   Skin:  Negative for rash.   Neurological:  Positive for dizziness. Negative for weakness and headaches.        Memory loss   Psychiatric/Behavioral:  Negative for decreased concentration.    All other systems reviewed and are negative.        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     Drug intolerance     Dysfunction of right eustachian tube     Dysfunctional uterine bleeding     Edema     Elevated d-dimer     Exposure to hepatitis C     Fracture of fibula     Generalized anxiety disorder     GERD (gastroesophageal reflux disease)     Hyperlipidemia     Mild cervical dysplasia     Myalgia     Myositis     Palpitations     PONV (postoperative nausea  and vomiting)     Pre-syncope     Scoliosis deformity of spine 1/23/2024    Seasonal allergies     Stress incontinence     Thrombocytopenia (HCC)     Urinary retention     Urinary tract infection     Uterine leiomyoma        Past Surgical History:   Procedure Laterality Date    BLADDER SURGERY      BREAST BIOPSY Right     US guided    COLONOSCOPY      COLPOSCOPY  01/06/1998    DENTAL SURGERY      ENDOMETRIAL BIOPSY  05/21/1993    HYSTERECTOMY      HYSTEROSCOPY      uterus- 1/22/98, 7/1/02 and 4/10/07    [place] ARTHRP KNE CONDYLE&PLATU MEDIAL&LAT COMPARTMENTS Right 2/8/2023    Procedure: ARTHROPLASTY KNEE TOTAL;  Surgeon: Miguel Gleason DO;  Location: [facility];  Service: Orthopedics    TUBAL LIGATION      US GUIDED BREAST BIOPSY RIGHT COMPLETE Right 08/08/2018       Family History   Problem Relation Age of Onset    Stroke Mother         cerebral artery occlusion with cerebral infarction    Coronary artery disease Mother     Arrhythmia Mother         sinus    Coronary artery disease Father     Diabetes Father     No Known Problems Daughter     No Known Problems Daughter     No Known Problems Maternal Grandmother     No Known Problems Paternal Grandmother     No Known Problems Maternal Aunt     Ovarian cancer Maternal Aunt     Aortic aneurysm Family     Arthritis Family     Diabetes Family     Heart disease Family     Breast cancer Neg Hx        Social History     Occupational History    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    Occupation: housewife   Tobacco Use    Smoking status: Every Day     Current packs/day: 1.50     Average packs/day: 1.5 packs/day for 52.8 years (79.3 ttl pk-yrs)     Types: Cigarettes     Start date: 1972     Passive exposure: Never    Smokeless tobacco: Never   Vaping Use    Vaping status: Never Used   Substance and Sexual Activity    Alcohol use: Not Currently    Drug use: No    Sexual activity: Not Currently         Current Outpatient Medications:     acetaminophen (TYLENOL)  "500 mg tablet, Take 500 mg by mouth every 6 (six) hours as needed for mild pain, Disp: , Rfl:     alendronate (FOSAMAX) 70 mg tablet, take 1 tablet by mouth every 7 days, Disp: 12 tablet, Rfl: 1    ascorbic acid (VITAMIN C) 1000 MG tablet, Take 1,000 mg by mouth, Disp: , Rfl:     atorvastatin (LIPITOR) 20 mg tablet, Take 1 tablet (20 mg total) by mouth daily after dinner, Disp: 90 tablet, Rfl: 1    buPROPion (WELLBUTRIN XL) 150 mg 24 hr tablet, 150 mg every morning, Disp: , Rfl:     clonazePAM (KlonoPIN) 1 mg tablet, 2 (two) times a day as needed, Disp: , Rfl:     methocarbamol (ROBAXIN) 750 mg tablet, take 1 tablet by mouth three times a day, Disp: 90 tablet, Rfl: 1    methylPREDNISolone 4 MG tablet therapy pack, Use as directed on package, Disp: 1 each, Rfl: 0    omeprazole (PriLOSEC) 40 MG capsule, Take 1 capsule (40 mg total) by mouth daily, Disp: 90 capsule, Rfl: 1    solifenacin (VESICARE) 10 MG tablet, take 1 tablet by mouth once daily, Disp: 90 tablet, Rfl: 1    tretinoin (RETIN-A) 0.1 % cream, APPLY TOPICALLY DAILY AT BEDTIME, Disp: 45 g, Rfl: 5    venlafaxine (EFFEXOR-XR) 150 mg 24 hr capsule, One 150mg and one 75mg q day for total of 225mg q day., Disp: 90 capsule, Rfl: 1    venlafaxine (EFFEXOR-XR) 75 mg 24 hr capsule, One 150mg and one 75mg q day for total of 225mg q day., Disp: 90 capsule, Rfl: 1    Multiple Vitamins-Minerals (multivitamin with minerals) tablet, Take 1 tablet by mouth daily (Patient taking differently: Take 1 tablet by mouth as needed), Disp: 30 tablet, Rfl: 1    Allergies   Allergen Reactions    Gabapentin Drowsiness       Physical Exam:    /66   Pulse 73   Ht 5' 5\" (1.651 m)   Wt 57.6 kg (127 lb)   BMI 21.13 kg/m²     Constitutional:normal, well developed, well nourished, alert, in no distress and non-toxic and no overt pain behavior.  Eyes:anicteric  HEENT:grossly intact  Neck:supple, symmetric, trachea midline and no masses   Pulmonary:even and " unlabored  Cardiovascular:No edema or pitting edema present  Skin:Normal without rashes or lesions and well hydrated  Psychiatric:Mood and affect appropriate  Neurologic:Cranial Nerves II-XII grossly intact  Musculoskeletal:normal    Imaging  No orders to display       Orders Placed This Encounter   Procedures    Ambulatory Referral to Physical Therapy

## 2024-11-14 ENCOUNTER — TELEPHONE (OUTPATIENT)
Age: 69
End: 2024-11-14

## 2024-11-14 ENCOUNTER — OFFICE VISIT (OUTPATIENT)
Dept: INTERNAL MEDICINE CLINIC | Facility: CLINIC | Age: 69
End: 2024-11-14
Payer: MEDICARE

## 2024-11-14 VITALS
BODY MASS INDEX: 21.23 KG/M2 | TEMPERATURE: 98.2 F | SYSTOLIC BLOOD PRESSURE: 108 MMHG | DIASTOLIC BLOOD PRESSURE: 66 MMHG | HEART RATE: 99 BPM | HEIGHT: 65 IN | OXYGEN SATURATION: 95 % | WEIGHT: 127.4 LBS

## 2024-11-14 DIAGNOSIS — Z12.2 ENCOUNTER FOR SCREENING FOR LUNG CANCER: ICD-10-CM

## 2024-11-14 DIAGNOSIS — K59.00 CONSTIPATION, UNSPECIFIED CONSTIPATION TYPE: ICD-10-CM

## 2024-11-14 DIAGNOSIS — M48.061 SPINAL STENOSIS OF LUMBAR REGION WITHOUT NEUROGENIC CLAUDICATION: ICD-10-CM

## 2024-11-14 DIAGNOSIS — J43.9 PULMONARY EMPHYSEMA, UNSPECIFIED EMPHYSEMA TYPE (HCC): ICD-10-CM

## 2024-11-14 DIAGNOSIS — M54.50 CHRONIC BILATERAL LOW BACK PAIN WITHOUT SCIATICA: Primary | ICD-10-CM

## 2024-11-14 DIAGNOSIS — E78.2 MIXED HYPERLIPIDEMIA: Primary | ICD-10-CM

## 2024-11-14 DIAGNOSIS — Z72.0 TOBACCO ABUSE: ICD-10-CM

## 2024-11-14 DIAGNOSIS — K21.9 GASTROESOPHAGEAL REFLUX DISEASE, UNSPECIFIED WHETHER ESOPHAGITIS PRESENT: ICD-10-CM

## 2024-11-14 DIAGNOSIS — Z00.00 MEDICARE ANNUAL WELLNESS VISIT, SUBSEQUENT: ICD-10-CM

## 2024-11-14 DIAGNOSIS — Z87.891 PERSONAL HISTORY OF NICOTINE DEPENDENCE: ICD-10-CM

## 2024-11-14 DIAGNOSIS — F41.1 GENERALIZED ANXIETY DISORDER: ICD-10-CM

## 2024-11-14 DIAGNOSIS — G89.29 CHRONIC BILATERAL LOW BACK PAIN WITHOUT SCIATICA: Primary | ICD-10-CM

## 2024-11-14 DIAGNOSIS — G89.4 CHRONIC PAIN SYNDROME: ICD-10-CM

## 2024-11-14 DIAGNOSIS — R73.03 PREDIABETES: ICD-10-CM

## 2024-11-14 DIAGNOSIS — Z12.11 SCREEN FOR COLON CANCER: ICD-10-CM

## 2024-11-14 PROCEDURE — 99214 OFFICE O/P EST MOD 30 MIN: CPT | Performed by: INTERNAL MEDICINE

## 2024-11-14 PROCEDURE — G0439 PPPS, SUBSEQ VISIT: HCPCS | Performed by: INTERNAL MEDICINE

## 2024-11-14 RX ORDER — LIDOCAINE 50 MG/G
1 PATCH TOPICAL DAILY
Qty: 30 PATCH | Refills: 2 | Status: SHIPPED | OUTPATIENT
Start: 2024-11-14

## 2024-11-14 NOTE — PATIENT INSTRUCTIONS
"Patient Education     Quitting smoking   The Basics   Written by the doctors and editors at [facility]   What are the benefits of quitting smoking? -- Quitting smoking can dramatically improve your health and help you live longer. It lowers your risk of heart disease, lung disease, kidney failure, cancer, infection, stomach problems, and diabetes.  Quitting smoking can also lower your chances of getting osteoporosis, a condition that makes your bones weak.  Quitting is not easy for most people, and it might take several tries to completely quit. But help and support are available. Quitting smoking will improve your health no matter how old you are, even if you have smoked for a long time.  What should I do if I want to quit smoking? -- It's a good idea to start by talking with your doctor or nurse. It is possible to quit on your own, without help. But getting help greatly increases your chances of quitting successfully.  When you are ready to quit, you will make a plan to:   Set a quit date.   Tell your family and friends that you plan to quit.   Plan ahead for the challenges you will face, such as cigarette cravings.   Remove cigarettes from your home, car, and work.  How can my doctor or nurse help? -- Your doctor or nurse can give you advice on the best way to quit. They can also give you medicines to:   Reduce your craving for cigarettes   Reduce your \"withdrawal\" symptoms (symptoms that happen when you stop smoking)  Your doctor or nurse can also help you find a counselor to talk to. For most people who are trying to quit smoking, it works best to use both medicines and counseling.  You can also get help from a free phone line (1-479-QUITNOW, or 1-244.740.4289) or go online to www.smokefree.gov.  What are the symptoms of withdrawal? -- When you stop smoking, you might have symptoms such as:   Trouble sleeping   Feeling irritable, anxious, or restless   Getting frustrated or angry   Having trouble thinking " clearly  These symptoms can be hard to deal with, which is why it can be so hard to quit. But medicines can help.  Some people who stop smoking become temporarily depressed. Some people need treatment for depression, such as counseling or medicines or both. People with depression might:   No longer enjoy or care about doing the things they used to like to do   Feel sad, down, hopeless, nervous, or cranky most of the day, almost every day   Lose or gain weight   Sleep too much or too little   Feel tired or like they have no energy   Feel guilty or like they are worth nothing   Forget things or feel confused   Move and speak more slowly than usual   Act restless or have trouble staying still   Think about death or suicide  If you think you might be depressed, tell your doctor or nurse right away. They can talk to you about your symptoms and recommend treatment if needed.  Get help right away if you are thinking of hurting or killing yourself! -- Sometimes, people with depression think of hurting or killing themselves. If you ever feel like you might hurt yourself, help is available:   In the , contact the [employer] Suicide & Crisis Lifeline:   To speak to someone, call or text [employer].   To talk to someone online, go to www.[employer].org/chat.   Call your doctor or nurse, and tell them that it is an emergency.   Call for an ambulance (in the [place] and [place], call 9-1-1).   Go to the emergency department at your local hospital.  If you think your partner might have depression, or if you are worried that they might hurt themselves, get them help right away.  How does counseling work? -- A counselor can help you figure out:   What triggers you to want to smoke, and how to handle these situations   How to resist cravings   What you can do differently if you have tried to quit before  You can meet with a counselor in 1-on-1 sessions or as part of a group. There are other ways to get counseling, too, such as over the phone, through  "text messaging, or online.  How do medicines help you stop smoking? -- Different medicines work in different ways:   Nicotine replacement therapy - Nicotine is the main drug in cigarettes, and the reason they are addictive. These medicines reduce your body's craving for nicotine. They also help with withdrawal symptoms.  There are different forms of nicotine replacement, including skin patches, lozenges, gum, nasal sprays, and inhalers. Most can be bought without a prescription. Also, health insurance might cover some or all of the cost.  It often helps to use 2 forms of nicotine replacement. For example, you might wear a patch all the time, plus use gum or lozenges when you get a craving to smoke.   Varenicline - Varenicline (brand names: Chantix, Champix) is a prescription medicine that reduces withdrawal symptoms and cigarette cravings. Varenicline can increase the effects of alcohol in some people. It's a good idea to limit drinking while you're taking it, at least until you know how it affects you.  Even if you are not yet ready to commit to a quit date, varenicline can help reduce cravings. This can make it easier to quit when you are ready.   Bupropion - Bupropion (sample brand names: Wellbutrin, Zyban) is a prescription medicine that reduces your desire to smoke. It is also available in a generic version, which is cheaper than the brand name medicines. Doctors do not usually prescribe bupropion for people with seizures or who have had seizures in the past.  It might also be helpful to combine nicotine replacement with bupropion or varenicline. In some cases, a person might even take both bupropion and varenicline. Your doctor or nurse can help you figure out the best combination for you.  Can vaping help me quit? -- Sometimes, people wonder if vaping can help them quit smoking. Vaping is using electronic cigarettes, or \"e-cigarettes.\"  Doctors recommend using medicines and counseling to quit smoking. These " methods have been studied the most. But for people who have tried these and not been able to quit, switching to vaping might be an option. There are some things to remember:   Vaping might be less harmful than smoking regular cigarettes. But e-cigarettes still contain nicotine as well as other substances that might be harmful.   If you decide to try vaping to help you quit, it's important to switch completely from regular cigarettes to e-cigarettes. Using both will probably not be helpful, and might increase the risks of harm.   It's not clear how vaping can affect a person's health in the long term.  For these reasons, doctors recommend that if you do try vaping to help you quit smoking, you still make a plan to quit vaping eventually.  If you are interested in trying vaping to help you quit smoking, it's a good idea to talk to your doctor or nurse first.  What if I am pregnant and I smoke? -- If you are pregnant, it's really important for the health of your baby that you quit. Ask your doctor what options you have, and what is safest for your baby.  What if I have already smoked for a long time? -- The longer you have smoked, the higher your chances are of having health problems. But it is never too late to quit smoking. It helps your health even if you are older or have smoked for many years. The best thing you can do to lower your chance of having a health problem caused by smoking is to quit.  Will I gain weight if I quit? -- You might gain a few pounds. This can be frustrating for some people. But it's important to remember that you are improving your health by quitting smoking. You can help prevent gaining a lot of weight by staying active and eating a healthy diet.  What if I am not able to quit? -- If you don't quit on your first try, or if you quit but then start smoking again, don't give up hope. Lots of people have to try more than once before they are able to completely quit.  It might help to try to  understand why quitting did not work. There might be something you can do differently when you try again. It can help to figure out which situations make you want to smoke, so you can avoid them.  What else can I do to improve my chances of quitting? -- You can:   Get regular exercise. Any type of physical activity, even gentle forms of movement, is good for your health. Physical activity can also help reduce stress.   Stay away from people who smoke and places that make you want to smoke. If people close to you smoke, ask them to quit with you or avoid smoking around you.   Carry gum, hard candy, or something to put in your mouth. If you get a craving for a cigarette, try 1 of these instead.   Don't give up, even if you start smoking again. It takes most people a few tries before they succeed.  All topics are updated as new evidence becomes available and our peer review process is complete.  This topic retrieved from [employer] on: Mar 14, 2024.  Topic 06760 Version 33.0  Release: 32.2.4 - C32.72  © 2024 UpToDate, Inc. and/or its affiliates. All rights reserved.  Consumer Information Use and Disclaimer   Disclaimer: This generalized information is a limited summary of diagnosis, treatment, and/or medication information. It is not meant to be comprehensive and should be used as a tool to help the user understand and/or assess potential diagnostic and treatment options. It does NOT include all information about conditions, treatments, medications, side effects, or risks that may apply to a specific patient. It is not intended to be medical advice or a substitute for the medical advice, diagnosis, or treatment of a health care provider based on the health care provider's examination and assessment of a patient's specific and unique circumstances. Patients must speak with a health care provider for complete information about their health, medical questions, and treatment options, including any risks or benefits regarding  use of medications. This information does not endorse any treatments or medications as safe, effective, or approved for treating a specific patient. UpToDate, Inc. and its affiliates disclaim any warranty or liability relating to this information or the use thereof.The use of this information is governed by the Terms of Use, available at https://www.[employer].com/en/know/clinical-effectiveness-terms. 2024© UpToDate, Inc. and its affiliates and/or licensors. All rights reserved.  Copyright   © 2024 UpToDate, Inc. and/or its affiliates. All rights reserved.    Medicare Preventive Visit Patient Instructions  Thank you for completing your Welcome to Medicare Visit or Medicare Annual Wellness Visit today. Your next wellness visit will be due in one year (11/15/2025).  The screening/preventive services that you may require over the next 5-10 years are detailed below. Some tests may not apply to you based off risk factors and/or age. Screening tests ordered at today's visit but not completed yet may show as past due. Also, please note that scanned in results may not display below.  Preventive Screenings:  Service Recommendations Previous Testing/Comments   Colorectal Cancer Screening  * Colonoscopy    * Fecal Occult Blood Test (FOBT)/Fecal Immunochemical Test (FIT)  * Fecal DNA/Cologuard Test  * Flexible Sigmoidoscopy Age: 45-75 years old   Colonoscopy: every 10 years (may be performed more frequently if at higher risk)  OR  FOBT/FIT: every 1 year  OR  Cologuard: every 3 years  OR  Sigmoidoscopy: every 5 years  Screening may be recommended earlier than age 45 if at higher risk for colorectal cancer. Also, an individualized decision between you and your healthcare provider will decide whether screening between the ages of 76-85 would be appropriate. Colonoscopy: 12/05/2007  FOBT/FIT: Not on file  Cologuard: Not on file  Sigmoidoscopy: Not on file          Breast Cancer Screening Age: 40+ years old  Frequency: every 1-2  years  Not required if history of left and right mastectomy Mammogram: 01/29/2024    Screening Current   Cervical Cancer Screening Between the ages of 21-29, pap smear recommended once every 3 years.   Between the ages of 30-65, can perform pap smear with HPV co-testing every 5 years.   Recommendations may differ for women with a history of total hysterectomy, cervical cancer, or abnormal pap smears in past. Pap Smear: 07/11/2018    History Cervical Cancer   Hepatitis C Screening Once for adults born between 1945 and 1965  More frequently in patients at high risk for Hepatitis C Hep C Antibody: 02/18/2022    Screening Current   Diabetes Screening 1-2 times per year if you're at risk for diabetes or have pre-diabetes Fasting glucose: 94 mg/dL (12/11/2023)  A1C: 5.9 % (10/24/2024)  Screening Current   Cholesterol Screening Once every 5 years if you don't have a lipid disorder. May order more often based on risk factors. Lipid panel: 03/06/2023    Screening Not Indicated  History Lipid Disorder     Other Preventive Screenings Covered by Medicare:  Abdominal Aortic Aneurysm (AAA) Screening: covered once if your at risk. You're considered to be at risk if you have a family history of AAA.  Lung Cancer Screening: covers low dose CT scan once per year if you meet all of the following conditions: (1) Age 55-77; (2) No signs or symptoms of lung cancer; (3) Current smoker or have quit smoking within the last 15 years; (4) You have a tobacco smoking history of at least 20 pack years (packs per day multiplied by number of years you smoked); (5) You get a written order from a healthcare provider.  Glaucoma Screening: covered annually if you're considered high risk: (1) You have diabetes OR (2) Family history of glaucoma OR (3)  aged 50 and older OR (4)  American aged 65 and older  Osteoporosis Screening: covered every 2 years if you meet one of the following conditions: (1) You're estrogen deficient and  at risk for osteoporosis based off medical history and other findings; (2) Have a vertebral abnormality; (3) On glucocorticoid therapy for more than 3 months; (4) Have primary hyperparathyroidism; (5) On osteoporosis medications and need to assess response to drug therapy.   Last bone density test (DXA Scan): 01/29/2024.  HIV Screening: covered annually if you're between the age of 15-65. Also covered annually if you are younger than 15 and older than 65 with risk factors for HIV infection. For pregnant patients, it is covered up to 3 times per pregnancy.    Immunizations:  Immunization Recommendations   Influenza Vaccine Annual influenza vaccination during flu season is recommended for all persons aged >= 6 months who do not have contraindications   Pneumococcal Vaccine   * Pneumococcal conjugate vaccine = PCV13 (Prevnar 13), PCV15 (Vaxneuvance), PCV20 (Prevnar 20)  * Pneumococcal polysaccharide vaccine = PPSV23 (Pneumovax) Adults 19-63 yo with certain risk factors or if 65+ yo  If never received any pneumonia vaccine: recommend Prevnar 20 (PCV20)  Give PCV20 if previously received 1 dose of PCV13 or PPSV23   Hepatitis B Vaccine 3 dose series if at intermediate or high risk (ex: diabetes, end stage renal disease, liver disease)   Respiratory syncytial virus (RSV) Vaccine - COVERED BY MEDICARE PART D  * RSVPreF3 (Arexvy) CDC recommends that adults 60 years of age and older may receive a single dose of RSV vaccine using shared clinical decision-making (SCDM)   Tetanus (Td) Vaccine - COST NOT COVERED BY MEDICARE PART B Following completion of primary series, a booster dose should be given every 10 years to maintain immunity against tetanus. Td may also be given as tetanus wound prophylaxis.   Tdap Vaccine - COST NOT COVERED BY MEDICARE PART B Recommended at least once for all adults. For pregnant patients, recommended with each pregnancy.   Shingles Vaccine (Shingrix) - COST NOT COVERED BY MEDICARE PART B  2 shot  series recommended in those 19 years and older who have or will have weakened immune systems or those 50 years and older     Health Maintenance Due:      Topic Date Due   • Colorectal Cancer Screening  12/05/2017   • Lung Cancer Screening  02/09/2024   • Breast Cancer Screening: Mammogram  01/29/2026   • DXA SCAN  01/29/2026   • Hepatitis C Screening  Completed   • Cervical Cancer Screening  Discontinued     Immunizations Due:      Topic Date Due   • Hepatitis A Vaccine (1 of 2 - Risk 2-dose series) Never done   • COVID-19 Vaccine (4 - 2024-25 season) 09/01/2024     Advance Directives   What are advance directives?  Advance directives are legal documents that state your wishes and plans for medical care. These plans are made ahead of time in case you lose your ability to make decisions for yourself. Advance directives can apply to any medical decision, such as the treatments you want, and if you want to donate organs.   What are the types of advance directives?  There are many types of advance directives, and each state has rules about how to use them. You may choose a combination of any of the following:  Living will:  This is a written record of the treatment you want. You can also choose which treatments you do not want, which to limit, and which to stop at a certain time. This includes surgery, medicine, IV fluid, and tube feedings.   Durable power of  for healthcare (DPAHC):  This is a written record that states who you want to make healthcare choices for you when you are unable to make them for yourself. This person, called a proxy, is usually a family member or a friend. You may choose more than 1 proxy.  Do not resuscitate (DNR) order:  A DNR order is used in case your heart stops beating or you stop breathing. It is a request not to have certain forms of treatment, such as CPR. A DNR order may be included in other types of advance directives.  Medical directive:  This covers the care that you want  if you are in a coma, near death, or unable to make decisions for yourself. You can list the treatments you want for each condition. Treatment may include pain medicine, surgery, blood transfusions, dialysis, IV or tube feedings, and a ventilator (breathing machine).  Values history:  This document has questions about your views, beliefs, and how you feel and think about life. This information can help others choose the care that you would choose.  Why are advance directives important?  An advance directive helps you control your care. Although spoken wishes may be used, it is better to have your wishes written down. Spoken wishes can be misunderstood, or not followed. Treatments may be given even if you do not want them. An advance directive may make it easier for your family to make difficult choices about your care.   Fall Prevention    Fall prevention  includes ways to make your home and other areas safer. It also includes ways you can move more carefully to prevent a fall. Health conditions that cause changes in your blood pressure, vision, or muscle strength and coordination may increase your risk for falls. Medicines may also increase your risk for falls if they make you dizzy, weak, or sleepy.   Fall prevention tips:   Stand or sit up slowly.    Use assistive devices as directed.    Wear shoes that fit well and have soles that .    Wear a personal alarm.    Stay active.    Manage your medical conditions.    Home Safety Tips:  Add items to prevent falls in the bathroom.    Keep paths clear.    Install bright lights in your home.    Keep items you use often on shelves within reach.    Paint or place reflective tape on the edges of your stairs.    Urinary Incontinence   Urinary incontinence (UI)  is when you lose control of your bladder. UI develops because your bladder cannot store or empty urine properly. The 3 most common types of UI are stress incontinence, urge incontinence, or both.  Medicines:   May  be given to help strengthen your bladder control. Report any side effects of medication to your healthcare provider.  Do pelvic muscle exercises often:  Your pelvic muscles help you stop urinating. Squeeze these muscles tight for 5 seconds, then relax for 5 seconds. Gradually work up to squeezing for 10 seconds. Do 3 sets of 15 repetitions a day, or as directed. This will help strengthen your pelvic muscles and improve bladder control.  Train your bladder:  Go to the bathroom at set times, such as every 2 hours, even if you do not feel the urge to go. You can also try to hold your urine when you feel the urge to go. For example, hold your urine for 5 minutes when you feel the urge to go. As that becomes easier, hold your urine for 10 minutes.   Self-care:   Keep a UI record.  Write down how often you leak urine and how much you leak. Make a note of what you were doing when you leaked urine.  Drink liquids as directed. You may need to limit the amount of liquid you drink to help control your urine leakage. Do not drink any liquid right before you go to bed. Limit or do not have drinks that contain caffeine or alcohol.   Prevent constipation.  Eat a variety of high-fiber foods. Good examples are high-fiber cereals, beans, vegetables, and whole-grain breads. Walking is the best way to trigger your intestines to have a bowel movement.  Exercise regularly and maintain a healthy weight.  Weight loss and exercise will decrease pressure on your bladder and help you control your leakage.   Use a catheter as directed  to help empty your bladder. A catheter is a tiny, plastic tube that is put into your bladder to drain your urine.   Go to behavior therapy as directed.  Behavior therapy may be used to help you learn to control your urge to urinate.    Cigarette Smoking and Your Health   Risks to your health if you smoke:  Nicotine and other chemicals found in tobacco damage every cell in your body. Even if you are a light  smoker, you have an increased risk for cancer, heart disease, and lung disease. If you are pregnant or have diabetes, smoking increases your risk for complications.   Benefits to your health if you stop smoking:   You decrease respiratory symptoms such as coughing, wheezing, and shortness of breath.   You reduce your risk for cancers of the lung, mouth, throat, kidney, bladder, pancreas, stomach, and cervix. If you already have cancer, you increase the benefits of chemotherapy. You also reduce your risk for cancer returning or a second cancer from developing.   You reduce your risk for heart disease, blood clots, heart attack, and stroke.   You reduce your risk for lung infections, and diseases such as pneumonia, asthma, chronic bronchitis, and emphysema.  Your circulation improves. More oxygen can be delivered to your body. If you have diabetes, you lower your risk for complications, such as kidney, artery, and eye diseases. You also lower your risk for nerve damage. Nerve damage can lead to amputations, poor vision, and blindness.  You improve your body's ability to heal and to fight infections.  For more information and support to stop smoking:   Smokefree.gov  Phone: 9- 544 - 775-1458  Web Address: www.smokefree.gov     © Copyright [employer] 2018 Information is for End User's use only and may not be sold, redistributed or otherwise used for commercial purposes. All illustrations and images included in CareNotes® are the copyrighted property of [employer] or [employer]

## 2024-11-14 NOTE — TELEPHONE ENCOUNTER
Caller: [email or web address]    Doctor: jones    Reason for call: pt would like to get a low back injection.    Call back#: [phone]

## 2024-11-14 NOTE — TELEPHONE ENCOUNTER
S/w pt, she has not called to arrange aqua therapy. Recommended she call and schedule now. Also advised Rx for Lidoderm patches were sent to her pharmacy.  PT is not asking to have repeat injections. Explained he last injection was 9/3 so she would not be due until Dec

## 2024-11-14 NOTE — PROGRESS NOTES
Name: Surekha Tenorio      : 1955      MRN: [id number]  Encounter Provider: Miguel Ruby DO  Encounter Date: 2024   Encounter department: [facility]  :  Assessment & Plan  Mixed hyperlipidemia         Constipation, unspecified constipation type         Spinal stenosis of lumbar region without neurogenic claudication         Chronic pain syndrome         Generalized anxiety disorder         Tobacco abuse    Orders:    CT lung screening program; Future    Gastroesophageal reflux disease, unspecified whether esophagitis present         Pulmonary emphysema, unspecified emphysema type (HCC)         Medicare annual wellness visit, subsequent         Encounter for screening for lung cancer  I discussed with her that she is a candidate for lung cancer CT screening.     The following Shared Decision-Making points were covered:  Benefits of screening were discussed, including the rates of reduction in death from lung cancer and other causes.  Harms of screening were reviewed, including false positive tests, radiation exposure levels, risks of invasive procedures, risks of complications of screening, and risk of overdiagnosis.  I counseled on the importance of adherence to annual lung cancer LDCT screening, impact of co-morbidities, and ability or willingness to undergo diagnosis and treatment.  I counseled on the importance of maintaining abstinence as a former smoker or was counseled on the importance of smoking cessation if a current smoker    Review of Eligibility Criteria: She meets all of the criteria for Lung Cancer Screening.   She is 68 y.o.   She has 20 pack year tobacco history and is a current smoker or has quit within the past 15 years  She presents no signs or symptoms of lung cancer    After discussion, the patient decided to elect lung cancer screening.    Orders:    CT lung screening program; Future    Prediabetes         Screen for colon cancer    Orders:     Cologuard    Personal history of nicotine dependence    Orders:    CT lung screening program; Future          Falls Plan of Care: balance, strength, and gait training instructions were provided.     Tobacco Cessation Counseling: Tobacco cessation counseling was provided.     A/P: Doing ok and labs are up to date. Pt to modify her risks for diabetes. Appreciate pain management input. Discussed vaccines defers all. Discussed lung ca screening will order. Wean tobacco. Order CRC. Keep f/u with the specialist and RTC six month for routine. ?palliative referral if injection don't work and pt not a sx candidate. ?Neurostim implant or morphine pump.       History of Present Illness     [facility] RTC for f/u HLD, COPD, etc. Doing ok, but still with considerable LBP and working with pain management. Remains as active as her pain will allow and no falls. Breathing is good with limited rescue MDI use, but still smoking. No reflux. No yellowing of the eyes or skin. ANAND/MDD are controlled. Constipation is stable. Labs recently done and ok except for pre diabetic range HgA1c. Due for  lung ca screening, vaccines, and CRC.       Review of Systems   Constitutional:  Positive for activity change. Negative for chills, diaphoresis, fatigue and fever.   HENT: Negative.     Eyes:  Negative for visual disturbance.   Respiratory:  Negative for cough, chest tightness, shortness of breath and wheezing.    Cardiovascular:  Negative for chest pain, palpitations and leg swelling.   Gastrointestinal:  Negative for abdominal pain, constipation, diarrhea, nausea and vomiting.   Endocrine: Negative for cold intolerance and heat intolerance.   Genitourinary:  Negative for difficulty urinating, dysuria and frequency.   Musculoskeletal:  Positive for arthralgias, back pain and gait problem. Negative for joint swelling and myalgias.   Neurological:  Negative for dizziness, seizures, syncope, weakness, light-headedness and headaches.   Psychiatric/Behavioral:  " Negative for confusion, dysphoric mood and sleep disturbance. The patient is not nervous/anxious.           Objective   /66   Pulse 99   Temp 98.2 °F (36.8 °C)   Ht 5' 5\" (1.651 m)   Wt 57.8 kg (127 lb 6.4 oz)   SpO2 95%   BMI 21.20 kg/m²      Physical Exam  Vitals and nursing note reviewed.   Constitutional:       General: She is not in acute distress.     Appearance: Normal appearance. She is not ill-appearing.   HENT:      Head: Normocephalic and atraumatic.      Mouth/Throat:      Mouth: Mucous membranes are moist.   Eyes:      Extraocular Movements: Extraocular movements intact.      Conjunctiva/sclera: Conjunctivae normal.      Pupils: Pupils are equal, round, and reactive to light.   Neck:      Vascular: No carotid bruit.   Cardiovascular:      Rate and Rhythm: Normal rate and regular rhythm.      Heart sounds: Normal heart sounds. No murmur heard.  Pulmonary:      Effort: Pulmonary effort is normal. No respiratory distress.      Breath sounds: Normal breath sounds. No wheezing, rhonchi or rales.   Abdominal:      General: Bowel sounds are normal. There is no distension.      Palpations: Abdomen is soft.      Tenderness: There is no abdominal tenderness.   Musculoskeletal:      Cervical back: Neck supple.      Right lower leg: No edema.      Left lower leg: No edema.   Neurological:      General: No focal deficit present.      Mental Status: She is alert and oriented to person, place, and time. Mental status is at baseline.   Psychiatric:         Mood and Affect: Mood normal.         Behavior: Behavior normal.         Thought Content: Thought content normal.         Judgment: Judgment normal.         Tobacco Cessation Counseling: Tobacco cessation counseling and education was provided. The patient is sincerely urged to quit consumption of tobacco. She is not ready to quit tobacco. The numerous health risks of tobacco consumption were discussed. If she decides to quit, there are a number of " helpful adjunctive aids, and she can see me to discuss nicotine replacement therapy, chantix, or bupropion anytime in the future.. I spent 5 minutes on Tobacco Cessation counseling during today's visit.

## 2024-11-14 NOTE — TELEPHONE ENCOUNTER
[facility] therapy was recommended during office visit last week.  Has she scheduled anything yet?  He can try Lidoderm patches.  I will send a prescription to her pharmacy.

## 2024-11-14 NOTE — PROGRESS NOTES
Name: Surekha Tenorio      : 1955      MRN: [id number]  Encounter Provider: Miguel Ruby DO  Encounter Date: 2024   Encounter department: [facility]    Assessment & Plan  Mixed hyperlipidemia         Constipation, unspecified constipation type         Spinal stenosis of lumbar region without neurogenic claudication         Chronic pain syndrome         Generalized anxiety disorder         Tobacco abuse    Orders:    CT lung screening program; Future    Gastroesophageal reflux disease, unspecified whether esophagitis present         Pulmonary emphysema, unspecified emphysema type (HCC)         Medicare annual wellness visit, subsequent         Encounter for screening for lung cancer  I discussed with her that she is a candidate for lung cancer CT screening.     The following Shared Decision-Making points were covered:  Benefits of screening were discussed, including the rates of reduction in death from lung cancer and other causes.  Harms of screening were reviewed, including false positive tests, radiation exposure levels, risks of invasive procedures, risks of complications of screening, and risk of overdiagnosis.  I counseled on the importance of adherence to annual lung cancer LDCT screening, impact of co-morbidities, and ability or willingness to undergo diagnosis and treatment.  I counseled on the importance of maintaining abstinence as a former smoker or was counseled on the importance of smoking cessation if a current smoker    Review of Eligibility Criteria: She meets all of the criteria for Lung Cancer Screening.   She is 68 y.o.   She has 20 pack year tobacco history and is a current smoker or has quit within the past 15 years  She presents no signs or symptoms of lung cancer    After discussion, the patient decided to elect lung cancer screening.    Orders:    CT lung screening program; Future    Prediabetes         Screen for colon cancer    Orders:     Mikayla    Personal history of nicotine dependence    Orders:    CT lung screening program; Future       Preventive health issues were discussed with patient, and age appropriate screening tests were ordered as noted in patient's After Visit Summary. Personalized health advice and appropriate referrals for health education or preventive services given if needed, as noted in patient's After Visit Summary.    History of Present Illness     HPI   Patient Care Team:  Miguel Ruby DO as PCP - General (Internal Medicine)  ZUHAIR Maynard as Nurse Practitioner (Pulmonology)  Kevon Guajardo DO (Gastroenterology)    Review of Systems  Medical History Reviewed by provider this encounter:  Tobacco  Allergies  Meds  Problems  Med Hx  Surg Hx  Fam Hx       Annual Wellness Visit Questionnaire   Surekha is here for her Subsequent Wellness visit. Last Medicare Wellness visit information reviewed, patient interviewed and updates made to the record today.      Health Risk Assessment:   Patient rates overall health as poor. Patient feels that their physical health rating is slightly worse. Patient is very satisfied with their life. Eyesight was rated as same. Hearing was rated as same. Patient feels that their emotional and mental health rating is slightly worse. Patients states they are never, rarely angry. Patient states they are sometimes unusually tired/fatigued. Pain experienced in the last 7 days has been a lot. Patient's pain rating has been 9/10. Patient states that she has experienced no weight loss or gain in last 6 months.     Depression Screening:   PHQ-2 Score: 2      Fall Risk Screening:   In the past year, patient has experienced: history of falling in past year    Number of falls: 2 or more  Injured during fall?: Yes    Feels unsteady when standing or walking?: Yes    Worried about falling?: Yes      Urinary Incontinence Screening:   Patient has leaked urine accidently in the last six  months.     Home Safety:  Patient does not have trouble with stairs inside or outside of their home. Patient has working smoke alarms and has working carbon monoxide detector. Home safety hazards include: loose rugs on the floor.     Nutrition:   Current diet is Regular.     Medications:   Patient is currently taking over-the-counter supplements. OTC medications include: see medication list. Patient is able to manage medications.     Activities of Daily Living (ADLs)/Instrumental Activities of Daily Living (IADLs):   Walk and transfer into and out of bed and chair?: Yes  Dress and groom yourself?: Yes    Bathe or shower yourself?: Yes    Feed yourself? Yes  Do your laundry/housekeeping?: Yes  Manage your money, pay your bills and track your expenses?: Yes  Make your own meals?: Yes    Do your own shopping?: Yes    Previous Hospitalizations:   Any hospitalizations or ED visits within the last 12 months?: No      Advance Care Planning:   Living will: No    Durable POA for healthcare: No    Advanced directive: No    Advanced directive counseling given: Yes    ACP document given: Yes    Patient declined ACP directive: No    End of Life Decisions reviewed with patient: Yes    Provider agrees with end of life decisions: Yes      Comments: Will discuss further once pt reviews the info given today.     Cognitive Screening:   Provider or family/friend/caregiver concerned regarding cognition?: No    PREVENTIVE SCREENINGS      Cardiovascular Screening:    General: History Lipid Disorder and Screening Current      Diabetes Screening:     General: Screening Current      Colorectal Cancer Screening:     General: Risks and Benefits Discussed    Due for: Cologuard      Breast Cancer Screening:     General: Screening Current      Cervical Cancer Screening:    General: History Cervical Cancer      Osteoporosis Screening:    General: Screening Current      Abdominal Aortic Aneurysm (AAA) Screening:        General: Screening Not  "Indicated      Lung Cancer Screening:     General: Risks and Benefits Discussed    Due for: Low Dose CT (LDCT)      Hepatitis C Screening:    General: Screening Current    Screening, Brief Intervention, and Referral to Treatment (SBIRT)    Screening  Typical number of drinks in a day: 0  Typical number of drinks in a week: 0  Interpretation: Low risk drinking behavior.    Single Item Drug Screening:  How often have you used an illegal drug (including marijuana) or a prescription medication for non-medical reasons in the past year? never    Single Item Drug Screen Score: 0  Interpretation: Negative screen for possible drug use disorder    Other Counseling Topics:   Car/seat belt/driving safety, sunscreen and regular weightbearing exercise and calcium and vitamin D intake.     Social Drivers of Health     Financial Resource Strain: Low Risk  (3/6/2023)    Overall Financial Resource Strain (CARDIA)     Difficulty of Paying Living Expenses: Not hard at all   Food Insecurity: No Food Insecurity (11/14/2024)    Hunger Vital Sign     Worried About Running Out of Food in the Last Year: Never true     Ran Out of Food in the Last Year: Never true   Transportation Needs: No Transportation Needs (11/14/2024)    PRAPARE - Transportation     Lack of Transportation (Medical): No     Lack of Transportation (Non-Medical): No   Housing Stability: Low Risk  (11/14/2024)    Housing Stability Vital Sign     Unable to Pay for Housing in the Last Year: No     Number of Times Moved in the Last Year: 0     Homeless in the Last Year: No   Utilities: Not At Risk (11/14/2024)    [facility] Utilities     Threatened with loss of utilities: No     No results found.    Objective   /66   Pulse 99   Temp 98.2 °F (36.8 °C)   Ht 5' 5\" (1.651 m)   Wt 57.8 kg (127 lb 6.4 oz)   SpO2 95%   BMI 21.20 kg/m²     Physical Exam    A/P: Doing well and no falls. Denies depression and feels safe at home. Diverse diet. No problems operating a MV and uses seat " belts. No living will or POA. Information give for pt to review. No DME or referrals needed today. RTC in one year for medicare wellness.

## 2024-11-14 NOTE — TELEPHONE ENCOUNTER
S/w/ pt. Seen 11/7, given steroids and notes nothing is helping her. States pain was 9/10 yesterday , 6-7/10 today . Was seen by PCP and she said he mentioned that she ask for a pain patch  Aware I would discuss with BK and call her back

## 2024-12-19 ENCOUNTER — TELEPHONE (OUTPATIENT)
Age: 69
End: 2024-12-19

## 2024-12-19 NOTE — TELEPHONE ENCOUNTER
Patient called requesting refills for venlafaxine 75mg and 150mg. Patient made aware medications were refilled on 09/25/24 for 90 capsules with 1 refill at [facility] pharmacy. Patient instructed to contact the pharmacy to obtain refills of medications. Patient verbalized understanding.

## 2024-12-23 DIAGNOSIS — M62.830 SPASM OF THORACOLUMBAR MUSCLE: ICD-10-CM

## 2024-12-23 RX ORDER — METHOCARBAMOL 750 MG/1
750 TABLET, FILM COATED ORAL 3 TIMES DAILY
Qty: 90 TABLET | Refills: 1 | Status: SHIPPED | OUTPATIENT
Start: 2024-12-23

## 2025-01-07 ENCOUNTER — OFFICE VISIT (OUTPATIENT)
Dept: OBGYN CLINIC | Facility: CLINIC | Age: 70
End: 2025-01-07
Payer: MEDICARE

## 2025-01-07 VITALS — BODY MASS INDEX: 21.66 KG/M2 | HEIGHT: 65 IN | WEIGHT: 130 LBS

## 2025-01-07 DIAGNOSIS — M19.031 ARTHRITIS OF RIGHT WRIST: ICD-10-CM

## 2025-01-07 DIAGNOSIS — M70.61 GREATER TROCHANTERIC BURSITIS OF RIGHT HIP: ICD-10-CM

## 2025-01-07 DIAGNOSIS — M75.41 IMPINGEMENT SYNDROME OF RIGHT SHOULDER: Primary | ICD-10-CM

## 2025-01-07 PROCEDURE — 20605 DRAIN/INJ JOINT/BURSA W/O US: CPT | Performed by: ORTHOPAEDIC SURGERY

## 2025-01-07 PROCEDURE — 99213 OFFICE O/P EST LOW 20 MIN: CPT | Performed by: ORTHOPAEDIC SURGERY

## 2025-01-07 PROCEDURE — 20610 DRAIN/INJ JOINT/BURSA W/O US: CPT | Performed by: ORTHOPAEDIC SURGERY

## 2025-01-07 RX ORDER — BUPIVACAINE HYDROCHLORIDE 5 MG/ML
3.5 INJECTION, SOLUTION PERINEURAL
Status: COMPLETED | OUTPATIENT
Start: 2025-01-07 | End: 2025-01-07

## 2025-01-07 RX ORDER — BETAMETHASONE SODIUM PHOSPHATE AND BETAMETHASONE ACETATE 3; 3 MG/ML; MG/ML
6 INJECTION, SUSPENSION INTRA-ARTICULAR; INTRALESIONAL; INTRAMUSCULAR; SOFT TISSUE
Status: COMPLETED | OUTPATIENT
Start: 2025-01-07 | End: 2025-01-07

## 2025-01-07 RX ORDER — TRIAMCINOLONE ACETONIDE 40 MG/ML
80 INJECTION, SUSPENSION INTRA-ARTICULAR; INTRAMUSCULAR
Status: COMPLETED | OUTPATIENT
Start: 2025-01-07 | End: 2025-01-07

## 2025-01-07 RX ORDER — BUPIVACAINE HYDROCHLORIDE 2.5 MG/ML
1 INJECTION, SOLUTION INFILTRATION; PERINEURAL
Status: COMPLETED | OUTPATIENT
Start: 2025-01-07 | End: 2025-01-07

## 2025-01-07 RX ADMIN — BUPIVACAINE HYDROCHLORIDE 1 ML: 2.5 INJECTION, SOLUTION INFILTRATION; PERINEURAL at 08:45

## 2025-01-07 RX ADMIN — BUPIVACAINE HYDROCHLORIDE 3.5 ML: 5 INJECTION, SOLUTION PERINEURAL at 08:45

## 2025-01-07 RX ADMIN — BETAMETHASONE SODIUM PHOSPHATE AND BETAMETHASONE ACETATE 6 MG: 3; 3 INJECTION, SUSPENSION INTRA-ARTICULAR; INTRALESIONAL; INTRAMUSCULAR; SOFT TISSUE at 08:45

## 2025-01-07 RX ADMIN — TRIAMCINOLONE ACETONIDE 80 MG: 40 INJECTION, SUSPENSION INTRA-ARTICULAR; INTRAMUSCULAR at 08:45

## 2025-01-07 NOTE — PROGRESS NOTES
Assessment/Plan:   Diagnoses and all orders for this visit:    Impingement syndrome of right shoulder  -     Large joint arthrocentesis: R subacromial bursa    Greater trochanteric bursitis of right hip  -     Large joint arthrocentesis: R greater trochanteric bursa    Arthritis of right wrist  -     Medium joint arthrocentesis: R radiocarpal    Discussed with patient at today's physical exam is consistent with flareup of right subacromial impingement, right trochanteric bursitis, and right radiocarpal osteoarthritis. Patient was offered, and accepted, Kenalog and Marcaine injection(s) to the right subacromial bursa and right trochanteric bursa for relief of pain and inflammation. She was also offered a Marcaine and betamethasone injection to the right radiocarpal joint for relief of pain and inflammation. Patient tolerated treatment(s) well. She will be seen in 3 months for re-evaluation and consideration for repeat injections as necessary.  Patient expresses understanding and is in agreement with this treatment plan.    The patient has arthritis of her right wrist, bursitis right hip, and impingement right shoulder.  The latter 2 were injected with Kenalog and Marcaine.  The former with Celestone and Marcaine.  She tolerated procedures well.  Return back in 3 months for reevaluation    Subjective:   Patient ID: Surekha Tenorio  1955     HPI  Patient is a 69 y.o. female who presents for follow-up evaluation of right trochanteric bursitis and right subacromial impingement. She was last seen regards his issues on 10/8/2024, at which time she received corticosteroid injections. On today's presentation patient states she has experienced recurrence of her right shoulder and right hip symptoms within the last 2 weeks. On today's presentation she reports localized tenderness over the lateral aspect of the right hip, pain exacerbation in the right shoulder with overhead motion, and achy pain in the right wrist with  gripping activity. She denies any new onset bruising, swelling, numbness, or tingling. She desires repeat injections today.    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     Drug intolerance     Dysfunction of right eustachian tube     Dysfunctional uterine bleeding     Edema     Elevated d-dimer     Exposure to hepatitis C     Fracture of fibula     Generalized anxiety disorder     GERD (gastroesophageal reflux disease)     Hyperlipidemia     Mild cervical dysplasia     Myalgia     Myositis     Palpitations     PONV (postoperative nausea and vomiting)     Pre-syncope     Scoliosis deformity of spine 1/23/2024    Seasonal allergies     Stress incontinence     Thrombocytopenia (HCC)     Urinary retention     Urinary tract infection     Uterine leiomyoma        Past Surgical History:   Procedure Laterality Date    BLADDER SURGERY      BREAST BIOPSY Right     US guided    COLONOSCOPY      COLPOSCOPY  01/06/1998    DENTAL SURGERY      ENDOMETRIAL BIOPSY  05/21/1993    HYSTERECTOMY      HYSTEROSCOPY      uterus- 1/22/98, 7/1/02 and 4/10/07    ME ARTHRP KNE CONDYLE&PLATU MEDIAL&LAT COMPARTMENTS Right 2/8/2023    Procedure: ARTHROPLASTY KNEE TOTAL;  Surgeon: Miguel Gleason DO;  Location: [facility];  Service: Orthopedics    TUBAL LIGATION      US GUIDED BREAST BIOPSY RIGHT COMPLETE Right 08/08/2018       Family History   Problem Relation Age of  Onset    Stroke Mother         cerebral artery occlusion with cerebral infarction    Coronary artery disease Mother     Arrhythmia Mother         sinus    Coronary artery disease Father     Diabetes Father     No Known Problems Daughter     No Known Problems Daughter     No Known Problems Maternal Grandmother     No Known Problems Paternal Grandmother     No Known Problems Maternal Aunt     Ovarian cancer Maternal Aunt     Aortic aneurysm Family     Arthritis Family     Diabetes Family     Heart disease Family     Breast cancer Neg Hx        Social History     Socioeconomic History    Marital status: /Civil Union     Spouse name: None    Number of children: None    Years of education: None    Highest education level: None   Occupational History    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[employer] [employer]    Occupation: housewife   Tobacco Use    Smoking status: Every Day     Current packs/day: 1.50     Average packs/day: 1.5 packs/day for 53.0 years (79.5 ttl pk-yrs)     Types: Cigarettes     Start date: 1972     Passive exposure: Never    Smokeless tobacco: Never   Vaping Use    Vaping status: Never Used   Substance and Sexual Activity    Alcohol use: Not Currently    Drug use: No    Sexual activity: Not Currently   Other Topics Concern    None   Social History Narrative    Caffeine use     Social Drivers of Health     Financial Resource Strain: Low Risk  (3/6/2023)    Overall Financial Resource Strain (CARDIA)     Difficulty of Paying Living Expenses: Not hard at all   Food Insecurity: No Food Insecurity (11/14/2024)    Hunger Vital Sign     Worried About Running Out of Food in the Last Year: Never true     Ran Out of Food in the Last Year: Never true   Transportation Needs: No Transportation Needs (11/14/2024)    PRAPARE - Transportation     Lack of Transportation (Medical): No     Lack of Transportation (Non-Medical): No   Physical Activity: Not on file   Stress: Not on file   Social Connections: Not on file    Intimate Partner Violence: Not on file   Housing Stability: Low Risk  (11/14/2024)    Housing Stability Vital Sign     Unable to Pay for Housing in the Last Year: No     Number of Times Moved in the Last Year: 0     Homeless in the Last Year: No         Current Outpatient Medications:     acetaminophen (TYLENOL) 500 mg tablet, Take 500 mg by mouth every 6 (six) hours as needed for mild pain, Disp: , Rfl:     alendronate (FOSAMAX) 70 mg tablet, take 1 tablet by mouth every 7 days, Disp: 12 tablet, Rfl: 1    ascorbic acid (VITAMIN C) 1000 MG tablet, Take 1,000 mg by mouth, Disp: , Rfl:     atorvastatin (LIPITOR) 20 mg tablet, Take 1 tablet (20 mg total) by mouth daily after dinner, Disp: 90 tablet, Rfl: 1    buPROPion (WELLBUTRIN XL) 150 mg 24 hr tablet, 150 mg every morning, Disp: , Rfl:     clonazePAM (KlonoPIN) 1 mg tablet, 2 (two) times a day as needed, Disp: , Rfl:     lidocaine (Lidoderm) 5 %, Apply 1 patch topically over 12 hours daily Remove & Discard patch within 12 hours or as directed by MD, Disp: 30 patch, Rfl: 2    methocarbamol (ROBAXIN) 750 mg tablet, take 1 tablet by mouth three times a day, Disp: 90 tablet, Rfl: 1    omeprazole (PriLOSEC) 40 MG capsule, Take 1 capsule (40 mg total) by mouth daily, Disp: 90 capsule, Rfl: 1    solifenacin (VESICARE) 10 MG tablet, take 1 tablet by mouth once daily, Disp: 90 tablet, Rfl: 1    tretinoin (RETIN-A) 0.1 % cream, APPLY TOPICALLY DAILY AT BEDTIME, Disp: 45 g, Rfl: 5    venlafaxine (EFFEXOR-XR) 150 mg 24 hr capsule, One 150mg and one 75mg q day for total of 225mg q day., Disp: 90 capsule, Rfl: 1    venlafaxine (EFFEXOR-XR) 75 mg 24 hr capsule, One 150mg and one 75mg q day for total of 225mg q day., Disp: 90 capsule, Rfl: 1    methylPREDNISolone 4 MG tablet therapy pack, Use as directed on package (Patient not taking: Reported on 1/7/2025), Disp: 1 each, Rfl: 0    Multiple Vitamins-Minerals (multivitamin with minerals) tablet, Take 1 tablet by mouth daily  "(Patient not taking: Reported on 1/7/2025), Disp: 30 tablet, Rfl: 1    Allergies   Allergen Reactions    Gabapentin Drowsiness       Review of Systems   Constitutional:  Negative for chills, fever and unexpected weight change.   HENT:  Negative for hearing loss, nosebleeds and sore throat.    Eyes:  Negative for pain, redness and visual disturbance.   Respiratory:  Negative for cough, shortness of breath and wheezing.    Cardiovascular:  Negative for chest pain, palpitations and leg swelling.   Gastrointestinal:  Negative for abdominal pain, nausea and vomiting.   Endocrine: Negative for polydipsia and polyuria.   Genitourinary:  Negative for dysuria and hematuria.   Musculoskeletal:         As noted in HPI   Skin:  Negative for rash and wound.   Neurological:  Negative for dizziness, numbness and headaches.   Psychiatric/Behavioral:  Negative for decreased concentration and suicidal ideas. The patient is not nervous/anxious.         Objective:  Ht 5' 5\" (1.651 m)   Wt 59 kg (130 lb)   BMI 21.63 kg/m²     Ortho Exam  Right hip -   Patient presents with no obvious anatomical deformity  Ambulates with steady gait pattern  Uses no assistive device  Nontender over lumbar midline  TTP over greater trochanter/trochanteric bursa  Nontender over anterior hip joint/groin  ROM: Within normal limits  Smooth passive circumduction  Knee flexor and extensor mechanisms intact  - DIOR, - FADIR  - log roll  - Leg Length Discrepancy  2+ TP and DP pulses with brisk capillary refill to the toes  Sural, saphenous, tibial, superficial and deep peroneal motor and sensory distributions intact  Sensation to light touch intact distally    Right shoulder -   Observable Víctor anatomical deformity  Skin is warm and dry to touch with no signs of erythema, ecchymosis, or infection  Negative soft tissue swelling or effusion noted  Negative palpable crepitus with passive motion  TTP over lateral deltoid/subacromial bursa  ROM FF 0-150, ABD " 0-150, ER 0-60, IR right hip  MMT 4+/5 throughout  - glenohumeral instability appreciated on exam  + Neer's, + Camara  - Speed's, - Yergason's  - empty can, - drop-arm, - belly press, - resisted external rotation, - lift-off  Demonstrates normal elbow, wrist, and finger motion  2+ distal radial pulse with brisk capillary refill to the fingers  Radial, median, and ulnar motor and sensory distributions intact  Sensation to light touch intact distally    Right wrist -   Patient presents with no obvious anatomical deformity  Skin is warm and dry to touch with no signs of erythema, ecchymosis, infection  No soft tissue swelling or effusion noted   Full FDS, FDP, extensor mechanisms are intact   + thenar atrophy, + intrinsic atrophy  TTP over radiocarpal joint, nontender over radial styloid or ulnar styloid  Demonstrates normal wrist, elbow, and shoulder motion  Forearm compartments are soft and supple  2+ distal radial pulse with brisk capillary refill to the fingers  Radial, median, and ulnar motor and sensory distributions intact  Sensation light touch intact distally    Physical Exam  Vitals and nursing note reviewed.   Constitutional:       General: She is not in acute distress.     Appearance: She is well-developed.   HENT:      Head: Normocephalic and atraumatic.   Eyes:      Conjunctiva/sclera: Conjunctivae normal.   Cardiovascular:      Rate and Rhythm: Normal rate.   Pulmonary:      Effort: Pulmonary effort is normal.   Musculoskeletal:      Cervical back: Neck supple.   Skin:     General: Skin is warm and dry.      Capillary Refill: Capillary refill takes less than 2 seconds.   Neurological:      Mental Status: She is alert and oriented to person, place, and time.   Psychiatric:         Mood and Affect: Mood normal.         Behavior: Behavior normal.          Diagnostic Test Review:  No new imaging reviewed this visit    Large joint arthrocentesis: R subacromial bursa  Universal Protocol:  Consent: Verbal  "consent obtained.  Risks and benefits: risks, benefits and alternatives were discussed  Consent given by: patient  Time out: Immediately prior to procedure a \"time out\" was called to verify the correct patient, procedure, equipment, support staff and site/side marked as required.  Timeout called at: 1/7/2025 8:59 AM.  Patient understanding: patient states understanding of the procedure being performed  Site marked: the operative site was marked  Patient identity confirmed: verbally with patient  Supporting Documentation  Indications: pain and joint swelling   Procedure Details  Location: shoulder - R subacromial bursa  Preparation: Patient was prepped and draped in the usual sterile fashion  Needle gauge: 21G.  Ultrasound guidance: no  Approach: lateral  Medications administered: 80 mg triamcinolone acetonide 40 mg/mL; 3.5 mL bupivacaine 0.5 %    Patient tolerance: patient tolerated the procedure well with no immediate complications  Dressing:  Sterile dressing applied      Large joint arthrocentesis: R greater trochanteric bursa  Universal Protocol:  Consent: Verbal consent obtained.  Risks and benefits: risks, benefits and alternatives were discussed  Consent given by: patient  Time out: Immediately prior to procedure a \"time out\" was called to verify the correct patient, procedure, equipment, support staff and site/side marked as required.  Timeout called at: 1/7/2025 9:02 AM.  Patient understanding: patient states understanding of the procedure being performed  Site marked: the operative site was marked  Patient identity confirmed: verbally with patient  Supporting Documentation  Indications: pain and joint swelling   Procedure Details  Location: hip - R greater trochanteric bursa  Preparation: Patient was prepped and draped in the usual sterile fashion  Needle gauge: 21G.  Ultrasound guidance: no  Approach: lateral  Medications administered: 80 mg triamcinolone acetonide 40 mg/mL; 3.5 mL bupivacaine 0.5 " %    Patient tolerance: patient tolerated the procedure well with no immediate complications  Dressing:  Sterile dressing applied      Medium joint arthrocentesis: R radiocarpal  Universal Protocol:  procedure performed by consultantConsent: Verbal consent obtained.  Risks and benefits: risks, benefits and alternatives were discussed  Consent given by: patient  Timeout called at: 1/7/2025 9:02 AM.  Patient understanding: patient states understanding of the procedure being performed  Test results: test results available and properly labeled  Site marked: the operative site was marked  Patient identity confirmed: verbally with patient  Supporting Documentation  Indications: pain   Procedure Details  Location: wrist - R radiocarpal  Needle size: 25 G  Ultrasound guidance: no  Approach: dorsal  Medications administered: 1 mL bupivacaine 0.25 %; 6 mg betamethasone acetate-betamethasone sodium phosphate 6 (3-3) mg/mL    Patient tolerance: patient tolerated the procedure well with no immediate complications  Dressing:  Sterile dressing applied           Scribe Attestation      I,:  Manuelito Valera am acting as a scribe while in the presence of the attending physician.:       I,:  Miguel Gleason DO personally performed the services described in this documentation    as scribed in my presence.:

## 2025-01-08 ENCOUNTER — OFFICE VISIT (OUTPATIENT)
Age: 70
End: 2025-01-08
Payer: MEDICARE

## 2025-01-08 VITALS — HEIGHT: 65 IN | BODY MASS INDEX: 21.66 KG/M2 | WEIGHT: 130 LBS

## 2025-01-08 DIAGNOSIS — M48.061 SPINAL STENOSIS OF LUMBAR REGION WITHOUT NEUROGENIC CLAUDICATION: ICD-10-CM

## 2025-01-08 DIAGNOSIS — G89.29 CHRONIC BILATERAL LOW BACK PAIN WITHOUT SCIATICA: ICD-10-CM

## 2025-01-08 DIAGNOSIS — M54.2 NECK PAIN: ICD-10-CM

## 2025-01-08 DIAGNOSIS — M54.50 CHRONIC BILATERAL LOW BACK PAIN WITHOUT SCIATICA: ICD-10-CM

## 2025-01-08 DIAGNOSIS — M47.812 CERVICAL SPONDYLOSIS: Chronic | ICD-10-CM

## 2025-01-08 DIAGNOSIS — M51.16 LUMBAR DISC DISEASE WITH RADICULOPATHY: ICD-10-CM

## 2025-01-08 DIAGNOSIS — M47.816 LUMBAR SPONDYLOSIS: ICD-10-CM

## 2025-01-08 DIAGNOSIS — G89.4 CHRONIC PAIN SYNDROME: Primary | ICD-10-CM

## 2025-01-08 PROCEDURE — 99214 OFFICE O/P EST MOD 30 MIN: CPT | Performed by: NURSE PRACTITIONER

## 2025-01-08 RX ORDER — CELECOXIB 50 MG/1
50 CAPSULE ORAL 2 TIMES DAILY
COMMUNITY

## 2025-01-08 NOTE — PROGRESS NOTES
Assessment:  1. Chronic pain syndrome    2. Chronic bilateral low back pain without sciatica    3. Lumbar spondylosis    4. Lumbar disc disease with radiculopathy    5. Spinal stenosis of lumbar region without neurogenic claudication    6. Neck pain    7. Cervical spondylosis        Plan:  While the patient was in the office today, I did have a thorough conversation regarding their chronic pain syndrome, medication management, and treatment plan options.  Patient is being seen for follow-up visit.  She was last seen here on 11/7/2024 at which time I provided her with a tapering dose of steroid for complaints of significantly increased generalized joint pain.  She states that her pain did slightly improve after taking the tapering dose of steroid.    She continues to follow-up with Ortho regarding joint pain.  She was seen yesterday by Dr. Gleason who performed steroid injections involving her wrist, shoulder, hip.    During our last office visit, I referred her for aqua therapy.  Patient has not started aqua therapy, but I encouraged her to try it.    She previously underwent right sided C3-4 and C4-5 radiofrequency ablations on 5/31/2024 which improved her neck pain by at least 50%.  We will consider repeating the ablation in the future.    Patient was previously seen for rheumatology consult.  No evidence for RA or autoimmune.    Continue Robaxin 750 mg 3 times daily if needed for spasms.  She did not require refill of this medication during today's visit.    Follow-up in 2 months.  Call sooner if pain increases.      History of Present Illness:  The patient is a 69 y.o. female who presents for a follow up office visit in regards to Shoulder Pain, Back Pain, Hip Pain, Neck Pain, Hand Pain, and Foot Pain.   The patient’s current symptoms include plaints of diffuse joint pain, neck pain, low back pain.  Current pain level is a 7-8/10.  Quality of pain is described as dull, aching, sharp.    Current pain medications  includes: PCP prescribes Celebrex.  We prescribe Robaxin-750 milligrams 3 times daily if needed for spasms .  The patient reports that this regimen is providing 25-30% pain relief.  The patient is reporting no side effects from this pain medication regimen.    I have personally reviewed and/or updated the patient's past medical history, past surgical history, family history, social history, current medications, allergies, and vital signs today.         Review of Systems  Review of Systems   Constitutional:  Negative for unexpected weight change.   HENT:  Negative for hearing loss.    Eyes:  Negative for visual disturbance.   Respiratory:  Negative for shortness of breath.    Cardiovascular:  Negative for leg swelling.   Gastrointestinal:  Positive for nausea. Negative for constipation.   Endocrine: Negative for polyuria.   Genitourinary:  Negative for difficulty urinating.   Musculoskeletal:  Positive for gait problem. Negative for joint swelling and myalgias.        Joint stiffness   Skin:  Negative for rash.   Neurological:  Negative for weakness and headaches.   Psychiatric/Behavioral:  Negative for decreased concentration.    All other systems reviewed and are negative.        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     Drug intolerance     Dysfunction of right eustachian tube     Dysfunctional uterine bleeding     Edema     Elevated d-dimer     Exposure to hepatitis C     Fracture of fibula     Generalized anxiety disorder     GERD (gastroesophageal reflux disease)     Hyperlipidemia     Mild cervical dysplasia     Myalgia      Myositis     Palpitations     PONV (postoperative nausea and vomiting)     Pre-syncope     Scoliosis deformity of spine 1/23/2024    Seasonal allergies     Stress incontinence     Thrombocytopenia (HCC)     Urinary retention     Urinary tract infection     Uterine leiomyoma        Past Surgical History:   Procedure Laterality Date    BLADDER SURGERY      BREAST BIOPSY Right     US guided    COLONOSCOPY      COLPOSCOPY  01/06/1998    DENTAL SURGERY      ENDOMETRIAL BIOPSY  05/21/1993    HYSTERECTOMY      HYSTEROSCOPY      uterus- 1/22/98, 7/1/02 and 4/10/07    VT ARTHRP KNE CONDYLE&PLATU MEDIAL&LAT COMPARTMENTS Right 2/8/2023    Procedure: ARTHROPLASTY KNEE TOTAL;  Surgeon: Miguel Gleason DO;  Location: [facility];  Service: Orthopedics    TUBAL LIGATION      US GUIDED BREAST BIOPSY RIGHT COMPLETE Right 08/08/2018       Family History   Problem Relation Age of Onset    Stroke Mother         cerebral artery occlusion with cerebral infarction    Coronary artery disease Mother     Arrhythmia Mother         sinus    Coronary artery disease Father     Diabetes Father     No Known Problems Daughter     No Known Problems Daughter     No Known Problems Maternal Grandmother     No Known Problems Paternal Grandmother     No Known Problems Maternal Aunt     Ovarian cancer Maternal Aunt     Aortic aneurysm Family     Arthritis Family     Diabetes Family     Heart disease Family     Breast cancer Neg Hx        Social History     Occupational History    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[employer]    Occupation: housewife   Tobacco Use    Smoking status: Every Day     Current packs/day: 1.50     Average packs/day: 1.5 packs/day for 53.0 years (79.5 ttl pk-yrs)     Types: Cigarettes     Start date: 1972     Passive exposure: Never    Smokeless tobacco: Never   Vaping Use    Vaping status: Never Used   Substance and Sexual Activity    Alcohol use: Not Currently    Drug use: No    Sexual activity: Not Currently          Current Outpatient Medications:     acetaminophen (TYLENOL) 500 mg tablet, Take 500 mg by mouth every 6 (six) hours as needed for mild pain, Disp: , Rfl:     alendronate (FOSAMAX) 70 mg tablet, take 1 tablet by mouth every 7 days, Disp: 12 tablet, Rfl: 1    ascorbic acid (VITAMIN C) 1000 MG tablet, Take 1,000 mg by mouth, Disp: , Rfl:     atorvastatin (LIPITOR) 20 mg tablet, Take 1 tablet (20 mg total) by mouth daily after dinner, Disp: 90 tablet, Rfl: 1    buPROPion (WELLBUTRIN XL) 150 mg 24 hr tablet, 150 mg every morning, Disp: , Rfl:     celecoxib (CeleBREX) 50 MG capsule, Take 50 mg by mouth 2 (two) times a day, Disp: , Rfl:     clonazePAM (KlonoPIN) 1 mg tablet, 2 (two) times a day as needed, Disp: , Rfl:     lidocaine (Lidoderm) 5 %, Apply 1 patch topically over 12 hours daily Remove & Discard patch within 12 hours or as directed by MD, Disp: 30 patch, Rfl: 2    methocarbamol (ROBAXIN) 750 mg tablet, take 1 tablet by mouth three times a day, Disp: 90 tablet, Rfl: 1    omeprazole (PriLOSEC) 40 MG capsule, Take 1 capsule (40 mg total) by mouth daily, Disp: 90 capsule, Rfl: 1    solifenacin (VESICARE) 10 MG tablet, take 1 tablet by mouth once daily, Disp: 90 tablet, Rfl: 1    tretinoin (RETIN-A) 0.1 % cream, APPLY TOPICALLY DAILY AT BEDTIME, Disp: 45 g, Rfl: 5    venlafaxine (EFFEXOR-XR) 150 mg 24 hr capsule, One 150mg and one 75mg q day for total of 225mg q day., Disp: 90 capsule, Rfl: 1    venlafaxine (EFFEXOR-XR) 75 mg 24 hr capsule, One 150mg and one 75mg q day for total of 225mg q day., Disp: 90 capsule, Rfl: 1    methylPREDNISolone 4 MG tablet therapy pack, Use as directed on package (Patient not taking: Reported on 11/14/2024), Disp: 1 each, Rfl: 0    Multiple Vitamins-Minerals (multivitamin with minerals) tablet, Take 1 tablet by mouth daily (Patient not taking: Reported on 1/7/2025), Disp: 30 tablet, Rfl: 1    Allergies   Allergen Reactions    Gabapentin Drowsiness       Physical Exam:    Ht 5'  "5\" (1.651 m)   Wt 59 kg (130 lb)   BMI 21.63 kg/m²     Constitutional:normal, well developed, well nourished, alert, in no distress and non-toxic and no overt pain behavior.  Eyes:anicteric  HEENT:grossly intact  Neck:supple, symmetric, trachea midline and no masses   Pulmonary:even and unlabored  Cardiovascular:No edema or pitting edema present  Skin:Normal without rashes or lesions and well hydrated  Psychiatric:Mood and affect appropriate  Neurologic:Cranial Nerves II-XII grossly intact  Musculoskeletal:normal    Imaging  No orders to display       No orders of the defined types were placed in this encounter.        "

## 2025-01-20 DIAGNOSIS — K21.9 GASTROESOPHAGEAL REFLUX DISEASE WITHOUT ESOPHAGITIS: ICD-10-CM

## 2025-01-20 RX ORDER — OMEPRAZOLE 40 MG/1
40 CAPSULE, DELAYED RELEASE ORAL DAILY
Qty: 90 CAPSULE | Refills: 1 | Status: SHIPPED | OUTPATIENT
Start: 2025-01-20

## 2025-01-21 ENCOUNTER — TELEPHONE (OUTPATIENT)
Dept: INTERNAL MEDICINE CLINIC | Facility: CLINIC | Age: 70
End: 2025-01-21

## 2025-01-21 ENCOUNTER — TELEMEDICINE (OUTPATIENT)
Dept: INTERNAL MEDICINE CLINIC | Facility: CLINIC | Age: 70
End: 2025-01-21
Payer: MEDICARE

## 2025-01-21 DIAGNOSIS — J43.9 PULMONARY EMPHYSEMA, UNSPECIFIED EMPHYSEMA TYPE (HCC): ICD-10-CM

## 2025-01-21 DIAGNOSIS — U07.1 LAB TEST POSITIVE FOR DETECTION OF COVID-19 VIRUS: Primary | ICD-10-CM

## 2025-01-21 DIAGNOSIS — Z72.0 TOBACCO ABUSE: ICD-10-CM

## 2025-01-21 PROCEDURE — G2211 COMPLEX E/M VISIT ADD ON: HCPCS | Performed by: INTERNAL MEDICINE

## 2025-01-21 PROCEDURE — 99213 OFFICE O/P EST LOW 20 MIN: CPT | Performed by: INTERNAL MEDICINE

## 2025-01-21 RX ORDER — BENZONATATE 200 MG/1
200 CAPSULE ORAL 3 TIMES DAILY PRN
Qty: 20 CAPSULE | Refills: 0 | Status: SHIPPED | OUTPATIENT
Start: 2025-01-21

## 2025-01-21 RX ORDER — FLUTICASONE PROPIONATE 50 MCG
1 SPRAY, SUSPENSION (ML) NASAL DAILY
Qty: 16 G | Refills: 0 | Status: SHIPPED | OUTPATIENT
Start: 2025-01-21

## 2025-01-21 RX ORDER — NIRMATRELVIR AND RITONAVIR 300-100 MG
3 KIT ORAL 2 TIMES DAILY
Qty: 30 TABLET | Refills: 0 | Status: SHIPPED | OUTPATIENT
Start: 2025-01-21 | End: 2025-01-26

## 2025-01-21 RX ORDER — GUAIFENESIN 600 MG/1
600 TABLET, EXTENDED RELEASE ORAL EVERY 12 HOURS SCHEDULED
Qty: 20 TABLET | Refills: 0 | Status: SHIPPED | OUTPATIENT
Start: 2025-01-21

## 2025-01-21 NOTE — TELEPHONE ENCOUNTER
----- Message from Miguel Ruyb DO sent at 1/21/2025  1:36 PM EST -----  Call pt ASAP and remind her to stop her lipitor and VESICARE while on paxlovid. I forgot to mention the vesicare. Can resume them one week after finishing the paxlovid.

## 2025-01-21 NOTE — PROGRESS NOTES
COVID-19 Outpatient Progress Note  Name: Surekha Tenorio      : 1955      MRN: [id number]  Encounter Provider: Miguel Ruby DO  Encounter Date: 2025   Encounter department: [facility] [place]    Assessment & Plan  Lab test positive for detection of COVID-19 virus    Orders:    Ambulatory Referral to Colorectal Surgery; Future    nirmatrelvir & ritonavir (Paxlovid, 300/100,) tablet therapy pack; Take 3 tablets by mouth 2 (two) times a day for 5 days Take 2 nirmatrelvir tablets + 1 ritonavir tablet together per dose    guaiFENesin (Mucinex) 600 mg 12 hr tablet; Take 1 tablet (600 mg total) by mouth every 12 (twelve) hours    benzonatate (TESSALON) 200 MG capsule; Take 1 capsule (200 mg total) by mouth 3 (three) times a day as needed for cough    fluticasone (FLONASE) 50 mcg/act nasal spray; 1 spray into each nostril daily    Pulmonary emphysema, unspecified emphysema type (HCC)    Orders:    nirmatrelvir & ritonavir (Paxlovid, 300/100,) tablet therapy pack; Take 3 tablets by mouth 2 (two) times a day for 5 days Take 2 nirmatrelvir tablets + 1 ritonavir tablet together per dose    Tobacco abuse    Orders:    nirmatrelvir & ritonavir (Paxlovid, 300/100,) tablet therapy pack; Take 3 tablets by mouth 2 (two) times a day for 5 days Take 2 nirmatrelvir tablets + 1 ritonavir tablet together per dose    Disposition:     Discussed symptom directed medication options with patient. Discussed vitamin D, vitamin C, and/or zinc supplementation with patient.   A/P: Day # 4 since onset of COVID s/s. Covid test at home was positive. Doing ok. Mainly nasal congestion, muscle aches, headache and cough. No SOB. Still smoking. . Continue isolation/quarantine, increase fluids, PRN motrin/tylenol, and breathing exercises. Wean tobacco. Pt consider high risk and agreeable to paxlovid. GFR over 60. Pt to stop her lipitor and vesicare. Voiced understanding. Will send in mucinex and cough med. RTC as scheduled for  f/u.        Patient meets criteria for Paxlovid and they have been counseled appropriately regarding risks, benefits, side effects, and alternative treatment options. After discussion, patient agrees to treatment.    Possible side effects of Paxlovid?    Possible side effects of Paxlovid are:  - Liver Problems. Notify us right away if you start to experience loss of appetite, yellowing of your skin and the whites of eyes (jaundice), dark-colored urine, pale colored stools and itchy skin, stomach area (abdominal) pain.  - Resistance to HIV Medicines. If you have untreated HIV infection, Paxlovid may lead to some HIV medicines not working as well in the future.  - Other possible side effects include: altered sense of taste, diarrhea, high blood pressure, or muscle aches.    I have spent a total time of 25 minutes on the day of the encounter for this patient including discussing diagnostic results, discussing prognosis, risks and benefits of treatment options, instructions for management, patient and family education, importance of treatment compliance, risk factor reductions, impressions, counseling/coordination of care, documenting in the medical record, reviewing/ordering tests, medicine, procedures and obtaining or reviewing history.          Encounter provider: Miguel Ruby DO     Provider located at: [address] [address]     Recent Visits  No visits were found meeting these conditions.  Showing recent visits within past 7 days and meeting all other requirements  Future Appointments  No visits were found meeting these conditions.  Showing future appointments within next 150 days and meeting all other requirements    History of Present Illness      This virtual check-in was done via [employer] and patient was informed that this is a secure, HIPAA-compliant platform. She agrees to proceed.    Patient agrees to participate in a virtual check in via telephone or video  visit instead of presenting to the office to address urgent/immediate medical needs. Patient is aware this is a billable service. She acknowledged consent and understanding of privacy and security of the video platform. The patient has agreed to participate and understands they can discontinue the visit at any time.    After connecting through [employer], the patient was identified by name and date of birth. Surekha Tenorio was informed that this was a telemedicine visit and that the exam was being conducted confidentially over secure lines. My office door was closed. No one else was in the room. Surekha Tenorio acknowledged consent and understanding of privacy and security of the telemedicine visit. I informed the patient that I have reviewed her record in Epic and presented the opportunity for her to ask any questions regarding the visit today. The patient agreed to participate.     Verification of patient location:  Patient is located in the following state in which I hold an active license: [place]    Subjective:   Surekha Tenorio is a 69 y.o. female who has been screened for COVID-19. Symptom change since last report: worsening. Patient's symptoms include fatigue, nasal congestion, rhinorrhea, sore throat, cough, myalgias and headache. Patient denies fever, chills, anosmia, loss of taste, shortness of breath, chest tightness, abdominal pain, nausea, vomiting and diarrhea.     - Date of symptom onset: 1/17/2025  - Date of positive COVID-19 test: 1/21/2025. Type of test: Home antigen.     COVID-19 vaccination status: Fully vaccinated with booster    Surekha has been staying home and has isolated themselves in her home. She is taking care to not share personal items and is cleaning all surfaces that are touched often, like counters, tabletops, and doorknobs using household cleaning sprays or wipes. She is wearing a mask when she leaves her room.     Lab Results   Component Value Date    SARSCOV2 Negative 02/09/2023     SARSCORONAVI Not Detected 02/15/2021       Review of Systems   Constitutional:  Positive for activity change, appetite change and fatigue. Negative for chills, diaphoresis and fever.   HENT:  Positive for congestion, postnasal drip, rhinorrhea, sinus pressure and sore throat. Negative for ear discharge, ear pain, facial swelling and sinus pain.         No loss of taste or smell.   Respiratory:  Positive for cough. Negative for chest tightness, shortness of breath and wheezing.    Cardiovascular:  Negative for chest pain, palpitations and leg swelling.   Gastrointestinal:  Negative for abdominal pain, constipation, diarrhea, nausea and vomiting.   Genitourinary:  Negative for difficulty urinating, dysuria and frequency.   Musculoskeletal:  Positive for arthralgias and myalgias. Negative for gait problem.   Neurological:  Positive for headaches. Negative for light-headedness.   Psychiatric/Behavioral:  Negative for confusion. The patient is not nervous/anxious.      Objective   There were no vitals taken for this visit.    Physical Exam  Vitals and nursing note reviewed.   Constitutional:       General: She is not in acute distress.     Appearance: Normal appearance. She is not ill-appearing.   HENT:      Head: Normocephalic and atraumatic.      Mouth/Throat:      Mouth: Mucous membranes are moist.   Eyes:      Extraocular Movements: Extraocular movements intact.      Conjunctiva/sclera: Conjunctivae normal.      Pupils: Pupils are equal, round, and reactive to light.   Pulmonary:      Effort: Pulmonary effort is normal. No respiratory distress.   Neurological:      General: No focal deficit present.      Mental Status: She is alert and oriented to person, place, and time. Mental status is at baseline.   Psychiatric:         Mood and Affect: Mood normal.         Behavior: Behavior normal.         Thought Content: Thought content normal.         Judgment: Judgment normal.

## 2025-01-21 NOTE — ASSESSMENT & PLAN NOTE
Orders:    nirmatrelvir & ritonavir (Paxlovid, 300/100,) tablet therapy pack; Take 3 tablets by mouth 2 (two) times a day for 5 days Take 2 nirmatrelvir tablets + 1 ritonavir tablet together per dose

## 2025-01-21 NOTE — ASSESSMENT & PLAN NOTE
Orders:    nirmatrelvir & ritonavir (Paxlovid, 300/100,) tablet therapy pack; Take 3 tablets by mouth 2 (two) times a day for 5 days Take 2 nirmatrelvir tablets + 1 ritonavir tablet together per dose     normal... Disheveled, unkempt, awake, alert, oriented to person, place, time/situation and in no apparent distress.

## 2025-01-23 NOTE — TELEPHONE ENCOUNTER
Patient said she is feeling about the same since 1/21. She was just calling with an update   Thank you

## 2025-02-01 ENCOUNTER — HOSPITAL ENCOUNTER (EMERGENCY)
Facility: HOSPITAL | Age: 70
Discharge: HOME | End: 2025-02-01
Attending: EMERGENCY MEDICINE | Admitting: EMERGENCY MEDICINE
Payer: MEDICARE

## 2025-02-01 ENCOUNTER — APPOINTMENT (EMERGENCY)
Dept: RADIOLOGY | Facility: HOSPITAL | Age: 70
End: 2025-02-01
Payer: MEDICARE

## 2025-02-01 VITALS
WEIGHT: 129 LBS | OXYGEN SATURATION: 98 % | RESPIRATION RATE: 20 BRPM | SYSTOLIC BLOOD PRESSURE: 117 MMHG | DIASTOLIC BLOOD PRESSURE: 70 MMHG | HEART RATE: 81 BPM | TEMPERATURE: 97.9 F | BODY MASS INDEX: 21.47 KG/M2

## 2025-02-01 DIAGNOSIS — M79.671 RIGHT FOOT PAIN: Primary | ICD-10-CM

## 2025-02-01 PROCEDURE — 99283 EMERGENCY DEPT VISIT LOW MDM: CPT

## 2025-02-01 PROCEDURE — 73610 X-RAY EXAM OF ANKLE: CPT

## 2025-02-01 PROCEDURE — 99284 EMERGENCY DEPT VISIT MOD MDM: CPT | Performed by: PHYSICIAN ASSISTANT

## 2025-02-01 PROCEDURE — 73630 X-RAY EXAM OF FOOT: CPT

## 2025-02-01 NOTE — ED PROVIDER NOTES
Time reflects when diagnosis was documented in both MDM as applicable and the Disposition within this note       Time User Action Codes Description Comment    2/1/2025  9:22 AM Shell Garg Add [M79.671] Right foot pain           ED Disposition       ED Disposition   Discharge    Condition   Stable    Date/Time   Sat Feb 1, 2025  9:22 AM    Comment   Surekha Coby discharge to home/self care.                   Assessment & Plan       Medical Decision Making  Patient is a 69-year-old female with a PMHx of pain disorder, COPD, HLD and osteopenia, presenting to the ED for evaluation of right foot pain x 3 weeks.    DDX including but not limited to: contusion, sprain, strain, fracture, gout, tendinitis, plantar fasciitis, metatarsalgia, radiculopathy, arthritis.     X-rays show no evidence of acute fracture.  No evidence of cellulitis, gout or septic arthritis.  Suspect sprain vs possible occult stress fracture. Patient was given a cam boot to wear for comfort/support.  She was given a referral to podiatry for close follow-up.  She was encouraged to rest, ice and elevate the extremity and take Advil/Tylenol as needed for pain.  She was advised to return to the ED if she experiences any new/worsening symptoms.    The management plan was discussed in detail with the patient at bedside and all questions were answered. Strict ED return instructions were discussed at bedside. Prior to discharge, both verbal and written instructions were provided. We discussed the signs and symptoms that should prompt the patient to return to the ED. All questions were answered and the patient was comfortable with the plan of care and discharged home. The patient agrees to return to the Emergency Department for concerns and/or progression of illness.     Amount and/or Complexity of Data Reviewed  Radiology: ordered and independent interpretation performed.             Medications - No data to display    ED Risk Strat Scores                                               History of Present Illness       Chief Complaint   Patient presents with    Foot Pain     Right foot pain x 3 weeks. No injury.          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     Drug intolerance     Dysfunction of right eustachian tube     Dysfunctional uterine bleeding     Edema     Elevated d-dimer     Exposure to hepatitis C     Fracture of fibula     Generalized anxiety disorder     GERD (gastroesophageal reflux disease)     Hyperlipidemia     Mild cervical dysplasia     Myalgia     Myositis     Palpitations     PONV (postoperative nausea and vomiting)     Pre-syncope     Scoliosis deformity of spine 1/23/2024    Seasonal allergies     Stress incontinence     Thrombocytopenia (HCC)     Urinary retention     Urinary tract infection     Uterine leiomyoma       Past Surgical History:   Procedure Laterality Date    BLADDER SURGERY      BREAST BIOPSY Right     US guided    COLONOSCOPY      COLPOSCOPY  01/06/1998    DENTAL SURGERY      ENDOMETRIAL BIOPSY  05/21/1993    HYSTERECTOMY      HYSTEROSCOPY      uterus- 1/22/98, 7/1/02 and 4/10/07    MN ARTHRP KNE CONDYLE&PLATU MEDIAL&LAT COMPARTMENTS Right 2/8/2023    Procedure: ARTHROPLASTY KNEE TOTAL;  Surgeon: Miguel Gleason DO;  Location: [facility];  Service: Orthopedics    TUBAL LIGATION      US GUIDED BREAST BIOPSY RIGHT COMPLETE Right 08/08/2018      Family History   Problem Relation Age of Onset    Stroke Mother         cerebral artery occlusion with cerebral infarction    Coronary artery disease Mother     Arrhythmia Mother         sinus     Coronary artery disease Father     Diabetes Father     No Known Problems Daughter     No Known Problems Daughter     No Known Problems Maternal Grandmother     No Known Problems Paternal Grandmother     No Known Problems Maternal Aunt     Ovarian cancer Maternal Aunt     Aortic aneurysm Family     Arthritis Family     Diabetes Family     Heart disease Family     Breast cancer Neg Hx       Social History     Tobacco Use    Smoking status: Every Day     Current packs/day: 1.50     Average packs/day: 1.5 packs/day for 53.1 years (79.6 ttl pk-yrs)     Types: Cigarettes     Start date: 1972     Passive exposure: Never    Smokeless tobacco: Never   Vaping Use    Vaping status: Never Used   Substance Use Topics    Alcohol use: Not Currently    Drug use: No      E-Cigarette/Vaping    E-Cigarette Use Never User       E-Cigarette/Vaping Substances    Nicotine No     THC No     CBD No     Flavoring No     Other No     Unknown No       I have reviewed and agree with the history as documented.     Patient is a 69-year-old female with a PMHx of pain disorder, COPD, HLD and osteopenia, presenting to the ED for evaluation of right foot pain x 3 weeks.  Patient denies any falls, trauma or injuries but states that she is on her feet a lot during the day.  She states that the pain is primarily across the top of the foot and radiates into the lateral aspect of the ankle.  She reports mild swelling in the top of the foot that is primarily noticeable after being on her feet all day.  She denies fevers, chills or erythema/warmth in the foot or ankle.  She has been able to ambulate on the foot without any assistance.  She denies any other complaints at this time.        Review of Systems   Constitutional:  Negative for chills and fever.   HENT:  Negative for congestion, rhinorrhea and sore throat.    Eyes:  Negative for visual disturbance.   Respiratory:  Negative for cough and shortness of breath.    Cardiovascular:  Negative for chest  pain, palpitations and leg swelling.   Gastrointestinal:  Negative for abdominal pain, constipation, diarrhea, nausea and vomiting.   Genitourinary:  Negative for dysuria, flank pain, frequency and hematuria.   Musculoskeletal:  Positive for arthralgias (right foot pain). Negative for back pain and neck pain.   Skin:  Negative for rash.   Neurological:  Negative for dizziness, syncope, weakness and headaches.   All other systems reviewed and are negative.          Objective       ED Triage Vitals   Temperature Pulse Blood Pressure Respirations SpO2 Patient Position - Orthostatic VS   02/01/25 0838 02/01/25 0838 02/01/25 0843 02/01/25 0838 02/01/25 0838 02/01/25 0843   97.9 °F (36.6 °C) 81 117/70 20 98 % Lying      Temp Source Heart Rate Source BP Location FiO2 (%) Pain Score    02/01/25 0838 02/01/25 0838 02/01/25 0843 -- 02/01/25 0838    Tympanic Monitor Left arm  7      Vitals      Date and Time Temp Pulse SpO2 Resp BP Pain Score FACES Pain Rating User   02/01/25 0843 -- -- -- -- 117/70 -- -- JCS   02/01/25 0838 97.9 °F (36.6 °C) 81 98 % 20 -- 7 -- JCS            Physical Exam  Vitals and nursing note reviewed.   Constitutional:       General: She is awake.      Appearance: Normal appearance. She is well-developed. She is not toxic-appearing or diaphoretic.   HENT:      Head: Normocephalic and atraumatic.      Right Ear: External ear normal.      Left Ear: External ear normal.      Nose: Nose normal.      Mouth/Throat:      Lips: Pink.      Mouth: Mucous membranes are moist.   Eyes:      General: Lids are normal. No scleral icterus.     Conjunctiva/sclera: Conjunctivae normal.      Pupils: Pupils are equal, round, and reactive to light.   Cardiovascular:      Rate and Rhythm: Normal rate and regular rhythm.      Pulses: Normal pulses.           Radial pulses are 2+ on the right side and 2+ on the left side.      Heart sounds: Normal heart sounds, S1 normal and S2 normal.   Pulmonary:      Effort: Pulmonary effort  is normal. No accessory muscle usage.      Breath sounds: Normal breath sounds. No stridor. No decreased breath sounds, wheezing, rhonchi or rales.   Abdominal:      General: Abdomen is flat. Bowel sounds are normal. There is no distension.      Palpations: Abdomen is soft.      Tenderness: There is no abdominal tenderness. There is no right CVA tenderness, left CVA tenderness, guarding or rebound.   Musculoskeletal:      Cervical back: Full passive range of motion without pain and neck supple. No signs of trauma. No pain with movement.      Right lower leg: No edema.      Left lower leg: No edema.        Feet:    Feet:      Comments: Tenderness to palpation over the top of the 3rd-5th metatarsals and just below the lateral malleolus.  No significant swelling or joint effusion.  No overlying erythema/warmth.  Normal range of motion.  Sensation intact.  PT/DP pulses 2+.  Cap refill <2 seconds.  Patient is able to ambulate without difficulty.  Lymphadenopathy:      Cervical: No cervical adenopathy.   Skin:     General: Skin is warm and dry.      Capillary Refill: Capillary refill takes less than 2 seconds.      Coloration: Skin is not cyanotic, jaundiced or pale.   Neurological:      Mental Status: She is alert and oriented to person, place, and time.      GCS: GCS eye subscore is 4. GCS verbal subscore is 5. GCS motor subscore is 6.      Gait: Gait normal.   Psychiatric:         Mood and Affect: Mood normal.         Speech: Speech normal.         Behavior: Behavior is cooperative.         Results Reviewed       None            XR foot 3+ views RIGHT   ED Interpretation by Shell Garg PA-C (02/01 0932)   Subtle irregularity along the lateral aspect of the cuboid bone. No other acute fracture noted.      Final Interpretation by Logan Velazquez MD (02/02 1103)      No acute fracture or dislocation identified.         Computerized Assisted Algorithm (CAA) may have been used to analyze all applicable images.          Workstation performed: [id number]         XR ankle 3+ views RIGHT   ED Interpretation by Shell Garg PA-C (932)   No acute fracture.      Final Interpretation by Logan Velazquez MD (1103)      No acute fracture or dislocation identified.         Computerized Assisted Algorithm (CAA) may have been used to analyze all applicable images.         Workstation performed: [id number]             Procedures    ED Medication and Procedure Management   Prior to Admission Medications   Prescriptions Last Dose Informant Patient Reported? Taking?   acetaminophen (TYLENOL) 500 mg tablet  Self Yes No   Sig: Take 500 mg by mouth every 6 (six) hours as needed for mild pain   alendronate (FOSAMAX) 70 mg tablet   No No   Sig: take 1 tablet by mouth every 7 days   ascorbic acid (VITAMIN C) 1000 MG tablet  Self Yes No   Sig: Take 1,000 mg by mouth   atorvastatin (LIPITOR) 20 mg tablet  Self No No   Sig: Take 1 tablet (20 mg total) by mouth daily after dinner   benzonatate (TESSALON) 200 MG capsule   No No   Sig: Take 1 capsule (200 mg total) by mouth 3 (three) times a day as needed for cough   buPROPion (WELLBUTRIN XL) 150 mg 24 hr tablet  Self Yes No   Si mg every morning   celecoxib (CeleBREX) 50 MG capsule   Yes No   Sig: Take 50 mg by mouth 2 (two) times a day   clonazePAM (KlonoPIN) 1 mg tablet  Self Yes No   Si (two) times a day as needed   fluticasone (FLONASE) 50 mcg/act nasal spray   No No   Si spray into each nostril daily   guaiFENesin (Mucinex) 600 mg 12 hr tablet   No No   Sig: Take 1 tablet (600 mg total) by mouth every 12 (twelve) hours   lidocaine (Lidoderm) 5 %   No No   Sig: Apply 1 patch topically over 12 hours daily Remove & Discard patch within 12 hours or as directed by MD   methocarbamol (ROBAXIN) 750 mg tablet   No No   Sig: take 1 tablet by mouth three times a day   omeprazole (PriLOSEC) 40 MG capsule   No No   Sig: take 1 capsule by mouth once daily   solifenacin (VESICARE) 10 MG  tablet   No No   Sig: take 1 tablet by mouth once daily   tretinoin (RETIN-A) 0.1 % cream  Self No No   Sig: APPLY TOPICALLY DAILY AT BEDTIME   venlafaxine (EFFEXOR-XR) 150 mg 24 hr capsule   No No   Sig: One 150mg and one 75mg q day for total of 225mg q day.   venlafaxine (EFFEXOR-XR) 75 mg 24 hr capsule   No No   Sig: One 150mg and one 75mg q day for total of 225mg q day.      Facility-Administered Medications: None     Discharge Medication List as of 2/1/2025  9:33 AM        START taking these medications    Details   Diclofenac Sodium (VOLTAREN) 1 % Apply 2 g topically 3 (three) times a day as needed (pain), Starting Sat 2/1/2025, Normal           CONTINUE these medications which have NOT CHANGED    Details   acetaminophen (TYLENOL) 500 mg tablet Take 500 mg by mouth every 6 (six) hours as needed for mild pain, Historical Med      alendronate (FOSAMAX) 70 mg tablet take 1 tablet by mouth every 7 days, Normal      ascorbic acid (VITAMIN C) 1000 MG tablet Take 1,000 mg by mouth, Historical Med      atorvastatin (LIPITOR) 20 mg tablet Take 1 tablet (20 mg total) by mouth daily after dinner, Starting Mon 3/4/2024, Normal      benzonatate (TESSALON) 200 MG capsule Take 1 capsule (200 mg total) by mouth 3 (three) times a day as needed for cough, Starting Tue 1/21/2025, Normal      buPROPion (WELLBUTRIN XL) 150 mg 24 hr tablet 150 mg every morning, Starting Mon 9/20/2021, Historical Med      celecoxib (CeleBREX) 50 MG capsule Take 50 mg by mouth 2 (two) times a day, Historical Med      clonazePAM (KlonoPIN) 1 mg tablet 2 (two) times a day as needed, Historical Med      fluticasone (FLONASE) 50 mcg/act nasal spray 1 spray into each nostril daily, Starting Tue 1/21/2025, Normal      guaiFENesin (Mucinex) 600 mg 12 hr tablet Take 1 tablet (600 mg total) by mouth every 12 (twelve) hours, Starting Tue 1/21/2025, Normal      lidocaine (Lidoderm) 5 % Apply 1 patch topically over 12 hours daily Remove & Discard patch within 12  hours or as directed by MD, Starting Thu 11/14/2024, Normal      methocarbamol (ROBAXIN) 750 mg tablet take 1 tablet by mouth three times a day, Starting Mon 12/23/2024, Normal      omeprazole (PriLOSEC) 40 MG capsule take 1 capsule by mouth once daily, Starting Mon 1/20/2025, Normal      solifenacin (VESICARE) 10 MG tablet take 1 tablet by mouth once daily, Starting Wed 9/18/2024, Normal      tretinoin (RETIN-A) 0.1 % cream APPLY TOPICALLY DAILY AT BEDTIME, Starting Sun 5/5/2024, Normal      !! venlafaxine (EFFEXOR-XR) 150 mg 24 hr capsule One 150mg and one 75mg q day for total of 225mg q day., Normal      !! venlafaxine (EFFEXOR-XR) 75 mg 24 hr capsule One 150mg and one 75mg q day for total of 225mg q day., Normal       !! - Potential duplicate medications found. Please discuss with provider.          ED SEPSIS DOCUMENTATION   Time reflects when diagnosis was documented in both MDM as applicable and the Disposition within this note       Time User Action Codes Description Comment    2/1/2025  9:22 AM Shell Garg Add [M79.671] Right foot pain                  Shell Garg PA-C  02/02/25 2007

## 2025-02-19 ENCOUNTER — TELEPHONE (OUTPATIENT)
Age: 70
End: 2025-02-19

## 2025-02-19 NOTE — TELEPHONE ENCOUNTER
Pt c/o hair loss since starting on the celebrex. Pt states she did read that this is a side effect of the medication.  Pt asking if there is an alternative that can be prescribed for her?    Please advise

## 2025-03-03 ENCOUNTER — OFFICE VISIT (OUTPATIENT)
Dept: PODIATRY | Facility: CLINIC | Age: 70
End: 2025-03-03
Payer: MEDICARE

## 2025-03-03 VITALS — BODY MASS INDEX: 21.49 KG/M2 | HEIGHT: 65 IN | WEIGHT: 129 LBS

## 2025-03-03 DIAGNOSIS — M79.671 RIGHT FOOT PAIN: ICD-10-CM

## 2025-03-03 DIAGNOSIS — M76.71 PERONEAL TENDINITIS OF RIGHT LOWER EXTREMITY: ICD-10-CM

## 2025-03-03 DIAGNOSIS — M19.071 ARTHRITIS OF RIGHT MIDFOOT: Primary | ICD-10-CM

## 2025-03-03 PROCEDURE — 99204 OFFICE O/P NEW MOD 45 MIN: CPT | Performed by: PODIATRIST

## 2025-03-03 NOTE — PROGRESS NOTES
Name: Surekha Tenorio      : 1955      MRN: [id number]  Encounter Provider: Peter Dumont DPM  Encounter Date: 3/3/2025   Encounter department: [facility] PODIATRY [place]  :  Assessment & Plan  Right foot pain    Orders:    Ambulatory Referral to Podiatry    FL guided needle plac bx/asp/inj; Future    CT lower extremity wo contrast right; Future    Arthritis of right midfoot    Orders:    FL guided needle plac bx/asp/inj; Future    CT lower extremity wo contrast right; Future    Peroneal tendinitis of right lower extremity    Orders:    FL guided needle plac bx/asp/inj; Future    CT lower extremity wo contrast right; Future    -Her nonweightbearing x-rays are rather not helpful for her current treatment, there is some narrowing of the joint spaces on a AP view, the lateral view view is  poor  -Rx given for Mobic for management of her inflammatory pain  - She is advised not to use her Mobic with her Voltaren gel  - We will also send her for a fluoroscopic guided injection of her TMT J's on the right foot  - She is wearing J Carlos's which are great for arthritis, she is already doing rigid shoes but she is having some substantial warmth and swelling across her midfoot with active midfoot arthritis  - She is to return in 2 months for repeat evaluation of her inflammatory pain and we will we will consider escalation of her treatment based off of the imaging and her current response to the injection    History of Present Illness   HPI  Surekha Tenorio is a 69 y.o. female who presents evaluation and management of her right foot. Bothering her for over a month. Aching pain denies trauma. Stay at home. Points to her midfoot where she is having pain. Does wear shoes in the house, worse barefoot. Has tried soaking and did try an arch support and she thinks that this helped her pain.       Review of Systems   Constitutional:  Negative for chills and fever.   HENT:  Negative for ear pain and sore  "throat.    Eyes:  Negative for pain and visual disturbance.   Respiratory:  Negative for cough and shortness of breath.    Cardiovascular:  Negative for chest pain and palpitations.   Gastrointestinal:  Negative for abdominal pain and vomiting.   Genitourinary:  Negative for dysuria and hematuria.   Musculoskeletal:  Negative for arthralgias and back pain.   Skin:  Negative for color change and rash.   Neurological:  Negative for seizures and syncope.   All other systems reviewed and are negative.         Objective   Ht 5' 5\" (1.651 m)   Wt 58.5 kg (129 lb)   BMI 21.47 kg/m²      Physical Exam  Vitals reviewed.   Constitutional:       Appearance: Normal appearance.   HENT:      Head: Normocephalic and atraumatic.      Nose: Nose normal.      Mouth/Throat:      Mouth: Mucous membranes are moist.   Eyes:      Pupils: Pupils are equal, round, and reactive to light.   Pulmonary:      Effort: Pulmonary effort is normal.   Musculoskeletal:         General: Swelling, tenderness and deformity present.      Comments: There is pain with palpation with range of motion of the TMT J's 1 through 5.  There is pain on palpation at the insertion of the right peroneus brevis and also along the insertion of the posterior tibial tendon, she does have a cavus foot with some metatarsus adductus.  No pain along the Achilles or also the origin of her right plantar fascia   Skin:     Capillary Refill: Capillary refill takes 2 to 3 seconds.   Neurological:      General: No focal deficit present.      Mental Status: She is alert. Mental status is at baseline.           "

## 2025-03-06 ENCOUNTER — OFFICE VISIT (OUTPATIENT)
Age: 70
End: 2025-03-06
Payer: MEDICARE

## 2025-03-06 VITALS — WEIGHT: 129 LBS | BODY MASS INDEX: 21.49 KG/M2 | HEIGHT: 65 IN

## 2025-03-06 DIAGNOSIS — G89.29 CHRONIC BILATERAL LOW BACK PAIN WITHOUT SCIATICA: ICD-10-CM

## 2025-03-06 DIAGNOSIS — M54.50 CHRONIC BILATERAL LOW BACK PAIN WITHOUT SCIATICA: ICD-10-CM

## 2025-03-06 DIAGNOSIS — G89.4 CHRONIC PAIN SYNDROME: Primary | ICD-10-CM

## 2025-03-06 DIAGNOSIS — M46.1 SACROILIITIS (HCC): ICD-10-CM

## 2025-03-06 DIAGNOSIS — M54.2 NECK PAIN: ICD-10-CM

## 2025-03-06 DIAGNOSIS — M62.830 SPASM OF THORACOLUMBAR MUSCLE: ICD-10-CM

## 2025-03-06 DIAGNOSIS — M47.812 CERVICAL SPONDYLOSIS: Chronic | ICD-10-CM

## 2025-03-06 DIAGNOSIS — M79.18 MYOFASCIAL PAIN SYNDROME: ICD-10-CM

## 2025-03-06 DIAGNOSIS — M25.50 GENERALIZED JOINT PAIN: ICD-10-CM

## 2025-03-06 DIAGNOSIS — M47.816 LUMBAR SPONDYLOSIS: ICD-10-CM

## 2025-03-06 PROCEDURE — 99214 OFFICE O/P EST MOD 30 MIN: CPT | Performed by: NURSE PRACTITIONER

## 2025-03-06 RX ORDER — METHOCARBAMOL 750 MG/1
750 TABLET, FILM COATED ORAL 3 TIMES DAILY
Qty: 90 TABLET | Refills: 1 | Status: SHIPPED | OUTPATIENT
Start: 2025-03-06

## 2025-03-06 RX ORDER — MELOXICAM 15 MG/1
15 TABLET ORAL DAILY
Qty: 30 TABLET | Refills: 1 | Status: SHIPPED | OUTPATIENT
Start: 2025-03-06

## 2025-03-06 NOTE — PROGRESS NOTES
Assessment:  1. Chronic pain syndrome    2. Neck pain    3. Cervical spondylosis    4. Lumbar spondylosis    5. Chronic bilateral low back pain without sciatica    6. Sacroiliitis (HCC)    7. Generalized joint pain    8. Spasm of thoracolumbar muscle    9. Myofascial pain syndrome        Plan:  While the patient was in the office today, I did have a thorough conversation regarding their chronic pain syndrome, medication management, and treatment plan options.  Patient is being seen for a follow-up visit.  She has been complaining of increasing pain in her neck.  She previously underwent a right sided C3-4 and C4-5 radiofrequency ablation on 5/31/2024 which provided her with up to 50% improvement in her neck pain until recently.  Patient reports that their pain improved by more than 50% and their ability to perform activities and ADLs improved by more than 50% for more than 6 months.  Will plan for repeat right sided C3-4 and C4-5 radiofrequency ablation on or after 5/31/2025.    Also, complaining of increasing low back pain.  Previous bilateral SI joint injections performed on 9/3/2024 provided her with up to 80% improvement for a few months.  Will plan to repeat bilateral SI joint injections in the near future.    Patient tells me that she was treated at 2 different pain management practices in the past with lumbar facet radiofrequency ablations which failed to provide her with relief.    Prescribed Mobic 15 mg daily with food to address the inflammatory component of her pain.    Renewed Robaxin 750 mg 3 times daily if needed for spasms.  She did not require refill of this medication during today's visit.    Previous rheumatology consult.  No evidence for RA or autoimmune.      History of Present Illness:  The patient is a 69 y.o. female who presents for a follow up office visit in regards to Neck Pain, Back Pain, Elbow Pain, Hand Pain, Wrist Pain, and Foot Pain.   The patient’s current symptoms include complaints  of diffuse joint pain, neck pain, low back pain.  Current pain level is a 6-7/10.  Quality of pain is described as dull, aching, sharp.    Current pain medications includes: Robaxin 750 mg 3 times daily.  Patient tells me that she tried Celebrex in the past which was helpful, but caused hair loss.     I have personally reviewed and/or updated the patient's past medical history, past surgical history, family history, social history, current medications, allergies, and vital signs today.         Review of Systems  Review of Systems   Constitutional:  Negative for unexpected weight change.   HENT:  Negative for hearing loss.    Eyes:  Negative for visual disturbance.   Respiratory:  Negative for shortness of breath.    Cardiovascular:  Negative for leg swelling.   Gastrointestinal:  Negative for constipation.   Endocrine: Negative for polyuria.   Genitourinary:  Negative for difficulty urinating.   Musculoskeletal:  Negative for gait problem, joint swelling and myalgias.        Joint stiffness  Pain in extremity- back, hand, foot, neck   Skin:  Negative for rash.   Neurological:  Negative for weakness and headaches.   Psychiatric/Behavioral:  Negative for decreased concentration.    All other systems reviewed and are negative.        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     Drug intolerance     Dysfunction of right eustachian tube     Dysfunctional uterine bleeding     Edema     Elevated d-dimer     Exposure to hepatitis C     Fracture of fibula     Generalized anxiety disorder     GERD (gastroesophageal reflux disease)     Hyperlipidemia      Mild cervical dysplasia     Myalgia     Myositis     Palpitations     PONV (postoperative nausea and vomiting)     Pre-syncope     Scoliosis deformity of spine 1/23/2024    Seasonal allergies     Stress incontinence     Thrombocytopenia (HCC)     Urinary retention     Urinary tract infection     Uterine leiomyoma        Past Surgical History:   Procedure Laterality Date    BLADDER SURGERY      BREAST BIOPSY Right     US guided    COLONOSCOPY      COLPOSCOPY  01/06/1998    DENTAL SURGERY      ENDOMETRIAL BIOPSY  05/21/1993    HYSTERECTOMY      HYSTEROSCOPY      uterus- 1/22/98, 7/1/02 and 4/10/07    DC ARTHRP KNE CONDYLE&PLATU MEDIAL&LAT COMPARTMENTS Right 2/8/2023    Procedure: ARTHROPLASTY KNEE TOTAL;  Surgeon: Miguel Gleason DO;  Location: [facility];  Service: Orthopedics    TUBAL LIGATION      US GUIDED BREAST BIOPSY RIGHT COMPLETE Right 08/08/2018       Family History   Problem Relation Age of Onset    Stroke Mother         cerebral artery occlusion with cerebral infarction    Coronary artery disease Mother     Arrhythmia Mother         sinus    Coronary artery disease Father     Diabetes Father     No Known Problems Daughter     No Known Problems Daughter     No Known Problems Maternal Grandmother     No Known Problems Paternal Grandmother     No Known Problems Maternal Aunt     Ovarian cancer Maternal Aunt     Aortic aneurysm Family     Arthritis Family     Diabetes Family     Heart disease Family     Breast cancer Neg Hx        Social History     Occupational History    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[employer]    Occupation: housewife   Tobacco Use    Smoking status: Every Day     Current packs/day: 1.50     Average packs/day: 1.5 packs/day for 53.2 years (79.8 ttl pk-yrs)     Types: Cigarettes     Start date: 1972     Passive exposure: Never    Smokeless tobacco: Never   Vaping Use    Vaping status: Never Used   Substance and Sexual Activity    Alcohol use: Not Currently    Drug use: No     Sexual activity: Not Currently         Current Outpatient Medications:     acetaminophen (TYLENOL) 500 mg tablet, Take 500 mg by mouth every 6 (six) hours as needed for mild pain, Disp: , Rfl:     alendronate (FOSAMAX) 70 mg tablet, take 1 tablet by mouth every 7 days, Disp: 12 tablet, Rfl: 1    ascorbic acid (VITAMIN C) 1000 MG tablet, Take 1,000 mg by mouth, Disp: , Rfl:     atorvastatin (LIPITOR) 20 mg tablet, Take 1 tablet (20 mg total) by mouth daily after dinner, Disp: 90 tablet, Rfl: 1    benzonatate (TESSALON) 200 MG capsule, Take 1 capsule (200 mg total) by mouth 3 (three) times a day as needed for cough, Disp: 20 capsule, Rfl: 0    buPROPion (WELLBUTRIN XL) 150 mg 24 hr tablet, 150 mg every morning, Disp: , Rfl:     clonazePAM (KlonoPIN) 1 mg tablet, 2 (two) times a day as needed, Disp: , Rfl:     Diclofenac Sodium (VOLTAREN) 1 %, Apply 2 g topically 3 (three) times a day as needed (pain), Disp: 180 g, Rfl: 0    fluticasone (FLONASE) 50 mcg/act nasal spray, 1 spray into each nostril daily, Disp: 16 g, Rfl: 0    guaiFENesin (Mucinex) 600 mg 12 hr tablet, Take 1 tablet (600 mg total) by mouth every 12 (twelve) hours, Disp: 20 tablet, Rfl: 0    lidocaine (Lidoderm) 5 %, Apply 1 patch topically over 12 hours daily Remove & Discard patch within 12 hours or as directed by MD, Disp: 30 patch, Rfl: 2    meloxicam (Mobic) 15 mg tablet, Take 1 tablet (15 mg total) by mouth daily Take with food, Disp: 30 tablet, Rfl: 1    methocarbamol (ROBAXIN) 750 mg tablet, Take 1 tablet (750 mg total) by mouth 3 (three) times a day, Disp: 90 tablet, Rfl: 1    omeprazole (PriLOSEC) 40 MG capsule, take 1 capsule by mouth once daily, Disp: 90 capsule, Rfl: 1    solifenacin (VESICARE) 10 MG tablet, take 1 tablet by mouth once daily, Disp: 90 tablet, Rfl: 1    tretinoin (RETIN-A) 0.1 % cream, APPLY TOPICALLY DAILY AT BEDTIME, Disp: 45 g, Rfl: 5    venlafaxine (EFFEXOR-XR) 150 mg 24 hr capsule, One 150mg and one 75mg q day for total  "of 225mg q day., Disp: 90 capsule, Rfl: 1    venlafaxine (EFFEXOR-XR) 75 mg 24 hr capsule, One 150mg and one 75mg q day for total of 225mg q day., Disp: 90 capsule, Rfl: 1    Allergies   Allergen Reactions    Gabapentin Drowsiness       Physical Exam:    Ht 5' 5\" (1.651 m)   Wt 58.5 kg (129 lb)   BMI 21.47 kg/m²     Constitutional:normal, well developed, well nourished, alert, in no distress and non-toxic and no overt pain behavior.  Eyes:anicteric  HEENT:grossly intact  Neck:supple, symmetric, trachea midline and no masses   Pulmonary:even and unlabored  Cardiovascular:No edema or pitting edema present  Skin:Normal without rashes or lesions and well hydrated  Psychiatric:Mood and affect appropriate  Neurologic:Cranial Nerves II-XII grossly intact  Musculoskeletal: Limited range of motion of the cervical spine in all planes.  There is tenderness bilaterally C2-C6.  There are cervical paraspinal muscle spasms present.  Range of motion of the lumbar spine is limited in all planes.  There is tenderness over bilateral SI joints.  Compression test, distraction test, sacral thrust test are positive bilaterally.    Imaging  FL spine and pain procedure    (Results Pending)   FL spine and pain procedure    (Results Pending)       Orders Placed This Encounter   Procedures    FL spine and pain procedure    FL spine and pain procedure         "

## 2025-03-07 ENCOUNTER — TELEPHONE (OUTPATIENT)
Dept: PODIATRY | Facility: CLINIC | Age: 70
End: 2025-03-07

## 2025-03-10 ENCOUNTER — TELEPHONE (OUTPATIENT)
Age: 70
End: 2025-03-10

## 2025-03-10 NOTE — TELEPHONE ENCOUNTER
Patient called in and asked if you can call her she said she thinks she going to go with the shots in her feet. Please advise

## 2025-03-10 NOTE — TELEPHONE ENCOUNTER
Patient called asking to speak to [facility]'s Podiatry.  Warm transfer to [facility] Podiatry.

## 2025-03-11 ENCOUNTER — TELEPHONE (OUTPATIENT)
Age: 70
End: 2025-03-11

## 2025-03-11 NOTE — TELEPHONE ENCOUNTER
Caller: Patient    Doctor: Dr. STAPLES    Reason for call: Patient is scheduled for a procedure on 3/19 with Ortho and was told she is able to get  back injections the same day    Please advise     Call back#:

## 2025-03-11 NOTE — TELEPHONE ENCOUNTER
S/w pt and advised pt is already sched for 4/11/25 for B/L SIJ @ 0840. RN also advised pt she is sched for her Rt C spine RFA on 6/3/25. Pt verba understanding and apprec of call.

## 2025-03-11 NOTE — TELEPHONE ENCOUNTER
Called patient to inform that Dr. Dumont agrees with [facility] Doctor- she should proceed with her back issues/ injections first before getting injection in her feet. She understands and will take care of one issue at a time for now.

## 2025-03-13 DIAGNOSIS — F41.9 ANXIETY: ICD-10-CM

## 2025-03-14 DIAGNOSIS — F41.9 ANXIETY: ICD-10-CM

## 2025-03-14 DIAGNOSIS — N32.81 OAB (OVERACTIVE BLADDER): ICD-10-CM

## 2025-03-14 RX ORDER — VENLAFAXINE HYDROCHLORIDE 150 MG/1
CAPSULE, EXTENDED RELEASE ORAL
Qty: 90 CAPSULE | Refills: 1 | Status: SHIPPED | OUTPATIENT
Start: 2025-03-14 | End: 2025-03-19 | Stop reason: SDUPTHER

## 2025-03-14 RX ORDER — VENLAFAXINE HYDROCHLORIDE 150 MG/1
CAPSULE, EXTENDED RELEASE ORAL
Qty: 90 CAPSULE | Refills: 1 | OUTPATIENT
Start: 2025-03-14

## 2025-03-14 RX ORDER — VENLAFAXINE HYDROCHLORIDE 75 MG/1
CAPSULE, EXTENDED RELEASE ORAL
Qty: 90 CAPSULE | Refills: 1 | Status: SHIPPED | OUTPATIENT
Start: 2025-03-14 | End: 2025-03-19 | Stop reason: SDUPTHER

## 2025-03-14 RX ORDER — VENLAFAXINE HYDROCHLORIDE 75 MG/1
CAPSULE, EXTENDED RELEASE ORAL
Qty: 90 CAPSULE | Refills: 1 | OUTPATIENT
Start: 2025-03-14

## 2025-03-14 RX ORDER — SOLIFENACIN SUCCINATE 10 MG/1
10 TABLET, FILM COATED ORAL DAILY
Qty: 90 TABLET | Refills: 1 | Status: SHIPPED | OUTPATIENT
Start: 2025-03-14

## 2025-03-18 ENCOUNTER — TELEPHONE (OUTPATIENT)
Dept: RADIOLOGY | Facility: HOSPITAL | Age: 70
End: 2025-03-18

## 2025-03-18 NOTE — NURSING NOTE
Call placed to pt to discuss upcoming appointment at [facility] Radiology Department.  No answer.  Information left on pts voicemail.  Pt is having a R Foot 1-5 TMTJ Injection completed on 3/19/2025.  Pre procedure instructions given including diet and taking own medications.  Instructed pt that she may eat normally and take medications as usual before the procedure.  Instructed pt that she will need a  due to procedure being completed on R Foot.  Reminded pt of the location, date and time of procedure.  Phone number given for pt to call with any questions.

## 2025-03-19 ENCOUNTER — TELEPHONE (OUTPATIENT)
Dept: INTERNAL MEDICINE CLINIC | Facility: CLINIC | Age: 70
End: 2025-03-19

## 2025-03-19 ENCOUNTER — HOSPITAL ENCOUNTER (OUTPATIENT)
Dept: RADIOLOGY | Facility: HOSPITAL | Age: 70
Discharge: HOME | End: 2025-03-19
Attending: PODIATRIST
Payer: MEDICARE

## 2025-03-19 DIAGNOSIS — F41.9 ANXIETY: ICD-10-CM

## 2025-03-19 DIAGNOSIS — M19.071 ARTHRITIS OF RIGHT MIDFOOT: ICD-10-CM

## 2025-03-19 DIAGNOSIS — M79.671 RIGHT FOOT PAIN: ICD-10-CM

## 2025-03-19 DIAGNOSIS — M76.71 PERONEAL TENDINITIS OF RIGHT LOWER EXTREMITY: ICD-10-CM

## 2025-03-19 PROCEDURE — 77002 NEEDLE LOCALIZATION BY XRAY: CPT

## 2025-03-19 RX ORDER — ROPIVACAINE HYDROCHLORIDE 2 MG/ML
2 INJECTION, SOLUTION EPIDURAL; INFILTRATION; PERINEURAL ONCE
Status: DISCONTINUED | OUTPATIENT
Start: 2025-03-19 | End: 2025-03-20 | Stop reason: HOSPADM

## 2025-03-19 RX ORDER — BETAMETHASONE SODIUM PHOSPHATE AND BETAMETHASONE ACETATE 3; 3 MG/ML; MG/ML
12 INJECTION, SUSPENSION INTRA-ARTICULAR; INTRALESIONAL; INTRAMUSCULAR; SOFT TISSUE EVERY 24 HOURS
Status: DISCONTINUED | OUTPATIENT
Start: 2025-03-19 | End: 2025-03-20 | Stop reason: HOSPADM

## 2025-03-19 RX ORDER — VENLAFAXINE HYDROCHLORIDE 75 MG/1
CAPSULE, EXTENDED RELEASE ORAL
Qty: 90 CAPSULE | Refills: 1 | Status: SHIPPED | OUTPATIENT
Start: 2025-03-19

## 2025-03-19 RX ORDER — LIDOCAINE HYDROCHLORIDE 10 MG/ML
0.5 INJECTION, SOLUTION EPIDURAL; INFILTRATION; INTRACAUDAL; PERINEURAL
Status: DISCONTINUED | OUTPATIENT
Start: 2025-03-19 | End: 2025-03-20 | Stop reason: HOSPADM

## 2025-03-19 RX ORDER — BETAMETHASONE SODIUM PHOSPHATE AND BETAMETHASONE ACETATE 3; 3 MG/ML; MG/ML
12 INJECTION, SUSPENSION INTRA-ARTICULAR; INTRALESIONAL; INTRAMUSCULAR; SOFT TISSUE ONCE
Status: DISCONTINUED | OUTPATIENT
Start: 2025-03-19 | End: 2025-03-20 | Stop reason: HOSPADM

## 2025-03-19 RX ORDER — VENLAFAXINE HYDROCHLORIDE 150 MG/1
CAPSULE, EXTENDED RELEASE ORAL
Qty: 90 CAPSULE | Refills: 1 | Status: SHIPPED | OUTPATIENT
Start: 2025-03-19

## 2025-03-19 RX ADMIN — IOHEXOL 1 ML: 300 INJECTION, SOLUTION INTRAVENOUS at 13:15

## 2025-03-19 NOTE — TELEPHONE ENCOUNTER
Pt came in stating she went to pharmacy and they told her they did not have her venlafaxine. They stated the order was cancelled. Can this be sent to [facility]

## 2025-04-02 ENCOUNTER — TELEPHONE (OUTPATIENT)
Age: 70
End: 2025-04-02

## 2025-04-02 NOTE — TELEPHONE ENCOUNTER
Caller: Robyn Tenorio    Doctor and/or Office: Dr. Dumont/Theron    #: [phone]    Escalation: Care Patient is having a lot of pain and some swelling going up her ankle. She states she had an appt in [place] for her feet, and since then its worse. Please return call.Thank you

## 2025-04-07 ENCOUNTER — OFFICE VISIT (OUTPATIENT)
Dept: PODIATRY | Facility: CLINIC | Age: 70
End: 2025-04-07
Payer: MEDICARE

## 2025-04-07 VITALS — HEIGHT: 65 IN | WEIGHT: 129 LBS | BODY MASS INDEX: 21.49 KG/M2

## 2025-04-07 DIAGNOSIS — M19.071 ARTHRITIS OF RIGHT MIDFOOT: Primary | ICD-10-CM

## 2025-04-07 DIAGNOSIS — M79.671 RIGHT FOOT PAIN: ICD-10-CM

## 2025-04-07 PROCEDURE — 99213 OFFICE O/P EST LOW 20 MIN: CPT | Performed by: PODIATRIST

## 2025-04-07 RX ORDER — BUSPIRONE HYDROCHLORIDE 30 MG/1
1 TABLET ORAL 2 TIMES DAILY
COMMUNITY
Start: 2025-04-01

## 2025-04-07 NOTE — PROGRESS NOTES
"Name: Surekha Tenorio      : 1955      MRN: [id number]  Encounter Provider: Peter Dumont DPM  Encounter Date: 2025   Encounter department: [facility] PODIATRY [place]  :  Assessment & Plan  Arthritis of right midfoot         Right foot pain         - Her symptoms are consistent with a severe arthritic flareup of her right foot  - She only got around a week of relief, this shows us that her right foot the midfoot is the area in the foot that is really causing her a lot of the pain, but her arthritis is too severe to really be controlled by intra-articular injections  - We will obtain a CT to evaluate the severity of her arthritis for further discussion of treatment which would likely be surgical intervention.  - She does not want surgical intervention, and she is apprehensive about going under anesthesia, if this is the case I would recommend that she find an a second opinion to consider surgery under spinal.    History of Present Illness   HPI  Surekha Tenorio is a 69 y.o. female who presents for evaluation and management of her left foot. The injection was done on 3/19 and she had relief for about a week and then the pain came back. She has not scheduled her CT yet.       Review of Systems   Constitutional:  Negative for chills and fever.   HENT:  Negative for ear pain and sore throat.    Eyes:  Negative for pain and visual disturbance.   Respiratory:  Negative for cough and shortness of breath.    Cardiovascular:  Negative for chest pain and palpitations.   Gastrointestinal:  Negative for abdominal pain and vomiting.   Genitourinary:  Negative for dysuria and hematuria.   Musculoskeletal:  Negative for arthralgias and back pain.   Skin:  Negative for color change and rash.   Neurological:  Negative for seizures and syncope.   All other systems reviewed and are negative.         Objective   Ht 5' 5\" (1.651 m)   Wt 58.5 kg (129 lb)   BMI 21.47 kg/m²      Physical " Exam  Constitutional:       Appearance: Normal appearance.   HENT:      Head: Normocephalic and atraumatic.      Nose: Nose normal.      Mouth/Throat:      Mouth: Mucous membranes are moist.      Pharynx: Oropharynx is clear.   Eyes:      Pupils: Pupils are equal, round, and reactive to light.   Cardiovascular:      Pulses: Normal pulses.   Pulmonary:      Effort: Pulmonary effort is normal.   Skin:     Comments: She has pretty severe continue arthritis flareup on the right foot.  She has asymmetrical warmth erythema and soft tissue swelling overlying her 1 to 3-4 TMT J's of the right foot, cavus foot type   Neurological:      General: No focal deficit present.      Mental Status: She is alert and oriented to person, place, and time. Mental status is at baseline.

## 2025-04-08 ENCOUNTER — OFFICE VISIT (OUTPATIENT)
Dept: OBGYN CLINIC | Facility: CLINIC | Age: 70
End: 2025-04-08
Payer: MEDICARE

## 2025-04-08 ENCOUNTER — APPOINTMENT (OUTPATIENT)
Dept: RADIOLOGY | Facility: CLINIC | Age: 70
End: 2025-04-08
Payer: MEDICARE

## 2025-04-08 VITALS — BODY MASS INDEX: 21.49 KG/M2 | WEIGHT: 129 LBS | HEIGHT: 65 IN

## 2025-04-08 DIAGNOSIS — Z96.651 S/P TOTAL KNEE REPLACEMENT, RIGHT: ICD-10-CM

## 2025-04-08 DIAGNOSIS — Z96.651 S/P TOTAL KNEE REPLACEMENT, RIGHT: Primary | ICD-10-CM

## 2025-04-08 PROCEDURE — 99213 OFFICE O/P EST LOW 20 MIN: CPT | Performed by: ORTHOPAEDIC SURGERY

## 2025-04-08 PROCEDURE — 73562 X-RAY EXAM OF KNEE 3: CPT

## 2025-04-08 NOTE — PROGRESS NOTES
Assessment & Plan  S/P total knee replacement, right  X-rays of the patient's right knee are consistent with a well-seated right total knee replacement.  The prosthesis is in good alignment.  There are no signs of loosening.  There are no fractures or dislocations.  Overall, the patient is very pleased with the results of surgery.  She has returned to normal activities of daily living. She may continue home exercise program.  She will follow-up with our office in 1 year with new x-rays of her right knee 3 views.  She is acceptable to this plan.    Return in about 1 year (around 4/8/2026) for Recheck, Repeat X-ray right knee.    The patient is doing quite well in regards to her right total knee replacement.  Strength and motion intact.  No instability.  Incision well healed.  X-rays show anatomic placement of the prosthesis.  Continue home exercise program.  Follow-up 12 months evaluation with new x-rays of right knee-3 views    Orders:    XR knee 3 vw right non injury; Future      The patient is doing quite well from her right total knee replacement from 2 years ago.  There is full strength full motion.  No instability.  X-rays show anatomic placement of prosthesis.  Continue home exercise program.  Follow-up in 1 year for evaluation with new x-rays of right knee-3 views      _____________________________________________________  CHIEF COMPLAINT:  Chief Complaint   Patient presents with    Right Knee - Follow-up         SUBJECTIVE:  Surekha Tenorio is a 69 y.o. female who presents to our office for annual postop visit.  The patient is status post right total knee replacement from 2/8/2023.  She is very pleased with the results of surgery.  She is ambulating without an assist device or antalgic gait.  She denies any numbness or tingling.  She denies any fever or chills.    The following portions of the patient's history were reviewed and updated as appropriate: allergies, current medications, past family history,  past medical history, past social history, past surgical history and problem list.    PAST 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     Drug intolerance     Dysfunction of right eustachian tube     Dysfunctional uterine bleeding     Edema     Elevated d-dimer     Exposure to hepatitis C     Fracture of fibula     Generalized anxiety disorder     GERD (gastroesophageal reflux disease)     Hyperlipidemia     Mild cervical dysplasia     Myalgia     Myositis     Palpitations     PONV (postoperative nausea and vomiting)     Pre-syncope     Scoliosis deformity of spine 1/23/2024    Seasonal allergies     Stress incontinence     Thrombocytopenia (HCC)     Urinary retention     Urinary tract infection     Uterine leiomyoma        PAST SURGICAL HISTORY:  Past Surgical History:   Procedure Laterality Date    BLADDER SURGERY      BREAST BIOPSY Right     US guided    COLONOSCOPY      COLPOSCOPY  01/06/1998    DENTAL SURGERY      ENDOMETRIAL BIOPSY  05/21/1993    FL GUIDED NEEDLE PLAC BX/ASP/INJ  3/19/2025    HYSTERECTOMY      HYSTEROSCOPY      uterus- 1/22/98, 7/1/02 and 4/10/07    [place] ARTHRP KNE CONDYLE&PLATU MEDIAL&LAT COMPARTMENTS Right 2/8/2023    Procedure: ARTHROPLASTY KNEE TOTAL;  Surgeon: Miguel Gleason DO;  Location: [facility];  Service: Orthopedics    TUBAL LIGATION      US GUIDED BREAST BIOPSY RIGHT COMPLETE Right 08/08/2018       FAMILY HISTORY:  Family History   Problem Relation Age of Onset    Stroke Mother         cerebral artery occlusion with cerebral infarction    Coronary artery disease Mother     Arrhythmia  Mother         sinus    Coronary artery disease Father     Diabetes Father     No Known Problems Daughter     No Known Problems Daughter     No Known Problems Maternal Grandmother     No Known Problems Paternal Grandmother     No Known Problems Maternal Aunt     Ovarian cancer Maternal Aunt     Aortic aneurysm Family     Arthritis Family     Diabetes Family     Heart disease Family     Breast cancer Neg Hx        SOCIAL HISTORY:  Social History     Tobacco Use    Smoking status: Every Day     Current packs/day: 1.50     Average packs/day: 1.5 packs/day for 53.3 years (79.9 ttl pk-yrs)     Types: Cigarettes     Start date: 1972     Passive exposure: Never    Smokeless tobacco: Never   Vaping Use    Vaping status: Never Used   Substance Use Topics    Alcohol use: Not Currently    Drug use: No       MEDICATIONS:    Current Outpatient Medications:     acetaminophen (TYLENOL) 500 mg tablet, Take 500 mg by mouth every 6 (six) hours as needed for mild pain, Disp: , Rfl:     alendronate (FOSAMAX) 70 mg tablet, take 1 tablet by mouth every 7 days, Disp: 12 tablet, Rfl: 1    ascorbic acid (VITAMIN C) 1000 MG tablet, Take 1,000 mg by mouth, Disp: , Rfl:     atorvastatin (LIPITOR) 20 mg tablet, Take 1 tablet (20 mg total) by mouth daily after dinner, Disp: 90 tablet, Rfl: 1    benzonatate (TESSALON) 200 MG capsule, Take 1 capsule (200 mg total) by mouth 3 (three) times a day as needed for cough, Disp: 20 capsule, Rfl: 0    buPROPion (WELLBUTRIN XL) 150 mg 24 hr tablet, 150 mg every morning, Disp: , Rfl:     busPIRone (BUSPAR) 30 MG tablet, Take 1 tablet by mouth 2 (two) times a day, Disp: , Rfl:     clonazePAM (KlonoPIN) 1 mg tablet, 2 (two) times a day as needed, Disp: , Rfl:     Diclofenac Sodium (VOLTAREN) 1 %, Apply 2 g topically 3 (three) times a day as needed (pain), Disp: 180 g, Rfl: 0    fluticasone (FLONASE) 50 mcg/act nasal spray, 1 spray into each nostril daily, Disp: 16 g, Rfl: 0    guaiFENesin (Mucinex) 600 mg 12  "hr tablet, Take 1 tablet (600 mg total) by mouth every 12 (twelve) hours, Disp: 20 tablet, Rfl: 0    lidocaine (Lidoderm) 5 %, Apply 1 patch topically over 12 hours daily Remove & Discard patch within 12 hours or as directed by MD, Disp: 30 patch, Rfl: 2    meloxicam (Mobic) 15 mg tablet, Take 1 tablet (15 mg total) by mouth daily Take with food, Disp: 30 tablet, Rfl: 1    methocarbamol (ROBAXIN) 750 mg tablet, Take 1 tablet (750 mg total) by mouth 3 (three) times a day, Disp: 90 tablet, Rfl: 1    omeprazole (PriLOSEC) 40 MG capsule, take 1 capsule by mouth once daily, Disp: 90 capsule, Rfl: 1    solifenacin (VESICARE) 10 MG tablet, take 1 tablet by mouth once daily, Disp: 90 tablet, Rfl: 1    tretinoin (RETIN-A) 0.1 % cream, APPLY TOPICALLY DAILY AT BEDTIME, Disp: 45 g, Rfl: 5    venlafaxine (EFFEXOR-XR) 150 mg 24 hr capsule, One 150mg and one 75mg q day for total of 225mg q day., Disp: 90 capsule, Rfl: 1    venlafaxine (EFFEXOR-XR) 75 mg 24 hr capsule, One 150mg and one 75mg q day for total of 225mg q day., Disp: 90 capsule, Rfl: 1    ALLERGIES:  Allergies   Allergen Reactions    Gabapentin Drowsiness       ROS:  Review of Systems     Constitutional: Negative for fatigue, fever or loss of appetite.   HENT: Negative.    Respiratory: Negative for shortness of breath, dyspnea.    Cardiovascular: Negative for chest pain/tightness.   Gastrointestinal: Negative for abdominal pain, N/V.   Endocrine: Negative for cold/heat intolerance, unexplained weight loss/gain.   Genitourinary: Negative for flank pain, dysuria, hematuria.   Musculoskeletal: Negative for arthralgia   Skin: Negative for rash.    Neurological: Negative for numbness or tingling  Psychiatric/Behavioral: Negative for agitation.  _____________________________________________________  PHYSICAL EXAMINATION:    Height 5' 5\" (1.651 m), weight 58.5 kg (129 lb).    Constitutional: Oriented to person, place, and time. Appears well-developed and well-nourished. No " distress.   HENT:   Head: Normocephalic.   Eyes: Conjunctivae are normal. Right eye exhibits no discharge. Left eye exhibits no discharge. No scleral icterus.   Cardiovascular: Normal rate.    Pulmonary/Chest: Effort normal.   Neurological: Alert and oriented to person, place, and time.   Skin: Skin is warm and dry. No rash noted. Not diaphoretic. No erythema. No pallor.   Psychiatric: Normal mood and affect. Behavior is normal. Judgment and thought content normal.      MUSCULOSKELETAL EXAMINATION:   Physical Exam  Constitutional:       Appearance: Normal appearance. She is normal weight.   HENT:      Nose: Nose normal.      Mouth/Throat:      Mouth: Mucous membranes are moist.      Pharynx: Oropharynx is clear.   Cardiovascular:      Rate and Rhythm: Normal rate and regular rhythm.      Pulses: Normal pulses.   Pulmonary:      Effort: Pulmonary effort is normal.      Breath sounds: Normal breath sounds.   Musculoskeletal:         General: Normal range of motion.      Cervical back: Normal range of motion.   Skin:     General: Skin is warm and dry.      Capillary Refill: Capillary refill takes less than 2 seconds.   Neurological:      General: No focal deficit present.      Mental Status: She is alert and oriented to person, place, and time. Mental status is at baseline.   Psychiatric:         Mood and Affect: Mood normal.         Behavior: Behavior normal.         Thought Content: Thought content normal.         Judgment: Judgment normal.       Ortho Exam    Unchanged from prior-  Right lower extremity is neurovascularly intact  Toes are pink and mobile  Compartments are soft  Incision is well-healed  Range of motion of the knee is from 0 to 120 degrees  No ligament laxity  Brisk cap refill  Sensation intact      Objective:  BP Readings from Last 1 Encounters:   02/01/25 117/70      Wt Readings from Last 1 Encounters:   04/08/25 58.5 kg (129 lb)        BMI:   Estimated body mass index is 21.47 kg/m² as calculated  "from the following:    Height as of this encounter: 5' 5\" (1.651 m).    Weight as of this encounter: 58.5 kg (129 lb).          Scribe Attestation      I,:  Chayo Giordano am acting as a scribe while in the presence of the attending physician.:       I,:  Miguel Gleason, DO personally performed the services described in this documentation    as scribed in my presence.:            "

## 2025-04-11 ENCOUNTER — HOSPITAL ENCOUNTER (OUTPATIENT)
Dept: CT IMAGING | Facility: HOSPITAL | Age: 70
End: 2025-04-11
Attending: PODIATRIST
Payer: MEDICARE

## 2025-04-11 ENCOUNTER — HOSPITAL ENCOUNTER (OUTPATIENT)
Dept: RADIOLOGY | Facility: HOSPITAL | Age: 70
End: 2025-04-11
Payer: MEDICARE

## 2025-04-11 VITALS
SYSTOLIC BLOOD PRESSURE: 130 MMHG | HEART RATE: 67 BPM | DIASTOLIC BLOOD PRESSURE: 68 MMHG | TEMPERATURE: 97.2 F | RESPIRATION RATE: 20 BRPM | OXYGEN SATURATION: 95 %

## 2025-04-11 DIAGNOSIS — M46.1 SACROILIITIS (HCC): ICD-10-CM

## 2025-04-11 DIAGNOSIS — M79.671 RIGHT FOOT PAIN: ICD-10-CM

## 2025-04-11 DIAGNOSIS — M76.71 PERONEAL TENDINITIS OF RIGHT LOWER EXTREMITY: ICD-10-CM

## 2025-04-11 DIAGNOSIS — M54.50 CHRONIC BILATERAL LOW BACK PAIN WITHOUT SCIATICA: ICD-10-CM

## 2025-04-11 DIAGNOSIS — G89.29 CHRONIC BILATERAL LOW BACK PAIN WITHOUT SCIATICA: ICD-10-CM

## 2025-04-11 DIAGNOSIS — M19.071 ARTHRITIS OF RIGHT MIDFOOT: ICD-10-CM

## 2025-04-11 PROCEDURE — 73700 CT LOWER EXTREMITY W/O DYE: CPT

## 2025-04-11 PROCEDURE — 27096 INJECT SACROILIAC JOINT: CPT | Performed by: ANESTHESIOLOGY

## 2025-04-11 RX ORDER — METHYLPREDNISOLONE ACETATE 40 MG/ML
80 INJECTION, SUSPENSION INTRA-ARTICULAR; INTRALESIONAL; INTRAMUSCULAR; PARENTERAL; SOFT TISSUE ONCE
Status: COMPLETED | OUTPATIENT
Start: 2025-04-11 | End: 2025-04-11

## 2025-04-11 RX ORDER — LIDOCAINE HYDROCHLORIDE 10 MG/ML
2 INJECTION, SOLUTION EPIDURAL; INFILTRATION; INTRACAUDAL; PERINEURAL ONCE
Status: COMPLETED | OUTPATIENT
Start: 2025-04-11 | End: 2025-04-11

## 2025-04-11 RX ORDER — ROPIVACAINE HYDROCHLORIDE 2 MG/ML
2 INJECTION, SOLUTION EPIDURAL; INFILTRATION; PERINEURAL ONCE
Status: COMPLETED | OUTPATIENT
Start: 2025-04-11 | End: 2025-04-11

## 2025-04-11 RX ADMIN — IOHEXOL 2 ML: 300 INJECTION, SOLUTION INTRAVENOUS at 08:56

## 2025-04-11 RX ADMIN — ROPIVACAINE HYDROCHLORIDE 2 ML: 2 INJECTION, SOLUTION EPIDURAL; INFILTRATION; PERINEURAL at 08:57

## 2025-04-11 RX ADMIN — METHYLPREDNISOLONE ACETATE 80 MG: 40 INJECTION, SUSPENSION INTRA-ARTICULAR; INTRALESIONAL; INTRAMUSCULAR; PARENTERAL; SOFT TISSUE at 08:57

## 2025-04-11 RX ADMIN — LIDOCAINE HYDROCHLORIDE 2 ML: 10 INJECTION, SOLUTION EPIDURAL; INFILTRATION; INTRACAUDAL; PERINEURAL at 08:55

## 2025-04-11 NOTE — H&P
Assessment:  1. Chronic bilateral low back pain without sciatica  FL spine and pain procedure    FL spine and pain procedure      2. Sacroiliitis (HCC)  FL spine and pain procedure    FL spine and pain procedure          Plan:  Surekha Tenorio is a 69 y.o. female with complaints of low back and bilateral hip pain presents to surgical center for procedure.  We will perform a bilateral sacroiliac joint steroid injection  2. Follow-up 1 month after injection    Complete risks and benefits including bleeding, infection, tissue reaction, nerve injury and allergic reaction were discussed. The approach was demonstrated using models and literature was provided. Verbal and written consent was obtained.    My impressions and treatment recommendations were discussed in detail with the patient who verbalized understanding and had no further questions.  Discharge instructions were provided. I personally saw and examined the patient and I agree with the above discussed plan of care.    Orders Placed This Encounter   Procedures    FL spine and pain procedure     Standing Status:   Standing     Number of Occurrences:   1     Reason for Exam::   B/L SI joint injection     Anticoagulant hold needed?:   nsaids    Discharge Diet     Diet Type::   Regular    Activity as tolerated    Remove dressing in 24 hours    Call provider for:  persistent nausea or vomiting    Call provider for:  severe uncontrolled pain    Call provider for:  redness, tenderness, or signs of infection (pain, swelling, redness, odor or green/yellow discharge around incision site)    Call provider for: active or persistent bleeding    Call provider for:  difficulty breathing, headache or visual disturbances    Call provider for:  hives    Call provider for:  persistent dizziness or light-headedness    Call provider for:  extreme fatigue    Discharge Nursing Instructions: Provide Tobacco Cessation Materials to Patient     If patient willing to receive, nurse to  provide tobacco cessation materials from Admission Packet.     Standing Status:   Standing     Number of Occurrences:   1    Discharge patient     Standing Status:   Standing     Number of Occurrences:   1     Is this a planned readmission (within 30 days)?:   No     New Medications Ordered This Visit   Medications    iohexol (OMNIPAQUE) 300 mg/mL injection 2 mL    lidocaine (PF) (XYLOCAINE-MPF) 1 % injection 2 mL    methylPREDNISolone acetate (DEPO-MEDROL) injection 80 mg    ropivacaine (NAROPIN) injection 2 mL       History of Present Illness:  Surekha Tenorio is a 69 y.o. female who presents for a follow up office visit in regards to low back and bilateral hip pain.   The patient’s current symptoms include 8 out of 10 sharp and stabbing throbbing.  There are particular time pattern.      I have personally reviewed and/or updated the patient's past medical history, past surgical history, family history, social history, current medications, allergies, and vital signs today.     Review of Systems   Musculoskeletal:  Positive for arthralgias and back pain.   All other systems reviewed and are negative.      Patient Active Problem List   Diagnosis    Allergic rhinitis    Chronic cough    Constipation    GERD (gastroesophageal reflux disease)    Generalized anxiety disorder    Hyperlipidemia    Symptomatic menopausal or female climacteric states    Myofascial pain syndrome    Varicosities of leg    Acquired trigger finger    Carpal tunnel syndrome    Spider veins of both lower extremities    Tobacco abuse    Chronic pain syndrome    Uterine prolapse    Chronic bilateral low back pain without sciatica    History of pulmonary embolism    Pulmonary emphysema (HCC)    Scoliosis deformity of spine    Lumbar disc disease with radiculopathy    Lumbar spondylosis    Spinal stenosis of lumbar region without neurogenic claudication    Cervical spondylosis    Sacroiliitis (HCC)    Neck pain    Abnormal CT scan, colon    NAFL  (nonalcoholic fatty liver)    Generalized joint pain    Prediabetes       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     Drug intolerance     Dysfunction of right eustachian tube     Dysfunctional uterine bleeding     Edema     Elevated d-dimer     Exposure to hepatitis C     Fracture of fibula     Generalized anxiety disorder     GERD (gastroesophageal reflux disease)     Hyperlipidemia     Mild cervical dysplasia     Myalgia     Myositis     Palpitations     PONV (postoperative nausea and vomiting)     Pre-syncope     Scoliosis deformity of spine 1/23/2024    Seasonal allergies     Stress incontinence     Thrombocytopenia (HCC)     Urinary retention     Urinary tract infection     Uterine leiomyoma        Past Surgical History:   Procedure Laterality Date    BLADDER SURGERY      BREAST BIOPSY Right     US guided    COLONOSCOPY      COLPOSCOPY  01/06/1998    DENTAL SURGERY      ENDOMETRIAL BIOPSY  05/21/1993    FL GUIDED NEEDLE PLAC BX/ASP/INJ  3/19/2025    HYSTERECTOMY      HYSTEROSCOPY      uterus- 1/22/98, 7/1/02 and 4/10/07    [place] ARTHRP KNE CONDYLE&PLATU MEDIAL&LAT COMPARTMENTS Right 2/8/2023    Procedure: ARTHROPLASTY KNEE TOTAL;  Surgeon: Miguel Gleason DO;  Location: [facility];  Service: Orthopedics    TUBAL LIGATION      US GUIDED BREAST BIOPSY RIGHT COMPLETE Right 08/08/2018       Family History   Problem Relation Age of Onset    Stroke Mother         cerebral artery occlusion with cerebral infarction    Coronary artery disease Mother     Arrhythmia Mother         sinus    Coronary artery disease Father     Diabetes Father      No Known Problems Daughter     No Known Problems Daughter     No Known Problems Maternal Grandmother     No Known Problems Paternal Grandmother     No Known Problems Maternal Aunt     Ovarian cancer Maternal Aunt     Aortic aneurysm Family     Arthritis Family     Diabetes Family     Heart disease Family     Breast cancer Neg Hx        Social History     Occupational History    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[employer]    Occupation: housewife   Tobacco Use    Smoking status: Every Day     Current packs/day: 1.50     Average packs/day: 1.5 packs/day for 53.3 years (79.9 ttl pk-yrs)     Types: Cigarettes     Start date: 1972     Passive exposure: Never    Smokeless tobacco: Never   Vaping Use    Vaping status: Never Used   Substance and Sexual Activity    Alcohol use: Not Currently    Drug use: No    Sexual activity: Not Currently       Current Outpatient Medications on File Prior to Encounter   Medication Sig    acetaminophen (TYLENOL) 500 mg tablet Take 500 mg by mouth every 6 (six) hours as needed for mild pain    alendronate (FOSAMAX) 70 mg tablet take 1 tablet by mouth every 7 days    ascorbic acid (VITAMIN C) 1000 MG tablet Take 1,000 mg by mouth    atorvastatin (LIPITOR) 20 mg tablet Take 1 tablet (20 mg total) by mouth daily after dinner    benzonatate (TESSALON) 200 MG capsule Take 1 capsule (200 mg total) by mouth 3 (three) times a day as needed for cough    buPROPion (WELLBUTRIN XL) 150 mg 24 hr tablet 150 mg every morning    busPIRone (BUSPAR) 30 MG tablet Take 1 tablet by mouth 2 (two) times a day    clonazePAM (KlonoPIN) 1 mg tablet 2 (two) times a day as needed    Diclofenac Sodium (VOLTAREN) 1 % Apply 2 g topically 3 (three) times a day as needed (pain)    fluticasone (FLONASE) 50 mcg/act nasal spray 1 spray into each nostril daily    guaiFENesin (Mucinex) 600 mg 12 hr tablet Take 1 tablet (600 mg total) by mouth every 12 (twelve) hours    lidocaine (Lidoderm) 5 % Apply 1 patch topically  over 12 hours daily Remove & Discard patch within 12 hours or as directed by MD    meloxicam (Mobic) 15 mg tablet Take 1 tablet (15 mg total) by mouth daily Take with food    methocarbamol (ROBAXIN) 750 mg tablet Take 1 tablet (750 mg total) by mouth 3 (three) times a day    omeprazole (PriLOSEC) 40 MG capsule take 1 capsule by mouth once daily    solifenacin (VESICARE) 10 MG tablet take 1 tablet by mouth once daily    tretinoin (RETIN-A) 0.1 % cream APPLY TOPICALLY DAILY AT BEDTIME    venlafaxine (EFFEXOR-XR) 150 mg 24 hr capsule One 150mg and one 75mg q day for total of 225mg q day.    venlafaxine (EFFEXOR-XR) 75 mg 24 hr capsule One 150mg and one 75mg q day for total of 225mg q day.     No current facility-administered medications on file prior to encounter.       Allergies   Allergen Reactions    Gabapentin Drowsiness       Physical Exam:    /70   Pulse 76   Temp (!) 97.2 °F (36.2 °C) (Temporal)   Resp 18   SpO2 97%     Constitutional:normal, well developed, well nourished, alert, in no distress and non-toxic and no overt pain behavior.  Eyes:anicteric  HEENT:grossly intact  Neck:supple, symmetric, trachea midline and no masses   Pulmonary:even and unlabored  Cardiovascular:No edema or pitting edema present  Skin:Normal without rashes or lesions and well hydrated  Psychiatric:Mood and affect appropriate  Neurologic:Cranial Nerves II-XII grossly intact  Musculoskeletal:antalgic

## 2025-04-11 NOTE — DISCHARGE INSTR - LAB

## 2025-04-21 ENCOUNTER — OFFICE VISIT (OUTPATIENT)
Dept: PODIATRY | Facility: CLINIC | Age: 70
End: 2025-04-21
Payer: MEDICARE

## 2025-04-21 VITALS — HEIGHT: 65 IN | BODY MASS INDEX: 21.49 KG/M2 | WEIGHT: 129 LBS

## 2025-04-21 DIAGNOSIS — M79.674 PAIN OF TOE OF RIGHT FOOT: ICD-10-CM

## 2025-04-21 DIAGNOSIS — T14.8XXA LIGAMENT TEAR: Primary | ICD-10-CM

## 2025-04-21 PROCEDURE — 99214 OFFICE O/P EST MOD 30 MIN: CPT | Performed by: PODIATRIST

## 2025-04-21 NOTE — PROGRESS NOTES
"Name: Surekha Tenorio      : 1955      MRN: [id number]  Encounter Provider: Peter Dumont DPM  Encounter Date: 2025   Encounter department: [facility] POD[facility] [place]  :  Assessment & Plan  Ligament tear    Orders:    MRI arthrogram right foot; Future    FL guided needle plac bx/asp/inj; Future    Pain of toe of right foot    Orders:    MRI arthrogram right foot; Future    FL guided needle plac bx/asp/inj; Future    - I would recommend right second TMT J fusion with plates and screws  - CT reviewed and she is having substantial pain with end-stage arthritis of this area  - I would also recommend an MRI arthrogram of the right second MTPJ to rule out collateral ligament versus plantar plate rupture  - Return after MRI to discuss final decision for potential surgical intervention    History of Present Illness   HPI  Surekha Tenorio is a 69 y.o. female who presents evaluation and management of her right foot.  She is here to review her CT discuss further treatment.  She is doing rigid shoes she also notes new onset toe pain.  She is guarding and being on her toes up and down has really been hurting her.          Review of Systems   Constitutional:  Negative for chills and fever.   HENT:  Negative for ear pain and sore throat.    Eyes:  Negative for pain and visual disturbance.   Respiratory:  Negative for cough and shortness of breath.    Cardiovascular:  Negative for chest pain and palpitations.   Gastrointestinal:  Negative for abdominal pain and vomiting.   Genitourinary:  Negative for dysuria and hematuria.   Musculoskeletal:  Negative for arthralgias and back pain.   Skin:  Negative for color change and rash.   Neurological:  Negative for seizures and syncope.   All other systems reviewed and are negative.         Objective   Ht 5' 5\" (1.651 m)   Wt 58.5 kg (129 lb)   BMI 21.47 kg/m²      Physical Exam  Vitals reviewed.   Constitutional:       Appearance: Normal appearance. "   HENT:      Head: Normocephalic and atraumatic.      Nose: Nose normal.      Mouth/Throat:      Mouth: Mucous membranes are moist.   Eyes:      Pupils: Pupils are equal, round, and reactive to light.   Cardiovascular:      Pulses: Normal pulses.   Pulmonary:      Effort: Pulmonary effort is normal.   Skin:     Capillary Refill: Capillary refill takes less than 2 seconds.      Comments: There is continued pain overlying her right second TMT J, there is pain with the patient on the plantar medial aspect of her right second MTPJ with a painful but overall negative Lockman maneuver.  The toe is touching the ground still purchasing   Neurological:      General: No focal deficit present.      Mental Status: She is alert and oriented to person, place, and time. Mental status is at baseline.

## 2025-04-23 ENCOUNTER — TELEPHONE (OUTPATIENT)
Age: 70
End: 2025-04-23

## 2025-04-23 DIAGNOSIS — N32.81 OAB (OVERACTIVE BLADDER): ICD-10-CM

## 2025-04-23 RX ORDER — SOLIFENACIN SUCCINATE 10 MG/1
10 TABLET, FILM COATED ORAL DAILY
Qty: 90 TABLET | Refills: 1 | OUTPATIENT
Start: 2025-04-23

## 2025-04-23 NOTE — TELEPHONE ENCOUNTER
Patient returned call, states she she doesn't take the Mobic. She states the celecoxib (CeleBREX) 50 MG capsule [[id number]] works better for her.

## 2025-04-23 NOTE — TELEPHONE ENCOUNTER
Patient called in asking for a refill of   celecoxib (CeleBREX) 50 MG capsule [[id number]] . Not on her current list of meds. She says Dr. Ruby has given it to her before. She says she needs it for her arthritis. Please advise. Thank you!    Robyn: [phone] (Home Phone)   [phone] (Mobile)

## 2025-04-23 NOTE — TELEPHONE ENCOUNTER
Requested Prescriptions     Pending Prescriptions Disp Refills    solifenacin (VESICARE) 10 MG tablet 90 tablet 1     Sig: Take 1 tablet (10 mg total) by mouth daily     [id number] - [place] - [address] [phone]

## 2025-04-24 DIAGNOSIS — G89.4 CHRONIC PAIN SYNDROME: ICD-10-CM

## 2025-04-24 DIAGNOSIS — M48.061 SPINAL STENOSIS OF LUMBAR REGION WITHOUT NEUROGENIC CLAUDICATION: Primary | ICD-10-CM

## 2025-04-24 RX ORDER — CELECOXIB 200 MG/1
200 CAPSULE ORAL DAILY
Qty: 90 CAPSULE | Refills: 1 | Status: SHIPPED | OUTPATIENT
Start: 2025-04-24

## 2025-04-25 ENCOUNTER — TELEPHONE (OUTPATIENT)
Dept: PAIN MEDICINE | Facility: CLINIC | Age: 70
End: 2025-04-25

## 2025-04-29 NOTE — TELEPHONE ENCOUNTER
1st attempt  Lm to cb with % improvement and pain level.     
2nd attempt  Lm to cb with % improvement and pain level.    
Caller: pt    Doctor: Dr. goldman    Reason for call: 75% improvement    Call back#: [phone]  
No adenopathy or splenomegaly. No cervical or inguinal lymphadenopathy.

## 2025-05-12 ENCOUNTER — TELEPHONE (OUTPATIENT)
Age: 70
End: 2025-05-12

## 2025-05-12 NOTE — TELEPHONE ENCOUNTER
Caller: patient    Doctor/Office: Dr newton    Call regarding :  podiatry     Call was transferred to: podiatry

## 2025-05-12 NOTE — TELEPHONE ENCOUNTER
Caller: Robyn Tenorio    Doctor and/or Office: Dr. Dumont/Theron    #: [phone]    Escalation: Care Patient states her foot/toe is very swollen and red. She is in a lot of pain. Please call and advise. Thank you

## 2025-05-15 ENCOUNTER — OFFICE VISIT (OUTPATIENT)
Age: 70
End: 2025-05-15
Payer: MEDICARE

## 2025-05-15 VITALS — BODY MASS INDEX: 21.47 KG/M2 | HEIGHT: 65 IN

## 2025-05-15 DIAGNOSIS — M47.812 CERVICAL SPONDYLOSIS: Chronic | ICD-10-CM

## 2025-05-15 DIAGNOSIS — M46.1 SACROILIITIS (HCC): ICD-10-CM

## 2025-05-15 DIAGNOSIS — G89.4 CHRONIC PAIN SYNDROME: Primary | ICD-10-CM

## 2025-05-15 DIAGNOSIS — M54.2 NECK PAIN: ICD-10-CM

## 2025-05-15 DIAGNOSIS — M54.50 CHRONIC BILATERAL LOW BACK PAIN WITHOUT SCIATICA: ICD-10-CM

## 2025-05-15 DIAGNOSIS — G89.29 CHRONIC BILATERAL LOW BACK PAIN WITHOUT SCIATICA: ICD-10-CM

## 2025-05-15 DIAGNOSIS — M47.816 LUMBAR SPONDYLOSIS: ICD-10-CM

## 2025-05-15 DIAGNOSIS — M79.18 MYOFASCIAL PAIN SYNDROME: ICD-10-CM

## 2025-05-15 PROCEDURE — 99214 OFFICE O/P EST MOD 30 MIN: CPT | Performed by: NURSE PRACTITIONER

## 2025-05-15 RX ORDER — METHOCARBAMOL 750 MG/1
750 TABLET, FILM COATED ORAL 3 TIMES DAILY
Qty: 90 TABLET | Refills: 2 | Status: SHIPPED | OUTPATIENT
Start: 2025-05-15

## 2025-05-15 NOTE — PROGRESS NOTES
Assessment:  1. Chronic pain syndrome    2. Chronic bilateral low back pain without sciatica    3. Lumbar spondylosis    4. Sacroiliitis (HCC)    5. Neck pain    6. Cervical spondylosis    7. Myofascial pain syndrome        Plan:  While the patient was in the office today, I did have a thorough conversation regarding their chronic pain syndrome, medication management, and treatment plan options.  Patient is being seen for a follow-up visit.  She underwent bilateral sacroiliac joint injections on 4/11/2025.  She is reporting about 50% improvement in her back pain.  She has good and bad days, but overall it has improved.  She is scheduled for repeat right sided C3-4 and C4-5 radiofrequency ablations in early June.    Continue Robaxin 750 mg 3 times daily if needed for spasms.  A prescription was sent to her pharmacy with 2 refills.    Continue Celebrex 200 mg daily as prescribed by her PCP.    Continue to follow with podiatry regarding chronic foot pain.    Follow-up 1 month after repeat radiofrequency ablation.    History of Present Illness:  The patient is a 69 y.o. female who presents for a follow up office visit in regards to Neck Pain, Back Pain, Hand Pain, and Foot Pain.   The patient’s current symptoms include complaints of neck pain and low back pain.  Current pain level is a 6/10.  Quality of pain is described as intermittent, cramping, pressure-like    Current pain medications includes:  .  Robaxin 750 mg 3 times daily if needed for spasms, PCP prescribes Celebrex 200 mg daily the patient reports that this regimen is providing 20-25% pain relief.  The patient is reporting no side effects from this pain medication regimen.    I have personally reviewed and/or updated the patient's past medical history, past surgical history, family history, social history, current medications, allergies, and vital signs today.         Review of Systems  Review of Systems   Constitutional:  Negative for unexpected weight  change.   HENT:  Negative for hearing loss.    Eyes:  Negative for visual disturbance.   Respiratory:  Negative for shortness of breath.    Cardiovascular:  Negative for leg swelling.   Gastrointestinal:  Negative for constipation.   Endocrine: Negative for polyuria.   Genitourinary:  Negative for difficulty urinating.   Musculoskeletal:  Negative for gait problem, joint swelling and myalgias.        Decreased range of motion  Joint stiffness  swelling   Skin:  Negative for rash.   Neurological:  Negative for weakness and headaches.   Psychiatric/Behavioral:  Negative for decreased concentration.    All other systems reviewed and are negative.        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     Drug intolerance     Dysfunction of right eustachian tube     Dysfunctional uterine bleeding     Edema     Elevated d-dimer     Exposure to hepatitis C     Fracture of fibula     Generalized anxiety disorder     GERD (gastroesophageal reflux disease)     Hyperlipidemia     Mild cervical dysplasia     Myalgia     Myositis     Palpitations     PONV (postoperative nausea and vomiting)     Pre-syncope     Scoliosis deformity of spine 1/23/2024    Seasonal allergies     Stress incontinence     Thrombocytopenia (HCC)     Urinary retention     Urinary tract infection     Uterine leiomyoma        Past Surgical History:   Procedure Laterality Date    BLADDER SURGERY      BREAST BIOPSY Right     US guided    COLONOSCOPY      COLPOSCOPY  01/06/1998    DENTAL SURGERY      ENDOMETRIAL BIOPSY  05/21/1993    FL GUIDED NEEDLE PLAC BX/ASP/INJ  3/19/2025     "HYSTERECTOMY      HYSTEROSCOPY      uterus- 1/22/98, 7/1/02 and 4/10/07    FL ARTHRP KNE CONDYLE&PLATU MEDIAL&LAT COMPARTMENTS Right 2/8/2023    Procedure: ARTHROPLASTY KNEE TOTAL;  Surgeon: Miguel Gleason DO;  Location: [facility];  Service: Orthopedics    TUBAL LIGATION      US GUIDED BREAST BIOPSY RIGHT COMPLETE Right 08/08/2018       Family History   Problem Relation Age of Onset    Stroke Mother         cerebral artery occlusion with cerebral infarction    Coronary artery disease Mother     Arrhythmia Mother         sinus    Coronary artery disease Father     Diabetes Father     No Known Problems Daughter     No Known Problems Daughter     No Known Problems Maternal Grandmother     No Known Problems Paternal Grandmother     No Known Problems Maternal Aunt     Ovarian cancer Maternal Aunt     Aortic aneurysm Family     Arthritis Family     Diabetes Family     Heart disease Family     Breast cancer Neg Hx        Social History     Occupational History    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[employer]    Occupation: housewife   Tobacco Use    Smoking status: Every Day     Current packs/day: 1.50     Average packs/day: 1.5 packs/day for 53.4 years (80.1 ttl pk-yrs)     Types: Cigarettes     Start date: 1972     Passive exposure: Never    Smokeless tobacco: Never   Vaping Use    Vaping status: Never Used   Substance and Sexual Activity    Alcohol use: Not Currently    Drug use: No    Sexual activity: Not Currently       Current Medications[1]    Allergies[2]    Physical Exam:    Ht 5' 5\" (1.651 m)   BMI 21.47 kg/m²     Constitutional:normal, well developed, well nourished, alert, in no distress and non-toxic and no overt pain behavior.  Eyes:anicteric  HEENT:grossly intact  Neck:supple, symmetric, trachea midline and no masses   Pulmonary:even and unlabored  Cardiovascular:No edema or pitting edema present  Skin:Normal without rashes or lesions and well hydrated  Psychiatric:Mood and affect " appropriate  Neurologic:Cranial Nerves II-XII grossly intact  Musculoskeletal:Limited range of motion of the cervical spine in all planes.  There is tenderness predominantly on the right from C2-C6.  There are cervical paraspinal muscle spasms.  Gait is slow and guarded.    Imaging  No orders to display       No orders of the defined types were placed in this encounter.             [1]   Current Outpatient Medications:     acetaminophen (TYLENOL) 500 mg tablet, Take 500 mg by mouth every 6 (six) hours as needed for mild pain, Disp: , Rfl:     alendronate (FOSAMAX) 70 mg tablet, take 1 tablet by mouth every 7 days, Disp: 12 tablet, Rfl: 1    ascorbic acid (VITAMIN C) 1000 MG tablet, Take 1,000 mg by mouth, Disp: , Rfl:     atorvastatin (LIPITOR) 20 mg tablet, Take 1 tablet (20 mg total) by mouth daily after dinner, Disp: 90 tablet, Rfl: 1    benzonatate (TESSALON) 200 MG capsule, Take 1 capsule (200 mg total) by mouth 3 (three) times a day as needed for cough, Disp: 20 capsule, Rfl: 0    buPROPion (WELLBUTRIN XL) 150 mg 24 hr tablet, 150 mg every morning, Disp: , Rfl:     busPIRone (BUSPAR) 30 MG tablet, Take 1 tablet by mouth in the morning and 1 tablet before bedtime., Disp: , Rfl:     celecoxib (CeleBREX) 200 mg capsule, Take 1 capsule (200 mg total) by mouth daily, Disp: 90 capsule, Rfl: 1    clonazePAM (KlonoPIN) 1 mg tablet, as needed in the morning and as needed in the evening., Disp: , Rfl:     Diclofenac Sodium (VOLTAREN) 1 %, Apply 2 g topically 3 (three) times a day as needed (pain), Disp: 180 g, Rfl: 0    fluticasone (FLONASE) 50 mcg/act nasal spray, 1 spray into each nostril daily, Disp: 16 g, Rfl: 0    guaiFENesin (Mucinex) 600 mg 12 hr tablet, Take 1 tablet (600 mg total) by mouth every 12 (twelve) hours, Disp: 20 tablet, Rfl: 0    lidocaine (Lidoderm) 5 %, Apply 1 patch topically over 12 hours daily Remove & Discard patch within 12 hours or as directed by MD, Disp: 30 patch, Rfl: 2    methocarbamol  (ROBAXIN) 750 mg tablet, Take 1 tablet (750 mg total) by mouth 3 (three) times a day, Disp: 90 tablet, Rfl: 2    omeprazole (PriLOSEC) 40 MG capsule, take 1 capsule by mouth once daily, Disp: 90 capsule, Rfl: 1    solifenacin (VESICARE) 10 MG tablet, take 1 tablet by mouth once daily, Disp: 90 tablet, Rfl: 1    tretinoin (RETIN-A) 0.1 % cream, APPLY TOPICALLY DAILY AT BEDTIME, Disp: 45 g, Rfl: 5    venlafaxine (EFFEXOR-XR) 150 mg 24 hr capsule, One 150mg and one 75mg q day for total of 225mg q day., Disp: 90 capsule, Rfl: 1    venlafaxine (EFFEXOR-XR) 75 mg 24 hr capsule, One 150mg and one 75mg q day for total of 225mg q day., Disp: 90 capsule, Rfl: 1  [2]   Allergies  Allergen Reactions    Gabapentin Drowsiness

## 2025-05-16 ENCOUNTER — TELEPHONE (OUTPATIENT)
Dept: RADIOLOGY | Facility: HOSPITAL | Age: 70
End: 2025-05-16

## 2025-05-16 NOTE — NURSING NOTE
Call placed to pt to discuss upcoming appointment at [facility] Radiology Department and consultation completed.  Pt is having a R Foot Arthrogram completed on 5/21/2025.  Allergies reviewed and verified pt does not currently take any anticoagulant medications.  Pre procedure instructions including diet and taking own medications discussed with pt.  Pt instructed that she may eat normally and take medications as usual before the procedure.  Pt verbalized understanding of instructions given.  Reminded pt of the location, date and time of procedure.  My number was given to call if any questions or concerns arise pre or post procedure.

## 2025-05-21 ENCOUNTER — HOSPITAL ENCOUNTER (OUTPATIENT)
Dept: RADIOLOGY | Facility: HOSPITAL | Age: 70
Discharge: HOME | End: 2025-05-21
Attending: PODIATRIST
Payer: MEDICARE

## 2025-05-21 ENCOUNTER — HOSPITAL ENCOUNTER (OUTPATIENT)
Dept: MRI IMAGING | Facility: HOSPITAL | Age: 70
Discharge: HOME | End: 2025-05-21
Attending: PODIATRIST
Payer: MEDICARE

## 2025-05-21 DIAGNOSIS — T14.8XXA LIGAMENT TEAR: ICD-10-CM

## 2025-05-21 DIAGNOSIS — M79.674 PAIN OF TOE OF RIGHT FOOT: ICD-10-CM

## 2025-05-21 PROCEDURE — A9585 GADOBUTROL INJECTION: HCPCS | Performed by: RADIOLOGY

## 2025-05-21 PROCEDURE — 73718 MRI LOWER EXTREMITY W/O DYE: CPT

## 2025-05-21 PROCEDURE — 77002 NEEDLE LOCALIZATION BY XRAY: CPT

## 2025-05-21 RX ORDER — GADOBUTROL 604.72 MG/ML
0.2 INJECTION INTRAVENOUS
Status: COMPLETED | OUTPATIENT
Start: 2025-05-21 | End: 2025-05-21

## 2025-05-21 RX ADMIN — IOHEXOL 0.2 ML: 300 INJECTION, SOLUTION INTRAVENOUS at 10:30

## 2025-05-21 RX ADMIN — GADOBUTROL 0.2 ML: 604.72 INJECTION INTRAVENOUS at 10:30

## 2025-05-29 ENCOUNTER — OFFICE VISIT (OUTPATIENT)
Dept: PODIATRY | Facility: CLINIC | Age: 70
End: 2025-05-29
Payer: MEDICARE

## 2025-05-29 ENCOUNTER — PREP FOR PROCEDURE (OUTPATIENT)
Age: 70
End: 2025-05-29

## 2025-05-29 ENCOUNTER — OFFICE VISIT (OUTPATIENT)
Dept: INTERNAL MEDICINE CLINIC | Facility: CLINIC | Age: 70
End: 2025-05-29
Payer: MEDICARE

## 2025-05-29 VITALS
OXYGEN SATURATION: 95 % | TEMPERATURE: 98.3 F | HEIGHT: 65 IN | BODY MASS INDEX: 21.66 KG/M2 | HEART RATE: 50 BPM | DIASTOLIC BLOOD PRESSURE: 72 MMHG | WEIGHT: 130 LBS | SYSTOLIC BLOOD PRESSURE: 110 MMHG

## 2025-05-29 VITALS — BODY MASS INDEX: 21.49 KG/M2 | HEIGHT: 65 IN | WEIGHT: 129 LBS

## 2025-05-29 DIAGNOSIS — Z12.11 SCREENING FOR COLORECTAL CANCER: ICD-10-CM

## 2025-05-29 DIAGNOSIS — R73.03 PREDIABETES: ICD-10-CM

## 2025-05-29 DIAGNOSIS — H61.23 BILATERAL IMPACTED CERUMEN: ICD-10-CM

## 2025-05-29 DIAGNOSIS — Z72.0 TOBACCO ABUSE: ICD-10-CM

## 2025-05-29 DIAGNOSIS — Z12.12 SCREENING FOR COLORECTAL CANCER: ICD-10-CM

## 2025-05-29 DIAGNOSIS — M19.071 ARTHRITIS OF RIGHT MIDFOOT: Primary | ICD-10-CM

## 2025-05-29 DIAGNOSIS — K76.0 NAFL (NONALCOHOLIC FATTY LIVER): ICD-10-CM

## 2025-05-29 DIAGNOSIS — Z13.1 SCREENING FOR DIABETES MELLITUS: ICD-10-CM

## 2025-05-29 DIAGNOSIS — M79.671 RIGHT FOOT PAIN: ICD-10-CM

## 2025-05-29 DIAGNOSIS — G89.4 CHRONIC PAIN SYNDROME: ICD-10-CM

## 2025-05-29 DIAGNOSIS — J43.9 PULMONARY EMPHYSEMA, UNSPECIFIED EMPHYSEMA TYPE (HCC): ICD-10-CM

## 2025-05-29 DIAGNOSIS — F41.1 GENERALIZED ANXIETY DISORDER: ICD-10-CM

## 2025-05-29 DIAGNOSIS — K21.9 GASTROESOPHAGEAL REFLUX DISEASE, UNSPECIFIED WHETHER ESOPHAGITIS PRESENT: ICD-10-CM

## 2025-05-29 DIAGNOSIS — F17.210 SMOKING GREATER THAN 20 PACK YEARS: ICD-10-CM

## 2025-05-29 DIAGNOSIS — E78.2 MIXED HYPERLIPIDEMIA: Primary | ICD-10-CM

## 2025-05-29 PROCEDURE — 99214 OFFICE O/P EST MOD 30 MIN: CPT | Performed by: INTERNAL MEDICINE

## 2025-05-29 PROCEDURE — G2211 COMPLEX E/M VISIT ADD ON: HCPCS | Performed by: INTERNAL MEDICINE

## 2025-05-29 PROCEDURE — 99214 OFFICE O/P EST MOD 30 MIN: CPT | Performed by: PODIATRIST

## 2025-05-29 RX ORDER — CHLORHEXIDINE GLUCONATE ORAL RINSE 1.2 MG/ML
15 SOLUTION DENTAL ONCE
OUTPATIENT
Start: 2025-05-29 | End: 2025-05-29

## 2025-05-29 RX ORDER — CHLORHEXIDINE GLUCONATE 40 MG/ML
SOLUTION TOPICAL DAILY PRN
OUTPATIENT
Start: 2025-05-29

## 2025-05-29 RX ORDER — PREGABALIN 75 MG/1
75 CAPSULE ORAL 3 TIMES DAILY
Qty: 90 CAPSULE | Refills: 1 | Status: SHIPPED | OUTPATIENT
Start: 2025-05-29 | End: 2025-06-06 | Stop reason: SDUPTHER

## 2025-05-29 NOTE — ASSESSMENT & PLAN NOTE
Orders:    Comprehensive metabolic panel; Future    CBC and differential; Future    
  Orders:    Comprehensive metabolic panel; Future    LDL cholesterol, direct; Future    Triglycerides; Future    
  Orders:    Hemoglobin A1C; Future    
  Orders:    TSH, 3rd generation; Future    
  Orders:    pregabalin (LYRICA) 75 mg capsule; Take 1 capsule (75 mg total) by mouth 3 (three) times a day    
Diagnostic Uncertainty

## 2025-05-29 NOTE — PROGRESS NOTES
Name: Surekha Tenorio      : 1955      MRN: [id number]  Encounter Provider: Peter Dumont DPM  Encounter Date: 2025   Encounter department: [facility] [place]  :  Assessment & Plan  Arthritis of right midfoot         Right foot pain         - At this points she has failed CMO, short's, she has only had about a week of relief from an injection  - At this point in time we will plan for right second TMT J fusion with plates and screws  - Will be strict nonweightbearing for 6 weeks status post procedure  - I did discuss using a walker knee scooter or crutches  - She will need appointment set 10 days, 4 weeks, 6 weeks and 3 months  -Between 6 and 8 weeks postop if doing well with x-ray assessment at 6 weeks, she will be in protected weightbearing in a cam boot, between 8 and 10 weeks will anticipate transition out of boot back into a rigid shoe  - I did discuss potential for nonunion, and the need for vitamin D supplementation and calcium supplementation periprocedure    Patient was counseled and educated on the condition and the diagnosis. The diagnosis, treatment options and prognosis were discussed with the patient.  The patient failed conservative care at this time and wished to proceed with surgical treatment.  Explained surgical details and post-op course.  Discussed all risks and complications related to the patient's condition and surgery.  The benefits of surgery were also discussed.  The patient understood that the surgery would not guarantee desirable outcome.  All questions and concerns were addressed and the consent was signed.        History of Present Illness   HPI  Surekha Tenorio is a 69 y.o. female who presents evaluation management of her right foot here to discuss further treatment on her right foot.  She is having continued significant pain with the more she is using her feet, she is compliant with her shoe gear changes, she using orthotics with her [place]  "and she is telling substantial pain.      Review of Systems   Constitutional:  Negative for chills and fever.   HENT:  Negative for ear pain and sore throat.    Eyes:  Negative for pain and visual disturbance.   Respiratory:  Negative for cough and shortness of breath.    Cardiovascular:  Negative for chest pain and palpitations.   Gastrointestinal:  Negative for abdominal pain and vomiting.   Genitourinary:  Negative for dysuria and hematuria.   Musculoskeletal:  Negative for arthralgias and back pain.   Skin:  Negative for color change and rash.   Neurological:  Negative for seizures and syncope.   All other systems reviewed and are negative.         Objective   Ht 5' 5\" (1.651 m)   Wt 58.5 kg (129 lb)   BMI 21.47 kg/m²      Physical Exam  Vitals reviewed.   Constitutional:       Appearance: Normal appearance.   HENT:      Head: Normocephalic and atraumatic.      Nose: Nose normal.      Mouth/Throat:      Mouth: Mucous membranes are moist.     Eyes:      Pupils: Pupils are equal, round, and reactive to light.       Cardiovascular:      Pulses: Normal pulses.   Pulmonary:      Effort: Pulmonary effort is normal.     Musculoskeletal:         General: Swelling, tenderness and deformity present.      Comments: Significant pain with range of motion of her right second TMT J, there is positive Tinel's along the first interspace.  There is pain on palpation of the right second MTPJ apparatus pain and range of motion of the right second toe pain direct palpation of her plantar plate     Skin:     Capillary Refill: Capillary refill takes less than 2 seconds.     Neurological:      General: No focal deficit present.      Mental Status: She is alert and oriented to person, place, and time. Mental status is at baseline.           "

## 2025-05-29 NOTE — PROGRESS NOTES
Name: Surekha Tenorio      : 1955      MRN: [id number]  Encounter Provider: Miguel Ruby DO  Encounter Date: 2025   Encounter department: [facility]  :  Assessment & Plan  Smoking greater than 20 pack years    Orders:    CT lung screening program; Future    Screening for colorectal cancer    Orders:    Ambulatory Referral to Gastroenterology; Future    Mixed hyperlipidemia    Orders:    Comprehensive metabolic panel; Future    LDL cholesterol, direct; Future    Triglycerides; Future    Pulmonary emphysema, unspecified emphysema type (HCC)    Orders:    Comprehensive metabolic panel; Future    CBC and differential; Future    Gastroesophageal reflux disease, unspecified whether esophagitis present         NAFL (nonalcoholic fatty liver)         Generalized anxiety disorder    Orders:    TSH, 3rd generation; Future    Tobacco abuse         Chronic pain syndrome    Orders:    pregabalin (LYRICA) 75 mg capsule; Take 1 capsule (75 mg total) by mouth 3 (three) times a day    Prediabetes    Orders:    Hemoglobin A1C; Future    Screening for diabetes mellitus    Orders:    Hemoglobin A1C; Future    Bilateral impacted cerumen    Orders:    carbamide peroxide (DEBROX) 6.5 % otic solution; Administer 5 drops into both ears 2 (two) times a day    A/P: Doing ok and will check labs. Refer for CRC. Order lung CT screen. Wean tobacco. Will try lyrica since pt didn't tolerate the dusty. Ears are bad and will start debrox first and flush next week. Appreciate specialist input. Continue current treatment and RTC six months for routine and one week for the ears. .        History of Present Illness   WF RTC for f/u HLD, COPD, etc. Doing ok and no new issues, but reports both ears are clogged.  Remains active w/o difficulty and no falls. Continues to see pain management, ortho, and DPM. COPD is controlled and limited rescue MDI use. Still smoking. ANAND is controlled. Due for labs, CRC, and lung ca  "screening.       Review of Systems   Constitutional:  Negative for activity change, chills, diaphoresis, fatigue and fever.   HENT:  Positive for ear pain. Negative for ear discharge.    Eyes:  Negative for visual disturbance.   Respiratory:  Negative for cough, chest tightness, shortness of breath and wheezing.    Cardiovascular:  Negative for chest pain, palpitations and leg swelling.   Gastrointestinal:  Negative for abdominal pain, constipation, diarrhea, nausea and vomiting.   Endocrine: Negative for cold intolerance and heat intolerance.   Genitourinary:  Negative for difficulty urinating, dysuria and frequency.   Musculoskeletal:  Negative for arthralgias, gait problem and myalgias.   Neurological:  Negative for dizziness, seizures, syncope, weakness, light-headedness and headaches.   Psychiatric/Behavioral:  Negative for confusion, dysphoric mood and sleep disturbance. The patient is not nervous/anxious.        Objective   /72 (BP Location: Left arm, Patient Position: Sitting)   Pulse (!) 50   Temp 98.3 °F (36.8 °C) (Tympanic)   Ht 5' 5\" (1.651 m)   Wt 59 kg (130 lb)   SpO2 95%   BMI 21.63 kg/m²      Physical Exam  Vitals and nursing note reviewed.   Constitutional:       General: She is not in acute distress.     Appearance: Normal appearance. She is not ill-appearing.   HENT:      Head: Normocephalic and atraumatic.      Right Ear: There is impacted cerumen.      Left Ear: There is impacted cerumen.      Mouth/Throat:      Mouth: Mucous membranes are moist.     Eyes:      Extraocular Movements: Extraocular movements intact.      Conjunctiva/sclera: Conjunctivae normal.      Pupils: Pupils are equal, round, and reactive to light.     Neck:      Vascular: No carotid bruit.     Cardiovascular:      Rate and Rhythm: Normal rate and regular rhythm.      Heart sounds: Normal heart sounds. No murmur heard.  Pulmonary:      Effort: Pulmonary effort is normal. No respiratory distress.      Breath " sounds: Normal breath sounds. No wheezing, rhonchi or rales.   Abdominal:      General: There is no distension.      Palpations: Abdomen is soft.      Tenderness: There is no abdominal tenderness.     Musculoskeletal:      Cervical back: Neck supple.      Right lower leg: No edema.      Left lower leg: No edema.     Neurological:      General: No focal deficit present.      Mental Status: She is alert and oriented to person, place, and time. Mental status is at baseline.     Psychiatric:         Mood and Affect: Mood normal.         Behavior: Behavior normal.         Thought Content: Thought content normal.         Judgment: Judgment normal.

## 2025-05-29 NOTE — PATIENT INSTRUCTIONS
"Patient Education     High cholesterol   The Basics   Written by the doctors and editors at [facility]   What is cholesterol? -- Cholesterol is a substance found in blood. Everyone has some. It is needed for good health. But people sometimes have too much cholesterol.  Compared with people with normal cholesterol, people with high cholesterol have a higher risk of heart attack, stroke, and other health problems. The higher your cholesterol, the higher your risk of these problems.  Are there different types of cholesterol? -- Yes, there are a few different types. If you get a cholesterol test, you might hear your doctor or nurse talk about:   Total cholesterol   LDL cholesterol - Some people call this the \"bad\" cholesterol. That's because having high LDL levels raises your risk of heart attack, stroke, and other health problems.   HDL cholesterol - Some people call this the \"good\" cholesterol. That's because people with high HDL levels tend to have a lower risk of heart attack, stroke, and other health problems.   Non-HDL cholesterol - Non-HDL cholesterol is your total cholesterol minus your HDL cholesterol.   Triglycerides - Triglycerides are not cholesterol. They are another type of fat. But they often get measured when cholesterol is measured. (Having high triglycerides also seems to increase the risk of heart attack and stroke.)  What should my numbers be? -- Ask your doctor or nurse what your numbers should be. Different people need different goals. If you live outside of the [place], see the table (table 1).  In general, people who do not already have heart disease should aim for:   Total cholesterol below 200   LDL cholesterol below 130, or much lower if they are at risk of heart attack or stroke   HDL cholesterol above 60   Non-HDL cholesterol below 160, or lower if they are at risk of heart attack or stroke   Triglycerides below 150  Remember, though, that many people who cannot meet these goals still have a low " "risk of heart attack and stroke.  What should I do if I have high cholesterol? -- Ask your doctor what your overall risk of heart attack and stroke is. Just having high cholesterol is not always a reason to worry. Having high cholesterol is just one of many things that can increase your risk of heart attack and stroke.  Other things that increase your risk include:   Smoking   High blood pressure   Having a parent or sibling who got heart disease at a young age - Young, in this case, means younger than 55 for males and younger than 65 for females.   A diet that is not heart healthy - A \"heart-healthy\" diet includes lots of fruits and vegetables, fiber, and healthy fats (like those found in fish, nuts, and certain oils). It also means limiting sugar and unhealthy fats.   Older age  If you are at high risk of heart attack and stroke, having high cholesterol is a problem. But if you are at low risk, high cholesterol might not need treatment.  Should I take medicine to lower cholesterol? -- Not everyone who has high cholesterol needs medicines. Your doctor or nurse will decide if you need them based on your age, family history, and other health concerns.  There are many different medicines used to lower cholesterol (table 2). Some help your body make less cholesterol. Some keep your body from absorbing cholesterol from foods. Some help your body get rid of cholesterol faster. The medicines most often used to treat high cholesterol are called \"statins.\"  You should probably take a statin if you:   Already had a heart attack or stroke   Have known heart disease   Have diabetes   Have a condition called \"peripheral artery disease,\" which makes it painful to walk, and happens when the arteries in your legs get clogged with fatty deposits   Have an \"abdominal aortic aneurysm,\" which is a widening of the main artery in the belly  Most people with any of the conditions listed above should take a statin no matter what their " "cholesterol level is. If your doctor or nurse prescribes a statin, it's important to keep taking it. The medicine might not make you feel any different. But it can help prevent heart attack, stroke, and death.  If your doctor or nurse recommends taking medicine to help lower your cholesterol, make sure that you know what it is called. Follow all the instructions for how to take it. For example, some medicines work better when you take them in the evening. Some need to be taken with food.  Tell your doctor or nurse if your medicine causes any side effects that bother you. They might be able to switch you to a different medicine.  Can I lower my cholesterol without medicines? -- Yes. You can help lower your cholesterol by doing these things:   You can lower your LDL, or \"bad,\" cholesterol by avoiding red meat, butter, fried foods, cheese, and other foods that have a lot of saturated fat.   You can lower triglycerides by avoiding sugary foods, fried foods, and excess alcohol.   If you have excess weight, it can help to lose weight. Your doctor or nurse can help you do this in a healthy way.   Try to get regular physical activity. Even gentle forms of exercise, like walking, are good for your health.  Even if these steps don't change your cholesterol very much, they can improve your health in many other ways.  All topics are updated as new evidence becomes available and our peer review process is complete.  This topic retrieved from [employer] on: Mar 01, 2024.  Topic 81404 Version 25.0  Release: 32.2.4 - C32.59  © 2024 UpToDate, Inc. and/or its affiliates. All rights reserved.  table 1: Cholesterol and triglyceride measurements in the [place] and elsewhere     Measurement used within the [place] Milligrams/deciliter (mg/dL)  Measurement used most places outside of the [place] Millimoles/liter (mmol/Liter)     Level to aim for  Level to aim for    Total cholesterol  Below 200 Below 5.17   LDL cholesterol  Below 130, or much lower if at " risk of heart attack and stroke Below 3.36, or much lower if at risk of heart attack and stroke   HDL cholesterol  Above 60 Above 1.55   Triglycerides  Below 150 Below 1.7   Cholesterol is measured differently in the [place] than it is in most other countries. This table shows values used within and outside of the [place]. It includes the cholesterol and triglyceride levels that most people who do not have heart disease should aim for.  Graphic 92877 Version 5.0  table 2: Lipid-lowering medicines  Generic name  Brand name    Statins    Atorvastatin Lipitor   Fluvastatin Lescol, Lescol XL   Lovastatin Mevacor, Altoprev   Pitavastatin Livalo   Pravastatin Pravachol   Rosuvastatin Crestor   Simvastatin Zocor   PCSK9 inhibitors    Alirocumab Praluent   Evolocumab Repatha, Repatha SureClick   Cholesterol absorption inhibitors    Ezetimibe Zetia   Bile acid sequestrants    Cholestyramine Prevalite, Questran, Questran Light   Colesevelam Welchol   Colestipol Colestid   Niacin (nicotinic acid)    Niacin immediate release     Niacin extended release Niaspan   Fibrates    Fenofibrate Fenoglide, Tricor, Triglide, others   Gemfibrozil Lopid   Brand names listed are for medicines available in the [place] and some other countries.  Graphic 29287 Version 7.0  Consumer Information Use and Disclaimer   Disclaimer: This generalized information is a limited summary of diagnosis, treatment, and/or medication information. It is not meant to be comprehensive and should be used as a tool to help the user understand and/or assess potential diagnostic and treatment options. It does NOT include all information about conditions, treatments, medications, side effects, or risks that may apply to a specific patient. It is not intended to be medical advice or a substitute for the medical advice, diagnosis, or treatment of a health care provider based on the health care provider's examination and assessment of a patient's specific and unique circumstances.  Patients must speak with a health care provider for complete information about their health, medical questions, and treatment options, including any risks or benefits regarding use of medications. This information does not endorse any treatments or medications as safe, effective, or approved for treating a specific patient. UpToDate, Inc. and its affiliates disclaim any warranty or liability relating to this information or the use thereof.The use of this information is governed by the Terms of Use, available at https://www.woltersClass Messengeruwer.com/en/know/clinical-effectiveness-terms. 2024© UpToDate, Inc. and its affiliates and/or licensors. All rights reserved.  Copyright   © 2024 UpToDate, Inc. and/or its affiliates. All rights reserved.

## 2025-06-02 ENCOUNTER — TELEPHONE (OUTPATIENT)
Age: 70
End: 2025-06-02

## 2025-06-02 DIAGNOSIS — L30.9 DERMATITIS: ICD-10-CM

## 2025-06-02 RX ORDER — TRETINOIN 1 MG/G
CREAM TOPICAL
Qty: 45 G | Refills: 5 | Status: SHIPPED | OUTPATIENT
Start: 2025-06-02

## 2025-06-02 NOTE — TELEPHONE ENCOUNTER
Caller: Robyn COELHO    Doctor: Dr. Harper    Reason for call: Pt called in to CX procedure not feeling well and will abigail back to reschedule    Call back#: N/A

## 2025-06-03 ENCOUNTER — TELEPHONE (OUTPATIENT)
Age: 70
End: 2025-06-03

## 2025-06-03 NOTE — TELEPHONE ENCOUNTER
LVOM for pt to return phone call to schedule sx and go over sx details. Phone # provided thank you

## 2025-06-06 ENCOUNTER — PROCEDURE VISIT (OUTPATIENT)
Dept: INTERNAL MEDICINE CLINIC | Facility: CLINIC | Age: 70
End: 2025-06-06
Payer: MEDICARE

## 2025-06-06 VITALS
TEMPERATURE: 97 F | WEIGHT: 129 LBS | HEART RATE: 67 BPM | BODY MASS INDEX: 21.49 KG/M2 | OXYGEN SATURATION: 97 % | HEIGHT: 65 IN | SYSTOLIC BLOOD PRESSURE: 130 MMHG | DIASTOLIC BLOOD PRESSURE: 70 MMHG

## 2025-06-06 DIAGNOSIS — N32.81 OAB (OVERACTIVE BLADDER): ICD-10-CM

## 2025-06-06 DIAGNOSIS — M54.50 CHRONIC BILATERAL LOW BACK PAIN WITHOUT SCIATICA: ICD-10-CM

## 2025-06-06 DIAGNOSIS — F41.9 ANXIETY: ICD-10-CM

## 2025-06-06 DIAGNOSIS — Z12.12 SCREENING FOR COLORECTAL CANCER: ICD-10-CM

## 2025-06-06 DIAGNOSIS — E78.2 MIXED HYPERLIPIDEMIA: ICD-10-CM

## 2025-06-06 DIAGNOSIS — G89.29 CHRONIC BILATERAL LOW BACK PAIN WITHOUT SCIATICA: ICD-10-CM

## 2025-06-06 DIAGNOSIS — Z12.11 SCREENING FOR COLORECTAL CANCER: ICD-10-CM

## 2025-06-06 DIAGNOSIS — M85.89 OSTEOPENIA OF MULTIPLE SITES: ICD-10-CM

## 2025-06-06 DIAGNOSIS — K21.9 GASTROESOPHAGEAL REFLUX DISEASE WITHOUT ESOPHAGITIS: ICD-10-CM

## 2025-06-06 DIAGNOSIS — H61.23 BILATERAL IMPACTED CERUMEN: ICD-10-CM

## 2025-06-06 DIAGNOSIS — H60.333 ACUTE SWIMMER'S EAR OF BOTH SIDES: Primary | ICD-10-CM

## 2025-06-06 DIAGNOSIS — U07.1 LAB TEST POSITIVE FOR DETECTION OF COVID-19 VIRUS: ICD-10-CM

## 2025-06-06 DIAGNOSIS — G89.4 CHRONIC PAIN SYNDROME: ICD-10-CM

## 2025-06-06 DIAGNOSIS — M48.061 SPINAL STENOSIS OF LUMBAR REGION WITHOUT NEUROGENIC CLAUDICATION: ICD-10-CM

## 2025-06-06 DIAGNOSIS — M79.18 MYOFASCIAL PAIN SYNDROME: ICD-10-CM

## 2025-06-06 PROCEDURE — 69209 REMOVE IMPACTED EAR WAX UNI: CPT | Performed by: INTERNAL MEDICINE

## 2025-06-06 PROCEDURE — 99213 OFFICE O/P EST LOW 20 MIN: CPT | Performed by: INTERNAL MEDICINE

## 2025-06-06 RX ORDER — VENLAFAXINE HYDROCHLORIDE 150 MG/1
CAPSULE, EXTENDED RELEASE ORAL
Qty: 90 CAPSULE | Refills: 1 | Status: SHIPPED | OUTPATIENT
Start: 2025-06-06

## 2025-06-06 RX ORDER — LIDOCAINE 50 MG/G
1 PATCH TOPICAL DAILY
Qty: 30 PATCH | Refills: 2 | Status: SHIPPED | OUTPATIENT
Start: 2025-06-06

## 2025-06-06 RX ORDER — ALENDRONATE SODIUM 70 MG/1
70 TABLET ORAL
Qty: 12 TABLET | Refills: 1 | Status: SHIPPED | OUTPATIENT
Start: 2025-06-06

## 2025-06-06 RX ORDER — VENLAFAXINE HYDROCHLORIDE 75 MG/1
CAPSULE, EXTENDED RELEASE ORAL
Qty: 90 CAPSULE | Refills: 1 | Status: SHIPPED | OUTPATIENT
Start: 2025-06-06

## 2025-06-06 RX ORDER — BUPROPION HYDROCHLORIDE 150 MG/1
150 TABLET ORAL EVERY MORNING
Qty: 90 TABLET | Refills: 1 | Status: SHIPPED | OUTPATIENT
Start: 2025-06-06

## 2025-06-06 RX ORDER — METHOCARBAMOL 750 MG/1
750 TABLET, FILM COATED ORAL 3 TIMES DAILY
Qty: 90 TABLET | Refills: 2 | Status: SHIPPED | OUTPATIENT
Start: 2025-06-06

## 2025-06-06 RX ORDER — SOLIFENACIN SUCCINATE 10 MG/1
10 TABLET, FILM COATED ORAL DAILY
Qty: 90 TABLET | Refills: 1 | Status: SHIPPED | OUTPATIENT
Start: 2025-06-06

## 2025-06-06 RX ORDER — CELECOXIB 200 MG/1
200 CAPSULE ORAL DAILY
Qty: 90 CAPSULE | Refills: 1 | Status: SHIPPED | OUTPATIENT
Start: 2025-06-06

## 2025-06-06 RX ORDER — FLUTICASONE PROPIONATE 50 MCG
1 SPRAY, SUSPENSION (ML) NASAL DAILY
Qty: 16 G | Refills: 0 | Status: SHIPPED | OUTPATIENT
Start: 2025-06-06

## 2025-06-06 RX ORDER — OMEPRAZOLE 40 MG/1
40 CAPSULE, DELAYED RELEASE ORAL DAILY
Qty: 90 CAPSULE | Refills: 1 | Status: SHIPPED | OUTPATIENT
Start: 2025-06-06

## 2025-06-06 RX ORDER — BUSPIRONE HYDROCHLORIDE 30 MG/1
30 TABLET ORAL 2 TIMES DAILY
Qty: 180 TABLET | Refills: 1 | Status: SHIPPED | OUTPATIENT
Start: 2025-06-06

## 2025-06-06 RX ORDER — PREGABALIN 75 MG/1
75 CAPSULE ORAL 3 TIMES DAILY
Qty: 90 CAPSULE | Refills: 1 | Status: SHIPPED | OUTPATIENT
Start: 2025-06-06

## 2025-06-06 RX ORDER — ATORVASTATIN CALCIUM 20 MG/1
20 TABLET, FILM COATED ORAL
Qty: 90 TABLET | Refills: 1 | Status: SHIPPED | OUTPATIENT
Start: 2025-06-06

## 2025-06-06 RX ORDER — CLONAZEPAM 1 MG/1
1 TABLET ORAL 2 TIMES DAILY PRN
Qty: 60 TABLET | Refills: 0 | Status: SHIPPED | OUTPATIENT
Start: 2025-06-06

## 2025-06-06 RX ORDER — NEOMYCIN SULFATE, POLYMYXIN B SULFATE AND HYDROCORTISONE 10; 3.5; 1 MG/ML; MG/ML; [USP'U]/ML
4 SUSPENSION/ DROPS AURICULAR (OTIC) 4 TIMES DAILY
Qty: 10 ML | Refills: 1 | Status: SHIPPED | OUTPATIENT
Start: 2025-06-06

## 2025-06-06 NOTE — PROGRESS NOTES
Name: Surekha Tenorio      : 1955      MRN: [id number]  Encounter Provider: Miguel Ruby DO  Encounter Date: 2025   Encounter department: [facility]  :  Assessment & Plan  Screening for colorectal cancer         Acute swimmer's ear of both sides    Orders:    Ear cerumen removal    neomycin-polymyxin-hydrocortisone (CORTISPORIN) 0.35%-10,000 units/mL-1% otic suspension; Administer 4 drops into both ears 4 (four) times a day    buPROPion (WELLBUTRIN XL) 150 mg 24 hr tablet; Take 1 tablet (150 mg total) by mouth every morning    busPIRone (BUSPAR) 30 MG tablet; Take 1 tablet (30 mg total) by mouth in the morning and 1 tablet (30 mg total) before bedtime.    clonazePAM (KlonoPIN) 1 mg tablet; Take 1 tablet (1 mg total) by mouth as needed in the morning and 1 tablet (1 mg total) as needed in the evening for anxiety.    Osteopenia of multiple sites    Orders:    alendronate (FOSAMAX) 70 mg tablet; Take 1 tablet (70 mg total) by mouth every 7 days    Mixed hyperlipidemia    Orders:    atorvastatin (LIPITOR) 20 mg tablet; Take 1 tablet (20 mg total) by mouth daily after dinner    Spinal stenosis of lumbar region without neurogenic claudication    Orders:    celecoxib (CeleBREX) 200 mg capsule; Take 1 capsule (200 mg total) by mouth daily    Chronic pain syndrome    Orders:    celecoxib (CeleBREX) 200 mg capsule; Take 1 capsule (200 mg total) by mouth daily    pregabalin (LYRICA) 75 mg capsule; Take 1 capsule (75 mg total) by mouth 3 (three) times a day    Lab test positive for detection of COVID-19 virus    Orders:    fluticasone (FLONASE) 50 mcg/act nasal spray; 1 spray into each nostril daily    Chronic bilateral low back pain without sciatica    Orders:    lidocaine (Lidoderm) 5 %; Apply 1 patch topically daily over 12 hours Remove & Discard patch within 12 hours or as directed by MD    Myofascial pain syndrome    Orders:    methocarbamol (ROBAXIN) 750 mg tablet; Take 1 tablet (750 mg  "total) by mouth 3 (three) times a day    Gastroesophageal reflux disease without esophagitis    Orders:    omeprazole (PriLOSEC) 40 MG capsule; Take 1 capsule (40 mg total) by mouth daily    OAB (overactive bladder)    Orders:    solifenacin (VESICARE) 10 MG tablet; Take 1 tablet (10 mg total) by mouth daily    Anxiety    Orders:    venlafaxine (EFFEXOR-XR) 150 mg 24 hr capsule; One 150mg and one 75mg q day for total of 225mg q day.    venlafaxine (EFFEXOR-XR) 75 mg 24 hr capsule; One 150mg and one 75mg q day for total of 225mg q day.    Bilateral impacted cerumen    Orders:    Ear cerumen removal    A/P: Large amount of cerumen removed bilat relatively easily. EAC AU inflamed and red, but TM intact. Hearing improved. Will start cortisporin otic. RTC as scheduled.        History of Present Illness   WF RTC for f/u bilat ear blockage. Pt with severe cerumen impaction AU and has been using debrox. Tolerating the med and notes worsening AU pain, AD greater than AS. [place] is worse. Denies swimming, trauma, or flying.       Review of Systems   Constitutional:  Negative for activity change, chills, diaphoresis, fatigue and fever.   HENT:  Positive for congestion, ear pain and hearing loss. Negative for ear discharge and facial swelling.    Respiratory:  Negative for cough, chest tightness, shortness of breath and wheezing.    Cardiovascular:  Negative for chest pain, palpitations and leg swelling.   Gastrointestinal:  Negative for abdominal pain, constipation, diarrhea, nausea and vomiting.   Genitourinary:  Negative for difficulty urinating, dysuria and frequency.   Musculoskeletal:  Negative for arthralgias, gait problem and myalgias.   Neurological:  Negative for light-headedness and headaches.   Psychiatric/Behavioral:  Negative for confusion. The patient is not nervous/anxious.        Objective   /70   Pulse 67   Temp (!) 97 °F (36.1 °C) (Tympanic)   Ht 5' 5\" (1.651 m)   Wt 58.5 kg (129 lb)   SpO2 97%   BMI " "21.47 kg/m²      Physical Exam  Vitals and nursing note reviewed.   Constitutional:       General: She is not in acute distress.     Appearance: Normal appearance. She is not ill-appearing.   HENT:      Head: Normocephalic and atraumatic.      Right Ear: There is impacted cerumen.      Left Ear: There is impacted cerumen.      Nose: No congestion or rhinorrhea.      Mouth/Throat:      Mouth: Mucous membranes are moist.     Eyes:      Extraocular Movements: Extraocular movements intact.      Conjunctiva/sclera: Conjunctivae normal.      Pupils: Pupils are equal, round, and reactive to light.       Musculoskeletal:      Cervical back: Neck supple. No tenderness.   Lymphadenopathy:      Cervical: No cervical adenopathy.     Neurological:      General: No focal deficit present.      Mental Status: She is alert and oriented to person, place, and time. Mental status is at baseline.     Psychiatric:         Mood and Affect: Mood normal.         Behavior: Behavior normal.         Thought Content: Thought content normal.         Judgment: Judgment normal.           Ear cerumen removal    Date/Time: 6/6/2025 2:30 PM    Performed by: Miguel Ruby DO  Authorized by: Miguel Ruby DO    Universal Protocol:  procedure performed by consultantConsent: Verbal consent obtained  Risks and benefits: risks, benefits and alternatives were discussed  Consent given by: patient  Time out: Immediately prior to procedure a \"time out\" was called to verify the correct patient, procedure, equipment, support staff and site/side marked as required.  Patient understanding: patient states understanding of the procedure being performed  Required items: required blood products, implants, devices, and special equipment available    Patient location:  Clinic  Procedure details:     Local anesthetic: used debrox for several days prior.    Location:  Both ears    Procedure type: irrigation only      Approach:  External    Visualization (free text):  " Large amount removed. EAC AU inflamed and red.    Equipment used:  60cc, syringe and cath tip.  Post-procedure details:     Complication:  None    Hearing quality:  Improved    Patient tolerance of procedure:  Tolerated well, no immediate complications

## 2025-06-06 NOTE — ASSESSMENT & PLAN NOTE
Orders:    celecoxib (CeleBREX) 200 mg capsule; Take 1 capsule (200 mg total) by mouth daily    pregabalin (LYRICA) 75 mg capsule; Take 1 capsule (75 mg total) by mouth 3 (three) times a day

## 2025-06-06 NOTE — TELEPHONE ENCOUNTER
LVOM X2 for pt to please call me back if still interested in scheduling sx. Phone # provided thank you

## 2025-06-06 NOTE — ASSESSMENT & PLAN NOTE
Orders:    methocarbamol (ROBAXIN) 750 mg tablet; Take 1 tablet (750 mg total) by mouth 3 (three) times a day

## 2025-06-06 NOTE — ASSESSMENT & PLAN NOTE
Orders:    atorvastatin (LIPITOR) 20 mg tablet; Take 1 tablet (20 mg total) by mouth daily after dinner

## 2025-06-06 NOTE — ASSESSMENT & PLAN NOTE
Orders:    lidocaine (Lidoderm) 5 %; Apply 1 patch topically daily over 12 hours Remove & Discard patch within 12 hours or as directed by MD

## 2025-06-30 ENCOUNTER — TELEPHONE (OUTPATIENT)
Age: 70
End: 2025-06-30

## 2025-06-30 DIAGNOSIS — M48.061 SPINAL STENOSIS OF LUMBAR REGION WITHOUT NEUROGENIC CLAUDICATION: ICD-10-CM

## 2025-06-30 DIAGNOSIS — G89.4 CHRONIC PAIN SYNDROME: ICD-10-CM

## 2025-06-30 DIAGNOSIS — G89.4 CHRONIC PAIN SYNDROME: Primary | ICD-10-CM

## 2025-06-30 RX ORDER — CELECOXIB 200 MG/1
200 CAPSULE ORAL 2 TIMES DAILY
Qty: 90 CAPSULE | Refills: 1 | Status: SHIPPED | OUTPATIENT
Start: 2025-06-30

## 2025-06-30 RX ORDER — CELECOXIB 200 MG/1
200 CAPSULE ORAL DAILY
Qty: 14 CAPSULE | Refills: 0 | Status: SHIPPED | OUTPATIENT
Start: 2025-06-30

## 2025-06-30 NOTE — TELEPHONE ENCOUNTER
Patient is completely out of celecoxid 200 mg. She states the pharmacy cannot give a refill for another week. Patient is requesting a script for a week unti her current refill is ready.

## 2025-06-30 NOTE — TELEPHONE ENCOUNTER
Patient came to office, states she takes celebrex BID instead of once, ran out of medications, requesting refill with BID instructions to pharmacy. Please advise.

## 2025-07-03 ENCOUNTER — OFFICE VISIT (OUTPATIENT)
Age: 70
End: 2025-07-03
Payer: MEDICARE

## 2025-07-03 VITALS — HEIGHT: 65 IN | WEIGHT: 129 LBS | BODY MASS INDEX: 21.49 KG/M2

## 2025-07-03 DIAGNOSIS — M54.2 NECK PAIN: ICD-10-CM

## 2025-07-03 DIAGNOSIS — G89.4 CHRONIC PAIN SYNDROME: Primary | ICD-10-CM

## 2025-07-03 DIAGNOSIS — M51.16 LUMBAR DISC DISEASE WITH RADICULOPATHY: ICD-10-CM

## 2025-07-03 DIAGNOSIS — M47.812 CERVICAL SPONDYLOSIS: Chronic | ICD-10-CM

## 2025-07-03 DIAGNOSIS — M48.061 SPINAL STENOSIS OF LUMBAR REGION WITHOUT NEUROGENIC CLAUDICATION: ICD-10-CM

## 2025-07-03 PROCEDURE — 99214 OFFICE O/P EST MOD 30 MIN: CPT | Performed by: NURSE PRACTITIONER

## 2025-07-03 RX ORDER — PREGABALIN 25 MG/1
CAPSULE ORAL
Qty: 90 CAPSULE | Refills: 1 | Status: SHIPPED | OUTPATIENT
Start: 2025-07-03 | End: 2025-07-09 | Stop reason: SDUPTHER

## 2025-07-03 RX ORDER — GABAPENTIN 100 MG/1
100 CAPSULE ORAL 2 TIMES DAILY
COMMUNITY
End: 2025-07-03 | Stop reason: SINTOL

## 2025-07-03 NOTE — PROGRESS NOTES
Name: Surekha Tenorio      : 1955      MRN: [id number]  Encounter Provider: Barbara Kocher, CRNP  Encounter Date: 7/3/2025   Encounter department: . [facility] [place]  :  Assessment & Plan  Chronic pain syndrome  Spinal stenosis of lumbar region without neurogenic claudication  Lumbar disc disease with radiculopathy  Neck pain  Cervical spondylosis  Orders:    pregabalin (LYRICA) 25 mg capsule; 1 PO QHSx 1 day, then 1 PO BID, then 1 PO TID    While the patient was in the office today, I did have a thorough conversation regarding their chronic pain syndrome, medication management, and treatment plan options.  Patient is being seen for follow-up visit.  She was last seen here on 5/15/2025 at which time she was scheduled to have a repeat right sided C3-4 and C4-5 radiofrequency ablation.  Patient canceled appointment due to illness.  She would like to reschedule.    She has previously been tried on gabapentin which was discontinued due to drowsiness.  Her PCP recently tried her on Lyrica 75 mg 3 times daily.  She states that it was somewhat helpful, however caused significant drowsiness.  She discontinued it.  We will try to restart Lyrica at a lower dose.  Will start Lyrica at 25 mg, titrating to 3 times daily.    Continue Robaxin 570 mg 3 times daily if needed for spasms and Celebrex 200 mg daily.  Both prescribed by her PCP.    Follow-up 1 month after repeat RFA.    My impressions and treatment recommendations were discussed in detail with the patient who verbalized understanding and had no further questions.  Discharge instructions were provided. I personally saw and examined the patient and I agree with the above discussed plan of care.    History of Present Illness     Surekha Tenorio is a 69 y.o. female who presents for a follow up office visit in regards to Back Pain, Arm Pain, Neck Pain, Hand Pain, and Foot Pain. The patient’s current symptoms include complaints of neck pain,  "complaints of low back pain.  Current pain level is a 7/10.  Quality of pain is described as constant, burning, dull, aching, sharp, throbbing, shooting.    Review of Systems   Constitutional:  Negative for unexpected weight change.   HENT:  Negative for hearing loss.    Eyes:  Negative for visual disturbance.   Respiratory:  Negative for shortness of breath.    Cardiovascular:  Negative for leg swelling.   Gastrointestinal:  Negative for constipation.   Endocrine: Negative for polyuria.   Genitourinary:  Negative for difficulty urinating.   Musculoskeletal:  Positive for gait problem. Negative for joint swelling and myalgias.        Joint stiffness   Skin:  Negative for rash.   Neurological:  Negative for weakness and headaches.   Psychiatric/Behavioral:  Negative for decreased concentration.    All other systems reviewed and are negative.      Medical History Reviewed by provider this encounter:  Tobacco  Allergies  Meds  Problems  Med Hx  Surg Hx  Fam Hx     .  Medications Ordered Prior to Encounter[1]      Objective   Ht 5' 5\" (1.651 m)   Wt 58.5 kg (129 lb)   BMI 21.47 kg/m²      Pain Score:   7  Physical Exam  Constitutional: normal, well developed, well nourished, alert, in no distress and non-toxic and no overt pain behavior.  Eyes: anicteric  HEENT: grossly intact  Neck: supple, symmetric, trachea midline and no masses   Pulmonary: even and unlabored  Cardiovascular: No edema or pitting edema present  Skin: Normal without rashes or lesions and well hydrated  Psychiatric: Mood and affect appropriate  Neurologic: Cranial Nerves II-XII grossly intact  Musculoskeletal: Limited range of motion of the cervical spine in all planes.  There is tenderness predominantly on the right from C2-C6.  There are cervical paraspinal muscle spasms present.           [1]   Current Outpatient Medications on File Prior to Visit   Medication Sig Dispense Refill    acetaminophen (TYLENOL) 500 mg tablet Take 500 mg by mouth " every 6 (six) hours as needed for mild pain      alendronate (FOSAMAX) 70 mg tablet Take 1 tablet (70 mg total) by mouth every 7 days 12 tablet 1    ascorbic acid (VITAMIN C) 1000 MG tablet Take 1,000 mg by mouth      atorvastatin (LIPITOR) 20 mg tablet Take 1 tablet (20 mg total) by mouth daily after dinner 90 tablet 1    buPROPion (WELLBUTRIN XL) 150 mg 24 hr tablet Take 1 tablet (150 mg total) by mouth every morning 90 tablet 1    busPIRone (BUSPAR) 30 MG tablet Take 1 tablet (30 mg total) by mouth in the morning and 1 tablet (30 mg total) before bedtime. 180 tablet 1    celecoxib (CeleBREX) 200 mg capsule Take 1 capsule (200 mg total) by mouth 2 (two) times a day 90 capsule 1    celecoxib (CeleBREX) 200 mg capsule Take 1 capsule (200 mg total) by mouth daily 14 capsule 0    clonazePAM (KlonoPIN) 1 mg tablet Take 1 tablet (1 mg total) by mouth as needed in the morning and 1 tablet (1 mg total) as needed in the evening for anxiety. 60 tablet 0    Diclofenac Sodium (VOLTAREN) 1 % Apply 2 g topically 3 (three) times a day as needed (pain) 180 g 0    fluticasone (FLONASE) 50 mcg/act nasal spray 1 spray into each nostril daily 16 g 0    lidocaine (Lidoderm) 5 % Apply 1 patch topically daily over 12 hours Remove & Discard patch within 12 hours or as directed by MD 30 patch 2    methocarbamol (ROBAXIN) 750 mg tablet Take 1 tablet (750 mg total) by mouth 3 (three) times a day 90 tablet 2    neomycin-polymyxin-hydrocortisone (CORTISPORIN) 0.35%-10,000 units/mL-1% otic suspension Administer 4 drops into both ears 4 (four) times a day 10 mL 1    omeprazole (PriLOSEC) 40 MG capsule Take 1 capsule (40 mg total) by mouth daily 90 capsule 1    solifenacin (VESICARE) 10 MG tablet Take 1 tablet (10 mg total) by mouth daily 90 tablet 1    tretinoin (RETIN-A) 0.1 % cream APPLY TOPICALLY DAILY AT BEDTIME 45 g 5    venlafaxine (EFFEXOR-XR) 150 mg 24 hr capsule One 150mg and one 75mg q day for total of 225mg q day. 90 capsule 1     venlafaxine (EFFEXOR-XR) 75 mg 24 hr capsule One 150mg and one 75mg q day for total of 225mg q day. 90 capsule 1    [DISCONTINUED] gabapentin (NEURONTIN) 100 mg capsule Take 100 mg by mouth 2 (two) times a day      [DISCONTINUED] pregabalin (LYRICA) 75 mg capsule Take 1 capsule (75 mg total) by mouth 3 (three) times a day 90 capsule 1     No current facility-administered medications on file prior to visit.

## 2025-07-03 NOTE — ASSESSMENT & PLAN NOTE
Orders:    pregabalin (LYRICA) 25 mg capsule; 1 PO QHSx 1 day, then 1 PO BID, then 1 PO TID    While the patient was in the office today, I did have a thorough conversation regarding their chronic pain syndrome, medication management, and treatment plan options.  Patient is being seen for follow-up visit.  She was last seen here on 5/15/2025 at which time she was scheduled to have a repeat right sided C3-4 and C4-5 radiofrequency ablation.  Patient canceled appointment due to illness.  She would like to reschedule.    She has previously been tried on gabapentin which was discontinued due to drowsiness.  Her PCP recently tried her on Lyrica 75 mg 3 times daily.  She states that it was somewhat helpful, however caused significant drowsiness.  She discontinued it.  We will try to restart Lyrica at a lower dose.  Will start Lyrica at 25 mg, titrating to 3 times daily.    Continue Robaxin 570 mg 3 times daily if needed for spasms and Celebrex 200 mg daily.  Both prescribed by her PCP.

## 2025-07-07 ENCOUNTER — TELEPHONE (OUTPATIENT)
Dept: PAIN MEDICINE | Facility: CLINIC | Age: 70
End: 2025-07-07

## 2025-07-08 ENCOUNTER — TELEPHONE (OUTPATIENT)
Age: 70
End: 2025-07-08

## 2025-07-08 ENCOUNTER — OFFICE VISIT (OUTPATIENT)
Dept: OBGYN CLINIC | Facility: CLINIC | Age: 70
End: 2025-07-08
Payer: MEDICARE

## 2025-07-08 VITALS — BODY MASS INDEX: 21.47 KG/M2 | HEIGHT: 65 IN

## 2025-07-08 DIAGNOSIS — M48.061 SPINAL STENOSIS OF LUMBAR REGION WITHOUT NEUROGENIC CLAUDICATION: ICD-10-CM

## 2025-07-08 DIAGNOSIS — M51.16 LUMBAR DISC DISEASE WITH RADICULOPATHY: ICD-10-CM

## 2025-07-08 DIAGNOSIS — M19.032 ARTHRITIS OF BOTH WRISTS: ICD-10-CM

## 2025-07-08 DIAGNOSIS — M75.41 IMPINGEMENT SYNDROME OF RIGHT SHOULDER: Primary | ICD-10-CM

## 2025-07-08 DIAGNOSIS — M19.031 ARTHRITIS OF BOTH WRISTS: ICD-10-CM

## 2025-07-08 PROCEDURE — 99213 OFFICE O/P EST LOW 20 MIN: CPT | Performed by: ORTHOPAEDIC SURGERY

## 2025-07-08 PROCEDURE — 20610 DRAIN/INJ JOINT/BURSA W/O US: CPT | Performed by: ORTHOPAEDIC SURGERY

## 2025-07-08 PROCEDURE — 20605 DRAIN/INJ JOINT/BURSA W/O US: CPT | Performed by: ORTHOPAEDIC SURGERY

## 2025-07-08 RX ORDER — BETAMETHASONE SODIUM PHOSPHATE AND BETAMETHASONE ACETATE 3; 3 MG/ML; MG/ML
6 INJECTION, SUSPENSION INTRA-ARTICULAR; INTRALESIONAL; INTRAMUSCULAR; SOFT TISSUE
Status: COMPLETED | OUTPATIENT
Start: 2025-07-08 | End: 2025-07-08

## 2025-07-08 RX ORDER — BUPIVACAINE HYDROCHLORIDE 2.5 MG/ML
1 INJECTION, SOLUTION EPIDURAL; INFILTRATION; INTRACAUDAL; PERINEURAL
Status: COMPLETED | OUTPATIENT
Start: 2025-07-08 | End: 2025-07-08

## 2025-07-08 RX ORDER — BUPIVACAINE HYDROCHLORIDE 2.5 MG/ML
4 INJECTION, SOLUTION INFILTRATION; PERINEURAL
Status: COMPLETED | OUTPATIENT
Start: 2025-07-08 | End: 2025-07-08

## 2025-07-08 RX ORDER — TRIAMCINOLONE ACETONIDE 40 MG/ML
80 INJECTION, SUSPENSION INTRA-ARTICULAR; INTRAMUSCULAR
Status: COMPLETED | OUTPATIENT
Start: 2025-07-08 | End: 2025-07-08

## 2025-07-08 RX ADMIN — BUPIVACAINE HYDROCHLORIDE 4 ML: 2.5 INJECTION, SOLUTION INFILTRATION; PERINEURAL at 10:30

## 2025-07-08 RX ADMIN — BUPIVACAINE HYDROCHLORIDE 1 ML: 2.5 INJECTION, SOLUTION EPIDURAL; INFILTRATION; INTRACAUDAL; PERINEURAL at 10:30

## 2025-07-08 RX ADMIN — BETAMETHASONE SODIUM PHOSPHATE AND BETAMETHASONE ACETATE 6 MG: 3; 3 INJECTION, SUSPENSION INTRA-ARTICULAR; INTRALESIONAL; INTRAMUSCULAR; SOFT TISSUE at 10:30

## 2025-07-08 RX ADMIN — TRIAMCINOLONE ACETONIDE 80 MG: 40 INJECTION, SUSPENSION INTRA-ARTICULAR; INTRAMUSCULAR at 10:30

## 2025-07-08 NOTE — PROGRESS NOTES
Assessment & Plan  Impingement syndrome of right shoulder    Orders:    Large joint arthrocentesis: R subacromial bursa  The patient has recurrent symptoms related to impingement syndrome of the right shoulder. An injection(s) of kenalog and marcaine was performed to her Right shoulder(s) for symptomatic relief of pain and inflammation. Patient tolerated the treatment(s) well. Ice and post injection protocol advised. Weightbearing activities as tolerated. She will be seen for follow-up in 3 months for re-evaluation and consideration for repeat injections as necessary. Patient expresses understanding and is in agreement with this treatment plan. The patient was given the opportunity to ask questions or present concerns.    Arthritis of both wrists    Orders:    Medium joint arthrocentesis: bilateral radiocarpal  The patient has osteoarthritis of the bilateral wrists. An injection(s) of celestone and marcaine was performed to her Bilateral wrist(s) for symptomatic relief of pain and inflammation. Patient tolerated the treatment(s) well. Ice and post injection protocol advised. Weightbearing activities as tolerated. She will be seen for follow-up in 3 months for re-evaluation and consideration for repeat injections as necessary. Patient expresses understanding and is in agreement with this treatment plan. The patient was given the opportunity to ask questions or present concerns.      The patient has arthritis of her bilateral wrists as well as impingement of her right shoulder.  Both wrists were injected with Celestone and Marcaine.  The shoulder with Kenalog and Marcaine.  She tolerated procedures well.  Return back in 3 months for reevaluation    Subjective:   Patient ID: Surekha Tenorio  1955     HPI  Patient is a 69 y.o. female who presents for evaluation of her bilateral wrists and her right shoulder. The patient reports circumferential pain in the wrists. She reports pain into her fingers. She states that  "her pain is present with repetitive activities and gripping activities. She denies numbness and tingling. The patient reports pain in the right shoulder as well. She states that her pain is present with overhead activities and reaching. She also reports pain with rotation.     The following portions of the patient's history were reviewed and updated as appropriate:  Past medical history, past surgical history, Family history, social history, current medications and allergies    Past Medical History[1]    Past Surgical History[2]    Family History[3]    Social History[4]    Current Medications[5]    Allergies   Allergen Reactions    Gabapentin Drowsiness       Review of Systems   Constitutional:  Negative for chills, fever and unexpected weight change.   HENT:  Negative for hearing loss, nosebleeds and sore throat.    Eyes:  Negative for pain, redness and visual disturbance.   Respiratory:  Negative for cough, shortness of breath and wheezing.    Cardiovascular:  Negative for chest pain, palpitations and leg swelling.   Gastrointestinal:  Negative for abdominal pain, nausea and vomiting.   Endocrine: Negative for polydipsia and polyuria.   Genitourinary:  Negative for dysuria and hematuria.   Skin:  Negative for rash and wound.   Neurological:  Negative for dizziness, numbness and headaches.   Psychiatric/Behavioral:  Negative for decreased concentration and suicidal ideas. The patient is not nervous/anxious.    All other systems reviewed and are negative.       Objective:  Ht 5' 5\" (1.651 m)   BMI 21.47 kg/m²     Ortho Exam  right Shoulder -   No anatomical deformity  Skin is warm and dry to touch with no signs of erythema, ecchymosis, or infection  No soft tissue swelling or effusion noted  No Palpable crepitus with passive motion  TTP over subacromial bursa  ROM °, °, ER 60°  Strength: 5/5 throughout  No glenohumeral instability appreciated on exam  + Neer's, + Camara  - empty can, - drop-arm, - " resisted external rotation, - belly press, - lift-off   Demonstrates normal elbow, wrist, and finger motion  2+  distal radial pulse with brisk capillary refill to the fingers  Radial, median, ulnar and motor  and sensory distribution intact  Sensation to light touch intact distally    bilateral wrist -  Patient presents with no obvious anatomical deformity  Skin is warm and dry to touch with no signs of erythema, ecchymosis, infection  ROM WNL  TTP over radiocarpal joint   Strength: 5/5 throughout  No soft tissue swelling or effusion noted  Full FDS, FDP, extensor mechanisms are intact  - Thenar atrophy, - intrinsic atrophy  - Tinel's at carpal tunnel  - Phalen's sign  - Carpal Tunnel Compression  - AP Drawer - LM Drawer  - Finkelstein   - Gauthier's  Demonstrates normal wrist, elbow, and shoulder motion  Forearm compartments are soft and supple  2+ Distal radial pulse with brisk capillary refill to the fingers  Radial, median, ulnar motor and sensory distribution intact  Sensation to light touch intact distally      Physical Exam  HENT:      Head: Normocephalic and atraumatic.      Nose: Nose normal.     Eyes:      Conjunctiva/sclera: Conjunctivae normal.       Cardiovascular:      Rate and Rhythm: Normal rate.   Pulmonary:      Effort: Pulmonary effort is normal.     Musculoskeletal:      Cervical back: Neck supple.     Skin:     General: Skin is warm and dry.      Capillary Refill: Capillary refill takes less than 2 seconds.     Neurological:      Mental Status: She is alert and oriented to person, place, and time.     Psychiatric:         Mood and Affect: Mood normal.         Behavior: Behavior normal.          Diagnostic Test Review:  No new imaging at time of visit.     Large joint arthrocentesis: R subacromial bursa    Performed by: Miguel Gleason DO  Authorized by: Miguel Gleason DO    Universal Protocol:  procedure performed by consultantConsent: Verbal consent obtained  Risks and benefits: risks,  benefits and alternatives were discussed  Consent given by: patient  Timeout called at: 7/8/2025 10:57 AM.  Patient understanding: patient states understanding of the procedure being performed  Patient consent: the patient's understanding of the procedure matches consent given  Site marked: the operative site was marked  Radiology Images displayed and confirmed. If images not available, report reviewed: imaging studies available  Patient identity confirmed: verbally with patient  Supporting Documentation  Indications: pain and joint swelling     Is this a Visco injection? NoProcedure Details  Location: shoulder - R subacromial bursa  Needle gauge: 21 G.  Ultrasound guidance: no  Approach: lateral  Medications administered: 4 mL bupivacaine 0.25 %; 80 mg triamcinolone acetonide 40 mg/mL    Patient tolerance: patient tolerated the procedure well with no immediate complications  Dressing:  Sterile dressing applied      Medium joint arthrocentesis: bilateral radiocarpal    Performed by: Miguel Gleason DO  Authorized by: Miguel Gleason DO    Universal Protocol:  procedure performed by consultantConsent: Verbal consent obtained  Risks and benefits: risks, benefits and alternatives were discussed  Consent given by: patient  Timeout called at: 7/8/2025 10:57 AM.  Patient understanding: patient states understanding of the procedure being performed  Patient consent: the patient's understanding of the procedure matches consent given  Test results: test results available and properly labeled  Site marked: the operative site was marked  Radiology Images displayed and confirmed. If images not available, report reviewed: imaging studies available  Supporting Documentation  Indications: pain and joint swelling   Procedure Details  Location: wrist - bilateral radiocarpal  Needle size: 25 G  Ultrasound guidance: no  Approach: dorsal    Medications (Right): 1 mL bupivacaine (PF) 0.25 %; 6 mg betamethasone acetate-betamethasone  sodium phosphate 6 (3-3) mg/mLMedications (Left): 1 mL bupivacaine (PF) 0.25 %; 6 mg betamethasone acetate-betamethasone sodium phosphate 6 (3-3) mg/mL              Scribe Attestation      I,:  Bouchra Bowser am acting as a scribe while in the presence of the attending physician.:       I,:  Miguel Gleason, DO personally performed the services described in this documentation    as scribed in my presence.:                   [1]   Past Medical History:  Diagnosis Date    Abnormal finding in urine     Anxiety     Arthralgia of multiple joints     Balance disorder     Biceps tendinitis, right     Bursitis of right knee     Bursitis of right shoulder     Chondrocalcinosis     Chronic bilateral low back pain without sciatica 11/29/2022    Chronic pain disorder     COPD exacerbation (HCC)     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     Drug intolerance     Dysfunction of right eustachian tube     Dysfunctional uterine bleeding     Edema     Elevated d-dimer     Exposure to hepatitis C     Fracture of fibula     Generalized anxiety disorder     GERD (gastroesophageal reflux disease)     Hyperlipidemia     Mild cervical dysplasia     Myalgia     Myositis     Palpitations     PONV (postoperative nausea and vomiting)     Pre-syncope     Scoliosis deformity of spine 1/23/2024    Seasonal allergies     Stress incontinence     Thrombocytopenia (HCC)     Urinary retention     Urinary tract infection     Uterine leiomyoma    [2]   Past Surgical History:  Procedure Laterality Date    BLADDER SURGERY      BREAST BIOPSY Right     US guided    COLONOSCOPY      COLPOSCOPY  01/06/1998    DENTAL SURGERY      ENDOMETRIAL BIOPSY  05/21/1993    FL GUIDED NEEDLE PLAC BX/ASP/INJ  3/19/2025    FL GUIDED NEEDLE PLAC BX/ASP/INJ  5/21/2025    HYSTERECTOMY      HYSTEROSCOPY      uterus- 1/22/98, 7/1/02 and 4/10/07    [place] ARTHRP KNE CONDYLE&PLATU MEDIAL&LAT COMPARTMENTS Right 2/8/2023    Procedure: ARTHROPLASTY KNEE TOTAL;   Surgeon: Miguel Gleason DO;  Location: [facility];  Service: Orthopedics    TUBAL LIGATION      US GUIDED BREAST BIOPSY RIGHT COMPLETE Right 08/08/2018   [3]   Family History  Problem Relation Name Age of Onset    Stroke Mother Ga         cerebral artery occlusion with cerebral infarction    Coronary artery disease Mother Evyonne     Arrhythmia Mother Ga         sinus    Coronary artery disease Father Kofi     Diabetes Father Kofi     No Known Problems Daughter chester     No Known Problems Daughter cam     No Known Problems Maternal Grandmother      No Known Problems Paternal Grandmother      No Known Problems Maternal Aunt      Ovarian cancer Maternal Aunt      Aortic aneurysm Family      Arthritis Family      Diabetes Family      Heart disease Family      Breast cancer Neg Hx     [4]   Social History  Socioeconomic History    Marital status: /Civil Union   Occupational History    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[employer]    Occupation: housewife   Tobacco Use    Smoking status: Every Day     Current packs/day: 1.50     Average packs/day: 1.5 packs/day for 53.5 years (80.3 ttl pk-yrs)     Types: Cigarettes     Start date: 1972     Passive exposure: Never    Smokeless tobacco: Never   Vaping Use    Vaping status: Never Used   Substance and Sexual Activity    Alcohol use: Not Currently    Drug use: No    Sexual activity: Not Currently   Social History Narrative    Caffeine use     Social Drivers of Health     Financial Resource Strain: Low Risk  (3/6/2023)    Overall Financial Resource Strain (CARDIA)     Difficulty of Paying Living Expenses: Not hard at all   Food Insecurity: No Food Insecurity (11/14/2024)    Nursing - Inadequate Food Risk Classification     Worried About Running Out of Food in the Last Year: Never true     Ran Out of Food in the Last Year: Never true   Transportation Needs: No Transportation Needs (11/14/2024)    PRAPARE - Transportation     Lack of  Transportation (Medical): No     Lack of Transportation (Non-Medical): No   Housing Stability: Low Risk  (11/14/2024)    Housing Stability Vital Sign     Unable to Pay for Housing in the Last Year: No     Number of Times Moved in the Last Year: 0     Homeless in the Last Year: No   [5]   Current Outpatient Medications:     acetaminophen (TYLENOL) 500 mg tablet, Take 500 mg by mouth every 6 (six) hours as needed for mild pain, Disp: , Rfl:     alendronate (FOSAMAX) 70 mg tablet, Take 1 tablet (70 mg total) by mouth every 7 days, Disp: 12 tablet, Rfl: 1    ascorbic acid (VITAMIN C) 1000 MG tablet, Take 1,000 mg by mouth, Disp: , Rfl:     atorvastatin (LIPITOR) 20 mg tablet, Take 1 tablet (20 mg total) by mouth daily after dinner, Disp: 90 tablet, Rfl: 1    buPROPion (WELLBUTRIN XL) 150 mg 24 hr tablet, Take 1 tablet (150 mg total) by mouth every morning, Disp: 90 tablet, Rfl: 1    busPIRone (BUSPAR) 30 MG tablet, Take 1 tablet (30 mg total) by mouth in the morning and 1 tablet (30 mg total) before bedtime., Disp: 180 tablet, Rfl: 1    celecoxib (CeleBREX) 200 mg capsule, Take 1 capsule (200 mg total) by mouth 2 (two) times a day, Disp: 90 capsule, Rfl: 1    celecoxib (CeleBREX) 200 mg capsule, Take 1 capsule (200 mg total) by mouth daily, Disp: 14 capsule, Rfl: 0    clonazePAM (KlonoPIN) 1 mg tablet, Take 1 tablet (1 mg total) by mouth as needed in the morning and 1 tablet (1 mg total) as needed in the evening for anxiety., Disp: 60 tablet, Rfl: 0    Diclofenac Sodium (VOLTAREN) 1 %, Apply 2 g topically 3 (three) times a day as needed (pain), Disp: 180 g, Rfl: 0    fluticasone (FLONASE) 50 mcg/act nasal spray, 1 spray into each nostril daily, Disp: 16 g, Rfl: 0    lidocaine (Lidoderm) 5 %, Apply 1 patch topically daily over 12 hours Remove & Discard patch within 12 hours or as directed by MD, Disp: 30 patch, Rfl: 2    methocarbamol (ROBAXIN) 750 mg tablet, Take 1 tablet (750 mg total) by mouth 3 (three) times a day,  Disp: 90 tablet, Rfl: 2    neomycin-polymyxin-hydrocortisone (CORTISPORIN) 0.35%-10,000 units/mL-1% otic suspension, Administer 4 drops into both ears 4 (four) times a day, Disp: 10 mL, Rfl: 1    omeprazole (PriLOSEC) 40 MG capsule, Take 1 capsule (40 mg total) by mouth daily, Disp: 90 capsule, Rfl: 1    pregabalin (LYRICA) 25 mg capsule, 1 PO QHSx 1 day, then 1 PO BID, then 1 PO TID, Disp: 90 capsule, Rfl: 1    solifenacin (VESICARE) 10 MG tablet, Take 1 tablet (10 mg total) by mouth daily, Disp: 90 tablet, Rfl: 1    tretinoin (RETIN-A) 0.1 % cream, APPLY TOPICALLY DAILY AT BEDTIME, Disp: 45 g, Rfl: 5    venlafaxine (EFFEXOR-XR) 150 mg 24 hr capsule, One 150mg and one 75mg q day for total of 225mg q day., Disp: 90 capsule, Rfl: 1    venlafaxine (EFFEXOR-XR) 75 mg 24 hr capsule, One 150mg and one 75mg q day for total of 225mg q day., Disp: 90 capsule, Rfl: 1

## 2025-07-08 NOTE — TELEPHONE ENCOUNTER
Caller: zeynep Carlson    Doctor: Dr. Kocher    Reason for call: pt went to  her pregabalin today and pharmacy said they don't have a script for her.  Can [facility]K possibly re-send it to [facility] - [place], [place] - [address] [phone]     Call back#: [phone]

## 2025-07-09 RX ORDER — PREGABALIN 25 MG/1
CAPSULE ORAL
Qty: 90 CAPSULE | Refills: 1 | Status: SHIPPED | OUTPATIENT
Start: 2025-07-09

## 2025-07-10 ENCOUNTER — TELEPHONE (OUTPATIENT)
Age: 70
End: 2025-07-10

## 2025-07-16 ENCOUNTER — OFFICE VISIT (OUTPATIENT)
Dept: INTERNAL MEDICINE CLINIC | Facility: CLINIC | Age: 70
End: 2025-07-16
Payer: MEDICARE

## 2025-07-16 VITALS
WEIGHT: 133 LBS | TEMPERATURE: 97.6 F | HEIGHT: 65 IN | SYSTOLIC BLOOD PRESSURE: 116 MMHG | OXYGEN SATURATION: 97 % | DIASTOLIC BLOOD PRESSURE: 70 MMHG | HEART RATE: 78 BPM | BODY MASS INDEX: 22.16 KG/M2

## 2025-07-16 DIAGNOSIS — R31.0 GROSS HEMATURIA: Primary | ICD-10-CM

## 2025-07-16 LAB
SL AMB  POCT GLUCOSE, UA: ABNORMAL
SL AMB LEUKOCYTE ESTERASE,UA: ABNORMAL
SL AMB POCT BILIRUBIN,UA: ABNORMAL
SL AMB POCT BLOOD,UA: ABNORMAL
SL AMB POCT CLARITY,UA: ABNORMAL
SL AMB POCT COLOR,UA: ABNORMAL
SL AMB POCT KETONES,UA: ABNORMAL
SL AMB POCT NITRITE,UA: ABNORMAL
SL AMB POCT URINE PROTEIN: ABNORMAL

## 2025-07-16 PROCEDURE — 87077 CULTURE AEROBIC IDENTIFY: CPT | Performed by: PHYSICIAN ASSISTANT

## 2025-07-16 PROCEDURE — 87086 URINE CULTURE/COLONY COUNT: CPT | Performed by: PHYSICIAN ASSISTANT

## 2025-07-16 PROCEDURE — 99214 OFFICE O/P EST MOD 30 MIN: CPT | Performed by: PHYSICIAN ASSISTANT

## 2025-07-16 PROCEDURE — 87186 SC STD MICRODIL/AGAR DIL: CPT | Performed by: PHYSICIAN ASSISTANT

## 2025-07-16 PROCEDURE — G2211 COMPLEX E/M VISIT ADD ON: HCPCS | Performed by: PHYSICIAN ASSISTANT

## 2025-07-16 PROCEDURE — 81002 URINALYSIS NONAUTO W/O SCOPE: CPT | Performed by: PHYSICIAN ASSISTANT

## 2025-07-16 RX ORDER — CIPROFLOXACIN 500 MG/1
500 TABLET, FILM COATED ORAL EVERY 12 HOURS SCHEDULED
Qty: 20 TABLET | Refills: 0 | Status: SHIPPED | OUTPATIENT
Start: 2025-07-16 | End: 2025-07-26

## 2025-07-16 NOTE — ASSESSMENT & PLAN NOTE
Differential includes UTI, nephrolithiasis, bladder CA. Will get urine culture. Start Cipro as a precaution. Will get CT stone study. Adjust treatment according to results.   Orders:  •  POCT urine dip  •  CT renal stone study abdomen pelvis wo contrast; Future  •  ciprofloxacin (CIPRO) 500 mg tablet; Take 1 tablet (500 mg total) by mouth every 12 (twelve) hours for 10 days  •  Urine culture; Future

## 2025-07-16 NOTE — PROGRESS NOTES
Name: Surekha Tenorio      : 1955      MRN: [id number]  Encounter Provider: Taniya Lowe PA-C  Encounter Date: 2025   Encounter department: [facility]  :  Assessment & Plan  Gross hematuria  Differential includes UTI, nephrolithiasis, bladder CA. Will get urine culture. Start Cipro as a precaution. Will get CT stone study. Adjust treatment according to results.   Orders:  •  POCT urine dip  •  CT renal stone study abdomen pelvis wo contrast; Future  •  ciprofloxacin (CIPRO) 500 mg tablet; Take 1 tablet (500 mg total) by mouth every 12 (twelve) hours for 10 days  •  Urine culture; Future          Depression Screening and Follow-up Plan: Patient was screened for depression during today's encounter. They screened negative with a PHQ-2 score of 0.      Falls Plan of Care: balance, strength, and gait training instructions were provided.     History of Present Illness   Pt presents complaining of gross blood in her urine x 2 days. Urine is maroon in color. She denies burning or painful urination, denies urgency or frequency, denies fever or chills. Denies hx of kidney stones. She does relate back pain but notes this is chronic so she didn't think it was associated to this issue. She is a 80 pack year smoker.     Review of Systems   Constitutional:  Negative for chills and fever.   HENT:  Negative for congestion, ear pain, hearing loss, postnasal drip, rhinorrhea, sinus pressure, sinus pain, sore throat and trouble swallowing.    Eyes:  Negative for pain and visual disturbance.   Respiratory:  Negative for cough, chest tightness, shortness of breath and wheezing.    Cardiovascular: Negative.  Negative for chest pain, palpitations and leg swelling.   Gastrointestinal:  Negative for abdominal pain, blood in stool, constipation, diarrhea, nausea and vomiting.   Endocrine: Negative for cold intolerance, heat intolerance, polydipsia, polyphagia and polyuria.   Genitourinary:  Positive for  "hematuria. Negative for difficulty urinating, dysuria, flank pain and urgency.   Musculoskeletal:  Positive for back pain. Negative for arthralgias, gait problem and myalgias.   Skin:  Negative for rash.   Allergic/Immunologic: Negative.    Neurological:  Negative for dizziness, weakness, light-headedness and headaches.   Hematological: Negative.    Psychiatric/Behavioral:  Negative for behavioral problems, dysphoric mood and sleep disturbance. The patient is not nervous/anxious.        Objective   /70 (BP Location: Right arm, Patient Position: Sitting, Cuff Size: Large)   Pulse 78   Temp 97.6 °F (36.4 °C)   Ht 5' 5\" (1.651 m)   Wt 60.3 kg (133 lb)   SpO2 97%   BMI 22.13 kg/m²      Physical Exam  Vitals and nursing note reviewed.   Constitutional:       General: She is not in acute distress.     Appearance: Normal appearance. She is well-developed. She is not diaphoretic.   HENT:      Head: Normocephalic and atraumatic.      Right Ear: External ear normal.      Left Ear: External ear normal.      Nose: Nose normal.      Mouth/Throat:      Pharynx: No oropharyngeal exudate.     Eyes:      General: No scleral icterus.        Right eye: No discharge.         Left eye: No discharge.      Conjunctiva/sclera: Conjunctivae normal.      Pupils: Pupils are equal, round, and reactive to light.     Neck:      Thyroid: No thyromegaly.     Cardiovascular:      Rate and Rhythm: Normal rate and regular rhythm.      Heart sounds: Normal heart sounds. No murmur heard.     No friction rub. No gallop.   Pulmonary:      Effort: Pulmonary effort is normal. No respiratory distress.      Breath sounds: Normal breath sounds. No wheezing or rales.   Abdominal:      General: Bowel sounds are normal. There is no distension.      Palpations: Abdomen is soft.      Tenderness: There is no abdominal tenderness.     Musculoskeletal:         General: No tenderness or deformity. Normal range of motion.      Cervical back: Normal range of " motion and neck supple.     Skin:     General: Skin is warm and dry.     Neurological:      Mental Status: She is alert and oriented to person, place, and time.      Cranial Nerves: No cranial nerve deficit.     Psychiatric:         Behavior: Behavior normal.         Thought Content: Thought content normal.         Judgment: Judgment normal.

## 2025-07-18 LAB — BACTERIA UR CULT: ABNORMAL

## 2025-07-28 ENCOUNTER — HOSPITAL ENCOUNTER (OUTPATIENT)
Dept: CT IMAGING | Facility: HOSPITAL | Age: 70
Discharge: HOME | End: 2025-07-28
Attending: PHYSICIAN ASSISTANT
Payer: MEDICARE

## 2025-07-28 DIAGNOSIS — R31.0 GROSS HEMATURIA: ICD-10-CM

## 2025-07-28 PROCEDURE — 74176 CT ABD & PELVIS W/O CONTRAST: CPT

## 2025-07-29 ENCOUNTER — TELEPHONE (OUTPATIENT)
Age: 70
End: 2025-07-29

## 2025-08-08 DIAGNOSIS — L30.9 DERMATITIS: ICD-10-CM

## 2025-08-08 RX ORDER — TRETINOIN 1 MG/G
CREAM TOPICAL
Qty: 45 G | Refills: 5 | Status: SHIPPED | OUTPATIENT
Start: 2025-08-08

## 2025-08-12 ENCOUNTER — TELEPHONE (OUTPATIENT)
Dept: PAIN MEDICINE | Facility: CLINIC | Age: 70
End: 2025-08-12

## (undated) DEVICE — SKIN MARKER DUAL TIP WITH RULER CAP, FLEXIBLE RULER AND LABELS: Brand: DEVON

## (undated) DEVICE — ACE WRAP 6 IN XL STERILE

## (undated) DEVICE — READY WET SKIN SCRUB TRAY-LF: Brand: MEDLINE INDUSTRIES, INC.

## (undated) DEVICE — PREP SURGICAL PURPREP 26ML

## (undated) DEVICE — 3M™ STERI-DRAPE™ U-DRAPE 1015: Brand: STERI-DRAPE™

## (undated) DEVICE — CEMENT BOWL VACUUM MIXING

## (undated) DEVICE — HOOD WITH PEEL AWAY FACE SHIELD: Brand: T7PLUS

## (undated) DEVICE — FRAZIER SUCTION INSTRUMENT 18 FR W/OBTURATOR, NO CONTROL VENT: Brand: FRAZIER

## (undated) DEVICE — BLOOD CONSERVATION SYSTEM WITH QUICK DISCONNECT AND PVC DRAIN: Brand: CBCII CONSTAVAC

## (undated) DEVICE — PADDING CAST 6IN COTTON STRL

## (undated) DEVICE — PLUMEPEN PRO 10FT

## (undated) DEVICE — STOCKINETTE,IMPERVIOUS,12X48,STERILE: Brand: MEDLINE

## (undated) DEVICE — GAUZE SPONGES,16 PLY: Brand: CURITY

## (undated) DEVICE — NO-SCRATCH ™ SMALL WHITNEY CURETTE ™ IS A SINGLE-USE, PLASTIC CURETTE FOR QUICKLY APPLYING, MANIPULATING AND REMOVING BONE CEMENT DURING HIP AND KNEE REPLACEMENT SURGERY. THE PLASTIC IS SOFTER THAN STEEL INSTRUMENTS, REDUCING THE RISK OF DAMAGING THE PROSTHESIS WITH METAL INSTRUMENTS.  THE CURETTE’S 6MM TIP REMOVES EXCESS CEMENT FROM REPLACEMENT HIPS AND KNEES. EASY-TO-MANEUVER, THE SMALL BLUE CURETTE LETS YOU REMOVE CEMENT FROM ALL EDGES OF THE PROSTHESIS.NO-SCRATCH WHITNEY SMALL CURETTE FEATURES:SAFER THAN STEEL- MADE OF PLASTIC - STURDY YET SOFTER THAN SURGICAL STEEL.HANDIER- EACH TOOL HAS A MOLDED-IN THUMB INDENTATION INSTANTLY ORIENTING THE TOOL.- EASIER TO MANEUVER IN HARD TO SEE PLACES.- COLOR-CODED FOR EASY IDENTIFICATION.FASTER- COMES INDIVIDUALLY PACKAGED IN STERILE, PEEL OPEN POUCH, READY TO GO.- APPLIES, MANIPULATES, OR REMOVES CEMENT WITH FINGERTIP PRECISION.ECONOMICAL- THE COST OF A SINGLE REVISION DWARFS THE COST OF A SINGLE-USE CURETTE. - DISPOSABLE – THERE’S NO NEED TO WASTE TIME REMOVING HARDENED CEMENT OR RE-STERILIZING TOOLS.- LESS EXPENSIVE TO BUY AND INVENTORY - ORDER ONLY THE TOOL YOU USE.- PACKAGED 25 INDIVIDUALLY WRAPPED TOOLS TO A CARTON FOR CONVENIENT SHELF STORAGE.: Brand: WHITNEY NO-SCRATCH CURETTE (SMALL)

## (undated) DEVICE — FAN SPRAY KIT: Brand: PULSAVAC®

## (undated) DEVICE — 4-PORT MANIFOLD: Brand: NEPTUNE 2

## (undated) DEVICE — INTENDED FOR TISSUE SEPARATION, AND OTHER PROCEDURES THAT REQUIRE A SHARP SURGICAL BLADE TO PUNCTURE OR CUT.: Brand: BARD-PARKER ® CARBON RIB-BACK BLADES

## (undated) DEVICE — SAW BLADE RECIPROCATING 179

## (undated) DEVICE — GLOVE INDICATOR PI UNDERGLOVE SZ 8 BLUE

## (undated) DEVICE — MEDI-VAC YANK SUCT HNDL W/TPRD BULBOUS TIP: Brand: CARDINAL HEALTH

## (undated) DEVICE — SUT VICRYL 1 CP 27 IN J196H

## (undated) DEVICE — Device

## (undated) DEVICE — SUT VICRYL 2-0 CP-1 27 IN J266H

## (undated) DEVICE — GLOVE SRG BIOGEL 7.5

## (undated) DEVICE — 3M™ IOBAN™ 2 ANTIMICROBIAL INCISE DRAPE 6651EZ: Brand: IOBAN™ 2

## (undated) DEVICE — TUBING SUCTION 5MM X 12 FT

## (undated) DEVICE — STRAP LITHOTOMY CANDY CANE

## (undated) DEVICE — STAPLER SKIN 35 WIDE ULC APPOSE

## (undated) DEVICE — 3M™ DURAPORE™ SURGICAL TAPE 1538-3, 3 INCH X 10 YARD (7,5CM X 9,1M), 4 ROLLS/BOX: Brand: 3M™ DURAPORE™

## (undated) DEVICE — DRESSING XEROFORM 5 X 9

## (undated) DEVICE — COBAN 6 IN STERILE

## (undated) DEVICE — GLOVE INDICATOR PI UNDERGLOVE SZ 7.5 BLUE

## (undated) DEVICE — BETHLEHEM UNIV TOTAL KNEE, KIT: Brand: CARDINAL HEALTH

## (undated) DEVICE — BLADE OSCILLATOR 86.0 X 19.5MM

## (undated) DEVICE — HOOD: Brand: T7PLUS

## (undated) DEVICE — TIBURON SPLIT SHEET: Brand: CONVERTORS

## (undated) DEVICE — ASTOUND FABRIC REINFORCED SURGICAL GOWN: Brand: CONVERTORS

## (undated) DEVICE — SUT VICRYL 0 CP-1 27 IN J267H

## (undated) DEVICE — HEMOVAC TUBING 1/4 IN

## (undated) DEVICE — ABDOMINAL PAD: Brand: DERMACEA

## (undated) DEVICE — TOWEL SURG XR DETECT GREEN STRL RFD

## (undated) DEVICE — BLADE REAMER PATELLA 46MM

## (undated) DEVICE — GLOVE SRG BIOGEL 8

## (undated) DEVICE — CUFF TOURNIQUET 30 X 4 IN QUICK CONNECT DISP 1BLA